# Patient Record
Sex: FEMALE | Race: WHITE | NOT HISPANIC OR LATINO | Employment: OTHER | URBAN - METROPOLITAN AREA
[De-identification: names, ages, dates, MRNs, and addresses within clinical notes are randomized per-mention and may not be internally consistent; named-entity substitution may affect disease eponyms.]

---

## 2017-01-11 ENCOUNTER — LAB REQUISITION (OUTPATIENT)
Dept: LAB | Facility: HOSPITAL | Age: 82
End: 2017-01-11
Payer: COMMERCIAL

## 2017-01-11 DIAGNOSIS — E78.5 HYPERLIPIDEMIA: ICD-10-CM

## 2017-01-11 LAB
ALBUMIN SERPL BCP-MCNC: 3.8 G/DL (ref 3.5–5)
ALP SERPL-CCNC: 59 U/L (ref 46–116)
ALT SERPL W P-5'-P-CCNC: 24 U/L (ref 12–78)
ANION GAP SERPL CALCULATED.3IONS-SCNC: 7 MMOL/L (ref 4–13)
AST SERPL W P-5'-P-CCNC: 20 U/L (ref 5–45)
BILIRUB SERPL-MCNC: 0.6 MG/DL (ref 0.2–1)
BUN SERPL-MCNC: 17 MG/DL (ref 5–25)
CALCIUM SERPL-MCNC: 8.5 MG/DL (ref 8.3–10.1)
CHLORIDE SERPL-SCNC: 102 MMOL/L (ref 100–108)
CHOLEST SERPL-MCNC: 233 MG/DL (ref 50–200)
CO2 SERPL-SCNC: 31 MMOL/L (ref 21–32)
CREAT SERPL-MCNC: 0.74 MG/DL (ref 0.6–1.3)
GFR SERPL CREATININE-BSD FRML MDRD: >60 ML/MIN/1.73SQ M
GLUCOSE SERPL-MCNC: 87 MG/DL (ref 65–140)
HDLC SERPL-MCNC: 85 MG/DL (ref 40–60)
LDLC SERPL CALC-MCNC: 122 MG/DL (ref 0–100)
POTASSIUM SERPL-SCNC: 4.8 MMOL/L (ref 3.5–5.3)
PROT SERPL-MCNC: 6.7 G/DL (ref 6.4–8.2)
SODIUM SERPL-SCNC: 140 MMOL/L (ref 136–145)
TRIGL SERPL-MCNC: 132 MG/DL

## 2017-01-11 PROCEDURE — 80053 COMPREHEN METABOLIC PANEL: CPT | Performed by: INTERNAL MEDICINE

## 2017-01-11 PROCEDURE — 80061 LIPID PANEL: CPT | Performed by: INTERNAL MEDICINE

## 2017-01-13 ENCOUNTER — ALLSCRIPTS OFFICE VISIT (OUTPATIENT)
Dept: OTHER | Facility: OTHER | Age: 82
End: 2017-01-13

## 2017-01-18 ENCOUNTER — GENERIC CONVERSION - ENCOUNTER (OUTPATIENT)
Dept: OTHER | Facility: OTHER | Age: 82
End: 2017-01-18

## 2017-01-26 ENCOUNTER — APPOINTMENT (OUTPATIENT)
Dept: LAB | Facility: MEDICAL CENTER | Age: 82
End: 2017-01-26
Payer: COMMERCIAL

## 2017-01-26 ENCOUNTER — TRANSCRIBE ORDERS (OUTPATIENT)
Dept: ADMINISTRATIVE | Facility: HOSPITAL | Age: 82
End: 2017-01-26

## 2017-01-26 DIAGNOSIS — E03.9 UNSPECIFIED HYPOTHYROIDISM: Primary | ICD-10-CM

## 2017-01-26 LAB — TSH SERPL DL<=0.05 MIU/L-ACNC: 0.2 UIU/ML (ref 0.36–3.74)

## 2017-01-26 PROCEDURE — 84443 ASSAY THYROID STIM HORMONE: CPT | Performed by: INTERNAL MEDICINE

## 2017-01-26 PROCEDURE — 36415 COLL VENOUS BLD VENIPUNCTURE: CPT | Performed by: INTERNAL MEDICINE

## 2017-01-29 ENCOUNTER — GENERIC CONVERSION - ENCOUNTER (OUTPATIENT)
Dept: OTHER | Facility: OTHER | Age: 82
End: 2017-01-29

## 2017-02-21 ENCOUNTER — TRANSCRIBE ORDERS (OUTPATIENT)
Dept: LAB | Facility: HOSPITAL | Age: 82
End: 2017-02-21

## 2017-02-21 ENCOUNTER — OFFICE VISIT (OUTPATIENT)
Dept: LAB | Facility: HOSPITAL | Age: 82
End: 2017-02-21
Attending: INTERNAL MEDICINE
Payer: COMMERCIAL

## 2017-02-21 DIAGNOSIS — E03.9 HYPOTHYROIDISM: ICD-10-CM

## 2017-02-21 DIAGNOSIS — E03.9 UNSPECIFIED HYPOTHYROIDISM: ICD-10-CM

## 2017-02-21 DIAGNOSIS — C05.0 MALIGNANT NEOPLASM OF HARD PALATE (HCC): ICD-10-CM

## 2017-02-21 DIAGNOSIS — E03.9 UNSPECIFIED HYPOTHYROIDISM: Primary | ICD-10-CM

## 2017-02-21 LAB
ALBUMIN SERPL BCP-MCNC: 3.6 G/DL (ref 3.5–5)
ALP SERPL-CCNC: 72 U/L (ref 46–116)
ALT SERPL W P-5'-P-CCNC: 22 U/L (ref 12–78)
ANION GAP SERPL CALCULATED.3IONS-SCNC: 6 MMOL/L (ref 4–13)
AST SERPL W P-5'-P-CCNC: 18 U/L (ref 5–45)
BASOPHILS # BLD AUTO: 0.05 THOUSANDS/ΜL (ref 0–0.1)
BASOPHILS NFR BLD AUTO: 1 % (ref 0–1)
BILIRUB SERPL-MCNC: 0.48 MG/DL (ref 0.2–1)
BUN SERPL-MCNC: 18 MG/DL (ref 5–25)
CALCIUM SERPL-MCNC: 8.6 MG/DL (ref 8.3–10.1)
CHLORIDE SERPL-SCNC: 105 MMOL/L (ref 100–108)
CO2 SERPL-SCNC: 29 MMOL/L (ref 21–32)
CREAT SERPL-MCNC: 0.73 MG/DL (ref 0.6–1.3)
EOSINOPHIL # BLD AUTO: 0.11 THOUSAND/ΜL (ref 0–0.61)
EOSINOPHIL NFR BLD AUTO: 1 % (ref 0–6)
ERYTHROCYTE [DISTWIDTH] IN BLOOD BY AUTOMATED COUNT: 14.3 % (ref 11.6–15.1)
GFR SERPL CREATININE-BSD FRML MDRD: >60 ML/MIN/1.73SQ M
GLUCOSE SERPL-MCNC: 97 MG/DL (ref 65–140)
HCT VFR BLD AUTO: 40 % (ref 34.8–46.1)
HGB BLD-MCNC: 13 G/DL (ref 11.5–15.4)
LYMPHOCYTES # BLD AUTO: 2.12 THOUSANDS/ΜL (ref 0.6–4.47)
LYMPHOCYTES NFR BLD AUTO: 24 % (ref 14–44)
MCH RBC QN AUTO: 29 PG (ref 26.8–34.3)
MCHC RBC AUTO-ENTMCNC: 32.5 G/DL (ref 31.4–37.4)
MCV RBC AUTO: 89 FL (ref 82–98)
MONOCYTES # BLD AUTO: 0.56 THOUSAND/ΜL (ref 0.17–1.22)
MONOCYTES NFR BLD AUTO: 6 % (ref 4–12)
NEUTROPHILS # BLD AUTO: 5.98 THOUSANDS/ΜL (ref 1.85–7.62)
NEUTS SEG NFR BLD AUTO: 68 % (ref 43–75)
NRBC BLD AUTO-RTO: 0 /100 WBCS
PLATELET # BLD AUTO: 223 THOUSANDS/UL (ref 149–390)
PMV BLD AUTO: 11.3 FL (ref 8.9–12.7)
POTASSIUM SERPL-SCNC: 4.3 MMOL/L (ref 3.5–5.3)
PROT SERPL-MCNC: 7.4 G/DL (ref 6.4–8.2)
RBC # BLD AUTO: 4.49 MILLION/UL (ref 3.81–5.12)
SODIUM SERPL-SCNC: 140 MMOL/L (ref 136–145)
TSH SERPL DL<=0.05 MIU/L-ACNC: 0.55 UIU/ML (ref 0.36–3.74)
VENIPUNCTURE: NORMAL
WBC # BLD AUTO: 8.83 THOUSAND/UL (ref 4.31–10.16)

## 2017-02-21 PROCEDURE — 36415 COLL VENOUS BLD VENIPUNCTURE: CPT

## 2017-02-21 PROCEDURE — 80053 COMPREHEN METABOLIC PANEL: CPT

## 2017-02-21 PROCEDURE — 85025 COMPLETE CBC W/AUTO DIFF WBC: CPT

## 2017-02-21 PROCEDURE — 84443 ASSAY THYROID STIM HORMONE: CPT

## 2017-02-27 ENCOUNTER — TRANSCRIBE ORDERS (OUTPATIENT)
Dept: ADMINISTRATIVE | Facility: HOSPITAL | Age: 82
End: 2017-02-27

## 2017-02-27 ENCOUNTER — ALLSCRIPTS OFFICE VISIT (OUTPATIENT)
Dept: OTHER | Facility: OTHER | Age: 82
End: 2017-02-27

## 2017-02-27 DIAGNOSIS — C05.0 MALIGNANT NEOPLASM OF HARD PALATE (HCC): Primary | ICD-10-CM

## 2017-03-13 ENCOUNTER — ALLSCRIPTS OFFICE VISIT (OUTPATIENT)
Dept: OTHER | Facility: OTHER | Age: 82
End: 2017-03-13

## 2017-03-13 DIAGNOSIS — M54.12 RADICULOPATHY OF CERVICAL REGION: ICD-10-CM

## 2017-03-13 DIAGNOSIS — M54.2 CERVICALGIA: ICD-10-CM

## 2017-03-16 ENCOUNTER — HOSPITAL ENCOUNTER (OUTPATIENT)
Dept: RADIOLOGY | Facility: MEDICAL CENTER | Age: 82
Discharge: HOME | End: 2017-03-16
Payer: COMMERCIAL

## 2017-03-16 DIAGNOSIS — M54.2 CERVICALGIA: ICD-10-CM

## 2017-03-16 PROCEDURE — 72050 X-RAY EXAM NECK SPINE 4/5VWS: CPT

## 2017-03-20 ENCOUNTER — GENERIC CONVERSION - ENCOUNTER (OUTPATIENT)
Dept: OTHER | Facility: OTHER | Age: 82
End: 2017-03-20

## 2017-03-23 ENCOUNTER — TRANSCRIBE ORDERS (OUTPATIENT)
Dept: ADMINISTRATIVE | Facility: HOSPITAL | Age: 82
End: 2017-03-23

## 2017-03-23 DIAGNOSIS — M54.12 BRACHIAL NEURITIS OR RADICULITIS NOS: Primary | ICD-10-CM

## 2017-04-04 ENCOUNTER — HOSPITAL ENCOUNTER (OUTPATIENT)
Dept: MRI IMAGING | Facility: HOSPITAL | Age: 82
Discharge: HOME | End: 2017-04-04
Payer: COMMERCIAL

## 2017-04-04 ENCOUNTER — ALLSCRIPTS OFFICE VISIT (OUTPATIENT)
Dept: OTHER | Facility: OTHER | Age: 82
End: 2017-04-04

## 2017-04-04 DIAGNOSIS — M54.12 RADICULOPATHY OF CERVICAL REGION: ICD-10-CM

## 2017-04-04 PROCEDURE — 72141 MRI NECK SPINE W/O DYE: CPT

## 2017-04-05 ENCOUNTER — ALLSCRIPTS OFFICE VISIT (OUTPATIENT)
Dept: OTHER | Facility: OTHER | Age: 82
End: 2017-04-05

## 2017-04-06 ENCOUNTER — GENERIC CONVERSION - ENCOUNTER (OUTPATIENT)
Dept: OTHER | Facility: OTHER | Age: 82
End: 2017-04-06

## 2017-04-24 ENCOUNTER — TRANSCRIBE ORDERS (OUTPATIENT)
Dept: LAB | Facility: CLINIC | Age: 82
End: 2017-04-24

## 2017-04-24 ENCOUNTER — ALLSCRIPTS OFFICE VISIT (OUTPATIENT)
Dept: OTHER | Facility: OTHER | Age: 82
End: 2017-04-24

## 2017-04-24 ENCOUNTER — APPOINTMENT (OUTPATIENT)
Dept: LAB | Facility: CLINIC | Age: 82
End: 2017-04-24
Payer: COMMERCIAL

## 2017-04-24 ENCOUNTER — APPOINTMENT (OUTPATIENT)
Dept: PREADMISSION TESTING | Facility: HOSPITAL | Age: 82
End: 2017-04-24
Payer: COMMERCIAL

## 2017-04-24 DIAGNOSIS — M54.2 CERVICALGIA: ICD-10-CM

## 2017-04-24 DIAGNOSIS — M12.88 OTHER SPECIFIC ARTHROPATHIES, NOT ELSEWHERE CLASSIFIED, OTHER SPECIFIED SITE: ICD-10-CM

## 2017-04-24 DIAGNOSIS — M48.00 SPINAL STENOSIS: ICD-10-CM

## 2017-04-24 DIAGNOSIS — R32 UNSPECIFIED URINARY INCONTINENCE: Primary | ICD-10-CM

## 2017-04-24 DIAGNOSIS — M50.30 OTHER CERVICAL DISC DEGENERATION, UNSPECIFIED CERVICAL REGION: ICD-10-CM

## 2017-04-24 DIAGNOSIS — Z01.818 PREOP EXAMINATION: ICD-10-CM

## 2017-04-24 DIAGNOSIS — M99.81 OTHER BIOMECHANICAL LESIONS OF CERVICAL REGION: ICD-10-CM

## 2017-04-24 DIAGNOSIS — R32 UNSPECIFIED URINARY INCONTINENCE: ICD-10-CM

## 2017-04-24 LAB
ANION GAP SERPL CALCULATED.3IONS-SCNC: 7 MMOL/L (ref 4–13)
APTT PPP: 34 SECONDS (ref 24–36)
BASOPHILS # BLD AUTO: 0.05 THOUSANDS/ΜL (ref 0–0.1)
BASOPHILS NFR BLD AUTO: 1 % (ref 0–1)
BUN SERPL-MCNC: 20 MG/DL (ref 5–25)
CALCIUM SERPL-MCNC: 9.1 MG/DL (ref 8.3–10.1)
CHLORIDE SERPL-SCNC: 102 MMOL/L (ref 100–108)
CO2 SERPL-SCNC: 30 MMOL/L (ref 21–32)
CREAT SERPL-MCNC: 0.77 MG/DL (ref 0.6–1.3)
EOSINOPHIL # BLD AUTO: 0.13 THOUSAND/ΜL (ref 0–0.61)
EOSINOPHIL NFR BLD AUTO: 1 % (ref 0–6)
ERYTHROCYTE [DISTWIDTH] IN BLOOD BY AUTOMATED COUNT: 14.7 % (ref 11.6–15.1)
GFR SERPL CREATININE-BSD FRML MDRD: >60 ML/MIN/1.73SQ M
GLUCOSE SERPL-MCNC: 107 MG/DL (ref 65–140)
HCT VFR BLD AUTO: 40 % (ref 34.8–46.1)
HGB BLD-MCNC: 13 G/DL (ref 11.5–15.4)
INR PPP: 1 (ref 0.86–1.16)
LYMPHOCYTES # BLD AUTO: 2.3 THOUSANDS/ΜL (ref 0.6–4.47)
LYMPHOCYTES NFR BLD AUTO: 24 % (ref 14–44)
MCH RBC QN AUTO: 29.1 PG (ref 26.8–34.3)
MCHC RBC AUTO-ENTMCNC: 32.5 G/DL (ref 31.4–37.4)
MCV RBC AUTO: 90 FL (ref 82–98)
MONOCYTES # BLD AUTO: 0.94 THOUSAND/ΜL (ref 0.17–1.22)
MONOCYTES NFR BLD AUTO: 10 % (ref 4–12)
NEUTROPHILS # BLD AUTO: 6.17 THOUSANDS/ΜL (ref 1.85–7.62)
NEUTS SEG NFR BLD AUTO: 64 % (ref 43–75)
PLATELET # BLD AUTO: 259 THOUSANDS/UL (ref 149–390)
PMV BLD AUTO: 11.1 FL (ref 8.9–12.7)
POTASSIUM SERPL-SCNC: 4 MMOL/L (ref 3.5–5.3)
PROTHROMBIN TIME: 13 SECONDS (ref 12–14.3)
RBC # BLD AUTO: 4.46 MILLION/UL (ref 3.81–5.12)
SODIUM SERPL-SCNC: 139 MMOL/L (ref 136–145)
WBC # BLD AUTO: 9.59 THOUSAND/UL (ref 4.31–10.16)

## 2017-04-24 PROCEDURE — 85610 PROTHROMBIN TIME: CPT

## 2017-04-24 PROCEDURE — 36415 COLL VENOUS BLD VENIPUNCTURE: CPT

## 2017-04-24 PROCEDURE — 80048 BASIC METABOLIC PNL TOTAL CA: CPT

## 2017-04-24 PROCEDURE — 85730 THROMBOPLASTIN TIME PARTIAL: CPT

## 2017-04-24 PROCEDURE — 85025 COMPLETE CBC W/AUTO DIFF WBC: CPT

## 2017-04-26 ENCOUNTER — HOSPITAL ENCOUNTER (EMERGENCY)
Facility: HOSPITAL | Age: 82
Discharge: HOME | End: 2017-04-26
Attending: EMERGENCY MEDICINE
Payer: COMMERCIAL

## 2017-04-26 VITALS
TEMPERATURE: 97.5 F | RESPIRATION RATE: 18 BRPM | DIASTOLIC BLOOD PRESSURE: 81 MMHG | BODY MASS INDEX: 25.61 KG/M2 | SYSTOLIC BLOOD PRESSURE: 169 MMHG | OXYGEN SATURATION: 92 % | WEIGHT: 140 LBS | HEART RATE: 69 BPM

## 2017-04-26 DIAGNOSIS — M54.2 NECK PAIN: Primary | ICD-10-CM

## 2017-04-26 DIAGNOSIS — M62.838 NECK MUSCLE SPASM: ICD-10-CM

## 2017-04-26 PROCEDURE — 99283 EMERGENCY DEPT VISIT LOW MDM: CPT

## 2017-04-26 RX ORDER — LIDOCAINE 50 MG/G
1 PATCH TOPICAL ONCE
Status: DISCONTINUED | OUTPATIENT
Start: 2017-04-26 | End: 2017-04-26 | Stop reason: HOSPADM

## 2017-04-26 RX ORDER — DIAZEPAM 5 MG/1
5 TABLET ORAL ONCE
Status: COMPLETED | OUTPATIENT
Start: 2017-04-26 | End: 2017-04-26

## 2017-04-26 RX ORDER — LIDOCAINE 50 MG/G
1 PATCH TOPICAL ONCE
Qty: 30 PATCH | Refills: 0 | Status: SHIPPED | OUTPATIENT
Start: 2017-04-26 | End: 2018-03-05

## 2017-04-26 RX ADMIN — DIAZEPAM 5 MG: 5 TABLET ORAL at 18:56

## 2017-04-26 RX ADMIN — LIDOCAINE 1 PATCH: 50 PATCH CUTANEOUS at 18:57

## 2017-05-01 ENCOUNTER — ALLSCRIPTS OFFICE VISIT (OUTPATIENT)
Dept: OTHER | Facility: OTHER | Age: 82
End: 2017-05-01

## 2017-05-02 ENCOUNTER — GENERIC CONVERSION - ENCOUNTER (OUTPATIENT)
Dept: OTHER | Facility: OTHER | Age: 82
End: 2017-05-02

## 2017-05-02 ENCOUNTER — APPOINTMENT (OUTPATIENT)
Dept: PHYSICAL THERAPY | Facility: CLINIC | Age: 82
End: 2017-05-02
Payer: COMMERCIAL

## 2017-05-02 DIAGNOSIS — M54.2 CERVICALGIA: ICD-10-CM

## 2017-05-02 DIAGNOSIS — M99.81 OTHER BIOMECHANICAL LESIONS OF CERVICAL REGION: ICD-10-CM

## 2017-05-02 DIAGNOSIS — M50.30 OTHER CERVICAL DISC DEGENERATION, UNSPECIFIED CERVICAL REGION: ICD-10-CM

## 2017-05-02 DIAGNOSIS — M12.88 OTHER SPECIFIC ARTHROPATHIES, NOT ELSEWHERE CLASSIFIED, OTHER SPECIFIED SITE: ICD-10-CM

## 2017-05-02 DIAGNOSIS — M48.00 SPINAL STENOSIS: ICD-10-CM

## 2017-05-02 PROCEDURE — 97162 PT EVAL MOD COMPLEX 30 MIN: CPT

## 2017-05-02 PROCEDURE — G8979 MOBILITY GOAL STATUS: HCPCS

## 2017-05-02 PROCEDURE — 97110 THERAPEUTIC EXERCISES: CPT

## 2017-05-02 PROCEDURE — G8978 MOBILITY CURRENT STATUS: HCPCS

## 2017-05-08 ENCOUNTER — APPOINTMENT (OUTPATIENT)
Dept: PHYSICAL THERAPY | Facility: CLINIC | Age: 82
End: 2017-05-08
Payer: COMMERCIAL

## 2017-05-08 RX ORDER — ACETAMINOPHEN 500 MG
500 TABLET ORAL EVERY 6 HOURS PRN
COMMUNITY
End: 2020-07-15 | Stop reason: ALTCHOICE

## 2017-05-09 ENCOUNTER — APPOINTMENT (OUTPATIENT)
Dept: PHYSICAL THERAPY | Facility: CLINIC | Age: 82
End: 2017-05-09
Payer: COMMERCIAL

## 2017-05-11 ENCOUNTER — GENERIC CONVERSION - ENCOUNTER (OUTPATIENT)
Dept: OTHER | Facility: OTHER | Age: 82
End: 2017-05-11

## 2017-05-12 ENCOUNTER — APPOINTMENT (OUTPATIENT)
Dept: PHYSICAL THERAPY | Facility: CLINIC | Age: 82
End: 2017-05-12
Payer: COMMERCIAL

## 2017-05-12 PROCEDURE — 97140 MANUAL THERAPY 1/> REGIONS: CPT

## 2017-05-12 PROCEDURE — 97110 THERAPEUTIC EXERCISES: CPT

## 2017-05-16 ENCOUNTER — APPOINTMENT (OUTPATIENT)
Dept: PHYSICAL THERAPY | Facility: CLINIC | Age: 82
End: 2017-05-16
Payer: COMMERCIAL

## 2017-05-16 PROCEDURE — 97140 MANUAL THERAPY 1/> REGIONS: CPT

## 2017-05-16 PROCEDURE — 97110 THERAPEUTIC EXERCISES: CPT

## 2017-05-18 ENCOUNTER — ANESTHESIA EVENT (OUTPATIENT)
Dept: PERIOP | Facility: HOSPITAL | Age: 82
End: 2017-05-18
Payer: COMMERCIAL

## 2017-05-19 ENCOUNTER — HOSPITAL ENCOUNTER (OUTPATIENT)
Facility: HOSPITAL | Age: 82
Setting detail: OUTPATIENT SURGERY
Discharge: HOME | End: 2017-05-19
Attending: UROLOGY | Admitting: UROLOGY
Payer: COMMERCIAL

## 2017-05-19 ENCOUNTER — ANESTHESIA (OUTPATIENT)
Dept: PERIOP | Facility: HOSPITAL | Age: 82
End: 2017-05-19
Payer: COMMERCIAL

## 2017-05-19 VITALS
HEIGHT: 62 IN | OXYGEN SATURATION: 92 % | SYSTOLIC BLOOD PRESSURE: 151 MMHG | WEIGHT: 140 LBS | TEMPERATURE: 97.6 F | RESPIRATION RATE: 16 BRPM | HEART RATE: 68 BPM | BODY MASS INDEX: 25.76 KG/M2 | DIASTOLIC BLOOD PRESSURE: 61 MMHG

## 2017-05-19 PROCEDURE — L8606 SYNTHETIC IMPLNT URINARY 1ML: HCPCS | Performed by: UROLOGY

## 2017-05-19 DEVICE — IMPLANTABLE DEVICE: Type: IMPLANTABLE DEVICE | Site: URETHRA | Status: FUNCTIONAL

## 2017-05-19 RX ORDER — ACETAMINOPHEN 325 MG/1
650 TABLET ORAL EVERY 4 HOURS PRN
Status: DISCONTINUED | OUTPATIENT
Start: 2017-05-19 | End: 2017-05-19 | Stop reason: HOSPADM

## 2017-05-19 RX ORDER — FENTANYL CITRATE/PF 50 MCG/ML
25 SYRINGE (ML) INJECTION
Status: DISCONTINUED | OUTPATIENT
Start: 2017-05-19 | End: 2017-05-19 | Stop reason: HOSPADM

## 2017-05-19 RX ORDER — HYDROCODONE BITARTRATE AND ACETAMINOPHEN 5; 325 MG/1; MG/1
1 TABLET ORAL EVERY 4 HOURS PRN
Status: DISCONTINUED | OUTPATIENT
Start: 2017-05-19 | End: 2017-05-19 | Stop reason: HOSPADM

## 2017-05-19 RX ORDER — CIPROFLOXACIN 500 MG/1
500 TABLET, FILM COATED ORAL 2 TIMES DAILY
Qty: 6 TABLET | Refills: 0 | Status: SHIPPED | OUTPATIENT
Start: 2017-05-19 | End: 2017-05-22

## 2017-05-19 RX ORDER — SODIUM CHLORIDE, SODIUM LACTATE, POTASSIUM CHLORIDE, CALCIUM CHLORIDE 600; 310; 30; 20 MG/100ML; MG/100ML; MG/100ML; MG/100ML
50 INJECTION, SOLUTION INTRAVENOUS CONTINUOUS
Status: DISCONTINUED | OUTPATIENT
Start: 2017-05-19 | End: 2017-05-19 | Stop reason: HOSPADM

## 2017-05-19 RX ORDER — HYDROCODONE BITARTRATE AND ACETAMINOPHEN 5; 325 MG/1; MG/1
1 TABLET ORAL EVERY 6 HOURS PRN
Qty: 20 TABLET | Refills: 0 | Status: SHIPPED | OUTPATIENT
Start: 2017-05-19 | End: 2017-05-29

## 2017-05-19 RX ORDER — ONDANSETRON 2 MG/ML
4 INJECTION INTRAMUSCULAR; INTRAVENOUS ONCE
Status: DISCONTINUED | OUTPATIENT
Start: 2017-05-19 | End: 2017-05-19 | Stop reason: HOSPADM

## 2017-05-19 RX ORDER — MAGNESIUM HYDROXIDE 1200 MG/15ML
LIQUID ORAL AS NEEDED
Status: DISCONTINUED | OUTPATIENT
Start: 2017-05-19 | End: 2017-05-19 | Stop reason: HOSPADM

## 2017-05-19 RX ORDER — ONDANSETRON 2 MG/ML
4 INJECTION INTRAMUSCULAR; INTRAVENOUS EVERY 4 HOURS PRN
Status: DISCONTINUED | OUTPATIENT
Start: 2017-05-19 | End: 2017-05-19 | Stop reason: HOSPADM

## 2017-05-19 RX ORDER — MORPHINE SULFATE 4 MG/ML
4 INJECTION, SOLUTION INTRAMUSCULAR; INTRAVENOUS EVERY 4 HOURS PRN
Status: DISCONTINUED | OUTPATIENT
Start: 2017-05-19 | End: 2017-05-19 | Stop reason: HOSPADM

## 2017-05-19 RX ORDER — CIPROFLOXACIN 2 MG/ML
400 INJECTION, SOLUTION INTRAVENOUS ONCE
Status: DISCONTINUED | OUTPATIENT
Start: 2017-05-19 | End: 2017-05-19 | Stop reason: HOSPADM

## 2017-05-19 RX ADMIN — FENTANYL CITRATE 25 MCG: 50 INJECTION INTRAMUSCULAR; INTRAVENOUS at 10:01

## 2017-05-19 RX ADMIN — FENTANYL CITRATE 25 MCG: 50 INJECTION INTRAMUSCULAR; INTRAVENOUS at 10:09

## 2017-05-19 RX ADMIN — SODIUM CHLORIDE, SODIUM LACTATE, POTASSIUM CHLORIDE, AND CALCIUM CHLORIDE: .6; .31; .03; .02 INJECTION, SOLUTION INTRAVENOUS at 09:15

## 2017-05-19 RX ADMIN — CIPROFLOXACIN 400 MG: 2 INJECTION INTRAVENOUS at 09:15

## 2017-05-22 ENCOUNTER — APPOINTMENT (OUTPATIENT)
Dept: PHYSICAL THERAPY | Facility: CLINIC | Age: 82
End: 2017-05-22
Payer: COMMERCIAL

## 2017-05-22 PROCEDURE — 97110 THERAPEUTIC EXERCISES: CPT

## 2017-05-22 PROCEDURE — 97140 MANUAL THERAPY 1/> REGIONS: CPT

## 2017-05-25 ENCOUNTER — APPOINTMENT (OUTPATIENT)
Dept: PHYSICAL THERAPY | Facility: CLINIC | Age: 82
End: 2017-05-25
Payer: COMMERCIAL

## 2017-05-26 ENCOUNTER — APPOINTMENT (OUTPATIENT)
Dept: PHYSICAL THERAPY | Facility: CLINIC | Age: 82
End: 2017-05-26
Payer: COMMERCIAL

## 2017-05-26 PROCEDURE — 97140 MANUAL THERAPY 1/> REGIONS: CPT

## 2017-05-26 PROCEDURE — 97110 THERAPEUTIC EXERCISES: CPT

## 2017-05-30 ENCOUNTER — APPOINTMENT (OUTPATIENT)
Dept: PHYSICAL THERAPY | Facility: CLINIC | Age: 82
End: 2017-05-30
Payer: COMMERCIAL

## 2017-06-01 ENCOUNTER — APPOINTMENT (OUTPATIENT)
Dept: PHYSICAL THERAPY | Facility: CLINIC | Age: 82
End: 2017-06-01
Payer: COMMERCIAL

## 2017-06-01 PROCEDURE — 97140 MANUAL THERAPY 1/> REGIONS: CPT

## 2017-06-01 PROCEDURE — 97110 THERAPEUTIC EXERCISES: CPT

## 2017-06-08 ENCOUNTER — APPOINTMENT (OUTPATIENT)
Dept: LAB | Facility: CLINIC | Age: 82
End: 2017-06-08
Payer: COMMERCIAL

## 2017-06-08 ENCOUNTER — TRANSCRIBE ORDERS (OUTPATIENT)
Dept: LAB | Facility: CLINIC | Age: 82
End: 2017-06-08

## 2017-06-08 ENCOUNTER — ALLSCRIPTS OFFICE VISIT (OUTPATIENT)
Dept: OTHER | Facility: OTHER | Age: 82
End: 2017-06-08

## 2017-06-08 DIAGNOSIS — M12.88 OTHER SPECIFIC ARTHROPATHIES, NOT ELSEWHERE CLASSIFIED, OTHER SPECIFIED SITE: ICD-10-CM

## 2017-06-08 DIAGNOSIS — M79.10 MYALGIA: ICD-10-CM

## 2017-06-08 LAB
25(OH)D3 SERPL-MCNC: 29.6 NG/ML (ref 30–100)
CK MB SERPL-MCNC: 1 % (ref 0–2.5)
CK MB SERPL-MCNC: 2.2 NG/ML (ref 0–5)
CK SERPL-CCNC: 218 U/L (ref 26–192)
CRP SERPL QL: 4.3 MG/L
ERYTHROCYTE [SEDIMENTATION RATE] IN BLOOD: 24 MM/HOUR (ref 0–20)

## 2017-06-08 PROCEDURE — 36415 COLL VENOUS BLD VENIPUNCTURE: CPT

## 2017-06-08 PROCEDURE — 85652 RBC SED RATE AUTOMATED: CPT

## 2017-06-08 PROCEDURE — 82085 ASSAY OF ALDOLASE: CPT

## 2017-06-08 PROCEDURE — 86140 C-REACTIVE PROTEIN: CPT

## 2017-06-08 PROCEDURE — 82553 CREATINE MB FRACTION: CPT

## 2017-06-08 PROCEDURE — 82306 VITAMIN D 25 HYDROXY: CPT

## 2017-06-08 PROCEDURE — 82550 ASSAY OF CK (CPK): CPT

## 2017-06-09 ENCOUNTER — GENERIC CONVERSION - ENCOUNTER (OUTPATIENT)
Dept: OTHER | Facility: OTHER | Age: 82
End: 2017-06-09

## 2017-06-09 LAB — ALDOLASE SERPL-CCNC: 9.1 U/L (ref 3.3–10.3)

## 2017-06-12 ENCOUNTER — GENERIC CONVERSION - ENCOUNTER (OUTPATIENT)
Dept: OTHER | Facility: OTHER | Age: 82
End: 2017-06-12

## 2017-06-29 ENCOUNTER — ALLSCRIPTS OFFICE VISIT (OUTPATIENT)
Dept: OTHER | Facility: OTHER | Age: 82
End: 2017-06-29

## 2017-07-11 ENCOUNTER — APPOINTMENT (OUTPATIENT)
Dept: PHYSICAL THERAPY | Facility: CLINIC | Age: 82
End: 2017-07-11
Payer: COMMERCIAL

## 2017-07-11 ENCOUNTER — APPOINTMENT (OUTPATIENT)
Dept: LAB | Facility: CLINIC | Age: 82
End: 2017-07-11
Payer: COMMERCIAL

## 2017-07-11 ENCOUNTER — ALLSCRIPTS OFFICE VISIT (OUTPATIENT)
Dept: OTHER | Facility: OTHER | Age: 82
End: 2017-07-11

## 2017-07-11 DIAGNOSIS — R32 URINARY INCONTINENCE: ICD-10-CM

## 2017-07-11 DIAGNOSIS — M12.88 OTHER SPECIFIC ARTHROPATHIES, NOT ELSEWHERE CLASSIFIED, OTHER SPECIFIED SITE: ICD-10-CM

## 2017-07-11 LAB
BACTERIA UR QL AUTO: ABNORMAL /HPF
BILIRUB UR QL STRIP: NEGATIVE
CLARITY UR: CLEAR
COLOR UR: YELLOW
GLUCOSE UR STRIP-MCNC: NEGATIVE MG/DL
HGB UR QL STRIP.AUTO: NEGATIVE
HYALINE CASTS #/AREA URNS LPF: ABNORMAL /LPF
KETONES UR STRIP-MCNC: NEGATIVE MG/DL
LEUKOCYTE ESTERASE UR QL STRIP: ABNORMAL
NITRITE UR QL STRIP: NEGATIVE
NON-SQ EPI CELLS URNS QL MICRO: ABNORMAL /HPF
PH UR STRIP.AUTO: 6 [PH] (ref 4.5–8)
PROT UR STRIP-MCNC: ABNORMAL MG/DL
RBC #/AREA URNS AUTO: ABNORMAL /HPF
SP GR UR STRIP.AUTO: 1.02 (ref 1–1.03)
UROBILINOGEN UR QL STRIP.AUTO: 0.2 E.U./DL
WBC #/AREA URNS AUTO: ABNORMAL /HPF

## 2017-07-11 PROCEDURE — 97163 PT EVAL HIGH COMPLEX 45 MIN: CPT

## 2017-07-11 PROCEDURE — G8990 OTHER PT/OT CURRENT STATUS: HCPCS

## 2017-07-11 PROCEDURE — 97014 ELECTRIC STIMULATION THERAPY: CPT

## 2017-07-11 PROCEDURE — 81001 URINALYSIS AUTO W/SCOPE: CPT

## 2017-07-11 PROCEDURE — G8991 OTHER PT/OT GOAL STATUS: HCPCS

## 2017-07-12 ENCOUNTER — GENERIC CONVERSION - ENCOUNTER (OUTPATIENT)
Dept: OTHER | Facility: OTHER | Age: 82
End: 2017-07-12

## 2017-07-18 ENCOUNTER — APPOINTMENT (OUTPATIENT)
Dept: PHYSICAL THERAPY | Facility: CLINIC | Age: 82
End: 2017-07-18
Payer: COMMERCIAL

## 2017-07-18 PROCEDURE — 97014 ELECTRIC STIMULATION THERAPY: CPT

## 2017-07-31 ENCOUNTER — ALLSCRIPTS OFFICE VISIT (OUTPATIENT)
Dept: OTHER | Facility: OTHER | Age: 82
End: 2017-07-31

## 2017-07-31 ENCOUNTER — APPOINTMENT (OUTPATIENT)
Dept: PHYSICAL THERAPY | Facility: REHABILITATION | Age: 82
End: 2017-07-31
Payer: COMMERCIAL

## 2017-07-31 PROCEDURE — G8990 OTHER PT/OT CURRENT STATUS: HCPCS

## 2017-07-31 PROCEDURE — G8991 OTHER PT/OT GOAL STATUS: HCPCS

## 2017-07-31 PROCEDURE — 97161 PT EVAL LOW COMPLEX 20 MIN: CPT

## 2017-07-31 PROCEDURE — 97530 THERAPEUTIC ACTIVITIES: CPT

## 2017-08-08 ENCOUNTER — GENERIC CONVERSION - ENCOUNTER (OUTPATIENT)
Dept: OTHER | Facility: OTHER | Age: 82
End: 2017-08-08

## 2017-08-09 ENCOUNTER — GENERIC CONVERSION - ENCOUNTER (OUTPATIENT)
Dept: OTHER | Facility: OTHER | Age: 82
End: 2017-08-09

## 2017-08-10 ENCOUNTER — GENERIC CONVERSION - ENCOUNTER (OUTPATIENT)
Dept: OTHER | Facility: OTHER | Age: 82
End: 2017-08-10

## 2017-08-10 ENCOUNTER — APPOINTMENT (OUTPATIENT)
Dept: PHYSICAL THERAPY | Facility: REHABILITATION | Age: 82
End: 2017-08-10
Payer: COMMERCIAL

## 2017-08-10 PROCEDURE — 97112 NEUROMUSCULAR REEDUCATION: CPT

## 2017-08-15 ENCOUNTER — ALLSCRIPTS OFFICE VISIT (OUTPATIENT)
Dept: RADIOLOGY | Facility: CLINIC | Age: 82
End: 2017-08-15
Payer: COMMERCIAL

## 2017-08-17 ENCOUNTER — APPOINTMENT (OUTPATIENT)
Dept: LAB | Facility: CLINIC | Age: 82
End: 2017-08-17
Payer: COMMERCIAL

## 2017-08-17 ENCOUNTER — GENERIC CONVERSION - ENCOUNTER (OUTPATIENT)
Dept: OTHER | Facility: OTHER | Age: 82
End: 2017-08-17

## 2017-08-17 ENCOUNTER — ALLSCRIPTS OFFICE VISIT (OUTPATIENT)
Dept: OTHER | Facility: OTHER | Age: 82
End: 2017-08-17

## 2017-08-17 DIAGNOSIS — R74.8 ABNORMAL LEVELS OF OTHER SERUM ENZYMES: ICD-10-CM

## 2017-08-17 DIAGNOSIS — C05.0 MALIGNANT NEOPLASM OF HARD PALATE (HCC): ICD-10-CM

## 2017-08-17 LAB — CK SERPL-CCNC: 105 U/L (ref 26–192)

## 2017-08-17 PROCEDURE — 36415 COLL VENOUS BLD VENIPUNCTURE: CPT

## 2017-08-17 PROCEDURE — 82550 ASSAY OF CK (CPK): CPT

## 2017-08-21 ENCOUNTER — HOSPITAL ENCOUNTER (OUTPATIENT)
Dept: CT IMAGING | Facility: HOSPITAL | Age: 82
Discharge: HOME | End: 2017-08-21
Attending: INTERNAL MEDICINE
Payer: COMMERCIAL

## 2017-08-21 DIAGNOSIS — C05.0 MALIGNANT NEOPLASM OF HARD PALATE (HCC): ICD-10-CM

## 2017-08-21 PROCEDURE — 70490 CT SOFT TISSUE NECK W/O DYE: CPT

## 2017-08-24 ENCOUNTER — APPOINTMENT (OUTPATIENT)
Dept: PHYSICAL THERAPY | Facility: REHABILITATION | Age: 82
End: 2017-08-24
Payer: COMMERCIAL

## 2017-08-24 PROCEDURE — 97112 NEUROMUSCULAR REEDUCATION: CPT

## 2017-08-24 PROCEDURE — 97530 THERAPEUTIC ACTIVITIES: CPT

## 2017-08-28 ENCOUNTER — ALLSCRIPTS OFFICE VISIT (OUTPATIENT)
Dept: OTHER | Facility: OTHER | Age: 82
End: 2017-08-28

## 2017-08-28 ENCOUNTER — TRANSCRIBE ORDERS (OUTPATIENT)
Dept: ADMINISTRATIVE | Facility: HOSPITAL | Age: 82
End: 2017-08-28

## 2017-08-28 DIAGNOSIS — C05.0 MALIGNANT NEOPLASM OF HARD PALATE (HCC): Primary | ICD-10-CM

## 2017-09-08 ENCOUNTER — LAB (OUTPATIENT)
Dept: LAB | Facility: CLINIC | Age: 82
End: 2017-09-08
Payer: COMMERCIAL

## 2017-09-08 DIAGNOSIS — C05.0 MALIGNANT NEOPLASM OF HARD PALATE (HCC): ICD-10-CM

## 2017-09-08 LAB
ALBUMIN SERPL BCP-MCNC: 3.7 G/DL (ref 3.5–5)
ALP SERPL-CCNC: 73 U/L (ref 46–116)
ALT SERPL W P-5'-P-CCNC: 22 U/L (ref 12–78)
ANION GAP SERPL CALCULATED.3IONS-SCNC: 8 MMOL/L (ref 4–13)
AST SERPL W P-5'-P-CCNC: 18 U/L (ref 5–45)
BASOPHILS # BLD AUTO: 0.03 THOUSANDS/ΜL (ref 0–0.1)
BASOPHILS NFR BLD AUTO: 0 % (ref 0–1)
BILIRUB SERPL-MCNC: 0.5 MG/DL (ref 0.2–1)
BUN SERPL-MCNC: 25 MG/DL (ref 5–25)
CALCIUM SERPL-MCNC: 8.7 MG/DL (ref 8.3–10.1)
CHLORIDE SERPL-SCNC: 104 MMOL/L (ref 100–108)
CO2 SERPL-SCNC: 28 MMOL/L (ref 21–32)
CREAT SERPL-MCNC: 0.98 MG/DL (ref 0.6–1.3)
EOSINOPHIL # BLD AUTO: 0.11 THOUSAND/ΜL (ref 0–0.61)
EOSINOPHIL NFR BLD AUTO: 1 % (ref 0–6)
ERYTHROCYTE [DISTWIDTH] IN BLOOD BY AUTOMATED COUNT: 14.1 % (ref 11.6–15.1)
GFR SERPL CREATININE-BSD FRML MDRD: 53 ML/MIN/1.73SQ M
GLUCOSE SERPL-MCNC: 88 MG/DL (ref 65–140)
HCT VFR BLD AUTO: 38.3 % (ref 34.8–46.1)
HGB BLD-MCNC: 12.3 G/DL (ref 11.5–15.4)
LYMPHOCYTES # BLD AUTO: 2.15 THOUSANDS/ΜL (ref 0.6–4.47)
LYMPHOCYTES NFR BLD AUTO: 24 % (ref 14–44)
MCH RBC QN AUTO: 30 PG (ref 26.8–34.3)
MCHC RBC AUTO-ENTMCNC: 32.1 G/DL (ref 31.4–37.4)
MCV RBC AUTO: 93 FL (ref 82–98)
MONOCYTES # BLD AUTO: 0.93 THOUSAND/ΜL (ref 0.17–1.22)
MONOCYTES NFR BLD AUTO: 11 % (ref 4–12)
NEUTROPHILS # BLD AUTO: 5.64 THOUSANDS/ΜL (ref 1.85–7.62)
NEUTS SEG NFR BLD AUTO: 64 % (ref 43–75)
NRBC BLD AUTO-RTO: 0 /100 WBCS
PLATELET # BLD AUTO: 230 THOUSANDS/UL (ref 149–390)
PMV BLD AUTO: 11.5 FL (ref 8.9–12.7)
POTASSIUM SERPL-SCNC: 4.3 MMOL/L (ref 3.5–5.3)
PROT SERPL-MCNC: 7 G/DL (ref 6.4–8.2)
RBC # BLD AUTO: 4.1 MILLION/UL (ref 3.81–5.12)
SODIUM SERPL-SCNC: 140 MMOL/L (ref 136–145)
WBC # BLD AUTO: 8.88 THOUSAND/UL (ref 4.31–10.16)

## 2017-09-08 PROCEDURE — 80053 COMPREHEN METABOLIC PANEL: CPT

## 2017-09-08 PROCEDURE — 36415 COLL VENOUS BLD VENIPUNCTURE: CPT

## 2017-09-08 PROCEDURE — 85025 COMPLETE CBC W/AUTO DIFF WBC: CPT

## 2017-10-18 ENCOUNTER — GENERIC CONVERSION - ENCOUNTER (OUTPATIENT)
Dept: OTHER | Facility: OTHER | Age: 82
End: 2017-10-18

## 2017-10-19 ENCOUNTER — ALLSCRIPTS OFFICE VISIT (OUTPATIENT)
Dept: OTHER | Facility: OTHER | Age: 82
End: 2017-10-19

## 2017-10-19 DIAGNOSIS — I10 ESSENTIAL (PRIMARY) HYPERTENSION: ICD-10-CM

## 2017-10-19 DIAGNOSIS — M85.80 OTHER SPECIFIED DISORDERS OF BONE DENSITY AND STRUCTURE, UNSPECIFIED SITE (CODE): ICD-10-CM

## 2017-10-19 DIAGNOSIS — M99.01 SEGMENTAL AND SOMATIC DYSFUNCTION OF CERVICAL REGION: ICD-10-CM

## 2017-10-23 ENCOUNTER — GENERIC CONVERSION - ENCOUNTER (OUTPATIENT)
Dept: OTHER | Facility: OTHER | Age: 82
End: 2017-10-23

## 2017-10-23 ENCOUNTER — APPOINTMENT (OUTPATIENT)
Dept: PHYSICAL THERAPY | Facility: CLINIC | Age: 82
End: 2017-10-23
Payer: COMMERCIAL

## 2017-10-23 DIAGNOSIS — M99.01 SEGMENTAL AND SOMATIC DYSFUNCTION OF CERVICAL REGION: ICD-10-CM

## 2017-10-23 PROCEDURE — G8982 BODY POS GOAL STATUS: HCPCS

## 2017-10-23 PROCEDURE — G8981 BODY POS CURRENT STATUS: HCPCS

## 2017-10-23 PROCEDURE — 97162 PT EVAL MOD COMPLEX 30 MIN: CPT

## 2017-10-26 ENCOUNTER — APPOINTMENT (OUTPATIENT)
Dept: PHYSICAL THERAPY | Facility: CLINIC | Age: 82
End: 2017-10-26
Payer: COMMERCIAL

## 2017-10-26 PROCEDURE — 97110 THERAPEUTIC EXERCISES: CPT

## 2017-10-30 ENCOUNTER — APPOINTMENT (OUTPATIENT)
Dept: PHYSICAL THERAPY | Facility: CLINIC | Age: 82
End: 2017-10-30
Payer: COMMERCIAL

## 2017-10-30 PROCEDURE — 97110 THERAPEUTIC EXERCISES: CPT

## 2017-10-30 PROCEDURE — 97140 MANUAL THERAPY 1/> REGIONS: CPT

## 2017-11-02 ENCOUNTER — APPOINTMENT (OUTPATIENT)
Dept: PHYSICAL THERAPY | Facility: CLINIC | Age: 82
End: 2017-11-02
Payer: COMMERCIAL

## 2017-11-02 PROCEDURE — 97014 ELECTRIC STIMULATION THERAPY: CPT

## 2017-11-02 PROCEDURE — 97140 MANUAL THERAPY 1/> REGIONS: CPT

## 2017-11-06 ENCOUNTER — ALLSCRIPTS OFFICE VISIT (OUTPATIENT)
Dept: OTHER | Facility: OTHER | Age: 82
End: 2017-11-06

## 2017-11-07 NOTE — PROGRESS NOTES
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)   7  Vitamin D deficiency (268 9) (E55 9)   8  Osteopenia (733 90) (M85 80)    Assessment plan 1  Asthma might help persistence I recommended the patient increase the Advair to 500/50 but the patient declined she will monitor symptoms try to avoid the triggers and his symptoms were to worsen she will notify me  2 benign essential hypertension controlled continue with current medical regimen will check a comprehensive metabolic panel and lipid panel 3  Hallucinations secondary to a eye problem currently stable and no recurrence 4  Hypothyroidism check 3rd generation TSH 5  Malignant neoplasm of the hard palate currently stable patient is seeing Hematology Oncology  6 vitamin-D deficiency check a vitamin-D level  7 osteopenia will check a DEXA scan RTO in 6 months call vein problems  Patient received the high-dose flu vaccine today     Plan  Benign essential hypertension    · (1) COMPREHENSIVE METABOLIC PANEL; Status:Active; Requested KFL:[id number];    · (1) LIPID PANEL, FASTING; Status:Active; Requested for:06Nov2017;   Hypothyroidism    · (Q) TSH, 3RD GENERATION; Status:Active; Requested UDS:[id number];   Osteopenia    · * DXA BONE DENSITY SPINE HIP AND PELVIS; Status:Hold For - Scheduling; Requested  GWB:[id number];   Vitamin D deficiency    · *(Q) VITAMIN D, 25-HYDROXY, LC/MS/MS; Status:Active; Requested OQR:[id number];     Chief Complaint  Chief Complaint Chronic Condition [facility]: Patient is here today for follow up of chronic conditions described in HPI        History of Present Illness  HPI: 20-year-old female who is coming in for a follow-up examination benign essential hypertension, anxiety, asthma, hallucinations; the patient reports me that the after returning she developed cough and fatigue but no fever no chills treatment but symptoms did get better on their own  She does report to me she is following a healthy diet ; the pt reports sad about the political , she gets up to dance , asthma triggers with diesel truck smoke she is trying t o avoid   uses albuterol daily  the hallucination secondary to the eye problem lolly lorraine syndrome the sx resolved no si no depression      Review of Systems  Complete-Female:   Constitutional: No fever, no chills, feels well, no tiredness, no recent weight gain or weight loss  Cardiovascular: No complaints of slow heart rate, no fast heart rate, no chest pain, no palpitations, no leg claudication, no lower extremity edema  Respiratory: No complaints of shortness of breath, no wheezing, no cough, no SOB on exertion, no orthopnea, no PND  Gastrointestinal: No complaints of abdominal pain, no constipation, no nausea or vomiting, no diarrhea, no bloody stools  Genitourinary: No complaints of dysuria, no incontinence, no pelvic pain, no dysmenorrhea, no vaginal discharge or bleeding  ROS Reviewed:   ROS reviewed  Active Problems  1  Abnormal LH level (259 9) (E34 9)   2  Allergy to IVP dye (995 27,E947 4) (T50 995A)   3  Anemia (285 9) (D64 9)   4  History of Aneurysm of aortic root (441 9) (I71 9)   5  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)   6  History of Aortic root dilatation (447 71) (I77 810)   7  Arthritis (716 90) (M19 90)   8  History of Ascending aortic aneurysm (441 2) (I71 2)   9  Asthma (493 90) (J45 909)   10  Atherosclerosis of aorta (440 0) (I70 0)   11  Basal cell carcinoma of skin (173 91) (C44 91)   12  Benign essential hypertension (401 1) (I10)   13  Bloating (787 3) (R14 0)   14  Calf cramp (729 82) (R25 2)   15  Central stenosis of spinal canal (724 00) (M48 00)   16  Cervical disc disorder of mid-cervical region (722 91) (M50 920)   17   Cervical radiculopathy (723 4) (M54 12)   18  Cervical spinal stenosis (723 0) (M48 02)   19  Cervical spondylosis (721 0) (M47 812)   20  Cervicalgia (723 1) (M54 2)   21  Chronic migraine without aura (346 70) (G43 709)   22  Chronic neck pain (723 1,338 29) (M54 2,G89 29)   23  Classic migraine with aura (346 00) (G43 109)   24  Clostridium difficile diarrhea (008 45) (A04 72)   25  Contrast media allergy (V15 08) (Z91 041)   26  Cough (786 2) (R05)   27  DDD (degenerative disc disease), cervical (722 4) (M50 30)   28  Decreased GFR (794 4) (R94 4)   29  Depression (311) (F32 9)   30  Diarrhea (787 91) (R19 7)   31  Difficulty breathing (786 09) (R06 89)   32  Dysphagia (787 20) (R13 10)   33  Elevated CK (790 5) (R74 8)   34  Esophagitis, reflux (530 11) (K21 0)   35  Facet arthropathy, cervical (721 0) (M12 88)   36  Foraminal stenosis of cervical region (723 0) (M99 81)   37  Hallucination (780 1) (R44 3)   38  Head ache (784 0) (R51)   39  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)   40  Hyperlipidemia (272 4) (E78 5)   41  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)   42  Hypophonia (784 49) (R49 8)   43  Hypothyroidism (244 9) (E03 9)   44  History of Junctional rhythm (427 89) (I49 8)   45  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)   46  Migraine aura without headache (migraine equivalents) (346 00) (G43 109)   47  Myalgia (729 1) (M79 1)   48  Myositis of multiple sites, unspecified myositis type (729 1) (M60 9)   49  Myositis of other site, unspecified myositis type (729 1) (M60 9)   50  Neck muscle spasm (728 85) (M62 838)   51  Need for influenza vaccination (V04 81) (Z23)   52  Need for pneumococcal vaccination (V03 82) (Z23)   53  Night sweats (780 8) (R61)   54  Obstructive sleep apnea (327 23) (G47 33)   55  Osteopenia (733 90) (M85 80)   56  Other specified symptom or sign involving circulatory or respiratory system (785 9,786 9) (R09 89)   57  Paroxysmal atrial fibrillation (427 31) (I48 0)   58   History of Pleural effusion, bilateral (511 9) (J90)   59  Polymyalgia rheumatica (725) (M35 3)   60  PPD screening test (V74 1) (Z11 1)   61  Preoperative clearance (V72 84) (Z01 818)   62  Preventive Medicine Estab Patient Checkup Adult Over 59 (V70 0)   63  Restless legs syndrome (333 94) (G25 81)   64  S/P trigger finger release (V45 89) (Z98 890)   65  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)   66  Screening for genitourinary condition (V81 6) (Z13 89)   67  Screening for neurological condition (V80 09) (Z13 89)   68  Shortness of breath (786 05) (R06 02)   69  Somatic dysfunction of cervical region (739 1) (M99 01)   70  Stress incontinence in female (625 6) (N39 3)   71  Stress incontinence, male (788 32) (N39 3)   72  Suicidal overdose (977 9,E950 5) (T50 902A)   73  Tricuspid regurgitation (397 0) (I07 1)   74  Urinary incontinence (788 30) (R32)   75  Urinary tract infection (599 0) (N39 0)   76  Vitamin D deficiency (268 9) (E55 9)   77  Xerostomia (527 7) (R68 2)    Past Medical History  1  Allergy to IVP dye (995 27,E947 4) (T50 995A)   2  History of Aneurysm of aortic root (441 9) (I71 9)   3  History of Aortic root dilatation (447 71) (I77 810)   4  History of Ascending aortic aneurysm (441 2) (I71 2)   5  History of Facet arthropathy, cervical (721 0) (M12 88)   6  History of Fracture Of The Vertebral Column (805 8)   7  History of basal cell carcinoma (V10 83) (Z85 828)   8  History of urinary incontinence (V13 09) (Z87 898)   9  History of urinary incontinence (V13 09) (Z87 898)   10  History of Hypoxia (799 02) (R09 02)   11  History of Junctional rhythm (427 89) (I49 8)   12  History of Lightheadedness (780 4) (R42)   13  History of Palpitations (785 1) (R00 2)   14  History of Pleural effusion, bilateral (511 9) (J90)   15  History of Thyroid trouble (246 9) (E07 9)   16  History of Trigger finger (727 03) (M65 30)   17   History of Trigger middle finger of right hand (727 03) (M65 331)  Active Problems And Past Medical History Reviewed: The active problems and past medical history were reviewed and updated today  Surgical History  1  History of Appendectomy   2  History of Ascending Aortic Aneurysm Repair With Graft   3  Recurrent history of Bladder Surgery   4  History of Cardiac Cath Procedure Summary   5  History of Hysterectomy   6  Preventive Medicine Estab Patient Checkup Adult Over 64 (V70 0)   7  History of Thyroid Surgery Total Thyroidectomy   8  History of Tonsillectomy With Adenoidectomy  Surgical History Reviewed: The surgical history was reviewed and updated today  Family History  Sister    1  Family history of CABG  Family History    2  Family history of Family Health Status Of Father -    3  Family history of Family Health Status Of Mother -    3  Family history of cardiac disorder (V17 49) (Z82 49)   5  Family history of hypertension (V17 49) (Z82 49)   6  Family history of malignant neoplasm (V16 9) (Z80 9)   7  Family history of thyroid disease (V18 19) (Z83 49)  Family History Reviewed: The family history was reviewed and updated today  Social History   · Being A Social Drinker   · Denied: History of Drug Use   · Former smoker (V15 82) (E38 739)   · Marital History -    · Never Drank Alcohol  Social History Reviewed: The social history was reviewed and updated today  The social history was reviewed and is unchanged  Current Meds   1  Advair Diskus 250-50 MCG/DOSE Inhalation Aerosol Powder Breath Activated; INHALE 1 PUFF   TWICE DAILY; Therapy: 67YNR6047 to [phone])  Requested for: 25FNC7662; Last Rx:2017   Ordered   2  Albuterol Sulfate (2 5 MG/3ML) 0 083% Inhalation Nebulization Solution; USE 1 UNIT DOSE EVERY   4-6 HOURS AS NEEDED FOR WHEEZING ; Therapy: 76KGX7459 to (Last Rx:2017)  Requested for: 30JCZ7052 Ordered   3  AmLODIPine Besylate 5 MG Oral Tablet; TAKE 1 TABLET BY MOUTH EVERY DAY;    Therapy: 34JJN3612 to (Evaluate:65Iwk5479)  Requested for: 25Oct2017; Last Rx:71Dnw6340   Ordered   4  Aspirin 81 MG TABS; TAKE 1 TABLET DAILY; Therapy: (Recorded:06Igk2971) to  Requested for: 91PFY4627 Recorded   5  Calcium CAPS; Therapy: (Recorded:08Jun2017) to Recorded   6  Levothyroxine Sodium 100 MCG Oral Tablet; TAKE 1 TABLET DAILY AS DIRECTED; Therapy: 70DLD4829 to (Rula Walden)  Requested for: 62NEI2344; Last Rx:06Mar2017   Ordered   7  Lovastatin 20 MG Oral Tablet; TAKE 1 TABLET DAILY AS DIRECTED; Therapy: 49HGT4361 to (Cherrie Jones)  Requested for: 63POG6745; Last Rx:05Oct2016   Ordered   8  Metoprolol Tartrate 25 MG Oral Tablet; Take 1 tablet twice daily; Therapy: 60ZER2842 to (Evaluate:30Mar2018)  Requested for: 95HJB1563; Last Rx:04Apr2017   Ordered   9  Multi-Vitamin Oral Tablet; TAKE 1 TABLET DAILY; Therapy: 82GQM7323 to Recorded   10  Peak Flow Meter Device; USE AS DIRECTED; Therapy: 37ASC0905 to (Last Rx:13Jan2017)  Requested for: 28LBG1003 Ordered   11  PreserVision AREDS Oral Capsule; Take 1 capsule twice daily; Therapy: 63Lok7876 to Recorded   12  Tylenol Extra Strength 500 MG Oral Tablet; Therapy: (Recorded:08Jun2017) to Recorded   13  Ventolin  (90 Base) MCG/ACT Inhalation Aerosol Solution; INHALE 2 PUFFS EVERY 4-6    HOURS, SPACED 60 SECONDS APART; Therapy: 01KMX0999 to (Last Rx:10Apr2017)  Requested for: 76Njo3564 Ordered   14  Vitamin D CAPS; Therapy: (Recorded:08Jun2017) to Recorded  Medication List Reviewed: The medication list was reviewed and updated today  Allergies  1  Amiodarone HCl TABS   2  Iodinated Contrast Media   3  Naprosyn TABS   4  Sulfa Drugs   5  Clindamycin   6   Keflex TABS  7  IV Dye    Vitals  Vital Signs    Recorded: 39MGW4473 01:43PM   Heart Rate 71   Respiration 16   Systolic 353, LUE, Sitting   Diastolic 82, LUE, Sitting   Height 5 ft 2 in   Weight 140 lb    BMI Calculated 25 61   BSA Calculated 1 64   O2 Saturation 95     Physical Exam    Constitutional   General appearance: No acute distress, well appearing and well nourished  Eyes   Conjunctiva and lids: No swelling, erythema or discharge  Pupils and irises: Equal, round and reactive to light  Ears, Nose, Mouth, and Throat   External inspection of ears and nose: Normal     Otoscopic examination: Tympanic membranes translucent with normal light reflex  Canals patent without erythema  Nasal mucosa, septum, and turbinates: Normal without edema or erythema  Oropharynx: Normal with no erythema, edema, exudate or lesions  Pulmonary   Respiratory effort: No increased work of breathing or signs of respiratory distress  Auscultation of lungs: Clear to auscultation  Cardiovascular   Auscultation of heart: Normal rate and rhythm, normal S1 and S2, without murmurs  Examination of extremities for edema and/or varicosities: Normal     Abdomen   Abdomen: Non-tender, no masses  Liver and spleen: No hepatomegaly or splenomegaly  Lymphatic   Palpation of lymph nodes in neck: No lymphadenopathy  Psychiatric   Mood and affect: Normal          Results/Data  PHQ-9 Adult Depression Screening 05OXY4963 01:45PM User, [facility]     Test Name Result Flag Reference   PHQ-9 Adult Depression Score 0     Over the last two weeks, how often have you been bothered by any of the following problems? Little interest or pleasure in doing things: Not at all - 0  Feeling down, depressed, or hopeless: Not at all - 0  Trouble falling or staying asleep, or sleeping too much: Not at all - 0  Feeling tired or having little energy: Not at all - 0  Poor appetite or over eating: Not at all - 0  Feeling bad about yourself - or that you are a failure or have let yourself or your family down: Not at all - 0  Trouble concentrating on things, such as reading the newspaper or watching television: Not at all - 0  Moving or speaking so slowly that other people could have noticed   Or the opposite -  being so fidgety or restless that you have been moving around a lot more than usual: Not at all - 0  Thoughts that you would be better off dead, or of hurting yourself in some way: Not at all - 0   PHQ-9 Adult Depression Screening Negative     PHQ-9 Difficulty Level Not difficult at all     PHQ-9 Severity No Depression       (1) CBC/PLT/DIFF 26Zwa6018 11:13AM [facility] Number: [id number]     Test Name Result Flag Reference   WBC COUNT 8 88 Thousand/uL  4 31-10 16   RBC COUNT 4 10 Million/uL  3 81-5 12   HEMOGLOBIN 12 3 g/dL  11 5-15 4   HEMATOCRIT 38 3 %  34 8-46  1   MCV 93 fL  82-98   MCH 30 0 pg  26 8-34 3   MCHC 32 1 g/dL  31 4-37 4   RDW 14 1 %  11 6-15 1   MPV 11 5 fL  8 9-12 7   PLATELET COUNT 370 Thousands/uL  149-390   nRBC AUTOMATED 0 /100 WBCs     NEUTROPHILS RELATIVE PERCENT 64 %  43-75   LYMPHOCYTES RELATIVE PERCENT 24 %  14-44   MONOCYTES RELATIVE PERCENT 11 %  4-12   EOSINOPHILS RELATIVE PERCENT 1 %  0-6   BASOPHILS RELATIVE PERCENT 0 %  0-1   NEUTROPHILS ABSOLUTE COUNT 5 64 Thousands/? ??L  1 85-7 62   LYMPHOCYTES ABSOLUTE COUNT 2 15 Thousands/? ??L  0 60-4 47   MONOCYTES ABSOLUTE COUNT 0 93 Thousand/? ??L  0 17-1 22   EOSINOPHILS ABSOLUTE COUNT 0 11 Thousand/? ??L  0 00-0 61   BASOPHILS ABSOLUTE COUNT 0 03 Thousands/? ??L  0 00-0 10     (1) COMPREHENSIVE METABOLIC PANEL 16XMF8301 28:70SR [facility] Order Number: [id number]     Test Name Result Flag Reference   GLUCOSE,RANDM 88 mg/dL     If the patient is fasting, the ADA then defines impaired fasting glucose as > 100 mg/dL and diabetes as > or equal to 123 mg/dL  Specimen collection should occur prior to Sulfasalazine administration due to the potential for falsely depressed results  Specimen collection should occur prior to Sulfapyridine administration due to the potential for falsely elevated results     SODIUM 140 mmol/L  136-145   POTASSIUM 4 3 mmol/L  3 5-5 3   CHLORIDE 104 mmol/L  100-108   CARBON DIOXIDE 28 mmol/L  21-32   ANION GAP (CALC) 8 mmol/L  4-13   BLOOD UREA NITROGEN 25 mg/dL  5-25   CREATININE 0 98 mg/dL  0 60-1 30   Standardized to IDMS reference method   CALCIUM 8 7 mg/dL  8 3-10 1   BILI, TOTAL 0 50 mg/dL  0 20-1 00   ALK PHOSPHATAS 73 U/L     ALT (SGPT) 22 U/L  12-78   Specimen collection should occur prior to Sulfasalazine and/or Sulfapyridine administration due to the potential for falsely depressed results  AST(SGOT) 18 U/L  5-45   Specimen collection should occur prior to Sulfasalazine administration due to the potential for falsely depressed results  ALBUMIN 3 7 g/dL  3 5-5 0   TOTAL PROTEIN 7 0 g/dL  6 4-8 2   eGFR 53 ml/min/1 73sq m     [facility] Disease Education Program recommendations are as follows:  GFR calculation is accurate only with a steady state creatinine  Chronic Kidney disease less than 60 ml/min/1 73 sq  meters  Kidney failure less than 15 ml/min/1 73 sq  meters  CT SOFT TISSUE NECK WO CONTRAST 47Jie8275 12:49PM David Polk [facility] Reusing)     Test Name Result Flag Reference   CT SOFT TISSUE NECK WO CONTRAST (Report)     CT SOFT TISSUE NECK WITHOUT CONTRAST     INDICATION: Follow-up carcinoma  COMPARISON: MRI of the soft tissues of the neck dated 10/11/2016  TECHNIQUE: Contiguous 2 5 mm images were obtained through the neck  In addition, sagittal and coronal reformatted images were submitted for interpretation  Radiation dose length product (DLP) for this visit: 469 mGy-cm   This examination, like all CT scans performed in the [facility], was performed utilizing techniques to minimize radiation dose exposure, including the use of iterative    reconstruction and automated exposure control  IMAGE QUALITY: Diagnostic  FINDINGS:     VISUALIZED BRAIN PARENCHYMA: Normal visualized brain parenchyma  VISUALIZED ORBITS AND PARANASAL SINUSES: Unremarkable orbits   Small retention cyst within the anterior inferior aspect of the right maxillary sinus  NASAL CAVITY AND NASOPHARYNX: Normal      SUPRAHYOID NECK: Unremarkable tongue musculature  Once again identified is a focal soft tissue mass within the right posterior lateral aspect of the hard palate and maxilla eroding the cortex  Mass measures approximately 2 cm in AP and transverse    diameter  There is thinning of the bone of the hard palate  Despite bending, the bone is intact with no extension into the adjacent inferior right nasal cavity or maxillary sinus  Normal lingual and palatine tonsils  Normal vallecula and epiglottis  Normal infratemporal fossa  INFRAHYOID NECK: Aryepiglottic folds and piriform sinuses  Normal larynx and subglottic airway  THYROID GLAND:    Normal      PAROTID AND SUBMANDIBULAR GLANDS: Normal      LYMPH NODES: No pathologic or enlarged adenopathy  VASCULAR STRUCTURES: Aneurysmal dilatation of the aortic arch which measures 4 3 cm superiorly  There is mild atherosclerotic change  THORACIC INLET: Mild chronic pleural and parenchymal change at the lung apices  BONY STRUCTURES: Stable bony structures  Exaggerated upper thoracic kyphosis  Degenerative endplate changes at W2-3  Mild chronic compression fractures of T2 and T3  IMPRESSION:     Stable soft tissue mass within the posterior lateral aspect of the right hard palate extending into the maxilla with erosion of the adjacent bone  This mass measures approximately 2 cm x 2 cm and is grossly unchanged in size and configuration  No    pathologic adenopathy identified on this limited noncontrast exam      Aneurysmal dilatation of the aortic arch  Workstation performed: [id number]     Signed by:   Marj Dockery DO   8/22/17     Health Management  Health Maintenance   Medicare Annual Wellness Visit; every 1 year; Next Due: 72Tyf2041; Overdue    Future Appointments    Date/Time Provider Specialty Site   03/05/2018 03:30 PM MYRIAM Lam [facility] [place]   11/30/2017 01:20 PM Steph Lopez, [address] [facility]   12/06/2017 03:20 PM Luly Moreno MD Cardiology  [facility]   12/11/2017 09:45 AM Oc Porter [facility] [address] [facility]     Signatures   Electronically signed by : Zoraida Metzger DO; Nov 6 2017  2:28PM EST                       (Author)

## 2017-11-13 ENCOUNTER — LAB (OUTPATIENT)
Dept: LAB | Facility: CLINIC | Age: 82
End: 2017-11-13
Payer: COMMERCIAL

## 2017-11-13 ENCOUNTER — GENERIC CONVERSION - ENCOUNTER (OUTPATIENT)
Dept: OTHER | Facility: OTHER | Age: 82
End: 2017-11-13

## 2017-11-13 DIAGNOSIS — E78.5 HYPERLIPIDEMIA: ICD-10-CM

## 2017-11-13 DIAGNOSIS — E03.9 MYXEDEMA HEART DISEASE: Primary | ICD-10-CM

## 2017-11-13 DIAGNOSIS — I51.9 MYXEDEMA HEART DISEASE: Primary | ICD-10-CM

## 2017-11-13 LAB
25(OH)D3 SERPL-MCNC: 35.2 NG/ML (ref 30–100)
ALBUMIN SERPL BCP-MCNC: 3.8 G/DL (ref 3.5–5)
ALP SERPL-CCNC: 70 U/L (ref 46–116)
ALT SERPL W P-5'-P-CCNC: 17 U/L (ref 12–78)
ANION GAP SERPL CALCULATED.3IONS-SCNC: 9 MMOL/L (ref 4–13)
AST SERPL W P-5'-P-CCNC: 18 U/L (ref 5–45)
BILIRUB SERPL-MCNC: 0.48 MG/DL (ref 0.2–1)
BUN SERPL-MCNC: 22 MG/DL (ref 5–25)
CALCIUM SERPL-MCNC: 8.7 MG/DL (ref 8.3–10.1)
CHLORIDE SERPL-SCNC: 106 MMOL/L (ref 100–108)
CHOLEST SERPL-MCNC: 190 MG/DL (ref 50–200)
CO2 SERPL-SCNC: 27 MMOL/L (ref 21–32)
CREAT SERPL-MCNC: 0.78 MG/DL (ref 0.6–1.3)
GFR SERPL CREATININE-BSD FRML MDRD: 70 ML/MIN/1.73SQ M
GLUCOSE P FAST SERPL-MCNC: 96 MG/DL (ref 65–99)
HDLC SERPL-MCNC: 64 MG/DL (ref 40–60)
LDLC SERPL CALC-MCNC: 105 MG/DL (ref 0–100)
POTASSIUM SERPL-SCNC: 4.3 MMOL/L (ref 3.5–5.3)
PROT SERPL-MCNC: 7.5 G/DL (ref 6.4–8.2)
SODIUM SERPL-SCNC: 142 MMOL/L (ref 136–145)
TRIGL SERPL-MCNC: 105 MG/DL
TSH SERPL DL<=0.05 MIU/L-ACNC: 2.62 UIU/ML (ref 0.36–3.74)

## 2017-11-13 PROCEDURE — 80053 COMPREHEN METABOLIC PANEL: CPT

## 2017-11-13 PROCEDURE — 36415 COLL VENOUS BLD VENIPUNCTURE: CPT

## 2017-11-13 PROCEDURE — 84443 ASSAY THYROID STIM HORMONE: CPT

## 2017-11-13 PROCEDURE — 82306 VITAMIN D 25 HYDROXY: CPT

## 2017-11-13 PROCEDURE — 80061 LIPID PANEL: CPT

## 2017-11-30 ENCOUNTER — ALLSCRIPTS OFFICE VISIT (OUTPATIENT)
Dept: OTHER | Facility: OTHER | Age: 82
End: 2017-11-30

## 2017-12-04 ENCOUNTER — GENERIC CONVERSION - ENCOUNTER (OUTPATIENT)
Dept: INTERNAL MEDICINE CLINIC | Facility: CLINIC | Age: 82
End: 2017-12-04

## 2017-12-06 ENCOUNTER — ALLSCRIPTS OFFICE VISIT (OUTPATIENT)
Dept: OTHER | Facility: OTHER | Age: 82
End: 2017-12-06

## 2017-12-07 ENCOUNTER — HOSPITAL ENCOUNTER (OUTPATIENT)
Dept: RADIOLOGY | Age: 82
Discharge: HOME | End: 2017-12-07
Payer: COMMERCIAL

## 2017-12-07 ENCOUNTER — TRANSCRIBE ORDERS (OUTPATIENT)
Dept: ADMINISTRATIVE | Facility: HOSPITAL | Age: 82
End: 2017-12-07

## 2017-12-07 DIAGNOSIS — M85.80 OTHER SPECIFIED DISORDERS OF BONE DENSITY AND STRUCTURE, UNSPECIFIED SITE (CODE): ICD-10-CM

## 2017-12-07 DIAGNOSIS — R06.89 HYPOVENTILATION, IDIOPATHIC: Primary | ICD-10-CM

## 2017-12-07 DIAGNOSIS — I10 ESSENTIAL HYPERTENSION, MALIGNANT: ICD-10-CM

## 2017-12-07 PROCEDURE — 77080 DXA BONE DENSITY AXIAL: CPT

## 2017-12-08 NOTE — PROGRESS NOTES
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 9)   2  History of Aortic root dilatation (447 71) (I77 810)   3  History of Ascending aortic aneurysm (441 2) (I71 2)   4  Benign essential hypertension (401 1) (I10)   5  Hyperlipidemia (272 4) (E78 5)   6  Paroxysmal atrial fibrillation (427 31) (I48 0)   7  CASTELLANO (dyspnea on exertion) (786 09) (R06 09)    Plan  Benign essential hypertension, Health Maintenance, Paroxysmal atrial fibrillation    · EKG/ECG- POC; Status:Complete;   Done: 40RYD2776   Perform: In Office; [phone]; Last Updated Oly Hernandez; 12/6/2017 3:24:44 PM;Ordered;essential hypertension, Health Maintenance, Paroxysmal atrial fibrillation; Ordered By:Steve Sandoval;  PMH: Aneurysm of aortic root, Benign essential hypertension, CASTELLANO (dyspnea onexertion), Paroxysmal atrial fibrillation    · Follow-up visit in 6 months Evaluation and Treatment  Follow-up  Status: Complete Done: 31SFL3277   Ordered;PMH: Aneurysm of aortic root, Benign essential hypertension, CASTELLANO (dyspnea on exertion), Paroxysmal atrial fibrillation; Ordered By: George Alvarado Performed:  Due: 05IOY5977; Last Updated By: Yoni Cantor; 12/6/2017 4:03:21 PM   · ECHO COMPLETE WITH CONTRAST IF INDICATED; Status:Need Information - FinancialAuthorization; Requested for:[id number];    Perform:[facility]; YJU:52NJH1989; Last Updated By:Manuel Luke; 12/6/2017 4:03:21 PM;Ordered; Aneurysm of aortic root, Benign essential hypertension, CASTELLANO (dyspnea on exertion), Paroxysmal atrial fibrillation; Ordered By:Steve Sandoval;    Discussion/Summary  Cardiology Discussion Summary Free Text Note Form [facility]:   Dear Dr Archie Howell ,  returns to see me at the [place] office  Aortic aneurysm - status post aortic aneurysm repair 2/15 - controlled  - occasional palpitations persist  I suggested to baby aspirin daily, she has refused Coumadin because of prior nasopharyngeal bleeding - still a problem  Has not improved   Walking up jomar is difficult  - continues on lovastatin with good lipid control  cancer - stable    ongoing dyspnea on exertion with an updated echocardiogram, last done in 2015 has been a slight change on her EKG with some evidence of right axis deviation  Otherwise no ischemic changes  She did have very mild MR/AI in 2015  She had no evidence of CAD on a cardiac catheterization prior to her ascending aorta at repair  Chief Complaint  Chief Complaint Free Text Note Form: Patient is here today for 6 mo f/u  Patient c/o SOB, occ palpations, Headaches  EKG today  Chief Complaint Chronic Condition [facility]: Patient is here today for follow up of chronic conditions described in HPI  History of Present Illness  Cardiology HPI Free Text Note Form [facility]: Buffy has a history of aortic aneurysm, status post repair with a history of paroxysmal atrial fibrillation but 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  She has hypertension, blood pressures appear nicely controlled  She remains in sinus rhythm  Her main complaint today is that of fairly stable class 2 dyspnea on exertion she denies orthopnea, PND, palpitations, lower extremity edema  She denies any syncope  Active Problems  Problems    1  Abnormal LH level (259 9) (E34 9)   2  Allergy to IVP dye (995 27,E947 4) (T50 995A)   3  Anemia (285 9) (D64 9)   4  History of Aneurysm of aortic root (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 9) (I71 9)   5  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)   6  History of Aortic root dilatation (447 71) (I77 810)   7  Arthritis (716 90) (M19 90)   8  History of Ascending aortic aneurysm (441 2) (I71 2)   9  Asthma (493 90) (J45 909)   10  Atherosclerosis of aorta (440 0) (I70 0)   11  Basal cell carcinoma of skin (173 91) (C44 91)   12  Benign essential hypertension (401 1) (I10)   13  Bloating (787 3) (R14 0)   14  Calf cramp (729 82) (R25 2)   15  Central stenosis of spinal canal (724 00) (M48 00)   16   Cervical disc disorder of mid-cervical region (722 91) (M50 920)   17  Cervical radiculopathy (723 4) (M54 12)   18  Cervical spinal stenosis (723 0) (M48 02)   19  Cervical spondylosis (721 0) (M47 812)   20  Cervicalgia (723 1) (M54 2)   21  Chronic migraine without aura (346 70) (G43 709)   22  Chronic neck pain (723 1,338 29) (M54 2,G89 29)   23  Classic migraine with aura (346 00) (G43 109)   24  Clostridium difficile diarrhea (008 45) (A04 72)   25  Contrast media allergy (V15 08) (Z91 041)   26  Cough (786 2) (R05)   27  DDD (degenerative disc disease), cervical (722 4) (M50 30)   28  Decreased GFR (794 4) (R94 4)   29  Depression (311) (F32 9)   30  Diarrhea (787 91) (R19 7)   31  Difficulty breathing (786 09) (R06 89)   32  Dysphagia (787 20) (R13 10)   33  Elevated CK (790 5) (R74 8)   34  Esophagitis, reflux (530 11) (K21 0)   35  Facet arthropathy, cervical (721 0) (M46 92)   36  Foraminal stenosis of cervical region (723 0) (M99 81)   37  Hallucination (780 1) (R44 3)   38  Head ache (784 0) (R51)   39  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)   40  Hyperlipidemia (272 4) (E78 5)   41  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)   42  Hypophonia (784 49) (R49 8)   43  Hypothyroidism (244 9) (E03 9)   44  History of Junctional rhythm (427 89) (I49 8)   45  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)   46  Migraine aura without headache (migraine equivalents) (346 00) (G43 109)   47  Myalgia (729 1) (M79 1)   48  Myositis of multiple sites, unspecified myositis type (729 1) (M60 9)   49  Myositis of other site, unspecified myositis type (729 1) (M60 9)   50  Neck muscle spasm (728 85) (M62 838)   51  Need for influenza vaccination (V04 81) (Z23)   52  Need for pneumococcal vaccination (V03 82) (Z23)   53  Night sweats (780 8) (R61)   54  Obstructive sleep apnea (327 23) (G47 33)   55  Osteopenia (733 90) (M85 80)   56  Other specified symptom or sign involving circulatory or respiratory system (785 9,786  9) (R09 89)   57  Paroxysmal atrial fibrillation (427 31) (I48 0)   58  History of Pleural effusion, bilateral (511 9) (J90)   59  Polymyalgia rheumatica (725) (M35 3)   60  PPD screening test (V74 1) (Z11 1)   61  Preoperative clearance (V72 84) (Z01 818)   62  Preventive Medicine Estab Patient Checkup Adult Over 59 (V70 0)   63  Restless legs syndrome (333 94) (G25 81)   64  S/P trigger finger release (V45 89) (Z98 890)   65  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)   66  Screening for genitourinary condition (V81 6) (Z13 89)   67  Screening for neurological condition (V80 09) (Z13 89)   68  Somatic dysfunction of cervical region (739 1) (M99 01)   69  Stress incontinence in female (625 6) (N39 3)   70  Stress incontinence, male (788 32) (N39 3)   70  Suicidal overdose (977 9,E950 5) (T50 902A)   72  Tricuspid regurgitation (397 0) (I07 1)   73  Urinary incontinence (788 30) (R32)   74  Urinary tract infection (599 0) (N39 0)   75  Vitamin D deficiency (268 9) (E55 9)   76  Xerostomia (527 7) (R68 2)    Past Medical History  Problems    1  Allergy to IVP dye (995 27,E947 4) (T50 995A)   2  History of Aneurysm of aortic root (441 9) (I71 9)   3  History of Aortic root dilatation (447 71) (I77 810)   4  History of Ascending aortic aneurysm (441 2) (I71 2)   5  History of Facet arthropathy, cervical (721 0) (M46 92)   6  History of Fracture Of The Vertebral Column (805 8)   7  History of basal cell carcinoma (V10 83) (Z85 828)   8  History of shortness of breath (V13 89) (Z87 898)   9  History of urinary incontinence (V13 09) (Z87 898)   10  History of urinary incontinence (V13 09) (Z87 898)   11  History of Hypoxia (799 02) (R09 02)   12  History of Junctional rhythm (427 89) (I49 8)   13  History of Lightheadedness (780 4) (R42)   14  History of Palpitations (785 1) (R00 2)   15  History of Pleural effusion, bilateral (511 9) (J90)   16  History of Thyroid trouble (246 9) (E07 9)   17   History of Trigger finger (727 03) (M65 30)   18  History of Trigger middle finger of right hand (727 03) (M65 331)  Active Problems And Past Medical History Reviewed: The active problems and past medical history were reviewed and updated today  Surgical History  Problems    1  History of Appendectomy   2  History of Ascending Aortic Aneurysm Repair With Graft   3  Recurrent history of Bladder Surgery   4  History of Cardiac Cath Procedure Summary   5  History of Hysterectomy   6  Preventive Medicine Estab Patient Checkup Adult Over 64 (V70 0)   7  History of Thyroid Surgery Total Thyroidectomy   8  History of Tonsillectomy With Adenoidectomy    Family History  Sister    1  Family history of CABG  Family History    2  Family history of Family Health Status Of Father -    3  Family history of Family Health Status Of Mother -    3  Family history of cardiac disorder (V17 49) (Z82 49)   5  Family history of hypertension (V17 49) (Z82 49)   6  Family history of malignant neoplasm (V16 9) (Z80 9)   7  Family history of thyroid disease (V18 19) (Z83 49)    Social History  Problems    · Being A Social Drinker   · Denied: History of Drug Use   · Former smoker (V15 82) (H68 772)   · Marital History -    · Never Drank Alcohol  Social History Reviewed: The social history was reviewed and updated today  The social history was reviewed and is unchanged  Current Meds   1  Advair Diskus 250-50 MCG/DOSE Inhalation Aerosol Powder Breath Activated; INHALE 1 PUFF TWICE DAILY; Therapy: 77HBY4430 to 22 162189)  Requested for: 07HBJ5540; Last Rx:2017 Ordered   2  Albuterol Sulfate (2 5 MG/3ML) 0 083% Inhalation Nebulization Solution; USE 1 UNIT DOSE EVERY 4-6 HOURS AS NEEDED FOR WHEEZING ; Therapy: 56JNI5229 to (Last Rx:2017)  Requested for: 14DOX5764 Ordered   3  AmLODIPine Besylate 5 MG Oral Tablet; TAKE 1 TABLET BY MOUTH EVERY DAY;  Therapy: 52UYS9183 to (Evaluate:05Naa5124)  Requested for: 2017; Last Rx: 30XIW0084 Ordered   4  Aspirin 81 MG TABS; take 2 tablet daily; Therapy: (Recorded:47Hox7933) to  Requested for: 22AMA6377 Recorded   5  Calcium CAPS; Therapy: (Recorded:08Jun2017) to Recorded   6  Levothyroxine Sodium 100 MCG Oral Tablet; TAKE 1 TABLET DAILY AS DIRECTED; Therapy: 79LJR9388 to (Evaluate:10Nov2018)  Requested for: 95UYE0626; Last Rx:68Fpw9736 Ordered   7  Lovastatin 20 MG Oral Tablet; TAKE 1 TABLET DAILY AS DIRECTED; Therapy: 57ANJ6506 to ([place] [place])  Requested for: 12NZO7284; Last Rx:09Nov2017 Ordered   8  Metoprolol Tartrate 25 MG Oral Tablet; Take 1 tablet twice daily; Therapy: 11DQQ1006 to (Evaluate:30Mar2018)  Requested for: 07USV5835; Last Rx:36Pxw2607 Ordered   9  Multi-Vitamin Oral Tablet; TAKE 1 TABLET DAILY; Therapy: 46MCM4332 to Recorded   10  Peak Flow Meter Device; USE AS DIRECTED; Therapy: 69GQU0529 to (Last Rx:13Jan2017)  Requested for: 29CTB9823 Ordered   11  PreserVision AREDS Oral Capsule; Take 1 capsule twice daily; Therapy: 53Odx3810 to Recorded   12  Tylenol Extra Strength 500 MG Oral Tablet; Therapy: (Recorded:08Jun2017) to Recorded   13  Ventolin  (90 Base) MCG/ACT Inhalation Aerosol Solution; INHALE 2 PUFFS  EVERY 4-6 HOURS, SPACED 60 SECONDS APART; Therapy: 46RPK0999 to (Last Rx:98Pxs7166)  Requested for: 01Mcd9905 Ordered   14  Vitamin D CAPS; Therapy: (Recorded:08Jun2017) to Recorded  Medication List Reviewed: The medication list was reviewed and updated today  Allergies  Medication    1  Amiodarone HCl TABS   2  Iodinated Contrast Media   3  Naprosyn TABS   4  Sulfa Drugs   5  Clindamycin   6  Keflex TABS  Non-Medication    7   IV Dye    Vitals  Vital Signs    Recorded: 07KJG4222 03:19PM   Heart Rate 70, Apical   Systolic 068, LUE, Sitting   Diastolic 86, LUE, Sitting   Height 5 ft 2 in   Weight 140 lb 3 oz   BMI Calculated 25 64   BSA Calculated 1 64       Physical Exam   Constitutional  General appearance: No acute distress, well appearing and well nourished  Eyes  Conjunctiva and Sclera examination: Conjunctiva pink, sclera anicteric  Ears, Nose, Mouth, and Throat - Oropharynx: Clear, nares are clear, mucous membranes are moist   Neck  Neck and thyroid: Normal, supple, trachea midline, no thyromegaly  Pulmonary  Respiratory effort: No increased work of breathing or signs of respiratory distress  Auscultation of lungs: Clear to auscultation, no rales, no rhonchi, no wheezing, good air movement  Cardiovascular  Auscultation of heart: Normal rate and rhythm, normal S1 and S2, no murmurs  Carotid pulses: Normal, 2+ bilaterally  Pedal pulses: Normal, 2+ bilaterally  Examination of extremities for edema and/or varicosities: Normal    Musculoskeletal Gait and station: Normal gait  Skin - Skin and subcutaneous tissue: Normal without rashes or lesions  Skin is warm and well perfused, normal turgor  Neurologic - Cranial nerves: II - XII intact  Psychiatric - Orientation to person, place, and time: Normal -- Mood and affect: Normal       Results/Data  Diagnostic Studies Reviewed Cardio:  Echocardiogram/BETTINA: 5/14 - Normal LVEF, dilated proximal ascending aorta 5 cm, mild to moderate TR, normal PA pressures, with mild central aortic regurgitation- EF 55%, possible anteroseptal and inferoseptal hypokinesis, RV dilated, inferior vena cava dilated  CT Scan Review: 47 mm proximal ascending aorta, 43 mm distal ascending aorta  ECG Report: Sinus rhythm, otherwise normal- sinus rhythm, normal EKG- sinus rhythm, occasional PVC- NSR, normal ECG  sinus rhythm, normal EKG12/17 - NSR, RAD      Health Management  Health Maintenance   Medicare Annual Wellness Visit; every 1 year; Next Due: 09Miw9043; Overdue    Future Appointments    Date/Time Provider Specialty Site   03/05/2018 03:30 PM MYRIAM Power   Hematology On[facility] [place]   05/07/2018 02:00 PM Poornima Espinoza DO Internal Medicine [facility]   12/11/2017 09:45 AM Zain Engel Urology [facility]       Signatures   Electronically signed by : Osorio Mckenzie MD; Dec  7 2017  1:55PM EST                       (Author)

## 2017-12-10 ENCOUNTER — GENERIC CONVERSION - ENCOUNTER (OUTPATIENT)
Dept: OTHER | Facility: OTHER | Age: 82
End: 2017-12-10

## 2017-12-12 ENCOUNTER — GENERIC CONVERSION - ENCOUNTER (OUTPATIENT)
Dept: CARDIOLOGY CLINIC | Facility: CLINIC | Age: 82
End: 2017-12-12

## 2017-12-12 ENCOUNTER — HOSPITAL ENCOUNTER (OUTPATIENT)
Dept: NON INVASIVE DIAGNOSTICS | Facility: HOSPITAL | Age: 82
Discharge: HOME | End: 2017-12-12
Attending: INTERNAL MEDICINE
Payer: COMMERCIAL

## 2017-12-12 DIAGNOSIS — R06.89 HYPOVENTILATION, IDIOPATHIC: ICD-10-CM

## 2017-12-12 DIAGNOSIS — I10 ESSENTIAL HYPERTENSION, MALIGNANT: ICD-10-CM

## 2017-12-12 PROCEDURE — 93306 TTE W/DOPPLER COMPLETE: CPT

## 2018-01-09 NOTE — MISCELLANEOUS
Assessment    1  Asthma (493 90) (J45 909)   2  Hallucination (780 1) (R44 3)   3  Urinary incontinence (788 30) (R32)    Assessment and plan #1 status post severe asthma attack requiring hospitalization for aggressive pulmonary treatments, IV steroids at this point time she is significantly better because she improved she stopped her long acting steroid I have counseled her to take this medication routinely Advair 2 50-50 one inhalation twice daily she does have a rescue inhaler  #2 hallucinations she will follow-up with her ophthalmologist is not felt to be psychiatric  #3 urinary incontinence that has been chronic I will have her see Dr Tanner Buenrostro for consideration of collagen injections RTO 4 months call if any problems  Plan  Asthma    · Advair Diskus 250-50 MCG/DOSE Inhalation Aerosol Powder Breath Activated;  INHALE 1 PUFF TWICE DAILY   Rx By: Muna Cherry; Dispense: 30 Days ; #:1 Aerosol Powder Breath Activated; Refill: 3; For: Asthma; KOBI = N; Sent To: [facility]   · Albuterol Sulfate (2 5 MG/3ML) 0 083% Inhalation Nebulization Solution; USE 1  UNIT DOSE EVERY 4-6 HOURS AS NEEDED FOR WHEEZING    Rx By: Muna Cherry; Dispense: 0 Days ; #:1 ML; Refill: 0; For: Asthma; KOBI = N; Sent To: [facility]   · Peak Flow Meter Device; USE AS DIRECTED   Rx By: Muna Cherry; Dispense: 0 Days ; #:1 Device; Refill: 0; For: Asthma; KOBI = N; Sent To: [facility]   · [address] 99; Status:Complete;   Done: 27LKZ0797 11:39AM   Perform:Not Applicable; BVA:55IQR7129;JNWJNCN; For:Asthma; Ordered By:Melina Rees;   · Respiratory Equipment Nebulizer; Status:Complete;   Done: 03TVJ4665 11:39AM   Perform:Not Applicable; JQL:48GKN1260;LEUECVW;   For:Asthma; Ordered By:Jet Rees;  Stress incontinence, male    · 1 - Tanner Buenrostro MD, Jahaira Collins (Urology) Physician Referral  Consult Only: the  expectation is that the referring provider will communicate back to the patient on  treatment options  Evaluation and Treatment: the expectation is that the referred to  provider will communicate back to the patient on treatment options  Status: Active   Requested for: 20VGS7860   Ordered; For: Stress incontinence, male; Ordered By: Elizabeth Rinaldi Performed:  Due: 68VZH4398  Care Summary provided  : Yes    Chief Complaint  Chief Complaint Free Text Note Form: HUYEN      History of Present Illness  TCM Communication [facility]: The patient is being contacted for follow-up after hospitalization  Hospital records were reviewed  She was hospitalized at [facility]  The date of admission: 12/30/2016, date of discharge: 01/01/2017, 29-year-old female is coming in for a follow-up examination regarding shortness of breath she initially presented to the hospital on 12/30/2016 due to shortness of breath cough and tightness/wheezing  She has a history of asthma she was diagnosed with severe asthma and treated with prednisone and respiratory treatment improvement of her symptoms  Her flu PCR was negative  ct of the chest negative the hallucination more of a retinal problem  At this point in time the patient is doing much better she's completed the taper of prednisone because she felt so much better shape  The Advair but she does understand now she must take these medications routinely she is unsure what may have triggered her asthma than this not needed to use her rescue inhaler is not short of breath at this point in time  Diagnosis: Shortness of breath  She was discharged to home  Medications were not reviewed today  She scheduled a follow up appointment  Symptoms: no fever, no weakness, no dizziness, no headache, no fatigue, no cough, no shortness of breath, no chest pain, no upper abdominal pain, no middle abdominal pain, no rash:, no anorexia, no nausea, no vomiting, no loose stools, no constipation and no pain with urinating  The patient is currently asymptomatic   Counseling was provided to the patient  Communication performed and completed by Barbara Mcgill      Active Problems    1  Abnormal LH level (259 9) (E34 9)   2  Allergy to IVP dye (995 27,E947 4) (T50 995A)   3  Anemia (285 9) (D64 9)   4  History of Aneurysm of aortic root (441 9) (I71 9)   5  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)   6  History of Aortic root dilatation (447 71) (I77 810)   7  Arthritis (716 90) (M19 90)   8  History of Ascending aortic aneurysm (441 2) (I71 2)   9  Asthma (493 90) (J45 909)   10  Atherosclerosis of aorta (440 0) (I70 0)   11  Basal cell carcinoma of skin (173 91) (C44 91)   12  Benign essential hypertension (401 1) (I10)   13  Bloating (787 3) (R14 0)   14  Calf cramp (729 82) (R25 2)   15  Chronic migraine without aura (346 70) (G43 709)   16  Classic migraine with aura (346 00) (G43 109)   17  Clostridium difficile diarrhea (008 45) (A04 7)   18  Contrast media allergy (V15 08) (Z91 041)   19  Cough (786 2) (R05)   20  Decreased GFR (794 4) (R94 4)   21  Depression (311) (F32 9)   22  Diarrhea (787 91) (R19 7)   23  Difficulty breathing (786 09) (R06 89)   24  Dysphagia (787 20) (R13 10)   25  Esophagitis, reflux (530 11) (K21 0)   26  Hallucination (780 1) (R44 3)   27  Head ache (784 0) (R51)   28  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)   29  Hyperlipidemia (272 4) (E78 5)   30  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)   31  Hypophonia (784 49) (R49 8)   32  Hypothyroidism (244 9) (E03 9)   33  History of Junctional rhythm (427 89) (I49 8)   34  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)   35  Migraine aura without headache (migraine equivalents) (346 00) (G43 109)   36  Need for influenza vaccination (V04 81) (Z23)   37  Night sweats (780 8) (R61)   38  Obstructive sleep apnea (327 23) (G47 33)   39  Osteopenia (733 90) (M85 80)   40  Other specified symptom or sign involving circulatory or respiratory system (785 9,786 9)    (R09 89)   41   Paroxysmal atrial fibrillation (427 31) (I48 0)   42  History of Pleural effusion, bilateral (511 9) (J90)   43  Polymyalgia rheumatica (725) (M35 3)   44  PPD screening test (V74 1) (Z11 1)   45  Preventive Medicine [facility] Patient Checkup Adult Over 59 (V70 0)   46  Restless legs syndrome (333 94) (G25 81)   47  S/P trigger finger release (V45 89) (Z98 890)   48  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)   49  Screening for genitourinary condition (V81 6) (Z13 89)   50  Screening for neurological condition (V80 09) (Z13 89)   51  Shortness of breath (786 05) (R06 02)   52  Suicidal overdose (977 9,E950 5) (T50 902A)   53  Tricuspid regurgitation (397 0) (I07 1)   54  Urinary incontinence (788 30) (R32)   55  Urinary tract infection (599 0) (N39 0)   56  Xerostomia (527 7) (K11 7)    Past Medical History    1  Allergy to IVP dye (995 27,E947 4) (T50 995A)   2  History of Aneurysm of aortic root (441 9) (I71 9)   3  History of Aortic root dilatation (447 71) (I77 810)   4  History of Ascending aortic aneurysm (441 2) (I71 2)   5  History of Fracture Of The Vertebral Column (805 8)   6  History of basal cell carcinoma (V10 83) (Z85 828)   7  History of Hypoxia (799 02) (R09 02)   8  History of Junctional rhythm (427 89) (I49 8)   9  History of Lightheadedness (780 4) (R42)   10  History of Palpitations (785 1) (R00 2)   11  History of Pleural effusion, bilateral (511 9) (J90)   12  History of Trigger finger (727 03) (M65 30)   13  History of Trigger middle finger of right hand (727 03) (M65 331)    Surgical History    1  History of Appendectomy   2  History of Ascending Aortic Aneurysm Repair With Graft   3  Recurrent history of Bladder Surgery   4  History of Cardiac Cath Procedure Summary   5  History of Hysterectomy   6  Preventive Medicine [facility] Patient Checkup Adult Over 64 (V70 0)   7  History of Thyroid Surgery Total Thyroidectomy   8  History of Tonsillectomy With Adenoidectomy  Surgical History Reviewed:    The surgical history was reviewed and updated today  Family History  Mother    1  No pertinent family history  Sister    2  Family history of CABG  Family History    3  Family history of Family Health Status Of Father -    4  Family history of Family Health Status Of Mother -   Family History Reviewed: The family history was reviewed and updated today  Social History    · Being A Social Drinker   · Denied: History of Drug Use   · Former smoker (V15 82) (G01 084)   · Marital History -    · Never Drank Alcohol  Social History Reviewed: The social history was reviewed and updated today  The social history was reviewed and is unchanged  Current Meds   1  AmLODIPine Besylate 5 MG Oral Tablet; TAKE 1 TABLET BY MOUTH EVERY DAY; Therapy: 52YFI5175 to (Evaluate:35Apq2128)  Requested for: 51BAV3923; Last   Rx:56Shi4923 Ordered   2  Aspirin 81 MG TABS; TAKE 1 TABLET DAILY; Therapy: (Recorded:93Okn6741) to  Requested for: 19QPM8134 Recorded   3  Calcium 600+D Plus Minerals 600-400 MG-UNIT Oral Tablet Chewable; Therapy: (Recorded:26Cos9324) to Recorded   4  Levothyroxine Sodium 125 MCG Oral Tablet; TAKE 1 TABLET DAILY 1 HOUR BEFORE   BREAKFAST  Requested for: 53FSF4329; Last Rx:2016 Ordered   5  Lovastatin 20 MG Oral Tablet; TAKE 1 TABLET DAILY AS DIRECTED; Therapy: 55CHL4665 to (Idalia Mabry)  Requested for: 45RDQ7431; Last   Rx:45Cwp3645 Ordered   6  Metoprolol Tartrate 25 MG Oral Tablet; Take 1 tablet twice daily; Therapy: 77BFI6147 to (Reddy Olea)  Requested for: 63HII8735; Last   Rx:51Hed8951 Ordered   7  Multi-Vitamin Oral Tablet; TAKE 1 TABLET DAILY; Therapy: 67KLG1615 to Recorded   8  PreserVision AREDS Oral Capsule; Take 1 capsule twice daily; Therapy: 23Jlb9539 to Recorded   9  Ventolin  (90 Base) MCG/ACT Inhalation Aerosol Solution; INHALE 2 PUFFS   EVERY 4-6 HOURS, SPACED 60 SECONDS APART;    Therapy: 70VMO5381 to (Last Rx:26Wtk5996)  Requested for: 03KSK6532 Ordered   10  Vitamin B-12 100 MCG Oral Tablet; TAKE 1 TABLET DAILY AS DIRECTED; Therapy: 59UZA7605 to Recorded   11  Vitamin C 500 MG Oral Capsule; TAKE 1 CAPSULE DAILY; Therapy: 30SLF0654 to Recorded  Medication List Reviewed: The medication list was reviewed and updated today  Allergies    1  Amiodarone HCl TABS   2  Iodinated Contrast Media   3  Naprosyn TABS   4  Sulfa Drugs    5  IV Dye    Physical Exam    Constitutional   General appearance: No acute distress, well appearing and well nourished  Eyes   Conjunctiva and lids: No swelling, erythema or discharge  Pupils and irises: Equal, round and reactive to light  Ears, Nose, Mouth, and Throat   External inspection of ears and nose: Normal     Otoscopic examination: Tympanic membranes translucent with normal light reflex  Canals patent without erythema  Nasal mucosa, septum, and turbinates: Normal without edema or erythema  Oropharynx: Normal with no erythema, edema, exudate or lesions  Pulmonary   Respiratory effort: No increased work of breathing or signs of respiratory distress  Auscultation of lungs: Clear to auscultation  Cardiovascular   Auscultation of heart: Normal rate and rhythm, normal S1 and S2, without murmurs  Examination of extremities for edema and/or varicosities: Normal     Abdomen   Abdomen: Non-tender, no masses  Liver and spleen: No hepatomegaly or splenomegaly  Lymphatic   Palpation of lymph nodes in neck: No lymphadenopathy  Psychiatric   Mood and affect: Normal          Health Management  Health Maintenance   Medicare Annual Wellness Visit; every 1 year; Next Due: 01Hmr2040; Overdue    Future Appointments    Date/Time Provider Specialty Site   02/27/2017 11:15 AM MYRIAM Calderón   Hematology Oncology [facility] [place]   03/10/2017 01:00 PM Edita Maurer DO Internal Medicine [facility]     Signatures   Electronically signed by James Rodriguez, ; Maxime  3 2017  8:49AM EST                       (Author)    Electronically signed by : Rocky Fall DO; Maxime 15 2017  2:03PM EST                       (Author)

## 2018-01-09 NOTE — RESULT NOTES
Message   notify the patient normal CBC follow up as scheduled        Verified Results  (1) BASIC METABOLIC PROFILE 61HUV5092 01:03PM Emmanuelle Tyler    Order Number: [id number]      National Kidney Disease Education Program recommendations are as follows:  GFR calculation is accurate only with a steady state creatinine  Chronic Kidney disease less than 60 ml/min/1 73 sq  meters  Kidney failure less than 15 ml/min/1 73 sq  meters       Test Name Result Flag Reference   GLUCOSE,RANDM 107 mg/dL     SODIUM 139 mmol/L  136-145   POTASSIUM 4 5 mmol/L  3 5-5 3   CHLORIDE 103 mmol/L  100-108   CARBON DIOXIDE 29 mmol/L  21-32   ANION GAP (CALC) 7 mmol/L  4-13   BLOOD UREA NITROGEN 19 mg/dL  5-25   CREATININE 0 90 mg/dL  0 60-1 30   Standardized to IDMS reference method   CALCIUM 8 6 mg/dL  8 3-10 1   eGFR Non-African American 59 8 ml/min/1 73sq m       (1) LD (LDH) 83GDT8362 01:03PM Rosalia Matos Order Number: [id number]     Test Name Result Flag Reference   LD (LDH) 200 U/L       (1) CBC/ PLT (NO DIFF) 39ROK3794 01:03PM Rosalia Matos Order Number: [id number]     Order Number: [id number]     Test Name Result Flag Reference   HEMATOCRIT 38 9 %  34 8-46 1   HEMOGLOBIN 12 7 g/dL  11 5-15 4   MCHC 32 6 g/dL  31 4-37 4   MCH 28 0 pg  26 8-34 3   MCV 86 fL  82-98   PLATELET COUNT 766 Thousands/uL  149-390   RBC COUNT 4 54 Million/uL  3 81-5 12   RDW 14 5 %  11 6-15 1   WBC COUNT 8 45 Thousand/uL  4 31-10 16   MPV 12 0 fL  8 9-12 7       Signatures   Electronically signed by : Henry Cannon DO; Mar 18 2016  4:32PM EST                       (Author)

## 2018-01-10 NOTE — PROGRESS NOTES
History of Present Illness  Care Coordination Encounter Information:   Type of Encounter: Telephonic    Spoke to Patient  Care Coordination SL Nurse Alejandro Spaulding:   The reason for call is to discuss outreach for follow up/needed services  Follow up call made to patient for [facility]NA discharge 1/11 and PCP visit 1/13  She states she is doing well, not currently experiencing shortness of breath  She is taking her inhalers as prescribed  She did state she had previously stopped taking her advair due to biopsies she had performed and was concerned about the steroid residue left inside her mouth  Also is concerned about the cost of the advair inhaler, I will mail out a [facility] prescription card to see if this will help with the cost  She has not experienced anymore visual hallucinations  I did provide her with urology office phone number and locations and she will schedule her appointment  No further needs or concerns  Will continue to monitor  Active Problems    1  Abnormal LH level (259 9) (E34 9)   2  Allergy to IVP dye (995 27,E947 4) (T50 995A)   3  Anemia (285 9) (D64 9)   4  History of Aneurysm of aortic root (441 9) (I71 9)   5  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)   6  History of Aortic root dilatation (447 71) (I77 810)   7  Arthritis (716 90) (M19 90)   8  History of Ascending aortic aneurysm (441 2) (I71 2)   9  Asthma (493 90) (J45 909)   10  Atherosclerosis of aorta (440 0) (I70 0)   11  Basal cell carcinoma of skin (173 91) (C44 91)   12  Benign essential hypertension (401 1) (I10)   13  Bloating (787 3) (R14 0)   14  Calf cramp (729 82) (R25 2)   15  Chronic migraine without aura (346 70) (G43 709)   16  Classic migraine with aura (346 00) (G43 109)   17  Clostridium difficile diarrhea (008 45) (A04 7)   18  Contrast media allergy (V15 08) (Z91 041)   19  Cough (786 2) (R05)   20  Decreased GFR (794 4) (R94 4)   21  Depression (311) (F32 9)   22   Diarrhea (787 91) (R19 7)   23  Difficulty breathing (786 09) (R06 89)   24  Dysphagia (787 20) (R13 10)   25  Esophagitis, reflux (530 11) (K21 0)   26  Hallucination (780 1) (R44 3)   27  Head ache (784 0) (R51)   28  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)   29  Hyperlipidemia (272 4) (E78 5)   30  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)   31  Hypophonia (784 49) (R49 8)   32  Hypothyroidism (244 9) (E03 9)   33  History of Junctional rhythm (427 89) (I49 8)   34  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)   35  Migraine aura without headache (migraine equivalents) (346 00) (G43 109)   36  Need for influenza vaccination (V04 81) (Z23)   37  Night sweats (780 8) (R61)   38  Obstructive sleep apnea (327 23) (G47 33)   39  Osteopenia (733 90) (M85 80)   40  Other specified symptom or sign involving circulatory or respiratory system (785 9,786 9)    (R09 89)   41  Paroxysmal atrial fibrillation (427 31) (I48 0)   42  History of Pleural effusion, bilateral (511 9) (J90)   43  Polymyalgia rheumatica (725) (M35 3)   44  PPD screening test (V74 1) (Z11 1)   45  Preventive Medicine [facility] Patient Checkup Adult Over 59 (V70 0)   46  Restless legs syndrome (333 94) (G25 81)   47  S/P trigger finger release (V45 89) (Z98 890)   48  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)   49  Screening for genitourinary condition (V81 6) (Z13 89)   50  Screening for neurological condition (V80 09) (Z13 89)   51  Shortness of breath (786 05) (R06 02)   52  Stress incontinence, male (788 32) (N39 3)   48  Suicidal overdose (977 9,E950 5) (T50 902A)   54  Tricuspid regurgitation (397 0) (I07 1)   55  Urinary incontinence (788 30) (R32)   56  Urinary tract infection (599 0) (N39 0)   57  Xerostomia (527 7) (K11 7)    Past Medical History    1  Allergy to IVP dye (995 27,E947 4) (T50 995A)   2  History of Aneurysm of aortic root (441 9) (I71 9)   3  History of Aortic root dilatation (447 71) (I77 810)   4   History of Ascending aortic aneurysm (441 2) (I71 2)   5  History of Fracture Of The Vertebral Column (805 8)   6  History of basal cell carcinoma (V10 83) (Z85 828)   7  History of urinary incontinence (V13 09) (Z87 898)   8  History of urinary incontinence (V13 09) (Z87 898)   9  History of Hypoxia (799 02) (R09 02)   10  History of Junctional rhythm (427 89) (I49 8)   11  History of Lightheadedness (780 4) (R42)   12  History of Palpitations (785 1) (R00 2)   13  History of Pleural effusion, bilateral (511 9) (J90)   14  History of Trigger finger (727 03) (M65 30)   15  History of Trigger middle finger of right hand (727 03) (M65 331)    Surgical History    1  History of Appendectomy   2  History of Ascending Aortic Aneurysm Repair With Graft   3  Recurrent history of Bladder Surgery   4  History of Cardiac Cath Procedure Summary   5  History of Hysterectomy   6  Preventive Medicine [facility] Patient Checkup Adult Over 64 (V70 0)   7  History of Thyroid Surgery Total Thyroidectomy   8  History of Tonsillectomy With Adenoidectomy    Family History  Mother    1  No pertinent family history  Sister    2  Family history of CABG  Family History    3  Family history of Family Health Status Of Father -    4  Family history of Family Health Status Of Mother -     Social History    · Being A Social Drinker   · Denied: History of Drug Use   · Former smoker (W87 22) (H95 149)   · Marital History -    · Never Drank Alcohol    Current Meds    1  Advair Diskus 250-50 MCG/DOSE Inhalation Aerosol Powder Breath Activated; INHALE 1   PUFF TWICE DAILY; Therapy: 69RJJ8427 to (Evaluate:44Mml1990)  Requested for: 14PHT3478; Last   Rx:2017 Ordered   2  Albuterol Sulfate (2 5 MG/3ML) 0 083% Inhalation Nebulization Solution; USE 1 UNIT   DOSE EVERY 4-6 HOURS AS NEEDED FOR WHEEZING ; Therapy: 64DGG5501 to (Last Rx:2017)  Requested for: 07NZN0558 Ordered   3  Peak Flow Meter Device; USE AS DIRECTED;    Therapy: 40TSL3488 to (Last Rx:2017) Requested for: 21YRZ0467 Ordered   4  Ventolin  (90 Base) MCG/ACT Inhalation Aerosol Solution; INHALE 2 PUFFS   EVERY 4-6 HOURS, SPACED 60 SECONDS APART; Therapy: 25SWP2526 to (Last Rx:55Vjb9755)  Requested for: 41Efm4784 Ordered    5  AmLODIPine Besylate 5 MG Oral Tablet; TAKE 1 TABLET BY MOUTH EVERY DAY; Therapy: 06UQA0203 to (Evaluate:99Nit3686)  Requested for: 70CFE0893; Last   Rx:50Qbg9669 Ordered    6  Multi-Vitamin Oral Tablet; TAKE 1 TABLET DAILY; Therapy: 91AYM2573 to Recorded   7  PreserVision AREDS Oral Capsule; Take 1 capsule twice daily; Therapy: 68Rve8542 to Recorded   8  Vitamin B-12 100 MCG Oral Tablet; TAKE 1 TABLET DAILY AS DIRECTED; Therapy: 86PTV1571 to Recorded   9  Vitamin C 500 MG Oral Capsule; TAKE 1 CAPSULE DAILY; Therapy: 19IWK4787 to Recorded    10  Lovastatin 20 MG Oral Tablet; TAKE 1 TABLET DAILY AS DIRECTED; Therapy: 47PPQ5871 to (Byron Lax)  Requested for: 71KVM1812; Last    Rx:66Eus7306 Ordered    11  Levothyroxine Sodium 125 MCG Oral Tablet (Synthroid); TAKE 1 TABLET DAILY 1 HOUR    BEFORE BREAKFAST  Requested for: 87QKW0033; Last Rx:01Mar2016 Ordered    12  Calcium 600+D Plus Minerals 600-400 MG-UNIT Oral Tablet Chewable; Therapy: (Recorded:71Wok8876) to Recorded    13  Metoprolol Tartrate 25 MG Oral Tablet; Take 1 tablet twice daily; Therapy: 92LIH7613 to (Augustina Brito)  Requested for: 04ZJT4474; Last    Rx:30Enr1829 Ordered    14  Aspirin 81 MG TABS; TAKE 1 TABLET DAILY; Therapy: (Recorded:08Bmy6254) to  Requested for: 29ECB9267 Recorded    Allergies    1  Amiodarone HCl TABS   2  Iodinated Contrast Media   3  Naprosyn TABS   4  Sulfa Drugs    5  IV Dye    Health Management   Medicare Annual Wellness Visit; every 1 year; Next Due: 47Ofb8907; Overdue    End of Encounter Meds    1  Advair Diskus 250-50 MCG/DOSE Inhalation Aerosol Powder Breath Activated; INHALE 1   PUFF TWICE DAILY;    Therapy: 49XXO7191 to (Evaluate:10Rib5688)  Requested for: 17CUQ5035; Last   Rx:13Jan2017 Ordered   2  Albuterol Sulfate (2 5 MG/3ML) 0 083% Inhalation Nebulization Solution; USE 1 UNIT   DOSE EVERY 4-6 HOURS AS NEEDED FOR WHEEZING ; Therapy: 96IFY3298 to (Last Rx:13Jan2017)  Requested for: 13YCF1020 Ordered   3  Peak Flow Meter Device; USE AS DIRECTED; Therapy: 09ALB4343 to (Last Rx:13Jan2017)  Requested for: 50FYF8233 Ordered   4  Ventolin  (90 Base) MCG/ACT Inhalation Aerosol Solution; INHALE 2 PUFFS   EVERY 4-6 HOURS, SPACED 60 SECONDS APART; Therapy: 81AXD8091 to (Last Rx:40Wjt3987)  Requested for: 43Kkw7274 Ordered    5  AmLODIPine Besylate 5 MG Oral Tablet; TAKE 1 TABLET BY MOUTH EVERY DAY; Therapy: 34CZF3083 to (Evaluate:23Pow6513)  Requested for: 89TSZ8291; Last   Rx:96Pqm9861 Ordered    6  Multi-Vitamin Oral Tablet; TAKE 1 TABLET DAILY; Therapy: 34VAQ8951 to Recorded   7  PreserVision AREDS Oral Capsule; Take 1 capsule twice daily; Therapy: 12Lkq7293 to Recorded   8  Vitamin B-12 100 MCG Oral Tablet; TAKE 1 TABLET DAILY AS DIRECTED; Therapy: 24SVN5388 to Recorded   9  Vitamin C 500 MG Oral Capsule; TAKE 1 CAPSULE DAILY; Therapy: 44ZFU8257 to Recorded    10  Lovastatin 20 MG Oral Tablet; TAKE 1 TABLET DAILY AS DIRECTED; Therapy: 91JOJ8050 to (Elisabeth Schumacher)  Requested for: 50FUX8219; Last    Rx:05Oct2016 Ordered    11  Levothyroxine Sodium 125 MCG Oral Tablet (Synthroid); TAKE 1 TABLET DAILY 1 HOUR    BEFORE BREAKFAST  Requested for: 50BIT5846; Last Rx:01Mar2016 Ordered    12  Calcium 600+D Plus Minerals 600-400 MG-UNIT Oral Tablet Chewable; Therapy: (Recorded:77Sde3095) to Recorded    13  Metoprolol Tartrate 25 MG Oral Tablet; Take 1 tablet twice daily; Therapy: 85LZH9244 to (Priscilla Lemons)  Requested for: 07PFD6057; Last    Rx:96Vaa5205 Ordered    14  Aspirin 81 MG TABS; TAKE 1 TABLET DAILY;     Therapy: (Recorded:78Sze0267) to  Requested for: 38RGI1412 Recorded    Future Appointments    Date/Time Provider Specialty Site   02/27/2017 11:15 AM MYRIAM Holguin  Hematology Oncology [facility] [place]   03/10/2017 01:00 PM Brenna Huddleston DO Internal [address]     Patient Care Team    Care Team Member Role Specialty Office Number   Attila MANSFIELD  Specialist Pulmonary Medicine [phone]   Antolin Miguel MD Specialist Cardiology [phone]   Miladys Calderon MD Specialist Pulmonary Medicine [phone]   Ryan Garner MD  Cardiac Surgery [phone], Serafin Lees DO  Internal Medicine [phone]   Andrew MANSFIELD  Pulmonary Medicine [phone]   Mable Riedel Specialist Hematology Oncology [phone]) Andrea MANSFIELD    Gastroenterology Adult [phone]     Signatures   Electronically signed by : Barbara Jimenez, ; Jan 18 2017 11:34AM EST                       (Author)

## 2018-01-11 NOTE — RESULT NOTES
Message   Please notify the patient normal labs follow up as scheduled to discuss the report in detail   Please notify the patient normal labs follow up as scheduled to discuss the report in detail   Please notify the patient normal labs follow up as scheduled to discuss the report in detail   Please notify the patient normal labs follow up as scheduled to discuss the report in detail        Verified Results  (1) COMPREHENSIVE METABOLIC PANEL [id number] 42:88MQ Rhiannon Delgado Order Number: [id number]     Test Name Result Flag Reference   SODIUM 142 mmol/L  136-145   POTASSIUM 4 3 mmol/L  3 5-5 3   CHLORIDE 106 mmol/L  100-108   CARBON DIOXIDE 27 mmol/L  21-32   ANION GAP (CALC) 9 mmol/L  4-13   BLOOD UREA NITROGEN 22 mg/dL  5-25   CREATININE 0 78 mg/dL  0 60-1 30   Standardized to IDMS reference method   CALCIUM 8 7 mg/dL  8 3-10 1   BILI, TOTAL 0 48 mg/dL  0 20-1 00   ALK PHOSPHATAS 70 U/L     ALT (SGPT) 17 U/L  12-78   Specimen collection should occur prior to Sulfasalazine and/or Sulfapyridine administration due to the potential for falsely depressed results  AST(SGOT) 18 U/L  5-45   Specimen collection should occur prior to Sulfasalazine administration due to the potential for falsely depressed results  ALBUMIN 3 8 g/dL  3 5-5 0   TOTAL PROTEIN 7 5 g/dL  6 4-8 2   eGFR 70 ml/min/1 73sq m     [facility] Disease Education Program recommendations are as follows:  GFR calculation is accurate only with a steady state creatinine  Chronic Kidney disease less than 60 ml/min/1 73 sq  meters  Kidney failure less than 15 ml/min/1 73 sq  meters  GLUCOSE FASTING 96 mg/dL  65-99   Specimen collection should occur prior to Sulfasalazine administration due to the potential for falsely depressed results  Specimen collection should occur prior to Sulfapyridine administration due to the potential for falsely elevated results       (1) LIPID PANEL, FASTING [id number] 07:48AM Mana Abrams TW Order Number: [id number]     Test Name Result Flag Reference   CHOLESTEROL 190 mg/dL     HDL,DIRECT 64 mg/dL H 40-60   Specimen collection should occur prior to Metamizole administration due to the potential for falsley depressed results  LDL CHOLESTEROL CALCULATED 105 mg/dL H 0-100   - Patient Instructions: This is a fasting blood test  Water,black tea or black  coffee only after 9:00pm the night before test   Drink 2 glasses of water the morning of test       Triglyceride:        Normal <150 mg/dl   Borderline High 150-199 mg/dl   High 200-499 mg/dl   Very High >499 mg/dl      Cholesterol:       Desirable <200 mg/dl    Borderline High 200-239 mg/dl    High >239 mg/dl      HDL Cholesterol:       High>59 mg/dL    Low <41 mg/dL      This screening LDL is a calculated result  It does not have the accuracy of the Direct Measured LDL in the monitoring of patients with hyperlipidemia and/or statin therapy  Direct Measure LDL (DWX245) must be ordered separately in these patients  TRIGLYCERIDES 105 mg/dL  <=150   Specimen collection should occur prior to N-Acetylcysteine or Metamizole administration due to the potential for falsely depressed results  (1) VITAMIN D 25-HYDROXY [id number] 07:48AM Aleyda Hill     Test Name Result Flag Reference   VIT D 25-HYDROX 35 2 ng/mL  30 0-100 0   This assay is a certified procedure of the CDC Vitamin D Standardization Certification Program (VDSCP)     Deficiency <20ng/ml   Insufficiency 20-30ng/ml   Sufficient  ng/ml     *Patients undergoing fluorescein dye angiography may retain small amounts of fluorescein in the body for 48-72 hours post procedure  Samples containing fluorescein can produce falsely elevated Vitamin D values  If the patient had this procedure, a specimen should be resubmitted post fluorescein clearance       (1) TSH [id number] 07:48AM Aleyda Hill     Test Name Result Flag Reference   TSH 2 620 uIU/mL  0 358-3 740 This is a patient instruction: This test is non-fasting  Please drink two glasses of water morning of bloodwork  Patients undergoing fluorescein dye angiography may retain small amounts of fluorescein in the body for 48-72 hours post procedure  Samples containing fluorescein can produce falsely depressed TSH values  If the patient had this procedure,a specimen should be resubmitted post fluorescein clearance            The recommended reference ranges for TSH during pregnancy are as follows:  First trimester 0 1 to 2 5 uIU/mL  Second trimester  0 2 to 3 0 uIU/mL  Third trimester 0 3 to 3 0 uIU/m

## 2018-01-11 NOTE — MISCELLANEOUS
Message   Recorded as Task   Date: 12/15/2016 10:24 AM, Created By: Dale Gamez   Task Name: Call Back   Assigned To: Maria L Chakraborty   Regarding Patient: Anayeli Felix, Status: Active   Comment:    Dale Gamez - 15 Dec 2016 10:24 AM     TASK CREATED  PT called requesting a call back she did not wish to discuss with me the reason of the call  [place] [address] - 15 Dec 2016 10:32 AM     TASK EDITED  spoke with patient   Maria L Chakraborty - 15 Dec 2016 10:32 AM     TASK COMPLETED   Maria L Chakraborty - 29 Dec 2016 12:46 PM     TASK REACTIVATED  patient was having "vague "symptoms in mid-december  stated I would call patient after Dr Olivia Snyder return to discuss  message left for patient to call me back to discuss        Active Problems    1  Abnormal LH level (259 9) (E34 9)   2  Allergy to IVP dye (995 27,E947 4) (T50 995A)   3  Anemia (285 9) (D64 9)   4  History of Aneurysm of aortic root (441 9) (I71 9)   5  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)   6  History of Aortic root dilatation (447 71) (I77 810)   7  Arthritis (716 90) (M19 90)   8  History of Ascending aortic aneurysm (441 2) (I71 2)   9  Asthma (493 90) (J45 909)   10  Atherosclerosis of aorta (440 0) (I70 0)   11  Basal cell carcinoma of skin (173 91) (C44 91)   12  Benign essential hypertension (401 1) (I10)   13  Bloating (787 3) (R14 0)   14  Calf cramp (729 82) (R25 2)   15  Chronic migraine without aura (346 70) (G43 709)   16  Classic migraine with aura (346 00) (G43 109)   17  Clostridium difficile diarrhea (008 45) (A04 7)   18  Contrast media allergy (V15 08) (Z91 041)   19  Cough (786 2) (R05)   20  Decreased GFR (794 4) (R94 4)   21  Depression (311) (F32 9)   22  Diarrhea (787 91) (R19 7)   23  Difficulty breathing (786 09) (R06 89)   24  Dysphagia (787 20) (R13 10)   25  Esophagitis, reflux (530 11) (K21 0)   26  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)   27  Hyperlipidemia (272 4) (E78 5)   28  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)   29  Hypophonia (784 49) (R49 8)   30  Hypothyroidism (244 9) (E03 9)   31  History of Junctional rhythm (427 89) (I49 8)   32  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)   33  Migraine aura without headache (migraine equivalents) (346 00) (G43 109)   34  Need for influenza vaccination (V04 81) (Z23)   35  Night sweats (780 8) (R61)   36  Obstructive sleep apnea (327 23) (G47 33)   37  Osteopenia (733 90) (M85 80)   38  Other specified symptom or sign involving circulatory or respiratory system (785 9,786 9)    (R09 89)   39  Paroxysmal atrial fibrillation (427 31) (I48 0)   40  History of Pleural effusion, bilateral (511 9) (J90)   41  Polymyalgia rheumatica (725) (M35 3)   42  PPD screening test (V74 1) (Z11 1)   43  Preventive Medicine Estab Patient Checkup Adult Over 59 (V70 0)   44  Restless legs syndrome (333 94) (G25 81)   45  S/P trigger finger release (V45 89) (Z98 890)   46  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)   47  Screening for genitourinary condition (V81 6) (Z13 89)   48  Screening for neurological condition (V80 09) (Z13 89)   49  Shortness of breath (786 05) (R06 02)   50  Suicidal overdose (977 9,E950 5) (T50 902A)   51  Tricuspid regurgitation (397 0) (I07 1)   52  Urinary incontinence (788 30) (R32)   53  Urinary tract infection (599 0) (N39 0)   54  Xerostomia (527 7) (K11 7)    Current Meds   1  AmLODIPine Besylate 5 MG Oral Tablet; TAKE 1 TABLET BY MOUTH EVERY DAY; Therapy: 60YDX7105 to (Evaluate:91Xje2296)  Requested for: 07BMU9510; Last   Rx:25Dam3635 Ordered   2  Aspirin 81 MG TABS; TAKE 1 TABLET DAILY; Therapy: (Recorded:08Ifo8703) to  Requested for: 96AGN6930 Recorded   3  Calcium 600+D Plus Minerals 600-400 MG-UNIT Oral Tablet Chewable; Therapy: (Recorded:54Kvk3175) to Recorded   4  Levothyroxine Sodium 125 MCG Oral Tablet (Synthroid); TAKE 1 TABLET DAILY 1 HOUR   BEFORE BREAKFAST  Requested for: 34GSJ5582; Last Rx:01Mar2016 Ordered   5  Lovastatin 20 MG Oral Tablet; TAKE 1 TABLET DAILY AS DIRECTED; Therapy: 94PUW9029 to (Leeannrobert Ryan)  Requested for: 89ZIH8460; Last   Rx:74Tym8003 Ordered   6  Metoprolol Tartrate 25 MG Oral Tablet; Take 1 tablet twice daily; Therapy: 02GOI8113 to (Bill Walker)  Requested for: 64UZY7779; Last   Rx:78Kan9802 Ordered   7  Multi-Vitamin Oral Tablet; TAKE 1 TABLET DAILY; Therapy: 09RMR2390 to Recorded   8  PreserVision AREDS Oral Capsule; Take 1 capsule twice daily; Therapy: 44Pom4833 to Recorded   9  Ventolin  (90 Base) MCG/ACT Inhalation Aerosol Solution; INHALE ML  PRN; Therapy: 71CIC3484 to Recorded   10  Vitamin B-12 100 MCG Oral Tablet; TAKE 1 TABLET DAILY AS DIRECTED; Therapy: 36ZGK0558 to Recorded   11  Vitamin C 500 MG Oral Capsule; TAKE 1 CAPSULE DAILY; Therapy: 28NVV3063 to Recorded    Allergies    1  Amiodarone HCl TABS   2  Iodinated Contrast Media   3  Naprosyn TABS   4  Sulfa Drugs    5   IV Dye    Signatures   Electronically signed by : Chaz Valdivia, ; Dec 29 2016 12:46PM EST                       (Author)

## 2018-01-11 NOTE — PROGRESS NOTES
Assessment    1  Cervical spondylosis (721 0) (M47 812)   2  Cervicalgia (723 1) (M54 2)   3  Somatic dysfunction of cervical region (739 1) (M99 01)    Plan  Somatic dysfunction of cervical region    · *1 - [facility] Co-Management  Please eval and treat  STM MFR  modalioties prn  muscle stim trial  Criss Singh REVIEW HEP as pt  unable to demonstrate  to  me today  Status: Active  Requested for: 95VUH1696  Care Summary provided  : Yes   · Follow-up Visit in 4 Weeks Evaluation and Treatment  Follow-up  Status: Hold For -  Scheduling  Requested for: 08CIF8858    Discussion/Summary  Impression: neck pain and cervical spondylosis  Currently, the condition is worsening  Medication changes are as documented in orders  Patient discussion: discussed with the patient  Treatment plan includes physical therapy  Chief Complaint  PCP referral for "painful stiff neck"  6/29 follow up for neck pain  8/17 fu for neck pain  10/19 had      History of Present Illness  6/8/17 IE: 79 yo female with non-traumatic chronic stiff painful neck   had to stop driving   had PTx per Dr Oneil Shoulder without pain relief   takes APAP    NSAIDS increase BP   had taken left over oxycodone without benefit  cannot ID triggers, pattern in posterior neck and radiates to periauricular area   denies numbness or tingling in fingers  Has hard palate tumor  Has painful bed transfers Has also had HAs for about nine months   attributes to tumor   had CT with "bone erosion" Was in ED April and given diazepam with definite benefit   had marked hangover effect with CBP    6/29 fu for painful stiff neck   today somewhat improved  very stiiff in am better with as day goes on  PM consultfor facet injections pending  8/17 had facet block two days ago with so far marked benefit   has stim trial with some benefit   so far unable to get home unit   no response from vendor  Did NOT change Vit D ose  10/19 much better after EMILY   Today c/o earaches on the left shooting to eye  DENIES any radicular Sx into LUE, Admits not doing HEP daily  Review of Systems    Respiratory: has URI and cough  Musculoskeletal: as noted in HPI  Neurological: no numbness and no tingling  Active Problems    1  Abnormal LH level (259 9) (E34 9)   2  Allergy to IVP dye (995 27,E947 4) (T50 995A)   3  Anemia (285 9) (D64 9)   4  History of Aneurysm of aortic root (441 9) (I71 9)   5  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)   6  History of Aortic root dilatation (447 71) (I77 810)   7  Arthritis (716 90) (M19 90)   8  History of Ascending aortic aneurysm (441 2) (I71 2)   9  Asthma (493 90) (J45 909)   10  Atherosclerosis of aorta (440 0) (I70 0)   11  Basal cell carcinoma of skin (173 91) (C44 91)   12  Benign essential hypertension (401 1) (I10)   13  Bloating (787 3) (R14 0)   14  Calf cramp (729 82) (R25 2)   15  Central stenosis of spinal canal (724 00) (M48 00)   16  Cervical disc disorder of mid-cervical region (722 91) (M50 920)   17  Cervical radiculopathy (723 4) (M54 12)   18  Cervical spinal stenosis (723 0) (M48 02)   19  Cervical spondylosis (721 0) (M47 812)   20  Cervicalgia (723 1) (M54 2)   21  Chronic migraine without aura (346 70) (G43 709)   22  Chronic neck pain (723 1,338 29) (M54 2,G89 29)   23  Classic migraine with aura (346 00) (G43 109)   24  Clostridium difficile diarrhea (008 45) (A04 72)   25  Contrast media allergy (V15 08) (Z91 041)   26  Cough (786 2) (R05)   27  DDD (degenerative disc disease), cervical (722 4) (M50 30)   28  Decreased GFR (794 4) (R94 4)   29  Depression (311) (F32 9)   30  Diarrhea (787 91) (R19 7)   31  Difficulty breathing (786 09) (R06 89)   32  Dysphagia (787 20) (R13 10)   33  Elevated CK (790 5) (R74 8)   34  Esophagitis, reflux (530 11) (K21 0)   35  Facet arthropathy, cervical (721 0) (M12 88)   36  Foraminal stenosis of cervical region (723 0) (M99 81)   37  Hallucination (780 1) (R44 3)   38   Head ache (784 0) (R51)   39  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)   40  Hyperlipidemia (272 4) (E78 5)   41  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)   42  Hypophonia (784 49) (R49 8)   43  Hypothyroidism (244 9) (E03 9)   44  History of Junctional rhythm (427 89) (I49 8)   45  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)   46  Migraine aura without headache (migraine equivalents) (346 00) (G43 109)   47  Myalgia (729 1) (M79 1)   48  Myositis of multiple sites, unspecified myositis type (729 1) (M60 9)   49  Myositis of other site, unspecified myositis type (729 1) (M60 9)   50  Neck muscle spasm (728 85) (M62 838)   51  Need for influenza vaccination (V04 81) (Z23)   52  Need for pneumococcal vaccination (V03 82) (Z23)   53  Night sweats (780 8) (R61)   54  Obstructive sleep apnea (327 23) (G47 33)   55  Osteopenia (733 90) (M85 80)   56  Other specified symptom or sign involving circulatory or respiratory system (785 9,786 9)    (R09 89)   57  Paroxysmal atrial fibrillation (427 31) (I48 0)   58  History of Pleural effusion, bilateral (511 9) (J90)   59  Polymyalgia rheumatica (725) (M35 3)   60  PPD screening test (V74 1) (Z11 1)   61  Preoperative clearance (V72 84) (Z01 818)   62  Preventive Medicine Estab Patient Checkup Adult Over 59 (V70 0)   63  Restless legs syndrome (333 94) (G25 81)   64  S/P trigger finger release (V45 89) (Z98 890)   65  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)   66  Screening for genitourinary condition (V81 6) (Z13 89)   67  Screening for neurological condition (V80 09) (Z13 89)   68  Shortness of breath (786 05) (R06 02)   69  Stress incontinence in female (625 6) (N39 3)   70  Stress incontinence, male (788 32) (N39 3)   70  Suicidal overdose (977 9,E950 5) (T50 902A)   72  Tricuspid regurgitation (397 0) (I07 1)   73  Urinary incontinence (788 30) (R32)   74  Urinary tract infection (599 0) (N39 0)   75  Vitamin D deficiency (268 9) (E55 9)   76   Xerostomia (527 7) (R68 2)    Past Medical History    1  Allergy to IVP dye (995 27,E947 4) (T50 995A)   2  History of Aneurysm of aortic root (441 9) (I71 9)   3  History of Aortic root dilatation (447 71) (I77 810)   4  History of Ascending aortic aneurysm (441 2) (I71 2)   5  History of Facet arthropathy, cervical (721 0) (M12 88)   6  History of Fracture Of The Vertebral Column (805 8)   7  History of basal cell carcinoma (V10 83) (Z85 828)   8  History of urinary incontinence (V13 09) (Z87 898)   9  History of urinary incontinence (V13 09) (Z87 898)   10  History of Hypoxia (799 02) (R09 02)   11  History of Junctional rhythm (427 89) (I49 8)   12  History of Lightheadedness (780 4) (R42)   13  History of Palpitations (785 1) (R00 2)   14  History of Pleural effusion, bilateral (511 9) (J90)   15  History of Thyroid trouble (246 9) (E07 9)   16  History of Trigger finger (727 03) (M65 30)   17  History of Trigger middle finger of right hand (727 03) (M65 331)    The active problems and past medical history were reviewed and updated today  Surgical History    1  History of Appendectomy   2  History of Ascending Aortic Aneurysm Repair With Graft   3  Recurrent history of Bladder Surgery   4  History of Cardiac Cath Procedure Summary   5  History of Hysterectomy   6  Preventive Medicine Estab Patient Checkup Adult Over 64 (V70 0)   7  History of Thyroid Surgery Total Thyroidectomy   8  History of Tonsillectomy With Adenoidectomy    The surgical history was reviewed and updated today  Family History  Sister    1  Family history of CABG  Family History    2  Family history of Family Health Status Of Father -    3  Family history of Family Health Status Of Mother -    3  Family history of cardiac disorder (V17 49) (Z82 49)   5  Family history of hypertension (V17 49) (Z82 49)   6  Family history of malignant neoplasm (V16 9) (Z80 9)   7   Family history of thyroid disease (V18 19) (Z83 49)    Social History · Being A Social Drinker   · Denied: History of Drug Use   · Former smoker (V15 82) (D25 246)   · Marital History -    · Never Drank Alcohol  The social history was reviewed and is unchanged  Current Meds   1  Advair Diskus 250-50 MCG/DOSE Inhalation Aerosol Powder Breath Activated; INHALE 1   PUFF TWICE DAILY; Therapy: 62SQN0247 to 52-28-62-17)  Requested for: 97EEU0924; Last   Rx:01Mar2017 Ordered   2  Albuterol Sulfate (2 5 MG/3ML) 0 083% Inhalation Nebulization Solution; USE 1 UNIT   DOSE EVERY 4-6 HOURS AS NEEDED FOR WHEEZING ; Therapy: 17OUU4889 to (Last Rx:13Jan2017)  Requested for: 33MTL8577 Ordered   3  AmLODIPine Besylate 5 MG Oral Tablet; TAKE 1 TABLET BY MOUTH EVERY DAY; Therapy: 27QOH9965 to (Evaluate:06Bmm0750)  Requested for: 03Xox3906; Last   Rx:01Hjz3002; Status: ACTIVE - Retrospective Authorization Ordered   4  Aspirin 81 MG TABS; TAKE 1 TABLET DAILY; Therapy: (Recorded:18Jxr0930) to  Requested for: 08HBL5527 Recorded   5  Calcium CAPS; Therapy: (Recorded:08Jun2017) to Recorded   6  Levothyroxine Sodium 100 MCG Oral Tablet; TAKE 1 TABLET DAILY AS DIRECTED; Therapy: 41WPJ3894 to (Dougie Lewis)  Requested for: 36UQF3602; Last   Rx:06Mar2017 Ordered   7  Lovastatin 20 MG Oral Tablet; TAKE 1 TABLET DAILY AS DIRECTED; Therapy: 03YQU2349 to (Vernell Reis)  Requested for: 24ZMY6361; Last   Rx:05Oct2016 Ordered   8  Metoprolol Tartrate 25 MG Oral Tablet; Take 1 tablet twice daily; Therapy: 90STY0312 to (Evaluate:30Mar2018)  Requested for: 63QGM4908; Last   Rx:04Apr2017 Ordered   9  Multi-Vitamin Oral Tablet; TAKE 1 TABLET DAILY; Therapy: 43VXZ5841 to Recorded   10  Peak Flow Meter Device; USE AS DIRECTED; Therapy: 99JHR2838 to (Last Rx:13Jan2017)  Requested for: 00FBE3852 Ordered   11  PreserVision AREDS Oral Capsule; Take 1 capsule twice daily; Therapy: 14Apr2014 to Recorded   12  Tylenol Extra Strength 500 MG Oral Tablet;     Therapy: (Recorded:08Jun2017) to Recorded   13  Ventolin  (90 Base) MCG/ACT Inhalation Aerosol Solution; INHALE 2 PUFFS    EVERY 4-6 HOURS, SPACED 60 SECONDS APART; Therapy: 98JDE5018 to (Last Rx:10Apr2017)  Requested for: 10Apr2017 Ordered   14  Vitamin D CAPS; Therapy: (Recorded:08Jun2017) to Recorded    The medication list was reviewed and updated today  Allergies    1  Amiodarone HCl TABS   2  Iodinated Contrast Media   3  Naprosyn TABS   4  Sulfa Drugs   5  Clindamycin   6  Keflex TABS    7  IV Dye    Vitals  Signs   Recorded: 65TFO4778 01:18PM   Heart Rate: 68  Systolic: 876  Diastolic: 70  Height: 5 ft 2 in  Weight: 141 lb   BMI Calculated: 25 79  BSA Calculated: 1 65    Physical Exam    Constitutional   General appearance: Abnormal   chronically ill  Cervical Spine examination demonstrates Cervical Spine: Marked restrictions of CROM  Special Tests: Marked TTP left > right SCM and traps  Special Tests Shoulder: negative Apprehension Sign on left, negative Neer Test on right, Lopez Test negative on left and negative Supraspinatus Test on the left  Results/Data    Diagnostic Review no recent notes from PTx , unable to show me her HEP  Future Appointments    Date/Time Provider Specialty Site   03/05/2018 03:30 PM MYRIAM Pugh   Hematology Oncology [facility] [place]   12/06/2017 03:20 PM Madyson Rodríguez MD Cardiology [address]   11/06/2017 01:30 PM Brooks Valladares DO Internal Medicine [facility]   12/11/2017 09:45 AM Stanley Engel Urology [facility]     Signatures   Electronically signed by : Destiny Garcia DO; Oct 19 2017  1:44PM EST                       (Author)

## 2018-01-12 VITALS
WEIGHT: 139 LBS | BODY MASS INDEX: 25.58 KG/M2 | SYSTOLIC BLOOD PRESSURE: 106 MMHG | HEIGHT: 62 IN | DIASTOLIC BLOOD PRESSURE: 66 MMHG | HEART RATE: 68 BPM

## 2018-01-12 VITALS
TEMPERATURE: 98.6 F | WEIGHT: 138 LBS | DIASTOLIC BLOOD PRESSURE: 82 MMHG | HEART RATE: 78 BPM | HEIGHT: 62 IN | RESPIRATION RATE: 16 BRPM | SYSTOLIC BLOOD PRESSURE: 150 MMHG | BODY MASS INDEX: 25.4 KG/M2 | OXYGEN SATURATION: 95 %

## 2018-01-12 VITALS
HEART RATE: 71 BPM | HEIGHT: 62 IN | BODY MASS INDEX: 25.76 KG/M2 | DIASTOLIC BLOOD PRESSURE: 82 MMHG | WEIGHT: 140 LBS | RESPIRATION RATE: 16 BRPM | SYSTOLIC BLOOD PRESSURE: 144 MMHG | OXYGEN SATURATION: 95 %

## 2018-01-12 VITALS
BODY MASS INDEX: 25.83 KG/M2 | OXYGEN SATURATION: 95 % | HEIGHT: 62 IN | SYSTOLIC BLOOD PRESSURE: 140 MMHG | WEIGHT: 140.38 LBS | DIASTOLIC BLOOD PRESSURE: 78 MMHG | TEMPERATURE: 97.1 F | RESPIRATION RATE: 14 BRPM | HEART RATE: 68 BPM

## 2018-01-12 VITALS
BODY MASS INDEX: 25.76 KG/M2 | SYSTOLIC BLOOD PRESSURE: 150 MMHG | HEIGHT: 62 IN | WEIGHT: 140 LBS | DIASTOLIC BLOOD PRESSURE: 68 MMHG | HEART RATE: 65 BPM

## 2018-01-12 VITALS
WEIGHT: 142 LBS | OXYGEN SATURATION: 96 % | HEART RATE: 67 BPM | BODY MASS INDEX: 26.13 KG/M2 | RESPIRATION RATE: 16 BRPM | DIASTOLIC BLOOD PRESSURE: 68 MMHG | HEIGHT: 62 IN | SYSTOLIC BLOOD PRESSURE: 102 MMHG | TEMPERATURE: 97.5 F

## 2018-01-12 NOTE — RESULT NOTES
Message   i already spoke to pt  please mail referral of dr solorzano to patient     Verified Results  *  [facility] [id number] 02:41PM Shelia Arredondo   TW Order Number: [id number]    - Patient Instructions: To schedule this appointment, please contact Central Scheduling at [phone]  Test Name Result Flag Reference   MRI CERVICAL SPINE [address] (Report)     This is a summary report  The complete report is available in the patient's medical record  If you cannot access the medical record, please contact the sending organization for a detailed fax or copy  MRI CERVICAL SPINE WITHOUT CONTRAST     INDICATION: Neck cracking  Right ear pain  COMPARISON: None  TECHNIQUE: Sagittal T1, sagittal T2, sagittal inversion recovery, axial T2, axial 2D merge        IMAGE QUALITY: Diagnostic     FINDINGS:     ALIGNMENT: Superior endplate compression deformities are seen from T2 through T4  MARROW SIGNAL: Normal marrow signal is identified within the visualized bony structures  No discrete marrow lesion  CERVICAL AND VISUALIZED THORACIC CORD: Normal signal within the visualized cord  PREVERTEBRAL AND PARASPINAL SOFT TISSUES: Prevertebral and paraspinal soft tissues are unremarkable  VISUALIZED POSTERIOR FOSSA: The visualized posterior fossa demonstrates no abnormal signal      CERVICAL DISC SPACES:        C2-C3: Normal      C3-C4: Moderate facet hypertrophy on the left  Minimal left foraminal narrowing  Minimal central stenosis  C4-C5: Moderate facet hypertrophy on the right results in minimal right foraminal narrowing  Minimal central stenosis  C5-C6: Degenerative disc osteophyte complex with marginal osteophytes right larger than left results in mild to moderate central stenosis, moderate right, and mild left foraminal narrowing  There is a small central protrusion type disc herniation noted   at this level       C6-C7: Degenerative disc osteophyte complex with marginal osteophytes results in mild bilateral foraminal narrowing  Central canal is patent  C7-T1: Normal      UPPER THORACIC DISC SPACES: Normal        IMPRESSION:     Spondylotic degenerative changes result in central stenosis most significantly at C5-6 where is mild to moderate in degree with moderate right and mild left foraminal narrowing noted  Degenerative changes elsewhere results in mild central and foraminal    narrowing as detailed above         Workstation performed: [id number]     Signed by:   Argentina Ann DO   4/5/17       Plan  Cervical radiculopathy    · 1 - Natty Rabago MD  (Orthopedic Surgery) Co-Management  *  Status: Active   Requested for: 25FEE7608  Care Summary provided  : Yes

## 2018-01-12 NOTE — RESULT NOTES
Message   Notify the patient low TSH please have the patient refused to see levothyroxine to 100 mcg once a day and recheck TSH in 4 weeks follow up as scheduled linda        Verified Results  (1) TSH 77MIY8756 01:35PM Kaleigh Krueger     Test Name Result Flag Reference   TSH 0 204 uIU/mL L 0 358-3 740   Patients undergoing fluorescein dye angiography may retain small amounts of fluorescein in the body for 48-72 hours post procedure  Samples containing fluorescein can produce falsely depressed TSH values  If the patient had this procedure,a specimen should be resubmitted post fluorescein clearance            The recommended reference ranges for TSH during pregnancy are as follows:  First trimester 0 1 to 2 5 uIU/mL  Second trimester  0 2 to 3 0 uIU/mL  Third trimester 0 3 to 3 0 uIU/m

## 2018-01-13 VITALS
HEIGHT: 62 IN | BODY MASS INDEX: 24.48 KG/M2 | SYSTOLIC BLOOD PRESSURE: 142 MMHG | WEIGHT: 133.04 LBS | DIASTOLIC BLOOD PRESSURE: 82 MMHG | RESPIRATION RATE: 16 BRPM | HEART RATE: 78 BPM

## 2018-01-13 VITALS
WEIGHT: 139.38 LBS | HEART RATE: 71 BPM | SYSTOLIC BLOOD PRESSURE: 169 MMHG | HEIGHT: 62 IN | BODY MASS INDEX: 25.65 KG/M2 | DIASTOLIC BLOOD PRESSURE: 73 MMHG

## 2018-01-13 VITALS
BODY MASS INDEX: 25.21 KG/M2 | SYSTOLIC BLOOD PRESSURE: 130 MMHG | WEIGHT: 137 LBS | HEART RATE: 69 BPM | RESPIRATION RATE: 16 BRPM | HEIGHT: 62 IN | DIASTOLIC BLOOD PRESSURE: 80 MMHG | OXYGEN SATURATION: 97 % | TEMPERATURE: 97.6 F

## 2018-01-13 VITALS
WEIGHT: 141 LBS | DIASTOLIC BLOOD PRESSURE: 70 MMHG | HEIGHT: 62 IN | SYSTOLIC BLOOD PRESSURE: 120 MMHG | BODY MASS INDEX: 25.95 KG/M2 | HEART RATE: 68 BPM

## 2018-01-13 NOTE — MISCELLANEOUS
Message   Recorded as Task   Date: 07/31/2017 02:54 PM, Created By: Mariah Jones   Task Name: Care Coordination   Assigned To: [employer],TEAM   Regarding Patient: Leisa Cruz, Status: In Progress   Comment:    Soledad Villasenor - 31 Jul 2017 2:54 PM     TASK CREATED  Request to hold ASA for EMILY faxed to Dr John Paul Fritzoter  Pt is hoping to get scheduled ASAP  Thank you  Pat Sewell - 95 Aug 2017 11:29 AM     TASK EDITED   Juwan Muñoz - 09 Aug 2017 4:52 PM     TASK EDITED  Already addressed in another task  Active Problems    1  Abnormal LH level (259 9) (E34 9)   2  Allergy to IVP dye (995 27,E947 4) (T50 995A)   3  Anemia (285 9) (D64 9)   4  History of Aneurysm of aortic root (441 9) (I71 9)   5  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)   6  History of Aortic root dilatation (447 71) (I77 810)   7  Arthritis (716 90) (M19 90)   8  History of Ascending aortic aneurysm (441 2) (I71 2)   9  Asthma (493 90) (J45 909)   10  Atherosclerosis of aorta (440 0) (I70 0)   11  Basal cell carcinoma of skin (173 91) (C44 91)   12  Benign essential hypertension (401 1) (I10)   13  Bloating (787 3) (R14 0)   14  Calf cramp (729 82) (R25 2)   15  Central stenosis of spinal canal (724 00) (M48 00)   16  Cervical disc disorder of mid-cervical region (722 91) (M50 920)   17  Cervical radiculopathy (723 4) (M54 12)   18  Cervical spinal stenosis (723 0) (M48 02)   19  Cervical spondylosis (721 0) (M47 812)   20  Cervicalgia (723 1) (M54 2)   21  Chronic migraine without aura (346 70) (G43 709)   22  Chronic neck pain (723 1,338 29) (M54 2,G89 29)   23  Classic migraine with aura (346 00) (G43 109)   24  Clostridium difficile diarrhea (008 45) (A04 7)   25  Contrast media allergy (V15 08) (Z91 041)   26  Cough (786 2) (R05)   27  DDD (degenerative disc disease), cervical (722 4) (M50 30)   28  Decreased GFR (794 4) (R94 4)   29  Depression (311) (F32 9)   30  Diarrhea (787 91) (R19 7)   31   Difficulty breathing (786 09) (R06 89)   32  Dysphagia (787 20) (R13 10)   33  Esophagitis, reflux (530 11) (K21 0)   34  Facet arthropathy, cervical (721 0) (M12 88)   35  Foraminal stenosis of cervical region (723 0) (M99 81)   36  Hallucination (780 1) (R44 3)   37  Head ache (784 0) (R51)   38  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)   39  Hyperlipidemia (272 4) (E78 5)   40  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)   41  Hypophonia (784 49) (R49 8)   42  Hypothyroidism (244 9) (E03 9)   43  History of Junctional rhythm (427 89) (I49 8)   44  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)   45  Migraine aura without headache (migraine equivalents) (346 00) (G43 109)   46  Myalgia (729 1) (M79 1)   47  Myositis of multiple sites, unspecified myositis type (729 1) (M60 9)   48  Myositis of other site, unspecified myositis type (729 1) (M60 9)   49  Neck muscle spasm (728 85) (M62 838)   50  Need for influenza vaccination (V04 81) (Z23)   51  Need for pneumococcal vaccination (V03 82) (Z23)   52  Night sweats (780 8) (R61)   53  Obstructive sleep apnea (327 23) (G47 33)   54  Osteopenia (733 90) (M85 80)   55  Other specified symptom or sign involving circulatory or respiratory system (785 9,786 9)    (R09 89)   56  Paroxysmal atrial fibrillation (427 31) (I48 0)   57  History of Pleural effusion, bilateral (511 9) (J90)   58  Polymyalgia rheumatica (725) (M35 3)   59  PPD screening test (V74 1) (Z11 1)   60  Preoperative clearance (V72 84) (Z01 818)   61  Preventive Medicine Estab Patient Checkup Adult Over 59 (V70 0)   62  Restless legs syndrome (333 94) (G25 81)   63  S/P trigger finger release (V45 89) (Z98 890)   64  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)   65  Screening for genitourinary condition (V81 6) (Z13 89)   66  Screening for neurological condition (V80 09) (Z13 89)   67  Shortness of breath (786 05) (R06 02)   68  Stress incontinence in female (625 6) (N39 3)   69   Stress incontinence, male (788 32) (N39 3)   70  Suicidal overdose (977 9,E950 5) (T50 902A)   71  Tricuspid regurgitation (397 0) (I07 1)   72  Urinary incontinence (788 30) (R32)   73  Urinary tract infection (599 0) (N39 0)   74  Vitamin D deficiency (268 9) (E55 9)   75  Xerostomia (527 7) (R68 2)    Current Meds   1  Advair Diskus 250-50 MCG/DOSE Inhalation Aerosol Powder Breath Activated; INHALE   1 PUFF TWICE DAILY; Therapy: 03ZFD9619 to )  Requested for: 95CBY3007; Last   Rx:01Mar2017 Ordered   2  Albuterol Sulfate (2 5 MG/3ML) 0 083% Inhalation Nebulization Solution; USE 1 UNIT   DOSE EVERY 4-6 HOURS AS NEEDED FOR WHEEZING ; Therapy: 42KSF4754 to (Last Rx:13Jan2017)  Requested for: 27UPS4187 Ordered   3  AmLODIPine Besylate 5 MG Oral Tablet; TAKE 1 TABLET BY MOUTH EVERY DAY; Therapy: 49QJY0740 to (Evaluate:04Sep2017)  Requested for: 94JCL1994; Last   Rx:09Sep2016 Ordered   4  Aspirin 81 MG TABS; TAKE 1 TABLET DAILY; Therapy: (Recorded:01Xvx7148) to  Requested for: 76IFW9349 Recorded   5  Calcium CAPS; Therapy: (Recorded:08Jun2017) to Recorded   6  Levothyroxine Sodium 100 MCG Oral Tablet; TAKE 1 TABLET DAILY AS DIRECTED; Therapy: 76WYB5755 to (Tiera Fines)  Requested for: 41EYI7900; Last   Rx:06Mar2017 Ordered   7  Lovastatin 20 MG Oral Tablet; TAKE 1 TABLET DAILY AS DIRECTED; Therapy: 84XPM1772 to (Beltran Paige)  Requested for: 07KLU2696; Last   Rx:05Oct2016 Ordered   8  Metoprolol Tartrate 25 MG Oral Tablet; Take 1 tablet twice daily; Therapy: 88UXS9973 to (Evaluate:30Mar2018)  Requested for: 48BGO3215; Last   Rx:04Apr2017 Ordered   9  Multi-Vitamin Oral Tablet; TAKE 1 TABLET DAILY; Therapy: 30ZFB3236 to Recorded   10  Peak Flow Meter Device; USE AS DIRECTED; Therapy: 68DRO3741 to (Last Rx:13Jan2017)  Requested for: 95FLW7280 Ordered   11  PreserVision AREDS Oral Capsule; Take 1 capsule twice daily; Therapy: 14Apr2014 to Recorded   12   Tylenol Extra Strength 500 MG Oral Tablet; Therapy: (Recorded:08Jun2017) to Recorded   13  Ventolin  (90 Base) MCG/ACT Inhalation Aerosol Solution; INHALE 2 PUFFS    EVERY 4-6 HOURS, SPACED 60 SECONDS APART; Therapy: 21NFD5611 to (Last Rx:10Apr2017)  Requested for: 10Apr2017 Ordered   14  Vitamin D CAPS; Therapy: (Recorded:08Jun2017) to Recorded    Allergies    1  Amiodarone HCl TABS   2  Iodinated Contrast Media   3  Naprosyn TABS   4  Sulfa Drugs   5  Clindamycin   6   Keflex TABS    7  IV Dye    Signatures   Electronically signed by : Isidro Ennis, ; Aug  9 2017  4:52PM EST                       (Author)

## 2018-01-13 NOTE — PSYCH
History of Present Illness  Psychotherapy Provided  Luke: Individual Psychotherapy 30 minutes minutes provided today  Goals addressed in session:   Patient denies any acute issues  Denies any depressive symptoms  Has chosen not to follow through with any oncology treatments moving forward  Has some anxiety moving forward as she will have to deal with pain/physical struggles while living alone  She will eventually be approved for subsidized housing in [place] and this would be helpful as she had lived there for years and has more supports/contacts in this area  Assured her I could initiate referrals that would help with these needs as well  Patient pleasant verbal and cooperative  HPI - Psych: Patient denies ay depressive symptoms  Denies SI  Has made conscious effort to view her situation in positive perceptive  Has natural apprehension regarding the future and her physical status   Note   Note:   Provided supportive therapy  Reviewed quality of life issues with her and family's response to hr choice  Reviewed stress mgmt strategies to utilize when needed  She agrees to contact me if she needs anything or her status changes in future  Assessment    1   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

## 2018-01-14 VITALS
SYSTOLIC BLOOD PRESSURE: 150 MMHG | WEIGHT: 141 LBS | BODY MASS INDEX: 25.95 KG/M2 | HEART RATE: 74 BPM | DIASTOLIC BLOOD PRESSURE: 78 MMHG | HEIGHT: 62 IN

## 2018-01-14 NOTE — RESULT NOTES
Verified Results  (1) CK (CPK) 03BMJ5488 12:12PM [facility] Number: [id number]     Test Name Result Flag Reference   CK (CPK) 218 U/L H    Slightly Hemolyzed; Results May be Affected   CK MB FRACTION 2 2 ng/mL  0 0-5 0   CK-MB INDEX 1 0 %  0 0-2 5     (1) C-REACTIVE PROTEIN 08Jun2017 12:12PM [facility] Order Number: [id number]     Test Name Result Flag Reference   C-REACT PROTEIN 4 3 mg/L H <3 0     (1) SED RATE 29AGE8431 12:12PM [facility] Number: [id number]     Test Name Result Flag Reference   SED RATE 24 mm/hour H 0-20     (1) VITAMIN D 25-[facility]OX[facility]ED9804 [facility]: [id number]     Test Name Result Flag Reference   VIT D 25-HYDROX 29 6 ng/mL L 30 0-100 0   This assay is a certified procedure of the CDC Vitamin D Standardization Certification Program (VDSCP)     Deficiency <20ng/ml   Insufficiency 20-30ng/ml   Sufficient  ng/ml     *Patients undergoing fluorescein dye angiography may retain small amounts of fluorescein in the body for 48-72 hours post procedure  Samples containing fluorescein can produce falsely elevated Vitamin D values  If the patient had this procedure, a specimen should be resubmitted post fluorescein clearance

## 2018-01-15 VITALS
HEART RATE: 72 BPM | WEIGHT: 140.38 LBS | HEIGHT: 62 IN | BODY MASS INDEX: 25.83 KG/M2 | SYSTOLIC BLOOD PRESSURE: 120 MMHG | DIASTOLIC BLOOD PRESSURE: 58 MMHG

## 2018-01-15 VITALS
DIASTOLIC BLOOD PRESSURE: 70 MMHG | HEIGHT: 62 IN | BODY MASS INDEX: 25.95 KG/M2 | WEIGHT: 141 LBS | SYSTOLIC BLOOD PRESSURE: 140 MMHG | HEART RATE: 68 BPM

## 2018-01-15 VITALS
HEART RATE: 76 BPM | WEIGHT: 142 LBS | BODY MASS INDEX: 26.13 KG/M2 | HEIGHT: 62 IN | RESPIRATION RATE: 16 BRPM | SYSTOLIC BLOOD PRESSURE: 144 MMHG | DIASTOLIC BLOOD PRESSURE: 84 MMHG

## 2018-01-15 NOTE — RESULT NOTES
Message   notified the patient negative SS-A and SS-B antibodies f/u as scheduled        Verified Results  (1) SJOGREN'S ANTIBODIES 32KCR5224 03:55PM Carry Prichard   Performed at:  [facility] [employer] [employer] [address]  [id number]  : Leann Plaza MD, Phone:  [phone]     Test Name Result Flag Reference   SS-A <0 2 AI  0 0 - 0 9   SS-B <0 2 AI  0 0 - 0 9       Signatures   Electronically signed by : Dellia Gosselin, DO; Feb 25 2016  8:11PM EST                       (Author)

## 2018-01-15 NOTE — PROGRESS NOTES
Assessment    1  Cervicalgia (723 1) (M54 2)   2  Myositis of multiple sites, unspecified myositis type (729 1) (M60 9)   3  Vitamin D deficiency (268 9) (E55 9)   4  Facet arthropathy, cervical (721 0) (M12 88)    cervical facet OA   somewhat classic AM stiffness improved with movement  Plan  Facet arthropathy, cervical    · Follow-up Visit in 4 Weeks Evaluation and Treatment  Follow-up  Status: Hold For -  Scheduling  Requested for: 96IMB3321   · *1 - [facility]  trial of muscle stim   H wave if  you have it   home unit if helpful  Status: Active  Requested for: 22KCM8262  Care Summary provided  : Yes    Discussion/Summary  Impression: osteoarthritis of the cervical spine and mild deficiency in Vit D  There are no changes in medication management  Take an extra 1000 units of Vit D3 daily  Chief Complaint  PCP referral for "painful stiff neck"  6/29 follow up for neck pain  History of Present Illness  6/8/17 IE: 81 yo female with non-traumatic chronic stiff painful neck   had to stop driving   had PTx per Dr Leopold Offer without pain relief   takes APAP    NSAIDS increase BP   had taken left over oxycodone without benefit  cannot ID triggers, pattern in posterior neck and radiates to periauricular area   denies numbness or tingling in fingers  Has hard palate tumor  Has painful bed transfers Has also had HAs for about nine months   attributes to tumor   had CT with "bone erosion" Was in ED April and given diazepam with definite benefit   had marked hangover effect with CBP    6/29 fu for painful stiff neck   today somewhat improved  very stiiff in am better with as day goes on  PM consult for facet injections pending  Review of Systems    Musculoskeletal: as noted in HPI  Neurological: no numbness  ROS reviewed  Active Problems    1  Abnormal LH level (259 9) (E34 9)   2  Allergy to IVP dye (995 27,E947 4) (T50 995A)   3  Anemia (285 9) (D64 9)   4   History of Aneurysm of aortic root (441 9) (I71 9)   5  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)   6  History of Aortic root dilatation (447 71) (I77 810)   7  Arthritis (716 90) (M19 90)   8  History of Ascending aortic aneurysm (441 2) (I71 2)   9  Asthma (493 90) (J45 909)   10  Atherosclerosis of aorta (440 0) (I70 0)   11  Basal cell carcinoma of skin (173 91) (C44 91)   12  Benign essential hypertension (401 1) (I10)   13  Bloating (787 3) (R14 0)   14  Calf cramp (729 82) (R25 2)   15  Central stenosis of spinal canal (724 00) (M48 00)   16  Cervical radiculopathy (723 4) (M54 12)   17  Cervicalgia (723 1) (M54 2)   18  Chronic migraine without aura (346 70) (G43 709)   19  Chronic neck pain (723 1,338 29) (M54 2,G89 29)   20  Classic migraine with aura (346 00) (G43 109)   21  Clostridium difficile diarrhea (008 45) (A04 7)   22  Contrast media allergy (V15 08) (Z91 041)   23  Cough (786 2) (R05)   24  DDD (degenerative disc disease), cervical (722 4) (M50 30)   25  Decreased GFR (794 4) (R94 4)   26  Depression (311) (F32 9)   27  Diarrhea (787 91) (R19 7)   28  Difficulty breathing (786 09) (R06 89)   29  Dysphagia (787 20) (R13 10)   30  Esophagitis, reflux (530 11) (K21 0)   31  Facet arthropathy, cervical (721 0) (M12 88)   32  Foraminal stenosis of cervical region (723 0) (M99 81)   33  Hallucination (780 1) (R44 3)   34  Head ache (784 0) (R51)   35  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)   36  Hyperlipidemia (272 4) (E78 5)   37  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)   38  Hypophonia (784 49) (R49 8)   39  Hypothyroidism (244 9) (E03 9)   40  History of Junctional rhythm (427 89) (I49 8)   41  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)   42  Migraine aura without headache (migraine equivalents) (346 00) (G43 109)   43  Myalgia (729 1) (M79 1)   44  Myositis of multiple sites, unspecified myositis type (729 1) (M60 9)   45   Myositis of other site, unspecified myositis type (729 1) (M60 9)   46  Neck muscle spasm (728 85) (M62 838)   47  Need for influenza vaccination (V04 81) (Z23)   48  Need for pneumococcal vaccination (V03 82) (Z23)   49  Night sweats (780 8) (R61)   50  Obstructive sleep apnea (327 23) (G47 33)   51  Osteopenia (733 90) (M85 80)   52  Other specified symptom or sign involving circulatory or respiratory system (785 9,786 9)    (R09 89)   53  Paroxysmal atrial fibrillation (427 31) (I48 0)   54  History of Pleural effusion, bilateral (511 9) (J90)   55  Polymyalgia rheumatica (725) (M35 3)   56  PPD screening test (V74 1) (Z11 1)   57  Preoperative clearance (V72 84) (Z01 818)   58  Preventive Medicine Estab Patient Checkup Adult Over 59 (V70 0)   59  Restless legs syndrome (333 94) (G25 81)   60  S/P trigger finger release (V45 89) (Z98 890)   61  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)   62  Screening for genitourinary condition (V81 6) (Z13 89)   63  Screening for neurological condition (V80 09) (Z13 89)   64  Shortness of breath (786 05) (R06 02)   65  Stress incontinence in female (625 6) (N39 3)   66  Stress incontinence, male (788 32) (N39 3)   79  Suicidal overdose (977 9,E950 5) (T50 902A)   68  Tricuspid regurgitation (397 0) (I07 1)   69  Urinary incontinence (788 30) (R32)   70  Urinary tract infection (599 0) (N39 0)   71  Xerostomia (527 7) (R68 2)    Past Medical History    1  Allergy to IVP dye (995 27,E947 4) (T50 995A)   2  History of Aneurysm of aortic root (441 9) (I71 9)   3  History of Aortic root dilatation (447 71) (I77 810)   4  History of Ascending aortic aneurysm (441 2) (I71 2)   5  History of Facet arthropathy, cervical (721 0) (M12 88)   6  History of Fracture Of The Vertebral Column (805 8)   7  History of basal cell carcinoma (V10 83) (Z85 828)   8  History of urinary incontinence (V13 09) (Z87 898)   9  History of urinary incontinence (V13 09) (Z87 898)   10  History of Hypoxia (799 02) (R09 02)   11   History of Junctional rhythm (427 89) (I49 8)   12  History of Lightheadedness (780 4) (R42)   13  History of Palpitations (785 1) (R00 2)   14  History of Pleural effusion, bilateral (511 9) (J90)   15  History of Thyroid trouble (246 9) (E07 9)   16  History of Trigger finger (727 03) (M65 30)   17  History of Trigger middle finger of right hand (727 03) (M65 331)    The active problems and past medical history were reviewed and updated today  Surgical History    1  History of Appendectomy   2  History of Ascending Aortic Aneurysm Repair With Graft   3  Recurrent history of Bladder Surgery   4  History of Cardiac Cath Procedure Summary   5  History of Hysterectomy   6  Preventive Medicine Estab Patient Checkup Adult Over 64 (V70 0)   7  History of Thyroid Surgery Total Thyroidectomy   8  History of Tonsillectomy With Adenoidectomy    The surgical history was reviewed and updated today  Family History  Sister    1  Family history of CABG  Family History    2  Family history of Family Health Status Of Father -    3  Family history of Family Health Status Of Mother -    3  Family history of cardiac disorder (V17 49) (Z82 49)   5  Family history of hypertension (V17 49) (Z82 49)   6  Family history of malignant neoplasm (V16 9) (Z80 9)   7  Family history of thyroid disease (V18 19) (Z83 49)    The family history was reviewed and updated today  Social History    · Being A Social Drinker   · Denied: History of Drug Use   · Former smoker (V15 82) (W12 689)   · Marital History -    · Never Drank Alcohol  The social history was reviewed and is unchanged  Current Meds   1  Advair Diskus 250-50 MCG/DOSE Inhalation Aerosol Powder Breath Activated; INHALE 1   PUFF TWICE DAILY; Therapy: 05FLZ5974 to 9397 8822)  Requested for: 55NMN8451; Last   Rx:2017 Ordered   2   Albuterol Sulfate (2 5 MG/3ML) 0 083% Inhalation Nebulization Solution; USE 1 UNIT   DOSE EVERY 4-6 HOURS AS NEEDED FOR WHEEZING ; Therapy: 08TQB6770 to (Last Rx:13Jan2017)  Requested for: 94SGD8177 Ordered   3  AmLODIPine Besylate 5 MG Oral Tablet; TAKE 1 TABLET BY MOUTH EVERY DAY; Therapy: 92LTE6667 to (Evaluate:04Sep2017)  Requested for: 93UXC6096; Last   Rx:99Nfj0963 Ordered   4  Aspirin 81 MG TABS; TAKE 1 TABLET DAILY; Therapy: (Recorded:35Evm3035) to  Requested for: 78DSB9182 Recorded   5  Calcium CAPS; Therapy: (Recorded:08Jun2017) to Recorded   6  Cyclobenzaprine HCl - 5 MG Oral Tablet; TAKE 1 TABLET AT BEDTIME; Therapy: 55GXX0966 to (Evaluate:21May2017)  Requested for: 73GTR3128; Last   LY:12AQU9553 Ordered   7  DiazePAM 5 MG Oral Tablet (Valium); TAKE 1 TABLET  AS NEEDED; Therapy: 23Xxv7118 to (Evaluate:03May2017); Last Rx:28Apr2017 Ordered   8  Levothyroxine Sodium 100 MCG Oral Tablet; TAKE 1 TABLET DAILY AS DIRECTED; Therapy: 44USE7827 to (Alan Quintero)  Requested for: 33UMF4040; Last   Rx:06Mar2017 Ordered   9  Lovastatin 20 MG Oral Tablet; TAKE 1 TABLET DAILY AS DIRECTED; Therapy: 75IJY0685 to (Leonel Kwan)  Requested for: 28UVO6515; Last   Rx:05Oct2016 Ordered   10  Methocarbamol 500 MG Oral Tablet; take 1-2 tabs q6h prn; Therapy: 50UGO4485 to (Last Rx:08Jun2017)  Requested for: 44SKK7124 Ordered   11  Metoprolol Tartrate 25 MG Oral Tablet; Take 1 tablet twice daily; Therapy: 99KNS3377 to (Evaluate:30Mar2018)  Requested for: 63KFE1070; Last    Rx:04Apr2017 Ordered   12  Multi-Vitamin Oral Tablet; TAKE 1 TABLET DAILY; Therapy: 62KCM4533 to Recorded   13  Peak Flow Meter Device; USE AS DIRECTED; Therapy: 10RNX0289 to (Last Rx:13Jan2017)  Requested for: 12AMJ0929 Ordered   14  PredniSONE 10 MG Oral Tablet; TAKE 1 TABLET DAILY; Therapy: 54Sou9539 to (Evaluate:83Qik3147)  Requested for: 24Apr2017; Last    Rx:24Apr2017 Ordered   15  PreserVision AREDS Oral Capsule; Take 1 capsule twice daily; Therapy: 14Apr2014 to Recorded   16  Tylenol Extra Strength 500 MG Oral Tablet;     Therapy: (Recorded:08Jun2017) to Recorded   17  Ventolin  (90 Base) MCG/ACT Inhalation Aerosol Solution; INHALE 2 PUFFS    EVERY 4-6 HOURS, SPACED 60 SECONDS APART; Therapy: 28MFJ7401 to (Last Rx:10Apr2017)  Requested for: 10Apr2017 Ordered   18  Vitamin D CAPS; Therapy: (Recorded:08Jun2017) to Recorded    The medication list was reviewed and updated today  Allergies    1  Amiodarone HCl TABS   2  Iodinated Contrast Media   3  Naprosyn TABS   4  Sulfa Drugs   5  Clindamycin   6  Keflex TABS    7  IV Dye    Vitals  Signs   Recorded: 71JDP8939 01:47PM   Heart Rate: 72  Systolic: 165  Diastolic: 58  Height: 5 ft 2 in  Weight: 140 lb 6 08 oz  BMI Calculated: 25 68  BSA Calculated: 1 64    Physical Exam    Constitutional increased thoracic kyphosis  Results/Data    Diagnostic Review mildly low Vit D, borderline CK, Normal aldolase, ESR 24 (down from 54 in 2015)  Future Appointments    Date/Time Provider Specialty Site   08/28/2017 10:30 AM MYRIAM Santiago  Hematology [facility]cology [facility] [place]   11/06/2017 01:30 PM Theresa Capone DO Internal Medicine [facility]   07/05/2017 01:15 PM MYRIAM Lynn   Urology [address]   07/31/2017 02:00 PM Shiloh Wagn MD Pain Management [place] [address]   Electronically signed by : Teofilo Schultz DO; Jun 29 2017  2:13PM EST                       (Author)

## 2018-01-15 NOTE — RESULT NOTES
Message   patient has degenerative disc disease of the cervical spine which is thinning of the disc and possible herniated disc  I would like to look closer at with with mri or ct scan but patient needs to try physical therapy first per insurance  if the patient likes, i can put in an order for PT     Verified Results  * XR SPINE CERVICAL COMPLETE 4 OR 5 VW NON INJURY 70NJO4996 01:42PM Yuliana Wild   TW Order Number: [id number]     Test Name Result Flag Reference   XR SPINE CERVICAL COMPLETE 4 OR 5 VW (Report)     CERVICAL SPINE     INDICATION: Neck pain  COMPARISON: 11/12/2007     VIEWS: 5     IMAGES: 5     FINDINGS:     No evidence of fracture or subluxation  There is interval development of degenerative disc disease of the cervical spine at C6-C7 with disc space narrowing and endplate spurring  Minor degree of narrowing and endplate spurring present at C5-C6  There is mild bilateral facet joint    osteoarthritis  On the left, the C3-C4 and C7-T1 foramina are partially narrowed secondary to uncovertebral joint spurring  On the right, there is mild to moderate narrowing at C6-C7     There are multiple surgical clips in the neck  Patient is status post median sternotomy       IMPRESSION:     Degenerative disc disease and arthritis of the cervical spine         Workstation performed: [id number]     Signed by:   Thierno Mayfield MD   3/17/17

## 2018-01-15 NOTE — RESULT NOTES
Message   notify the patient mild elevation of the blood sugar in the prediabetic range and also a slightly low GFR; please have the patient reduce sweets in the diet and follow up as scheduled        Verified Results  (1) LD (LDH) [id number] 01:03PM Leanna Ring Order Number: [id number]     Test Name Result Flag Reference   LD (LDH) 200 U/L       (1) Tristan [id number] 01:03PM Desire Lee   TW Order Number: [id number]      National Kidney Disease Education Program recommendations are as follows:  GFR calculation is accurate only with a steady state creatinine  Chronic Kidney disease less than 60 ml/min/1 73 sq  meters  Kidney failure less than 15 ml/min/1 73 sq  meters       Test Name Result Flag Reference   GLUCOSE,RANDM 107 mg/dL     SODIUM 139 mmol/L  136-145   POTASSIUM 4 5 mmol/L  3 5-5 3   CHLORIDE 103 mmol/L  100-108   CARBON DIOXIDE 29 mmol/L  21-32   ANION GAP (CALC) 7 mmol/L  4-13   BLOOD UREA NITROGEN 19 mg/dL  5-25   CREATININE 0 90 mg/dL  0 60-1 30   Standardized to IDMS reference method   CALCIUM 8 6 mg/dL  8 3-10 1   eGFR Non-African American 59 8 ml/min/1 73sq m         Signatures   Electronically signed by : Alan Feliciano DO; Mar 18 2016  4:31PM EST                       (Author)

## 2018-01-15 NOTE — PROCEDURES
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]       3  Reflux esophagitis [K21 0]                Procedures:       1  FL BARIUM [facility]oco [facility] SPEECH [FYS909 (Custom)]                                                      Video Swallow Study      Patient Name: Buffy Vazquez  Today's Date: 7/12/2016  par  par  Past Medical History  Asthma, skin ca, chronic migraines, CAD, cough, reflux esophagitis, malignant neoplasm of hard palate, salivary gland cancer, polymyalgia, xerostomia       General Information:    81 yo female referred to [facility]  for a VBS by Dr Jen Luu for dysphagia w/  c/o food getting stuck in upper chest area, specifically white bread, meats, and sometimes pills  Pt stated she does not choke  She feels the food can be stuck for hours  Pt attributes problem to eating too fast   Pt denied any problem w/ swallowing liquids  Cognition:  WNL    Speech/Swallow [facility]: Oral motor movements appeared  WNL; Dentition was  natural;  Respiratory Status: WNL on room air; Current diet: regular w/ thin liquids  Prior VBS none    Pt was seen in radiology for a Video Barium Swallow Study, seated in the upright position and viewed laterally with the following consistencies: puree, soft, solid, HTL, NTL, thin liquids, and whole pill w/ thin liquids  Results are as follows:           Oral Stage:   Bolus retrieval, mastication, manipulation, and transfer of all materials were WNL  Pharyngeal Stage:   Swallow initiation was timely w/ good airway closure and epiglottic inversion   No pharyngeal retention, laryngeal penetration or aspiration was observed  Esophageal Stage:   briefly assessed, noted delayed clearance of food boluses from cervical-thoracic esophagus, improved clearance w/ liquids  Also noted brief pill stasis in same area  All materials emptied into stomach w/o difficulty  Assessment Summary:   Oral and pharyngeal stages of swallowing appear WNL  Esophageal dysphagia noted w/ delayed clearance from proximal esophagus  Diagnosis/Prognosis:            Recommendations:   cont regular diet w/ thin liquids  Alternate food and liquids as needed    meds as tolerated  F/u w/ GI                             Received for:Provider  EPIC   Jul 12 2016  4:00PM [facility] Standard Time

## 2018-01-15 NOTE — RESULT NOTES
Message   notify the patient normal LDH follow up as scheduled        Verified Results  (1) LD (LDH) [id number] 01:03PM Rosalia Matos Order Number: [id number]     Test Name Result Flag Reference   LD (LDH) 200 U/L         Signatures   Electronically signed by : Henry Cannon DO; Mar 18 2016  4:30PM EST                       (Author)

## 2018-01-16 NOTE — RESULT NOTES
Message   please get the written report        Verified Results  Anay Palumbo [id number] 09:40AM Yue Calvillo   TW Order Number: [id number]   TW Order Number: [id number]     Test Name Result Flag Reference   FL BARIUM SWALLOW Joe Beltran (Report)     3 minutes of fluoroscopy time were provided for the Department of Speech Pathology in performing a video barium swallow  Please refer to their report for the official interpretation  A copy of the video tape is on file        Signed by: Delores Spain DO   [7/13/2016 10:01:10 AM - Alban DAI]   0 8   25       Signatures   Electronically signed by : Slick Honeycutt DO; Jul 13 2016  6:48PM EST                       (Author)

## 2018-01-16 NOTE — RESULT NOTES
Verified Results  (1) ALDOLASE [id number] 12:12PM Rahul Naranjo    Order Number: [id number]     Test Name Result Flag Reference   ALDOLASE 9 1 U/L  3 3 - 10 3   Performed at:  [address] [place] [address]  [id number]  : Bhavik Dyson MD, Phone:  [phone]

## 2018-01-16 NOTE — MISCELLANEOUS
Message     Recorded as Task   Date: 08/09/2017 12:25 PM, Created By: Stanton Mccoy   Task Name: Follow Up   Assigned To: Samia Tello   Regarding Patient: Jama Coleman, Status: Active   CommentHouston Piper - 09 Aug 2017 12:25 PM     TASK CREATED  received ok to hold aspirin from Dr Ruben Kc - 09 Aug 2017 1:13 PM     TASK IN [address] - 09 Aug 2017 1:27 PM     TASK EDITED  Called pt to schedule EMILY  Pt last took her (2) Baby ASA yest   Pt instructed she will need to hold ASA for 6 days, pt questioning that Dr Nuno Lax a 6 day hold b/c 2 months ago he would only allow her to hold the Baby ASA for 4 days instead of the required 5 days for a sm bladder sx she was having  Pt said she really is suppose to be on coumadin but can't b/c she has a tumor in her mouth and when she was on coumadin it caused bleeding in her mouth so she was switched to (2) Baby ASA  Told pt I will have to d/w FQ further how many days Dr Carlton Zapata told him pt could hold the ASA for  Pt aware Dr Colin Perry out of office this afternoon we will c/b either today or tomorrow after d/w FQ  Dr Colin Perry only in office today until 1pm, left  on his cell to pls c/b to discuss  Jamaica Reyes - 09 Aug 2017 2:40 PM     TASK EDITED  *FYI*  Discussed w/ FQ at 1:35p and he said he received a pre-op task note from Dr Carlton Zapata giving permission to hold ASA  *Per FQ if pt not comfortable holding ASA for the full 6 days he will allow her to only hold for 4 days  *    I obtained pre-op risk assessment task dated 8/9/17 from Dr Carlton Zapata which states pt may hold ASA for 7 days prior to opro  I s/w pt and advised Dr Carlton Zapata ok ASA hold for 7days prior to opro, but FQ said if pt not comfortable holding 6 days then he will allow her to only hold 4 days  Pt prefers to only hold 4 days  EMILY scheduled for 8/15 at 3:30 at [facility]   Instr reviewed: light lunch then NPO after 2:15,  needed, c/b needed if sick/abx started prior to opro, ASA to be stopped on 8/11, pt aware no NSAIDS 4 days prior to opro, no earrings or necklaces  Pt verbalized understanding of all instr  Lucille Andrews - 10 Aug 2017 8:36 AM     TASK REPLIED TO: Previously Assigned To Lucille Andrews md aware        Active Problems    1  Abnormal LH level (259 9) (E34 9)   2  Allergy to IVP dye (995 27,E947 4) (T50 995A)   3  Anemia (285 9) (D64 9)   4  History of Aneurysm of aortic root (441 9) (I71 9)   5  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)   6  History of Aortic root dilatation (447 71) (I77 810)   7  Arthritis (716 90) (M19 90)   8  History of Ascending aortic aneurysm (441 2) (I71 2)   9  Asthma (493 90) (J45 909)   10  Atherosclerosis of aorta (440 0) (I70 0)   11  Basal cell carcinoma of skin (173 91) (C44 91)   12  Benign essential hypertension (401 1) (I10)   13  Bloating (787 3) (R14 0)   14  Calf cramp (729 82) (R25 2)   15  Central stenosis of spinal canal (724 00) (M48 00)   16  Cervical disc disorder of mid-cervical region (722 91) (M50 920)   17  Cervical radiculopathy (723 4) (M54 12)   18  Cervical spinal stenosis (723 0) (M48 02)   19  Cervical spondylosis (721 0) (M47 812)   20  Cervicalgia (723 1) (M54 2)   21  Chronic migraine without aura (346 70) (G43 709)   22  Chronic neck pain (723 1,338 29) (M54 2,G89 29)   23  Classic migraine with aura (346 00) (G43 109)   24  Clostridium difficile diarrhea (008 45) (A04 7)   25  Contrast media allergy (V15 08) (Z91 041)   26  Cough (786 2) (R05)   27  DDD (degenerative disc disease), cervical (722 4) (M50 30)   28  Decreased GFR (794 4) (R94 4)   29  Depression (311) (F32 9)   30  Diarrhea (787 91) (R19 7)   31  Difficulty breathing (786 09) (R06 89)   32  Dysphagia (787 20) (R13 10)   33  Esophagitis, reflux (530 11) (K21 0)   34  Facet arthropathy, cervical (721 0) (M12 88)   35  Foraminal stenosis of cervical region (723 0) (M99 81)   36  Hallucination (780 1) (R44 3)   37  Head ache (784 0) (R51)   38  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)   39  Hyperlipidemia (272 4) (E78 5)   40  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)   41  Hypophonia (784 49) (R49 8)   42  Hypothyroidism (244 9) (E03 9)   43  History of Junctional rhythm (427 89) (I49 8)   44  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)   45  Migraine aura without headache (migraine equivalents) (346 00) (G43 109)   46  Myalgia (729 1) (M79 1)   47  Myositis of multiple sites, unspecified myositis type (729 1) (M60 9)   48  Myositis of other site, unspecified myositis type (729 1) (M60 9)   49  Neck muscle spasm (728 85) (M62 838)   50  Need for influenza vaccination (V04 81) (Z23)   51  Need for pneumococcal vaccination (V03 82) (Z23)   52  Night sweats (780 8) (R61)   53  Obstructive sleep apnea (327 23) (G47 33)   54  Osteopenia (733 90) (M85 80)   55  Other specified symptom or sign involving circulatory or respiratory system (785 9,786 9)    (R09 89)   56  Paroxysmal atrial fibrillation (427 31) (I48 0)   57  History of Pleural effusion, bilateral (511 9) (J90)   58  Polymyalgia rheumatica (725) (M35 3)   59  PPD screening test (V74 1) (Z11 1)   60  Preoperative clearance (V72 84) (Z01 818)   61  Preventive Medicine Estab Patient Checkup Adult Over 59 (V70 0)   62  Restless legs syndrome (333 94) (G25 81)   63  S/P trigger finger release (V45 89) (Z98 890)   64  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)   65  Screening for genitourinary condition (V81 6) (Z13 89)   66  Screening for neurological condition (V80 09) (Z13 89)   67  Shortness of breath (786 05) (R06 02)   68  Stress incontinence in female (625 6) (N39 3)   69  Stress incontinence, male (788 32) (N39 3)   70  Suicidal overdose (977 9,E950 5) (T50 902A)   71  Tricuspid regurgitation (397 0) (I07 1)   72  Urinary incontinence (788 30) (R32)   73  Urinary tract infection (599 0) (N39 0)   74   Vitamin D deficiency (268 9) (E55 9)   75  Xerostomia (527 7) (R68 2)    Current Meds   1  Advair Diskus 250-50 MCG/DOSE Inhalation Aerosol Powder Breath Activated; INHALE   1 PUFF TWICE DAILY; Therapy: 47IME5555 to [phone])  Requested for: 15UXV1660; Last   Rx:01Mar2017 Ordered   2  Albuterol Sulfate (2 5 MG/3ML) 0 083% Inhalation Nebulization Solution; USE 1 UNIT   DOSE EVERY 4-6 HOURS AS NEEDED FOR WHEEZING ; Therapy: 14JYD0432 to (Last Rx:13Jan2017)  Requested for: 46JJL2728 Ordered   3  AmLODIPine Besylate 5 MG Oral Tablet; TAKE 1 TABLET BY MOUTH EVERY DAY; Therapy: 15QAB2407 to (Evaluate:04Sep2017)  Requested for: 41NSO3421; Last   Rx:09Sep2016 Ordered   4  Aspirin 81 MG TABS; TAKE 1 TABLET DAILY; Therapy: (Recorded:87Bes0879) to  Requested for: 85OMZ3053 Recorded   5  Calcium CAPS; Therapy: (Recorded:08Jun2017) to Recorded   6  Levothyroxine Sodium 100 MCG Oral Tablet; TAKE 1 TABLET DAILY AS DIRECTED; Therapy: 27MGP7917 to (Manny Thorpe)  Requested for: 74RCX6193; Last   Rx:06Mar2017 Ordered   7  Lovastatin 20 MG Oral Tablet; TAKE 1 TABLET DAILY AS DIRECTED; Therapy: 95DGI6135 to (Judi Jessica)  Requested for: 87QXE4058; Last   Rx:05Oct2016 Ordered   8  Metoprolol Tartrate 25 MG Oral Tablet; Take 1 tablet twice daily; Therapy: 18SPO3918 to (Evaluate:30Mar2018)  Requested for: 29FIB5594; Last   Rx:04Apr2017 Ordered   9  Multi-Vitamin Oral Tablet; TAKE 1 TABLET DAILY; Therapy: 12RBC8159 to Recorded   10  Peak Flow Meter Device; USE AS DIRECTED; Therapy: 26RXT4397 to (Last Rx:13Jan2017)  Requested for: 80JBK6318 Ordered   11  PreserVision AREDS Oral Capsule; Take 1 capsule twice daily; Therapy: 92Tld3804 to Recorded   12  Tylenol Extra Strength 500 MG Oral Tablet; Therapy: (Recorded:08Jun2017) to Recorded   13  Ventolin  (90 Base) MCG/ACT Inhalation Aerosol Solution; INHALE 2 PUFFS    EVERY 4-6 HOURS, SPACED 60 SECONDS APART;     Therapy: 86VKF6876 to (Last Rx:77Dhp3522) Requested for: 14Woq3707 Ordered   14  Vitamin D CAPS; Therapy: (Recorded:81Pij1529) to Recorded    Allergies    1  Amiodarone HCl TABS   2  Iodinated Contrast Media   3  Naprosyn TABS   4  Sulfa Drugs   5  Clindamycin   6   Keflex TABS    7  IV Dye    Signatures   Electronically signed by : Rashaad Cedillo, ; Aug 10 2017  8:43AM EST                       (Author)

## 2018-01-17 NOTE — MISCELLANEOUS
Message  patient returned call, is acutely SOB and wheezing , has increased phlegm production with increased cough  she will contact daughter to be taken to ED      Active Problems    1  Abnormal LH level (259 9) (E34 9)   2  Allergy to IVP dye (995 27,E947 4) (T50 995A)   3  Anemia (285 9) (D64 9)   4  History of Aneurysm of aortic root (441 9) (I71 9)   5  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)   6  History of Aortic root dilatation (447 71) (I77 810)   7  Arthritis (716 90) (M19 90)   8  History of Ascending aortic aneurysm (441 2) (I71 2)   9  Asthma (493 90) (J45 909)   10  Atherosclerosis of aorta (440 0) (I70 0)   11  Basal cell carcinoma of skin (173 91) (C44 91)   12  Benign essential hypertension (401 1) (I10)   13  Bloating (787 3) (R14 0)   14  Calf cramp (729 82) (R25 2)   15  Chronic migraine without aura (346 70) (G43 709)   16  Classic migraine with aura (346 00) (G43 109)   17  Clostridium difficile diarrhea (008 45) (A04 7)   18  Contrast media allergy (V15 08) (Z91 041)   19  Cough (786 2) (R05)   20  Decreased GFR (794 4) (R94 4)   21  Depression (311) (F32 9)   22  Diarrhea (787 91) (R19 7)   23  Difficulty breathing (786 09) (R06 89)   24  Dysphagia (787 20) (R13 10)   25  Esophagitis, reflux (530 11) (K21 0)   26  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)   27  Hyperlipidemia (272 4) (E78 5)   28  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)   29  Hypophonia (784 49) (R49 8)   30  Hypothyroidism (244 9) (E03 9)   31  History of Junctional rhythm (427 89) (I49 8)   32  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)   33  Migraine aura without headache (migraine equivalents) (346 00) (G43 109)   34  Need for influenza vaccination (V04 81) (Z23)   35  Night sweats (780 8) (R61)   36  Obstructive sleep apnea (327 23) (G47 33)   37  Osteopenia (733 90) (M85 80)   38   Other specified symptom or sign involving circulatory or respiratory system (785 9,786 9)    (R09 89) 39  Paroxysmal atrial fibrillation (427 31) (I48 0)   40  History of Pleural effusion, bilateral (511 9) (J90)   41  Polymyalgia rheumatica (725) (M35 3)   42  PPD screening test (V74 1) (Z11 1)   43  Preventive Medicine Estab Patient Checkup Adult Over 59 (V70 0)   44  Restless legs syndrome (333 94) (G25 81)   45  S/P trigger finger release (V45 89) (Z98 890)   46  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)   47  Screening for genitourinary condition (V81 6) (Z13 89)   48  Screening for neurological condition (V80 09) (Z13 89)   49  Shortness of breath (786 05) (R06 02)   50  Suicidal overdose (977 9,E950 5) (T50 902A)   51  Tricuspid regurgitation (397 0) (I07 1)   52  Urinary incontinence (788 30) (R32)   53  Urinary tract infection (599 0) (N39 0)   54  Xerostomia (527 7) (K11 7)    Current Meds   1  AmLODIPine Besylate 5 MG Oral Tablet; TAKE 1 TABLET BY MOUTH EVERY DAY; Therapy: 22ZJV2006 to (Evaluate:65Vlo8714)  Requested for: 64UGS2822; Last   Rx:75Dvq6468 Ordered   2  Aspirin 81 MG TABS; TAKE 1 TABLET DAILY; Therapy: (Recorded:71Mrc8099) to  Requested for: 21WAU6919 Recorded   3  Calcium 600+D Plus Minerals 600-400 MG-UNIT Oral Tablet Chewable; Therapy: (Recorded:90Fpw6769) to Recorded   4  Levothyroxine Sodium 125 MCG Oral Tablet (Synthroid); TAKE 1 TABLET DAILY 1 HOUR   BEFORE BREAKFAST  Requested for: 40UVS9998; Last Rx:01Mar2016 Ordered   5  Lovastatin 20 MG Oral Tablet; TAKE 1 TABLET DAILY AS DIRECTED; Therapy: 70JSP0323 to (Cirilo Conn)  Requested for: 77ASH8237; Last   Rx:66Dbf4984 Ordered   6  Metoprolol Tartrate 25 MG Oral Tablet; Take 1 tablet twice daily; Therapy: 92EAU5086 to (Aj Terry)  Requested for: 95FAZ3561; Last   Rx:73Nai3279 Ordered   7  Multi-Vitamin Oral Tablet; TAKE 1 TABLET DAILY; Therapy: 72YLN2967 to Recorded   8  PreserVision AREDS Oral Capsule; Take 1 capsule twice daily; Therapy: 14Apr2014 to Recorded   9   Ventolin  (90 Base) MCG/ACT Inhalation Aerosol Solution; INHALE ML  PRN; Therapy: 59SQS5036 to Recorded   10  Vitamin B-12 100 MCG Oral Tablet; TAKE 1 TABLET DAILY AS DIRECTED; Therapy: 91AHV5663 to Recorded   11  Vitamin C 500 MG Oral Capsule; TAKE 1 CAPSULE DAILY; Therapy: 91PGS7966 to Recorded    Allergies    1  Amiodarone HCl TABS   2  Iodinated Contrast Media   3  Naprosyn TABS   4  Sulfa Drugs    5   IV Dye    Signatures   Electronically signed by : Irene Rangel, ; Dec 29 2016 12:58PM EST                       (Author)

## 2018-01-17 NOTE — PROGRESS NOTES
Assessment    1  Elevated CK (790 5) (R74 8)   2  Cervical spondylosis (721 0) (M47 812)    Plan  Elevated CK    · (1) CK (CPK); Status:Active; Requested for:17Aug2017;    · Follow-up visit in 2 months Evaluation and Treatment  Follow-up  Status: Hold For -  Scheduling  Requested for: 17Aug2017    Discussion/Summary    Increase Vit d to 2000 units total daily  Blood test nonfasting   will call with results  Chief Complaint  PCP referral for "painful stiff neck"  6/29 follow up for neck pain  8/17 fu for neck pain      History of Present Illness  6/8/17 IE: 79 yo female with non-traumatic chronic stiff painful neck   had to stop driving   had PTx per Dr Antoni Alvarez without pain relief   takes APAP    NSAIDS increase BP   had taken left over oxycodone without benefit  cannot ID triggers, pattern in posterior neck and radiates to periauricular area   denies numbness or tingling in fingers  Has hard palate tumor  Has painful bed transfers Has also had HAs for about nine months   attributes to tumor   had CT with "bone erosion" Was in ED April and given diazepam with definite benefit   had marked hangover effect with CBP    6/29 fu for painful stiff neck   today somewhat improved  very stiiff in am better with as day goes on  PM consult for facet injections pending  8/17 had facet block two days ago with so far marked benefit   has stim trial with some benefit   so far unable to get home unit   no response from vendor  Did NOT change Vit D ose  Review of Systems    Musculoskeletal: as noted in HPI  Neurological: no numbness and no tingling  ROS reviewed  Active Problems    1  Abnormal LH level (259 9) (E34 9)   2  Allergy to IVP dye (995 27,E947 4) (T50 995A)   3  Anemia (285 9) (D64 9)   4  History of Aneurysm of aortic root (441 9) (I71 9)   5  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)   6  History of Aortic root dilatation (447 71) (I77 810)   7  Arthritis (716 90) (M19 90)   8  History of Ascending aortic aneurysm (441 2) (I71 2)   9  Asthma (493 90) (J45 909)   10  Atherosclerosis of aorta (440 0) (I70 0)   11  Basal cell carcinoma of skin (173 91) (C44 91)   12  Benign essential hypertension (401 1) (I10)   13  Bloating (787 3) (R14 0)   14  Calf cramp (729 82) (R25 2)   15  Central stenosis of spinal canal (724 00) (M48 00)   16  Cervical disc disorder of mid-cervical region (722 91) (M50 920)   17  Cervical radiculopathy (723 4) (M54 12)   18  Cervical spinal stenosis (723 0) (M48 02)   19  Cervical spondylosis (721 0) (M47 812)   20  Cervicalgia (723 1) (M54 2)   21  Chronic migraine without aura (346 70) (G43 709)   22  Chronic neck pain (723 1,338 29) (M54 2,G89 29)   23  Classic migraine with aura (346 00) (G43 109)   24  Clostridium difficile diarrhea (008 45) (A04 7)   25  Contrast media allergy (V15 08) (Z91 041)   26  Cough (786 2) (R05)   27  DDD (degenerative disc disease), cervical (722 4) (M50 30)   28  Decreased GFR (794 4) (R94 4)   29  Depression (311) (F32 9)   30  Diarrhea (787 91) (R19 7)   31  Difficulty breathing (786 09) (R06 89)   32  Dysphagia (787 20) (R13 10)   33  Esophagitis, reflux (530 11) (K21 0)   34  Facet arthropathy, cervical (721 0) (M12 88)   35  Foraminal stenosis of cervical region (723 0) (M99 81)   36  Hallucination (780 1) (R44 3)   37  Head ache (784 0) (R51)   38  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)   39  Hyperlipidemia (272 4) (E78 5)   40  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)   41  Hypophonia (784 49) (R49 8)   42  Hypothyroidism (244 9) (E03 9)   43  History of Junctional rhythm (427 89) (I49 8)   44  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)   45  Migraine aura without headache (migraine equivalents) (346 00) (G43 109)   46  Myalgia (729 1) (M79 1)   47  Myositis of multiple sites, unspecified myositis type (729 1) (M60 9)   48  Myositis of other site, unspecified myositis type (729 1) (M60 9)   49   Neck muscle spasm (728 85) (M62 838)   50  Need for influenza vaccination (V04 81) (Z23)   51  Need for pneumococcal vaccination (V03 82) (Z23)   52  Night sweats (780 8) (R61)   53  Obstructive sleep apnea (327 23) (G47 33)   54  Osteopenia (733 90) (M85 80)   55  Other specified symptom or sign involving circulatory or respiratory system (785 9,786 9)    (R09 89)   56  Paroxysmal atrial fibrillation (427 31) (I48 0)   57  History of Pleural effusion, bilateral (511 9) (J90)   58  Polymyalgia rheumatica (725) (M35 3)   59  PPD screening test (V74 1) (Z11 1)   60  Preoperative clearance (V72 84) (Z01 818)   61  Preventive Medicine Estab Patient Checkup Adult Over 59 (V70 0)   62  Restless legs syndrome (333 94) (G25 81)   63  S/P trigger finger release (V45 89) (Z98 890)   64  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)   65  Screening for genitourinary condition (V81 6) (Z13 89)   66  Screening for neurological condition (V80 09) (Z13 89)   67  Shortness of breath (786 05) (R06 02)   68  Stress incontinence in female (625 6) (N39 3)   69  Stress incontinence, male (788 32) (N39 3)   70  Suicidal overdose (977 9,E950 5) (T50 902A)   71  Tricuspid regurgitation (397 0) (I07 1)   72  Urinary incontinence (788 30) (R32)   73  Urinary tract infection (599 0) (N39 0)   74  Vitamin D deficiency (268 9) (E55 9)   75  Xerostomia (527 7) (R68 2)    Past Medical History    1  Allergy to IVP dye (995 27,E947 4) (T50 995A)   2  History of Aneurysm of aortic root (441 9) (I71 9)   3  History of Aortic root dilatation (447 71) (I77 810)   4  History of Ascending aortic aneurysm (441 2) (I71 2)   5  History of Facet arthropathy, cervical (721 0) (M12 88)   6  History of Fracture Of The Vertebral Column (805 8)   7  History of basal cell carcinoma (V10 83) (Z85 828)   8  History of urinary incontinence (V13 09) (Z87 898)   9  History of urinary incontinence (V13 09) (Z87 898)   10  History of Hypoxia (799 02) (R09 02)   11   History of Junctional rhythm (427 89) (I49 8)   12  History of Lightheadedness (780 4) (R42)   13  History of Palpitations (785 1) (R00 2)   14  History of Pleural effusion, bilateral (511 9) (J90)   15  History of Thyroid trouble (246 9) (E07 9)   16  History of Trigger finger (727 03) (M65 30)   17  History of Trigger middle finger of right hand (727 03) (M65 331)    The active problems and past medical history were reviewed and updated today  Surgical History    1  History of Appendectomy   2  History of Ascending Aortic Aneurysm Repair With Graft   3  Recurrent history of Bladder Surgery   4  History of Cardiac Cath Procedure Summary   5  History of Hysterectomy   6  Preventive Medicine Estab Patient Checkup Adult Over 64 (V70 0)   7  History of Thyroid Surgery Total Thyroidectomy   8  History of Tonsillectomy With Adenoidectomy    The surgical history was reviewed and updated today  Family History  Sister    1  Family history of CABG  Family History    2  Family history of Family Health Status Of Father -    3  Family history of Family Health Status Of Mother -    3  Family history of cardiac disorder (V17 49) (Z82 49)   5  Family history of hypertension (V17 49) (Z82 49)   6  Family history of malignant neoplasm (V16 9) (Z80 9)   7  Family history of thyroid disease (V18 19) (Z83 49)    The family history was reviewed and updated today  Social History    · Being A Social Drinker   · Denied: History of Drug Use   · Former smoker (V15 82) (M86 980)   · Marital History -    · Never Drank Alcohol  The social history was reviewed and is unchanged  Current Meds   1  Advair Diskus 250-50 MCG/DOSE Inhalation Aerosol Powder Breath Activated; INHALE 1   PUFF TWICE DAILY; Therapy: 11SUL2310 to Benjamín Yee)  Requested for: 44AGO6534; Last   Rx:2017 Ordered   2   Albuterol Sulfate (2 5 MG/3ML) 0 083% Inhalation Nebulization Solution; USE 1 UNIT   DOSE EVERY 4-6 HOURS AS NEEDED FOR WHEEZING ; Therapy: 74HMR4133 to (Last Rx:13Jan2017)  Requested for: 68QWO9693 Ordered   3  AmLODIPine Besylate 5 MG Oral Tablet; TAKE 1 TABLET BY MOUTH EVERY DAY; Therapy: 16EQH1544 to (Evaluate:04Sep2017)  Requested for: 07KPI0753; Last   Rx:09Sep2016 Ordered   4  Aspirin 81 MG TABS; TAKE 1 TABLET DAILY; Therapy: (Recorded:91Ddd1431) to  Requested for: 44JPX0197 Recorded   5  Calcium CAPS; Therapy: (Recorded:08Jun2017) to Recorded   6  Levothyroxine Sodium 100 MCG Oral Tablet; TAKE 1 TABLET DAILY AS DIRECTED; Therapy: 69BXQ2046 to (Leonel Mar)  Requested for: 06NAQ1196; Last   Rx:06Mar2017 Ordered   7  Lovastatin 20 MG Oral Tablet; TAKE 1 TABLET DAILY AS DIRECTED; Therapy: 93TMY5324 to (Audra Alejandre)  Requested for: 00IVX9681; Last   Rx:05Oct2016 Ordered   8  Metoprolol Tartrate 25 MG Oral Tablet; Take 1 tablet twice daily; Therapy: 12OTG0509 to (Evaluate:30Mar2018)  Requested for: 16IST5037; Last   Rx:04Apr2017 Ordered   9  Multi-Vitamin Oral Tablet; TAKE 1 TABLET DAILY; Therapy: 04KWK3100 to Recorded   10  Peak Flow Meter Device; USE AS DIRECTED; Therapy: 42KLP3598 to (Last Rx:13Jan2017)  Requested for: 69XAB9401 Ordered   11  PreserVision AREDS Oral Capsule; Take 1 capsule twice daily; Therapy: 41Ekq1440 to Recorded   12  Tylenol Extra Strength 500 MG Oral Tablet; Therapy: (Recorded:08Jun2017) to Recorded   13  Ventolin  (90 Base) MCG/ACT Inhalation Aerosol Solution; INHALE 2 PUFFS    EVERY 4-6 HOURS, SPACED 60 SECONDS APART; Therapy: 40EQC8290 to (Last Rx:10Apr2017)  Requested for: 69Fqp4418 Ordered   14  Vitamin D CAPS; Therapy: (Recorded:08Jun2017) to Recorded    The medication list was reviewed and updated today  Allergies    1  Amiodarone HCl TABS   2  Iodinated Contrast Media   3  Naprosyn TABS   4  Sulfa Drugs   5  Clindamycin   6   Keflex TABS    7  IV Dye    Vitals  Signs   Recorded: 17Aug2017 02:22PM   Heart Rate: 76  Systolic: 145  Diastolic: 62  Height: 5 ft 2 in  Weight: 138 lb   BMI Calculated: 25 24  BSA Calculated: 1 63    Physical Exam    Constitutional   General appearance: Well developed, well nourished, alert, in no distress, non-toxic and no overt pain behavior  Cervical Spine examination demonstrates Cervical Spine:   Special Tests Shoulder: no change in neurologic exam       Results/Data    Diagnostic Review mildly elevated CK  has not had any IM shots or unusual activity  Future Appointments    Date/Time Provider Specialty Site   08/28/2017 10:30 AM MYRIAM Garcia   Hematology Oncology [facility] [place]   11/06/2017 01:30 PM Keila Roa DO Internal Medicine [facility]   12/11/2017 09:45 AM Oc Engel Urology [facility]   Electronically signed by : Isidro Zafar DO; Aug 17 2017  2:45PM EST                       (Author)

## 2018-01-17 NOTE — RESULT NOTES
Verified Results  (1) CK (CPK) [id number] 03:04PM Emma Dooley Order Number: [id number]     Test Name Result Flag Reference   CK (CPK) 105 U/L

## 2018-01-17 NOTE — RESULT NOTES
Message   notify the patient normal ferritin level follow up as scheduled        Verified Results  (1) Radha [id number] 01:03PM Nikole Upton   TW Order Number: [id number]      National Kidney Disease Education Program recommendations are as follows:  GFR calculation is accurate only with a steady state creatinine  Chronic Kidney disease less than 60 ml/min/1 73 sq  meters  Kidney failure less than 15 ml/min/1 73 sq  meters       Test Name Result Flag Reference   GLUCOSE,RANDM 107 mg/dL     SODIUM 139 mmol/L  136-145   POTASSIUM 4 5 mmol/L  3 5-5 3   CHLORIDE 103 mmol/L  100-108   CARBON DIOXIDE 29 mmol/L  21-32   ANION GAP (CALC) 7 mmol/L  4-13   BLOOD UREA NITROGEN 19 mg/dL  5-25   CREATININE 0 90 mg/dL  0 60-1 30   Standardized to IDMS reference method   CALCIUM 8 6 mg/dL  8 3-10 1   eGFR Non-African American 59 8 ml/min/1 73sq m       (1) CBC/ PLT (NO DIFF) 05LOF9805 01:03PM Elle Alfonso Order Number: [id number]    TW Order Number: [id number]     Test Name Result Flag Reference   HEMATOCRIT 38 9 %  34 8-46 1   HEMOGLOBIN 12 7 g/dL  11 5-15 4   MCHC 32 6 g/dL  31 4-37 4   MCH 28 0 pg  26 8-34 3   MCV 86 fL  82-98   PLATELET COUNT 426 Thousands/uL  149-390   RBC COUNT 4 54 Million/uL  3 81-5 12   RDW 14 5 %  11 6-15 1   WBC COUNT 8 45 Thousand/uL  4 31-10 16   MPV 12 0 fL  8 9-12 7     (1) LD (LDH) 33LXT6556 01:03PM Elle Alfonso Order Number: [id number]     Test Name Result Flag Reference   LD (LDH) 200 U/L       (1) FERRITIN 04JXF8088 01:03PM Elle Alfonso Order Number: [id number]     Test Name Result Flag Reference   FERRITIN 21 ng/mL  9-865       Signatures   Electronically signed by : Seun Cantu DO; Mar 18 2016  4:33PM EST                       (Author)

## 2018-01-18 NOTE — CONSULTS
Assessment    1  Neck muscle spasm (728 85) (M62 838)   2  Myositis of other site, unspecified myositis type (729 1) (M60 9)   3  History of Facet arthropathy, cervical (721 0) (M12 88)   4  Facet arthropathy, cervical (721 0) (M12 88)    Facet arthropathy  Myalgic pain either secondary to reflex spasm or primary pathology  Plan  Myalgia    · Methocarbamol 500 MG Oral Tablet; take 1-2 tabs q6h prn   · Follow-up Visit in 4 Weeks Evaluation and Treatment  Follow-up  Status: Hold For -  Scheduling  Requested for: 86OBE9042  PMH: Facet arthropathy, cervical    · 1 - Melvin Collazo MD, Issac ALEXANDRA (Pain Management) Co-Management  *  Status: Active   Requested for: 91BXK2354  Care Summary provided  : Yes   · (1) ALDOLASE; Status:Active; Requested [id number];    · (1) CK (CPK); Status:Active; Requested VDH:96NQM3311;    · (1) C-REACTIVE PROTEIN; Status:Active; Requested QVX:00BZF5338;    · (1) SED RATE; Status:Active; Requested XCE:65CUB0958;   PMH: Facet arthropathy, cervical, Myalgia    · (1) VITAMIN D 25-HYDROXY; Status:Active; Requested OZU:76WIV9227;     Discussion/Summary  Impression: neck pain, cervical spondylosis, osteoarthritis of the cervical spine and muscle pain  Currently, the condition is unchanged  The differential diagnosis includes osteoarthritis  The diagnostic plan includes blood tests  Medication changes are as documented in orders  Treatment plan includes pain medicine consultation  Patient discussion: discussed with the patient  Chief Complaint  PCP referral for "painful stiff neck"      History of Present Illness  17 IE: 81 yo female with non-traumatic chronic stiff painful neck   had to stop driving   had PTx per Dr Dinorah Esaprza without pain relief   takes APAP    NSAIDS increase BP   had taken left over oxycodone without benefit  cannot ID triggers, pattern in posterior neck and radiates to periauricular area   denies numbness or tingling in fingers  Has hard palate tumor   Has painful bed transfers Has also had HAs for about nine months   attributes to tumor   had CT with "bone erosion" Was in ED April and given diazepam with definite benefit   had marked hangover effect with CBP  Review of Systems    Constitutional: recent 20 lb weight gain  Cardiovascular: HTN OHS for TAA  cannot take anti coag due to tumor bleeding  and palpitations  Respiratory: wheezing  Gastrointestinal: no complaints of abdominal pain, no constipation, no nausea or diarrhea, no vomiting, no bloody stools  Genitourinary: no complaints of dysuria, no incontinence  Musculoskeletal: as noted in HPI  Integumentary: no complaints of skin rash or lesion, no itching or dry skin, no skin wounds  Neurological: no numbness and no tingling  ROS reviewed  Active Problems    1  Abnormal LH level (259 9) (E34 9)   2  Allergy to IVP dye (995 27,E947 4) (T50 995A)   3  Anemia (285 9) (D64 9)   4  History of Aneurysm of aortic root (441 9) (I71 9)   5  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)   6  History of Aortic root dilatation (447 71) (I77 810)   7  Arthritis (716 90) (M19 90)   8  History of Ascending aortic aneurysm (441 2) (I71 2)   9  Asthma (493 90) (J45 909)   10  Atherosclerosis of aorta (440 0) (I70 0)   11  Basal cell carcinoma of skin (173 91) (C44 91)   12  Benign essential hypertension (401 1) (I10)   13  Bloating (787 3) (R14 0)   14  Calf cramp (729 82) (R25 2)   15  Central stenosis of spinal canal (724 00) (M48 00)   16  Cervical radiculopathy (723 4) (M54 12)   17  Cervicalgia (723 1) (M54 2)   18  Chronic migraine without aura (346 70) (G43 709)   19  Chronic neck pain (723 1,338 29) (M54 2,G89 29)   20  Classic migraine with aura (346 00) (G43 109)   21  Clostridium difficile diarrhea (008 45) (A04 7)   22  Contrast media allergy (V15 08) (Z91 041)   23  Cough (786 2) (R05)   24  DDD (degenerative disc disease), cervical (722 4) (M50 30)   25   Decreased GFR (794 4) (R94 4) 26  Depression (311) (F32 9)   27  Diarrhea (787 91) (R19 7)   28  Difficulty breathing (786 09) (R06 89)   29  Dysphagia (787 20) (R13 10)   30  Esophagitis, reflux (530 11) (K21 0)   31  Foraminal stenosis of cervical region (723 0) (M99 81)   32  Hallucination (780 1) (R44 3)   33  Head ache (784 0) (R51)   34  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)   35  Hyperlipidemia (272 4) (E78 5)   36  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)   37  Hypophonia (784 49) (R49 8)   38  Hypothyroidism (244 9) (E03 9)   39  History of Junctional rhythm (427 89) (I49 8)   40  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)   41  Migraine aura without headache (migraine equivalents) (346 00) (G43 109)   42  Need for influenza vaccination (V04 81) (Z23)   43  Need for pneumococcal vaccination (V03 82) (Z23)   44  Night sweats (780 8) (R61)   45  Obstructive sleep apnea (327 23) (G47 33)   46  Osteopenia (733 90) (M85 80)   47  Other specified symptom or sign involving circulatory or respiratory system (785 9,786 9)    (R09 89)   48  Paroxysmal atrial fibrillation (427 31) (I48 0)   49  History of Pleural effusion, bilateral (511 9) (J90)   50  Polymyalgia rheumatica (725) (M35 3)   51  PPD screening test (V74 1) (Z11 1)   52  Preoperative clearance (V72 84) (Z01 818)   53  Preventive Medicine Estab Patient Checkup Adult Over 59 (V70 0)   54  Restless legs syndrome (333 94) (G25 81)   55  S/P trigger finger release (V45 89) (Z98 890)   56  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)   57  Screening for genitourinary condition (V81 6) (Z13 89)   58  Screening for neurological condition (V80 09) (Z13 89)   59  Shortness of breath (786 05) (R06 02)   60  Stress incontinence in female (625 6) (N39 3)   61  Stress incontinence, male (788 32) (N39 3)   58  Suicidal overdose (977 9,E950 5) (T50 902A)   63  Tricuspid regurgitation (397 0) (I07 1)   64  Urinary incontinence (788 30) (R32)   65   Urinary tract infection (599 0) (N39 0)   66  Xerostomia (527 7) (R68 2)    Past Medical History    1  Allergy to IVP dye (995 27,E947 4) (T50 995A)   2  History of Aneurysm of aortic root (441 9) (I71 9)   3  History of Aortic root dilatation (447 71) (I77 810)   4  History of Ascending aortic aneurysm (441 2) (I71 2)   5  History of Facet arthropathy, cervical (721 0) (M12 88)   6  History of Fracture Of The Vertebral Column (805 8)   7  History of basal cell carcinoma (V10 83) (Z85 828)   8  History of urinary incontinence (V13 09) (Z87 898)   9  History of urinary incontinence (V13 09) (Z87 898)   10  History of Hypoxia (799 02) (R09 02)   11  History of Junctional rhythm (427 89) (I49 8)   12  History of Lightheadedness (780 4) (R42)   13  History of Palpitations (785 1) (R00 2)   14  History of Pleural effusion, bilateral (511 9) (J90)   15  History of Thyroid trouble (246 9) (E07 9)   16  History of Trigger finger (727 03) (M65 30)   17  History of Trigger middle finger of right hand (727 03) (M65 331)    The active problems and past medical history were reviewed and updated today  Surgical History    1  History of Appendectomy   2  History of Ascending Aortic Aneurysm Repair With Graft   3  Recurrent history of Bladder Surgery   4  History of Cardiac Cath Procedure Summary   5  History of Hysterectomy   6  Preventive Medicine Estab Patient Checkup Adult Over 64 (V70 0)   7  History of Thyroid Surgery Total Thyroidectomy   8  History of Tonsillectomy With Adenoidectomy    The surgical history was reviewed and updated today  Family History  Sister    1  Family history of CABG  Family History    2  Family history of Family Health Status Of Father -    3  Family history of Family Health Status Of Mother -    3  Family history of cardiac disorder (V17 49) (Z82 49)   5  Family history of hypertension (V17 49) (Z82 49)   6  Family history of malignant neoplasm (V16 9) (Z80 9)   7   Family history of thyroid disease (V18 19) (Z83 49)    The family history was reviewed and updated today  Social History    · Being A Social Drinker   · Denied: History of Drug Use   · Former smoker (V15 82) (O19 524)   · Marital History -    · Never Drank Alcohol  The social history was reviewed and is unchanged  Current Meds   1  Advair Diskus 250-50 MCG/DOSE Inhalation Aerosol Powder Breath Activated; INHALE 1   PUFF TWICE DAILY; Therapy: 81JVB8740 to [phone])  Requested for: 06JKX3147; Last   Rx:01Mar2017 Ordered   2  Albuterol Sulfate (2 5 MG/3ML) 0 083% Inhalation Nebulization Solution; USE 1 UNIT   DOSE EVERY 4-6 HOURS AS NEEDED FOR WHEEZING ; Therapy: 44IPF3762 to (Last Rx:13Jan2017)  Requested for: 01PSB0730 Ordered   3  AmLODIPine Besylate 5 MG Oral Tablet; TAKE 1 TABLET BY MOUTH EVERY DAY; Therapy: 73QZM4348 to (Evaluate:50Gmg4120)  Requested for: 56CPY5795; Last   Rx:09Sep2016 Ordered   4  Aspirin 81 MG TABS; TAKE 1 TABLET DAILY; Therapy: (Recorded:29Tls0229) to  Requested for: 91DTG7755 Recorded   5  Calcium CAPS; Therapy: (Recorded:08Jun2017) to Recorded   6  Cyclobenzaprine HCl - 5 MG Oral Tablet; TAKE 1 TABLET AT BEDTIME; Therapy: 92XUB0564 to (Evaluate:36Wgd2143)  Requested for: 44BMJ9356; Last   KX:96DVY9371 Ordered   7  DiazePAM 5 MG Oral Tablet (Valium); TAKE 1 TABLET  AS NEEDED; Therapy: 82Bpo2292 to (Evaluate:74Bjo2429); Last Rx:28Apr2017 Ordered   8  Levothyroxine Sodium 100 MCG Oral Tablet; TAKE 1 TABLET DAILY AS DIRECTED; Therapy: 74SRR9712 to (Bobbi Rodriguez)  Requested for: 55YQR4396; Last   Rx:06Mar2017 Ordered   9  Lovastatin 20 MG Oral Tablet; TAKE 1 TABLET DAILY AS DIRECTED; Therapy: 16ZHP2325 to (Candi Maciel)  Requested for: 58MJO9694; Last   Rx:44Fol9286 Ordered   10  Metoprolol Tartrate 25 MG Oral Tablet; Take 1 tablet twice daily; Therapy: 01XAN9328 to (Evaluate:30Mar2018)  Requested for: 12BKB6631; Last    Rx:04Apr2017 Ordered   11   Multi-Vitamin Oral Tablet; TAKE 1 TABLET DAILY; Therapy: 14QRB6620 to Recorded   12  Peak Flow Meter Device; USE AS DIRECTED; Therapy: 25WSC3799 to (Last Rx:13Jan2017)  Requested for: 79SJQ5973 Ordered   13  PredniSONE 10 MG Oral Tablet; TAKE 1 TABLET DAILY; Therapy: 90Xis2175 to (Evaluate:30Bnd0157)  Requested for: 04Vlx9346; Last    Rx:24Apr2017 Ordered   14  PreserVision AREDS Oral Capsule; Take 1 capsule twice daily; Therapy: 92Luf8817 to Recorded   15  Tylenol Extra Strength 500 MG Oral Tablet; Therapy: (Recorded:08Jun2017) to Recorded   16  Ventolin  (90 Base) MCG/ACT Inhalation Aerosol Solution; INHALE 2 PUFFS    EVERY 4-6 HOURS, SPACED 60 SECONDS APART; Therapy: 17IRZ7795 to (Last Rx:10Apr2017)  Requested for: 10Apr2017 Ordered   17  Vitamin D CAPS; Therapy: (Recorded:08Jun2017) to Recorded    The medication list was reviewed and updated today  Allergies    1  Amiodarone HCl TABS   2  Iodinated Contrast Media   3  Naprosyn TABS   4  Sulfa Drugs   5  Clindamycin   6  Keflex TABS    7  IV Dye    Vitals  Signs   Recorded: 54REY1933 11:02AM   Heart Rate: 68  Systolic: 591  Diastolic: 70  Height: 5 ft 2 in  Weight: 141 lb 6 08 oz  BMI Calculated: 25 86  BSA Calculated: 1 65    Physical Exam  Crouch's negative  Cervical Spine: Appearance: a loss of normal lordosis  Flexion was not restricted  Extension was restricted and was painful  Left lateral flexion was restricted  Right lateral flexion was restricted  Rotation to the left was restricted and was painful  Rotation to the right was restricted and was painful  RR 45 deg , LR 33 deg  Special Tests: tight SCMs, and platysma  Constitutional - General appearance: Abnormal  chronically ill  Musculoskeletal - Gait and station: Normal  Muscle strength/tone: Normal    Neurologic - Cranial nerves: Normal  Reflexes: Normal  Upper extremity peripheral neuro exam: Normal  Peripheral sensation findings: MMT 5/5 t/o BUEs     Psychiatric - Orientation to person, place, and time: Normal  Mood and affect: Normal    Free Text - Marked TTP left SCM > right as well as trap and levator left > right  Results/Data  I personally reviewed the films/images/results in the office today  My interpretation follows  MRI Review marked facet changes and foraminal stenosis  Diagnostic Review ESR 54 May 2015 (?), TSH normal   CBC / CMP normal       Future Appointments    Date/Time Provider Specialty Site   08/28/2017 10:30 AM MYRIAM Tobin  Hematology Oncology [facility] [place]   11/06/2017 01:30 PM Sommer Joshua DO Internal Medicine [facility]   07/05/2017 01:15 PM MYRIAM Li   Urology [facility]   06/26/2017 01:00 PM Treva Hassan MD Sports Medicine  [address]     Signatures   Electronically signed by : Antonio Elliott DO; Jun 8 2017 11:43AM EST                       (Author)

## 2018-01-18 NOTE — RESULT NOTES
Message   notify the patient normal magnesium level follow-up as scheduled        Verified Results  (1) MAGNESIUM 09Gsq5889 03:55PM Jose De Jesus Gutierrez Order Number: [id number]     Test Name Result Flag Reference   MAGNESIUM 2 1 mg/dL  1 6-2 6       Signatures   Electronically signed by : Jyoti Moraes DO; Feb 23 2016  6:50PM EST                       (Author)

## 2018-01-22 VITALS
WEIGHT: 141.38 LBS | HEART RATE: 68 BPM | BODY MASS INDEX: 26.02 KG/M2 | HEIGHT: 62 IN | SYSTOLIC BLOOD PRESSURE: 140 MMHG | DIASTOLIC BLOOD PRESSURE: 70 MMHG

## 2018-01-22 VITALS
DIASTOLIC BLOOD PRESSURE: 62 MMHG | BODY MASS INDEX: 25.4 KG/M2 | WEIGHT: 138 LBS | HEIGHT: 62 IN | HEART RATE: 76 BPM | SYSTOLIC BLOOD PRESSURE: 120 MMHG

## 2018-01-23 VITALS
SYSTOLIC BLOOD PRESSURE: 154 MMHG | HEART RATE: 70 BPM | BODY MASS INDEX: 25.8 KG/M2 | DIASTOLIC BLOOD PRESSURE: 86 MMHG | HEIGHT: 62 IN | WEIGHT: 140.19 LBS

## 2018-01-23 NOTE — RESULT NOTES
Message   Notify the patient DEXA scan does show osteopenia please have the pt follow up with me to discuss as scheduled        Verified Results  * DXA BONE DENSITY SPINE HIP AND PELVIS 25UWL6763 03:20PM Yuval Arnold Order Number: [id number]    - Patient Instructions: To schedule this appointment, please contact Central Scheduling at [phone]  Test Name Result Flag Reference   DXA BONE DENSITY SPINE HIP AND PELVIS (Report)     DXA SCAN     CLINICAL HISTORY: 59-year-old woman  Menopause at age 36  OTHER RISK FACTORS: None  TECHNIQUE: Bone densitometry was performed using a Hologic Horizon A bone densitometer  Regions of interest appear properly placed  COMPARISON: There are no prior DXA studies performed on this unit for comparison  RESULTS:      LUMBAR SPINE L2-L3: BMD 0 853 gm/cm2 / T-score -1 9 / Z score 1 0         LEFT TOTAL HIP: BMD 0 793 gm/cm2 / T-score -1 2 / Z score 1 1   LEFT FEMORAL NECK: BMD 0 671 gm/cm2 / T score -1 6 / Z score 0 9       IMPRESSION:     1  Low bone mass (osteopenia)  2  The 10 year risk of hip fracture is 3 9% with the 10 year risk of major osteoporotic fracture being 14% as calculated by the [facility]/WHO fracture risk assessment tool (FRAX)  3  The current NOF guidelines recommend treating patients with a T-score of -2 5 or less in the lumbar spine or hips, or in post-menopausal women and men over the age of 48 with low bone mass (osteopenia) and a FRAX 10 year risk score of >3% for hip    fracture and/or >20% for major osteoporotic fracture  4  A daily intake of at least 1200 mg calcium and vitamin D 800- 1000 IU, as well as weight bearing and muscle strengthening exercise, fall prevention and avoidance of tobacco and excessive alcohol as preventive measurements are suggested  5  Follow-up DXA in two years is recommended for most patients   A one year follow-up DXA is recommended after initiation or change in therapy for osteoporosis  More frequent evaluation is also appropriate for patients with conditions associated with    rapid bone loss, such as glucocorticoid therapy  The FRAX tool has not been validated in patients currently or previously treated with pharmacotherapy for osteoporosis  In such patients, clinical judgment must be exercised in interpreting FRAX scores  It is not appropriate to use FRAX to monitor    treatment response         WHO CLASSIFICATION:   Normal (a T-score of -1 0 or higher)   Low bone mineral density (a T-score of less than -1 0 but higher than -2 5)   Osteoporosis (a T-score of -2 5 or less)   Severe osteoporosis (a T-score of -2 5 or less with a fragility fracture)       Workstation performed: [id number]     Signed by:   Smita Hernandez MD   12/8/17

## 2018-02-20 ENCOUNTER — HOSPITAL ENCOUNTER (OUTPATIENT)
Dept: CT IMAGING | Facility: HOSPITAL | Age: 83
Discharge: HOME | End: 2018-02-20
Attending: INTERNAL MEDICINE
Payer: COMMERCIAL

## 2018-02-20 DIAGNOSIS — C05.0 MALIGNANT NEOPLASM OF HARD PALATE (HCC): ICD-10-CM

## 2018-02-20 PROCEDURE — 70490 CT SOFT TISSUE NECK W/O DYE: CPT

## 2018-02-27 ENCOUNTER — TRANSCRIBE ORDERS (OUTPATIENT)
Dept: ADMINISTRATIVE | Facility: HOSPITAL | Age: 83
End: 2018-02-27

## 2018-02-27 DIAGNOSIS — I71.4 AAA (ABDOMINAL AORTIC ANEURYSM) WITHOUT RUPTURE (HCC): Primary | ICD-10-CM

## 2018-03-05 ENCOUNTER — OFFICE VISIT (OUTPATIENT)
Dept: HEMATOLOGY ONCOLOGY | Facility: CLINIC | Age: 83
End: 2018-03-05
Payer: COMMERCIAL

## 2018-03-05 VITALS
WEIGHT: 140 LBS | RESPIRATION RATE: 16 BRPM | SYSTOLIC BLOOD PRESSURE: 120 MMHG | HEART RATE: 77 BPM | TEMPERATURE: 97.5 F | HEIGHT: 62 IN | BODY MASS INDEX: 25.76 KG/M2 | DIASTOLIC BLOOD PRESSURE: 70 MMHG | OXYGEN SATURATION: 90 %

## 2018-03-05 DIAGNOSIS — C05.0 MALIGNANT NEOPLASM OF HARD PALATE (HCC): Primary | ICD-10-CM

## 2018-03-05 PROCEDURE — 99214 OFFICE O/P EST MOD 30 MIN: CPT | Performed by: INTERNAL MEDICINE

## 2018-03-05 NOTE — PROGRESS NOTES
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, had been on watchful observation, the most recent CT scan showed stable disease, no evidence of invasion or cervical lymphadenopathy, I will continue watchful observation, the patient is minimally symptomatic, she declined radiation or surgery in the past  Follow up in 9 months with CBC, CMP and CT scan of the neck            HPI: 42-year-old  female who was evaluated in [place] for a lesion on the hard palate that is growing slowly over the past 3 years status post biopsy on 11 2015 showed minor salivary gland neoplasm not otherwise specified with a mixed solid, tubular, spindle cell configuration, with diffuse p63 staining, S100, Ki-67 is low, no evidence of perineural invasion, the Invasion could not be determined on this specimen   Differential diagnoses include a benign salivary gland neoplasm (pleomorphic adenoma) versus malignant neoplasm such as low-grade polymorphous adenocarcinomapatient was offered surgical resection however she declineddecided to move [place] to [place], she had significant past history for atrial fibrillation, depression, macular degeneration, aortic aneurysm status post repair, hypothyroidism status post thyroidectomy, pneumonia, degenerative arthritis, hysterectomy with oophorectomy, appendectomy, thyroidectomyis a former smoker with intermittent smoking quit on September 2015, denied any alcohol or drug abuse   Previous Therapy:   Partial biopsy declined radiation   Repeat biopsy showed 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 with extension into maxillary sinus     Interval History:        Previous Treatment:         Test Results:    Imaging: Ct Soft Tissue Neck Wo Contrast    Result Date: 2/21/2018  Narrative: CT SOFT TISSUE NECK WITHOUT CONTRAST INDICATION: C05 0: Malignant neoplasm of hard palate  History taken directly from the electronic ordering system  COMPARISON:  8/21/2017; 10/11/2016 TECHNIQUE:  Contiguous 2 5 mm images were obtained through the neck  In addition, sagittal and coronal reformatted images were submitted for interpretation  Radiation dose length product (DLP) for this visit:  424 22 mGy-cm   This examination, like all CT scans performed in the [facility], was performed utilizing techniques to minimize radiation dose exposure, including the use of iterative  reconstruction and automated exposure control  IMAGE QUALITY:  Diagnostic  FINDINGS: VISUALIZED BRAIN PARENCHYMA:  Normal visualized brain parenchyma  VISUALIZED ORBITS AND PARANASAL SINUSES:  Bilateral lens implants noted  Minimal mucosal thickening right maxillary sinus  NASAL CAVITY AND NASOPHARYNX:  Normal  SUPRAHYOID NECK:  Again demonstrated is a 2 x 2 2 cm soft tissue mass laterally in the roof of the mouth on the right/invading the hard palate and partially destroying the posterior aspect of the right maxilla, image 27, series 2  This tumor is stable in appearance  No pathologic fracture  No invasion of the tongue  Beam hardening artifact from dental hardware limits the floor the mouth and anterior half of the tongue  INFRAHYOID NECK:  Aryepiglottic folds and piriform sinuses  Normal larynx and subglottic airway  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 a right-sided vallecular cyst image 48, series 2 unchanged from the prior MRI from 2016, measuring approximately 8 mm  THYROID GLAND:  Clips in the thyroid fossa likely reflect prior thyroidectomy  No definite thyroid tissue detected  PAROTID AND SUBMANDIBULAR GLANDS: Normal  LYMPH NODES:  No pathologic or enlarged adenopathy  VASCULAR STRUCTURES:  Sternotomy wires again noted  Marked ectasia/aneurysmal dilatation of the visualized aorta    The distal ascending aorta just proximal to the arch measures approximately 4 5 cm in maximal transverse dimension  THORACIC INLET:  Lung apices and upper mediastinum are unremarkable  BONY STRUCTURES:  Normal      Impression: 1  Stable 2 cm destructive mass in the right hard palate correlating to the history of palatine carcinoma  No definite metastases on this noncontrast neck CT 2  Stable aneurysmal dilatation of the visualized aorta  Workstation performed: [id number]       Labs:   Lab Results   Component Value Date    WBC 8 88 09/08/2017    HGB 12 3 09/08/2017    HCT 38 3 09/08/2017    MCV 93 09/08/2017     09/08/2017     Lab Results   Component Value Date     11/13/2017    K 4 3 11/13/2017     11/13/2017    CO2 27 11/13/2017    ANIONGAP 9 11/13/2017    BUN 22 11/13/2017    CREATININE 0 78 11/13/2017    GLUCOSE 88 09/08/2017    GLUF 96 11/13/2017    CALCIUM 8 7 11/13/2017    AST 18 11/13/2017    ALT 17 11/13/2017    ALKPHOS 70 11/13/2017    PROT 7 5 11/13/2017    BILITOT 0 48 11/13/2017    EGFR 70 11/13/2017       Lab Results   Component Value Date    IRON 22 (L) 02/23/2015    TIBC 248 (L) 02/23/2015    FERRITIN 21 03/17/2016       No results found for: [id number]      ROS:   Review of Systems   Constitutional: Negative  HENT: Positive for sinus pain  Negative for congestion, dental problem, drooling, postnasal drip, rhinorrhea, sinus pressure, sneezing, sore throat, tinnitus and trouble swallowing  Eyes: Negative  Respiratory: Negative  Negative for chest tightness, shortness of breath and stridor  Cardiovascular: Negative for palpitations and leg swelling  Genitourinary: Negative  Negative for dysuria, enuresis and flank pain  Musculoskeletal: Negative  Neurological: Negative  Hematological: Negative  Psychiatric/Behavioral: Negative  All other systems reviewed and are negative          Current Medications: Reviewed  Allergies: Reviewed  PMH/FH/SH:  Reviewed      Physical Exam:    Body surface area is 1 64 meters squared  Wt Readings from Last 3 Encounters:   03/05/18 63 5 kg (140 lb)   12/06/17 63 6 kg (140 lb 3 oz)   11/30/17 64 kg (141 lb)        Temp Readings from Last 3 Encounters:   03/05/18 97 5 °F (36 4 °C) (Tympanic)   08/28/17 97 5 °F (36 4 °C)   05/19/17 97 6 °F (36 4 °C) (Tympanic Core)        BP Readings from Last 3 Encounters:   03/05/18 120/70   12/06/17 154/86   11/30/17 140/70         Pulse Readings from Last 3 Encounters:   03/05/18 77   12/06/17 70   11/30/17 68        Physical Exam   Constitutional: She is oriented to person, place, and time  She appears well-developed and well-nourished  No distress  HENT:   Head: Normocephalic and atraumatic  Mouth/Throat: Oropharynx is clear and moist  No oropharyngeal exudate  A flat mass in the hard palate most likely in the right side measuring 3 x 2 cm, no ulceration was noted, no cervical lymphadenopathy   Eyes: Conjunctivae and EOM are normal  Pupils are equal, round, and reactive to light  Neck: Normal range of motion  Neck supple  No tracheal deviation present  No thyromegaly present  Cardiovascular: Normal rate and regular rhythm  Exam reveals no gallop and no friction rub  No murmur heard  Pulmonary/Chest: Effort normal and breath sounds normal  No respiratory distress  She has no wheezes  She has no rales  She exhibits no tenderness  Abdominal: Soft  Bowel sounds are normal  She exhibits no distension and no mass  There is no tenderness  There is no rebound and no guarding  Musculoskeletal: Normal range of motion  Lymphadenopathy:     She has no cervical adenopathy  Neurological: She is alert and oriented to person, place, and time  Skin: Skin is warm and dry  No rash noted  She is not diaphoretic  No erythema  No pallor  Psychiatric: She has a normal mood and affect  Her behavior is normal  Judgment and thought content normal    Vitals reviewed  Goals and Barriers:  Current Goal: Minimize effects of disease  Barriers: None  Patient's Capacity to Self Care:  Patient is able to self care      Code Status: @[facility]DESTATUS@

## 2018-03-07 NOTE — PROGRESS NOTES
January 18, 2017      Dear Nicole Samayoa,       I have enclosed a prescription discount card for NAANTALI  I hope this can save you some money on your medications  It was a pleasure speaking to you  If you have any questions regarding medications  or physician instructions feel free to contact me  Sincerely,   Louis Mota, RN  Care Coordinator  [address] [place] [address] [facility]  [phone]  [email or web address]        Electronically signed by:[facility]   Jan 18 2017 11:44AM EST

## 2018-03-20 ENCOUNTER — APPOINTMENT (OUTPATIENT)
Dept: LAB | Facility: CLINIC | Age: 83
End: 2018-03-20
Payer: COMMERCIAL

## 2018-03-20 DIAGNOSIS — C05.0 MALIGNANT NEOPLASM OF HARD PALATE (HCC): ICD-10-CM

## 2018-03-20 LAB
ALBUMIN SERPL BCP-MCNC: 3.9 G/DL (ref 3.5–5)
ALP SERPL-CCNC: 71 U/L (ref 46–116)
ALT SERPL W P-5'-P-CCNC: 17 U/L (ref 12–78)
ANION GAP SERPL CALCULATED.3IONS-SCNC: 6 MMOL/L (ref 4–13)
AST SERPL W P-5'-P-CCNC: 17 U/L (ref 5–45)
BASOPHILS # BLD AUTO: 0.04 THOUSANDS/ΜL (ref 0–0.1)
BASOPHILS NFR BLD AUTO: 1 % (ref 0–1)
BILIRUB SERPL-MCNC: 0.58 MG/DL (ref 0.2–1)
BUN SERPL-MCNC: 22 MG/DL (ref 5–25)
CALCIUM SERPL-MCNC: 8.7 MG/DL (ref 8.3–10.1)
CHLORIDE SERPL-SCNC: 101 MMOL/L (ref 100–108)
CO2 SERPL-SCNC: 28 MMOL/L (ref 21–32)
CREAT SERPL-MCNC: 0.89 MG/DL (ref 0.6–1.3)
EOSINOPHIL # BLD AUTO: 0.14 THOUSAND/ΜL (ref 0–0.61)
EOSINOPHIL NFR BLD AUTO: 2 % (ref 0–6)
ERYTHROCYTE [DISTWIDTH] IN BLOOD BY AUTOMATED COUNT: 14.3 % (ref 11.6–15.1)
GFR SERPL CREATININE-BSD FRML MDRD: 59 ML/MIN/1.73SQ M
GLUCOSE P FAST SERPL-MCNC: 87 MG/DL (ref 65–99)
HCT VFR BLD AUTO: 37.4 % (ref 34.8–46.1)
HGB BLD-MCNC: 12.1 G/DL (ref 11.5–15.4)
LYMPHOCYTES # BLD AUTO: 1.99 THOUSANDS/ΜL (ref 0.6–4.47)
LYMPHOCYTES NFR BLD AUTO: 25 % (ref 14–44)
MCH RBC QN AUTO: 29.5 PG (ref 26.8–34.3)
MCHC RBC AUTO-ENTMCNC: 32.4 G/DL (ref 31.4–37.4)
MCV RBC AUTO: 91 FL (ref 82–98)
MONOCYTES # BLD AUTO: 0.91 THOUSAND/ΜL (ref 0.17–1.22)
MONOCYTES NFR BLD AUTO: 11 % (ref 4–12)
NEUTROPHILS # BLD AUTO: 5.02 THOUSANDS/ΜL (ref 1.85–7.62)
NEUTS SEG NFR BLD AUTO: 61 % (ref 43–75)
NRBC BLD AUTO-RTO: 0 /100 WBCS
PLATELET # BLD AUTO: 232 THOUSANDS/UL (ref 149–390)
PMV BLD AUTO: 11.7 FL (ref 8.9–12.7)
POTASSIUM SERPL-SCNC: 4.1 MMOL/L (ref 3.5–5.3)
PROT SERPL-MCNC: 7.4 G/DL (ref 6.4–8.2)
RBC # BLD AUTO: 4.1 MILLION/UL (ref 3.81–5.12)
SODIUM SERPL-SCNC: 135 MMOL/L (ref 136–145)
WBC # BLD AUTO: 8.12 THOUSAND/UL (ref 4.31–10.16)

## 2018-03-20 PROCEDURE — 80053 COMPREHEN METABOLIC PANEL: CPT

## 2018-03-20 PROCEDURE — 36415 COLL VENOUS BLD VENIPUNCTURE: CPT

## 2018-03-20 PROCEDURE — 85025 COMPLETE CBC W/AUTO DIFF WBC: CPT

## 2018-04-16 DIAGNOSIS — I48.0 PAF (PAROXYSMAL ATRIAL FIBRILLATION) (HCC): Primary | ICD-10-CM

## 2018-05-02 RX ORDER — ALBUTEROL SULFATE 2.5 MG/3ML
1 SOLUTION RESPIRATORY (INHALATION)
COMMUNITY
Start: 2017-01-13 | End: 2018-05-09 | Stop reason: SDUPTHER

## 2018-05-02 RX ORDER — LEVOTHYROXINE SODIUM 0.1 MG/1
1 TABLET ORAL DAILY
COMMUNITY
Start: 2017-01-30 | End: 2018-06-13 | Stop reason: ALTCHOICE

## 2018-05-02 RX ORDER — VIT A/VIT C/VIT E/ZINC/COPPER 4296-226
1 CAPSULE ORAL 2 TIMES DAILY
COMMUNITY
Start: 2014-04-14 | End: 2018-06-13 | Stop reason: ALTCHOICE

## 2018-05-07 ENCOUNTER — OFFICE VISIT (OUTPATIENT)
Dept: INTERNAL MEDICINE CLINIC | Facility: CLINIC | Age: 83
End: 2018-05-07
Payer: COMMERCIAL

## 2018-05-07 VITALS
BODY MASS INDEX: 26.06 KG/M2 | DIASTOLIC BLOOD PRESSURE: 60 MMHG | WEIGHT: 141.6 LBS | OXYGEN SATURATION: 95 % | HEART RATE: 54 BPM | SYSTOLIC BLOOD PRESSURE: 122 MMHG | RESPIRATION RATE: 16 BRPM | HEIGHT: 62 IN

## 2018-05-07 DIAGNOSIS — J45.20 MILD INTERMITTENT ASTHMA WITHOUT COMPLICATION: ICD-10-CM

## 2018-05-07 DIAGNOSIS — R06.2 WHEEZING: ICD-10-CM

## 2018-05-07 DIAGNOSIS — I10 ESSENTIAL HYPERTENSION: ICD-10-CM

## 2018-05-07 DIAGNOSIS — C08.9 SALIVARY GLAND CANCER (HCC): ICD-10-CM

## 2018-05-07 DIAGNOSIS — E78.5 HYPERLIPIDEMIA, UNSPECIFIED HYPERLIPIDEMIA TYPE: Primary | ICD-10-CM

## 2018-05-07 PROBLEM — E03.8 HYPOTHYROIDISM DUE TO HASHIMOTO'S THYROIDITIS: Status: ACTIVE | Noted: 2018-05-07

## 2018-05-07 PROBLEM — E06.3 HYPOTHYROIDISM DUE TO HASHIMOTO'S THYROIDITIS: Status: ACTIVE | Noted: 2018-05-07

## 2018-05-07 PROCEDURE — 99214 OFFICE O/P EST MOD 30 MIN: CPT | Performed by: INTERNAL MEDICINE

## 2018-05-07 PROCEDURE — 3725F SCREEN DEPRESSION PERFORMED: CPT | Performed by: INTERNAL MEDICINE

## 2018-05-07 PROCEDURE — 1101F PT FALLS ASSESS-DOCD LE1/YR: CPT | Performed by: INTERNAL MEDICINE

## 2018-05-07 RX ORDER — BUDESONIDE AND FORMOTEROL FUMARATE DIHYDRATE 80; 4.5 UG/1; UG/1
2 AEROSOL RESPIRATORY (INHALATION) 2 TIMES DAILY
Qty: 1 INHALER | Refills: 0 | Status: SHIPPED | OUTPATIENT
Start: 2018-05-07 | End: 2018-06-13 | Stop reason: ALTCHOICE

## 2018-05-07 NOTE — PROGRESS NOTES
Assessment/Plan:           Problem List Items Addressed This Visit     Essential hypertension     Hypertension - controlled, I have counseled patient following healthy balance diet, I would like the patient reduce sodium, exercise routinely, I would like the patient continued the med current medical regiment and we will continue to monitor  Hyperlipidemia - Primary    Relevant Orders    Comprehensive metabolic panel    Lipid Panel with Direct LDL reflex    TSH, 3rd generation    Asthma     Patient does report me she has been using her rescue inhaler routinely she has not been taking her Advair and feels that the Advair was not effective  She is not taking the Advair in the past month  I will start her on Symbicort she is to continue with the nebulizer/rescue inhaler and I will have her see Pulmonary Dr  if worse call immediately         Relevant Medications    budesonide-formoterol (SYMBICORT) 80-4 5 MCG/ACT inhaler    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[address] )     Clinically stable the patient is seeing Hematology Oncology she does report me right facial pain which is intermittent secondary to tumor she declines treatment modalities at this point time           Other Visit Diagnoses     Wheezing        Relevant Medications    budesonide-formoterol (SYMBICORT) 80-4 5 MCG/ACT inhaler    Other Relevant Orders    Nebulizer    Ambulatory referral to Pulmonology    XR chest pa & lateral          Return to office 6 months call if  call if any problems  Subjective:      Patient ID: Buffy Blake is a 80 y o  female  HPI 80-year old female coming in for a follow up office visit regarding hyperlipidemia, wheezing, mild intermittent asthma, essential hypertension, salivary gland carcinoma; The patient reports me compliant taking medications without untoward side effects the    The patient is here to review his medical condition, update me on the medical condition and the patient reports me no hospitalizations and no ER visits  Patient does report me the p intermittent wheezing she has been needing to use her rescue inhaler several times per day which has been somewhat helpful she reports me she had stopped the Advair about a month ago when she ran out; she reports me she did not feel it was affective therefore she did not refill it  The patient is interested in seeing her lung specialist and considering pulmonary rehab but she would like to discuss this with the pulmonologist 1st   She reports me 9 am  And 3 pm she is sneezing and wheezing  Indoor no specific location,  Better with the rescue inhaler, cough some times vomit  with the phlem, the pt reports after 3 min mild sob wants pulm rehab for the last 6 month, no cp; she ran out of advair but not muched improved with the medication  Pt will be seeing card Dr Hina Bush in 1 week regarding swelling in the feet    The following portions of the patient's history were reviewed and updated as appropriate: allergies, current medications, past family history, past social history, past surgical history and problem list     Review of Systems   Constitutional: Negative for activity change, appetite change and unexpected weight change  HENT: Negative for dental problem and postnasal drip  Eyes: Negative for visual disturbance  Respiratory: Negative for cough and shortness of breath  Cardiovascular: Negative for chest pain  Gastrointestinal: Negative for abdominal pain, diarrhea, nausea and vomiting  Neurological: Negative for dizziness, light-headedness and headaches  Hematological: Negative for adenopathy  Objective:      /60 (BP Location: Left arm, Patient Position: Sitting, Cuff Size: Standard)   Pulse (!) 54   Resp 16   Ht 5' 2" (1 575 m)   Wt 64 2 kg (141 lb 9 6 oz)   SpO2 95%   BMI 25 90 kg/m²                       Return in about 6 months (around 11/7/2018)        Allergies   Allergen Reactions    Amiodarone Hives    Keflex [Cephalexin] Hives  Omnipaque [Iohexol] Hives and Shortness Of Breath    Clindamycin Other (See Comments)     When taken causes cdiff immediately    Sulfa Antibiotics Hives     itching    Iv Dye  [Iodinated Diagnostic Agents] Hypertension and Palpitations     Annotation - [id number]: Verified with patient     Naprosyn [Naproxen] Itching and Rash     Category: Allergy; Past Medical History:   Diagnosis Date    A-fib [facility])     Allergy to IVP dye 06/04/2013    pt felt heaviness, palpitations    Aneurysm of aortic root (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 [address] )     last assessed - [id number]    History of fracture of vertebral column     Hyperlipidemia     Hypertension     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[facility] [address] )     Hypoxia     last assessed - [id number]    Junctional rhythm     last assessed - 00Ezc9084    Lightheadedness     last assessed - 52Jyb0791    Migraine     Palpitations     last assessed - 81Rva9866    Pleural effusion, bilateral     last assessed - 58Ksq6300    Shortness of breath     last assessed - 54Dyp7333    Thyroid trouble     Trigger finger     last assessed - 37TPS3048    Trigger middle finger of right hand     last assessed - 81Ktu2237    Urinary incontinence     last assessed -  22Jul,2013; Resolved - [id number]     Past Surgical History:   Procedure Laterality Date    ABDOMINAL AORTIC ANEURYSM REPAIR W/ ENDOLUMINAL GRAFT  06/28/2015    Ascending aorta and hemiarch replacement with 30 mm Vascutek Gelweave graft;  Managed by Jey Szymanski; last assessed - 66WMG0964    APPENDECTOMY      BLADDER SURGERY      Reccurent histoy of bladder surgery    HOROWITZ PROCEDURE      CARDIAC CATHETERIZATION  2004    Procedure summary - Luminal irregularities; last assessed - [id number]     SECTION      CORONARY ANEURYSM REPAIR      CYSTOSCOPY      botox injection    HYSTERECTOMY      [place] ENDOSCOPIC INJECTION/IMPLANT N/A 2017    Procedure: CYSTOSCOPY; DURASPHERE-PERIURETHRAL BULKING AGENT INJECTION ;  Surgeon: Lupe Rasheed MD;  [facility];  Service: Urology    [place] INCISE FINGER TENDON SHEATH Right 10/18/2016    Procedure: LONG FINGER TRIGGER RELEASE ;  Surgeon: Gage Kumar MD;  [facility];  Service: Orthopedics    THYROIDECTOMY      Total Thyroidectomy    TONSILLECTOMY AND ADENOIDECTOMY       Current Outpatient Prescriptions on File Prior to Visit   Medication Sig Dispense Refill    acetaminophen (TYLENOL) 500 mg tablet Take 500 mg by mouth every 6 (six) hours as needed for mild pain      albuterol (2 5 mg/3 mL) 0 083 % nebulizer solution Inhale 1 each      albuterol (PROVENTIL HFA,VENTOLIN HFA) 90 mcg/act inhaler Inhale 2 puffs every 6 (six) hours as needed for wheezing      amLODIPine (NORVASC) 5 mg tablet Take 5 mg by mouth daily in the early morning        aspirin 81 MG tablet Take 162 mg by mouth daily in the early morning        calcium carbonate (OS-TAE) 600 MG tablet Take 600 mg by mouth daily in the early morning        fluticasone-salmeterol (ADVAIR DISKUS) 250-50 mcg/dose inhaler Inhale 1 puff 2 (two) times a day      levothyroxine 100 mcg tablet Take 1 tablet by mouth daily      levothyroxine 125 mcg tablet Take 100 mcg by mouth daily in the early morning        lovastatin (MEVACOR) 20 mg tablet Take 20 mg by mouth daily at bedtime      metoprolol tartrate (LOPRESSOR) 25 mg tablet Take 1 tablet (25 mg total) by mouth 2 (two) times a day 180 tablet 3    Multiple Vitamin (MULTIVITAMIN) tablet Take 1 tablet by mouth daily in the early morning        Multiple Vitamins-Minerals (PRESERVISION AREDS) CAPS Take 1 capsule by mouth 2 (two) times a day      [DISCONTINUED] Multiple Vitamins-Minerals (PRESERVISION AREDS PO) Take 1 tablet by mouth 2 (two) times a day       No current facility-administered medications on file prior to visit  Family History   Problem Relation Age of Onset    Coronary aneurysm Sister     Coronary artery disease Sister      CABG    Heart disease Family      cardiac disorder    Hypertension Family     Cancer Family     Thyroid disease Family      Social History     Social History    Marital status:      Spouse name: N/A    Number of children: N/A    Years of education: N/A     Occupational History    Not on file       Social History Main Topics    Smoking status: Former Smoker     Packs/day: 0 50     Quit date: 12/25/2014    Smokeless tobacco: Never Used      Comment: smoked 17 yrs 1/2 ppd quit and restarted then quit  10 days ago during Christmas 2014     Alcohol use No      Comment: Social drinker & never drang alcohol both documented in [employer]    Drug use: No    Sexual activity: Not on file     Other Topics Concern    Not on file     Social History Narrative    No narrative on file     Vitals:    05/07/18 1406   BP: 122/60   BP Location: Left arm   Patient Position: Sitting   Cuff Size: Standard   Pulse: (!) 54   Resp: 16   SpO2: 95%   Weight: 64 2 kg (141 lb 9 6 oz)   Height: 5' 2" (1 575 m)     Results for orders placed or performed in visit on 03/20/18   CBC and differential   Result Value Ref Range    WBC 8 12 4 31 - 10 16 Thousand/uL    RBC 4 10 3 81 - 5 12 Million/uL    Hemoglobin 12 1 11 5 - 15 4 g/dL    Hematocrit 37 4 34 8 - 46 1 %    MCV 91 82 - 98 fL    MCH 29 5 26 8 - 34 3 pg    MCHC 32 4 31 4 - 37 4 g/dL    RDW 14 3 11 6 - 15 1 %    MPV 11 7 8 9 - 12 7 fL    Platelets 496 468 - 892 Thousands/uL    nRBC 0 /100 WBCs    Neutrophils Relative 61 43 - 75 %    Lymphocytes Relative 25 14 - 44 %    Monocytes Relative 11 4 - 12 %    Eosinophils Relative 2 0 - 6 %    Basophils Relative 1 0 - 1 %    Neutrophils Absolute 5 02 1 85 - 7 62 Thousands/µL    Lymphocytes Absolute 1 99 0 60 - 4 47 Thousands/µL    Monocytes Absolute 0 91 0 17 - 1 22 Thousand/µL    Eosinophils Absolute 0 14 0 00 - 0 61 Thousand/µL    Basophils Absolute 0 04 0 00 - 0 10 Thousands/µL   Comprehensive metabolic panel   Result Value Ref Range    Sodium 135 (L) 136 - 145 mmol/L    Potassium 4 1 3 5 - 5 3 mmol/L    Chloride 101 100 - 108 mmol/L    CO2 28 21 - 32 mmol/L    Anion Gap 6 4 - 13 mmol/L    BUN 22 5 - 25 mg/dL    Creatinine 0 89 0 60 - 1 30 mg/dL    Glucose, Fasting 87 65 - 99 mg/dL    Calcium 8 7 8 3 - 10 1 mg/dL    AST 17 5 - 45 U/L    ALT 17 12 - 78 U/L    Alkaline Phosphatase 71 46 - 116 U/L    Total Protein 7 4 6 4 - 8 2 g/dL    Albumin 3 9 3 5 - 5 0 g/dL    Total Bilirubin 0 58 0 20 - 1 00 mg/dL    eGFR 59 ml/min/1 73sq m     Weight (last 2 days)     Date/Time   Weight    05/07/18 1406  64 2 (141 6)            Body mass index is 25 9 kg/m²  BP      Temp      Pulse    Resp      SpO2        Vitals:    05/07/18 1406   Weight: 64 2 kg (141 lb 9 6 oz)     Vitals:    05/07/18 1406   Weight: 64 2 kg (141 lb 9 6 oz)      Physical Exam   Constitutional: She appears well-developed and well-nourished  HENT:   Head: Normocephalic  Mouth/Throat: Oropharynx is clear and moist    Eyes: Conjunctivae are normal  Pupils are equal, round, and reactive to light  Right eye exhibits no discharge  Left eye exhibits no discharge  No scleral icterus  Neck: Neck supple  Cardiovascular: Normal rate, regular rhythm, normal heart sounds and intact distal pulses  Exam reveals no gallop and no friction rub  No murmur heard  Pulmonary/Chest: Breath sounds normal  No respiratory distress  She has no wheezes  She has no rales  Abdominal: Soft  Bowel sounds are normal  She exhibits no distension and no mass  There is no tenderness  There is no rebound and no guarding  Musculoskeletal: She exhibits no edema or deformity  Lymphadenopathy:     She has no cervical adenopathy  Neurological: She is alert   Coordination normal

## 2018-05-07 NOTE — ASSESSMENT & PLAN NOTE
Clinically stable the patient is seeing Hematology Oncology she does report me right facial pain which is intermittent secondary to tumor she declines treatment modalities at this point time

## 2018-05-07 NOTE — ASSESSMENT & PLAN NOTE
Hypothyroidism controlled the patient is currently euthyroid I will be ordering a TSH prior to the next office visit and the patient will continue with current medical regiment; we will continue to monitor the patient's progress

## 2018-05-07 NOTE — ASSESSMENT & PLAN NOTE
Patient does report me she has been using her rescue inhaler routinely she has not been taking her Advair and feels that the Advair was not effective  She is not taking the Advair in the past month    I will start her on Symbicort she is to continue with the nebulizer/rescue inhaler and I will have her see Pulmonary Dr  if worse call immediately

## 2018-05-07 NOTE — ASSESSMENT & PLAN NOTE
Hypertension - controlled, I have counseled patient following healthy balance diet, I would like the patient reduce sodium, exercise routinely, I would like the patient continued the med current medical regiment and we will continue to monitor

## 2018-05-09 ENCOUNTER — APPOINTMENT (OUTPATIENT)
Dept: RADIOLOGY | Facility: CLINIC | Age: 83
End: 2018-05-09
Payer: COMMERCIAL

## 2018-05-09 ENCOUNTER — APPOINTMENT (OUTPATIENT)
Dept: LAB | Facility: CLINIC | Age: 83
End: 2018-05-09
Payer: COMMERCIAL

## 2018-05-09 DIAGNOSIS — J45.909 UNCOMPLICATED ASTHMA, UNSPECIFIED ASTHMA SEVERITY, UNSPECIFIED WHETHER PERSISTENT: Primary | ICD-10-CM

## 2018-05-09 DIAGNOSIS — E78.5 HYPERLIPIDEMIA, UNSPECIFIED HYPERLIPIDEMIA TYPE: ICD-10-CM

## 2018-05-09 DIAGNOSIS — R06.2 WHEEZING: ICD-10-CM

## 2018-05-09 LAB
ALBUMIN SERPL BCP-MCNC: 3.8 G/DL (ref 3.5–5)
ALP SERPL-CCNC: 69 U/L (ref 46–116)
ALT SERPL W P-5'-P-CCNC: 22 U/L (ref 12–78)
ANION GAP SERPL CALCULATED.3IONS-SCNC: 5 MMOL/L (ref 4–13)
AST SERPL W P-5'-P-CCNC: 18 U/L (ref 5–45)
BILIRUB SERPL-MCNC: 0.61 MG/DL (ref 0.2–1)
BUN SERPL-MCNC: 18 MG/DL (ref 5–25)
CALCIUM SERPL-MCNC: 8.7 MG/DL (ref 8.3–10.1)
CHLORIDE SERPL-SCNC: 103 MMOL/L (ref 100–108)
CHOLEST SERPL-MCNC: 181 MG/DL (ref 50–200)
CO2 SERPL-SCNC: 29 MMOL/L (ref 21–32)
CREAT SERPL-MCNC: 0.79 MG/DL (ref 0.6–1.3)
GFR SERPL CREATININE-BSD FRML MDRD: 68 ML/MIN/1.73SQ M
GLUCOSE P FAST SERPL-MCNC: 98 MG/DL (ref 65–99)
HDLC SERPL-MCNC: 70 MG/DL (ref 40–60)
LDLC SERPL CALC-MCNC: 94 MG/DL (ref 0–100)
POTASSIUM SERPL-SCNC: 4.1 MMOL/L (ref 3.5–5.3)
PROT SERPL-MCNC: 7.2 G/DL (ref 6.4–8.2)
SODIUM SERPL-SCNC: 137 MMOL/L (ref 136–145)
TRIGL SERPL-MCNC: 86 MG/DL
TSH SERPL DL<=0.05 MIU/L-ACNC: 13.8 UIU/ML (ref 0.36–3.74)

## 2018-05-09 PROCEDURE — 80053 COMPREHEN METABOLIC PANEL: CPT

## 2018-05-09 PROCEDURE — 36415 COLL VENOUS BLD VENIPUNCTURE: CPT

## 2018-05-09 PROCEDURE — 84443 ASSAY THYROID STIM HORMONE: CPT

## 2018-05-09 PROCEDURE — 71046 X-RAY EXAM CHEST 2 VIEWS: CPT

## 2018-05-09 PROCEDURE — 80061 LIPID PANEL: CPT

## 2018-05-09 RX ORDER — ALBUTEROL SULFATE 2.5 MG/3ML
2.5 SOLUTION RESPIRATORY (INHALATION)
Qty: 50 VIAL | Refills: 2 | Status: SHIPPED | OUTPATIENT
Start: 2018-05-09 | End: 2019-08-21 | Stop reason: SDUPTHER

## 2018-05-10 DIAGNOSIS — R79.89 ELEVATED TSH: Primary | ICD-10-CM

## 2018-05-15 ENCOUNTER — OFFICE VISIT (OUTPATIENT)
Dept: INTERNAL MEDICINE CLINIC | Facility: CLINIC | Age: 83
End: 2018-05-15
Payer: COMMERCIAL

## 2018-05-15 VITALS
DIASTOLIC BLOOD PRESSURE: 62 MMHG | OXYGEN SATURATION: 96 % | SYSTOLIC BLOOD PRESSURE: 124 MMHG | BODY MASS INDEX: 25.83 KG/M2 | HEART RATE: 54 BPM | RESPIRATION RATE: 16 BRPM | WEIGHT: 141.2 LBS

## 2018-05-15 DIAGNOSIS — J45.41 MODERATE PERSISTENT ASTHMA WITH ACUTE EXACERBATION: Primary | ICD-10-CM

## 2018-05-15 PROCEDURE — 99213 OFFICE O/P EST LOW 20 MIN: CPT | Performed by: INTERNAL MEDICINE

## 2018-05-15 PROCEDURE — 1160F RVW MEDS BY RX/DR IN RCRD: CPT | Performed by: INTERNAL MEDICINE

## 2018-05-15 RX ORDER — PREDNISONE 10 MG/1
TABLET ORAL
Qty: 30 TABLET | Refills: 0 | Status: SHIPPED | OUTPATIENT
Start: 2018-05-15 | End: 2018-06-13 | Stop reason: ALTCHOICE

## 2018-05-15 NOTE — PROGRESS NOTES
Assessment/Plan:           Problem List Items Addressed This Visit     Asthma - Primary     The patient did not start Advair at this point reports me a dyspnea on exertion which is intermittent and intermittent wheezing worsened with cold air, humidity and improved with albuterol her symptoms are consistent with asthma; I have advised the patient's son Advair 500-50 1 puff every 12 hr, use ProAir HFA 2 puffs every 4-6 hours as needed, prednisone 10 mg 4 tablets a day x4 days, 3 tablets a day x3 days, 2 tablets a day x2 days then 1 tablet a day x1 day  Chest x-ray no acute changes  I would like the patient to see Pulmonary and the patient would like to attend pulmonary rehab  The patient declines ER have explained her her symptoms were to worsen to go to the ER  Relevant Medications    predniSONE 10 mg tablet    fluticasone-salmeterol (ADVAIR) 500-50 mcg/dose    Other Relevant Orders    Ambulatory Referral to Pulmonary Rehabilitation          Patient declined the ER but will go to ER if her symptoms worsen pulse ox at rest 95-96 on room air with ambulation around the hallway x2 it did drop to 92% in quickly recovered to 95% Return to office  1 months  call if any problems  Subjective:      Patient ID: Buffy Martines is a 80 y o  female  HPI 55-year-old female coming in with a chief complaint of wheezing, dyspnea on exertion over the last 6 months; she reports me that sob 6 months walking after a few min, a lot of wheezing and tightness , cough , wakes to goto the bathroom and when comes back wheezing , wants to see pulm  And go for pulm rehab  Declines symbicort,  advair , no cp improves with nebulizer , patient reports me that with hematin year cold weather this will trigger the wheezing and the wheezing will improve with the nebulizer treatment with albuterol      The following portions of the patient's history were reviewed and updated as appropriate: allergies, current medications, past family history, past social history, past surgical history and problem list     Review of Systems   Constitutional: Negative for chills and fever  Respiratory: Positive for cough, shortness of breath and wheezing  Cardiovascular: Negative for chest pain  Objective:      /62 (BP Location: Right arm, Patient Position: Sitting, Cuff Size: Standard)   Pulse (!) 54   Resp 16   Wt 64 kg (141 lb 3 2 oz)   SpO2 96%   BMI 25 83 kg/m²                       No Follow-up on file  Allergies   Allergen Reactions    Amiodarone Hives    Keflex [Cephalexin] Hives    Omnipaque [Iohexol] Hives and Shortness Of Breath    Clindamycin Other (See Comments)     When taken causes cdiff immediately    Sulfa Antibiotics Hives     itching    Iv Dye  [Iodinated Diagnostic Agents] Hypertension and Palpitations     Annotation - [id number]: Verified with patient     Naprosyn [Naproxen] Itching and Rash     Category: Allergy;         Past Medical History:   Diagnosis Date    A-fib [facility])     Allergy to IVP dye 06/04/2013    pt felt heaviness, palpitations    Aneurysm of aortic root ([facility] [address] )     last assessed - [id number]    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HCC)     Aortic root dilatation (HCC)     last assessed - 64GGB2157    Arthritis     Ascending aortic aneurysm (HCC)     last assessed - 41YTE2209    Asthma     Basal cell carcinoma     last assessed - 45Bdy9867    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[address] )     Disease of thyroid gland     Facet arthropathy, cervical ([facility] [address] )     last assessed - 96MMN8738    History of fracture of vertebral column     Hyperlipidemia     Hypertension     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[facility] [address] )     Hypoxia     last assessed - 73ZUL1290    Junctional rhythm     last assessed - 86Skn5167    Lightheadedness     last assessed - 13Cgb6596    Migraine     Palpitations     last assessed - 59Jqs3014    Pleural effusion, bilateral     last assessed - 15Gzz3187    Shortness of breath     last assessed - 97Vbu1343   Karon Jay Thyroid trouble     Trigger finger     last assessed - 85WWM7672    Trigger middle finger of right hand     last assessed - 42Kpm1711    Urinary incontinence     last assessed -  ; Resolved - [id number]     Past Surgical History:   Procedure Laterality Date    ABDOMINAL AORTIC ANEURYSM REPAIR W/ ENDOLUMINAL GRAFT  2015    Ascending aorta and hemiarch replacement with 30 mm Vascutek Gelweave graft;  Managed by Kelby Russell; last assessed - 48VRR8459    APPENDECTOMY      BLADDER SURGERY      Reccurent histoy of bladder surgery    [facility] PROCEDURE      CARDIAC CATHETERIZATION  2004    Procedure summary - Luminal irregularities; last assessed - 77MUR5275     SECTION      CORONARY ANEURYSM REPAIR      CYSTOSCOPY      botox injection    HYSTERECTOMY      [place] ENDOSCOPIC INJECTION/IMPLANT N/A 2017    Procedure: CYSTOSCOPY; DURASPHERE-PERIURETHRAL BULKING AGENT INJECTION ;  Surgeon: Jeremiah Geronimo MD;  [facility];  Service: Urology    [place] INCISE FINGER TENDON SHEATH Right 10/18/2016    Procedure: LONG FINGER TRIGGER RELEASE ;  Surgeon: Angelia Rawls MD;  [facility];  Service: Orthopedics    THYROIDECTOMY      Total Thyroidectomy    TONSILLECTOMY AND ADENOIDECTOMY       Current Outpatient Prescriptions on File Prior to Visit   Medication Sig Dispense Refill    acetaminophen (TYLENOL) 500 mg tablet Take 500 mg by mouth every 6 (six) hours as needed for mild pain      albuterol (2 5 mg/3 mL) 0 083 % nebulizer solution Take 3 mL (2 5 mg total) by nebulization 4 (four) times a day 50 vial 2    albuterol (PROVENTIL HFA,VENTOLIN HFA) 90 mcg/act inhaler Inhale 2 puffs every 6 (six) hours as needed for wheezing      amLODIPine (NORVASC) 5 mg tablet Take 5 mg by mouth daily in the early morning        aspirin 81 MG tablet Take 162 mg by mouth daily in the early morning        budesonide-formoterol (SYMBICORT) 80-4 5 MCG/ACT inhaler Inhale 2 puffs 2 (two) times a day 1 Inhaler 0    calcium carbonate (OS-TAE) 600 MG tablet Take 600 mg by mouth daily in the early morning        fluticasone-salmeterol (ADVAIR DISKUS) 250-50 mcg/dose inhaler Inhale 1 puff 2 (two) times a day      levothyroxine 100 mcg tablet Take 1 tablet by mouth daily      levothyroxine 125 mcg tablet Take 100 mcg by mouth daily in the early morning        lovastatin (MEVACOR) 20 mg tablet Take 20 mg by mouth daily at bedtime      metoprolol tartrate (LOPRESSOR) 25 mg tablet Take 1 tablet (25 mg total) by mouth 2 (two) times a day 180 tablet 3    Multiple Vitamin (MULTIVITAMIN) tablet Take 1 tablet by mouth daily in the early morning        Multiple Vitamins-Minerals (PRESERVISION AREDS) CAPS Take 1 capsule by mouth 2 (two) times a day       No current facility-administered medications on file prior to visit  Family History   Problem Relation Age of Onset    Coronary aneurysm Sister     Coronary artery disease Sister      CABG    Heart disease Family      cardiac disorder    Hypertension Family     Cancer Family     Thyroid disease Family      Social History     Social History    Marital status:      Spouse name: N/A    Number of children: N/A    Years of education: N/A     Occupational History    Not on file       Social History Main Topics    Smoking status: Former Smoker     Packs/day: 0 50     Quit date: 12/25/2014    Smokeless tobacco: Never Used      Comment: smoked 17 yrs 1/2 ppd quit and restarted then quit  10 days ago during Christmas 2014     Alcohol use No      Comment: Social drinker & never drang alcohol both documented in [employer]    Drug use: No    Sexual activity: Not on file     Other Topics Concern    Not on file     Social History Narrative    No narrative on file     Vitals:    05/15/18 1057   BP: 124/62   BP Location: Right arm   Patient Position: Sitting   Cuff Size: Standard   Pulse: (!) 54   Resp: 16   SpO2: 96%   Weight: 64 kg (141 lb 3 2 oz)     Results for orders placed or performed in visit on 05/09/18   Comprehensive metabolic panel   Result Value Ref Range    Sodium 137 136 - 145 mmol/L    Potassium 4 1 3 5 - 5 3 mmol/L    Chloride 103 100 - 108 mmol/L    CO2 29 21 - 32 mmol/L    Anion Gap 5 4 - 13 mmol/L    BUN 18 5 - 25 mg/dL    Creatinine 0 79 0 60 - 1 30 mg/dL    Glucose, Fasting 98 65 - 99 mg/dL    Calcium 8 7 8 3 - 10 1 mg/dL    AST 18 5 - 45 U/L    ALT 22 12 - 78 U/L    Alkaline Phosphatase 69 46 - 116 U/L    Total Protein 7 2 6 4 - 8 2 g/dL    Albumin 3 8 3 5 - 5 0 g/dL    Total Bilirubin 0 61 0 20 - 1 00 mg/dL    eGFR 68 ml/min/1 73sq m   Lipid Panel with Direct LDL reflex   Result Value Ref Range    Cholesterol 181 50 - 200 mg/dL    Triglycerides 86 <=150 mg/dL    HDL, Direct 70 (H) 40 - 60 mg/dL    LDL Calculated 94 0 - 100 mg/dL   TSH, 3rd generation   Result Value Ref Range    TSH 3RD GENERATON 13 800 (H) 0 358 - 3 740 uIU/mL     Weight (last 2 days)     Date/Time   Weight    05/15/18 1057  64 (141 2)            Body mass index is 25 83 kg/m²  BP      Temp      Pulse    Resp      SpO2        Vitals:    05/15/18 1057   Weight: 64 kg (141 lb 3 2 oz)     Vitals:    05/15/18 1057   Weight: 64 kg (141 lb 3 2 oz)      Physical Exam   Constitutional: She appears well-developed and well-nourished  No distress  HENT:   Head: Normocephalic and atraumatic  Right Ear: External ear normal    Left Ear: External ear normal    Nose: Nose normal    Mouth/Throat: Oropharynx is clear and moist  No oropharyngeal exudate  Eyes: Conjunctivae and EOM are normal  Pupils are equal, round, and reactive to light  Right eye exhibits no discharge  Left eye exhibits no discharge  No scleral icterus  Cardiovascular: Normal heart sounds  No murmur heard  Pulmonary/Chest: No respiratory distress  She has no wheezes  She has no rales  Lymphadenopathy:     She has no cervical adenopathy  Skin: She is not diaphoretic

## 2018-05-15 NOTE — ASSESSMENT & PLAN NOTE
The patient did not start Advair at this point reports me a dyspnea on exertion which is intermittent and intermittent wheezing worsened with cold air, humidity and improved with albuterol her symptoms are consistent with asthma; I have advised the patient's son Advair 500-50 1 puff every 12 hr, use ProAir HFA 2 puffs every 4-6 hours as needed, prednisone 10 mg 4 tablets a day x4 days, 3 tablets a day x3 days, 2 tablets a day x2 days then 1 tablet a day x1 day  Chest x-ray no acute changes  I would like the patient to see Pulmonary and the patient would like to attend pulmonary rehab  The patient declines ER have explained her her symptoms were to worsen to go to the ER

## 2018-05-23 ENCOUNTER — OFFICE VISIT (OUTPATIENT)
Dept: CARDIOLOGY CLINIC | Facility: MEDICAL CENTER | Age: 83
End: 2018-05-23
Payer: COMMERCIAL

## 2018-05-23 VITALS
BODY MASS INDEX: 26.7 KG/M2 | DIASTOLIC BLOOD PRESSURE: 64 MMHG | HEIGHT: 62 IN | OXYGEN SATURATION: 97 % | WEIGHT: 145.1 LBS | HEART RATE: 82 BPM | SYSTOLIC BLOOD PRESSURE: 130 MMHG

## 2018-05-23 DIAGNOSIS — I10 ESSENTIAL HYPERTENSION: ICD-10-CM

## 2018-05-23 DIAGNOSIS — R06.00 DOE (DYSPNEA ON EXERTION): ICD-10-CM

## 2018-05-23 DIAGNOSIS — I48.0 PAF (PAROXYSMAL ATRIAL FIBRILLATION) (HCC): Primary | ICD-10-CM

## 2018-05-23 DIAGNOSIS — J45.20 MILD INTERMITTENT ASTHMA WITHOUT COMPLICATION: ICD-10-CM

## 2018-05-23 DIAGNOSIS — E78.5 HYPERLIPIDEMIA, UNSPECIFIED HYPERLIPIDEMIA TYPE: ICD-10-CM

## 2018-05-23 DIAGNOSIS — I10 HYPERTENSION, UNSPECIFIED TYPE: ICD-10-CM

## 2018-05-23 PROCEDURE — 99214 OFFICE O/P EST MOD 30 MIN: CPT | Performed by: INTERNAL MEDICINE

## 2018-05-23 PROCEDURE — 93000 ELECTROCARDIOGRAM COMPLETE: CPT | Performed by: INTERNAL MEDICINE

## 2018-05-23 RX ORDER — AMLODIPINE BESYLATE AND BENAZEPRIL HYDROCHLORIDE 2.5; 1 MG/1; MG/1
1 CAPSULE ORAL DAILY
Qty: 30 CAPSULE | Refills: 11 | Status: SHIPPED | OUTPATIENT
Start: 2018-05-23 | End: 2018-06-04 | Stop reason: SDUPTHER

## 2018-05-23 NOTE — PROGRESS NOTES
Follow-up - Cardiology   Buffy James 80 y o  female MRN: [id number]        Problems    1  PAF (paroxysmal atrial fibrillation) (HCC)  POCT ECG   2  Hypertension, unspecified type  POCT ECG    amLODIPine-benazepril (LOTREL 2 5-10) 2 5-10 MG per capsule   3  CASTELLANO (dyspnea on exertion)     4  Essential hypertension     5  Mild intermittent asthma without complication     6  Hyperlipidemia, unspecified hyperlipidemia type         Impression:      Hypertension   having some edema, may be related to amlodipine as she has no risk factors for right heart failure, no examination findings of volume overload otherwise  Paroxysmal atrial fibrillation   no recent symptoms,  Only takes aspirin prophylaxis due to a 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     history of ascending aortic aneurysm   status post repair     hyperlipidemia   very mild, taken off statin therapy to reduce medical therapy considering normal cardiac catheterization prior to ascending aortic aneurysm repair     dyspnea on exertion   this is her biggest recent complaint, it is worsening, she suspects all due to her pulmonary issues/ asthma and has an appointment with Pulmonary in June  She has no signs of volume overload, echocardiogram 12/17 was normal with no significant valve disease  And her prior cardiac catheterization revealed no evidence of coronary artery disease      Plan:    Orders Placed This Encounter   Procedures    POCT ECG     Stop amlodipine due to recent edema   start low-dose Lotrel, a combination of amlodipine 2 5 mg/ benazepril 10 mg    6 month follow-up recommended      HPI:   Buffy James is a 80y o  year old female  With a history of ascending aortic aneurysm, status post repair, with significant asthma, hypertension, mild hyperlipidemia but not on statin therapy, prior normal cardiac catheterization pre surgery, paroxysmal atrial fibrillation without any recent significant symptoms, and on aspirin due to a 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  Dyspnea is our biggest complaint at this time, often has a sensation of chest pressure and wheezing  Her cardiac catheterization was normal prior to her aortic aneurysm repair in therefore highly doubtful that she has CAD causing her dyspnea  She has no recent symptoms of palpitations  Her blood pressure is under good control but she complains of recent edema  She denies orthopnea  Her recent echo 12/17 was unremarkable  Review of Systems      Past Medical History:   Diagnosis Date    A-fib [facility])     Allergy to IVP dye 06/04/2013    pt felt heaviness, palpitations    Aneurysm of aortic root (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 )     Disease of thyroid gland     [facility]     last assessed - [id number]    History of fracture of vertebral column     Hyperlipidemia     Hypertension     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[facility] )     Hypoxia     last assessed - [id number]    Junctional rhythm     last assessed - 95Yke6865    Lightheadedness     last assessed - 55Nbq5425    Migraine     Palpitations     last assessed - 71Tdm0756    Pleural effusion, bilateral     last assessed - 01Zly5956    Shortness of breath     last assessed - 54Esf1833    Thyroid trouble     Trigger finger     last assessed - 54GLD0474    Trigger middle finger of right hand     last assessed - 51Nvc8195    Urinary incontinence     last assessed -  22Jul,2013;  Resolved - [id number]     History   Alcohol Use No     Comment: Social drinker & never drang alcohol both documented in [employer]     History   Drug Use No     History   Smoking Status    Former Smoker    Packs/day: 0 50    Quit date: 12/25/2014   Smokeless Tobacco    Never Used     Comment: smoked 17 yrs 1/2 ppd quit and restarted then quit  10 days ago during Christmas 2014        Allergies: Allergies   Allergen Reactions    Amiodarone Hives    Keflex [Cephalexin] Hives    Omnipaque [Iohexol] Hives and Shortness Of Breath    Clindamycin Other (See Comments)     When taken causes cdiff immediately    Sulfa Antibiotics Hives     itching    Iv Dye  [Iodinated Diagnostic Agents] Hypertension and Palpitations     Annotation - [id number]: Verified with patient     Naprosyn [Naproxen] Itching and Rash     Category: Allergy;         Medications:     Current Outpatient Prescriptions:     acetaminophen (TYLENOL) 500 mg tablet, Take 500 mg by mouth every 6 (six) hours as needed for mild pain, Disp: , Rfl:     albuterol (2 5 mg/3 mL) 0 083 % nebulizer solution, Take 3 mL (2 5 mg total) by nebulization 4 (four) times a day, Disp: 50 vial, Rfl: 2    albuterol (PROVENTIL HFA,VENTOLIN HFA) 90 mcg/act inhaler, Inhale 2 puffs every 6 (six) hours as needed for wheezing, Disp: , Rfl:     aspirin 81 MG tablet, Take 162 mg by mouth daily in the early morning  , Disp: , Rfl:     calcium carbonate (OS-TAE) 600 MG tablet, Take 600 mg by mouth daily in the early morning  , Disp: , Rfl:     fluticasone-salmeterol (ADVAIR) 500-50 mcg/dose, Inhale 1 puff every 12 (twelve) hours, Disp: 1 Inhaler, Rfl: 0    levothyroxine 125 mcg tablet, Take 100 mcg by mouth daily in the early morning  , Disp: , Rfl:     lovastatin (MEVACOR) 20 mg tablet, Take 20 mg by mouth daily at bedtime, Disp: , Rfl:     metoprolol tartrate (LOPRESSOR) 25 mg tablet, Take 1 tablet (25 mg total) by mouth 2 (two) times a day, Disp: 180 tablet, Rfl: 3    Multiple Vitamin (MULTIVITAMIN) tablet, Take 1 tablet by mouth daily in the early morning  , Disp: , Rfl:     Multiple Vitamins-Minerals (PRESERVISION AREDS) CAPS, Take 1 capsule by mouth 2 (two) times a day, Disp: , Rfl:     predniSONE 10 mg tablet, 4 tablets a day x4 days, 3 tablets a day x3 days, 2 tablets a day x2 days, 1 tablet a x1 day , Disp: 30 tablet, Rfl: 0    amLODIPine-benazepril (LOTREL 2 5-10) 2 5-10 MG per capsule, Take 1 capsule by mouth daily, Disp: 30 capsule, Rfl: 11    budesonide-formoterol (SYMBICORT) 80-4 5 MCG/ACT inhaler, Inhale 2 puffs 2 (two) times a day, Disp: 1 Inhaler, Rfl: 0    fluticasone-salmeterol (ADVAIR DISKUS) 250-50 mcg/dose inhaler, Inhale 1 puff 2 (two) times a day, Disp: , Rfl:     levothyroxine 100 mcg tablet, Take 1 tablet by mouth daily, Disp: , Rfl:       Vitals:    05/23/18 1456   BP: 130/64   Pulse: 82   SpO2: 97%     Weight (last 2 days)     Date/Time   Weight    05/23/18 1456  65 8 (145 1)            Physical Exam      Laboratory Studies:  Lab Results   Component Value Date    HGBA1C 6 2 (H) 02/05/2015     05/09/2018     (L) 03/20/2018     11/13/2017     (L) 06/16/2015     06/11/2015     06/07/2015    K 4 1 05/09/2018    K 4 1 03/20/2018    K 4 3 11/13/2017    K 4 1 06/16/2015    K 4 1 06/11/2015    K 3 9 06/07/2015     05/09/2018     03/20/2018     11/13/2017    CL 97 (L) 06/16/2015     06/11/2015     06/07/2015    CO2 29 05/09/2018    CO2 28 03/20/2018    CO2 27 11/13/2017    CO2 30 06/16/2015    CO2 28 06/11/2015    CO2 27 06/07/2015    GLUCOSE 88 09/08/2017    GLUCOSE 107 04/24/2017    GLUCOSE 97 02/21/2017    GLUCOSE 138 06/16/2015    GLUCOSE 180 (H) 06/11/2015    GLUCOSE 97 06/08/2015    CREATININE 0 79 05/09/2018    CREATININE 0 89 03/20/2018    CREATININE 0 78 11/13/2017    CREATININE 1 07 06/16/2015    CREATININE 0 93 06/11/2015    CREATININE 0 66 06/07/2015    BUN 18 05/09/2018    BUN 22 03/20/2018    BUN 22 11/13/2017    BUN 23 06/16/2015    BUN 18 06/11/2015    BUN 13 06/07/2015    MG 2 1 02/22/2016    MG 1 9 06/07/2015    MG 1 7 06/06/2015    MG 1 8 04/09/2015    PHOS 3 3 06/07/2015     Lab Results Component Value Date    WBC 8 12 2018    WBC 7 65 2015    RBC 4 10 2018    RBC 4 16 2015    HGB 12 1 2018    HGB 9 1 (L) 2015    HCT 37 4 2018    HCT 29 9 (L) 2015    MCV 91 2018    MCV 72 (L) 2015    MCH 29 5 2018    MCH 21 9 (L) 2015    RDW 14 3 2018    RDW 17 2 (H) 2015     2018     2015     NT-proBNP: No results for input(s): NTBNP in the last 72 hours  Coags:    Lipid Profile:   Lab Results   Component Value Date    CHOL 181 2018     Lab Results   Component Value Date    HDL 70 (H) 2018     Lab Results   Component Value Date    LDLCALC 94 2018     Lab Results   Component Value Date    TRIG 86 2018       Cardiac testing:   EKG reviewed personally:   Normal sinus rhythm, cannot rule out anterior infarct,  Results for orders placed in visit on 17   Echo complete with contrast if indicated    Narrative [facility]   [facility], [address] [place] [address]   [phone]     Transthoracic Echocardiogram   2D, M-mode, Doppler, and Color Doppler     Study date:  12-Dec-2017     Patient: Orene Duane   MR number: [id number]   Account number: [de-identified]   : 1932   Age: 80 years   Gender: Female   Status: Outpatient   Location: Echo lab   Height: 62 in   Weight: 140 lb   BP: 154/ 86 mmHg     Indications: LV function  Diagnoses: I10  - Essential (primary) hypertension     Sonographer:  ROSY Castañeda   Primary Physician:  Ngoc Mcclendon DO   Referring Physician:  Maria Del Rosario Jennings MD   Group:  [facility]   Interpreting Physician:  Osmar Valdovinos MD     SUMMARY     LEFT VENTRICLE:   Size was normal    Systolic function was normal  Ejection fraction was estimated to be 55 %     Wall thickness was normal    Doppler parameters were consistent with abnormal left ventricular relaxation (grade 1 diastolic dysfunction)  RIGHT VENTRICLE:   The size was normal    Systolic function was normal      MITRAL VALVE:   There was trace regurgitation  AORTIC VALVE:   There was trace regurgitation  TRICUSPID VALVE:   There was trace regurgitation  COMPARISONS:   Comparison was made with the previous study of 04-Mar-2015  Tricuspid regurgitation has improved  HISTORY: PRIOR HISTORY: Hypertension, s/p aortic aneurysm repair (2015), hyperlipdemia, atrial fibrillation     PROCEDURE: The procedure was performed in the echo lab  This was a routine study  The transthoracic approach was used  The study included complete 2D imaging, M-mode, complete spectral Doppler, and color Doppler  The heart rate was 75 bpm,   at the start of the study  Images were obtained from the parasternal, apical, subcostal, and suprasternal notch acoustic windows  Image quality was adequate  LEFT VENTRICLE: Size was normal  Systolic function was normal  Ejection fraction was estimated to be 55 %  There were no regional wall motion abnormalities  Wall thickness was normal  DOPPLER: Doppler parameters were consistent with   abnormal left ventricular relaxation (grade 1 diastolic dysfunction)  RIGHT VENTRICLE: The size was normal  Systolic function was normal  Wall thickness was normal      LEFT ATRIUM: Size was normal      RIGHT ATRIUM: Size was normal      MITRAL VALVE: Valve structure was normal  There was normal leaflet separation  DOPPLER: The transmitral velocity was within the normal range  There was no evidence for stenosis  There was trace regurgitation  AORTIC VALVE: The valve was trileaflet  Leaflets exhibited normal thickness and normal cuspal separation  DOPPLER: Transaortic velocity was within the normal range  There was no evidence for stenosis  There was trace regurgitation  TRICUSPID VALVE: The valve structure was normal  There was normal leaflet separation   DOPPLER: The transtricuspid velocity was within the normal range  There was no evidence for stenosis  There was trace regurgitation  The tricuspid jet   envelope definition was inadequate for estimation of RV systolic pressure  There are no indirect findings suggestive of moderate or severe pulmonary hypertension  PULMONIC VALVE: Leaflets exhibited normal thickness, no calcification, and normal cuspal separation  DOPPLER: The transpulmonic velocity was within the normal range  There was trace regurgitation  PERICARDIUM: There was no pericardial effusion  The pericardium was normal in appearance  AORTA: The root exhibited upper limit of normal size  SYSTEMIC VEINS: IVC: The inferior vena cava was normal in size and course   Respirophasic changes were normal      SYSTEM MEASUREMENT TABLES     2D   %FS: 42 45 %   Ao Diam: 3 76 cm   EDV(Teich): 94 35 ml   EF Biplane: 65 87 %   EF(Teich): 73 61 %   ESV(Teich): 24 9 ml   IVSd: 0 83 cm   LA Area: 14 89 cm2   LA Diam: 3 78 cm   LVEDV MOD A2C: 54 98 ml   LVEDV MOD A4C: 58 39 ml   LVEDV MOD BP: 57 51 ml   LVEF MOD A2C: 63 38 %   LVEF MOD A4C: 68 88 %   LVESV MOD A2C: 20 13 ml   LVESV MOD A4C: 18 17 ml   LVESV MOD BP: 19 63 ml   LVIDd: 4 54 cm   LVIDs: 2 61 cm   LVLd A2C: 6 72 cm   LVLd A4C: 6 95 cm   LVLs A2C: 5 79 cm   LVLs A4C: 5 47 cm   LVPWd: 0 81 cm   RA Area: 16 33 cm2   RVIDd: 3 35 cm   SV MOD A2C: 34 85 ml   SV MOD A4C: 40 22 ml   SV(Teich): 69 46 ml     CW   AR Dec [place]: 2 01 m/s2   AR Dec Time: 1666 08 ms   AR PHT: 483 16 ms   AR Vmax: 3 35 m/s   AR maxP 97 mmHg     MM   TAPSE: 2 54 cm     PW   MV A Bipin: 0 71 m/s   MV Dec [place]: 2 46 m/s2   MV DecT: 282 46 ms   MV E Bipin: 0 69 m/s   MV E/A Ratio: 0 98   MV PHT: 81 91 ms   MVA By PHT: 2 69 cm2     [employer] Accredited Echocardiography Laboratory     Prepared and electronically signed by     Lucero Yee MD   Signed 12-Dec-2017 16:08:33       Results for orders placed in visit on 01/19/15   Cardiac catheterization    Narrative [facility], [address] [facility]   Phone: [phone] CARDIOVASCULAR LAB COMPLETE REPORT         Patient: Aki Earl   Location: Outpatient   MR number: [id number]   Account number: [de-identified]   Study date: 2015   Gender: Female   : 1932   Height: 61 8 in   Weight:   BSA:   Allergies: NONSTEROIDAL ANTI-INFLAM  AGTS, SULFA, #Naproxen, #No Known Food   Agent, Iodinated Contrast Media - IV Dye   Diagnostic Cardiologist:  Dallas Sewell DO   Primary Physician:  Magnus Seip, DO   SUMMARY   CORONARY CIRCULATION:   Coronary angiography demonstrated minor luminal irregularities  CARDIAC STRUCTURES:   EF calculated by contrast ventriculography was 60 %  INDICATIONS:   --  cath pre asc aneurysm repair   PROCEDURES PERFORMED   --  Left heart catheterization with ventriculography  --  Left coronary angiography  --  Right coronary angiography  --  Outpatient  --  Coronary Catheterization (w/ LHC)  PROCEDURE: The risks and alternatives of the procedures and conscious sedation   were explained to the patient and informed consent was obtained  The patient   was brought to the cath lab and placed on the table  The planned puncture    sites   were prepped and draped in the usual sterile fashion  --  Right radial artery access  After performing an Jesus Manuel's test to verify   adequate ulnar artery supply to the hand, the radial site was prepped  The   puncture site was infiltrated with local anesthetic  The vessel was accessed   INVASIVE CARDIOVASCULAR LAB COMPLETE REPORT   Patient: Aki Earl   MR number: [id number]    ------ Page 2   BSA:   using the modified Seldinger technique, a wire was advanced into the vessel,   and a sheath was advanced over the wire into the vessel  --  Left heart catheterization with ventriculography  A catheter was advanced   over a guidewire into the ascending aorta   After recording ascending aortic pressure, the catheter was advanced across the aortic valve and left   ventricular pressure was recorded  Ventriculography was performed  The    catheter   was pulled back across the aortic valve and into the ascending aorta and   pullback pressures were obtained  --  Left coronary artery angiography  A catheter was advanced over a guidewire   into the aorta and positioned in the left coronary artery ostium under   fluoroscopic guidance  Angiography was performed  --  Right coronary artery angiography  A catheter was advanced over a    guidewire   into the aorta and positioned in the right coronary artery ostium under   fluoroscopic guidance  Angiography was performed  --  Outpatient  --  Coronary Catheterization (w/ LHC)  PROCEDURE COMPLETION: The patient tolerated the procedure well and was   discharged from the cath lab  TIMING: Test started at 08:32  Test concluded at   08:50  HEMOSTASIS: The sheath was removed  The site was compressed with a   Hemoband device  Hemostasis was obtained  MEDICATIONS GIVEN: Fentanyl    (1OOmcg/2   ml), 25 mcg, IV, at 08:39  Versed (2mg/2ml), 1 mg, IV, at 08:39  1% Lidocaine,   1 ml, subcutaneously, at 08:40  Verapamil (5mg/2ml), 2 5 mg, IV, at 08:42  Heparin 1000 units/ml, 4,000 units, IV, at 08:42  Nitroglycerin (200mcg/ml),   200 mcg, at 08:42  CONTRAST GIVEN: 50 ml Omnipaque (350mg I /ml)  RADIATION   EXPOSURE: Fluoroscopy time: 3 5 min  VENTRICLES:   --  There were no left ventricular global or regional wall    motion   abnormalities  EF calculated by contrast ventriculography was 60 %  CORONARY VESSELS:   --  The coronary circulation is right dominant  --  Coronary angiography demonstrated minor luminal irregularities  --  Left main: Angiography showed mild atherosclerosis  --  LAD: Angiography showed minor luminal irregularities  --  Circumflex: Angiography showed minor luminal irregularities     --  RCA: Angiography showed minor luminal irregularities  RECOMMENDATIONS   Asc Aneurysm repair surgery scheduled with CTS   Prepared and signed by   Shine Solorio DO   Signed 01/19/2015 09:07:06   INVASIVE CARDIOVASCULAR LAB COMPLETE REPORT   Patient: Nolan Hahn   MR number: [id number]    ------ Page 3   BSA:   Study diagram   Hemodynamic tables   Pressures:  Baseline   Pressures:  - HR: 104   Pressures:  - Rhythm:   Pressures:  -- Aortic Pressure (S/D/M): 113/60/88   Outputs:  Baseline   Outputs:  -- CALCULATIONS[age]   Outputs:  -- CALCULATIONS: Height in cm: 157 00   Outputs:  -- CALCULATIONS: Sex: Female          George Nj MD    Portions of the record may have been created with voice recognition software   Occasional wrong word or "sound a like" substitutions may have occurred due to the inherent limitations of voice recognition software   Read the chart carefully and recognize, using context, where substitutions have occurred

## 2018-05-23 NOTE — PATIENT INSTRUCTIONS
Stop amlodipine  Start Lotrel  If you developed a worsening cough, please call me and we can consider a change

## 2018-06-04 ENCOUNTER — APPOINTMENT (OUTPATIENT)
Dept: LAB | Facility: CLINIC | Age: 83
End: 2018-06-04
Payer: COMMERCIAL

## 2018-06-04 DIAGNOSIS — I10 HYPERTENSION, UNSPECIFIED TYPE: ICD-10-CM

## 2018-06-04 DIAGNOSIS — R79.89 ELEVATED TSH: ICD-10-CM

## 2018-06-04 LAB — TSH SERPL DL<=0.05 MIU/L-ACNC: 3.15 UIU/ML (ref 0.36–3.74)

## 2018-06-04 PROCEDURE — 36415 COLL VENOUS BLD VENIPUNCTURE: CPT

## 2018-06-04 PROCEDURE — 84443 ASSAY THYROID STIM HORMONE: CPT

## 2018-06-05 ENCOUNTER — TELEPHONE (OUTPATIENT)
Dept: INTERNAL MEDICINE CLINIC | Facility: CLINIC | Age: 83
End: 2018-06-05

## 2018-06-05 RX ORDER — AMLODIPINE BESYLATE AND BENAZEPRIL HYDROCHLORIDE 2.5; 1 MG/1; MG/1
1 CAPSULE ORAL DAILY
Qty: 90 CAPSULE | Refills: 3 | Status: SHIPPED | OUTPATIENT
Start: 2018-06-05 | End: 2018-06-13 | Stop reason: ALTCHOICE

## 2018-06-05 NOTE — TELEPHONE ENCOUNTER
Pt called in regarding her recent TSH labwork - you stated that her results were normal but she doesn't know if you want her to continue taking 125mcg or go back down to 100mcg - if you want her to continue taking 125mcg she will need a new script sent into her mail order pharmacy ([facility])

## 2018-06-06 DIAGNOSIS — E03.9 HYPOTHYROIDISM, UNSPECIFIED TYPE: Primary | ICD-10-CM

## 2018-06-06 RX ORDER — LEVOTHYROXINE SODIUM 0.12 MG/1
125 TABLET ORAL
Qty: 90 TABLET | Refills: 1 | Status: SHIPPED | OUTPATIENT
Start: 2018-06-06 | End: 2019-03-20 | Stop reason: SDUPTHER

## 2018-06-06 NOTE — TELEPHONE ENCOUNTER
The patient was taking Levothyroxine 125mcg  We will need to send a prescription to [address]  I will send to you for authorization

## 2018-06-13 ENCOUNTER — OFFICE VISIT (OUTPATIENT)
Dept: PULMONOLOGY | Facility: CLINIC | Age: 83
End: 2018-06-13
Payer: COMMERCIAL

## 2018-06-13 VITALS
TEMPERATURE: 99.1 F | SYSTOLIC BLOOD PRESSURE: 150 MMHG | HEIGHT: 62 IN | BODY MASS INDEX: 26.31 KG/M2 | OXYGEN SATURATION: 97 % | WEIGHT: 143 LBS | HEART RATE: 60 BPM | DIASTOLIC BLOOD PRESSURE: 72 MMHG

## 2018-06-13 DIAGNOSIS — R06.00 DYSPNEA ON EXERTION: Primary | ICD-10-CM

## 2018-06-13 DIAGNOSIS — R06.2 WHEEZING: ICD-10-CM

## 2018-06-13 DIAGNOSIS — J44.9 COPD WITH ASTHMA (HCC): ICD-10-CM

## 2018-06-13 PROCEDURE — 99215 OFFICE O/P EST HI 40 MIN: CPT | Performed by: INTERNAL MEDICINE

## 2018-06-13 PROCEDURE — 94010 BREATHING CAPACITY TEST: CPT | Performed by: INTERNAL MEDICINE

## 2018-06-13 PROCEDURE — 94618 PULMONARY STRESS TESTING: CPT | Performed by: INTERNAL MEDICINE

## 2018-06-13 NOTE — PROGRESS NOTES
Office Progress Note - Pulmonary    June A Weddermann 80 y o  female MRN: [id number]    Encounter: [id number]      Assessment:  · Dyspnea on exertion  · COPD/asthma overlap syndrome  Plan:   · Spirometry  · 6 minutes walk test   · Advair 250/50, 1 inhalation twice a day  · Albuterol rescue inhaler as needed  · Pulmonary rehab referral   · Follow-up in 3 months  Discussion:   The patient's dyspnea on exertion is most likely due to deconditioning  She did fairly well on 6 minutes walk test and fortunately she did not desaturate  His spirometry today showed no significant airflow limitation  I have referred her to Pulmonary Rehab  I have maintained her on the Advair 250/50, 1 inhalation twice a day  She will continue to use the rescue inhaler as needed  I will see her in the office in a follow-up visit in 3 months  Subjective: The patient is a 72-year-old woman who was last seen in April of 2015  She stated that she has been doing very well until about 6 months ago  She started having shortness of breath on exertion which has been worsening  The patient stated even minimal exertion inside her house can precipitate wheezing in frothy sputum  Usually she rests and starts her nebulizer for relief  She does get the symptoms at rest   She denied chest pain or palpitations  No dizziness  She has occasional cough  No sputum production  She denies fever or chills  She is using Advair 250/51 inhalation twice a day  She is using albuterol nebulizer treatments 3 to 4 times a day  She has no nocturnal symptoms  Review of systems:  A 12 point system review is done and aside from what is stated above the rest of the review of systems is negative  Family history and social history are reviewed  Medications list is reviewed  Vitals: Blood pressure 150/72, pulse 60, temperature 99 1 °F (37 3 °C), temperature source Tympanic, height 5' 2" (1 575 m), weight 64 9 kg (143 lb), SpO2 97 %  , Physical Exam  Gen: Awake, alert, oriented x 3, no acute distress  HEENT: Mucous membranes moist, no oral lesions, no thrush  NECK: No accessory muscle use, JVP not elevated  Cardiac: Regular, single S1, single S2, no murmurs, no rubs, no gallops  Lungs:  Bilateral basal crackles  Abdomen: normoactive bowel sounds, soft nontender, nondistended, no rebound or rigidity, no guarding  Extremities: no cyanosis, no clubbing, no edema  Neuro:  Grossly nonfocal   Skin:  No rash  Spirometry done today in the office and it showed normal airflow on vital capacity  A 6 minutes walk test was done today in the office and the patient started with a heart rate of 65 beats per minute and O2 saturation 96%  At the end of the test the heart rate increased to 102 beats per minute and the O2 saturation dropped to 93%

## 2018-06-15 DIAGNOSIS — I10 HYPERTENSION, UNSPECIFIED TYPE: Primary | ICD-10-CM

## 2018-06-16 RX ORDER — AMLODIPINE BESYLATE AND BENAZEPRIL HYDROCHLORIDE 2.5; 1 MG/1; MG/1
1 CAPSULE ORAL DAILY
Qty: 90 CAPSULE | Refills: 3 | Status: SHIPPED | OUTPATIENT
Start: 2018-06-16 | End: 2019-06-07 | Stop reason: SDUPTHER

## 2018-06-26 ENCOUNTER — CLINICAL SUPPORT (OUTPATIENT)
Dept: PULMONOLOGY | Age: 83
End: 2018-06-26

## 2018-06-26 VITALS — HEIGHT: 62 IN | WEIGHT: 140 LBS | BODY MASS INDEX: 25.76 KG/M2

## 2018-06-26 DIAGNOSIS — J44.9 COPD WITH ASTHMA (HCC): ICD-10-CM

## 2018-06-26 NOTE — PROGRESS NOTES
[facility]   6/4/1932     Risk: low     Pre Post % Change Goal   Date:    6/26/18      Physical       Sub Max ETT (mets)    10% increase   6MWT (feet) Total Distance (feet): 1240 feet   10% increase   UCSD Dyspnea Score UCSD SOB Total Score: 50   5 pt decrease   Supplemental O2 use (L)        [facility] [place] (est peak O2)        Peak exercise CR/SD (mets)    40% increase   Emotional       PHQ9 (> 10 refer to MD) PHQ-9 Total Score: 6     4 pt decrease   [place] (lower score = improvement)       Total Total: 25   < 27   Feelings Feelings: Slightly   < 3   Physical Fitness Physical Fitness: Light   < 3   Social Support Social Support: No, not at all   < 3   Daily Activities Daily Activity: A little bit of difficulty   < 3   Social Activities Social Activities: Slightly   < 3   Pain Pain: Very mild pain   < 3   Overall Health Overall Health: Good   < 3   Quality of Life Quality of Life: Pretty good   < 3   Change in Health Change in Health: about the same   < 3   Dietary       Rate your plate  61   > 58   Measurements       Weight Weight - Scale: 63 5 kg (140 lb)     2 5 - 5%   BMI 25 6      19 - 25   Waist Circ       < 36 M / < 35 F   % Body fat     < 25 M / < 33 F   BP left arm               (systolic) 365   < 502   (diastolic) 82   < 90   Smoking #/day  (if applicable)    0

## 2018-06-26 NOTE — PROGRESS NOTES
134  Pulmonary Rehabilitation Plan of Care   Care Plan       Today's date: 2018   Visits: initial visit for evaluation  Patient name: Andrade Grant      : 1932  Age: 80 y o  MRN: [id number]  Referring Physician: Nara Aguirre MD  Provider: [facility]  Clinician: Ilda Cook    Dx:   Encounter Diagnosis   Name Primary?  COPD with asthma [facility])      Date of onset: 18    COMMENTS:  Patient was seen by Dr Fabian Stovall on , and was recommended to begin [facility]  She is eager to feel more energy, as she was normally very active and has recently felt very sluggish, lacking energy and motivation  She is a non-smoker who once attended Cardiac rehab and is familiar with the exercises  She will begin on  and her appt with Dr Fabian Stovall will be appropriate for her initial face to face      Medication compliance: Yes   Comments: none  Fall Risk: Low   Comments: none    EXERCISE/ACTIVITY    Cardiopulmonary Goals:   Min: 30-50   HR:    RPE: 5-7 moderate to somewhat hard exercise   O2 sat: >90%    Modalities: Treadmill, UBE, NuStep and Recumbent bike  Strength trainin-3 days / week, 12-15 repitations  and 1-2 sets per modality    Modalities: Leg press, Chest press, Lateral raise, Arm curl and Seated Row    Exercise Progression: to begin on     RPE N/A  Home activity: little  Goals: home exercise days opposite [place] and improved DASI score  Education: Benefit of exercise and O2 saturation monitoring   Plan:home exercise target 30 mins, 2 days opposite [place] and Improved 6MW results  Readiness to change: Preparation    NUTRITION    Weight control:    Starting weight: 140 lb     Diabetes: N/A  Goals:Improved Rate Your Plate score  Education:More frequent meals, smaller portions  Plan: Education Video- Diet and Nutrition  Readiness to change: Action    PSYCHOSOCIAL    Emotional: depression and chronic disease  Social support: yes  Goals: improved [facility] QOL < 27  Education: Mental Health Education  Plan: Anxiety and Lung disease  Readiness to change: Preparation    OTHER CORE COMPONENTS     Tobacco:   History   Smoking Status    Former Smoker    Packs/day: 0 25    Years: 57 00    Types: Cigarettes    Start date: 36    Quit date: 12/25/2014   Smokeless Tobacco    Never Used     Comment: smoked 17 yrs 1/2 ppd quit and restarted then quit  10 days ago during Christmas 2014      Oxygen: room air  Blood pressure:    Resting:     Exercise:    Goals: consistent exercise >150 mins/wk  Education: Pulmonary Disease, physiology, Mental Health and Medication  Plan: Exercise benefits and Proper nutrition  Readiness to change: Action

## 2018-07-05 ENCOUNTER — APPOINTMENT (OUTPATIENT)
Dept: PULMONOLOGY | Age: 83
End: 2018-07-05
Payer: COMMERCIAL

## 2018-07-10 ENCOUNTER — CLINICAL SUPPORT (OUTPATIENT)
Dept: PULMONOLOGY | Age: 83
End: 2018-07-10
Payer: COMMERCIAL

## 2018-07-10 DIAGNOSIS — J44.9 CHRONIC OBSTRUCTIVE PULMONARY DISEASE, UNSPECIFIED COPD TYPE (HCC): ICD-10-CM

## 2018-07-10 PROCEDURE — G0424 PULMONARY REHAB W EXER: HCPCS

## 2018-07-12 ENCOUNTER — CLINICAL SUPPORT (OUTPATIENT)
Dept: PULMONOLOGY | Age: 83
End: 2018-07-12
Payer: COMMERCIAL

## 2018-07-12 DIAGNOSIS — J44.9 CHRONIC OBSTRUCTIVE PULMONARY DISEASE, UNSPECIFIED COPD TYPE (HCC): ICD-10-CM

## 2018-07-12 PROCEDURE — G0424 PULMONARY REHAB W EXER: HCPCS

## 2018-07-17 ENCOUNTER — CLINICAL SUPPORT (OUTPATIENT)
Dept: PULMONOLOGY | Age: 83
End: 2018-07-17
Payer: COMMERCIAL

## 2018-07-17 DIAGNOSIS — J44.9 CHRONIC OBSTRUCTIVE PULMONARY DISEASE, UNSPECIFIED COPD TYPE (HCC): ICD-10-CM

## 2018-07-17 PROCEDURE — G0424 PULMONARY REHAB W EXER: HCPCS

## 2018-07-19 ENCOUNTER — CLINICAL SUPPORT (OUTPATIENT)
Dept: PULMONOLOGY | Age: 83
End: 2018-07-19
Payer: COMMERCIAL

## 2018-07-19 DIAGNOSIS — J44.9 CHRONIC OBSTRUCTIVE PULMONARY DISEASE, UNSPECIFIED COPD TYPE (HCC): ICD-10-CM

## 2018-07-19 PROCEDURE — G0424 PULMONARY REHAB W EXER: HCPCS

## 2018-07-24 ENCOUNTER — CLINICAL SUPPORT (OUTPATIENT)
Dept: PULMONOLOGY | Age: 83
End: 2018-07-24
Payer: COMMERCIAL

## 2018-07-24 DIAGNOSIS — J44.9 CHRONIC OBSTRUCTIVE PULMONARY DISEASE, UNSPECIFIED COPD TYPE (HCC): ICD-10-CM

## 2018-07-24 PROCEDURE — G0424 PULMONARY REHAB W EXER: HCPCS

## 2018-07-26 ENCOUNTER — CLINICAL SUPPORT (OUTPATIENT)
Dept: PULMONOLOGY | Age: 83
End: 2018-07-26
Payer: COMMERCIAL

## 2018-07-26 DIAGNOSIS — J44.9 CHRONIC OBSTRUCTIVE PULMONARY DISEASE, UNSPECIFIED COPD TYPE (HCC): ICD-10-CM

## 2018-07-26 PROCEDURE — G0424 PULMONARY REHAB W EXER: HCPCS

## 2018-07-31 ENCOUNTER — CLINICAL SUPPORT (OUTPATIENT)
Dept: PULMONOLOGY | Age: 83
End: 2018-07-31
Payer: COMMERCIAL

## 2018-07-31 DIAGNOSIS — J44.9 CHRONIC OBSTRUCTIVE PULMONARY DISEASE, UNSPECIFIED COPD TYPE (HCC): ICD-10-CM

## 2018-07-31 PROCEDURE — G0424 PULMONARY REHAB W EXER: HCPCS

## 2018-08-02 ENCOUNTER — CLINICAL SUPPORT (OUTPATIENT)
Dept: PULMONOLOGY | Age: 83
End: 2018-08-02
Payer: COMMERCIAL

## 2018-08-02 DIAGNOSIS — J44.9 CHRONIC OBSTRUCTIVE PULMONARY DISEASE, UNSPECIFIED COPD TYPE (HCC): ICD-10-CM

## 2018-08-02 PROCEDURE — G0424 PULMONARY REHAB W EXER: HCPCS

## 2018-08-06 NOTE — PROGRESS NOTES
Pulmonary Rehabilitation Plan of Care   30 day review      Today's date: 2018   Visits: 8  Patient name: Leigh Riojas      : 1932  Age: 80 y o  MRN: [id number]  Referring Physician: Randell Rodriguez MD  Provider: Cintia Alfred  Clinician: Lucero Guerrero    Dx:   Encounter Diagnosis   Name Primary?  Chronic obstructive pulmonary disease, unspecified COPD type ([facility] )      Date of onset: 18    COMMENTS: Patient is doing very well in rehab and making progress  She was unsure how steady she would feel on the treadmill but has done very well  She is making improvements in strength and endurance and gives a good effort  Medication compliance: Yes   Comments: none  Fall Risk: Low   Comments: none    EXERCISE/ACTIVITY    Cardiopulmonary Goals:   Min: 30-50   HR:    RPE: 5-7 moderate to somewhat hard exercise   O2 sat: >90%    Modalities: Treadmill, UBE, NuStep and Recumbent bike  Strength trainin-3 days / week, 12-15 repitations  and 1-2 sets per modality    Modalities: Leg press, Chest press, Lateral raise, Arm curl and Seated Row    Exercise Progression: Treadmill for 15 min at speed of 1 5 and incline of 1;  Arm ergometer for 10 min at level 4; Nustep for 15 min at level 4, Cybex equipment for strength    RPE N/A  Home activity: little  Goals: home exercise days opposite [place] and improved DASI score  Education: Benefit of exercise and O2 saturation monitoring   Plan:home exercise target 30 mins, 2 days opposite [place] and Improved 6MW results  Readiness to change: Preparation    NUTRITION    Weight control:    Starting weight: 140 lb     Diabetes: N/A  Goals:Improved Rate Your Plate score  Education:More frequent meals, smaller portions  Plan: Education Video- Diet and Nutrition  Readiness to change: Action    PSYCHOSOCIAL    Emotional: depression and chronic disease  Social support: yes  Goals: improved [facility] QOL < 27  Education: Mental Health Education  Plan: Anxiety and Lung disease  Readiness to change: Preparation    OTHER CORE COMPONENTS     Tobacco:   History   Smoking Status    Former Smoker    Packs/day: 0 25    Years: 57 00    Types: Cigarettes    Start date: 36    Quit date: 12/25/2014   Smokeless Tobacco    Never Used     Comment: smoked 17 yrs 1/2 ppd quit and restarted then quit  10 days ago during Christmas 2014      Oxygen: room air  Blood pressure:    Resting: Resting BP: 129/54   Exercise: Recovery BP: 122/59  Goals: consistent exercise >150 mins/wk  Education: Pulmonary Disease, physiology, Mental Health and Medication  Plan: Exercise benefits and Proper nutrition  Readiness to change: Action

## 2018-08-07 ENCOUNTER — APPOINTMENT (OUTPATIENT)
Dept: PULMONOLOGY | Age: 83
End: 2018-08-07
Payer: COMMERCIAL

## 2018-08-08 ENCOUNTER — OFFICE VISIT (OUTPATIENT)
Dept: PULMONOLOGY | Facility: CLINIC | Age: 83
End: 2018-08-08

## 2018-08-08 DIAGNOSIS — J45.20 MILD INTERMITTENT ASTHMA WITHOUT COMPLICATION: Primary | ICD-10-CM

## 2018-08-08 PROCEDURE — RECHECK: Performed by: INTERNAL MEDICINE

## 2018-08-08 RX ORDER — ALBUTEROL SULFATE 90 UG/1
2 AEROSOL, METERED RESPIRATORY (INHALATION) EVERY 6 HOURS PRN
Qty: 1 INHALER | Refills: 0 | Status: SHIPPED | OUTPATIENT
Start: 2018-08-08

## 2018-08-08 NOTE — PROGRESS NOTES
Medical Director 30 day Pulmonary Rehabilitation Review    I certify that I have met with Buffy Mckenzie face-to face to provide a 30 day progress review of his/her pulmonary rehabilitation program at [address] [place] [address]  I have reviewed the most recent individualized treatment plan (ITP), outcomes assessment, and provided opportunity for discussion with the patient    Comments: Feeling well with rehab, has not started to exercise at home but was encouraged to do so  She did report she is slow to change exercises to limit balance issues    Continue with current treatment plan yes    Please provide the following modifications to the current treatment plan: N/A    Albuterol rescue inhaler refill was request by the patient and was filled      Reji Lopez,

## 2018-08-09 ENCOUNTER — APPOINTMENT (OUTPATIENT)
Dept: PULMONOLOGY | Age: 83
End: 2018-08-09
Payer: COMMERCIAL

## 2018-08-16 ENCOUNTER — CLINICAL SUPPORT (OUTPATIENT)
Dept: PULMONOLOGY | Age: 83
End: 2018-08-16
Payer: COMMERCIAL

## 2018-08-16 DIAGNOSIS — J44.9 CHRONIC OBSTRUCTIVE PULMONARY DISEASE, UNSPECIFIED COPD TYPE (HCC): ICD-10-CM

## 2018-08-16 PROCEDURE — G0424 PULMONARY REHAB W EXER: HCPCS

## 2018-08-21 ENCOUNTER — CLINICAL SUPPORT (OUTPATIENT)
Dept: PULMONOLOGY | Age: 83
End: 2018-08-21
Payer: COMMERCIAL

## 2018-08-21 DIAGNOSIS — J44.9 CHRONIC OBSTRUCTIVE PULMONARY DISEASE, UNSPECIFIED COPD TYPE (HCC): ICD-10-CM

## 2018-08-21 PROCEDURE — G0424 PULMONARY REHAB W EXER: HCPCS

## 2018-08-23 ENCOUNTER — APPOINTMENT (OUTPATIENT)
Dept: PULMONOLOGY | Age: 83
End: 2018-08-23
Payer: COMMERCIAL

## 2018-08-28 ENCOUNTER — CLINICAL SUPPORT (OUTPATIENT)
Dept: PULMONOLOGY | Age: 83
End: 2018-08-28
Payer: COMMERCIAL

## 2018-08-28 DIAGNOSIS — J44.9 CHRONIC OBSTRUCTIVE PULMONARY DISEASE, UNSPECIFIED COPD TYPE (HCC): ICD-10-CM

## 2018-08-28 PROCEDURE — G0424 PULMONARY REHAB W EXER: HCPCS

## 2018-08-30 ENCOUNTER — CLINICAL SUPPORT (OUTPATIENT)
Dept: PULMONOLOGY | Age: 83
End: 2018-08-30
Payer: COMMERCIAL

## 2018-08-30 DIAGNOSIS — J44.9 CHRONIC OBSTRUCTIVE PULMONARY DISEASE, UNSPECIFIED COPD TYPE (HCC): ICD-10-CM

## 2018-08-30 PROCEDURE — G0424 PULMONARY REHAB W EXER: HCPCS

## 2018-09-04 ENCOUNTER — CLINICAL SUPPORT (OUTPATIENT)
Dept: PULMONOLOGY | Age: 83
End: 2018-09-04
Payer: COMMERCIAL

## 2018-09-04 DIAGNOSIS — J44.9 CHRONIC OBSTRUCTIVE PULMONARY DISEASE, UNSPECIFIED COPD TYPE (HCC): ICD-10-CM

## 2018-09-04 PROCEDURE — G0424 PULMONARY REHAB W EXER: HCPCS

## 2018-09-06 ENCOUNTER — CLINICAL SUPPORT (OUTPATIENT)
Dept: PULMONOLOGY | Age: 83
End: 2018-09-06
Payer: COMMERCIAL

## 2018-09-06 DIAGNOSIS — J44.9 CHRONIC OBSTRUCTIVE PULMONARY DISEASE, UNSPECIFIED COPD TYPE (HCC): ICD-10-CM

## 2018-09-06 PROCEDURE — G0424 PULMONARY REHAB W EXER: HCPCS

## 2018-09-06 NOTE — PROGRESS NOTES
Pulmonary Rehabilitation Plan of Care   60 day review      Today's date: 2018   Visits: 14  Patient name: Marquis Schuster      : 1932  Age: 80 y o  MRN: [id number]  Referring Physician: Vinny Og MD  Provider: [facility]  Clinician: Brandon Box    Dx:   Encounter Diagnosis   Name Primary?  Chronic obstructive pulmonary disease, unspecified COPD type ([facility] )      Date of onset: 18    COMMENTS: Patient is doing very well in rehab and making progress  She is making improvements in strength and endurance and continues to give a good effort  We tried the Life Cycle today and she felt her knees ache  We will try the recumbent bike next week  Medication compliance: Yes   Comments: none  Fall Risk: Low   Comments: none    EXERCISE/ACTIVITY    Cardiopulmonary Goals:   Min: 30-50   HR:    RPE: 5-7 moderate to somewhat hard exercise   O2 sat: >90%    Modalities: Treadmill, UBE, NuStep and Recumbent bike  Strength trainin-3 days / week, 12-15 repitations  and 1-2 sets per modality    Modalities: Leg press, Chest press, Lateral raise, Arm curl and Seated Row    Exercise Progression: Treadmill for 15 min at speed of 1 7 and incline of 1;  Arm ergometer for 10 min at level 4; Nustep/bike for 15 min at level 4/2, Cybex equipment for strength    RPE N/A  Home activity: little  Goals: home exercise days opposite [place] and improved DASI score  Education: Benefit of exercise and O2 saturation monitoring   Plan:home exercise target 30 mins, 2 days opposite [place] and Improved 6MW results  Readiness to change: Action    NUTRITION    Weight control:    Starting weight: 140 lb     Diabetes: N/A  Goals:Improved Rate Your Plate score  Education:More frequent meals, smaller portions  Plan: Education Video- Diet and Nutrition  Readiness to change: Action    PSYCHOSOCIAL    Emotional: depression and chronic disease  Social support: yes  Goals: improved [facility] QOL < 27  Education: Mental Health Education  Plan: Anxiety and Lung disease  Readiness to change: Action    OTHER CORE COMPONENTS     Tobacco:   History   Smoking Status    Former Smoker    Packs/day: 0 25    Years: 57 00    Types: Cigarettes    Start date: 36    Quit date: 12/25/2014   Smokeless Tobacco    Never Used     Comment: smoked 17 yrs 1/2 ppd quit and restarted then quit  10 days ago during Christmas 2014      Oxygen: room air  Blood pressure:    Resting: Resting BP: 139/72   Exercise: Recovery BP: 98/51  Goals: consistent exercise >150 mins/wk  Education: Pulmonary Disease, physiology, Mental Health and Medication  Plan: Exercise benefits and Proper nutrition  Readiness to change: Action

## 2018-09-11 ENCOUNTER — CLINICAL SUPPORT (OUTPATIENT)
Dept: PULMONOLOGY | Age: 83
End: 2018-09-11
Payer: COMMERCIAL

## 2018-09-11 DIAGNOSIS — J44.9 CHRONIC OBSTRUCTIVE PULMONARY DISEASE, UNSPECIFIED COPD TYPE (HCC): ICD-10-CM

## 2018-09-11 PROCEDURE — G0424 PULMONARY REHAB W EXER: HCPCS

## 2018-09-13 ENCOUNTER — OFFICE VISIT (OUTPATIENT)
Dept: PULMONOLOGY | Facility: CLINIC | Age: 83
End: 2018-09-13
Payer: COMMERCIAL

## 2018-09-13 ENCOUNTER — APPOINTMENT (OUTPATIENT)
Dept: PULMONOLOGY | Age: 83
End: 2018-09-13
Payer: COMMERCIAL

## 2018-09-13 VITALS
OXYGEN SATURATION: 94 % | WEIGHT: 134 LBS | TEMPERATURE: 97.9 F | BODY MASS INDEX: 24.66 KG/M2 | SYSTOLIC BLOOD PRESSURE: 150 MMHG | DIASTOLIC BLOOD PRESSURE: 70 MMHG | HEIGHT: 62 IN | HEART RATE: 63 BPM

## 2018-09-13 DIAGNOSIS — R06.00 DOE (DYSPNEA ON EXERTION): ICD-10-CM

## 2018-09-13 DIAGNOSIS — J44.9 ASTHMA-COPD OVERLAP SYNDROME (HCC): Primary | ICD-10-CM

## 2018-09-13 PROCEDURE — 99213 OFFICE O/P EST LOW 20 MIN: CPT | Performed by: INTERNAL MEDICINE

## 2018-09-13 NOTE — PROGRESS NOTES
Office Progress Note - Pulmonary    June A Weddermann 80 y o  female MRN: [id number]    Encounter: [id number]      Assessment:  · Dyspnea on exertion  · COPD/asthma overlap syndrome  Plan:   · Albuterol nebulizer treatments as needed  · Albuterol rescue inhaler as needed  · Discontinue Advair  · Continue pulmonary rehab  · Follow-up in 6 months  Discussion:   The patient's dyspnea on exertion is mainly due to deconditioning  It is improving with the pulmonary rehab  Have recommended to the patient to join a gym and she is going to do that  Hopefully she will be able to maintain her stamina after she is done with the pulmonary rehab sessions  She has been off the Advair and she would would like to remain off it  She stated that albuterol rescue inhaler usually gives her headache but she will be willing to use it if she has need for it outside the house  Otherwise she prefers to use the albuterol nebulizer as needed  The I have approved that plan for her  I reminded her to take the influenza vaccine later this month  I will see her in 6 months in a follow-up visit  Subjective: The patient is here for a follow-up visit  She has been doing the pulmonary rehab  She is able to do it up to 15 minutes on the treadmill with slight incline  She denies any significant cough, wheezing or sputum production  She has stopped using the Advair  She is using albuterol rescue inhaler and other times albuterol nebulizer as needed  She has no nocturnal symptoms  Review of systems:  A 12 point system review is done and aside from what is stated above the rest of the review of systems is negative  Family history and social history are reviewed  Medications list is reviewed  Vitals: Blood pressure 150/70, pulse 63, temperature 97 9 °F (36 6 °C), temperature source Tympanic, height 5' 2" (1 575 m), weight 60 8 kg (134 lb), SpO2 94 %  ,     Physical Exam  Gen: Awake, alert, oriented x 3, no acute distress  HEENT: Mucous membranes moist, no oral lesions, no thrush  NECK: No accessory muscle use, JVP not elevated  Cardiac: Regular, single S1, single S2, no murmurs, no rubs, no gallops  Lungs:     Normal breath sounds  No wheezing or rhonchi  Abdomen: normoactive bowel sounds, soft nontender, nondistended, no rebound or rigidity, no guarding  Extremities: no cyanosis, no clubbing, no edema  Neuro:  Grossly nonfocal   Skin:  No rash

## 2018-09-18 ENCOUNTER — CLINICAL SUPPORT (OUTPATIENT)
Dept: PULMONOLOGY | Age: 83
End: 2018-09-18
Payer: COMMERCIAL

## 2018-09-18 DIAGNOSIS — J44.9 OBSTRUCTIVE CHRONIC BRONCHITIS WITHOUT EXACERBATION (HCC): ICD-10-CM

## 2018-09-18 PROCEDURE — G0424 PULMONARY REHAB W EXER: HCPCS

## 2018-09-20 ENCOUNTER — CLINICAL SUPPORT (OUTPATIENT)
Dept: PULMONOLOGY | Age: 83
End: 2018-09-20
Payer: COMMERCIAL

## 2018-09-20 DIAGNOSIS — J44.9 OBSTRUCTIVE CHRONIC BRONCHITIS WITHOUT EXACERBATION (HCC): ICD-10-CM

## 2018-09-20 PROCEDURE — G0424 PULMONARY REHAB W EXER: HCPCS

## 2018-09-25 ENCOUNTER — APPOINTMENT (OUTPATIENT)
Dept: PULMONOLOGY | Age: 83
End: 2018-09-25
Payer: COMMERCIAL

## 2018-09-27 ENCOUNTER — CLINICAL SUPPORT (OUTPATIENT)
Dept: PULMONOLOGY | Age: 83
End: 2018-09-27
Payer: COMMERCIAL

## 2018-09-27 DIAGNOSIS — J44.9 CHRONIC OBSTRUCTIVE PULMONARY DISEASE, UNSPECIFIED COPD TYPE (HCC): ICD-10-CM

## 2018-09-27 PROCEDURE — G0424 PULMONARY REHAB W EXER: HCPCS

## 2018-10-02 ENCOUNTER — CLINICAL SUPPORT (OUTPATIENT)
Dept: PULMONOLOGY | Age: 83
End: 2018-10-02
Payer: COMMERCIAL

## 2018-10-02 DIAGNOSIS — J44.9 CHRONIC OBSTRUCTIVE PULMONARY DISEASE, UNSPECIFIED COPD TYPE (HCC): ICD-10-CM

## 2018-10-02 PROCEDURE — G0424 PULMONARY REHAB W EXER: HCPCS

## 2018-10-04 ENCOUNTER — CLINICAL SUPPORT (OUTPATIENT)
Dept: PULMONOLOGY | Age: 83
End: 2018-10-04
Payer: COMMERCIAL

## 2018-10-04 DIAGNOSIS — J44.9 CHRONIC OBSTRUCTIVE PULMONARY DISEASE, UNSPECIFIED COPD TYPE (HCC): ICD-10-CM

## 2018-10-04 PROCEDURE — G0424 PULMONARY REHAB W EXER: HCPCS

## 2018-10-09 ENCOUNTER — APPOINTMENT (OUTPATIENT)
Dept: PULMONOLOGY | Age: 83
End: 2018-10-09
Payer: COMMERCIAL

## 2018-10-10 ENCOUNTER — OFFICE VISIT (OUTPATIENT)
Dept: PULMONOLOGY | Facility: CLINIC | Age: 83
End: 2018-10-10

## 2018-10-10 DIAGNOSIS — R06.02 SHORTNESS OF BREATH: Primary | ICD-10-CM

## 2018-10-10 PROCEDURE — RECHECK: Performed by: INTERNAL MEDICINE

## 2018-10-10 NOTE — PROGRESS NOTES
Medical Director 30 day Pulmonary Rehabilitation Review    I certify that I have met with Buffy Li face-to face to provide a 30 day progress review of his/her pulmonary rehabilitation program at [address]  I have reviewed the most recent individualized treatment plan (ITP), outcomes assessment, and provided opportunity for discussion with the patient    Comments:Doing well  Plans to join gym when training completed      Continue with current treatment plan yes    Please provide the following modifications to the current treatment plan: Marychuy Villarreal MD

## 2018-10-11 ENCOUNTER — CLINICAL SUPPORT (OUTPATIENT)
Dept: PULMONOLOGY | Age: 83
End: 2018-10-11
Payer: COMMERCIAL

## 2018-10-11 DIAGNOSIS — J44.9 CHRONIC OBSTRUCTIVE PULMONARY DISEASE, UNSPECIFIED COPD TYPE (HCC): ICD-10-CM

## 2018-10-11 PROCEDURE — G0424 PULMONARY REHAB W EXER: HCPCS

## 2018-10-16 ENCOUNTER — CLINICAL SUPPORT (OUTPATIENT)
Dept: PULMONOLOGY | Age: 83
End: 2018-10-16
Payer: COMMERCIAL

## 2018-10-16 DIAGNOSIS — J44.9 OBSTRUCTIVE CHRONIC BRONCHITIS WITHOUT EXACERBATION (HCC): ICD-10-CM

## 2018-10-16 PROCEDURE — G0424 PULMONARY REHAB W EXER: HCPCS

## 2018-10-16 NOTE — PROGRESS NOTES
[facility]   6/4/1932     Risk: low     Pre Post % Change Goal   Date: 6/26/18    10/16/18     Physical       Sub Max ETT (mets)    10% increase   6MWT (feet) 1240 feet Total Distance (feet): 1300 feet  10% increase   UCSD Dyspnea Score 50 UCSD SOB Total Score: 47  5 pt decrease   Supplemental O2 use (L)        [facility] (est peak O2) CAT 23       Peak exercise CR/NV (mets)    40% increase   Emotional       PHQ9 (> 10 refer to MD) 6 PHQ-9 Total Score: 6    4 pt decrease   [place] (lower score = improvement)       Total 2 Total: 22  < 27   Feelings 2 Feelings: Slightly  < 3   Physical Fitness 4 Physical Fitness: Heavy  < 3   Social Support 2 Social Support: Yes, some  < 3   Daily Activities 2 Daily Activity: Some difficulty  < 3   Social Activities 5 Social Activities: Moderately  < 3   Pain 2 Pain: Very mild pain  < 3   Overall Health 3 Overall Health: Good  < 3   Quality of Life 2 Quality of Life: Pretty good  < 3   Change in Health 3 Change in Health: A little better  < 3   Dietary       Rate your plate 61  65  > 58   Measurements       Weight 140 lb    131 lb  2 5 - 5%   BMI 25 6  25 6  19 - 25   Waist Circ       < 36 M / < 35 F   % Body fat     < 25 M / < 33 F   BP left arm               (systolic) 264 246  < 598   (diastolic) 65 69  < 90   Smoking #/day  (if applicable)    0

## 2018-10-16 NOTE — PROGRESS NOTES
Pulmonary Rehabilitation Plan of Care   completion  review      Today's date: 10/16/2018   Visits: 22  Patient name: Wil Zafar      : 1932  Age: 80 y o  MRN: [id number]  Referring Physician: Harini Toscano MD  Provider: [facility]  Clinician: Treasure Cranker    Dx:   Encounter Diagnosis   Name Primary?  Obstructive chronic bronchitis without exacerbation [facility])      Date of onset: 18    COMMENTS: Patient has done well in Pulmonary Rehab and will continue to exercise  She just joined a gym  Her 6 min walk test showed improvement in distance walked   60 additional feet with a quicker step  She was winded after the test but recovered quickly  ADDENDUM: Weight change: 131 lbs from 140 lbs    Medication compliance: Yes   Comments: none  Fall Risk: Low   Comments: none    EXERCISE/ACTIVITY    Cardiopulmonary Goals:   Min: 30-50   HR:    RPE: 5-7 moderate to somewhat hard exercise   O2 sat: >90%    Modalities: Treadmill, UBE, NuStep and Recumbent bike  Strength trainin-3 days / week, 12-15 repitations  and 1-2 sets per modality    Modalities: Leg press, Chest press, Lateral raise, Arm curl and Seated Row    Exercise Progression: Treadmill for 15 min at speed of 2 5 and incline of 1;  Arm ergometer for 10 min at level 4; Nustep/bike for 15 min at level 5, Cybex equipment for strength    RPE 5  Home activity: yes  Goals: home exercise days opposite [place] and improved DASI score  Education: Benefit of exercise and O2 saturation monitoring   Plan:home exercise target 30 mins, 2 days opposite [place] and Improved 6MW results  Readiness to change: Action    NUTRITION    Weight control:    Starting weight: 140 lb     Diabetes: N/A  Goals:Improved Rate Your Plate score  Education:More frequent meals, smaller portions  Plan: Education Video- Diet and Nutrition  Readiness to change: Action    PSYCHOSOCIAL    Emotional: depression and chronic disease  Social support: yes  Goals: improved [facility] QOL < 27  Education: Mental Health Education  Plan: Anxiety and Lung disease  Readiness to change: Action    OTHER CORE COMPONENTS     Tobacco:   History   Smoking Status    Former Smoker    Packs/day: 0 25    Years: 57 00    Types: Cigarettes    Start date: 36    Quit date: 12/25/2014   Smokeless Tobacco    Never Used     Comment: smoked 17 yrs 1/2 ppd quit and restarted then quit  10 days ago during Christmas 2014      Oxygen: room air  Blood pressure:    Resting: Resting BP: 151/59   Exercise: Recovery BP: 149/59  Goals: consistent exercise >150 mins/wk  Education: Pulmonary Disease, physiology, Mental Health and Medication, Nutrition, Caregivers, exercise  Plan: Exercise benefits and Proper nutrition  Readiness to change: Action

## 2018-10-18 ENCOUNTER — CLINICAL SUPPORT (OUTPATIENT)
Dept: PULMONOLOGY | Age: 83
End: 2018-10-18
Payer: COMMERCIAL

## 2018-10-18 DIAGNOSIS — J44.9 CHRONIC OBSTRUCTIVE PULMONARY DISEASE, UNSPECIFIED COPD TYPE (HCC): ICD-10-CM

## 2018-10-18 PROCEDURE — G0424 PULMONARY REHAB W EXER: HCPCS

## 2018-10-23 ENCOUNTER — APPOINTMENT (OUTPATIENT)
Dept: PULMONOLOGY | Age: 83
End: 2018-10-23
Payer: COMMERCIAL

## 2018-11-13 ENCOUNTER — OFFICE VISIT (OUTPATIENT)
Dept: INTERNAL MEDICINE CLINIC | Facility: CLINIC | Age: 83
End: 2018-11-13
Payer: COMMERCIAL

## 2018-11-13 VITALS
HEIGHT: 62 IN | SYSTOLIC BLOOD PRESSURE: 124 MMHG | OXYGEN SATURATION: 97 % | RESPIRATION RATE: 16 BRPM | BODY MASS INDEX: 24.29 KG/M2 | WEIGHT: 132 LBS | DIASTOLIC BLOOD PRESSURE: 62 MMHG | HEART RATE: 66 BPM

## 2018-11-13 DIAGNOSIS — E03.8 HYPOTHYROIDISM DUE TO HASHIMOTO'S THYROIDITIS: ICD-10-CM

## 2018-11-13 DIAGNOSIS — H35.3290 EXUDATIVE AGE-RELATED MACULAR DEGENERATION, UNSPECIFIED LATERALITY, UNSPECIFIED STAGE (HCC): ICD-10-CM

## 2018-11-13 DIAGNOSIS — Z23 NEED FOR INFLUENZA VACCINATION: ICD-10-CM

## 2018-11-13 DIAGNOSIS — E78.5 HYPERLIPIDEMIA, UNSPECIFIED HYPERLIPIDEMIA TYPE: ICD-10-CM

## 2018-11-13 DIAGNOSIS — C05.0 MALIGNANT NEOPLASM OF HARD PALATE (HCC): Primary | ICD-10-CM

## 2018-11-13 DIAGNOSIS — J45.20 MILD INTERMITTENT ASTHMA WITHOUT COMPLICATION: ICD-10-CM

## 2018-11-13 DIAGNOSIS — I10 ESSENTIAL HYPERTENSION: ICD-10-CM

## 2018-11-13 DIAGNOSIS — E06.3 HYPOTHYROIDISM DUE TO HASHIMOTO'S THYROIDITIS: ICD-10-CM

## 2018-11-13 PROCEDURE — 99214 OFFICE O/P EST MOD 30 MIN: CPT | Performed by: INTERNAL MEDICINE

## 2018-11-13 PROCEDURE — 90662 IIV NO PRSV INCREASED AG IM: CPT

## 2018-11-13 PROCEDURE — 1036F TOBACCO NON-USER: CPT | Performed by: INTERNAL MEDICINE

## 2018-11-13 PROCEDURE — G0008 ADMIN INFLUENZA VIRUS VAC: HCPCS

## 2018-11-13 RX ORDER — GUAIFENESIN 600 MG
600 TABLET, EXTENDED RELEASE 12 HR ORAL EVERY 12 HOURS SCHEDULED
Qty: 60 TABLET | Refills: 0 | Status: SHIPPED | OUTPATIENT
Start: 2018-11-13 | End: 2020-02-10

## 2018-11-13 NOTE — PROGRESS NOTES
Assessment/Plan:           Problem List Items Addressed This Visit        Digestive    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[facility] [address] ) - Primary     Clinically stable and doing well she has now had her condition for 3 years she had opted for no chemo and no radiation and has been stable she will continue follow-up with Hematology            Endocrine    Hypothyroidism due to Hashimoto's thyroiditis     Hypothyroidism controlled the patient is currently euthyroid I will be ordering a TSH prior to the next office visit and the patient will continue with current medical regiment; we will continue to monitor the patient's progress  Relevant Orders    TSH, 3rd generation       Respiratory    Asthma     Asthma is currently stable continue with current medical regiment will continue monitor  Relevant Medications    guaiFENesin (MUCINEX) 600 mg 12 hr tablet    Other Relevant Orders    influenza vaccine, [id number], high-dose, PF 0 5 mL, for patients 65 yr+ (FLUZONE HIGH-DOSE) (Completed)    TSH, 3rd generation       Cardiovascular and Mediastinum    Essential hypertension     Hypertension - controlled, I have counseled patient following healthy balance diet, I would like the patient reduce sodium, exercise routinely, I would like the patient continued the med current medical regiment and we will continue to monitor  Other    Hyperlipidemia     Hyperlipidemia controlled continue with current medical regiment recommend a low-cholesterol diet and recommend routine exercise we will continue to monitor the progress           Relevant Orders    Comprehensive metabolic panel    Lipid Panel with Direct LDL reflex    Macular degeneration, wet ([facility] [address] )     She will continue follow-up with Ophthalmology clinically she is stable           Other Visit Diagnoses     Need for influenza vaccination        Relevant Orders    influenza vaccine, [id number], high-dose, PF 0 5 mL, for patients 65 yr+ (FLUZONE HIGH-DOSE) (Completed) Return to office 6  months  call if any problems  Subjective:      Patient ID: Buffy Miranda is a 80 y o  female  HPI [id number] old female coming in for a follow up office visit regarding neoplasm of the hard palate, hypothyroidism, mild intermittent asthma, hyperlipidemia, essential hypertension; The patient reports me compliant taking medications without untoward side effects the  The patient is here to review his medical condition, update me on the medical condition and the patient reports me no hospitalizations and no ER visits  she reports me she has completed the pulmonary rehab program and has now joined the gym; she does report me her lungs feel good for about 10 minutes he has seen significant improvements in her breathing able to walk for about 15 minutes now without having to stop down and be sure breath and wheezing  The pt reports she gets a lot of mucous and until she brings up the mucous the asthma wont improve     The following portions of the patient's history were reviewed and updated as appropriate: allergies, current medications, past family history, past medical history, past social history, past surgical history and problem list     Review of Systems   Constitutional: Negative for activity change, appetite change and unexpected weight change  HENT: Negative for congestion and postnasal drip  Eyes: Negative for visual disturbance  Respiratory: Negative for cough and shortness of breath  Cardiovascular: Negative for chest pain  Gastrointestinal: Negative for abdominal pain, diarrhea, nausea and vomiting  Neurological: Negative for dizziness, light-headedness and headaches  Hematological: Negative for adenopathy           Objective:      /62 (BP Location: Left arm, Patient Position: Sitting, Cuff Size: Standard)   Pulse 66   Resp 16   Ht 5' 2" (1 575 m)   Wt 59 9 kg (132 lb)   SpO2 97%   BMI 24 14 kg/m²                     No Follow-up on file       Allergies   Allergen Reactions    Amiodarone Hives    Keflex [Cephalexin] Hives    Omnipaque [Iohexol] Hives and Shortness Of Breath    Clindamycin Other (See Comments)     When taken causes cdiff immediately    Sulfa Antibiotics Hives     itching    Iv Dye  [Iodinated Diagnostic Agents] Hypertension and Palpitations     Annotation - [id number]: Verified with patient     Naprosyn [Naproxen] Itching and Rash     Category: Allergy; Past Medical History:   Diagnosis Date    A-fib [facility])     Allergy to IVP dye 06/04/2013    pt felt heaviness, palpitations    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), wet ([address] )     bilateral    Aneurysm of aortic root ([facility] [address] )     last assessed - [id number]    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HCC)     Aortic root dilatation (HCC)     last assessed - [id number]    Arthritis     Ascending aortic aneurysm (HCC)     last assessed - [id number]    Asthma     Basal cell carcinoma     last assessed - 53Szi8111    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[facility] [address] )     Disease of thyroid gland     Facet arthropathy, cervical     last assessed - [id number]    History of fracture of vertebral column     Hyperlipidemia     Hypertension     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[facility] [address] )     Hypoxia     last assessed - [id number]    Junctional rhythm     last assessed - 88Lsv3349    Lightheadedness     last assessed - 22Fgg2294    Migraine     Palpitations     last assessed - 37Jtn1203    Pleural effusion, bilateral     last assessed - 41Bqa6387    Shortness of breath     last assessed - 64Khm6112    Thyroid trouble     Trigger finger     last assessed - 44Frz3120    Trigger middle finger of right hand     last assessed - 00Ehg8739    Urinary incontinence     last assessed -  22Jul,2013; Resolved - [id number]     Past Surgical History:   Procedure Laterality Date    ABDOMINAL AORTIC ANEURYSM REPAIR W/ ENDOLUMINAL GRAFT  06/28/2015    Ascending aorta and hemiarch replacement with 30 mm Vascutek Gelweave graft;  Managed by Antonio Baez; last assessed - 49UGV1629    APPENDECTOMY      BLADDER SURGERY      Reccurent histoy of bladder surgery    [facility] YAZOO PROCEDURE      CARDIAC CATHETERIZATION  2004    Procedure summary - Luminal irregularities; last assessed - 52RQV4945     SECTION      CORONARY ANEURYSM REPAIR      CYSTOSCOPY      botox injection    HYSTERECTOMY      KS ENDOSCOPIC INJECTION/IMPLANT N/A 2017    Procedure: CYSTOSCOPY; DURASPHERE-PERIURETHRAL BULKING AGENT INJECTION ;  Surgeon: Anu Edwards MD;  [facility]: [facility];  Service: Urology    KS INCISE FINGER TENDON SHEATH Right 10/18/2016    Procedure: LONG FINGER TRIGGER RELEASE ;  Surgeon: Alejandrina Mccain MD;  Location: [facility];  Service: Orthopedics    THYROIDECTOMY      Total Thyroidectomy    TONSILLECTOMY AND ADENOIDECTOMY       Current Outpatient Prescriptions on File Prior to Visit   Medication Sig Dispense Refill    acetaminophen (TYLENOL) 500 mg tablet Take 500 mg by mouth every 6 (six) hours as needed for mild pain      albuterol (2 5 mg/3 mL) 0 083 % nebulizer solution Take 3 mL (2 5 mg total) by nebulization 4 (four) times a day 50 vial 2    albuterol (PROVENTIL HFA,VENTOLIN HFA) 90 mcg/act inhaler Inhale 2 puffs every 6 (six) hours as needed for wheezing 1 Inhaler 0    amLODIPine-benazepril (LOTREL 2 5-10) 2 5-10 MG per capsule Take 1 capsule by mouth daily 90 capsule 3    aspirin 81 MG tablet Take 162 mg by mouth daily in the early morning        calcium carbonate (OS-TAE) 600 MG tablet Take 600 mg by mouth daily in the early morning        levothyroxine 125 mcg tablet Take 1 tablet (125 mcg total) by mouth daily in the early morning 90 tablet 1    lovastatin (MEVACOR) 20 mg tablet Take 20 mg by mouth daily at bedtime      metoprolol tartrate (LOPRESSOR) 25 mg tablet Take 1 tablet (25 mg total) by mouth 2 (two) times a day 180 tablet 3    Multiple Vitamin (MULTIVITAMIN) tablet Take 1 tablet by mouth daily in the early morning         No current facility-administered medications on file prior to visit  Family History   Problem Relation Age of Onset    Coronary aneurysm Sister     Coronary artery disease Sister         CABG    Heart disease Family         cardiac disorder    Hypertension Family     Cancer Family     Thyroid disease Family      Social History     Social History    Marital status:      Spouse name: N/A    Number of children: N/A    Years of education: N/A     Occupational History    Not on file  Social History Main Topics    Smoking status: Former Smoker     Packs/day: 0 25     Years: 57 00     Types: Cigarettes     Start date: 1957     Quit date: 12/25/2014    Smokeless tobacco: Never Used      Comment: smoked 17 yrs 1/2 ppd quit and restarted then quit  10 days ago during Christmas 2014     Alcohol use No      Comment: Social drinker & never drang alcohol both documented in [employer]    Drug use: No    Sexual activity: Not Currently     Other Topics Concern    Not on file     Social History Narrative    No narrative on file     Vitals:    11/13/18 1333   BP: 124/62   BP Location: Left arm   Patient Position: Sitting   Cuff Size: Standard   Pulse: 66   Resp: 16   SpO2: 97%   Weight: 59 9 kg (132 lb)   Height: 5' 2" (1 575 m)     Results for orders placed or performed in visit on 06/04/18   TSH, 3rd generation   Result Value Ref Range    TSH 3RD GENERATON 3 150 0 358 - 3 740 uIU/mL     Weight (last 2 days)     Date/Time   Weight    11/13/18 1333  59 9 (132)            Body mass index is 24 14 kg/m²  BP      Temp      Pulse     Resp      SpO2        Vitals:    11/13/18 1333   Weight: 59 9 kg (132 lb)     Vitals:    11/13/18 1333   Weight: 59 9 kg (132 lb)        Physical Exam   Constitutional: She appears well-developed and well-nourished  HENT:   Head: Normocephalic  Mouth/Throat: Oropharynx is clear and moist    Eyes: Pupils are equal, round, and reactive to light  Conjunctivae are normal  Right eye exhibits no discharge  Left eye exhibits no discharge  No scleral icterus  Neck: Neck supple  Cardiovascular: Normal rate, regular rhythm, normal heart sounds and intact distal pulses  Exam reveals no gallop and no friction rub  No murmur heard  Pulmonary/Chest: Breath sounds normal  No respiratory distress  She has no wheezes  She has no rales  Abdominal: Soft  Bowel sounds are normal  She exhibits no distension and no mass  There is no tenderness  There is no rebound and no guarding  Musculoskeletal: She exhibits no edema or deformity  Lymphadenopathy:     She has no cervical adenopathy  Neurological: She is alert   Coordination normal

## 2018-11-14 ENCOUNTER — APPOINTMENT (OUTPATIENT)
Dept: LAB | Facility: CLINIC | Age: 83
End: 2018-11-14
Payer: COMMERCIAL

## 2018-11-14 ENCOUNTER — TELEPHONE (OUTPATIENT)
Dept: HEMATOLOGY ONCOLOGY | Facility: CLINIC | Age: 83
End: 2018-11-14

## 2018-11-14 DIAGNOSIS — E78.5 HYPERLIPIDEMIA, UNSPECIFIED HYPERLIPIDEMIA TYPE: ICD-10-CM

## 2018-11-14 DIAGNOSIS — C05.0 MALIGNANT NEOPLASM OF HARD PALATE (HCC): Primary | ICD-10-CM

## 2018-11-14 DIAGNOSIS — E06.3 HYPOTHYROIDISM DUE TO HASHIMOTO'S THYROIDITIS: ICD-10-CM

## 2018-11-14 DIAGNOSIS — E03.8 HYPOTHYROIDISM DUE TO HASHIMOTO'S THYROIDITIS: ICD-10-CM

## 2018-11-14 DIAGNOSIS — J45.20 MILD INTERMITTENT ASTHMA WITHOUT COMPLICATION: ICD-10-CM

## 2018-11-14 LAB
ALBUMIN SERPL BCP-MCNC: 3.7 G/DL (ref 3.5–5)
ALP SERPL-CCNC: 67 U/L (ref 46–116)
ALT SERPL W P-5'-P-CCNC: 21 U/L (ref 12–78)
ANION GAP SERPL CALCULATED.3IONS-SCNC: 4 MMOL/L (ref 4–13)
AST SERPL W P-5'-P-CCNC: 18 U/L (ref 5–45)
BILIRUB SERPL-MCNC: 0.7 MG/DL (ref 0.2–1)
BUN SERPL-MCNC: 15 MG/DL (ref 5–25)
CALCIUM SERPL-MCNC: 8.2 MG/DL (ref 8.3–10.1)
CHLORIDE SERPL-SCNC: 103 MMOL/L (ref 100–108)
CHOLEST SERPL-MCNC: 178 MG/DL (ref 50–200)
CO2 SERPL-SCNC: 29 MMOL/L (ref 21–32)
CREAT SERPL-MCNC: 0.71 MG/DL (ref 0.6–1.3)
GFR SERPL CREATININE-BSD FRML MDRD: 77 ML/MIN/1.73SQ M
GLUCOSE P FAST SERPL-MCNC: 95 MG/DL (ref 65–99)
HDLC SERPL-MCNC: 62 MG/DL (ref 40–60)
LDLC SERPL CALC-MCNC: 96 MG/DL (ref 0–100)
POTASSIUM SERPL-SCNC: 4.4 MMOL/L (ref 3.5–5.3)
PROT SERPL-MCNC: 6.9 G/DL (ref 6.4–8.2)
SODIUM SERPL-SCNC: 136 MMOL/L (ref 136–145)
TRIGL SERPL-MCNC: 99 MG/DL
TSH SERPL DL<=0.05 MIU/L-ACNC: 0.28 UIU/ML (ref 0.36–3.74)

## 2018-11-14 PROCEDURE — 80061 LIPID PANEL: CPT

## 2018-11-14 PROCEDURE — 80053 COMPREHEN METABOLIC PANEL: CPT

## 2018-11-14 PROCEDURE — 36415 COLL VENOUS BLD VENIPUNCTURE: CPT

## 2018-11-14 PROCEDURE — 84443 ASSAY THYROID STIM HORMONE: CPT

## 2018-11-14 NOTE — TELEPHONE ENCOUNTER
Scheduled pt for 11 28 18 at 2pm at [facility]  She is aware to have her labs done the Monday or Tuesday before her CT scan

## 2018-11-14 NOTE — TELEPHONE ENCOUNTER
Patient called to see if blood work and imaging is needed before December follow up appointment  Please call back and advise  Thank you

## 2018-11-14 NOTE — TELEPHONE ENCOUNTER
Patients appt with Dr Ying Sanchez is 12/3/18  Per Dr Agustina Sandhu last note-Patient will need CBC and CMP  and CT of the neck  Please schedule these tests  Thank you

## 2018-11-14 NOTE — ASSESSMENT & PLAN NOTE
Clinically stable and doing well she has now had her condition for 3 years she had opted for no chemo and no radiation and has been stable she will continue follow-up with Hematology

## 2018-11-15 DIAGNOSIS — E03.9 HYPOTHYROIDISM, UNSPECIFIED TYPE: Primary | ICD-10-CM

## 2018-11-15 RX ORDER — LEVOTHYROXINE SODIUM 112 UG/1
112 TABLET ORAL DAILY
Qty: 90 TABLET | Refills: 0 | Status: SHIPPED | OUTPATIENT
Start: 2018-11-15 | End: 2019-07-10 | Stop reason: ALTCHOICE

## 2018-11-21 ENCOUNTER — TRANSCRIBE ORDERS (OUTPATIENT)
Dept: LAB | Facility: CLINIC | Age: 83
End: 2018-11-21

## 2018-11-26 ENCOUNTER — TELEPHONE (OUTPATIENT)
Dept: HEMATOLOGY ONCOLOGY | Facility: CLINIC | Age: 83
End: 2018-11-26

## 2018-11-26 ENCOUNTER — APPOINTMENT (OUTPATIENT)
Dept: LAB | Facility: CLINIC | Age: 83
End: 2018-11-26
Payer: COMMERCIAL

## 2018-11-26 DIAGNOSIS — C08.9 ADENOCARCINOMA OF SALIVARY GLAND (HCC): Primary | ICD-10-CM

## 2018-11-26 LAB
ALBUMIN SERPL BCP-MCNC: 3.9 G/DL (ref 3.5–5)
ALP SERPL-CCNC: 66 U/L (ref 46–116)
ALT SERPL W P-5'-P-CCNC: 19 U/L (ref 12–78)
ANION GAP SERPL CALCULATED.3IONS-SCNC: 8 MMOL/L (ref 4–13)
AST SERPL W P-5'-P-CCNC: 18 U/L (ref 5–45)
BASOPHILS # BLD AUTO: 0.05 THOUSANDS/ΜL (ref 0–0.1)
BASOPHILS NFR BLD AUTO: 1 % (ref 0–1)
BILIRUB SERPL-MCNC: 0.56 MG/DL (ref 0.2–1)
BUN SERPL-MCNC: 17 MG/DL (ref 5–25)
CALCIUM SERPL-MCNC: 8.7 MG/DL (ref 8.3–10.1)
CHLORIDE SERPL-SCNC: 102 MMOL/L (ref 100–108)
CO2 SERPL-SCNC: 27 MMOL/L (ref 21–32)
CREAT SERPL-MCNC: 0.88 MG/DL (ref 0.6–1.3)
EOSINOPHIL # BLD AUTO: 0.08 THOUSAND/ΜL (ref 0–0.61)
EOSINOPHIL NFR BLD AUTO: 1 % (ref 0–6)
ERYTHROCYTE [DISTWIDTH] IN BLOOD BY AUTOMATED COUNT: 13.6 % (ref 11.6–15.1)
GFR SERPL CREATININE-BSD FRML MDRD: 60 ML/MIN/1.73SQ M
GLUCOSE SERPL-MCNC: 110 MG/DL (ref 65–140)
HCT VFR BLD AUTO: 38.8 % (ref 34.8–46.1)
HGB BLD-MCNC: 12.2 G/DL (ref 11.5–15.4)
IMM GRANULOCYTES # BLD AUTO: 0.01 THOUSAND/UL (ref 0–0.2)
IMM GRANULOCYTES NFR BLD AUTO: 0 % (ref 0–2)
LYMPHOCYTES # BLD AUTO: 1.87 THOUSANDS/ΜL (ref 0.6–4.47)
LYMPHOCYTES NFR BLD AUTO: 28 % (ref 14–44)
MCH RBC QN AUTO: 29.8 PG (ref 26.8–34.3)
MCHC RBC AUTO-ENTMCNC: 31.4 G/DL (ref 31.4–37.4)
MCV RBC AUTO: 95 FL (ref 82–98)
MONOCYTES # BLD AUTO: 0.67 THOUSAND/ΜL (ref 0.17–1.22)
MONOCYTES NFR BLD AUTO: 10 % (ref 4–12)
NEUTROPHILS # BLD AUTO: 4.1 THOUSANDS/ΜL (ref 1.85–7.62)
NEUTS SEG NFR BLD AUTO: 60 % (ref 43–75)
NRBC BLD AUTO-RTO: 0 /100 WBCS
PLATELET # BLD AUTO: 216 THOUSANDS/UL (ref 149–390)
PMV BLD AUTO: 12.4 FL (ref 8.9–12.7)
POTASSIUM SERPL-SCNC: 4.4 MMOL/L (ref 3.5–5.3)
PROT SERPL-MCNC: 7.2 G/DL (ref 6.4–8.2)
RBC # BLD AUTO: 4.09 MILLION/UL (ref 3.81–5.12)
SODIUM SERPL-SCNC: 137 MMOL/L (ref 136–145)
WBC # BLD AUTO: 6.78 THOUSAND/UL (ref 4.31–10.16)

## 2018-11-26 PROCEDURE — 36415 COLL VENOUS BLD VENIPUNCTURE: CPT

## 2018-11-26 PROCEDURE — 80053 COMPREHEN METABOLIC PANEL: CPT

## 2018-11-26 PROCEDURE — 85025 COMPLETE CBC W/AUTO DIFF WBC: CPT

## 2018-11-26 NOTE — TELEPHONE ENCOUNTER
Patient would like to go for these labs today  Please give her a call when they are available in the system for her      Call [phone]

## 2018-11-28 ENCOUNTER — HOSPITAL ENCOUNTER (OUTPATIENT)
Dept: RADIOLOGY | Facility: MEDICAL CENTER | Age: 83
Discharge: HOME | End: 2018-11-28
Payer: COMMERCIAL

## 2018-11-28 DIAGNOSIS — C05.0 MALIGNANT NEOPLASM OF HARD PALATE (HCC): ICD-10-CM

## 2018-11-28 PROCEDURE — 70490 CT SOFT TISSUE NECK W/O DYE: CPT

## 2018-12-03 ENCOUNTER — OFFICE VISIT (OUTPATIENT)
Dept: HEMATOLOGY ONCOLOGY | Facility: CLINIC | Age: 83
End: 2018-12-03
Payer: COMMERCIAL

## 2018-12-03 VITALS
WEIGHT: 132 LBS | DIASTOLIC BLOOD PRESSURE: 70 MMHG | OXYGEN SATURATION: 93 % | HEART RATE: 62 BPM | RESPIRATION RATE: 18 BRPM | SYSTOLIC BLOOD PRESSURE: 120 MMHG | HEIGHT: 62 IN | TEMPERATURE: 98.1 F | BODY MASS INDEX: 24.29 KG/M2

## 2018-12-03 DIAGNOSIS — C05.0 MALIGNANT NEOPLASM OF HARD PALATE (HCC): Primary | ICD-10-CM

## 2018-12-03 PROCEDURE — 99214 OFFICE O/P EST MOD 30 MIN: CPT | Performed by: INTERNAL MEDICINE

## 2018-12-03 PROCEDURE — 4040F PNEUMOC VAC/ADMIN/RCVD: CPT | Performed by: INTERNAL MEDICINE

## 2018-12-03 NOTE — LETTER
December 3, 2018     Kong Reynoso  [address] [address] [place]     Patient: Buffy Hutchison   YOB: 1932   Date of Visit: 12/3/2018       Dear Dr Kenny Ket: Thank you for referring Jaziel Covarrubias to me for evaluation  Below are my notes for this consultation  If you have questions, please do not hesitate to call me  I look forward to following your patient along with you  Sincerely,        Chas Caba MD        CC: No Recipients  Chas Caba MD  12/3/2018  1:32 PM  Sign at close encounter  Hematology Outpatient Follow - Up Note  Buffy James 80 y o  female MRN: @ Encounter: [id number]        Date:  12/3/2018        Assessment/ Plan:    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, diagnosed in November 2015, status post a 2nd biopsy to that confirm the diagnosis    The patient opted to watchful observation    The most recent CT scan of the neck in November 2018 showed stable 2 cm mass in the hard palate, no evidence of new masses   1  I will continue watchful observation    2   The patient decided not to follow up in our office unless it is needed, she would like not to do CT scan of the neck on yearly basis, she is aware of the consequences of her decision          HPI:   17-year-old  female who noticed a lesion in the hard palate of the right side in [place], that is growing slowly over the past 3 years status post biopsy in November 2015 showed minor salivary gland neoplasm not otherwise specified with a mixed solid / tubular / spindle-cell configuration and diffuse P 63 staining, Ki-67 was low   No evidence of perineural invasion, differential diagnosis was a benign salivary gland neoplasm versus malignant neoplasm such as low-grade polymorphous adenoma carcinoma  She declined surgery, she moved up to [place], she had been on yearly CT scan with stable disease the mass measuring 2 cm     She had a history of atrial fibrillation macular degeneration aortic aneurysm status post repair, hypothyroidism, status post thyroidectomy, pneumonia and degenerative arthritis   She is a former smoker with intermittent smoking quit in September 2015     repeat biopsy showed 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 with extension into the maxillary sinus    Interval History:        Previous Treatment:         Test Results:    Imaging: Ct Soft Tissue Neck Wo Contrast    Result Date: 11/29/2018  Narrative: CT SOFT TISSUE NECK WITHOUT CONTRAST INDICATION:   C05 0: Malignant neoplasm of hard palate  Known diagnosis of 3 years without history of chemotherapy or radiation  COMPARISON:  CT soft tissue neck study from February 20, 2018  TECHNIQUE:  Axial, sagittal, and coronal 2D reformatted images were created from the axial source data and submitted for interpretation  Radiation dose length product (DLP) for this visit:  469 mGy-cm   This examination, like all CT scans performed in the [facility], was performed utilizing techniques to minimize radiation dose exposure, including the use of iterative reconstruction and automated exposure control  IMAGE QUALITY:  Diagnostic  FINDINGS: VISUALIZED BRAIN PARENCHYMA:  Normal visualized brain parenchyma  VISUALIZED ORBITS AND PARANASAL SINUSES:  Focal polypoid soft tissue density in the right maxillary sinus, unchanged from the prior study  NASAL CAVITY AND NASOPHARYNX:  Normal  SUPRAHYOID NECK:  Redemonstrated is the rounded soft tissue mass within the posterior right lateral hard palate with erosion of the right posterior maxilla and stable defect in the floor of the right nasal cavity  The lesion is in close proximity to the  foramina within the right hard palate and perineural soft tissue infiltrative disease is difficult to exclude on this noncontrast CT evaluation  The lesion abuts the superior margin of the tongue  Direct inspection is recommended to exclude mucosal infiltrative disease  The lesion is stable in appearance, measuring 2 0 cm in greatest transverse dimension, 1 9 cm in craniocaudad dimension and approximately 1 8 cm in AP dimension  The lesion does not cross the midline  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 a  torus palatini  INFRAHYOID NECK:  Aryepiglottic folds and piriform sinuses  Stable right vallecular cyst   Normal larynx and subglottic airway  THYROID GLAND:  Surgically removed  PAROTID AND SUBMANDIBULAR GLANDS: Normal  LYMPH NODES:  Limited evaluation without contrast   No gross pathologic cervical adenopathy  VASCULAR STRUCTURES:  Limited without contrast  THORACIC INLET:  Redemonstrated atherosclerotic disease of the aorta with stable enlargement of the transverse aortic arch  The proximal transverse aorta measures 4 0 cm while the distal transverse aortic arch measures 4 2 cm  BONY STRUCTURES:  Normal   Periodontal disease within the mandible  Impression: 1  Stable 2 0cm  lesion of the right posterior hard palate and right posterior maxilla  Findings are in keeping with the reported history of palantine carcinoma  2   Limited evaluation for cervical adenopathy or perineural neoplastic disease  3  Redemonstrated atherosclerotic disease and aneurysmal dilatation of the transverse aortic arch   Workstation performed: [id number]       Labs:   Lab Results   Component Value Date    WBC 6 78 11/26/2018    HGB 12 2 11/26/2018    HCT 38 8 11/26/2018    MCV 95 11/26/2018     11/26/2018     Lab Results   Component Value Date     (L) 06/16/2015    K 4 4 11/26/2018     11/26/2018    CO2 27 11/26/2018    ANIONGAP 5 06/16/2015    BUN 17 11/26/2018    CREATININE 0 88 11/26/2018    GLUCOSE 138 06/16/2015    GLUF 95 11/14/2018    CALCIUM 8 7 11/26/2018    AST 18 11/26/2018    ALT 19 11/26/2018    ALKPHOS 66 11/26/2018    PROT 6 7 06/07/2015    BILITOT 0 28 06/07/2015    EGFR 60 11/26/2018       Lab Results   Component Value Date    IRON 22 (L) 02/23/2015    TIBC 248 (L) 02/23/2015    FERRITIN 21 03/17/2016       No results found for: [id number]      ROS:   Review of Systems   Constitutional: Positive for fatigue  Negative for activity change, appetite change, diaphoresis, fever and unexpected weight change  HENT: Positive for hearing loss  Negative for facial swelling, rhinorrhea, sinus pain, sinus pressure, sneezing, sore throat and tinnitus  Eyes: Negative for photophobia, pain, discharge, redness, itching and visual disturbance  Respiratory: Negative for apnea and chest tightness  Cardiovascular: Negative for chest pain, palpitations and leg swelling  Gastrointestinal: Negative for abdominal distention, abdominal pain, blood in stool, constipation, diarrhea, nausea, rectal pain and vomiting  Endocrine: Negative for cold intolerance, heat intolerance, polydipsia and polyphagia  Genitourinary: Negative for difficulty urinating, dyspareunia, frequency, hematuria, pelvic pain and urgency  Musculoskeletal: Negative for arthralgias, back pain, gait problem, joint swelling and myalgias  Skin: Negative for color change, pallor and rash  Allergic/Immunologic: Negative for environmental allergies and food allergies  Neurological: Negative for dizziness, tremors, seizures, syncope, speech difficulty, numbness and headaches  Hematological: Negative for adenopathy  Does not bruise/bleed easily  Psychiatric/Behavioral: Negative for agitation, confusion, dysphoric mood, hallucinations and suicidal ideas  Current Medications: Reviewed  Allergies: Reviewed  PMH/FH/SH:  Reviewed      Physical Exam:    Body surface area is 1 6 meters squared      Wt Readings from Last 3 Encounters:   12/03/18 59 9 kg (132 lb)   11/13/18 59 9 kg (132 lb)   09/13/18 60 8 kg (134 lb)        Temp Readings from Last 3 Encounters:   12/03/18 98 1 °F (36 7 °C) (Tympanic)   09/13/18 97 9 °F (36 6 °C) (Tympanic) 06/13/18 99 1 °F (37 3 °C) (Tympanic)        BP Readings from Last 3 Encounters:   12/03/18 120/70   11/13/18 124/62   09/13/18 150/70         Pulse Readings from Last 3 Encounters:   12/03/18 62   11/13/18 66   09/13/18 63        Physical Exam   Constitutional: She is oriented to person, place, and time  She appears well-developed and well-nourished  No distress  HENT:   Head: Normocephalic and atraumatic  Mouth/Throat: Oropharynx is clear and moist  No oropharyngeal exudate  Eyes: Pupils are equal, round, and reactive to light  Conjunctivae and EOM are normal    Neck: Normal range of motion  Neck supple  JVD present  No tracheal deviation present  No thyromegaly present  Cardiovascular: Normal rate and regular rhythm  Exam reveals no gallop and no friction rub  No murmur heard  Pulmonary/Chest: Effort normal and breath sounds normal  No respiratory distress  She has no wheezes  She has no rales  She exhibits no tenderness  Abdominal: Soft  Bowel sounds are normal  She exhibits no distension and no mass  There is no tenderness  There is no rebound and no guarding  Musculoskeletal: Normal range of motion  She exhibits no edema  Lymphadenopathy:     She has no cervical adenopathy  Neurological: She is alert and oriented to person, place, and time  Skin: Skin is warm and dry  No rash noted  She is not diaphoretic  No erythema  No pallor  Psychiatric: She has a normal mood and affect  Her behavior is normal  Judgment and thought content normal    Vitals reviewed  Goals and Barriers:  Current Goal: Minimize effects of disease  Barriers: None  Patient's Capacity to Self Care:  Patient is able to self care      Code Status: @[facility]@

## 2018-12-03 NOTE — PROGRESS NOTES
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, diagnosed in November 2015, status post a 2nd biopsy to that confirm the diagnosis    The patient opted to watchful observation    The most recent CT scan of the neck in November 2018 showed stable 2 cm mass in the hard palate, no evidence of new masses   1  I will continue watchful observation    2   The patient decided not to follow up in our office unless it is needed, she would like not to do CT scan of the neck on yearly basis, she is aware of the consequences of her decision          HPI:   55-year-old  female who noticed a lesion in the hard palate of the right side in [place], that is growing slowly over the past 3 years status post biopsy in November 2015 showed minor salivary gland neoplasm not otherwise specified with a mixed solid / tubular / spindle-cell configuration and diffuse P 63 staining, Ki-67 was low   No evidence of perineural invasion, differential diagnosis was a benign salivary gland neoplasm versus malignant neoplasm such as low-grade polymorphous adenoma carcinoma  She declined surgery, she moved up to [place], she had been on yearly CT scan with stable disease the mass measuring 2 cm     She had a history of atrial fibrillation macular degeneration aortic aneurysm status post repair, hypothyroidism, status post thyroidectomy, pneumonia and degenerative arthritis   She is a former smoker with intermittent smoking quit in September 2015     repeat biopsy showed 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 with extension into the maxillary sinus    Interval History:        Previous Treatment:         Test Results:    Imaging: Ct Soft Tissue Neck Wo Contrast    Result Date: 11/29/2018  Narrative: CT SOFT TISSUE NECK WITHOUT CONTRAST INDICATION:   C05 0: Malignant neoplasm of hard palate  Known diagnosis of 3 years without history of chemotherapy or radiation  COMPARISON:  CT soft tissue neck study from February 20, 2018  TECHNIQUE:  Axial, sagittal, and coronal 2D reformatted images were created from the axial source data and submitted for interpretation  Radiation dose length product (DLP) for this visit:  469 mGy-cm   This examination, like all CT scans performed in the [facility], was performed utilizing techniques to minimize radiation dose exposure, including the use of iterative reconstruction and automated exposure control  IMAGE QUALITY:  Diagnostic  FINDINGS: VISUALIZED BRAIN PARENCHYMA:  Normal visualized brain parenchyma  VISUALIZED ORBITS AND PARANASAL SINUSES:  Focal polypoid soft tissue density in the right maxillary sinus, unchanged from the prior study  NASAL CAVITY AND NASOPHARYNX:  Normal  SUPRAHYOID NECK:  Redemonstrated is the rounded soft tissue mass within the posterior right lateral hard palate with erosion of the right posterior maxilla and stable defect in the floor of the right nasal cavity  The lesion is in close proximity to the  foramina within the right hard palate and perineural soft tissue infiltrative disease is difficult to exclude on this noncontrast CT evaluation  The lesion abuts the superior margin of the tongue  Direct inspection is recommended to exclude mucosal infiltrative disease  The lesion is stable in appearance, measuring 2 0 cm in greatest transverse dimension, 1 9 cm in craniocaudad dimension and approximately 1 8 cm in AP dimension  The lesion does not cross the midline  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 a  torus palatini  INFRAHYOID NECK:  Aryepiglottic folds and piriform sinuses  Stable right vallecular cyst   Normal larynx and subglottic airway  THYROID GLAND:  Surgically removed   PAROTID AND SUBMANDIBULAR GLANDS: Normal  LYMPH NODES: Limited evaluation without contrast   No gross pathologic cervical adenopathy  VASCULAR STRUCTURES:  Limited without contrast  THORACIC INLET:  Redemonstrated atherosclerotic disease of the aorta with stable enlargement of the transverse aortic arch  The proximal transverse aorta measures 4 0 cm while the distal transverse aortic arch measures 4 2 cm  BONY STRUCTURES:  Normal   Periodontal disease within the mandible  Impression: 1  Stable 2 0cm  lesion of the right posterior hard palate and right posterior maxilla  Findings are in keeping with the reported history of palantine carcinoma  2   Limited evaluation for cervical adenopathy or perineural neoplastic disease  3  Redemonstrated atherosclerotic disease and aneurysmal dilatation of the transverse aortic arch  Workstation performed: [id number]       Labs:   Lab Results   Component Value Date    WBC 6 78 11/26/2018    HGB 12 2 11/26/2018    HCT 38 8 11/26/2018    MCV 95 11/26/2018     11/26/2018     Lab Results   Component Value Date     (L) 06/16/2015    K 4 4 11/26/2018     11/26/2018    CO2 27 11/26/2018    ANIONGAP 5 06/16/2015    BUN 17 11/26/2018    CREATININE 0 88 11/26/2018    GLUCOSE 138 06/16/2015    GLUF 95 11/14/2018    CALCIUM 8 7 11/26/2018    AST 18 11/26/2018    ALT 19 11/26/2018    ALKPHOS 66 11/26/2018    PROT 6 7 06/07/2015    BILITOT 0 28 06/07/2015    EGFR 60 11/26/2018       Lab Results   Component Value Date    IRON 22 (L) 02/23/2015    TIBC 248 (L) 02/23/2015    FERRITIN 21 03/17/2016       No results found for: [id number]      ROS:   Review of Systems   Constitutional: Positive for fatigue  Negative for activity change, appetite change, diaphoresis, fever and unexpected weight change  HENT: Positive for hearing loss  Negative for facial swelling, rhinorrhea, sinus pain, sinus pressure, sneezing, sore throat and tinnitus  Eyes: Negative for photophobia, pain, discharge, redness, itching and visual disturbance  Respiratory: Negative for apnea and chest tightness  Cardiovascular: Negative for chest pain, palpitations and leg swelling  Gastrointestinal: Negative for abdominal distention, abdominal pain, blood in stool, constipation, diarrhea, nausea, rectal pain and vomiting  Endocrine: Negative for cold intolerance, heat intolerance, polydipsia and polyphagia  Genitourinary: Negative for difficulty urinating, dyspareunia, frequency, hematuria, pelvic pain and urgency  Musculoskeletal: Negative for arthralgias, back pain, gait problem, joint swelling and myalgias  Skin: Negative for color change, pallor and rash  Allergic/Immunologic: Negative for environmental allergies and food allergies  Neurological: Negative for dizziness, tremors, seizures, syncope, speech difficulty, numbness and headaches  Hematological: Negative for adenopathy  Does not bruise/bleed easily  Psychiatric/Behavioral: Negative for agitation, confusion, dysphoric mood, hallucinations and suicidal ideas  Current Medications: Reviewed  Allergies: Reviewed  PMH/FH/SH:  Reviewed      Physical Exam:    Body surface area is 1 6 meters squared  Wt Readings from Last 3 Encounters:   12/03/18 59 9 kg (132 lb)   11/13/18 59 9 kg (132 lb)   09/13/18 60 8 kg (134 lb)        Temp Readings from Last 3 Encounters:   12/03/18 98 1 °F (36 7 °C) (Tympanic)   09/13/18 97 9 °F (36 6 °C) (Tympanic)   06/13/18 99 1 °F (37 3 °C) (Tympanic)        BP Readings from Last 3 Encounters:   12/03/18 120/70   11/13/18 124/62   09/13/18 150/70         Pulse Readings from Last 3 Encounters:   12/03/18 62   11/13/18 66   09/13/18 63        Physical Exam   Constitutional: She is oriented to person, place, and time  She appears well-developed and well-nourished  No distress  HENT:   Head: Normocephalic and atraumatic  Mouth/Throat: Oropharynx is clear and moist  No oropharyngeal exudate  Eyes: Pupils are equal, round, and reactive to light  Conjunctivae and EOM are normal    Neck: Normal range of motion  Neck supple  JVD present  No tracheal deviation present  No thyromegaly present  Cardiovascular: Normal rate and regular rhythm  Exam reveals no gallop and no friction rub  No murmur heard  Pulmonary/Chest: Effort normal and breath sounds normal  No respiratory distress  She has no wheezes  She has no rales  She exhibits no tenderness  Abdominal: Soft  Bowel sounds are normal  She exhibits no distension and no mass  There is no tenderness  There is no rebound and no guarding  Musculoskeletal: Normal range of motion  She exhibits no edema  Lymphadenopathy:     She has no cervical adenopathy  Neurological: She is alert and oriented to person, place, and time  Skin: Skin is warm and dry  No rash noted  She is not diaphoretic  No erythema  No pallor  Psychiatric: She has a normal mood and affect  Her behavior is normal  Judgment and thought content normal    Vitals reviewed  Goals and Barriers:  Current Goal: Minimize effects of disease  Barriers: None  Patient's Capacity to Self Care:  Patient is able to self care      Code Status: @[facility]@

## 2018-12-06 DIAGNOSIS — E78.5 HYPERLIPIDEMIA, UNSPECIFIED HYPERLIPIDEMIA TYPE: Primary | ICD-10-CM

## 2018-12-06 RX ORDER — LOVASTATIN 20 MG/1
TABLET ORAL
Qty: 90 TABLET | Refills: 3 | Status: SHIPPED | OUTPATIENT
Start: 2018-12-06 | End: 2020-01-06

## 2018-12-24 ENCOUNTER — APPOINTMENT (OUTPATIENT)
Dept: LAB | Facility: CLINIC | Age: 83
End: 2018-12-24
Payer: COMMERCIAL

## 2018-12-24 DIAGNOSIS — E03.9 HYPOTHYROIDISM, UNSPECIFIED TYPE: ICD-10-CM

## 2018-12-24 LAB — TSH SERPL DL<=0.05 MIU/L-ACNC: 0.2 UIU/ML (ref 0.36–3.74)

## 2018-12-24 PROCEDURE — 84443 ASSAY THYROID STIM HORMONE: CPT

## 2018-12-24 PROCEDURE — 36415 COLL VENOUS BLD VENIPUNCTURE: CPT

## 2018-12-26 DIAGNOSIS — R79.89 ABNORMAL TSH: Primary | ICD-10-CM

## 2018-12-26 RX ORDER — LEVOTHYROXINE SODIUM 0.1 MG/1
100 TABLET ORAL DAILY
Qty: 90 TABLET | Refills: 0 | Status: CANCELLED | OUTPATIENT
Start: 2018-12-26

## 2018-12-26 NOTE — TELEPHONE ENCOUNTER
You can disregard this request  It was for Levothyroxine 100mcg but the patient states that she has enough and does not need anymore sent to the pharmacy

## 2019-02-04 ENCOUNTER — OFFICE VISIT (OUTPATIENT)
Dept: CARDIOLOGY CLINIC | Facility: MEDICAL CENTER | Age: 84
End: 2019-02-04
Payer: COMMERCIAL

## 2019-02-04 VITALS
WEIGHT: 134.6 LBS | DIASTOLIC BLOOD PRESSURE: 58 MMHG | HEART RATE: 62 BPM | HEIGHT: 62 IN | SYSTOLIC BLOOD PRESSURE: 122 MMHG | OXYGEN SATURATION: 96 % | BODY MASS INDEX: 24.77 KG/M2

## 2019-02-04 DIAGNOSIS — E78.5 HYPERLIPIDEMIA, UNSPECIFIED HYPERLIPIDEMIA TYPE: ICD-10-CM

## 2019-02-04 DIAGNOSIS — I48.0 PAROXYSMAL ATRIAL FIBRILLATION (HCC): Primary | ICD-10-CM

## 2019-02-04 DIAGNOSIS — R06.02 SHORTNESS OF BREATH: ICD-10-CM

## 2019-02-04 DIAGNOSIS — I10 ESSENTIAL HYPERTENSION: ICD-10-CM

## 2019-02-04 PROCEDURE — 93000 ELECTROCARDIOGRAM COMPLETE: CPT | Performed by: INTERNAL MEDICINE

## 2019-02-04 PROCEDURE — 99214 OFFICE O/P EST MOD 30 MIN: CPT | Performed by: INTERNAL MEDICINE

## 2019-02-04 NOTE — PROGRESS NOTES
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, only takes aspirin, rare symptoms of palpitations occur  history of ascending aortic aneurysm   status post repair     hyperlipidemia  No atherosclerotic cardiovascular disease, and considering normal catheterization prior to aneurysm repair, taken off statin therapy considering her age     dyspnea on exertion  No cardiac cause found  Normal LV function  No evidence of volume overload/CHF previously  [place] by Pulmonary to be deconditioning, was sent to pulmonary rehab    Plan: At this point she would prefer to follow up as needed considering no significant recurrent atrial fibrillation, very well-controlled hypertension, improved lower extremity edema since adding benazepril, and overall feeling better since going through pulmonary rehab been doing some gym attendance on her own  HPI:   Buffy Guerrier is a 80y o  year old female  With a history of ascending aortic aneurysm, status post repair, with significant asthma, hypertension, mild hyperlipidemia but not on statin therapy, prior normal cardiac catheterization pre surgery, paroxysmal atrial fibrillation without any recent significant symptoms, and on aspirin due to a 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  Dyspnea is somewhat improved, she went through pulmonary rehab with her improved request of Pulmonary    No other specific pulmonary cause was found, but she does have a chronic issue of asthma, she has chronic mucus production that does cause coughing regularly but no fevers, chills, overwhelming illness  She has rare palpitations, does not last very long, she has had no obvious recurrence of atrial fibrillation  She can use to take aspirin at her own request, with a history of 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  Her blood pressure is very well controlled, improved with the addition of benazepril, and with the addition of benazepril in the setting of chronic amlodipine, with slight amlodipine dose reduction her lightheadedness has improved, and her lower extremity edema resolved  Review of Systems   Constitutional: Negative for activity change, diaphoresis and fatigue  Respiratory: Positive for shortness of breath  Cardiovascular: Positive for palpitations  Negative for chest pain and leg swelling  Neurological: Negative for syncope and light-headedness           Past Medical History:   Diagnosis Date    A-fib [facility])     Allergy to IVP dye 06/04/2013    pt felt heaviness, palpitations    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), wet ([facility] )     bilateral    [facility]     last assessed - [id number]    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HCC)     Aortic root dilatation (HCC)     last assessed - [id number]    Arthritis     Ascending aortic aneurysm (HCC)     last assessed - [id number]    Asthma     Basal cell carcinoma     last assessed - [id number]    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[facility] )     Disease of thyroid gland     Facet arthropathy, cervical     last assessed - [id number]    History of fracture of vertebral column     Hyperlipidemia     Hypertension     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[facility] )     Hypoxia     last assessed - [id number]    Junctional rhythm     last assessed - [id number]    Lightheadedness     last assessed - [id number]    Migraine     Palpitations     last assessed - [id number]    Pleural effusion, bilateral     last assessed - [id number]    Shortness of breath     last assessed - 10Yzd7474    Thyroid trouble     Trigger finger     last assessed - [id number]    Trigger middle finger of right hand     last assessed - 65Kah7001    Urinary incontinence     last assessed -  22Jul,2013; Resolved - [id number]     History   Alcohol Use No     Comment: Social drinker & never drang alcohol both documented in [employer]     History   Drug Use No     History   Smoking Status    Former Smoker    Packs/day: 0 25    Years: 57 00    Types: Cigarettes    Start date: 36    Quit date: 12/25/2014   Smokeless Tobacco    Never Used     Comment: smoked 17 yrs 1/2 ppd quit and restarted then quit  10 days ago during Christmas 2014        Allergies: Allergies   Allergen Reactions    Amiodarone Hives    Keflex [Cephalexin] Hives    Omnipaque [Iohexol] Hives and Shortness Of Breath    Clindamycin Other (See Comments)     When taken causes cdiff immediately    Sulfa Antibiotics Hives     itching    Iv Dye  [Iodinated Diagnostic Agents] Hypertension and Palpitations     Annotation - [id number]: Verified with patient     Naprosyn [Naproxen] Itching and Rash     Category: Allergy;         Medications:     Current Outpatient Prescriptions:     acetaminophen (TYLENOL) 500 mg tablet, Take 500 mg by mouth every 6 (six) hours as needed for mild pain, Disp: , Rfl:     albuterol (2 5 mg/3 mL) 0 083 % nebulizer solution, Take 3 mL (2 5 mg total) by nebulization 4 (four) times a day, Disp: 50 vial, Rfl: 2    albuterol (PROVENTIL HFA,VENTOLIN HFA) 90 mcg/act inhaler, Inhale 2 puffs every 6 (six) hours as needed for wheezing, Disp: 1 Inhaler, Rfl: 0    amLODIPine-benazepril (LOTREL 2 5-10) 2 5-10 MG per capsule, Take 1 capsule by mouth daily, Disp: 90 capsule, Rfl: 3    aspirin 81 MG tablet, Take 162 mg by mouth daily in the early morning  , Disp: , Rfl:     calcium carbonate (OS-TAE) 600 MG tablet, Take 600 mg by mouth daily in the early morning  , Disp: , Rfl:     levothyroxine 112 mcg tablet, Take 1 tablet (112 mcg total) by mouth daily (Patient taking differently: Take 100 mcg by mouth daily  ), Disp: 90 tablet, Rfl: 0    lovastatin (MEVACOR) 20 mg tablet, TAKE ONE TABLET BY MOUTH EVERY DAY, Disp: 90 tablet, Rfl: 3    metoprolol tartrate (LOPRESSOR) 25 mg tablet, Take 1 tablet (25 mg total) by mouth 2 (two) times a day, Disp: 180 tablet, Rfl: 3    Multiple Vitamin (MULTIVITAMIN) tablet, Take 1 tablet by mouth daily in the early morning  , Disp: , Rfl:     guaiFENesin (MUCINEX) 600 mg 12 hr tablet, Take 1 tablet (600 mg total) by mouth every 12 (twelve) hours (Patient not taking: Reported on 2/4/2019 ), Disp: 60 tablet, Rfl: 0    levothyroxine 125 mcg tablet, Take 1 tablet (125 mcg total) by mouth daily in the early morning (Patient not taking: Reported on 2/4/2019 ), Disp: 90 tablet, Rfl: 1      Vitals:    02/04/19 1407   BP: 122/58   Pulse: 62   SpO2: 96%     Weight (last 2 days)     Date/Time   Weight    02/04/19 1407  61 1 (134 6)            Physical Exam   Constitutional: No distress  HENT:   Head: Normocephalic and atraumatic  Eyes: Conjunctivae are normal  No scleral icterus  Neck: Normal range of motion  No JVD present  Cardiovascular: Normal rate, regular rhythm, normal heart sounds and intact distal pulses  No murmur heard  Pulmonary/Chest: Effort normal and breath sounds normal  No respiratory distress  She has no wheezes  She has no rales  Musculoskeletal: She exhibits no edema or tenderness  Skin: Skin is warm and dry  She is not diaphoretic           Laboratory Studies:  Lab Results   Component Value Date    HGBA1C 6 2 (H) 02/05/2015     (L) 06/16/2015     06/11/2015     06/07/2015    K 4 4 11/26/2018    K 4 4 11/14/2018    K 4 1 05/09/2018    K 4 1 06/16/2015    K 4 1 06/11/2015    K 3 9 06/07/2015     11/26/2018     11/14/2018     05/09/2018    CL 97 (L) 06/16/2015     06/11/2015     06/07/2015    CO2 27 11/26/2018    CO2 29 11/14/2018    CO2 29 05/09/2018    CO2 30 06/16/2015    CO2 28 06/11/2015    CO2 27 06/07/2015    GLUCOSE 138 06/16/2015    GLUCOSE 180 (H) 06/11/2015    GLUCOSE 97 06/08/2015    CREATININE 0 88 11/26/2018    CREATININE 0 71 11/14/2018    CREATININE 0 79 05/09/2018    CREATININE 1 07 06/16/2015    CREATININE 0 93 06/11/2015    CREATININE 0 66 06/07/2015    BUN 17 11/26/2018    BUN 15 11/14/2018    BUN 18 05/09/2018    BUN 23 06/16/2015    BUN 18 06/11/2015    BUN 13 06/07/2015    MG 2 1 02/22/2016    MG 1 9 06/07/2015    MG 1 7 06/06/2015    MG 1 8 04/09/2015    PHOS 3 3 06/07/2015     Lab Results   Component Value Date    WBC 6 78 11/26/2018    WBC 7 65 06/16/2015    RBC 4 09 11/26/2018    RBC 4 16 06/16/2015    HGB 12 2 11/26/2018    HGB 9 1 (L) 06/16/2015    HCT 38 8 11/26/2018    HCT 29 9 (L) 06/16/2015    MCV 95 11/26/2018    MCV 72 (L) 06/16/2015    MCH 29 8 11/26/2018    MCH 21 9 (L) 06/16/2015    RDW 13 6 11/26/2018    RDW 17 2 (H) 06/16/2015     11/26/2018     06/16/2015     NT-proBNP: No results for input(s): NTBNP in the last 72 hours  Coags:    Lipid Profile:   Lab Results   Component Value Date    CHOL 154 06/08/2015     Lab Results   Component Value Date    HDL 62 (H) 11/14/2018     Lab Results   Component Value Date    LDLCALC 96 11/14/2018     Lab Results   Component Value Date    TRIG 99 11/14/2018       Cardiac testing:   EKG reviewed personally:   Sinus rhythm, normal EKG    Echocardiogram 12/12/2017-LVEF 55%, normal wall thickness, grade 1 DD, trace MR, trace AI    Denice Claire MD    Portions of the record may have been created with voice recognition software   Occasional wrong word or "sound a like" substitutions may have occurred due to the inherent limitations of voice recognition software   Read the chart carefully and recognize, using context, where substitutions have occurred

## 2019-02-15 ENCOUNTER — APPOINTMENT (OUTPATIENT)
Dept: LAB | Facility: CLINIC | Age: 84
End: 2019-02-15
Payer: COMMERCIAL

## 2019-02-15 DIAGNOSIS — R79.89 ABNORMAL TSH: ICD-10-CM

## 2019-02-15 LAB — TSH SERPL DL<=0.05 MIU/L-ACNC: 5.65 UIU/ML (ref 0.36–3.74)

## 2019-02-15 PROCEDURE — 36415 COLL VENOUS BLD VENIPUNCTURE: CPT

## 2019-02-15 PROCEDURE — 84443 ASSAY THYROID STIM HORMONE: CPT

## 2019-02-19 DIAGNOSIS — E06.3 HYPOTHYROIDISM DUE TO HASHIMOTO'S THYROIDITIS: Primary | ICD-10-CM

## 2019-02-19 DIAGNOSIS — E03.8 HYPOTHYROIDISM DUE TO HASHIMOTO'S THYROIDITIS: Primary | ICD-10-CM

## 2019-03-19 ENCOUNTER — APPOINTMENT (OUTPATIENT)
Dept: LAB | Facility: CLINIC | Age: 84
End: 2019-03-19
Payer: COMMERCIAL

## 2019-03-19 DIAGNOSIS — E06.3 HYPOTHYROIDISM DUE TO HASHIMOTO'S THYROIDITIS: ICD-10-CM

## 2019-03-19 DIAGNOSIS — E03.8 HYPOTHYROIDISM DUE TO HASHIMOTO'S THYROIDITIS: ICD-10-CM

## 2019-03-19 LAB — TSH SERPL DL<=0.05 MIU/L-ACNC: 4.19 UIU/ML (ref 0.36–3.74)

## 2019-03-19 PROCEDURE — 36415 COLL VENOUS BLD VENIPUNCTURE: CPT

## 2019-03-19 PROCEDURE — 84443 ASSAY THYROID STIM HORMONE: CPT

## 2019-03-20 DIAGNOSIS — E06.3 HYPOTHYROIDISM DUE TO HASHIMOTO'S THYROIDITIS: Primary | ICD-10-CM

## 2019-03-20 DIAGNOSIS — E03.8 HYPOTHYROIDISM DUE TO HASHIMOTO'S THYROIDITIS: Primary | ICD-10-CM

## 2019-03-20 DIAGNOSIS — E03.9 HYPOTHYROIDISM, UNSPECIFIED TYPE: ICD-10-CM

## 2019-03-20 RX ORDER — LEVOTHYROXINE SODIUM 0.12 MG/1
125 TABLET ORAL
Qty: 90 TABLET | Refills: 1 | Status: SHIPPED | OUTPATIENT
Start: 2019-03-20 | End: 2019-07-10 | Stop reason: ALTCHOICE

## 2019-04-05 RX ORDER — LEVOTHYROXINE SODIUM 0.15 MG/1
150 TABLET ORAL
COMMUNITY
Start: 2015-06-22 | End: 2019-07-10 | Stop reason: ALTCHOICE

## 2019-04-09 ENCOUNTER — OFFICE VISIT (OUTPATIENT)
Dept: INTERNAL MEDICINE CLINIC | Facility: CLINIC | Age: 84
End: 2019-04-09
Payer: COMMERCIAL

## 2019-04-09 VITALS
SYSTOLIC BLOOD PRESSURE: 118 MMHG | WEIGHT: 134.6 LBS | HEIGHT: 62 IN | DIASTOLIC BLOOD PRESSURE: 60 MMHG | OXYGEN SATURATION: 96 % | RESPIRATION RATE: 16 BRPM | BODY MASS INDEX: 24.77 KG/M2 | HEART RATE: 73 BPM

## 2019-04-09 DIAGNOSIS — J45.20 MILD INTERMITTENT ASTHMA WITHOUT COMPLICATION: ICD-10-CM

## 2019-04-09 DIAGNOSIS — C08.9 SALIVARY GLAND CANCER (HCC): ICD-10-CM

## 2019-04-09 DIAGNOSIS — E78.5 HYPERLIPIDEMIA, UNSPECIFIED HYPERLIPIDEMIA TYPE: Primary | ICD-10-CM

## 2019-04-09 DIAGNOSIS — Z13.1 SCREENING FOR DIABETES MELLITUS: ICD-10-CM

## 2019-04-09 DIAGNOSIS — E03.9 HYPOTHYROIDISM, UNSPECIFIED TYPE: ICD-10-CM

## 2019-04-09 DIAGNOSIS — H92.03 OTALGIA OF BOTH EARS: ICD-10-CM

## 2019-04-09 DIAGNOSIS — I10 ESSENTIAL HYPERTENSION: ICD-10-CM

## 2019-04-09 PROCEDURE — 99214 OFFICE O/P EST MOD 30 MIN: CPT | Performed by: INTERNAL MEDICINE

## 2019-04-09 PROCEDURE — 1160F RVW MEDS BY RX/DR IN RCRD: CPT | Performed by: INTERNAL MEDICINE

## 2019-04-09 PROCEDURE — 1036F TOBACCO NON-USER: CPT | Performed by: INTERNAL MEDICINE

## 2019-04-29 DIAGNOSIS — I48.0 PAF (PAROXYSMAL ATRIAL FIBRILLATION) (HCC): ICD-10-CM

## 2019-06-05 ENCOUNTER — APPOINTMENT (OUTPATIENT)
Dept: LAB | Facility: CLINIC | Age: 84
End: 2019-06-05
Payer: COMMERCIAL

## 2019-06-05 DIAGNOSIS — E78.5 HYPERLIPIDEMIA, UNSPECIFIED HYPERLIPIDEMIA TYPE: ICD-10-CM

## 2019-06-05 DIAGNOSIS — E03.8 HYPOTHYROIDISM DUE TO HASHIMOTO'S THYROIDITIS: ICD-10-CM

## 2019-06-05 DIAGNOSIS — E03.9 HYPOTHYROIDISM, UNSPECIFIED TYPE: ICD-10-CM

## 2019-06-05 DIAGNOSIS — Z13.1 SCREENING FOR DIABETES MELLITUS: ICD-10-CM

## 2019-06-05 DIAGNOSIS — I10 ESSENTIAL HYPERTENSION: ICD-10-CM

## 2019-06-05 DIAGNOSIS — E06.3 HYPOTHYROIDISM DUE TO HASHIMOTO'S THYROIDITIS: ICD-10-CM

## 2019-06-05 LAB
ALBUMIN SERPL BCP-MCNC: 4 G/DL (ref 3.5–5)
ALP SERPL-CCNC: 64 U/L (ref 46–116)
ALT SERPL W P-5'-P-CCNC: 18 U/L (ref 12–78)
ANION GAP SERPL CALCULATED.3IONS-SCNC: 4 MMOL/L (ref 4–13)
AST SERPL W P-5'-P-CCNC: 16 U/L (ref 5–45)
BILIRUB SERPL-MCNC: 0.6 MG/DL (ref 0.2–1)
BUN SERPL-MCNC: 18 MG/DL (ref 5–25)
CALCIUM SERPL-MCNC: 8.4 MG/DL (ref 8.3–10.1)
CHLORIDE SERPL-SCNC: 106 MMOL/L (ref 100–108)
CHOLEST SERPL-MCNC: 177 MG/DL (ref 50–200)
CO2 SERPL-SCNC: 31 MMOL/L (ref 21–32)
CREAT SERPL-MCNC: 0.7 MG/DL (ref 0.6–1.3)
EST. AVERAGE GLUCOSE BLD GHB EST-MCNC: 123 MG/DL
GFR SERPL CREATININE-BSD FRML MDRD: 78 ML/MIN/1.73SQ M
GLUCOSE P FAST SERPL-MCNC: 103 MG/DL (ref 65–99)
HBA1C MFR BLD: 5.9 % (ref 4.2–6.3)
HDLC SERPL-MCNC: 71 MG/DL (ref 40–60)
LDLC SERPL CALC-MCNC: 91 MG/DL (ref 0–100)
POTASSIUM SERPL-SCNC: 4.3 MMOL/L (ref 3.5–5.3)
PROT SERPL-MCNC: 6.7 G/DL (ref 6.4–8.2)
SODIUM SERPL-SCNC: 141 MMOL/L (ref 136–145)
TRIGL SERPL-MCNC: 75 MG/DL
TSH SERPL DL<=0.05 MIU/L-ACNC: 0.29 UIU/ML (ref 0.36–3.74)

## 2019-06-05 PROCEDURE — 80061 LIPID PANEL: CPT

## 2019-06-05 PROCEDURE — 80053 COMPREHEN METABOLIC PANEL: CPT

## 2019-06-05 PROCEDURE — 84443 ASSAY THYROID STIM HORMONE: CPT

## 2019-06-05 PROCEDURE — 83036 HEMOGLOBIN GLYCOSYLATED A1C: CPT

## 2019-06-05 PROCEDURE — 36415 COLL VENOUS BLD VENIPUNCTURE: CPT

## 2019-06-07 DIAGNOSIS — E03.9 HYPOTHYROIDISM, UNSPECIFIED TYPE: Primary | ICD-10-CM

## 2019-06-07 DIAGNOSIS — I10 HYPERTENSION, UNSPECIFIED TYPE: ICD-10-CM

## 2019-06-07 RX ORDER — AMLODIPINE BESYLATE AND BENAZEPRIL HYDROCHLORIDE 2.5; 1 MG/1; MG/1
1 CAPSULE ORAL DAILY
Qty: 90 CAPSULE | Refills: 3 | Status: SHIPPED | OUTPATIENT
Start: 2019-06-07 | End: 2020-02-10

## 2019-07-10 ENCOUNTER — APPOINTMENT (OUTPATIENT)
Dept: LAB | Facility: CLINIC | Age: 84
End: 2019-07-10
Payer: COMMERCIAL

## 2019-07-10 ENCOUNTER — OFFICE VISIT (OUTPATIENT)
Dept: INTERNAL MEDICINE CLINIC | Facility: CLINIC | Age: 84
End: 2019-07-10
Payer: COMMERCIAL

## 2019-07-10 VITALS
DIASTOLIC BLOOD PRESSURE: 80 MMHG | BODY MASS INDEX: 25.1 KG/M2 | HEART RATE: 64 BPM | WEIGHT: 136.4 LBS | HEIGHT: 62 IN | RESPIRATION RATE: 16 BRPM | SYSTOLIC BLOOD PRESSURE: 138 MMHG | OXYGEN SATURATION: 94 %

## 2019-07-10 DIAGNOSIS — Z00.00 MEDICARE ANNUAL WELLNESS VISIT, SUBSEQUENT: ICD-10-CM

## 2019-07-10 DIAGNOSIS — E03.9 HYPOTHYROIDISM, UNSPECIFIED TYPE: ICD-10-CM

## 2019-07-10 DIAGNOSIS — R53.83 OTHER FATIGUE: Primary | ICD-10-CM

## 2019-07-10 DIAGNOSIS — M25.371 ANKLE INSTABILITY, RIGHT: ICD-10-CM

## 2019-07-10 DIAGNOSIS — I10 ESSENTIAL HYPERTENSION: ICD-10-CM

## 2019-07-10 DIAGNOSIS — J45.20 MILD INTERMITTENT ASTHMA WITHOUT COMPLICATION: ICD-10-CM

## 2019-07-10 LAB — TSH SERPL DL<=0.05 MIU/L-ACNC: 0.15 UIU/ML (ref 0.36–3.74)

## 2019-07-10 PROCEDURE — 84443 ASSAY THYROID STIM HORMONE: CPT

## 2019-07-10 PROCEDURE — 1101F PT FALLS ASSESS-DOCD LE1/YR: CPT | Performed by: INTERNAL MEDICINE

## 2019-07-10 PROCEDURE — 1170F FXNL STATUS ASSESSED: CPT | Performed by: INTERNAL MEDICINE

## 2019-07-10 PROCEDURE — 36415 COLL VENOUS BLD VENIPUNCTURE: CPT

## 2019-07-10 PROCEDURE — G0439 PPPS, SUBSEQ VISIT: HCPCS | Performed by: INTERNAL MEDICINE

## 2019-07-10 PROCEDURE — 99214 OFFICE O/P EST MOD 30 MIN: CPT | Performed by: INTERNAL MEDICINE

## 2019-07-10 PROCEDURE — 1125F AMNT PAIN NOTED PAIN PRSNT: CPT | Performed by: INTERNAL MEDICINE

## 2019-07-10 RX ORDER — LEVOTHYROXINE SODIUM 0.1 MG/1
100 TABLET ORAL DAILY
Qty: 90 TABLET | Refills: 3 | Status: SHIPPED | OUTPATIENT
Start: 2019-07-10 | End: 2020-07-07 | Stop reason: SDUPTHER

## 2019-07-10 NOTE — PROGRESS NOTES
Assessment and Plan:     Problem List Items Addressed This Visit        Endocrine    Hypothyroidism    Relevant Medications    levothyroxine 100 mcg tablet    Other Relevant Orders    TSH, 3rd generation       Other    Medicare annual wellness visit, subsequent     Assessment and plan 1  Medicare subsequent annual wellness examination overall the patient is clinically stable and doing well, we encouraged the patient to follow a healthy and balanced diet  We recommend that the patient exercise routinely approximately 30 minutes 5 times per week   We have reviewed the patient's vaccines and have made recommendations for updates if necessary consider the new shingles vaccine, annual flu vaccine       We will be ordering screening laboratories which are age appropriate  Return to the office in  6 months    call if any problems             Other Visit Diagnoses     Other fatigue    -  Primary    Relevant Orders    Ferritin    CBC (Includes Diff/Plt) (Refl)    Vitamin B12    Vitamin D 25 hydroxy    Ankle instability, right        Relevant Medications    Elastic Bandages & Supports (NEOPRENE ANKLE SUPPORT) MISC    Other Relevant Orders    Ambulatory referral to Orthopedic Surgery    XR ankle 3+ vw right         History of Present Illness:     Patient presents for Medicare Annual Wellness visit    Patient Care Team:  Mandie Mae DO as PCP - MD Ulises Alston MD Markel Montana, MD Leretha Bias, MD Nanine Celestine, DO Lavenia Chamorro, MD Lorretta Celestine, MD Jori Pfeiffer, MD Fleurette Pool, MD Erla Bimler, MD Samantha Millard, MD     Problem List:     Patient Active Problem List   Diagnosis    Trigger middle finger of right hand    Shortness of breath    Essential hypertension    Hyperlipidemia    Asthma    Paroxysmal atrial fibrillation (HCC)    Iron deficiency anemia    Diarrhea    Headache    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[address] )    Visual hallucination    Macular degeneration, wet (HCC)    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[address] )    Hypothyroidism due to Hashimoto's thyroiditis    Mild intermittent asthma without complication    Hypothyroidism    Otalgia of both ears    Screening for diabetes mellitus    Medicare annual wellness visit, subsequent      Past Medical and Surgical History:     Past Medical History:   Diagnosis Date    A-fib ([place] [address] )     Allergy to IVP dye 06/04/2013    pt felt heaviness, palpitations    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), wet ([place] [address] )     bilateral    Aneurysm of aortic root ([place] [address] )     last assessed - [id number]    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HCC)     Aortic root dilatation (HCC)     last assessed - [id number]    Arthritis     Ascending aortic aneurysm (HCC)     last assessed - [id number]    Asthma     Basal cell carcinoma     last assessed - 23Beg7711    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[place] [address] )     Disease of thyroid gland     Facet arthropathy, cervical     last assessed - [id number]    History of fracture of vertebral column     Hyperlipidemia     Hypertension     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[place] [address] )     Hypoxia     last assessed - [id number]    Junctional rhythm     last assessed - 68Xpj4949    Lightheadedness     last assessed - 50Mum9830    Migraine     Palpitations     last assessed - 11Azz9389    Pleural effusion, bilateral     last assessed - 86Nbj4838    Shortness of breath     last assessed - 59Toe2336    Thyroid trouble     Trigger finger     last assessed - 09Tlf5184    Trigger middle finger of right hand     last assessed - 30Eir0678    Urinary incontinence     last assessed -  22Jul,2013; Resolved - [id number]     Past Surgical History:   Procedure Laterality Date    ABDOMINAL AORTIC ANEURYSM REPAIR W/ ENDOLUMINAL GRAFT  06/28/2015    Ascending aorta and hemiarch replacement with 30 mm Vascutek Gelweave graft;  Managed by Joelle Card; last assessed - [id number]    APPENDECTOMY      BLADDER SURGERY Nano holley of bladder surgery    HOROWITZ PROCEDURE      CARDIAC CATHETERIZATION  2004    Procedure summary - Luminal irregularities; last assessed - [id number]     SECTION      CORONARY ANEURYSM REPAIR      CYSTOSCOPY      botox injection    HYSTERECTOMY      MI ENDOSCOPIC INJECTION/IMPLANT N/A 2017    Procedure: CYSTOSCOPY; DURASPHERE-PERIURETHRAL BULKING AGENT INJECTION ;  Surgeon: Stanton Dickerson MD;  Location: [facility];  Service: Urology    MI INCISE FINGER TENDON SHEATH Right 10/18/2016    Procedure: LONG FINGER TRIGGER RELEASE ;  Surgeon: Yash Ring MD;  Location: [facility];  Service: Orthopedics    THYROIDECTOMY      Total Thyroidectomy    TONSILLECTOMY AND ADENOIDECTOMY        Family History:     Family History   Problem Relation Age of Onset    Coronary aneurysm Sister     Coronary artery disease Sister         CABG    Heart disease Family         cardiac disorder    Hypertension Family     Cancer Family     Thyroid disease Family       Social History:     Social History     Tobacco Use   Smoking Status Former Smoker    Packs/day: 0 25    Years: 57 00    Pack years: 14 25    Types: Cigarettes    Start date: 36    Last attempt to quit: 2014    Years since quittin 5   Smokeless Tobacco Never Used   Tobacco Comment    smoked 17 yrs 1/2 ppd quit and restarted then quit  10 days ago during 2014      Social History     Substance and Sexual Activity   Alcohol Use No    Comment: Social drinker & never drang alcohol both documented in [employer]     Social History     Substance and Sexual Activity   Drug Use No      Medications and Allergies:     Current Outpatient Medications   Medication Sig Dispense Refill    acetaminophen (TYLENOL) 500 mg tablet Take 500 mg by mouth every 6 (six) hours as needed for mild pain      albuterol (2 5 mg/3 mL) 0 083 % nebulizer solution Take 3 mL (2 5 mg total) by nebulization 4 (four) times a day 50 vial 2  albuterol (PROVENTIL HFA,VENTOLIN HFA) 90 mcg/act inhaler Inhale 2 puffs every 6 (six) hours as needed for wheezing 1 Inhaler 0    amLODIPine-benazepril (LOTREL 2 5-10) 2 5-10 MG per capsule Take 1 capsule by mouth daily 90 capsule 3    aspirin 81 MG tablet Take 162 mg by mouth daily in the early morning        calcium carbonate (OS-TAE) 600 MG tablet Take 600 mg by mouth daily in the early morning        guaiFENesin (MUCINEX) 600 mg 12 hr tablet Take 1 tablet (600 mg total) by mouth every 12 (twelve) hours 60 tablet 0    lovastatin (MEVACOR) 20 mg tablet TAKE ONE TABLET BY MOUTH EVERY DAY 90 tablet 3    metoprolol tartrate (LOPRESSOR) 25 mg tablet Take 1 tablet (25 mg total) by mouth 2 (two) times a day 180 tablet 3    Multiple Vitamin (MULTIVITAMIN) tablet Take 1 tablet by mouth daily in the early morning        Elastic Bandages & Supports (NEOPRENE ANKLE SUPPORT) MISC by Does not apply route daily 1 each 0    levothyroxine 100 mcg tablet Take 1 tablet (100 mcg total) by mouth daily 90 tablet 3     No current facility-administered medications for this visit  Allergies   Allergen Reactions    Amiodarone Hives    Keflex [Cephalexin] Hives    Omnipaque [Iohexol] Hives and Shortness Of Breath    Clindamycin Other (See Comments)     When taken causes cdiff immediately    Sulfa Antibiotics Hives     itching    Iv Dye  [Iodinated Diagnostic Agents] Hypertension and Palpitations     Annotation - [id number]: Verified with patient     Naprosyn [Naproxen] Itching and Rash     Category:  Allergy;       Immunizations:     Immunization History   Administered Date(s) Administered    INFLUENZA 11/17/2005, 12/12/2013, 12/23/2014, 11/13/2018    Influenza Split High Dose Preservative Free IM 09/09/2016, 11/06/2017    Influenza TIV (IM) 01/01/2012, 10/03/2014    Influenza, high dose seasonal 0 5 mL 11/13/2018    Pneumococcal Conjugate 13-Valent 03/13/2017    Pneumococcal Polysaccharide PPV23 01/01/2008  Tuberculin Skin Test-PPD Intradermal 05/07/2015      Medicare Screening Tests and Risk Assessments:     Buffy is here for her Subsequent Wellness visit  Health Risk Assessment:  Patient rates overall health as good  Patient feels that their physical health rating is Same  Eyesight was rated as Same  Hearing was rated as Same  Patient feels that their emotional and mental health rating is Same  Pain experienced by patient in the last 7 days has been None  Patient states that she has experienced no weight loss or gain in last 6 months  Emotional/Mental Health:  Patient has not been feeling nervous/anxious  PHQ-9 Depression Screening:    Frequency of the following problems over the past two weeks:      1  Little interest or pleasure in doing things: 0 - not at all      2  Feeling down, depressed, or hopeless: 0 - not at all  PHQ-2 Score: 0          Broken Bones/Falls: Fall Risk Assessment:    In the past year, patient has experienced: History of falling in past year    Number of falls: 1    Injured during fall: No      Patient feels steady when standing or walking  Patient is worried about falling     Bladder/Bowel:  Patient has not leaked urine accidently in the last six months  Patient reports no loss of bowel control  Immunizations:  Patient has had a flu vaccination within the last year  Patient has received a pneumonia shot  Patient has not received a shingles shot  Patient has received tetanus/diphtheria shot  Home Safety:  Patient does not have trouble with stairs inside or outside of their home  Patient currently reports that there are no safety hazards present in home, working smoke alarms, working carbon monoxide detectors        Preventative Screenings:   Breast cancer screening performed, colon cancer screen completed, cholesterol screen completed, glaucoma eye exam completed,     Nutrition:  Current diet: Regular with servings of the following:    Medications:  Patient is currently taking over-the-counter supplements  Patient is able to manage medications  Lifestyle Choices:  Patient reports no tobacco use  Patient has not smoked or used tobacco in the past   Patient reports no alcohol use  Patient drives a vehicle  Patient wears seat belt  Activities of Daily Living:  Can get out of bed by his or her self, able to dress self, able to make own meals, able to do own shopping, able to bathe self, can do own laundry/housekeeping, can manage own money, pay bills and track expenses    Previous Hospitalizations:  No hospitalization or ED visit in past 12 months        Advanced Directives:  Patient has decided on a power of   Patient has spoken to designated power of   Patient has completed advanced directive

## 2019-07-10 NOTE — PATIENT INSTRUCTIONS
Obesity   AMBULATORY CARE:   Obesity  is when your body mass index (BMI) is greater than 30  Your healthcare provider will use your height and weight to measure your BMI  The risks of obesity include  many health problems, such as injuries or physical disability  You may need tests to check for the following:  · Diabetes     · High blood pressure or high cholesterol     · Heart disease     · Gallbladder or liver disease     · Cancer of the colon, breast, prostate, liver, or kidney     · Sleep apnea     · Arthritis or gout  Seek care immediately if:   · You have a severe headache, confusion, or difficulty speaking  · You have weakness on one side of your body  · You have chest pain, sweating, or shortness of breath  Contact your healthcare provider if:   · You have symptoms of gallbladder or liver disease, such as pain in your upper abdomen  · You have knee or hip pain and discomfort while walking  · You have symptoms of diabetes, such as intense hunger and thirst, and frequent urination  · You have symptoms of sleep apnea, such as snoring or daytime sleepiness  · You have questions or concerns about your condition or care  Treatment for obesity  focuses on helping you lose weight to improve your health  Even a small decrease in BMI can reduce the risk for many health problems  Your healthcare provider will help you set a weight-loss goal   · Lifestyle changes  are the first step in treating obesity  These include making healthy food choices and getting regular physical activity  Your healthcare provider may suggest a weight-loss program that involves coaching, education, and therapy  · Medicine  may help you lose weight when it is used with a healthy diet and physical activity  · Surgery  can help you lose weight if you are very obese and have other health problems  There are several types of weight-loss surgery  Ask your healthcare provider for more information    Be successful losing weight:   · Set small, realistic goals  An example of a small goal is to walk for 20 minutes 5 days a week  Anther goal is to lose 5% of your body weight  · Tell friends, family members, and coworkers about your goals  and ask for their support  Ask a friend to lose weight with you, or join a weight-loss support group  · Identify foods or triggers that may cause you to overeat , and find ways to avoid them  Remove tempting high-calorie foods from your home and workplace  Place a bowl of fresh fruit on your kitchen counter  If stress causes you to eat, then find other ways to cope with stress  · Keep a diary to track what you eat and drink  Also write down how many minutes of physical activity you do each day  Weigh yourself once a week and record it in your diary  Eating changes: You will need to eat 500 to 1,000 fewer calories each day than you currently eat to lose 1 to 2 pounds a week  The following changes will help you cut calories:  · Eat smaller portions  Use small plates, no larger than 9 inches in diameter  Fill your plate half full of fruits and vegetables  Measure your food using measuring cups until you know what a serving size looks like  · Eat 3 meals and 1 or 2 snacks each day  Plan your meals in advance  [employer] and eat at home most of the time  Eat slowly  · Eat fruits and vegetables at every meal   They are low in calories and high in fiber, which makes you feel full  Do not add butter, margarine, or cream sauce to vegetables  Use herbs to season steamed vegetables  · Eat less fat and fewer fried foods  Eat more baked or grilled chicken and fish  These protein sources are lower in calories and fat than red meat  Limit fast food  Dress your salads with olive oil and vinegar instead of bottled dressing  · Limit the amount of sugar you eat  Do not drink sugary beverages  Limit alcohol  Activity changes:  Physical activity is good for your body in many ways   It helps you burn calories and build strong muscles  It decreases stress and depression, and improves your mood  It can also help you sleep better  Talk to your healthcare provider before you begin an exercise program   · Exercise for at least 30 minutes 5 days a week  Start slowly  Set aside time each day for physical activity that you enjoy and that is convenient for you  It is best to do both weight training and an activity that increases your heart rate, such as walking, bicycling, or swimming  · Find ways to be more active  Do yard work and housecleaning  Walk up the stairs instead of using elevators  Spend your leisure time going to events that require walking, such as outdoor festivals or fairs  This extra physical activity can help you lose weight and keep it off  Follow up with your healthcare provider as directed: You may need to meet with a dietitian  Write down your questions so you remember to ask them during your visits  © 2017 [employer] Information is for End User's use only and may not be sold, redistributed or otherwise used for commercial purposes  All illustrations and images included in CareNotes® are the copyrighted property of [employer]  or Reid Noriega  The above information is an  only  It is not intended as medical advice for individual conditions or treatments  Talk to your doctor, nurse or pharmacist before following any medical regimen to see if it is safe and effective for you  Urinary Incontinence   WHAT YOU NEED TO KNOW:   What is urinary incontinence? Urinary incontinence (UI) is when you lose control of your bladder  What causes UI? UI occurs because your bladder cannot store or empty urine properly  The following are the most common types of UI:  · Stress incontinence  is when you leak urine due to increased bladder pressure  This may happen when you cough, sneeze, or exercise       · Urge incontinence  is when you feel the need to urinate right away and leak urine accidentally  · Mixed incontinence  is when you have both stress and urge UI  What are the signs and symptoms of UI?   · You feel like your bladder does not empty completely when you urinate  · You urinate often and need to urinate immediately  · You leak urine when you sleep, or you wake up with the urge to urinate  · You leak urine when you cough, sneeze, exercise, or laugh  How is UI diagnosed? Your healthcare provider will ask how often you leak urine and whether you have stress or urge symptoms  Tell him which medicines you take, how often you urinate, and how much liquid you drink each day  You may need any of the following tests:  · Urine tests  may show infection or kidney function  · A pelvic exam  may be done to check for blockages  A pelvic exam will also show if your bladder, uterus, or other organs have moved out of place  · An x-ray, ultrasound, or CT  may show problems with parts of your urinary system  You may be given contrast liquid to help your organs show up better in the pictures  Tell the healthcare provider if you have ever had an allergic reaction to contrast liquid  Do not enter the MRI room with anything metal  Metal can cause serious injury  Tell the healthcare provider if you have any metal in or on your body  · A bladder scan  will show how much urine is left in your bladder after you urinate  You will be asked to urinate and then healthcare providers will use a small ultrasound machine to check the urine left in your bladder  · Cystometry  is used to check the function of your urinary system  Your healthcare provider checks the pressure in your bladder while filling it with fluid  Your bladder pressure may also be tested when your bladder is full and while you urinate  How is UI treated? · Medicines  can help strengthen your bladder control      · Electrical stimulation  is used to send a small amount of electrical energy to your pelvic floor muscles  This helps control your bladder function  Electrodes may be placed outside your body or in your rectum  For women, the electrodes may be placed in the vagina  · A bulking agent  may be injected into the wall of your urethra to make it thicker  This helps keep your urethra closed and decreases urine leakage  · Devices  such as a clamp, pessary, or tampon may help stop urine leaks  Ask your healthcare provider for more information about these and other devices  · Surgery  may be needed if other treatments do not work  Several types of surgery can help improve your bladder control  Ask your healthcare provider for more information about the surgery you may need  How can I manage my symptoms? · Do pelvic muscle exercises often  Your pelvic muscles help you stop urinating  Squeeze these muscles tight for 5 seconds, then relax for 5 seconds  Gradually work up to squeezing for 10 seconds  Do 3 sets of 15 repetitions a day, or as directed  This will help strengthen your pelvic muscles and improve bladder control  · A catheter  may be used to help empty your bladder  A catheter is a tiny, plastic tube that is put into your bladder to drain your urine  Your healthcare provider may tell you to use a catheter to prevent your bladder from getting too full and leaking urine  · Keep a UI record  Write down how often you leak urine and how much you leak  Make a note of what you were doing when you leaked urine  · Train your bladder  Go to the bathroom at set times, such as every 2 hours, even if you do not feel the urge to go  You can also try to hold your urine when you feel the urge to go  For example, hold your urine for 5 minutes when you feel the urge to go  As that becomes easier, hold your urine for 10 minutes  · Drink liquids as directed  Ask your healthcare provider how much liquid to drink each day and which liquids are best for you   You may need to limit the amount of liquid you drink to help control your urine leakage  Limit or do not have drinks that contain caffeine or alcohol  Do not drink any liquid right before you go to bed  · Prevent constipation  Eat a variety of high-fiber foods  Good examples are high-fiber cereals, beans, vegetables, and whole-grain breads  Prune juice may help make your bowel movement softer  Walking is the best way to trigger your intestines to have a bowel movement  · Exercise regularly and maintain a healthy weight  Ask your healthcare provider how much you should weigh and about the best exercise plan for you  Weight loss and exercise will decrease pressure on your bladder and help you control your leakage  Ask him to help you create a weight loss plan if you are overweight  When should I seek immediate care? · You have severe pain  · You are confused or cannot think clearly  When should I contact my healthcare provider? · You have a fever  · You see blood in your urine  · You have pain when you urinate  · You have new or worse pain, even after treatment  · Your mouth feels dry or you have vision changes  · Your urine is cloudy or smells bad  · You have questions or concerns about your condition or care  CARE AGREEMENT:   You have the right to help plan your care  Learn about your health condition and how it may be treated  Discuss treatment options with your caregivers to decide what care you want to receive  You always have the right to refuse treatment  The above information is an  only  It is not intended as medical advice for individual conditions or treatments  Talk to your doctor, nurse or pharmacist before following any medical regimen to see if it is safe and effective for you  © 2017 [employer] Information is for End User's use only and may not be sold, redistributed or otherwise used for commercial purposes   All illustrations and images included in CareNotes® are the copyrighted property of [employer]  or Reid Noriega  Cigarette Smoking and Your Health   AMBULATORY CARE:   Risks to your health if you smoke:  Nicotine and other chemicals found in tobacco damage every cell in your body  Even if you are a light smoker, you have an increased risk for cancer, heart disease, and lung disease  If you are pregnant or have diabetes, smoking increases your risk for complications  Benefits to your health if you stop smoking:   · You decrease respiratory symptoms such as coughing, wheezing, and shortness of breath  · You reduce your risk for cancers of the lung, mouth, throat, kidney, bladder, pancreas, stomach, and cervix  If you already have cancer, you increase the benefits of chemotherapy  You also reduce your risk for cancer returning or a second cancer from developing  · You reduce your risk for heart disease, blood clots, heart attack, and stroke  · You reduce your risk for lung infections, and diseases such as pneumonia, asthma, chronic bronchitis, and emphysema  · Your circulation improves  More oxygen can be delivered to your body  If you have diabetes, you lower your risk for complications, such as kidney, artery, and eye diseases  You also lower your risk for nerve damage  Nerve damage can lead to amputations, poor vision, and blindness  · You improve your body's ability to heal and to fight infections  Benefits to the health of others if you stop smoking:  Tobacco is harmful to nonsmokers who breathe in your secondhand smoke  The following are ways the health of others around you may improve when you stop smoking:  · You lower the risks for lung cancer and heart disease in nonsmoking adults  · If you are pregnant, you lower the risk for miscarriage, early delivery, low birth weight, and stillbirth  You also lower your baby's risk for SIDS, obesity, developmental delay, and neurobehavioral problems, such as ADHD  · If you have children, you lower their risk for ear infections, colds, pneumonia, bronchitis, and asthma  For more information and support to stop smoking:   · Smokefree  gov  Phone: 7- 469 - 256-6934  Web Address: www smokefree  gov  Follow up with your healthcare provider as directed:  Write down your questions so you remember to ask them during your visits  © 2017 [address] [employer] Information is for End User's use only and may not be sold, redistributed or otherwise used for commercial purposes  All illustrations and images included in CareNotes® are the copyrighted property of [employer]  or Reid Noriega  The above information is an  only  It is not intended as medical advice for individual conditions or treatments  Talk to your doctor, nurse or pharmacist before following any medical regimen to see if it is safe and effective for you  Fall Prevention   AMBULATORY CARE:   Fall prevention  includes ways to make your home and other areas safer  It also includes ways you can move more carefully to prevent a fall  Health conditions that cause changes in your blood pressure, vision, or muscle strength and coordination may increase your risk for falls  Medicines may also increase your risk for falls if they make you dizzy, weak, or sleepy  Call 911 or have someone else call if:   · You have fallen and are unconscious  · You have fallen and cannot move part of your body  Contact your healthcare provider if:   · You have fallen and have pain or a headache  · You have questions or concerns about your condition or care  Fall prevention tips:   · Stand or sit up slowly  This may help you keep your balance and prevent falls  · Use assistive devices as directed  Your healthcare provider may suggest that you use a cane or walker to help you keep your balance  You may need to have grab bars put in your bathroom near the toilet or in the shower      · Wear shoes that fit well and have soles that   Wear shoes both inside and outside  Use slippers with good   Do not wear shoes with high heels  · Wear a personal alarm  This is a device that allows you to call 911 if you fall and need help  Ask your healthcare provider for more information  · Stay active  Exercise can help strengthen your muscles and improve your balance  Your healthcare provider may recommend water aerobics or walking  He or she may also recommend physical therapy to improve your coordination  Never start an exercise program without talking to your healthcare provider first      · Manage your medical conditions  Keep all appointments with your healthcare providers  Visit your eye doctor as directed  Home safety tips:   · Add items to prevent falls in the bathroom  Put nonslip strips on your bath or shower floor to prevent you from slipping  Use a bath mat if you do not have carpet in the bathroom  This will prevent you from falling when you step out of the bath or shower  Use a shower seat so you do not need to stand while you shower  Sit on the toilet or a chair in your bathroom to dry yourself and put on clothing  This will prevent you from losing your balance from drying or dressing yourself while you are standing  · Keep paths clear  Remove books, shoes, and other objects from walkways and stairs  Place cords for telephones and lamps out of the way so that you do not need to walk over them  Tape them down if you cannot move them  Remove small rugs  If you cannot remove a rug, secure it with double-sided tape  This will prevent you from tripping  · Install bright lights in your home  Use night lights to help light paths to the bathroom or kitchen  Always turn on the light before you start walking  · Keep items you use often on shelves within reach  Do not use a step stool to help you reach an item  · Paint or place reflective tape on the edges of your stairs    This will help you see the stairs better  Follow up with your healthcare provider as directed:  Write down your questions so you remember to ask them during your visits  © 2017 [employer] Information is for End User's use only and may not be sold, redistributed or otherwise used for commercial purposes  All illustrations and images included in CareNotes® are the copyrighted property of [employer]  or Reid Noriega  The above information is an  only  It is not intended as medical advice for individual conditions or treatments  Talk to your doctor, nurse or pharmacist before following any medical regimen to see if it is safe and effective for you  Advance Directives   WHAT YOU NEED TO KNOW:   What are advance directives? Advance directives are legal documents that state your wishes and plans for medical care  These plans are made ahead of time in case you lose your ability to make decisions for yourself  Advance directives can apply to any medical decision, such as the treatments you want, and if you want to donate organs  What are the types of advance directives? There are many types of advance directives, and each state has rules about how to use them  You may choose a combination of any of the following:  · Living will: This is a written record of the treatment you want  You can also choose which treatments you do not want, which to limit, and which to stop at a certain time  This includes surgery, medicine, IV fluid, and tube feedings  · Durable power of  for healthcare [facility]): This is a written record that states who you want to make healthcare choices for you when you are unable to make them for yourself  This person, called a proxy, is usually a family member or a friend  You may choose more than 1 proxy  · Do not resuscitate (DNR) order:  A DNR order is used in case your heart stops beating or you stop breathing   It is a request not to have certain forms of treatment, such as CPR  A DNR order may be included in other types of advance directives  · Medical directive: This covers the care that you want if you are in a coma, near death, or unable to make decisions for yourself  You can list the treatments you want for each condition  Treatment may include pain medicine, surgery, blood transfusions, dialysis, IV or tube feedings, and a ventilator (breathing machine)  · Values history: This document has questions about your views, beliefs, and how you feel and think about life  This information can help others choose the care that you would choose  Why are advance directives important? An advance directive helps you control your care  Although spoken wishes may be used, it is better to have your wishes written down  Spoken wishes can be misunderstood, or not followed  Treatments may be given even if you do not want them  An advance directive may make it easier for your family to make difficult choices about your care  How do I decide what to put in my advance directives? · Make informed decisions:  Make sure you fully understand treatments or care you may receive  Think about the benefits and problems your decisions could cause for you or your family  Talk to healthcare providers if you have concerns or questions before you write down your wishes  You may also want to talk with your Mormon or , or a   Check your state laws to make sure that what you put in your advance directive is legal      · Sign all forms:  Sign and date your advance directive when you have finished  You may also need 2 witnesses to sign the forms  Witnesses cannot be your doctor or his staff, your spouse, heirs or beneficiaries, people you owe money to, or your chosen proxy  Talk to your family, proxy, and healthcare providers about your advance directive  Give each person a copy, and keep one for yourself in a place you can get to easily   Do not keep it hidden or locked away  · Review and revise your plans: You can revise your advance directive at any time, as long as you are able to make decisions  Review your plan every year, and when there are changes in your life, or your health  When you make changes, let your family, proxy, and healthcare providers know  Give each a new copy  Where can I find more information? · [employer]  [address] [place] , [place]  Phone: [phone]  Phone: [phone]  Web Address: http://www  aafp org  · [address] [facility])  [address] [address] [facility], [address]  Phone: [phone]  Phone: [phone]  Web Address: Richie peters  CARE AGREEMENT:   You have the right to help plan your care  To help with this plan, you must learn about your health condition and treatment options  You must also learn about advance directives and how they are used  Work with your healthcare providers to decide what care will be used to treat you  You always have the right to refuse treatment  The above information is an  only  It is not intended as medical advice for individual conditions or treatments  Talk to your doctor, nurse or pharmacist before following any medical regimen to see if it is safe and effective for you  © 2017 [employer] Information is for End User's use only and may not be sold, redistributed or otherwise used for commercial purposes  All illustrations and images included in CareNotes® are the copyrighted property of [employer]  or Reid Noriega

## 2019-07-10 NOTE — PROGRESS NOTES
BMI Counseling: Body mass index is 24 95 kg/m²  Discussed the patient's BMI with her  The BMI is in the normal range    Assessment/Plan:    Medicare annual wellness visit, subsequent  Assessment and plan 1  Medicare subsequent annual wellness examination overall the patient is clinically stable and doing well, we encouraged the patient to follow a healthy and balanced diet  We recommend that the patient exercise routinely approximately 30 minutes 5 times per week   We have reviewed the patient's vaccines and have made recommendations for updates if necessary consider the new shingles vaccine, annual flu vaccine       We will be ordering screening laboratories which are age appropriate  Return to the office in  6 months    call if any problems  Hypothyroidism  Hypothyroidism low TSH I will reduce the levothyroxine to 100 mcg once daily and recheck a 3rd generation TSH in 4 weeks    Asthma  Clinically stable doing well she reports me intermittent wheezing I have asked her follow up with her pulmonologist currently she is stable    Essential hypertension  Hypertension - controlled, I have counseled patient following healthy balance diet, I would like the patient reduce sodium, exercise routinely, I would like the patient continued the med current medical regiment and we will continue to monitor      Ankle instability, right  Patient does have tenderness of the anterior medial aspect of the ankle I am concerned she has developed some arthritis/instability of the ankle I will check x-ray of the right ankle and have the patient see orthopedic foot specialist Dr Michael Yost         Problem List Items Addressed This Visit        Endocrine    Hypothyroidism     Hypothyroidism low TSH I will reduce the levothyroxine to 100 mcg once daily and recheck a 3rd generation TSH in 4 weeks         Relevant Medications    levothyroxine 100 mcg tablet    Other Relevant Orders    TSH, 3rd generation       Respiratory    Asthma Clinically stable doing well she reports me intermittent wheezing I have asked her follow up with her pulmonologist currently she is stable            Cardiovascular and Mediastinum    Essential hypertension     Hypertension - controlled, I have counseled patient following healthy balance diet, I would like the patient reduce sodium, exercise routinely, I would like the patient continued the med current medical regiment and we will continue to monitor  Other    Medicare annual wellness visit, subsequent     Assessment and plan 1  Medicare subsequent annual wellness examination overall the patient is clinically stable and doing well, we encouraged the patient to follow a healthy and balanced diet  We recommend that the patient exercise routinely approximately 30 minutes 5 times per week   We have reviewed the patient's vaccines and have made recommendations for updates if necessary consider the new shingles vaccine, annual flu vaccine       We will be ordering screening laboratories which are age appropriate  Return to the office in  6 months    call if any problems  Ankle instability, right     Patient does have tenderness of the anterior medial aspect of the ankle I am concerned she has developed some arthritis/instability of the ankle I will check x-ray of the right ankle and have the patient see orthopedic foot specialist Dr Maddison Juarez (NEOPRENE ANKLE SUPPORT) MISC    Other Relevant Orders    Ambulatory referral to Orthopedic Surgery    XR ankle 3+ vw right    Other fatigue - Primary    Relevant Orders    Ferritin    CBC (Includes Diff/Plt) (Refl)    Vitamin B12    Vitamin D 25 hydroxy          Return to office 6  months  call if any problems  Subjective:      Patient ID: Buffy Kerns is a 80 y o  female      HPI 80-year old female coming in for a follow up office visit regarding hypothyroidism, fatigue, right ankle instability/pain/mild intermittent asthma and hypertension; The patient reports me compliant taking medications without untoward side effects the  The patient is here to review his medical condition, update me on the medical condition and the patient reports me no hospitalizations and no ER visits  she reports me that with her right foot it can suddenly give out and then the pain develops and then she almost falls down; she describes as a sharp pain in she has had it for 3 weeks no injury she will have the pain for about 1 hour and then resolves on its own  He reports me that the pain will shoot up the the front of the like  No previous injuries that she can recall  The following portions of the patient's history were reviewed and updated as appropriate: allergies, current medications, past family history, past medical history, past social history, past surgical history and problem list     Review of Systems   Constitutional: Positive for fatigue  Negative for activity change, appetite change and unexpected weight change  HENT: Negative for congestion and postnasal drip  Eyes: Negative for visual disturbance  Respiratory: Positive for wheezing (Intermittent wheezing)  Negative for cough and shortness of breath  Cardiovascular: Negative for chest pain  Gastrointestinal: Negative for abdominal pain, diarrhea, nausea and vomiting  Neurological: Negative for dizziness, light-headedness and headaches  Hematological: Negative for adenopathy  Objective:    No follow-ups on file  No results found        Allergies   Allergen Reactions    Amiodarone Hives    Keflex [Cephalexin] Hives    Omnipaque [Iohexol] Hives and Shortness Of Breath    Clindamycin Other (See Comments)     When taken causes cdiff immediately    Sulfa Antibiotics Hives     itching    Iv Dye  [Iodinated Diagnostic Agents] Hypertension and Palpitations     [facility] [id number]: Verified with patient     Naprosyn [Naproxen] Itching and Rash     Category: Allergy; Past Medical History:   Diagnosis Date    A-fib [facility])     Allergy to IVP dye 2013    pt felt heaviness, palpitations    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), [facility] ([facility] )     bilateral    [facility] )     last assessed - [id number]    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HCC)     Aortic root dilatation (HCC)     last assessed - [id number]    Arthritis     Ascending aortic aneurysm (HCC)     last assessed - [id number]    Asthma     Basal cell carcinoma     last assessed - [id number]    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[facility] )     Disease of thyroid gland     Facet arthropathy, cervical     last assessed - [id number]    History of fracture of vertebral column     Hyperlipidemia     Hypertension     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[facility] )     Hypoxia     last assessed - [id number]    Junctional rhythm     last assessed - [id number]    Lightheadedness     last assessed - 40Ecp4506    Migraine     Palpitations     last assessed - [id number]    Pleural effusion, bilateral     last assessed - [id number]    Shortness of breath     last assessed - [id number]    Thyroid trouble     Trigger finger     last assessed - [id number]    Trigger middle finger of right hand     last assessed - 63Vej7217    Urinary incontinence     last assessed -  ; Resolved - [id number]     Past Surgical History:   Procedure Laterality Date    ABDOMINAL AORTIC ANEURYSM REPAIR W/ ENDOLUMINAL GRAFT  2015    Ascending aorta and hemiarch replacement with 30 mm Vascutek Gelweave graft;  Managed by Kimberly Barnhart; last assessed - [id number]    APPENDECTOMY      BLADDER SURGERY      Reccurent histoy of bladder surgery    [facility] JEREMY PROCEDURE      CARDIAC CATHETERIZATION  2004    Procedure summary - Luminal irregularities; last assessed - [id number]     SECTION      CORONARY ANEURYSM REPAIR      CYSTOSCOPY      botox injection    HYSTERECTOMY      CA ENDOSCOPIC INJECTION/IMPLANT N/A 5/19/2017    Procedure: CYSTOSCOPY; DURASPHERE-PERIURETHRAL BULKING AGENT INJECTION ;  Surgeon: Nimco Quintanilla MD;  [facility];  Service: Urology    FL INCISE FINGER TENDON SHEATH Right 10/18/2016    Procedure: LONG FINGER TRIGGER RELEASE ;  Surgeon: Barbara Blanco MD;  [facility];  Service: Orthopedics    THYROIDECTOMY      Total Thyroidectomy    TONSILLECTOMY AND ADENOIDECTOMY       Current Outpatient Medications on File Prior to Visit   Medication Sig Dispense Refill    acetaminophen (TYLENOL) 500 mg tablet Take 500 mg by mouth every 6 (six) hours as needed for mild pain      albuterol (2 5 mg/3 mL) 0 083 % nebulizer solution Take 3 mL (2 5 mg total) by nebulization 4 (four) times a day 50 vial 2    albuterol (PROVENTIL HFA,VENTOLIN HFA) 90 mcg/act inhaler Inhale 2 puffs every 6 (six) hours as needed for wheezing 1 Inhaler 0    amLODIPine-benazepril (LOTREL 2 5-10) 2 5-10 MG per capsule Take 1 capsule by mouth daily 90 capsule 3    aspirin 81 MG tablet Take 162 mg by mouth daily in the early morning        calcium carbonate (OS-TAE) 600 MG tablet Take 600 mg by mouth daily in the early morning        guaiFENesin (MUCINEX) 600 mg 12 hr tablet Take 1 tablet (600 mg total) by mouth every 12 (twelve) hours 60 tablet 0    lovastatin (MEVACOR) 20 mg tablet TAKE ONE TABLET BY MOUTH EVERY DAY 90 tablet 3    metoprolol tartrate (LOPRESSOR) 25 mg tablet Take 1 tablet (25 mg total) by mouth 2 (two) times a day 180 tablet 3    Multiple Vitamin (MULTIVITAMIN) tablet Take 1 tablet by mouth daily in the early morning        [DISCONTINUED] levothyroxine 112 mcg tablet Take 1 tablet (112 mcg total) by mouth daily (Patient taking differently: Take 100 mcg by mouth daily  ) 90 tablet 0    [DISCONTINUED] levothyroxine 125 mcg tablet Take 1 tablet (125 mcg total) by mouth daily in the early morning (Patient not taking: Reported on 7/10/2019) 90 tablet 1    [DISCONTINUED] levothyroxine 150 mcg tablet Take 150 mcg by mouth       No current facility-administered medications on file prior to visit  Family History   Problem Relation Age of Onset    Coronary aneurysm Sister     Coronary artery disease Sister         CABG    Heart disease Family         cardiac disorder    Hypertension Family     Cancer Family     Thyroid disease Family      Social History     Socioeconomic History    Marital status:       Spouse name: Not on file    Number of children: Not on file    Years of education: Not on file    Highest education level: Not on file   Occupational History    Not on file   Social Needs    Financial resource strain: Not on file    Food insecurity:     Worry: Not on file     Inability: Not on file    Transportation needs:     Medical: Not on file     Non-medical: Not on file   Tobacco Use    Smoking status: Former Smoker     Packs/day: 0 25     Years: 57 00     Pack years: 14 25     Types: Cigarettes     Start date:      Last attempt to quit: 2014     Years since quittin 5    Smokeless tobacco: Never Used    Tobacco comment: smoked 17 yrs /2 ppd quit and restarted then quit  10 days ago during 2014    Substance and Sexual Activity    Alcohol use: No     Comment: Social drinker & never drang alcohol both documented in [employer]    Drug use: No    Sexual activity: Not Currently   Lifestyle    Physical activity:     Days per week: Not on file     Minutes per session: Not on file    Stress: Not on file   Relationships    Social connections:     Talks on phone: Not on file     Gets together: Not on file     Attends Zoroastrian service: Not on file     Active member of club or organization: Not on file     Attends meetings of clubs or organizations: Not on file     Relationship status: Not on file    Intimate partner violence:     Fear of current or ex partner: Not on file     Emotionally abused: Not on file     Physically abused: Not on file     Forced sexual activity: Not on file   Other Topics Concern    Not on file   Social History Narrative    Not on file     Vitals:    07/10/19 1701   BP: 138/80   Pulse: 64   Resp: 16   SpO2: 94%   Weight: 61 9 kg (136 lb 6 4 oz)   Height: 5' 2" (1 575 m)     Results for orders placed or performed in visit on 07/10/19   TSH, 3rd generation   Result Value Ref Range    TSH 3RD GENERATON 0 146 (L) 0 358 - 3 740 uIU/mL     Weight (last 2 days)     Date/Time   Weight    07/10/19 1701   61 9 (136 4)            Body mass index is 24 95 kg/m²  BP      Temp      Pulse     Resp      SpO2        Vitals:    07/10/19 1701   Weight: 61 9 kg (136 lb 6 4 oz)     Vitals:    07/10/19 1701   Weight: 61 9 kg (136 lb 6 4 oz)       /80   Pulse 64   Resp 16   Ht 5' 2" (1 575 m)   Wt 61 9 kg (136 lb 6 4 oz)   SpO2 94%   BMI 24 95 kg/m²          Physical Exam   Constitutional: She appears well-developed and well-nourished  HENT:   Head: Normocephalic  Mouth/Throat: Oropharynx is clear and moist    Eyes: Pupils are equal, round, and reactive to light  Conjunctivae are normal  Right eye exhibits no discharge  Left eye exhibits no discharge  No scleral icterus  Neck: Neck supple  Cardiovascular: Normal rate, regular rhythm, normal heart sounds and intact distal pulses  Exam reveals no gallop and no friction rub  No murmur heard  Pulmonary/Chest: Breath sounds normal  No respiratory distress  She has no wheezes  She has no rales  Abdominal: Soft  Bowel sounds are normal  She exhibits no distension and no mass  There is no tenderness  There is no rebound and no guarding  Musculoskeletal: She exhibits no edema or deformity  Lymphadenopathy:     She has no cervical adenopathy  Neurological: She is alert   Coordination normal

## 2019-07-11 NOTE — ASSESSMENT & PLAN NOTE
Patient does have tenderness of the anterior medial aspect of the ankle I am concerned she has developed some arthritis/instability of the ankle I will check x-ray of the right ankle and have the patient see orthopedic foot specialist Dr Panda Cabrales

## 2019-07-11 NOTE — ASSESSMENT & PLAN NOTE
Assessment and plan 1  Medicare subsequent annual wellness examination overall the patient is clinically stable and doing well, we encouraged the patient to follow a healthy and balanced diet  We recommend that the patient exercise routinely approximately 30 minutes 5 times per week   We have reviewed the patient's vaccines and have made recommendations for updates if necessary consider the new shingles vaccine, annual flu vaccine       We will be ordering screening laboratories which are age appropriate  Return to the office in  6 months    call if any problems

## 2019-07-11 NOTE — ASSESSMENT & PLAN NOTE
Clinically stable doing well she reports me intermittent wheezing I have asked her follow up with her pulmonologist currently she is stable

## 2019-07-11 NOTE — ASSESSMENT & PLAN NOTE
Hypothyroidism low TSH I will reduce the levothyroxine to 100 mcg once daily and recheck a 3rd generation TSH in 4 weeks

## 2019-07-12 ENCOUNTER — APPOINTMENT (OUTPATIENT)
Dept: RADIOLOGY | Facility: MEDICAL CENTER | Age: 84
End: 2019-07-12
Payer: COMMERCIAL

## 2019-07-12 ENCOUNTER — TRANSCRIBE ORDERS (OUTPATIENT)
Dept: LAB | Facility: CLINIC | Age: 84
End: 2019-07-12

## 2019-07-12 ENCOUNTER — APPOINTMENT (OUTPATIENT)
Dept: LAB | Facility: CLINIC | Age: 84
End: 2019-07-12
Payer: COMMERCIAL

## 2019-07-12 DIAGNOSIS — R53.83 FATIGUE, UNSPECIFIED TYPE: ICD-10-CM

## 2019-07-12 DIAGNOSIS — E03.9 HYPOTHYROIDISM, UNSPECIFIED TYPE: ICD-10-CM

## 2019-07-12 DIAGNOSIS — M25.371 ANKLE INSTABILITY, RIGHT: ICD-10-CM

## 2019-07-12 DIAGNOSIS — R53.83 OTHER FATIGUE: ICD-10-CM

## 2019-07-12 DIAGNOSIS — R53.83 FATIGUE, UNSPECIFIED TYPE: Primary | ICD-10-CM

## 2019-07-12 LAB
25(OH)D3 SERPL-MCNC: 26.1 NG/ML (ref 30–100)
BASOPHILS # BLD AUTO: 0.07 THOUSANDS/ΜL (ref 0–0.1)
BASOPHILS NFR BLD AUTO: 1 % (ref 0–1)
EOSINOPHIL # BLD AUTO: 0.17 THOUSAND/ΜL (ref 0–0.61)
EOSINOPHIL NFR BLD AUTO: 2 % (ref 0–6)
ERYTHROCYTE [DISTWIDTH] IN BLOOD BY AUTOMATED COUNT: 13.4 % (ref 11.6–15.1)
FERRITIN SERPL-MCNC: 25 NG/ML (ref 8–388)
HCT VFR BLD AUTO: 39.5 % (ref 34.8–46.1)
HGB BLD-MCNC: 12.4 G/DL (ref 11.5–15.4)
IMM GRANULOCYTES # BLD AUTO: 0.03 THOUSAND/UL (ref 0–0.2)
IMM GRANULOCYTES NFR BLD AUTO: 0 % (ref 0–2)
LYMPHOCYTES # BLD AUTO: 2.31 THOUSANDS/ΜL (ref 0.6–4.47)
LYMPHOCYTES NFR BLD AUTO: 26 % (ref 14–44)
MCH RBC QN AUTO: 29.8 PG (ref 26.8–34.3)
MCHC RBC AUTO-ENTMCNC: 31.4 G/DL (ref 31.4–37.4)
MCV RBC AUTO: 95 FL (ref 82–98)
MONOCYTES # BLD AUTO: 0.86 THOUSAND/ΜL (ref 0.17–1.22)
MONOCYTES NFR BLD AUTO: 10 % (ref 4–12)
NEUTROPHILS # BLD AUTO: 5.39 THOUSANDS/ΜL (ref 1.85–7.62)
NEUTS SEG NFR BLD AUTO: 61 % (ref 43–75)
NRBC BLD AUTO-RTO: 0 /100 WBCS
PLATELET # BLD AUTO: 234 THOUSANDS/UL (ref 149–390)
PMV BLD AUTO: 11.9 FL (ref 8.9–12.7)
RBC # BLD AUTO: 4.16 MILLION/UL (ref 3.81–5.12)
VIT B12 SERPL-MCNC: 903 PG/ML (ref 100–900)
WBC # BLD AUTO: 8.83 THOUSAND/UL (ref 4.31–10.16)

## 2019-07-12 PROCEDURE — 73610 X-RAY EXAM OF ANKLE: CPT

## 2019-07-12 PROCEDURE — 85025 COMPLETE CBC W/AUTO DIFF WBC: CPT

## 2019-07-12 PROCEDURE — 36415 COLL VENOUS BLD VENIPUNCTURE: CPT

## 2019-07-12 PROCEDURE — 82728 ASSAY OF FERRITIN: CPT

## 2019-07-12 PROCEDURE — 82607 VITAMIN B-12: CPT

## 2019-07-12 PROCEDURE — 82306 VITAMIN D 25 HYDROXY: CPT

## 2019-07-16 DIAGNOSIS — E55.9 VITAMIN D DEFICIENCY: Primary | ICD-10-CM

## 2019-07-26 ENCOUNTER — CONSULT (OUTPATIENT)
Dept: OBGYN CLINIC | Facility: CLINIC | Age: 84
End: 2019-07-26
Payer: COMMERCIAL

## 2019-07-26 VITALS
HEART RATE: 67 BPM | WEIGHT: 136.4 LBS | DIASTOLIC BLOOD PRESSURE: 76 MMHG | BODY MASS INDEX: 25.1 KG/M2 | HEIGHT: 62 IN | SYSTOLIC BLOOD PRESSURE: 146 MMHG

## 2019-07-26 DIAGNOSIS — M94.9 OSTEOCHONDRAL LESION OF TALAR DOME: ICD-10-CM

## 2019-07-26 DIAGNOSIS — M25.371 ANKLE INSTABILITY, RIGHT: ICD-10-CM

## 2019-07-26 DIAGNOSIS — M67.01 ACQUIRED TIGHT ACHILLES TENDON, RIGHT: Primary | ICD-10-CM

## 2019-07-26 DIAGNOSIS — M89.9 OSTEOCHONDRAL LESION OF TALAR DOME: ICD-10-CM

## 2019-07-26 PROCEDURE — 99213 OFFICE O/P EST LOW 20 MIN: CPT | Performed by: ORTHOPAEDIC SURGERY

## 2019-07-26 NOTE — PROGRESS NOTES
MYRIAM Robertson  Attending, Orthopaedic Surgery  [facility] [address] [address] [facility]        ORTHOPAEDIC FOOT AND ANKLE CLINIC VISIT     Assessment:     Encounter Diagnoses   Name Primary?  Ankle instability, right     Acquired tight Achilles tendon, right Yes    Osteochondral lesion of talar dome               Plan:   · The patient verbalized understanding of exam findings and treatment plan  We engaged in the shared decision-making process and treatment options were discussed at length with the patient  Surgical and conservative management discussed today along with risks and benefits  · Buffy was given a referral to physical therapy for achilles equinus  · She is to wear an ASO ankle brace for stability with activity  · She can take NSAIDS for pain control as needed  · Activity to tolerance  Return if symptoms worsen or fail to improve  History of Present Illness:   Chief Complaint:   Chief Complaint   Patient presents with    Right Ankle - Pain     Buffy Rangel is a 80 y o  female who is being seen for right foot pain x 1 month  She denies any injury mechanism and states pain increases with walking  Pain is localized at anteromedial ankle with minimal radiating and described as sharp and severe  Patient denies numbness, tingling or radicular pain  Denies history of neuropathy  Patient does not smoke, does not have diabetes and does not take blood thinners  Patient denies family history of anesthesia complications and has not had any complications with anesthesia       Pain/symptom timing:  Worse during the day when active  Pain/symptom context:  Worse with activites and work  Pain/symptom modifying factors:  Rest makes better, activities make worse  Pain/symptom associated signs/symptoms: none    Prior treatment   · NSAIDsNo    · Injections No   · Bracing/Orthotics No   · Physical Therapy No     Orthopedic Surgical History:   See below    Past Medical, Surgical and Social History:  Past Medical History:  has a past medical history of A-fib ([facility] ), Allergy to IVP dye (2013), 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[facility] ), Aneurysm of aortic root ([facility] ), 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[facility] ), Aortic root dilatation ([facility] ), Arthritis, Ascending aortic aneurysm ([facility] ), Asthma, Basal cell carcinoma, 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[facility] ), Disease of thyroid gland, Facet arthropathy, cervical, History of fracture of vertebral column, Hyperlipidemia, Hypertension, 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[facility] ), Hypoxia, Junctional rhythm, Lightheadedness, Migraine, Palpitations, Pleural effusion, bilateral, Shortness of breath, Thyroid trouble, Trigger finger, Trigger middle finger of right hand, and Urinary incontinence  Problem List: does not have any pertinent problems on file  Past Surgical History:  has a past surgical history that includes pr incise finger tendon sheath (Right, 10/18/2016); Cystoscopy; Piper procedure;  section; Coronary aneurysm repair; Appendectomy; Hysterectomy; Thyroidectomy; pr endoscopic injection/implant (N/A, 2017); Abdominal aortic aneurysm repair w/ endoluminal graft (2015); Bladder surgery; Cardiac catheterization (2004); and Tonsillectomy and adenoidectomy  Family History: family history includes Cancer in her family; Coronary aneurysm in her sister; Coronary artery disease in her sister; Heart disease in her family; Hypertension in her family; Thyroid disease in her family  Social History:  reports that she quit smoking about 4 years ago  Her smoking use included cigarettes  She started smoking about 62 years ago  She has a 14 25 pack-year smoking history  She has never used smokeless tobacco  She reports that she does not drink alcohol or use drugs    Current Medications: has a current medication list which includes the following prescription(s): acetaminophen, albuterol, albuterol, amlodipine-benazepril, aspirin, calcium carbonate, neoprene ankle support, levothyroxine, lovastatin, metoprolol tartrate, multivitamin, and guaifenesin  Allergies: is allergic to amiodarone; keflex [cephalexin]; omnipaque [iohexol]; clindamycin; sulfa antibiotics; iv dye  [iodinated diagnostic agents]; and naprosyn [naproxen]  Review of Systems:  General- denies fever/chills  HEENT- denies hearing loss or sore throat  Eyes- denies eye pain or visual disturbances, denies red eyes  Respiratory- denies cough or SOB  Cardio- denies chest pain or palpitations  GI- denies abdominal pain  Endocrine- denies urinary frequency  Urinary- denies pain with urination  Musculoskeletal- Negative except noted above  Skin- denies rashes or wounds  Neurological- denies dizziness or headache  Psychiatric- denies anxiety or difficulty concentrating    Physical Exam:   /76 (BP Location: Left arm, Patient Position: Sitting, Cuff Size: Adult)   Pulse 67   Ht 5' 2" (1 575 m)   Wt 61 9 kg (136 lb 6 4 oz)   BMI 24 95 kg/m²   General/Constitutional: No apparent distress: well-nourished and well developed  Eyes: normal ocular motion  Lymphatic: No appreciable lymphadenopathy  Respiratory: Non-labored breathing  Vascular: No edema, swelling or tenderness, except as noted in detailed exam   Integumentary: No impressive skin lesions present, except as noted in detailed exam   Neuro: No ataxia or tremors noted  Psych: Normal mood and affect, oriented to person, place and time  Appropriate affect  Musculoskeletal: Normal, except as noted in detailed exam and in HPI  Examination    Right    Gait Normal   Musculoskeletal Tender to palpation at anteromedial ankle  Skin  Varicosities present of the right foot and ankle      Nails Normal    Range of Motion  Neutral degrees with achilles equinus  dorsiflexion, 40 degrees plantarflexion  Subtalar motion: wnl    Stability Stable    Muscle Strength 5/5 tibialis anterior  5/5 gastrocnemius-soleus  5/5 posterior tibialis  5/5 peroneal/eversion strength  5/5 EHL  5/5 FHL    Neurologic Normal    Sensation Intact to light touch throughout sural, saphenous, superficial peroneal, deep peroneal and medial/lateral plantar nerve distributions  [place] 5 07 filament (10g) testing deferred  Cardiovascular Brisk capillary refill < 2 seconds,intact DP and PT pulses    Special Tests Negative anterior drawer test      Imaging Studies:   3 views of the right ankle  were taken, reviewed and interpreted independently that demonstrate 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

## 2019-08-02 ENCOUNTER — EVALUATION (OUTPATIENT)
Dept: PHYSICAL THERAPY | Facility: CLINIC | Age: 84
End: 2019-08-02
Payer: COMMERCIAL

## 2019-08-02 DIAGNOSIS — M67.01 ACQUIRED TIGHT ACHILLES TENDON, RIGHT: Primary | ICD-10-CM

## 2019-08-02 PROCEDURE — 97161 PT EVAL LOW COMPLEX 20 MIN: CPT | Performed by: PHYSICAL THERAPIST

## 2019-08-02 PROCEDURE — 97140 MANUAL THERAPY 1/> REGIONS: CPT | Performed by: PHYSICAL THERAPIST

## 2019-08-02 PROCEDURE — 97110 THERAPEUTIC EXERCISES: CPT | Performed by: PHYSICAL THERAPIST

## 2019-08-02 NOTE — PROGRESS NOTES
PT Evaluation     Today's date: 2019  Patient name: Buffy Dobson  : 1932  MRN: [id number]  Referring provider: Junaid Urbina MD  Dx:   Encounter Diagnosis     ICD-10-CM    1  Acquired tight Achilles tendon, right M67 01 Ambulatory referral to Physical Therapy              Assessment  Assessment details: Buffy Dobson is a 80 y o  female who presents with signs and symptoms consistent with their referring diagnosis of Acquired tight Achilles tendon, right  (primary encounter diagnosis)  This patient would benefit from skilled physical therapy to address their listed impairments and functional limitations to maximize functional outcome  Impairments: abnormal or restricted ROM, lacks appropriate home exercise program, safety issue and weight-bearing intolerance     Prognosis: good    Goals  STG Patient is independent with HEP   STG Range of motion is improved by 25% in 2 weeks  STG Tolerance to weight bearing activities is improved by 25% in 2 weeks  LTG Increase ROM 10 degrees in 4 weeks  LTG Weight bearing tolerance improved 50% in 4 weeks    Plan  Patient would benefit from: skilled physical therapy  Planned therapy interventions: joint mobilization, manual therapy, stretching, graded activity, home exercise program, functional ROM exercises and flexibility  Frequency: 2x week  Duration in visits: 8  Duration in weeks: 4  Treatment plan discussed with: patient        Subjective Evaluation    History of Present Illness  Mechanism of injury: Patient reports 1 month ago she had sudden onset shooting and grabbing pain from the anterior ankle towards the knee  The sharp pain is transient but she is left with a dull ache that last for about 30 minutes  Symptoms only occur when walking  She had two episodes of pain today but typically she will get the symptoms 2-3 times per week      Pain  At best pain ratin  At worst pain rating: 10  Quality: dull ache and sharp    Patient Goals  Patient goals for therapy: decreased pain, increased motion and independence with ADLs/IADLs          Objective     Active Range of Motion     Right Ankle/Foot   Dorsiflexion (ke): -2 degrees   Plantar flexion: WFL  Inversion: WFL  Eversion: WFL    Joint Play     Right Ankle/Foot  Hypomobile in the talocrural joint, subtalar joint and midfoot            Precautions: cardiac:  Aneurysm repair, cath       Manual  8/2            TCJ mobilizations with movement SY            LMTJ mobilizations SY            Manual gastroc stretch SY                                          Exercise Diary  8/2            Ankle circles 20ea            gastroc towel stretch :20x4            Standing gastroc stretch :20x4            Wobble boards 4-way             SB stretch             prostretch             Leg press heel raises             SLS                                                                                                                                                                             Modalities

## 2019-08-21 ENCOUNTER — OFFICE VISIT (OUTPATIENT)
Dept: PULMONOLOGY | Facility: CLINIC | Age: 84
End: 2019-08-21
Payer: COMMERCIAL

## 2019-08-21 VITALS
DIASTOLIC BLOOD PRESSURE: 80 MMHG | HEIGHT: 62 IN | BODY MASS INDEX: 25.03 KG/M2 | RESPIRATION RATE: 18 BRPM | OXYGEN SATURATION: 95 % | TEMPERATURE: 98.8 F | HEART RATE: 75 BPM | SYSTOLIC BLOOD PRESSURE: 120 MMHG | WEIGHT: 136 LBS

## 2019-08-21 DIAGNOSIS — R06.00 DOE (DYSPNEA ON EXERTION): ICD-10-CM

## 2019-08-21 DIAGNOSIS — J45.909 UNCOMPLICATED ASTHMA, UNSPECIFIED ASTHMA SEVERITY, UNSPECIFIED WHETHER PERSISTENT: ICD-10-CM

## 2019-08-21 DIAGNOSIS — J44.9 ASTHMA-COPD OVERLAP SYNDROME (HCC): Primary | ICD-10-CM

## 2019-08-21 PROCEDURE — 99213 OFFICE O/P EST LOW 20 MIN: CPT | Performed by: INTERNAL MEDICINE

## 2019-08-21 RX ORDER — ALBUTEROL SULFATE 2.5 MG/3ML
2.5 SOLUTION RESPIRATORY (INHALATION)
Qty: 120 VIAL | Refills: 5 | Status: SHIPPED | OUTPATIENT
Start: 2019-08-21 | End: 2020-07-15 | Stop reason: ALTCHOICE

## 2019-08-21 NOTE — PROGRESS NOTES
Office Progress Note - Pulmonary    June A Weddermann 80 y o  female MRN: [id number]    Encounter: [id number]      Assessment:   COPD/asthma overlap syndrome   Dyspnea on exertion  Plan:     Albuterol nebulizer treatments 4 times a day as needed   Albuterol rescue inhaler 4 times a day as needed   Regular exercise to maintain stamina   Follow-up as needed  Discussion:   The patient's asthma/COPD overlap syndrome is in remission  I have maintained her on the albuterol nebulizer treatments 4 times a day as needed as well as the rescue inhaler as needed  She prefers not to be on a maintenance inhalers  For now she is very well controlled and I feel she is appropriate to be only on as needed basis albuterol  She requested if she can come only as needed since she is fairly asymptomatic and I have agreed  I have not scheduled her for another appointment however I will be happy to see her in the future if the need arises  Subjective: The patient is here for a follow-up visit  Today she feels much better compared to the last time  She finished her pulmonary rehab  She has remained active and she will can walk for 10 minutes with a fast pace  She told me that she dances frequently at home to remain active  She coughs only when she gets exposed to fumes  She has no nocturnal symptoms  On average she uses her albuterol nebulizer 2-3 times a day  She feels the nebulizer works better for her than the inhaler  Review of systems:  A 12 point system review is done and aside from what is stated above the rest of the review of systems is negative  Family history and social history are reviewed  Medications list is reviewed  Vitals: Blood pressure 120/80, pulse 75, temperature 98 8 °F (37 1 °C), temperature source Tympanic, resp  rate 18, height 5' 2" (1 575 m), weight 61 7 kg (136 lb), SpO2 95 %  ,     Physical Exam  Gen: Awake, alert, oriented x 3, no acute distress  HEENT: Mucous membranes moist, no oral lesions, no thrush  NECK: No accessory muscle use, JVP not elevated  Cardiac: Regular, single S1, single S2, no murmurs, no rubs, no gallops  Lungs:  Decreased breath sounds  Few basal crackles  No wheezing  Abdomen: normoactive bowel sounds, soft nontender, nondistended, no rebound or rigidity, no guarding  Extremities: no cyanosis, no clubbing, no edema  Neuro:  Grossly nonfocal   Skin:  No rash

## 2019-09-18 ENCOUNTER — OFFICE VISIT (OUTPATIENT)
Dept: URGENT CARE | Facility: MEDICAL CENTER | Age: 84
End: 2019-09-18
Payer: COMMERCIAL

## 2019-09-18 ENCOUNTER — APPOINTMENT (EMERGENCY)
Dept: CT IMAGING | Facility: HOSPITAL | Age: 84
End: 2019-09-18
Payer: COMMERCIAL

## 2019-09-18 ENCOUNTER — APPOINTMENT (EMERGENCY)
Dept: RADIOLOGY | Facility: HOSPITAL | Age: 84
End: 2019-09-18
Payer: COMMERCIAL

## 2019-09-18 ENCOUNTER — HOSPITAL ENCOUNTER (EMERGENCY)
Facility: HOSPITAL | Age: 84
Discharge: LEFT BEFORE DISCHARGE | End: 2019-09-18
Attending: EMERGENCY MEDICINE | Admitting: INTERNAL MEDICINE
Payer: COMMERCIAL

## 2019-09-18 VITALS
WEIGHT: 131.61 LBS | OXYGEN SATURATION: 95 % | TEMPERATURE: 98 F | DIASTOLIC BLOOD PRESSURE: 76 MMHG | BODY MASS INDEX: 24.07 KG/M2 | HEART RATE: 78 BPM | RESPIRATION RATE: 16 BRPM | SYSTOLIC BLOOD PRESSURE: 166 MMHG

## 2019-09-18 VITALS
RESPIRATION RATE: 18 BRPM | SYSTOLIC BLOOD PRESSURE: 140 MMHG | OXYGEN SATURATION: 95 % | HEART RATE: 77 BPM | TEMPERATURE: 98.4 F | DIASTOLIC BLOOD PRESSURE: 70 MMHG

## 2019-09-18 DIAGNOSIS — R55 NEAR SYNCOPE: Primary | ICD-10-CM

## 2019-09-18 DIAGNOSIS — R42 DIZZINESS AND GIDDINESS: Primary | ICD-10-CM

## 2019-09-18 LAB
ABO GROUP BLD: NORMAL
ALBUMIN SERPL BCP-MCNC: 4.4 G/DL (ref 3.5–5)
ALP SERPL-CCNC: 70 U/L (ref 46–116)
ALT SERPL W P-5'-P-CCNC: 34 U/L (ref 12–78)
ANION GAP SERPL CALCULATED.3IONS-SCNC: 8 MMOL/L (ref 4–13)
AST SERPL W P-5'-P-CCNC: 36 U/L (ref 5–45)
ATRIAL RATE: 73 BPM
BASOPHILS # BLD AUTO: 0.05 THOUSANDS/ΜL (ref 0–0.1)
BASOPHILS NFR BLD AUTO: 1 % (ref 0–1)
BILIRUB SERPL-MCNC: 0.5 MG/DL (ref 0.2–1)
BLD GP AB SCN SERPL QL: POSITIVE
BLOOD GROUP ANTIBODIES SERPL: NORMAL
BUN SERPL-MCNC: 15 MG/DL (ref 5–25)
CALCIUM SERPL-MCNC: 8.8 MG/DL (ref 8.3–10.1)
CHLORIDE SERPL-SCNC: 103 MMOL/L (ref 100–108)
CO2 SERPL-SCNC: 29 MMOL/L (ref 21–32)
CREAT SERPL-MCNC: 0.84 MG/DL (ref 0.6–1.3)
EOSINOPHIL # BLD AUTO: 0.11 THOUSAND/ΜL (ref 0–0.61)
EOSINOPHIL NFR BLD AUTO: 1 % (ref 0–6)
ERYTHROCYTE [DISTWIDTH] IN BLOOD BY AUTOMATED COUNT: 13.9 % (ref 11.6–15.1)
GFR SERPL CREATININE-BSD FRML MDRD: 63 ML/MIN/1.73SQ M
GLUCOSE SERPL-MCNC: 127 MG/DL (ref 65–140)
HCT VFR BLD AUTO: 41.2 % (ref 34.8–46.1)
HGB BLD-MCNC: 13.3 G/DL (ref 11.5–15.4)
IMM GRANULOCYTES # BLD AUTO: 0.01 THOUSAND/UL (ref 0–0.2)
IMM GRANULOCYTES NFR BLD AUTO: 0 % (ref 0–2)
LYMPHOCYTES # BLD AUTO: 2.31 THOUSANDS/ΜL (ref 0.6–4.47)
LYMPHOCYTES NFR BLD AUTO: 30 % (ref 14–44)
MCH RBC QN AUTO: 30.3 PG (ref 26.8–34.3)
MCHC RBC AUTO-ENTMCNC: 32.3 G/DL (ref 31.4–37.4)
MCV RBC AUTO: 94 FL (ref 82–98)
MONOCYTES # BLD AUTO: 0.7 THOUSAND/ΜL (ref 0.17–1.22)
MONOCYTES NFR BLD AUTO: 9 % (ref 4–12)
NEUTROPHILS # BLD AUTO: 4.54 THOUSANDS/ΜL (ref 1.85–7.62)
NEUTS SEG NFR BLD AUTO: 59 % (ref 43–75)
NRBC BLD AUTO-RTO: 0 /100 WBCS
NT-PROBNP SERPL-MCNC: 1229 PG/ML
P AXIS: 56 DEGREES
PLATELET # BLD AUTO: 223 THOUSANDS/UL (ref 149–390)
PMV BLD AUTO: 11.5 FL (ref 8.9–12.7)
POTASSIUM SERPL-SCNC: 4.3 MMOL/L (ref 3.5–5.3)
PR INTERVAL: 168 MS
PROT SERPL-MCNC: 7.9 G/DL (ref 6.4–8.2)
QRS AXIS: -5 DEGREES
QRSD INTERVAL: 78 MS
QT INTERVAL: 386 MS
QTC INTERVAL: 425 MS
RBC # BLD AUTO: 4.39 MILLION/UL (ref 3.81–5.12)
RH BLD: POSITIVE
SODIUM SERPL-SCNC: 140 MMOL/L (ref 136–145)
SPECIMEN EXPIRATION DATE: NORMAL
T WAVE AXIS: 61 DEGREES
TROPONIN I SERPL-MCNC: <0.02 NG/ML
TROPONIN I SERPL-MCNC: <0.02 NG/ML
VENTRICULAR RATE: 73 BPM
WBC # BLD AUTO: 7.72 THOUSAND/UL (ref 4.31–10.16)

## 2019-09-18 PROCEDURE — 36415 COLL VENOUS BLD VENIPUNCTURE: CPT | Performed by: EMERGENCY MEDICINE

## 2019-09-18 PROCEDURE — 80053 COMPREHEN METABOLIC PANEL: CPT | Performed by: EMERGENCY MEDICINE

## 2019-09-18 PROCEDURE — 71046 X-RAY EXAM CHEST 2 VIEWS: CPT

## 2019-09-18 PROCEDURE — 93005 ELECTROCARDIOGRAM TRACING: CPT

## 2019-09-18 PROCEDURE — S9088 SERVICES PROVIDED IN URGENT: HCPCS | Performed by: PHYSICIAN ASSISTANT

## 2019-09-18 PROCEDURE — 85025 COMPLETE CBC W/AUTO DIFF WBC: CPT | Performed by: EMERGENCY MEDICINE

## 2019-09-18 PROCEDURE — 86870 RBC ANTIBODY IDENTIFICATION: CPT | Performed by: EMERGENCY MEDICINE

## 2019-09-18 PROCEDURE — 93010 ELECTROCARDIOGRAM REPORT: CPT | Performed by: INTERNAL MEDICINE

## 2019-09-18 PROCEDURE — 99283 EMERGENCY DEPT VISIT LOW MDM: CPT | Performed by: EMERGENCY MEDICINE

## 2019-09-18 PROCEDURE — 86900 BLOOD TYPING SEROLOGIC ABO: CPT | Performed by: EMERGENCY MEDICINE

## 2019-09-18 PROCEDURE — 83880 ASSAY OF NATRIURETIC PEPTIDE: CPT | Performed by: EMERGENCY MEDICINE

## 2019-09-18 PROCEDURE — 86850 RBC ANTIBODY SCREEN: CPT | Performed by: EMERGENCY MEDICINE

## 2019-09-18 PROCEDURE — 99285 EMERGENCY DEPT VISIT HI MDM: CPT

## 2019-09-18 PROCEDURE — 70450 CT HEAD/BRAIN W/O DYE: CPT

## 2019-09-18 PROCEDURE — 70490 CT SOFT TISSUE NECK W/O DYE: CPT

## 2019-09-18 PROCEDURE — 84484 ASSAY OF TROPONIN QUANT: CPT | Performed by: EMERGENCY MEDICINE

## 2019-09-18 PROCEDURE — 99213 OFFICE O/P EST LOW 20 MIN: CPT | Performed by: PHYSICIAN ASSISTANT

## 2019-09-18 PROCEDURE — 86901 BLOOD TYPING SEROLOGIC RH(D): CPT | Performed by: EMERGENCY MEDICINE

## 2019-09-18 RX ORDER — HYDRALAZINE HYDROCHLORIDE 20 MG/ML
5 INJECTION INTRAMUSCULAR; INTRAVENOUS ONCE
Status: DISCONTINUED | OUTPATIENT
Start: 2019-09-18 | End: 2019-09-18

## 2019-09-18 NOTE — ED PROVIDER NOTES
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, not on chemotherapy, AFib not on anticoagulation, diastolic heart failure, hypertension, history of ascending aortic aneurysm repair in 2015 at [facility], who presents for evaluation of dizziness  Patient describes the dizziness as lightheadedness  States that it is worse when she is standing but also present when she is sitting down  She had symptoms like this about a year ago, improved when she was started on lisinopril by her doctor, has not had it in the interim  States she felt multiple times like she was going to pass out today, including while seated, prompting her daughter to bring her in for evaluation  Patient endorses a mild frontal headache that is chronic and has been present for years, is unchanged  No sensation of the room spinning  Denies chest pain or shortness of breath  She does endorse some bright red blood when having bowel movements, this started today  Patient states she has been constipated for the last few days, finally had multiple bowel movements today after taking [place], states she has seen some blood in the toilet after the bowel movement  Denies black stools  No abdominal pain, nausea, or vomiting  She does have a history of hemorrhoids  Prior to Admission Medications   Prescriptions Last Dose Informant Patient Reported? Taking?    Elastic Bandages & Supports (NEOPRENE ANKLE SUPPORT) MISC   No No   Sig: by Does not apply route daily   Patient not taking: Reported on 9/18/2019   Multiple Vitamin (MULTIVITAMIN) tablet  Self Yes No   Sig: Take 1 tablet by mouth daily in the early morning     Multiple Vitamins-Minerals (ICAPS AREDS 2 PO)   Yes No   Sig: Take by mouth   acetaminophen (TYLENOL) 500 mg tablet  Self Yes No   Sig: Take 500 mg by mouth every 6 (six) hours as needed for mild pain   albuterol (2 5 mg/3 mL) 0 083 % nebulizer solution   No No   Sig: Take 1 vial (2 5 mg total) by nebulization 4 (four) times a day   Patient not taking: Reported on 9/18/2019   albuterol (PROVENTIL HFA,VENTOLIN HFA) 90 mcg/act inhaler  Self No No   Sig: Inhale 2 puffs every 6 (six) hours as needed for wheezing   amLODIPine-benazepril (LOTREL 2 5-10) 2 5-10 MG per capsule   No No   Sig: Take 1 capsule by mouth daily   aspirin 81 MG tablet  Self Yes No   Sig: Take 162 mg by mouth daily in the early morning     calcium carbonate (OS-TAE) 600 MG tablet  Self Yes No   Sig: Take 600 mg by mouth daily in the early morning     guaiFENesin (MUCINEX) 600 mg 12 hr tablet  Self No No   Sig: Take 1 tablet (600 mg total) by mouth every 12 (twelve) hours   Patient not taking: Reported on 9/18/2019   levothyroxine 100 mcg tablet   No No   Sig: Take 1 tablet (100 mcg total) by mouth daily   lovastatin (MEVACOR) 20 mg tablet  Self No No   Sig: TAKE ONE TABLET BY MOUTH EVERY DAY   metoprolol tartrate (LOPRESSOR) 25 mg tablet   No No   Sig: Take 1 tablet (25 mg total) by mouth 2 (two) times a day      Facility-Administered Medications: None       Past Medical History:   Diagnosis Date    A-fib ([facility] )     Allergy to IVP dye 06/04/2013    pt felt heaviness, palpitations    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), wet (HCC)     bilateral    Aneurysm of aortic root ([facility] )     last assessed - [id number]    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HCC)     Aortic root dilatation (HCC)     last assessed - [id number]    Arthritis     Ascending aortic aneurysm (HCC)     last assessed - [id number]    Asthma     Basal cell carcinoma     last assessed - 81Psp3889    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[facility] )     Disease of thyroid gland     Facet arthropathy, cervical     last assessed - 44ALB8218    History of fracture of vertebral column     Hyperlipidemia     Hypertension     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[address] )     Hypoxia     last assessed - 39PXF9133    Junctional rhythm     last assessed - 76Vnb8966    Lightheadedness     last assessed - 04Ynz3930    Migraine     Palpitations     last assessed - 37Xgw8083    Pleural effusion, bilateral     last assessed - 22Gls4751    Shortness of breath     last assessed - 81Fnn2555    Thyroid trouble     Trigger finger     last assessed - 96ZNZ6016    Trigger middle finger of right hand     last assessed - 64Uya2103    Urinary incontinence     last assessed -  Jul,2013; Resolved - [id number]       Past Surgical History:   Procedure Laterality Date    ABDOMINAL AORTIC ANEURYSM REPAIR W/ ENDOLUMINAL GRAFT  2015    Ascending aorta and hemiarch replacement with 30 mm Vascutek Gelweave graft;  Managed by Radha Haji; last assessed - 29AGW4486    APPENDECTOMY      BLADDER SURGERY      Reccurent histoy of bladder surgery    [facility] YAZOO PROCEDURE      CARDIAC CATHETERIZATION  2004    Procedure summary - Luminal irregularities; last assessed - [id number]     SECTION      CORONARY ANEURYSM REPAIR      CYSTOSCOPY      botox injection    HYSTERECTOMY      SC ENDOSCOPIC INJECTION/IMPLANT N/A 2017    Procedure: CYSTOSCOPY; DURASPHERE-PERIURETHRAL BULKING AGENT INJECTION ;  Surgeon: Abby Adams MD;  [facility];  Service: Urology    SC INCISE FINGER TENDON SHEATH Right 10/18/2016    Procedure: LONG FINGER TRIGGER RELEASE ;  Surgeon: Gadiel Calzada MD;  Location: [facility];  Service: Orthopedics    THYROIDECTOMY      Total Thyroidectomy    TONSILLECTOMY AND ADENOIDECTOMY         Family History   Problem Relation Age of Onset    Coronary aneurysm Sister     Coronary artery disease Sister         CABG    Heart disease Family         cardiac disorder    Hypertension Family     Cancer Family     Thyroid disease Family      I have reviewed and agree with the history as documented  Social History     Tobacco Use    Smoking status: Former Smoker     Packs/day: 0 25     Years: 57 00     Pack years: 14 25     Types: Cigarettes     Start date:      Last attempt to quit: 2014     Years since quittin 7    Smokeless tobacco: Never Used    Tobacco comment: smoked 17 yrs 1/2 ppd quit and restarted then quit  10 days ago during 2014    Substance Use Topics    Alcohol use: No     Comment: Social drinker & never drang alcohol both documented in [employer]    Drug use: No        Review of Systems   Constitutional: Negative for chills and fever  HENT: Negative for congestion  Eyes: Negative for visual disturbance  Respiratory: Negative for cough and shortness of breath  Cardiovascular: Negative for chest pain and leg swelling  Gastrointestinal: Positive for blood in stool  Negative for abdominal pain, constipation, diarrhea, nausea and vomiting  Genitourinary: Negative for dysuria and frequency  Musculoskeletal: Negative for arthralgias, back pain, neck pain and neck stiffness  Skin: Negative for rash  Neurological: Positive for light-headedness and headaches  Negative for syncope, facial asymmetry, speech difficulty, weakness and numbness  Psychiatric/Behavioral: Negative for agitation, behavioral problems and confusion  Physical Exam  Physical Exam   Constitutional: She is oriented to person, place, and time  She appears well-developed and well-nourished  No distress  HENT:   Head: Normocephalic and atraumatic  Right Ear: External ear normal    Left Ear: External ear normal    Nose: Nose normal    Mouth/Throat: Oropharynx is clear and moist    Eyes: Pupils are equal, round, and reactive to light  Conjunctivae and EOM are normal    Neck: Normal range of motion  Neck supple  Cardiovascular: Normal rate, regular rhythm, normal heart sounds and intact distal pulses  Exam reveals no gallop and no friction rub  No murmur heard  Occasional PACs   Pulmonary/Chest: Effort normal and breath sounds normal  No respiratory distress  She has no wheezes  She has no rales  Abdominal: Soft  Bowel sounds are normal  She exhibits no distension  There is no tenderness  There is no guarding  Genitourinary:   Genitourinary Comments: Non-thrombosed external hemorrhoid with overlying dried blood  Light brown stool in the rectal vault  No gross blood  Musculoskeletal: Normal range of motion  She exhibits no edema (no calf swelling or tenderness) or deformity  Neurological: She is alert and oriented to person, place, and time  She exhibits normal muscle tone  Face symmetric, tongue midline, 5/5 strength in the proximal and distal upper and lower extremities bilaterally with intact sensation to light touch throughout  CN II-XII intact  Normal finger-to-nose, rapid alternating movements, and heel-to-shin bilaterally  Normal speech  No pronator drift  Skin: Skin is warm and dry  She is not diaphoretic         Vital Signs  ED Triage Vitals [09/18/19 1323]   Temperature Pulse Respirations Blood Pressure SpO2   98 °F (36 7 °C) 76 18 (!) 200/81 95 %      Temp Source Heart Rate Source Patient Position - Orthostatic VS BP Location FiO2 (%)   Oral Monitor Lying Right arm --      Pain Score       No Pain           Vitals:    09/18/19 1503 09/18/19 1658 09/18/19 1730 09/18/19 1800   BP: 140/62 (!) 174/77  166/76   Pulse: 72 77 80 78   Patient Position - Orthostatic VS:             Visual Acuity      ED Medications  Medications - No data to display    Diagnostic Studies  Results Reviewed     Procedure Component Value Units Date/Time    Troponin I [[id number]]  (Normal) Collected:  09/18/19 1657    Lab Status:  Final result Specimen:  Blood from Arm, Left Updated:  09/18/19 1735     Troponin I <0 02 ng/mL     NT-BNP PRO (BNP for [facility], [facility], [facility], [facility], [facility], [facility], [facility], [place] campuses) [[id number]]  (Abnormal) Collected:  09/18/19 1355    Lab Status:  Final result Specimen:  Blood from Arm, Right Updated:  09/18/19 1439     NT-proBNP 1,229 pg/mL     Troponin I [[id number]]  (Normal) Collected:  09/18/19 1355    Lab Status:  Final result Specimen:  Blood from Arm, Right Updated:  09/18/19 1433     Troponin I <0 02 ng/mL     Comprehensive metabolic panel [[id number]] Collected:  09/18/19 1355    Lab Status:  Final result Specimen:  Blood from Arm, Right Updated:  09/18/19 1430     Sodium 140 mmol/L      Potassium 4 3 mmol/L      Chloride 103 mmol/L      CO2 29 mmol/L      ANION GAP 8 mmol/L      BUN 15 mg/dL      Creatinine 0 84 mg/dL      Glucose 127 mg/dL      Calcium 8 8 mg/dL      AST 36 U/L      ALT 34 U/L      Alkaline Phosphatase 70 U/L      Total Protein 7 9 g/dL      Albumin 4 4 g/dL      Total Bilirubin 0 50 mg/dL      eGFR 63 ml/min/1 73sq m     Narrative:       [place] guidelines for Chronic Kidney Disease (CKD):     Stage 1 with normal or high GFR (GFR > 90 mL/min/1 73 square meters)    Stage 2 Mild CKD (GFR = 60-89 mL/min/1 73 square meters)    Stage 3A Moderate CKD (GFR = 45-59 mL/min/1 73 square meters)    Stage 3B Moderate CKD (GFR = 30-44 mL/min/1 73 square meters)    Stage 4 Severe CKD (GFR = 15-29 mL/min/1 73 square meters)    Stage 5 End Stage CKD (GFR <15 mL/min/1 73 square meters)  Note: GFR calculation is accurate only with a steady state creatinine    CBC and differential [[id number]] Collected:  09/18/19 1355    Lab Status:  Final result Specimen:  Blood from Arm, Right Updated:  09/18/19 1415     WBC 7 72 Thousand/uL      RBC 4 39 Million/uL      Hemoglobin 13 3 g/dL      Hematocrit 41 2 %      MCV 94 fL      MCH 30 3 pg      MCHC 32 3 g/dL      RDW 13 9 %      MPV 11 5 fL      Platelets 023 Thousands/uL      nRBC 0 /100 WBCs      Neutrophils Relative 59 %      Immat GRANS % 0 %      Lymphocytes Relative 30 %      Monocytes Relative 9 %      Eosinophils Relative 1 % Basophils Relative 1 %      Neutrophils Absolute 4 54 Thousands/µL      Immature Grans Absolute 0 01 Thousand/uL      Lymphocytes Absolute 2 31 Thousands/µL      Monocytes Absolute 0 70 Thousand/µL      Eosinophils Absolute 0 11 Thousand/µL      Basophils Absolute 0 05 Thousands/µL                  XR chest 2 views   Final Result by Artis Silva DO (09/18 1543)      No acute cardiopulmonary disease  Workstation performed: [id number]         CT head without contrast   Final Result by Stephanie Pizarro MD (09/18 1516)      No acute intracranial hemorrhage seen   No CT signs of acute territorial infarction   Moderate periventricular and white matter hypodensity related to chronic small vessel ischemic changes   If concern for mild metastatic disease in the brain parenchyma consider MRI or CT with contrast               Workstation performed: [id number]         CT soft tissue neck wo contrast   Final Result by Stephanie Pizarro MD (09/18 1559)   Mass involving the hard palate and lower right maxilla measuring 1 9 x 2 3 cm, stable  No new lymphadenopathy   Limited study due to lack of contrast                  Workstation performed: [id number]                    Procedures  Procedures       ED Course                               MDM  Number of Diagnoses or Management Options  Near syncope: new and requires workup  Diagnosis management comments: Generally well appearing  Afebrile and hemodynamically stable  Patient endorses multiple episodes of presyncope where she almost passed out during the day today  Denies chest pain, palpitations, or shortness of breath  I personally interpreted her EKG, which reveals normal rate, sinus rhythm with PACs, mild left axis deviation, Q-waves in leads V1 and V2, no acute ischemic changes, with the exception of PACs is unchanged from prior  Troponin obtained in the setting of risk stratification with presyncope, is undetectable    HEART score not calculated as patient denies chest pain  BNP is elevated to 1229  Chest x-ray with no cardiopulmonary disease  Patient is not short of breath, is satting well on room air, I have a low suspicion for acute decompensated heart failure, however given presyncope with known diastolic heart failure, recommend admission for cardiac evaluation including echocardiogram     Patient endorses a chronic headache, has a known malignant mass to hard palate  CT head with no acute intracranial abnormalities, CT of the soft tissues of the neck without progression of her known mass  Patient is hypertensive to over 291 systolic on arrival, improved without intervention  Discussed admission to the hospital with the patient for concern for possible underlying cardiac abnormality as the cause of her presyncope  Unfortunately, patient refuses observation admission and does not meet criteria for inpatient admission to the hospital   She was evaluated by the internal medicine team, who agrees that the patient does not meet inpatient criteria  The patient understands that at this time I am unable to rule out underlying cardiogenic cause of her presyncope, especially in light of her known history of CHF  Patient counseled about the risk of decompensated heart failure, cardiac arrest, or death without further workup and evaluation  She is able to verbalize these risks back to me, but despite my best attempts I am unable to convince her to stay  She has capacity to make her own medical decisions, and prefers to follow up with her cardiologist, Dr Lexi Gonzalez,  as an outpatient  She understands that this is not my medical recommendation  I have reached out to the patient's cardiologist via tiger text to let him know that the patient will be contacting his office to get follow-up as soon as possible  Patient was encouraged to return to the emergency department at any time for admission to complete her workup         Amount and/or Complexity of Data Reviewed  Clinical lab tests: ordered and reviewed  Tests in the radiology section of CPT®: ordered and reviewed  Review and summarize past medical records: yes  Discuss the patient with other providers: yes  Independent visualization of images, tracings, or specimens: yes    Patient Progress  Patient progress: stable           Disposition  Final diagnoses:   Near syncope     Time reflects when diagnosis was documented in both MDM as applicable and the Disposition within this note     Time User Action Codes Description Comment    9/18/2019  4:22 PM Ephriam Milks Add [R55] Near syncope       ED Disposition     ED Disposition Condition Date/Time Comment    NONA  Wed Sep 18, 2019  5:57 PM Patient is leaving against the medical advice of the emergency department staff  She has capacity to make her own medical decisions  Understands the risks of leaving, particularly the risk of heart attack, heart failure, or death, and is able to ve rbalize these back to me  Follow-up Information     Follow up With Specialties Details Why Contact Info Additional Information    [facility] Emergency Department Emergency Medicine  Return to the Emergency Department at any time for admission to the hospital to complete your medical workup  Please return immediately for recurrence of your ligthheadedness, or development of chest pain or shortness of breath  [facility] [place] [address]  [phone] [facility] ED, [address], [place], [place], [address], DO Internal Medicine  Please follow-up with your doctor as soon as possible for further evaluation of your ligthheadedness  Your doctor will likely want to perform an echocardiogram of your heart   [facility] [address]             Discharge Medication List as of 9/18/2019  5:59 PM      CONTINUE these medications which have NOT CHANGED    Details   acetaminophen (TYLENOL) 500 mg tablet Take 500 mg by mouth every 6 (six) hours as needed for mild pain, Historical Med      albuterol (2 5 mg/3 mL) 0 083 % nebulizer solution Take 1 vial (2 5 mg total) by nebulization 4 (four) times a day, Starting Wed 8/21/2019, Normal      albuterol (PROVENTIL HFA,VENTOLIN HFA) 90 mcg/act inhaler Inhale 2 puffs every 6 (six) hours as needed for wheezing, Starting Wed 8/8/2018, Normal      amLODIPine-benazepril (LOTREL 2 5-10) 2 5-10 MG per capsule Take 1 capsule by mouth daily, Starting Fri 6/7/2019, Normal      aspirin 81 MG tablet Take 162 mg by mouth daily in the early morning  , Historical Med      calcium carbonate (OS-TAE) 600 MG tablet Take 600 mg by mouth daily in the early morning  , Historical Med      Elastic Bandages & Supports (NEOPRENE ANKLE SUPPORT) MISC by Does not apply route daily, Starting Wed 7/10/2019, Print      guaiFENesin (MUCINEX) 600 mg 12 hr tablet Take 1 tablet (600 mg total) by mouth every 12 (twelve) hours, Starting Tue 11/13/2018, Print      levothyroxine 100 mcg tablet Take 1 tablet (100 mcg total) by mouth daily, Starting Wed 7/10/2019, Normal      lovastatin (MEVACOR) 20 mg tablet TAKE ONE TABLET BY MOUTH EVERY DAY, Normal      metoprolol tartrate (LOPRESSOR) 25 mg tablet Take 1 tablet (25 mg total) by mouth 2 (two) times a day, Starting Mon 4/29/2019, Normal      Multiple Vitamin (MULTIVITAMIN) tablet Take 1 tablet by mouth daily in the early morning  , Historical Med      Multiple Vitamins-Minerals (ICAPS AREDS 2 PO) Take by mouth, Historical Med           No discharge procedures on file      ED Provider  Electronically Signed by           Vishnu Walls MD  09/18/19 6045

## 2019-09-18 NOTE — ED NOTES
Pt ambulated to restroom with minimal assist x1 (pt requested to hold onto RN's elbow, steady gait noted)  Pt requested "a new pad, I don't think I made it in time"  New pad provided, at that time RN noticed old pad in pt's underwear to be saturated with bright red blood  Pt stated "this happened once before when I went pee earlier"  Pt admitted to not telling anyone because "I'm okay"  2 large painful, bleeding masses noted protruding from rectum  MD updated       Karyna Tanner, ERMELINDA  09/18/19 8789

## 2019-09-18 NOTE — QUICK NOTE
Went to evaluate patient for admission  Perform full history and physical exam   Discussed observation status with patient given lack of inpatient criteria (no hypertensive urgency, acute kidney injury, neurologic deficits, troponin elevation)  Patient at this time is very concerned that she will receive a high bill for hospital stay  Discussed that if I would make her inpatient criteria without any criteria hospital would be coming from [place], she may have a large bill, or her insurance would not cover at all  Due to concern for financial ramifications the patient decided to sign out against medical advice  She is alert and oriented x3 and of sound mind and body to make her own medical decisions  I highly urged the patient to continue her current blood pressure medication as well as to take her blood pressure at home more frequently so that her primary care physician and/or cardiologist can make adequate adjustments to her medications as needed  I have stressed close follow-up with her primary care physician and cardiologist in the outpatient setting  Patient verbalized understanding as well as risks  Emergency Department physician made aware the patient will be signing out AMA and she will complete the form as well as discuss further risk and benefit with the patient  Please refer to her note for further details  Discussed above circumstances with emergency department physician Laura Méndez MD and my attending, SHYLA Kurtz PA-C

## 2019-09-18 NOTE — PROGRESS NOTES
[facility]        NAME: Buffy Garcia is a 80 y o  female  : 1932    MRN: [id number]  DATE: 2019  TIME: 8:13 AM    Assessment and Plan   Dizziness and giddiness [R42]  1  Dizziness and giddiness  Transfer to other facility         Patient Instructions     Dizziness  Patient referred to ER for further evaluation  Follow up with PCP in 3-5 days  Proceed to  ER if symptoms worsen  Chief Complaint     Chief Complaint   Patient presents with    Dizziness     woke up at 3 am and felt "weird that her head felt like it was full of air"  once in room pt turned to get on stetcher and seemed to loss balance and stumbled  pt stated that she want to cards a few years ago for familiiar epsodies and was placed on meds  stated that the eposide last a few seconds  History of Present Illness       81 y/o female with PMH of cardiac disease presents with daughter c/o episodes of feeling like she will pass out  Patient states she had similar symptoms in the past but not x 1 year and never this severe  Patient denies chest pain, palpitations, SOB, diaphoresis  Patient referred to ER for further evaluation      Review of Systems   Review of Systems   Constitutional: Negative  HENT: Negative  Eyes: Negative  Respiratory: Negative  Negative for cough, chest tightness, shortness of breath, wheezing and stridor  Cardiovascular: Negative  Negative for chest pain, palpitations and leg swelling  Neurological: Positive for dizziness and headaches  Negative for tremors, seizures, syncope, facial asymmetry, speech difficulty, weakness, light-headedness and numbness           Current Medications       Current Outpatient Medications:     albuterol (PROVENTIL HFA,VENTOLIN HFA) 90 mcg/act inhaler, Inhale 2 puffs every 6 (six) hours as needed for wheezing, Disp: 1 Inhaler, Rfl: 0    amLODIPine-benazepril (LOTREL 2 5-10) 2 5-10 MG per capsule, Take 1 capsule by mouth daily, Disp: 90 capsule, Rfl: 3    aspirin 81 MG tablet, Take 162 mg by mouth daily in the early morning  , Disp: , Rfl:     calcium carbonate (OS-TAE) 600 MG tablet, Take 600 mg by mouth daily in the early morning  , Disp: , Rfl:     levothyroxine 100 mcg tablet, Take 1 tablet (100 mcg total) by mouth daily, Disp: 90 tablet, Rfl: 3    lovastatin (MEVACOR) 20 mg tablet, TAKE ONE TABLET BY MOUTH EVERY DAY, Disp: 90 tablet, Rfl: 3    metoprolol tartrate (LOPRESSOR) 25 mg tablet, Take 1 tablet (25 mg total) by mouth 2 (two) times a day, Disp: 180 tablet, Rfl: 3    Multiple Vitamin (MULTIVITAMIN) tablet, Take 1 tablet by mouth daily in the early morning  , Disp: , Rfl:     Multiple Vitamins-Minerals (ICAPS AREDS 2 PO), Take by mouth, Disp: , Rfl:     acetaminophen (TYLENOL) 500 mg tablet, Take 500 mg by mouth every 6 (six) hours as needed for mild pain, Disp: , Rfl:     albuterol (2 5 mg/3 mL) 0 083 % nebulizer solution, Take 1 vial (2 5 mg total) by nebulization 4 (four) times a day (Patient not taking: Reported on 9/18/2019), Disp: 120 vial, Rfl: 5    Elastic Bandages & Supports (NEOPRENE ANKLE SUPPORT) MISC, by Does not apply route daily (Patient not taking: Reported on 9/18/2019), Disp: 1 each, Rfl: 0    guaiFENesin (MUCINEX) 600 mg 12 hr tablet, Take 1 tablet (600 mg total) by mouth every 12 (twelve) hours (Patient not taking: Reported on 9/18/2019), Disp: 60 tablet, Rfl: 0    Current Allergies     Allergies as of 09/18/2019 - Reviewed 09/18/2019   Allergen Reaction Noted    Amiodarone Hives 03/19/2015    Keflex [cephalexin] Hives 12/31/2016    Omnipaque [iohexol] Hives and Shortness Of Breath 04/06/2016    Clindamycin Other (See Comments) 05/08/2017    Sulfa antibiotics Hives 08/26/2015    Iv dye  [iodinated diagnostic agents] Hypertension and Palpitations 06/10/2014    Naprosyn [naproxen] Itching and Rash 07/11/2012            The following portions of the patient's history were reviewed and updated as appropriate: allergies, current medications, past family history, past medical history, past social history, past surgical history and problem list      Past Medical History:   Diagnosis Date    [facility]-fib [facility])     Allergy to IVP dye 2013    pt felt heaviness, palpitations    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), wet ([facility] [address] )     bilateral    [facility] )     last assessed - [id number]    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HCC)     Aortic root dilatation (HCC)     last assessed - [id number]    Arthritis     Ascending aortic aneurysm ([facility] )     last assessed - [id number]    Asthma     Basal cell carcinoma     last assessed - 83Iqu4145    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[place] [address] )     Disease of thyroid gland     Facet arthropathy, cervical     last assessed - 38HKV3483    History of fracture of vertebral column     Hyperlipidemia     Hypertension     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[facility] )     Hypoxia     last assessed - 92YGW3323    Junctional rhythm     last assessed - 81Zcl5260    Lightheadedness     last assessed - 36Rlj4126    Migraine     Palpitations     last assessed - 65Xzt8263    Pleural effusion, bilateral     last assessed - 30Ixn5490    Shortness of breath     last assessed - 02Cis1139    Thyroid trouble     Trigger finger     last assessed - 64Etq6613    Trigger middle finger of right hand     last assessed - 97Ecc8252    Urinary incontinence     last assessed -  ; Resolved - [id number]       Past Surgical History:   Procedure Laterality Date    ABDOMINAL AORTIC ANEURYSM REPAIR W/ ENDOLUMINAL GRAFT  2015    Ascending aorta and hemiarch replacement with 30 mm Vascutek Gelweave graft;  Managed by Samuel Tai; last assessed - [id number]    APPENDECTOMY      BLADDER SURGERY      Reccurent histoy of bladder surgery    [facility] JEREMY PROCEDURE      CARDIAC CATHETERIZATION  2004    Procedure summary - Luminal irregularities; last assessed - [id number]     SECTION      CORONARY ANEURYSM REPAIR      CYSTOSCOPY      botox injection    HYSTERECTOMY      NV ENDOSCOPIC INJECTION/IMPLANT N/A 5/19/2017    Procedure: CYSTOSCOPY; DURASPHERE-PERIURETHRAL BULKING AGENT INJECTION ;  Surgeon: Ya Barnard MD;  Location: [facility];  Service: Urology    NV INCISE FINGER TENDON SHEATH Right 10/18/2016    Procedure: LONG FINGER TRIGGER RELEASE ;  Surgeon: Neal Machado MD;  Location: [facility];  Service: Orthopedics    THYROIDECTOMY      Total Thyroidectomy    TONSILLECTOMY AND ADENOIDECTOMY         Family History   Problem Relation Age of Onset    Coronary aneurysm Sister     Coronary artery disease Sister         CABG    Heart disease Family         cardiac disorder    Hypertension Family     Cancer Family     Thyroid disease Family          Medications have been verified  Objective   /70   Pulse 77   Temp 98 4 °F (36 9 °C) (Temporal)   Resp 18   SpO2 95%        Physical Exam     Physical Exam   Constitutional: She is oriented to person, place, and time  She appears well-developed and well-nourished  HENT:   Head: Normocephalic and atraumatic  Right Ear: External ear normal    Left Ear: External ear normal    Nose: Nose normal    Mouth/Throat: Oropharynx is clear and moist  No oropharyngeal exudate  Neck: Normal range of motion  Neck supple  Cardiovascular: Normal rate, regular rhythm, normal heart sounds and intact distal pulses  Pulmonary/Chest: Effort normal and breath sounds normal  No respiratory distress  She has no wheezes  She has no rales  She exhibits no tenderness  Lymphadenopathy:     She has no cervical adenopathy  Neurological: She is alert and oriented to person, place, and time  She has normal strength  She is not disoriented  No cranial nerve deficit or sensory deficit  GCS eye subscore is 4  GCS verbal subscore is 5  GCS motor subscore is 6           Patient refused EKG at this time and daughter states she will drive her to ER

## 2019-09-19 ENCOUNTER — TELEPHONE (OUTPATIENT)
Dept: CARDIOLOGY CLINIC | Facility: CLINIC | Age: 84
End: 2019-09-19

## 2019-09-19 NOTE — TELEPHONE ENCOUNTER
Buffy asking if you reviewed the notes from her ED visit yesterday  (She was the told the Dr Jimenez Giron you and discussed the event)  She opted for outpatient testing and is asking for an order for an echo prior to scheduling an OV   Please advise

## 2019-09-20 DIAGNOSIS — R55 NEAR SYNCOPE: Primary | ICD-10-CM

## 2019-09-20 NOTE — TELEPHONE ENCOUNTER
Echo ordered  I can see her at [facility] 3:20pm if she wants  Otherwise you can add her to [facility] office 10/4

## 2019-10-04 ENCOUNTER — OFFICE VISIT (OUTPATIENT)
Dept: CARDIOLOGY CLINIC | Facility: CLINIC | Age: 84
End: 2019-10-04
Payer: COMMERCIAL

## 2019-10-04 VITALS
SYSTOLIC BLOOD PRESSURE: 130 MMHG | HEIGHT: 62 IN | OXYGEN SATURATION: 96 % | WEIGHT: 133.4 LBS | DIASTOLIC BLOOD PRESSURE: 58 MMHG | BODY MASS INDEX: 24.55 KG/M2 | HEART RATE: 68 BPM

## 2019-10-04 DIAGNOSIS — I48.0 PAROXYSMAL ATRIAL FIBRILLATION (HCC): Primary | ICD-10-CM

## 2019-10-04 DIAGNOSIS — I10 ESSENTIAL HYPERTENSION: ICD-10-CM

## 2019-10-04 DIAGNOSIS — R06.02 SHORTNESS OF BREATH: ICD-10-CM

## 2019-10-04 DIAGNOSIS — R55 VASOVAGAL SYNCOPE: ICD-10-CM

## 2019-10-04 PROCEDURE — 99214 OFFICE O/P EST MOD 30 MIN: CPT | Performed by: INTERNAL MEDICINE

## 2019-10-04 PROCEDURE — 93000 ELECTROCARDIOGRAM COMPLETE: CPT | Performed by: INTERNAL MEDICINE

## 2019-10-04 NOTE — PROGRESS NOTES
Follow-up - Cardiology   [facility] MRN: [id number]        Problems    1  Paroxysmal atrial fibrillation (HCC)  POCT ECG   2  Essential hypertension  POCT ECG   3  Shortness of breath  POCT ECG   4  Vasovagal syncope         Impression:    Hypertension  Currently well controlled  Unexplained episode of severe hypertension on 09/18 in the setting of feeling quite lightheaded and having a syncopal episode  No clear explanation  Was not on any nasal decongestant  Had been in [place] the night before,  1 slice of pizza and had salad, does not seem like she had a tremendous amount of sodium load to explain the blood pressure spike    Paroxysmal atrial fibrillation  No recurrence  No anticoagulation due to nasopharyngeal bleeding    History of ascending aortic aneurysm  Status post remote repair    Hyperlipidemia  No statin therapy considering age and no evidence of CAD by cardiac catheterization prior to ascending aortic root in repair    Dyspnea on exertion  No cardiac cause found  Normal LV function  No evidence of volume overload/CHF previously  [place] by Pulmonary to be deconditioning, was sent to pulmonary rehab    Plan:    I do not have a clear explanation for her near syncopal symptoms, unclear why her blood pressure was suddenly so elevated, but without any significant change to her baseline regimen, her blood pressures are now quite well controlled and she has had no symptoms since her episode more than 2 weeks ago  I do not think any additional cardiac testing will help uncover any additional causes  Good dietary control, sodium restriction, adequate hydration, and medication compliance were discussed    I will have her come back and see me in 2 months    HPI:   Buffy Moctezuma is a 80y o  year old female  With a history of ascending aortic aneurysm, status post repair, with significant asthma, hypertension, mild hyperlipidemia but not on statin therapy, prior normal cardiac catheterization pre surgery, paroxysmal atrial fibrillation without any recent significant symptoms, and on aspirin due to a 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  She was just in the ER, severely hypertensive, 3 days of lightheadedness, and apparently an episode of syncope per her daughter and sister  Workup in the ER was normal except for high blood pressure  Symptoms have resolved since then, no medication changes were made  Troponins were normal, proBNP was minimally elevated, there is no signs of volume overload  She denies edema  She is currently taking Lotrel 2 5/10 mg, metoprolol 25 mg, and lovastatin  She is on aspirin but no Coumadin, there has been no sign of recurrence of atrial fibrillation, and avoiding anticoagulation due to prior nasopharyngeal bleeding in the setting of her cancer    Review of Systems   Constitutional: Negative for appetite change, diaphoresis, fatigue and fever  Respiratory: Negative for chest tightness, shortness of breath and wheezing  Cardiovascular: Negative for chest pain, palpitations and leg swelling  Gastrointestinal: Negative for abdominal pain and blood in stool  Musculoskeletal: Negative for arthralgias and joint swelling  Skin: Negative for rash  Neurological: Negative for dizziness, syncope and light-headedness           Past Medical History:   Diagnosis Date    A-fib [facility])     Allergy to IVP dye 06/04/2013    pt felt heaviness, palpitations    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), wet ([address] )     bilateral    Aneurysm of aortic root ([facility] [facility] )     last assessed - [id number]    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HCC)     Aortic root dilatation (HCC)     last assessed - 98QKC9957    Arthritis     Ascending aortic aneurysm (HCC)     last assessed - 64APT7475    Asthma     Basal cell carcinoma     last assessed - 76Pzd2966    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[facility] )     Disease of thyroid gland     Facet arthropathy, cervical     last assessed - 44PZX3764  History of fracture of vertebral column     Hyperlipidemia     Hypertension     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[facility] [address] )     Hypoxia     last assessed - 50HMG8725    Junctional rhythm     last assessed - 59Fgx6650    Lightheadedness     last assessed - 75Iez8219    Migraine     Palpitations     last assessed - 63Qde2305    Pleural effusion, bilateral     last assessed - 98Ygp7469    Shortness of breath     last assessed - 92Jva4089    Thyroid trouble     Trigger finger     last assessed - 60DRI2139    Trigger middle finger of right hand     last assessed - 56Wfo9142    Urinary incontinence     last assessed -  22Jul; Resolved - 35ZBZ3971     Social History     Substance and Sexual Activity   Alcohol Use No    Comment: Social drinker & never drang alcohol both documented in [employer]     Social History     Substance and Sexual Activity   Drug Use No     Social History     Tobacco Use   Smoking Status Former Smoker    Packs/day: 0 25    Years: 57 00    Pack years: 14 25    Types: Cigarettes    Start date: 36    Last attempt to quit: 2014    Years since quittin 7   Smokeless Tobacco Never Used   Tobacco Comment    smoked 17 yrs 1/2 ppd quit and restarted then quit  10 days ago during 2014        Allergies: Allergies   Allergen Reactions    Amiodarone Hives    Keflex [Cephalexin] Hives    Omnipaque [Iohexol] Hives and Shortness Of Breath    Clindamycin Other (See Comments)     When taken causes cdiff immediately    Other     Sulfa Antibiotics Hives     itching    Iv Dye  [Iodinated Diagnostic Agents] Hypertension and Palpitations     Annotation - [id number]: Verified with patient     Naprosyn [Naproxen] Itching and Rash     Category: Allergy;         Medications:     Current Outpatient Medications:     acetaminophen (TYLENOL) 500 mg tablet, Take 500 mg by mouth every 6 (six) hours as needed for mild pain, Disp: , Rfl:     albuterol (2 5 mg/3 mL) 0 083 % nebulizer solution, Take 1 vial (2 5 mg total) by nebulization 4 (four) times a day, Disp: 120 vial, Rfl: 5    albuterol (PROVENTIL HFA,VENTOLIN HFA) 90 mcg/act inhaler, Inhale 2 puffs every 6 (six) hours as needed for wheezing, Disp: 1 Inhaler, Rfl: 0    amLODIPine-benazepril (LOTREL 2 5-10) 2 5-10 MG per capsule, Take 1 capsule by mouth daily, Disp: 90 capsule, Rfl: 3    calcium carbonate (OS-TAE) 600 MG tablet, Take 600 mg by mouth daily in the early morning  , Disp: , Rfl:     guaiFENesin (MUCINEX) 600 mg 12 hr tablet, Take 1 tablet (600 mg total) by mouth every 12 (twelve) hours, Disp: 60 tablet, Rfl: 0    levothyroxine 100 mcg tablet, Take 1 tablet (100 mcg total) by mouth daily, Disp: 90 tablet, Rfl: 3    lovastatin (MEVACOR) 20 mg tablet, TAKE ONE TABLET BY MOUTH EVERY DAY, Disp: 90 tablet, Rfl: 3    metoprolol tartrate (LOPRESSOR) 25 mg tablet, Take 1 tablet (25 mg total) by mouth 2 (two) times a day, Disp: 180 tablet, Rfl: 3    Multiple Vitamin (MULTIVITAMIN) tablet, Take 1 tablet by mouth daily in the early morning  , Disp: , Rfl:     Multiple Vitamins-Minerals (ICAPS AREDS 2 PO), Take by mouth, Disp: , Rfl:     aspirin 81 MG tablet, Take 162 mg by mouth daily in the early morning  , Disp: , Rfl:     Elastic Bandages & Supports (NEOPRENE ANKLE SUPPORT) MISC, by Does not apply route daily (Patient not taking: Reported on 9/18/2019), Disp: 1 each, Rfl: 0      Vitals:    10/04/19 1342   BP: 130/58   Pulse: 68   SpO2: 96%     Weight (last 2 days)     Date/Time   Weight    10/04/19 1342   60 5 (133 4)            Physical Exam   Constitutional: No distress  HENT:   Head: Normocephalic and atraumatic  Eyes: Conjunctivae are normal  No scleral icterus  Neck: Normal range of motion  No JVD present  Cardiovascular: Normal rate, regular rhythm, normal heart sounds and intact distal pulses  No murmur heard  Pulmonary/Chest: Effort normal and breath sounds normal  No respiratory distress  She has no wheezes   She has no rhonchi  She has no rales  Musculoskeletal: She exhibits no edema or tenderness  Right lower leg: Normal  She exhibits no edema  Left lower leg: Normal  She exhibits no edema  Skin: Skin is warm and dry  She is not diaphoretic  Laboratory Studies:  Lab Results   Component Value Date    HGBA1C 5 9 06/05/2019    HGBA1C 6 2 (H) 02/05/2015     (L) 06/16/2015     06/11/2015     06/07/2015    K 4 3 09/18/2019    K 4 3 06/05/2019    K 4 4 11/26/2018    K 4 1 06/16/2015    K 4 1 06/11/2015    K 3 9 06/07/2015     09/18/2019     06/05/2019     11/26/2018    CL 97 (L) 06/16/2015     06/11/2015     06/07/2015    CO2 29 09/18/2019    CO2 31 06/05/2019    CO2 27 11/26/2018    CO2 30 06/16/2015    CO2 28 06/11/2015    CO2 27 06/07/2015    GLUCOSE 138 06/16/2015    GLUCOSE 180 (H) 06/11/2015    GLUCOSE 97 06/08/2015    CREATININE 0 84 09/18/2019    CREATININE 0 70 06/05/2019    CREATININE 0 88 11/26/2018    CREATININE 1 07 06/16/2015    CREATININE 0 93 06/11/2015    CREATININE 0 66 06/07/2015    BUN 15 09/18/2019    BUN 18 06/05/2019    BUN 17 11/26/2018    BUN 23 06/16/2015    BUN 18 06/11/2015    BUN 13 06/07/2015    MG 2 1 02/22/2016    MG 1 9 06/07/2015    MG 1 7 06/06/2015    MG 1 8 04/09/2015    PHOS 3 3 06/07/2015     Lab Results   Component Value Date    WBC 7 72 09/18/2019    WBC 7 65 06/16/2015    RBC 4 39 09/18/2019    RBC 4 16 06/16/2015    HGB 13 3 09/18/2019    HGB 9 1 (L) 06/16/2015    HCT 41 2 09/18/2019    HCT 29 9 (L) 06/16/2015    MCV 94 09/18/2019    MCV 72 (L) 06/16/2015    MCH 30 3 09/18/2019    MCH 21 9 (L) 06/16/2015    RDW 13 9 09/18/2019    RDW 17 2 (H) 06/16/2015     09/18/2019     06/16/2015     NT-proBNP: No results for input(s): NTBNP in the last 72 hours     Coags:    Lipid Profile:   Lab Results   Component Value Date    CHOL 154 06/08/2015     Lab Results   Component Value Date    HDL 71 (H) 06/05/2019     Lab Results   Component Value Date    LDLCALC 91 06/05/2019     Lab Results   Component Value Date    TRIG 75 06/05/2019       Cardiac testing:   EKG reviewed personally:  Sinus rhythm, normal EKG    Echocardiogram 12/12/2017-LVEF 55%, normal wall thickness, grade 1 DD, trace MR, trace AI    Brenda Graham MD    Portions of the record may have been created with voice recognition software   Occasional wrong word or "sound a like" substitutions may have occurred due to the inherent limitations of voice recognition software   Read the chart carefully and recognize, using context, where substitutions have occurred

## 2019-10-07 ENCOUNTER — APPOINTMENT (OUTPATIENT)
Dept: LAB | Facility: CLINIC | Age: 84
End: 2019-10-07
Payer: COMMERCIAL

## 2019-10-07 DIAGNOSIS — E55.9 VITAMIN D DEFICIENCY: ICD-10-CM

## 2019-10-07 LAB
25(OH)D3 SERPL-MCNC: 36.7 NG/ML (ref 30–100)
TSH SERPL DL<=0.05 MIU/L-ACNC: 0.84 UIU/ML (ref 0.36–3.74)

## 2019-10-07 PROCEDURE — 36415 COLL VENOUS BLD VENIPUNCTURE: CPT

## 2019-10-07 PROCEDURE — 82306 VITAMIN D 25 HYDROXY: CPT

## 2019-10-07 PROCEDURE — 84443 ASSAY THYROID STIM HORMONE: CPT

## 2019-10-10 ENCOUNTER — TELEPHONE (OUTPATIENT)
Dept: HEMATOLOGY ONCOLOGY | Facility: CLINIC | Age: 84
End: 2019-10-10

## 2019-10-10 NOTE — TELEPHONE ENCOUNTER
Pt called and wants to make a appt to see Dr Santana Mckay but before she does she thinks the Dr would like for her to go for a Scan of the Head and neck  Pt last  visit with Dr Santana Mckay was 12/03/2019    Can you please call pt back on if they need a scan before making appt

## 2019-10-14 ENCOUNTER — OFFICE VISIT (OUTPATIENT)
Dept: INTERNAL MEDICINE CLINIC | Facility: CLINIC | Age: 84
End: 2019-10-14
Payer: COMMERCIAL

## 2019-10-14 VITALS
WEIGHT: 132.2 LBS | BODY MASS INDEX: 24.33 KG/M2 | HEART RATE: 63 BPM | SYSTOLIC BLOOD PRESSURE: 112 MMHG | HEIGHT: 62 IN | OXYGEN SATURATION: 95 % | RESPIRATION RATE: 16 BRPM | DIASTOLIC BLOOD PRESSURE: 68 MMHG

## 2019-10-14 DIAGNOSIS — Z23 NEED FOR INFLUENZA VACCINATION: ICD-10-CM

## 2019-10-14 DIAGNOSIS — Z13.1 SCREENING FOR DIABETES MELLITUS: ICD-10-CM

## 2019-10-14 DIAGNOSIS — E78.5 HYPERLIPIDEMIA, UNSPECIFIED HYPERLIPIDEMIA TYPE: ICD-10-CM

## 2019-10-14 DIAGNOSIS — I10 ESSENTIAL HYPERTENSION: ICD-10-CM

## 2019-10-14 DIAGNOSIS — J45.20 MILD INTERMITTENT ASTHMA WITHOUT COMPLICATION: ICD-10-CM

## 2019-10-14 DIAGNOSIS — E06.3 HYPOTHYROIDISM DUE TO HASHIMOTO'S THYROIDITIS: ICD-10-CM

## 2019-10-14 DIAGNOSIS — E03.8 HYPOTHYROIDISM DUE TO HASHIMOTO'S THYROIDITIS: ICD-10-CM

## 2019-10-14 DIAGNOSIS — E03.9 HYPOTHYROIDISM, UNSPECIFIED TYPE: Primary | ICD-10-CM

## 2019-10-14 DIAGNOSIS — G47.09 OTHER INSOMNIA: ICD-10-CM

## 2019-10-14 PROBLEM — E55.9 VITAMIN D DEFICIENCY: Status: ACTIVE | Noted: 2019-10-14

## 2019-10-14 PROCEDURE — 90662 IIV NO PRSV INCREASED AG IM: CPT

## 2019-10-14 PROCEDURE — G0008 ADMIN INFLUENZA VIRUS VAC: HCPCS

## 2019-10-14 PROCEDURE — 99214 OFFICE O/P EST MOD 30 MIN: CPT | Performed by: INTERNAL MEDICINE

## 2019-10-14 RX ORDER — MONTELUKAST SODIUM 10 MG/1
10 TABLET ORAL
Qty: 30 TABLET | Refills: 2 | Status: SHIPPED | OUTPATIENT
Start: 2019-10-14 | End: 2020-02-24 | Stop reason: SDUPTHER

## 2019-10-14 NOTE — PROGRESS NOTES
BMI Counseling: Body mass index is 24 18 kg/m²  Discussed the patient's BMI with her  The BMI in the normal range, I have counselled the pt to follow a healthy and balanced diet ,and recommend routine exercise  Assessment/Plan:    Hypothyroidism  Hypothyroidism controlled the patient is currently euthyroid I will be ordering a TSH prior to the next office visit and the patient will continue with current medical regiment; we will continue to monitor the patient's progress  Asthma  Clinically stable and doing well continue the current medical regiment will continue monitor  Pharmacist will see there is a are cost effective medication to the Advair    Essential hypertension  Hypertension - controlled, I have counseled patient following healthy balance diet, I would like the patient reduce sodium, exercise routinely, I would like the patient continued the med current medical regiment and we will continue to monitor  Hyperlipidemia  Hyperlipidemia controlled continue with current medical regiment recommend a low-cholesterol diet and recommend routine exercise we will continue to monitor the progress  Need for influenza vaccination  Counseled, patient will receive the influenza vaccine high-dose    Screening for diabetes mellitus  I have counselled the pt to follow a healthy and balanced diet ,and recommend routine exercise      Other insomnia  Likely secondary to depression no suicidal ideation the patient declines SSRI but may consider patient will try melatonin 1 mg 1 p o  Q h s  P r n  I will have pharmacist [id number] work with the patient regarding this         Problem List Items Addressed This Visit        Endocrine    Hypothyroidism due to Hashimoto's thyroiditis    Hypothyroidism - Primary     Hypothyroidism controlled the patient is currently euthyroid I will be ordering a TSH prior to the next office visit and the patient will continue with current medical regiment; we will continue to monitor the patient's progress  Relevant Orders    TSH, 3rd generation       Respiratory    Asthma     Clinically stable and doing well continue the current medical regiment will continue monitor  Pharmacist will see there is a are cost effective medication to the Advair            Cardiovascular and Mediastinum    Essential hypertension     Hypertension - controlled, I have counseled patient following healthy balance diet, I would like the patient reduce sodium, exercise routinely, I would like the patient continued the med current medical regiment and we will continue to monitor  Relevant Orders    Comprehensive metabolic panel       Other    Hyperlipidemia     Hyperlipidemia controlled continue with current medical regiment recommend a low-cholesterol diet and recommend routine exercise we will continue to monitor the progress  Relevant Orders    Lipid Panel with Direct LDL reflex    Screening for diabetes mellitus     I have counselled the pt to follow a healthy and balanced diet ,and recommend routine exercise  Relevant Orders    Hemoglobin A1C    Need for influenza vaccination     Counseled, patient will receive the influenza vaccine high-dose         Relevant Orders    influenza vaccine, [id number], high-dose, PF 0 5 mL (FLUZONE HIGH-DOSE) (Completed)    Other insomnia     Likely secondary to depression no suicidal ideation the patient declines SSRI but may consider patient will try melatonin 1 mg 1 p o  Q h s  P r n  [facility] [id number] [facility]               Return to office 6  months  call if any problems  Subjective:      Patient ID: June A Dario Montoya is a 80 y o  female  HPI 80-year old female coming in for a follow up office visit regarding hypothyroidism, hypertension, hyperlipidemia, asthma; The patient reports me compliant taking medications without untoward side effects the    The patient is here to review his medical condition, update me on the medical condition and the patient reports me no hospitalizations and no ER visits  The pt reports er syncope, stressed out , needed to rent for sister, bp in the er 220 she is doiing  Much better with being careful with eating , difficulty with sleeping  She is having trouble with staying asleep sad , feels cheated ,  Frustrated ,  No si   The following portions of the patient's history were reviewed and updated as appropriate: allergies, current medications, past family history, past medical history, past social history, past surgical history and problem list     Review of Systems   Constitutional: Negative for activity change, appetite change and unexpected weight change  HENT: Negative for congestion and postnasal drip  Eyes: Negative for visual disturbance  Respiratory: Negative for cough and shortness of breath  Cardiovascular: Negative for chest pain  Gastrointestinal: Negative for abdominal pain, diarrhea, nausea and vomiting  Neurological: Negative for dizziness, light-headedness and headaches  Hematological: Negative for adenopathy  Objective:    No follow-ups on file  Procedure: Xr Chest 2 Views    Result Date: 9/18/2019  Narrative: CHEST INDICATION:   lightheadedness, history of aortic arch repair  COMPARISON:  5/9/2018 EXAM PERFORMED/VIEWS:  XR CHEST PA & LATERAL  The frontal view was performed utilizing dual energy radiographic technique  Images: 4 FINDINGS:  Sternotomy wires are intact  Mild tortuosity of the thoracic aorta  The heart is not enlarged  The lungs are clear  No pneumothorax or pleural effusion  Stable moderate compression fracture at the thoracolumbar junction  No acute osseous abnormality  Impression: No acute cardiopulmonary disease  Workstation performed: [id number]     Procedure: Ct Head Without Contrast    Result Date: 9/18/2019  Narrative: CT BRAIN - WITHOUT CONTRAST INDICATION:   headache, 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   COMPARISON:  Previous MRI from October 11, 2016 TECHNIQUE:  CT examination of the brain was performed  In addition to axial images, coronal 2D reformatted images were created and submitted for interpretation  Radiation dose length product (DLP) for this visit:  999 mGy-cm   This examination, like all CT scans performed in the [facility], was performed utilizing techniques to minimize radiation dose exposure, including the use of iterative reconstruction and automated exposure control  IMAGE QUALITY:  Diagnostic  FINDINGS: PARENCHYMA: Decreased attenuation is noted in periventricular and subcortical white matter demonstrating an appearance that is statistically most likely to represent moderate microangiopathic change  No CT signs of acute infarction  No intracranial mass, mass effect or midline shift  No acute parenchymal hemorrhage  No mass effect seen No acute intracranial hemorrhage seen A pineal cyst seen Tortuous basilar artery VENTRICLES AND EXTRA-AXIAL SPACES:  Normal for the patient's age  VISUALIZED ORBITS AND PARANASAL SINUSES:  Chronic mucosal thickening seen in the right maxillary sinus  A lytic lesion seen in the right side of the hard palate with associated soft tissue mass, measuring 1 9 x 1 9 cm CALVARIUM AND EXTRACRANIAL SOFT TISSUES:  Normal      Impression: No acute intracranial hemorrhage seen No CT signs of acute territorial infarction Moderate periventricular and white matter hypodensity related to chronic small vessel ischemic changes If concern for mild metastatic disease in the brain parenchyma consider MRI or CT with contrast Workstation performed: [id number]     Procedure: Ct Soft Tissue Neck Wo Contrast    Result Date: 9/18/2019  Narrative: CT SOFT TISSUE NECK WITHOUT CONTRAST INDICATION:   headache, 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   COMPARISON:  Previous study from November 28, 2018 TECHNIQUE:  Axial, sagittal, and coronal 2D reformatted images were created from the axial source data and submitted for interpretation  Radiation dose length product (DLP) for this visit:  425 mGy-cm   This examination, like all CT scans performed in the [facility], was performed utilizing techniques to minimize radiation dose exposure, including the use of iterative reconstruction and automated exposure control  IMAGE QUALITY:  Diagnostic  FINDINGS: VISUALIZED BRAIN PARENCHYMA:  Moderate periventricular and white matter hypodensity seen related to chronic small vessel ischemic changes VISUALIZED ORBITS AND PARANASAL SINUSES:  Normal  NASAL CAVITY AND NASOPHARYNX:  Normal  SUPRAHYOID NECK:  A rounded the mass involving the posterior aspect of the right maxilla with the erosive destructive changes with involvement of palate noted  This mass measures 1 9 x 2 3 cm  This was also seen on the previous study of November 2018  and is stable  Erosion of the floor of the right nasal cavity seen  Again this lesion is close to the palatine foramina with limited evaluation for perineural extension 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  INFRAHYOID NECK:  Aryepiglottic folds and piriform sinuses  Normal larynx and subglottic airway  Well-circumscribed rounded density seen deep to the hyoid body and projecting in the vallecula, measuring about 5 mm, stable THYROID GLAND:  The right thyroid lobe and the left thyroid lobe is absent  Surgical clips are noted in the thyroid gland PAROTID AND SUBMANDIBULAR GLANDS: Normal  LYMPH NODES:  No new lymphadenopathy seen Limited evaluation VASCULAR STRUCTURES:  Limited without contrast  THORACIC INLET:  Lung apices and upper mediastinum are unremarkable  Dilated aorta as seen on the previous study with dilation of the arch BONY STRUCTURES:  Degenerative changes seen within the cervical spine  No acute compression collapse of the vertebra seen     Impression: Mass involving the hard palate and lower right maxilla measuring 1 9 x 2 3 cm, stable    No new lymphadenopathy Limited study due to lack of contrast Workstation performed: [id number]         Allergies   Allergen Reactions    Amiodarone Hives    Keflex [Cephalexin] Hives    Omnipaque [Iohexol] Hives and Shortness Of Breath    Clindamycin Other (See Comments)     When taken causes cdiff immediately    Other     Sulfa Antibiotics Hives     itching    Iv Dye  [Iodinated Diagnostic Agents] Hypertension and Palpitations     Annotation - [id number]: Verified with patient     Naprosyn [Naproxen] Itching and Rash     Category: Allergy; Past Medical History:   Diagnosis Date    A-fib [facility])     Allergy to IVP dye 06/04/2013    pt felt heaviness, palpitations    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), wet ([facility] [address] )     bilateral    Aneurysm of aortic root ([facility] [address] )     last assessed - [id number]    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HCC)     Aortic root dilatation (HCC)     last assessed - [id number]    Arthritis     Ascending aortic aneurysm (HCC)     last assessed - [id number]    Asthma     Basal cell carcinoma     last assessed - [id number]    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[address] )     Disease of thyroid gland     Facet arthropathy, cervical     last assessed - [id number]    History of fracture of vertebral column     Hyperlipidemia     Hypertension     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[facility] [address] )     Hypoxia     last assessed - [id number]    Junctional rhythm     last assessed - [id number]    Lightheadedness     last assessed - 27Bzt2252    Migraine     Palpitations     last assessed - [id number]    Pleural effusion, bilateral     last assessed - 82Jlc2239    Shortness of breath     last assessed - 83Oti5397    Thyroid trouble     Trigger finger     last assessed - 39Qvj1712    Trigger middle finger of right hand     last assessed - [id number]    Urinary incontinence     last assessed -  22Jul,2013;  Resolved - [id number]     Past Surgical History:   Procedure Laterality Date    ABDOMINAL AORTIC ANEURYSM REPAIR W/ ENDOLUMINAL GRAFT  06/28/2015    Ascending aorta and hemiarch replacement with 30 mm Vascutek Gelweave graft;  Managed by Sarah Wright; last assessed - [id number]    APPENDECTOMY      BLADDER SURGERY      Reccurent leydi of bladder surgery    [facility] YAZOO PROCEDURE      CARDIAC CATHETERIZATION  2004    Procedure summary - Luminal irregularities; last assessed - [id number]     SECTION      CORONARY ANEURYSM REPAIR      CYSTOSCOPY      botox injection    HYSTERECTOMY      KS ENDOSCOPIC INJECTION/IMPLANT N/A 2017    Procedure: CYSTOSCOPY; DURASPHERE-PERIURETHRAL BULKING AGENT INJECTION ;  Surgeon: Justin Groves MD;  Location: [facility];  Service: Urology    KS INCISE FINGER TENDON SHEATH Right 10/18/2016    Procedure: LONG FINGER TRIGGER RELEASE ;  Surgeon: Jaylen Camarena MD;  Location: [facility];  Service: Orthopedics    THYROIDECTOMY      Total Thyroidectomy    TONSILLECTOMY AND ADENOIDECTOMY       Current Outpatient Medications on File Prior to Visit   Medication Sig Dispense Refill    acetaminophen (TYLENOL) 500 mg tablet Take 500 mg by mouth every 6 (six) hours as needed for mild pain      albuterol (2 5 mg/3 mL) 0 083 % nebulizer solution Take 1 vial (2 5 mg total) by nebulization 4 (four) times a day 120 vial 5    albuterol (PROVENTIL HFA,VENTOLIN HFA) 90 mcg/act inhaler Inhale 2 puffs every 6 (six) hours as needed for wheezing 1 Inhaler 0    amLODIPine-benazepril (LOTREL 2 5-10) 2 5-10 MG per capsule Take 1 capsule by mouth daily 90 capsule 3    aspirin 81 MG tablet Take 162 mg by mouth daily in the early morning        calcium carbonate (OS-TAE) 600 MG tablet Take 600 mg by mouth daily in the early morning        Elastic Bandages & Supports (NEOPRENE ANKLE SUPPORT) MISC by Does not apply route daily 1 each 0    guaiFENesin (MUCINEX) 600 mg 12 hr tablet Take 1 tablet (600 mg total) by mouth every 12 (twelve) hours 60 tablet 0    levothyroxine 100 mcg tablet Take 1 tablet (100 mcg total) by mouth daily 90 tablet 3    lovastatin (MEVACOR) 20 mg tablet TAKE ONE TABLET BY MOUTH EVERY DAY 90 tablet 3    metoprolol tartrate (LOPRESSOR) 25 mg tablet Take 1 tablet (25 mg total) by mouth 2 (two) times a day 180 tablet 3    Multiple Vitamin (MULTIVITAMIN) tablet Take 1 tablet by mouth daily in the early morning        Multiple Vitamins-Minerals (ICAPS AREDS 2 PO) Take by mouth       No current facility-administered medications on file prior to visit  Family History   Problem Relation Age of Onset    Coronary aneurysm Sister     Coronary artery disease Sister         CABG    Heart disease Family         cardiac disorder    Hypertension Family     Cancer Family     Thyroid disease Family      Social History     Socioeconomic History    Marital status:       Spouse name: Not on file    Number of children: Not on file    Years of education: Not on file    Highest education level: Not on file   Occupational History    Not on file   Social Needs    Financial resource strain: Not on file    Food insecurity:     Worry: Not on file     Inability: Not on file    Transportation needs:     Medical: Not on file     Non-medical: Not on file   Tobacco Use    Smoking status: Former Smoker     Packs/day: 0 25     Years: 57 00     Pack years: 14 25     Types: Cigarettes     Start date:      Last attempt to quit: 2014     Years since quittin 8    Smokeless tobacco: Never Used    Tobacco comment: smoked 17 yrs 1/2 ppd quit and restarted then quit  10 days ago during 2014    Substance and Sexual Activity    Alcohol use: No     Comment: Social drinker & never drang alcohol both documented in [employer]    Drug use: No    Sexual activity: Not Currently   Lifestyle    Physical activity:     Days per week: Not on file     Minutes per session: Not on file    Stress: Not on file   Relationships    Social connections:     Talks on phone: Not on file     Gets together: Not on file     Attends [employer] service: Not on file     Active member of club or organization: Not on file     Attends meetings of clubs or organizations: Not on file     Relationship status: Not on file    Intimate partner violence:     Fear of current or ex partner: Not on file     Emotionally abused: Not on file     Physically abused: Not on file     Forced sexual activity: Not on file   Other Topics Concern    Not on file   Social History Narrative    Not on file     Vitals:    10/14/19 1543   BP: 112/68   Pulse: 63   Resp: 16   SpO2: 95%   Weight: 60 kg (132 lb 3 2 oz)   Height: 5' 2" (1 575 m)     Results for orders placed or performed in visit on 10/07/19   Vitamin D 25 hydroxy   Result Value Ref Range    Vit D, 25-Hydroxy 36 7 30 0 - 100 0 ng/mL     Weight (last 2 days)     Date/Time   Weight    10/14/19 1543   60 (132 2)            Body mass index is 24 18 kg/m²  BP      Temp      Pulse     Resp      SpO2        Vitals:    10/14/19 1543   Weight: 60 kg (132 lb 3 2 oz)     Vitals:    10/14/19 1543   Weight: 60 kg (132 lb 3 2 oz)       /68   Pulse 63   Resp 16   Ht 5' 2" (1 575 m)   Wt 60 kg (132 lb 3 2 oz)   SpO2 95%   BMI 24 18 kg/m²          Physical Exam   Constitutional: She appears well-developed and well-nourished  HENT:   Head: Normocephalic  Mouth/Throat: Oropharynx is clear and moist    Eyes: Pupils are equal, round, and reactive to light  Conjunctivae are normal  Right eye exhibits no discharge  Left eye exhibits no discharge  No scleral icterus  Neck: Neck supple  Cardiovascular: Normal rate, regular rhythm, normal heart sounds and intact distal pulses  Exam reveals no gallop and no friction rub  No murmur heard  Pulmonary/Chest: Breath sounds normal  No respiratory distress  She has no wheezes  She has no rales  Abdominal: Soft  Bowel sounds are normal  She exhibits no distension and no mass  There is no tenderness  There is no rebound and no guarding     Musculoskeletal: She exhibits no edema or deformity  Lymphadenopathy:     She has no cervical adenopathy  Neurological: She is alert   Coordination normal

## 2019-10-14 NOTE — ASSESSMENT & PLAN NOTE
Clinically stable and doing well continue the current medical regiment will continue monitor    Pharmacist will see there is a are cost effective medication to the Advair

## 2019-10-14 NOTE — ASSESSMENT & PLAN NOTE
Likely secondary to depression no suicidal ideation the patient declines SSRI but may consider patient will try melatonin 1 mg 1 p o  Q h s  P r n  I will have pharmacist [phone] work with the patient regarding this

## 2019-10-14 NOTE — PROGRESS NOTES
Has not been using fluticasone/salmeterol inhaler in > 1 year d/t cost; per chart review pulmonary agreed with trial off rx  Reports SOB periodically throughout the week, denies daily s/sx but may sometimes need albuterol nebs/inh 2x/day  Patient would prefer oral agent vs inhaled steroid d/t concerns for absorption  Noted patient is considered to have persistent mild-mod asthma based on albuterol use  Noted patient previously saw pulmonary and was noted to have asthma/COPD overlap; last appt 8/2019 and rec to f/u PRN if s/sx worsen  Montelukast is considered tier 1 for patient's insurance, however this would not adequately relieve COPD s/sx  PCP: may consider trial with montelukast to see if rx improves asthma component; will pend rx to obtain agreeance for montelukast to trial to see if rx improves SOB s/sx per pt preference to avoid inh steroids  If SOB s/sx not adequately improved, may consider encouraging pt to f/u with pulmonary  Pharmacist will contact pt if PCP agrees with plan

## 2019-10-24 NOTE — TELEPHONE ENCOUNTER
Patient called stating that she is scheduled 12/9 with Dr Lakia Badillo but believes a Ct soft tissue neck w contrast should be ordered prior to her appt  Please call back and advise  Best call back number [phone]

## 2019-11-06 ENCOUNTER — TELEPHONE (OUTPATIENT)
Dept: HEMATOLOGY ONCOLOGY | Facility: CLINIC | Age: 84
End: 2019-11-06

## 2019-11-06 NOTE — TELEPHONE ENCOUNTER
Patient called to confirm appt time   I confirmed appt at [address] for  12-96-19 @ 2:30pm

## 2019-11-22 ENCOUNTER — HOSPITAL ENCOUNTER (OUTPATIENT)
Dept: NON INVASIVE DIAGNOSTICS | Facility: CLINIC | Age: 84
Discharge: HOME | End: 2019-11-22
Payer: COMMERCIAL

## 2019-11-22 DIAGNOSIS — R55 NEAR SYNCOPE: ICD-10-CM

## 2019-11-22 PROCEDURE — 93306 TTE W/DOPPLER COMPLETE: CPT | Performed by: INTERNAL MEDICINE

## 2019-11-22 PROCEDURE — 93306 TTE W/DOPPLER COMPLETE: CPT

## 2019-11-29 ENCOUNTER — TELEPHONE (OUTPATIENT)
Dept: CARDIOLOGY CLINIC | Facility: CLINIC | Age: 84
End: 2019-11-29

## 2019-11-29 NOTE — TELEPHONE ENCOUNTER
Called and left Buffy know that the results of her recent echo were normal     ----- Message from Lucia Yap MD sent at 11/27/2019 12:24 PM EST -----  Please make patient aware that this test was normal

## 2019-12-09 ENCOUNTER — OFFICE VISIT (OUTPATIENT)
Dept: HEMATOLOGY ONCOLOGY | Facility: CLINIC | Age: 84
End: 2019-12-09
Payer: COMMERCIAL

## 2019-12-09 VITALS
RESPIRATION RATE: 16 BRPM | HEIGHT: 62 IN | DIASTOLIC BLOOD PRESSURE: 72 MMHG | OXYGEN SATURATION: 97 % | BODY MASS INDEX: 24.48 KG/M2 | SYSTOLIC BLOOD PRESSURE: 120 MMHG | TEMPERATURE: 97.5 F | HEART RATE: 74 BPM | WEIGHT: 133 LBS

## 2019-12-09 DIAGNOSIS — C76.0 CANCER OF HEAD AND NECK (HCC): Primary | ICD-10-CM

## 2019-12-09 PROCEDURE — 99213 OFFICE O/P EST LOW 20 MIN: CPT | Performed by: PHYSICIAN ASSISTANT

## 2019-12-09 NOTE — PROGRESS NOTES
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, diagnosed in November 2015 in [place]  2nd biopsy 3/2016 by Dr Yris Hightower confirmed the diagnosis      The patient opted to observe  No surgery, radiation, systemic treatment        Non-contrast CT scan of the neck in September 2019 showed stable 2 cm mass in the hard palate, no evidence of new masses        2  Intermittent bleeding  States she will wake up on occasion with blood in her mouth  She requests f/u imaging  If there is progression, she states she may consider palliative measures  She requests results over phone  Declines f/u appointment  Confirmed her phone #  She reported ok to leave message  If within 1 week post CT scan, she doesn't hear from our office, she is to call  3  PAF  She is not anticoagulated due to history of nasopharyngeal bleeding              HPI:   Buffy Ly is an 29-year-old  female seen initially 7/2015  re: iron deficiency anemia after open heart surgery in February 2015 for aortic aneurysm repair, with subsequent atrial fibrillation  She also had a history of hemorrhoid with daily bleeding  Patient received Venofer 300 mg IV x4 doses  She was seen again 3/2016 regarding salivary gland neoplasm  She underwent excisional biopsy 11/3/2015 when she was in [place]  The pathology from [place], [place] was reviewed at [facility] 3/16/2016 who conferred with the diagnosis of Salivary gland epithelial neoplasm, differential diagnosis includes myoepithelial neoplasia such as monomorphic adenoma versus polymorphous low-grade adenocarcinoma  There was a mixed solid / tubular / spindle-cell configuration and diffuse P 63 staining, Ki-67 was low   No evidence of perineural invasion  Patient reported the lesion was growing slowly over 3 years  She declined surgery, radiation, chemo/rt and moved back  to [place]  She met with Dr Spike Landry  Shave biopsy performed by Dr Spike Landry 3/16/2016 of hard palate, alveolar lesion was also reviewed at [facility] which identified upper portion of salivary gland tumor, consistent with polymorphous low-grade adenocarcinoma  PET-CT did not identify evidence of distant metastases  The tumor measured 1 9 x 1 point sent been centimeters eroding through the maxillary bone/hard palate and possibly the maxillary sinus  Patient again declined any intervention  Serial CT imaging biannually continued to show stability  CT scan of the neck in November 2018 showed stable 2 cm mass in the hard palate, no evidence of new masses     She has a history of atrial fibrillation macular degeneration aortic aneurysm status post repair, hypothyroidism, status post thyroidectomy, pneumonia and degenerative arthritis   She is a former smoker with intermittent smoking quit in September 2015        Interval History: At her 12/3/2018 f/u, she declined additional f/u appointments or imaging in our office  Patient called 10/10/2019 requesting f/u and CT scan  Patient had CT scan 9/2019  States she wakes up occasionally with blood in her mouth  Intermittent ear pain  Intermittent wheezing  Known COPD and asthma  Met with Dr Ivanna De La Fuente, ENT 5/8/2019  Reported B ear pain  6 month f/u requested  States she is able to eat with no changes          Test Results:        Labs:   Lab Results   Component Value Date    HGB 13 3 09/18/2019    HCT 41 2 09/18/2019    MCV 94 09/18/2019     09/18/2019    WBC 7 72 09/18/2019    NRBC 0 09/18/2019     Lab Results   Component Value Date     (L) 06/16/2015    K 4 3 09/18/2019     09/18/2019    CO2 29 09/18/2019    ANIONGAP 5 06/16/2015    BUN 15 09/18/2019    CREATININE 0 84 09/18/2019    GLUCOSE 138 06/16/2015    GLUF 103 (H) 06/05/2019    CALCIUM 8 8 09/18/2019    AST 36 09/18/2019    ALT 34 09/18/2019    ALKPHOS 70 09/18/2019    PROT 6 7 06/07/2015    BILITOT 0 28 06/07/2015    EGFR 63 09/18/2019       Imaging: No results found  ROS:  As mentioned in HPI & Interval History otherwise 14 point ROS negative  Allergies: Allergies   Allergen Reactions    Amiodarone Hives    Keflex [Cephalexin] Hives    Omnipaque [Iohexol] Hives and Shortness Of Breath    Clindamycin Other (See Comments)     When taken causes cdiff immediately    Other     Sulfa Antibiotics Hives     itching    Iv Dye  [Iodinated Diagnostic Agents] Hypertension and Palpitations     Annotation - [id number]: Verified with patient     Naprosyn [Naproxen] Itching and Rash     Category: Allergy;      Current Medications: Reviewed  PMH/FH/SH:  Reviewed      Physical Exam:    There is no height or weight on file to calculate BSA  Ht Readings from Last 3 Encounters:   10/14/19 5' 2" (1 575 m)   10/04/19 5' 2" (1 575 m)   08/21/19 5' 2" (1 575 m)        Wt Readings from Last 3 Encounters:   10/14/19 60 kg (132 lb 3 2 oz)   10/04/19 60 5 kg (133 lb 6 4 oz)   09/18/19 59 7 kg (131 lb 9 8 oz)        Temp Readings from Last 3 Encounters:   09/18/19 98 °F (36 7 °C) (Oral)   09/18/19 98 4 °F (36 9 °C) (Temporal)   08/21/19 98 8 °F (37 1 °C) (Tympanic)        BP Readings from Last 3 Encounters:   10/14/19 112/68   10/04/19 130/58   09/18/19 166/76           Physical Exam   Constitutional: She is oriented to person, place, and time  She appears well-developed and well-nourished  No distress  HENT:   Head: Normocephalic and atraumatic  Mass right hard palate   Eyes: Conjunctivae are normal    Neck: Normal range of motion  Neck supple  No tracheal deviation present  Cardiovascular: Normal rate and regular rhythm  Exam reveals no gallop and no friction rub     No murmur heard   Pulmonary/Chest: Effort normal and breath sounds normal  No respiratory distress  She has no wheezes  She has no rales  She exhibits no tenderness  Abdominal: Soft  She exhibits no distension  There is no tenderness  Lymphadenopathy:     She has no cervical adenopathy  Neurological: She is alert and oriented to person, place, and time  Skin: Skin is warm and dry  She is not diaphoretic  No erythema  No pallor  Psychiatric: She has a normal mood and affect  Her behavior is normal  Judgment and thought content normal    Vitals reviewed        ECO      Emergency Contacts:    Blade Garcia, ,

## 2019-12-13 ENCOUNTER — HOSPITAL ENCOUNTER (OUTPATIENT)
Dept: RADIOLOGY | Facility: MEDICAL CENTER | Age: 84
Discharge: HOME | End: 2019-12-13
Payer: COMMERCIAL

## 2019-12-13 DIAGNOSIS — C76.0 CANCER OF HEAD AND NECK (HCC): ICD-10-CM

## 2019-12-13 PROCEDURE — 70490 CT SOFT TISSUE NECK W/O DYE: CPT

## 2020-01-05 DIAGNOSIS — E78.5 HYPERLIPIDEMIA, UNSPECIFIED HYPERLIPIDEMIA TYPE: ICD-10-CM

## 2020-01-06 RX ORDER — LOVASTATIN 20 MG/1
TABLET ORAL
Qty: 90 TABLET | Refills: 0 | Status: SHIPPED | OUTPATIENT
Start: 2020-01-06 | End: 2020-04-15 | Stop reason: SDUPTHER

## 2020-02-10 ENCOUNTER — OFFICE VISIT (OUTPATIENT)
Dept: CARDIOLOGY CLINIC | Facility: MEDICAL CENTER | Age: 85
End: 2020-02-10
Payer: COMMERCIAL

## 2020-02-10 VITALS
OXYGEN SATURATION: 97 % | HEIGHT: 62 IN | HEART RATE: 64 BPM | DIASTOLIC BLOOD PRESSURE: 68 MMHG | WEIGHT: 131.3 LBS | SYSTOLIC BLOOD PRESSURE: 180 MMHG | BODY MASS INDEX: 24.16 KG/M2

## 2020-02-10 DIAGNOSIS — R06.02 SHORTNESS OF BREATH: ICD-10-CM

## 2020-02-10 DIAGNOSIS — I10 ESSENTIAL HYPERTENSION: ICD-10-CM

## 2020-02-10 DIAGNOSIS — I48.0 PAROXYSMAL ATRIAL FIBRILLATION (HCC): Primary | ICD-10-CM

## 2020-02-10 DIAGNOSIS — I10 HYPERTENSION, UNSPECIFIED TYPE: ICD-10-CM

## 2020-02-10 DIAGNOSIS — E78.5 HYPERLIPIDEMIA, UNSPECIFIED HYPERLIPIDEMIA TYPE: ICD-10-CM

## 2020-02-10 PROCEDURE — 3077F SYST BP >= 140 MM HG: CPT | Performed by: INTERNAL MEDICINE

## 2020-02-10 PROCEDURE — 3078F DIAST BP <80 MM HG: CPT | Performed by: INTERNAL MEDICINE

## 2020-02-10 PROCEDURE — 3008F BODY MASS INDEX DOCD: CPT | Performed by: INTERNAL MEDICINE

## 2020-02-10 PROCEDURE — 1160F RVW MEDS BY RX/DR IN RCRD: CPT | Performed by: INTERNAL MEDICINE

## 2020-02-10 PROCEDURE — 4040F PNEUMOC VAC/ADMIN/RCVD: CPT | Performed by: INTERNAL MEDICINE

## 2020-02-10 PROCEDURE — 99214 OFFICE O/P EST MOD 30 MIN: CPT | Performed by: INTERNAL MEDICINE

## 2020-02-10 PROCEDURE — 1036F TOBACCO NON-USER: CPT | Performed by: INTERNAL MEDICINE

## 2020-02-10 RX ORDER — AMLODIPINE BESYLATE AND BENAZEPRIL HYDROCHLORIDE 5; 20 MG/1; MG/1
1 CAPSULE ORAL DAILY
Qty: 90 CAPSULE | Refills: 3 | Status: SHIPPED | OUTPATIENT
Start: 2020-02-10 | End: 2021-02-03 | Stop reason: SDUPTHER

## 2020-02-10 NOTE — PROGRESS NOTES
Vaccine Information Statement(s) was given . This has been reviewed, questions answered, and verbal consent given by  for injection(s) and administration of .        Patient tolerated without incident. See immunization grid for documentation.         Assessment    1  Cervicalgia (723 1) (M54 2)   2  Cervical spondylosis (721 0) (M47 812)    Plan  Cervical spondylosis    · Follow-up PRN Evaluation and Treatment  Follow-up  Status: Complete  Done:  40TVU0662 01:43PM    Discussion/Summary  The patient has the current Goals: Decrease pain  The patent has the current Barriers: None  Patient is able to Self-Care  Patient agrees and allows to involve family/caregiver in development of care plan:   Impression: neck pain and cervical spondylosis  Currently, the condition is improving  There are no changes in medication management  Treatment plan includes exercise regimen and muscle stim unity  Patient discussion: discussed with the patient  Chief Complaint  PCP referral for "painful stiff neck"  6/29 follow up for neck pain  8/17 fu for neck pain  10/19 had EMILY   11/30 fu for neck pain  History of Present Illness  6/8/17 IE: 81 yo female with non-traumatic chronic stiff painful neck   had to stop driving   had PTx per Dr Beverley Patel without pain relief   takes APAP    NSAIDS increase BP   had taken left over oxycodone without benefit  cannot ID triggers, pattern in posterior neck and radiates to periauricular area   denies numbness or tingling in fingers  Has hard palate tumor  Has painful bed transfers Has also had HAs for about nine months   attributes to tumor   had CT with "bone erosion" Was in ED April and given diazepam with definite benefit   had marked hangover effect with CBP    6/29 fu for painful stiff neck   today somewhat improved  very stiiff in am better with as day goes on  PM consultfor facet injections pending  8/17 had facet block two days ago with so far marked benefit   has stim trial with some benefit   so far unable to get home unit   no response from vendor  Did NOT change Vit D ose  10/19 much better after EMILY  Today c/o earaches on the left  shooting to eye  DENIES any radicular Sx into LUE, Admits not doing HEP daily     11/30 PT ordered 10/19   has a home stim unit   does use it with some relief   overall pain is less severe   does not awaken her  Has joined fitness center at her [facility] locale  Review of Systems    Cardiovascular: No complaints of chest pain, no palpitations, no leg claudication or lower extremity edema  Respiratory: cough  Gastrointestinal: no complaints of abdominal pain, no constipation, no nausea or diarrhea, no vomiting, no bloody stools  Genitourinary: no complaints of dysuria, no incontinence  Musculoskeletal: as noted in HPI  Active Problems    1  Abnormal LH level (259 9) (E34 9)   2  Allergy to IVP dye (995 27,E947 4) (T50 995A)   3  Anemia (285 9) (D64 9)   4  History of Aneurysm of aortic root (441 9) (I71 9)   5  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)   6  History of Aortic root dilatation (447 71) (I77 810)   7  Arthritis (716 90) (M19 90)   8  History of Ascending aortic aneurysm (441 2) (I71 2)   9  Asthma (493 90) (J45 909)   10  Atherosclerosis of aorta (440 0) (I70 0)   11  Basal cell carcinoma of skin (173 91) (C44 91)   12  Benign essential hypertension (401 1) (I10)   13  Bloating (787 3) (R14 0)   14  Calf cramp (729 82) (R25 2)   15  Central stenosis of spinal canal (724 00) (M48 00)   16  Cervical disc disorder of mid-cervical region (722 91) (M50 920)   17  Cervical radiculopathy (723 4) (M54 12)   18  Cervical spinal stenosis (723 0) (M48 02)   19  Cervical spondylosis (721 0) (M47 812)   20  Cervicalgia (723 1) (M54 2)   21  Chronic migraine without aura (346 70) (G43 709)   22  Chronic neck pain (723 1,338 29) (M54 2,G89 29)   23  Classic migraine with aura (346 00) (G43 109)   24  Clostridium difficile diarrhea (008 45) (A04 72)   25  Contrast media allergy (V15 08) (Z91 041)   26  Cough (786 2) (R05)   27  DDD (degenerative disc disease), cervical (722 4) (M50 30)   28  Decreased GFR (794 4) (R94 4)   29  Depression (311) (F32 9)   30   Diarrhea (787 91) (R19 7)   31  Difficulty breathing (786 09) (R06 89)   32  Dysphagia (787 20) (R13 10)   33  Elevated CK (790 5) (R74 8)   34  Esophagitis, reflux (530 11) (K21 0)   35  Facet arthropathy, cervical (721 0) (M12 88)   36  Foraminal stenosis of cervical region (723 0) (M99 81)   37  Hallucination (780 1) (R44 3)   38  Head ache (784 0) (R51)   39  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)   40  Hyperlipidemia (272 4) (E78 5)   41  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)   42  Hypophonia (784 49) (R49 8)   43  Hypothyroidism (244 9) (E03 9)   44  History of Junctional rhythm (427 89) (I49 8)   45  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)   46  Migraine aura without headache (migraine equivalents) (346 00) (G43 109)   47  Myalgia (729 1) (M79 1)   48  Myositis of multiple sites, unspecified myositis type (729 1) (M60 9)   49  Myositis of other site, unspecified myositis type (729 1) (M60 9)   50  Neck muscle spasm (728 85) (M62 838)   51  Need for influenza vaccination (V04 81) (Z23)   52  Need for pneumococcal vaccination (V03 82) (Z23)   53  Night sweats (780 8) (R61)   54  Obstructive sleep apnea (327 23) (G47 33)   55  Osteopenia (733 90) (M85 80)   56  Other specified symptom or sign involving circulatory or respiratory system (785 9,786 9)    (R09 89)   57  Paroxysmal atrial fibrillation (427 31) (I48 0)   58  History of Pleural effusion, bilateral (511 9) (J90)   59  Polymyalgia rheumatica (725) (M35 3)   60  PPD screening test (V74 1) (Z11 1)   61  Preoperative clearance (V72 84) (Z01 818)   62  Preventive Medicine Estab Patient Checkup Adult Over 59 (V70 0)   63  Restless legs syndrome (333 94) (G25 81)   64  S/P trigger finger release (V45 89) (Z98 890)   65  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)   66  Screening for genitourinary condition (V81 6) (Z13 89)   67  Screening for neurological condition (V80 09) (Z13 89)   68  Shortness of breath (786 05) (R06 02)   69   Somatic dysfunction of cervical region (739 1) (M99 01)   70  Stress incontinence in female (625 6) (N39 3)   71  Stress incontinence, male (788 32) (N39 3)   72  Suicidal overdose (977 9,E950 5) (T50 902A)   73  Tricuspid regurgitation (397 0) (I07 1)   74  Urinary incontinence (788 30) (R32)   75  Urinary tract infection (599 0) (N39 0)   76  Vitamin D deficiency (268 9) (E55 9)   77  Xerostomia (527 7) (R68 2)    Past Medical History    1  Allergy to IVP dye (995 27,E947 4) (T50 995A)   2  History of Aneurysm of aortic root (441 9) (I71 9)   3  History of Aortic root dilatation (447 71) (I77 810)   4  History of Ascending aortic aneurysm (441 2) (I71 2)   5  History of Facet arthropathy, cervical (721 0) (M12 88)   6  History of Fracture Of The Vertebral Column (805 8)   7  History of basal cell carcinoma (V10 83) (Z85 828)   8  History of urinary incontinence (V13 09) (Z87 898)   9  History of urinary incontinence (V13 09) (Z87 898)   10  History of Hypoxia (799 02) (R09 02)   11  History of Junctional rhythm (427 89) (I49 8)   12  History of Lightheadedness (780 4) (R42)   13  History of Palpitations (785 1) (R00 2)   14  History of Pleural effusion, bilateral (511 9) (J90)   15  History of Thyroid trouble (246 9) (E07 9)   16  History of Trigger finger (727 03) (M65 30)   17  History of Trigger middle finger of right hand (727 03) (M65 331)    The active problems and past medical history were reviewed and updated today  Surgical History    1  History of Appendectomy   2  History of Ascending Aortic Aneurysm Repair With Graft   3  Recurrent history of Bladder Surgery   4  History of Cardiac Cath Procedure Summary   5  History of Hysterectomy   6  Preventive Medicine Estab Patient Checkup Adult Over 64 (V70 0)   7  History of Thyroid Surgery Total Thyroidectomy   8  History of Tonsillectomy With Adenoidectomy    The surgical history was reviewed and updated today  Family History  Sister    1   Family history of CABG  Family History    2  Family history of Family Health Status Of Father -    3  Family history of Family Health Status Of Mother -    3  Family history of cardiac disorder (V17 49) (Z82 49)   5  Family history of hypertension (V17 49) (Z82 49)   6  Family history of malignant neoplasm (V16 9) (Z80 9)   7  Family history of thyroid disease (V18 19) (Z80 46)    Social History    · Being A Social Drinker   · Denied: History of Drug Use   · Former smoker (V15 82) (P85 029)   · Marital History -    · Never Drank Alcohol  The social history was reviewed and is unchanged  Current Meds   1  Advair Diskus 250-50 MCG/DOSE Inhalation Aerosol Powder Breath Activated; INHALE 1   PUFF TWICE DAILY; Therapy: 88TNB5852 to 9397 8822)  Requested for: 48FDS5982; Last   Rx:2017 Ordered   2  Albuterol Sulfate (2 5 MG/3ML) 0 083% Inhalation Nebulization Solution; USE 1 UNIT   DOSE EVERY 4-6 HOURS AS NEEDED FOR WHEEZING ; Therapy: 44WMI5447 to (Last Rx:2017)  Requested for: 57MAK4515 Ordered   3  AmLODIPine Besylate 5 MG Oral Tablet; TAKE 1 TABLET BY MOUTH EVERY DAY; Therapy: 59FAN7543 to (Evaluate:81Kxq1735)  Requested for: 2017; Last   Rx:31Fvz1835 Ordered   4  Aspirin 81 MG TABS; TAKE 1 TABLET DAILY; Therapy: (Recorded:41Nwa4445) to  Requested for: 93BFK3406 Recorded   5  Calcium CAPS; Therapy: (Recorded:2017) to Recorded   6  Levothyroxine Sodium 100 MCG Oral Tablet; TAKE 1 TABLET DAILY AS DIRECTED; Therapy: 35HBG2170 to (Evaluate:2018)  Requested for: 57KXT4939; Last   Rx:2017 Ordered   7  Lovastatin 20 MG Oral Tablet; TAKE 1 TABLET DAILY AS DIRECTED; Therapy: 00DQY3970 to (Corky Justice)  Requested for: 13ARZ8716; Last   Rx:2017 Ordered   8  Metoprolol Tartrate 25 MG Oral Tablet; Take 1 tablet twice daily; Therapy: 63XBA6566 to (Evaluate:2018)  Requested for: 81YAD4817; Last   Rx:2017 Ordered   9   Multi-Vitamin Oral Tablet; TAKE 1 TABLET DAILY; Therapy: 37MIH5186 to Recorded   10  Peak Flow Meter Device; USE AS DIRECTED; Therapy: 29OQQ1982 to (Last Rx:13Jan2017)  Requested for: 12HMI0649 Ordered   11  PreserVision AREDS Oral Capsule; Take 1 capsule twice daily; Therapy: 15Fgm9609 to Recorded   12  Tylenol Extra Strength 500 MG Oral Tablet; Therapy: (Recorded:08Jun2017) to Recorded   13  Ventolin  (90 Base) MCG/ACT Inhalation Aerosol Solution; INHALE 2 PUFFS    EVERY 4-6 HOURS, SPACED 60 SECONDS APART; Therapy: 02EAI1747 to (Last Rx:91Vfz4025)  Requested for: 23Qsu9130 Ordered   14  Vitamin D CAPS; Therapy: (Recorded:08Jun2017) to Recorded    The medication list was reviewed and updated today  Allergies    1  Amiodarone HCl TABS   2  Iodinated Contrast Media   3  Naprosyn TABS   4  Sulfa Drugs   5  Clindamycin   6  Keflex TABS    7  IV Dye    Vitals  Signs   Recorded: 60ZZM8487 01:22PM   Heart Rate: 68  Systolic: 228  Diastolic: 70  Height: 5 ft 2 in  Weight: 141 lb   BMI Calculated: 25 79  BSA Calculated: 1 65    Physical Exam    Constitutional   General appearance: Well developed, well nourished, alert, in no distress, non-toxic and no overt pain behavior  for age  Cervical Spine examination demonstrates Cervical Spine:   Appearance: abnormal lordosis  Tenderness: right paraspinal tenderness, left paraspinal tenderness, right trapezius muscle and left trapezius muscle  postural deformity mostly fixed  Flexion was restricted and was painless  Extension was restricted and was painless  Left lateral flexion was painless  Right lateral flexion was restricted and was painless  Rotation to the left was restricted and was painless  Rotation to the right was restricted and was painless  Evaluation of Muscle Stretch Reflexes on the right side demonstrates 1/4 Triceps Reflex, 1/4 Biceps Reflex, 1/4 Brachioradialis Reflex, 1/4 Knee Jerk Reflex and 1/4 Ankle Jerk Reflex, but negative Crouch's Test right upper extremity  Evaluation of Muscle Stretch Reflexes on the left side demonstrates 1/4 Triceps Reflex, 1/4 Biceps Reflex, 1/4 Brachioradialis Reflex, 1/4 Knee Jerk Reflex and 1/4 Ankle Jerk Reflex, but negative Crouch's Test left upper extremity  Results/Data    Diagnostic Review Last PTx note 10/23 for trial of E stim  Provider Comments    Discussed with her she can repeat EMILY but feels she is "dealing" with S at this time  Future Appointments    Date/Time Provider Specialty Site   03/05/2018 03:30 PM MYRIAM Hall   Hematology Oncology [facility] [place]   12/06/2017 03:20 PM Viola Siddiqui MD Cardiology [address]   05/07/2018 02:00 PM Therese Odom DO Internal [facility]   12/11/2017 09:45 AM Zain Engel Urology [facility]     Signatures   Electronically signed by : Erika Nguyen DO; Nov 30 2017  1:44PM EST                       (Author)

## 2020-02-10 NOTE — PROGRESS NOTES
Follow-up - Cardiology   Buffy ALEXANDRA Weddermann 80 y o  female MRN: [id number]        Problems    1  Paroxysmal atrial fibrillation (HCC)  CANCELED: POCT ECG   2  Essential hypertension  CANCELED: POCT ECG   3  Hyperlipidemia, unspecified hyperlipidemia type     4  Shortness of breath         Impression:    Hypertension  Did 3 weeks of blood pressures on her own at home, always high, always over 842 systolic  Office based blood pressures have been normal, but she has 2 phases to her blood pressure sounds, today initial phase was positive at 823 mmHg systolic, then it 064 disappears, then appears again at 125 mmHg  Do not have a good explanation for this, but I do believe her blood pressures are in fact elevated  Currently taking very low-dose amlodipine/benazepril    Paroxysmal atrial fibrillation  No recurrence by symptoms, normal on examination, and does not take anticoagulation due to significant nasopharyngeal bleeding in the past due to tumor    History of ascending aortic aneurysm  With a history of repair remotely    Hyperlipidemia  No statin due to age    Dyspnea on exertion  Chronic, mild-to-moderate symptoms, normal LVEF by echo 11/19, trivial valve disease except for mild AI which is unrelated to her symptoms  Plan:    Blood pressure is significantly elevated in my office today  Will increase benazepril/amlodipine to 20/5 mg daily  Requested she double up on her tablets she currently has at home, and start the new prescription when it comes in from her mail-in pharmacy  HPI:   Buffy Gallardo is a 80y o  year old female  With ascending aortic aneurysm, status post repair, with significant asthma, hypertension, mild hyperlipidemia but not on statin therapy, prior normal cardiac catheterization pre surgery, paroxysmal atrial fibrillation without any recent significant symptoms, and on aspirin due to a 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      Blood pressures been severely elevated, blood pressure in the office today is significantly elevated, she is on amlodipine/benazepril but very low-dose  She denies chest pain, she does not take statin therapy due to age, she denies syncope, but near lightheadedness and near-syncope have been common complaints in the past, happen on occasion and she thinks is related to high blood pressure  She denies palpitations, only takes aspirin and no anticoagulation for paroxysmal atrial fibrillation, primarily due to history of nasopharyngeal bleeding in the setting of her nasopharyngeal cancer  Review of Systems   All other systems reviewed and are negative  Past Medical History:   Diagnosis Date    A-fib [facility])     Allergy to IVP dye 06/04/2013    pt felt heaviness, palpitations    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), wet ([address] )     bilateral    Aneurysm of aortic root ([facility] [address] )     last assessed - [id number]    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HCC)     Aortic root dilatation (HCC)     last assessed - [id number]    Arthritis     Ascending aortic aneurysm (HCC)     last assessed - [id number]    Asthma     Basal cell carcinoma     last assessed - 09Qco0950    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[address] )     Disease of thyroid gland     Facet arthropathy, cervical     last assessed - [id number]    History of fracture of vertebral column     Hyperlipidemia     Hypertension     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[facility] [address] )     Hypoxia     last assessed - [id number]    Junctional rhythm     last assessed - 51Xon2466    Lightheadedness     last assessed - 70Chp0372    Migraine     Palpitations     last assessed - 24Sch7655    Pleural effusion, bilateral     last assessed - 02Tnt8106    Shortness of breath     last assessed - 99Qth4661    Thyroid trouble     Trigger finger     last assessed - 43Jnp0765    Trigger middle finger of right hand     last assessed - 61Fdj7457    Urinary incontinence     last assessed -  22Jul,2013;  Resolved - [id number]     Social History     Substance and Sexual Activity   Alcohol Use No    Comment: Social drinker & never drang alcohol both documented in [employer]     Social History     Substance and Sexual Activity   Drug Use No     Social History     Tobacco Use   Smoking Status Former Smoker    Packs/day: 0 25    Years: 57 00    Pack years: 14 25    Types: Cigarettes    Start date: 36    Last attempt to quit: 2014    Years since quittin 1   Smokeless Tobacco Never Used   Tobacco Comment    smoked 17 yrs 1/2 ppd quit and restarted then quit  10 days ago during 2014        Allergies: Allergies   Allergen Reactions    Amiodarone Hives    Keflex [Cephalexin] Hives    Omnipaque [Iohexol] Hives and Shortness Of Breath    Clindamycin Other (See Comments)     When taken causes cdiff immediately    Other     Sulfa Antibiotics Hives     itching    Acetazolamide Hives and Rash     Reaction Date:Unknown    Iv Dye  [Iodinated Diagnostic Agents] Hypertension and Palpitations     Annotation - [id number]: Verified with patient     Naprosyn [Naproxen] Itching and Rash     Category: Allergy;         Medications:     Current Outpatient Medications:     acetaminophen (TYLENOL) 500 mg tablet, Take 500 mg by mouth every 6 (six) hours as needed for mild pain, Disp: , Rfl:     albuterol (2 5 mg/3 mL) 0 083 % nebulizer solution, Take 1 vial (2 5 mg total) by nebulization 4 (four) times a day, Disp: 120 vial, Rfl: 5    albuterol (PROVENTIL HFA,VENTOLIN HFA) 90 mcg/act inhaler, Inhale 2 puffs every 6 (six) hours as needed for wheezing, Disp: 1 Inhaler, Rfl: 0    amLODIPine-benazepril (LOTREL 2 5-10) 2 5-10 MG per capsule, Take 1 capsule by mouth daily, Disp: 90 capsule, Rfl: 3    aspirin 81 MG tablet, Take 162 mg by mouth daily in the early morning  , Disp: , Rfl:     calcium carbonate (OS-TAE) 600 MG tablet, Take 600 mg by mouth daily in the early morning  , Disp: , Rfl:     levothyroxine 100 mcg tablet, Take 1 tablet (100 mcg total) by mouth daily, Disp: 90 tablet, Rfl: 3    lovastatin (MEVACOR) 20 mg tablet, TAKE ONE TABLET BY MOUTH EVERY DAY, Disp: 90 tablet, Rfl: 0    metoprolol tartrate (LOPRESSOR) 25 mg tablet, Take 1 tablet (25 mg total) by mouth 2 (two) times a day, Disp: 180 tablet, Rfl: 3    montelukast (SINGULAIR) 10 mg tablet, Take 1 tablet (10 mg total) by mouth daily at bedtime, Disp: 30 tablet, Rfl: 2    Multiple Vitamin (MULTIVITAMIN) tablet, Take 1 tablet by mouth daily in the early morning  , Disp: , Rfl:     Multiple Vitamins-Minerals (ICAPS AREDS 2 PO), Take by mouth, Disp: , Rfl:       Vitals:    02/10/20 1443   Pulse: 64   SpO2: 97%     Weight (last 2 days)     Date/Time   Weight    02/10/20 1443   59 6 (131 3)            Physical Exam   Constitutional: No distress  HENT:   Head: Normocephalic and atraumatic  Eyes: Conjunctivae are normal  No scleral icterus  Neck: Normal range of motion  No JVD present  Cardiovascular: Normal rate, regular rhythm, normal heart sounds and intact distal pulses  No murmur heard  Pulmonary/Chest: Effort normal and breath sounds normal  No respiratory distress  She has no wheezes  She has no rhonchi  She has no rales  Musculoskeletal: She exhibits no edema or tenderness  Right lower leg: Normal  She exhibits no edema  Left lower leg: Normal  She exhibits no edema  Skin: Skin is warm and dry  She is not diaphoretic           Laboratory Studies:  Lab Results   Component Value Date    HGBA1C 5 9 06/05/2019    HGBA1C 6 2 (H) 02/05/2015     (L) 06/16/2015     06/11/2015     06/07/2015    K 4 3 09/18/2019    K 4 3 06/05/2019    K 4 4 11/26/2018    K 4 1 06/16/2015    K 4 1 06/11/2015    K 3 9 06/07/2015     09/18/2019     06/05/2019     11/26/2018    CL 97 (L) 06/16/2015     06/11/2015     06/07/2015    CO2 29 09/18/2019    CO2 31 06/05/2019    CO2 27 11/26/2018    CO2 30 06/16/2015    CO2 28 06/11/2015    CO2 27 06/07/2015    GLUCOSE 138 06/16/2015    GLUCOSE 180 (H) 06/11/2015    GLUCOSE 97 06/08/2015    CREATININE 0 84 09/18/2019    CREATININE 0 70 06/05/2019    CREATININE 0 88 11/26/2018    CREATININE 1 07 06/16/2015    CREATININE 0 93 06/11/2015    CREATININE 0 66 06/07/2015    BUN 15 09/18/2019    BUN 18 06/05/2019    BUN 17 11/26/2018    BUN 23 06/16/2015    BUN 18 06/11/2015    BUN 13 06/07/2015    MG 2 1 02/22/2016    MG 1 9 06/07/2015    MG 1 7 06/06/2015    MG 1 8 04/09/2015    PHOS 3 3 06/07/2015     Lab Results   Component Value Date    WBC 7 72 09/18/2019    WBC 7 65 06/16/2015    RBC 4 39 09/18/2019    RBC 4 16 06/16/2015    HGB 13 3 09/18/2019    HGB 9 1 (L) 06/16/2015    HCT 41 2 09/18/2019    HCT 29 9 (L) 06/16/2015    MCV 94 09/18/2019    MCV 72 (L) 06/16/2015    MCH 30 3 09/18/2019    MCH 21 9 (L) 06/16/2015    RDW 13 9 09/18/2019    RDW 17 2 (H) 06/16/2015     09/18/2019     06/16/2015     NT-proBNP: No results for input(s): NTBNP in the last 72 hours  Coags:    Lipid Profile:   Lab Results   Component Value Date    CHOL 154 06/08/2015     Lab Results   Component Value Date    HDL 71 (H) 06/05/2019     Lab Results   Component Value Date    LDLCALC 91 06/05/2019     Lab Results   Component Value Date    TRIG 75 06/05/2019       Cardiac testing:   EKG reviewed personally:      10/19-Sinus rhythm, normal EKG    Echocardiogram 12/12/2017-LVEF 55%, normal wall thickness, grade 1 DD, trace MR, trace AI    Echocardiogram 12/19-EF normal, grade 1 diastolic dysfunction, mild AI    Yasmani Villanueva MD    Portions of the record may have been created with voice recognition software   Occasional wrong word or "sound a like" substitutions may have occurred due to the inherent limitations of voice recognition software   Read the chart carefully and recognize, using context, where substitutions have occurred

## 2020-02-24 DIAGNOSIS — J45.20 MILD INTERMITTENT ASTHMA WITHOUT COMPLICATION: ICD-10-CM

## 2020-02-24 RX ORDER — MONTELUKAST SODIUM 10 MG/1
10 TABLET ORAL
Qty: 90 TABLET | Refills: 1 | Status: SHIPPED | OUTPATIENT
Start: 2020-02-24 | End: 2020-06-13 | Stop reason: SDUPTHER

## 2020-02-25 DIAGNOSIS — J45.20 MILD INTERMITTENT ASTHMA WITHOUT COMPLICATION: ICD-10-CM

## 2020-02-25 RX ORDER — MONTELUKAST SODIUM 10 MG/1
10 TABLET ORAL
Qty: 90 TABLET | Refills: 0 | Status: CANCELLED | OUTPATIENT
Start: 2020-02-25

## 2020-04-03 ENCOUNTER — TELEPHONE (OUTPATIENT)
Dept: INTERNAL MEDICINE CLINIC | Facility: CLINIC | Age: 85
End: 2020-04-03

## 2020-04-07 ENCOUNTER — TRANSCRIBE ORDERS (OUTPATIENT)
Dept: LAB | Facility: HOSPITAL | Age: 85
End: 2020-04-07

## 2020-04-07 ENCOUNTER — TELEPHONE (OUTPATIENT)
Dept: LAB | Facility: HOSPITAL | Age: 85
End: 2020-04-07

## 2020-04-07 DIAGNOSIS — E78.5 HYPERLIPIDEMIA, UNSPECIFIED HYPERLIPIDEMIA TYPE: Primary | ICD-10-CM

## 2020-04-09 ENCOUNTER — APPOINTMENT (OUTPATIENT)
Dept: LAB | Facility: HOSPITAL | Age: 85
End: 2020-04-09
Payer: COMMERCIAL

## 2020-04-09 DIAGNOSIS — I10 ESSENTIAL HYPERTENSION: ICD-10-CM

## 2020-04-09 DIAGNOSIS — Z13.1 SCREENING FOR DIABETES MELLITUS: ICD-10-CM

## 2020-04-09 DIAGNOSIS — E03.9 HYPOTHYROIDISM, UNSPECIFIED TYPE: ICD-10-CM

## 2020-04-09 DIAGNOSIS — E78.5 HYPERLIPIDEMIA, UNSPECIFIED HYPERLIPIDEMIA TYPE: ICD-10-CM

## 2020-04-09 LAB
ALBUMIN SERPL BCP-MCNC: 4 G/DL (ref 3.5–5)
ALP SERPL-CCNC: 56 U/L (ref 46–116)
ALT SERPL W P-5'-P-CCNC: 22 U/L (ref 12–78)
ANION GAP SERPL CALCULATED.3IONS-SCNC: 5 MMOL/L (ref 4–13)
AST SERPL W P-5'-P-CCNC: 19 U/L (ref 5–45)
BILIRUB SERPL-MCNC: 0.61 MG/DL (ref 0.2–1)
BUN SERPL-MCNC: 24 MG/DL (ref 5–25)
CALCIUM SERPL-MCNC: 9.1 MG/DL (ref 8.3–10.1)
CHLORIDE SERPL-SCNC: 103 MMOL/L (ref 100–108)
CHOLEST SERPL-MCNC: 191 MG/DL (ref 50–200)
CO2 SERPL-SCNC: 29 MMOL/L (ref 21–32)
CREAT SERPL-MCNC: 0.84 MG/DL (ref 0.6–1.3)
EST. AVERAGE GLUCOSE BLD GHB EST-MCNC: 120 MG/DL
GFR SERPL CREATININE-BSD FRML MDRD: 63 ML/MIN/1.73SQ M
GLUCOSE SERPL-MCNC: 101 MG/DL (ref 65–140)
HBA1C MFR BLD: 5.8 %
HDLC SERPL-MCNC: 79 MG/DL
LDLC SERPL CALC-MCNC: 99 MG/DL (ref 0–100)
POTASSIUM SERPL-SCNC: 4.2 MMOL/L (ref 3.5–5.3)
PROT SERPL-MCNC: 7.4 G/DL (ref 6.4–8.2)
SODIUM SERPL-SCNC: 137 MMOL/L (ref 136–145)
TRIGL SERPL-MCNC: 67 MG/DL
TSH SERPL DL<=0.05 MIU/L-ACNC: 3.14 UIU/ML (ref 0.36–3.74)

## 2020-04-09 PROCEDURE — 84443 ASSAY THYROID STIM HORMONE: CPT

## 2020-04-09 PROCEDURE — 36415 COLL VENOUS BLD VENIPUNCTURE: CPT

## 2020-04-09 PROCEDURE — 83036 HEMOGLOBIN GLYCOSYLATED A1C: CPT

## 2020-04-09 PROCEDURE — 80061 LIPID PANEL: CPT

## 2020-04-09 PROCEDURE — 80053 COMPREHEN METABOLIC PANEL: CPT

## 2020-04-10 LAB — VENIPUNCTURE: NORMAL

## 2020-04-15 ENCOUNTER — TELEMEDICINE (OUTPATIENT)
Dept: INTERNAL MEDICINE CLINIC | Facility: CLINIC | Age: 85
End: 2020-04-15
Payer: COMMERCIAL

## 2020-04-15 VITALS
SYSTOLIC BLOOD PRESSURE: 130 MMHG | OXYGEN SATURATION: 95 % | BODY MASS INDEX: 23.37 KG/M2 | WEIGHT: 127 LBS | HEIGHT: 62 IN | HEART RATE: 61 BPM | DIASTOLIC BLOOD PRESSURE: 72 MMHG

## 2020-04-15 DIAGNOSIS — J45.909 UNCOMPLICATED ASTHMA, UNSPECIFIED ASTHMA SEVERITY, UNSPECIFIED WHETHER PERSISTENT: ICD-10-CM

## 2020-04-15 DIAGNOSIS — C08.9 SALIVARY GLAND CANCER (HCC): ICD-10-CM

## 2020-04-15 DIAGNOSIS — E78.5 HYPERLIPIDEMIA, UNSPECIFIED HYPERLIPIDEMIA TYPE: Primary | ICD-10-CM

## 2020-04-15 DIAGNOSIS — E03.9 HYPOTHYROIDISM, UNSPECIFIED TYPE: ICD-10-CM

## 2020-04-15 DIAGNOSIS — E78.5 HYPERLIPIDEMIA, UNSPECIFIED HYPERLIPIDEMIA TYPE: ICD-10-CM

## 2020-04-15 DIAGNOSIS — Z01.00 ENCOUNTER FOR VISION SCREENING: ICD-10-CM

## 2020-04-15 DIAGNOSIS — Z13.1 SCREENING FOR DIABETES MELLITUS: ICD-10-CM

## 2020-04-15 PROCEDURE — 99443 PR PHYS/QHP TELEPHONE EVALUATION 21-30 MIN: CPT | Performed by: INTERNAL MEDICINE

## 2020-04-15 RX ORDER — LOVASTATIN 20 MG/1
20 TABLET ORAL DAILY
Qty: 90 TABLET | Refills: 3 | Status: SHIPPED | OUTPATIENT
Start: 2020-04-15 | End: 2020-04-15 | Stop reason: SDUPTHER

## 2020-04-15 RX ORDER — LOVASTATIN 20 MG/1
20 TABLET ORAL DAILY
Qty: 90 TABLET | Refills: 3 | Status: SHIPPED | OUTPATIENT
Start: 2020-04-15 | End: 2021-05-05 | Stop reason: SDUPTHER

## 2020-05-04 DIAGNOSIS — I48.0 PAF (PAROXYSMAL ATRIAL FIBRILLATION) (HCC): ICD-10-CM

## 2020-06-13 DIAGNOSIS — J45.20 MILD INTERMITTENT ASTHMA WITHOUT COMPLICATION: ICD-10-CM

## 2020-06-15 RX ORDER — MONTELUKAST SODIUM 10 MG/1
10 TABLET ORAL
Qty: 90 TABLET | Refills: 1 | Status: SHIPPED | OUTPATIENT
Start: 2020-06-15 | End: 2020-10-05

## 2020-07-01 ENCOUNTER — APPOINTMENT (OUTPATIENT)
Dept: LAB | Facility: MEDICAL CENTER | Age: 85
End: 2020-07-01
Payer: COMMERCIAL

## 2020-07-01 ENCOUNTER — HOSPITAL ENCOUNTER (OUTPATIENT)
Dept: RADIOLOGY | Facility: MEDICAL CENTER | Age: 85
Discharge: HOME | End: 2020-07-01
Payer: COMMERCIAL

## 2020-07-01 DIAGNOSIS — E03.9 HYPOTHYROIDISM, UNSPECIFIED TYPE: ICD-10-CM

## 2020-07-01 DIAGNOSIS — C08.9 SALIVARY GLAND CANCER (HCC): ICD-10-CM

## 2020-07-01 LAB — TSH SERPL DL<=0.05 MIU/L-ACNC: 2.82 UIU/ML (ref 0.36–3.74)

## 2020-07-01 PROCEDURE — 70490 CT SOFT TISSUE NECK W/O DYE: CPT

## 2020-07-01 PROCEDURE — 36415 COLL VENOUS BLD VENIPUNCTURE: CPT

## 2020-07-01 PROCEDURE — 84443 ASSAY THYROID STIM HORMONE: CPT

## 2020-07-07 DIAGNOSIS — E03.9 HYPOTHYROIDISM, UNSPECIFIED TYPE: ICD-10-CM

## 2020-07-07 RX ORDER — LEVOTHYROXINE SODIUM 0.1 MG/1
100 TABLET ORAL DAILY
Qty: 90 TABLET | Refills: 3 | Status: SHIPPED | OUTPATIENT
Start: 2020-07-07 | End: 2020-10-22

## 2020-07-09 ENCOUNTER — OFFICE VISIT (OUTPATIENT)
Dept: HEMATOLOGY ONCOLOGY | Facility: CLINIC | Age: 85
End: 2020-07-09
Payer: COMMERCIAL

## 2020-07-09 VITALS
TEMPERATURE: 98.3 F | SYSTOLIC BLOOD PRESSURE: 126 MMHG | WEIGHT: 131 LBS | HEART RATE: 61 BPM | HEIGHT: 62 IN | OXYGEN SATURATION: 98 % | BODY MASS INDEX: 24.11 KG/M2 | RESPIRATION RATE: 16 BRPM | DIASTOLIC BLOOD PRESSURE: 72 MMHG

## 2020-07-09 DIAGNOSIS — C08.9 SALIVARY GLAND CANCER (HCC): ICD-10-CM

## 2020-07-09 PROCEDURE — 3074F SYST BP LT 130 MM HG: CPT | Performed by: INTERNAL MEDICINE

## 2020-07-09 PROCEDURE — 1160F RVW MEDS BY RX/DR IN RCRD: CPT | Performed by: INTERNAL MEDICINE

## 2020-07-09 PROCEDURE — 4040F PNEUMOC VAC/ADMIN/RCVD: CPT | Performed by: INTERNAL MEDICINE

## 2020-07-09 PROCEDURE — 1036F TOBACCO NON-USER: CPT | Performed by: INTERNAL MEDICINE

## 2020-07-09 PROCEDURE — 3008F BODY MASS INDEX DOCD: CPT | Performed by: INTERNAL MEDICINE

## 2020-07-09 PROCEDURE — 3078F DIAST BP <80 MM HG: CPT | Performed by: INTERNAL MEDICINE

## 2020-07-09 PROCEDURE — 99214 OFFICE O/P EST MOD 30 MIN: CPT | Performed by: INTERNAL MEDICINE

## 2020-07-09 NOTE — PROGRESS NOTES
HPI:  Follow-up visit for biopsy-proven low-grade salivary gland adenocarcinoma   on the right side of the hard palate with extension into the maxillary sinus  Patient has started to have some pain in that area  This was diagnosed in November 2015 in [place]  2nd biopsy was on  3/2016 by Dr Christine Lemus  Patient preferred this surveillance  There has not been change on imaging studies   Patient has agreed to try radiation treatments   Patient has vision problem secondary to macular degeneration and she gets injections into the eyes   She has chronic lung disease and has mild cough and exertional dyspnea  She has history of migraine headaches     Tiredness and arthritic symptoms           Current Outpatient Medications:     acetaminophen (TYLENOL) 500 mg tablet, Take 500 mg by mouth every 6 (six) hours as needed for mild pain, Disp: , Rfl:     albuterol (2 5 mg/3 mL) 0 083 % nebulizer solution, Take 1 vial (2 5 mg total) by nebulization 4 (four) times a day, Disp: 120 vial, Rfl: 5    albuterol (PROVENTIL HFA,VENTOLIN HFA) 90 mcg/act inhaler, Inhale 2 puffs every 6 (six) hours as needed for wheezing, Disp: 1 Inhaler, Rfl: 0    amLODIPine-benazepril (LOTREL 5-20) 5-20 MG per capsule, Take 1 capsule by mouth daily, Disp: 90 capsule, Rfl: 3    aspirin 81 MG tablet, Take 162 mg by mouth daily in the early morning  , Disp: , Rfl:     calcium carbonate (OS-TAE) 600 MG tablet, Take 600 mg by mouth daily in the early morning  , Disp: , Rfl:     levothyroxine 100 mcg tablet, Take 1 tablet (100 mcg total) by mouth daily, Disp: 90 tablet, Rfl: 3    lovastatin (MEVACOR) 20 mg tablet, Take 1 tablet (20 mg total) by mouth daily, Disp: 90 tablet, Rfl: 3    metoprolol tartrate (LOPRESSOR) 25 mg tablet, Take 1 tablet (25 mg total) by mouth 2 (two) times a day, Disp: 180 tablet, Rfl: 3    montelukast (SINGULAIR) 10 mg tablet, Take 1 tablet (10 mg total) by mouth daily at bedtime, Disp: 90 tablet, Rfl: 1   Multiple Vitamin (MULTIVITAMIN) tablet, Take 1 tablet by mouth daily in the early morning  , Disp: , Rfl:     Multiple Vitamins-Minerals (ICAPS AREDS 2 PO), Take by mouth, Disp: , Rfl:     Allergies   Allergen Reactions    Amiodarone Hives    Keflex [Cephalexin] Hives    Omnipaque [Iohexol] Hives and Shortness Of Breath    Clindamycin Other (See Comments)     When taken causes cdiff immediately    Other     Sulfa Antibiotics Hives     itching    Acetazolamide Hives and Rash     Reaction Date:Unknown    Iv Dye  [Iodinated Diagnostic Agents] Hypertension and Palpitations     [facility] - [id number]: Verified with patient     Naprosyn [Naproxen] Itching and Rash     Category: Allergy; No history exists  ROS:  07/09/20 Reviewed 13 systems: See symptoms in HPI:  Tiredness  Exertional dyspnea  Nonproductive cough  Vision problem  Migraine headaches  Palate tumor  Arthritic symptoms  Presently no  seizures, dizziness, diplopia, dysphagia, hoarseness, chest pain, palpitations,  hemoptysis, abdominal pain, nausea, vomiting, change in bowel habits, melena, hematuria, fever, chills, bleeding, bone pains, skin rash, weight loss,   weakness, numbness,  claudication and gait problem  No frequent infections  Not unusually sensitive to heat or cold  No swelling of the ankles  No swollen glands  Patient is anxious         /72 (BP Location: Left arm, Patient Position: Sitting, Cuff Size: Adult)   Pulse 61   Temp 98 3 °F (36 8 °C)   Resp 16   Ht 5' 2" (1 575 m)   Wt 59 4 kg (131 lb)   SpO2 98%   BMI 23 96 kg/m²     Physical Exam:  Alert, oriented, not in distress, no icterus, no oral thrush, visible mass right side of hard palate, no palpable neck mass, clear lung fields, regular heart rate, abdomen  soft and non tender, no palpable abdominal mass, no ascites, no edema of ankles, no calf tenderness, no focal neurological deficit other than vision problem, no skin rash, no palpable lymphadenopathy in the neck and axillary areas, good arterial pulses  Patient is anxious  Performance status 2  IMAGING:  IMPRESSION:        1  Lytic lesion in the right posterior maxilla/hard palate is stable  2   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  3   No significant interval change or obvious suspicious lymphadenopathy on this noncontrast CT              Workstation performed: [id number]      Imaging     CT soft tissue neck wo contrast (Order: [id number]) - 7/1/2020  IMPRESSION:     No acute cardiopulmonary disease            Workstation performed: [id number]      Imaging     XR chest 2 views (Order: [id number]) - 9/18/2019       LABS:  Results for orders placed or performed in visit on 07/01/20   TSH, 3rd generation   Result Value Ref Range    TSH 3RD GENERATON 2 820 0 358 - 3 740 uIU/mL     Labs, Imaging, & Other studies:   All pertinent labs and imaging studies were personally reviewed    Lab Results   Component Value Date     (L) 06/16/2015    K 4 2 04/09/2020     04/09/2020    CO2 29 04/09/2020    ANIONGAP 5 06/16/2015    BUN 24 04/09/2020    CREATININE 0 84 04/09/2020    GLUCOSE 138 06/16/2015    GLUF 103 (H) 06/05/2019    CALCIUM 9 1 04/09/2020    AST 19 04/09/2020    ALT 22 04/09/2020    ALKPHOS 56 04/09/2020    PROT 6 7 06/07/2015    BILITOT 0 28 06/07/2015    EGFR 63 04/09/2020     Lab Results   Component Value Date    WBC 7 72 09/18/2019    HGB 13 3 09/18/2019    HCT 41 2 09/18/2019    MCV 94 09/18/2019     09/18/2019   Tissue Exam: [id number]   Order: [id number]   Collected:  3/16/2016  3:00 PM   Status:  Final result   Visible to patient:  Yes (MyChart)   Dx:  Neoplasm of uncertain behavior of pha     Component  Resulting Agency   Case Report   Surgical Pathology Report                         Case: [id number]                                    Authorizing Provider: Samantha Augustin DO          Collected:           03/16/2016 1500               Pathologist:           Venus Esposito MD                Received:            03/18/2016 1210               Specimen:    Hard palate, Right hard palate/ Alveolar    lesion                                         Final Diagnosis   A  Right hard palate/ Alveolar lesion, shave biopsy:  - Upper portion of salivary gland tumor, consistent with polymorphous low-grade adenocarcinoma      Note:  The tumor is at the base of the biopsy  The interface between the tumor and surrounding normal tissue is not seen  Perineural invasion is not seen on the sections         Interpretation performed at [facility], [address] [place] [address], [address]            Electronically signed by Venus Esposito MD on 3/25/2016 at  6:42 PM   Additional Information  [facility]               Reviewed and discussed with patient  Assessment and plan: Follow-up visit for biopsy-proven low-grade salivary gland adenocarcinoma   on the right side of the hard palate with extension into the maxillary sinus  Patient has started to have some pain in that area  This was diagnosed in November 2015 in [place]  2nd biopsy was on  3/2016 by Dr Ludmila Paula  Patient preferred this surveillance  There has not been change on imaging studies   Patient has agreed to try radiation treatments   Patient has vision problem secondary to macular degeneration and she gets injections into the eyes   She has chronic lung disease and has mild cough and exertional dyspnea  She has history of migraine headaches     Tiredness and arthritic symptoms            Physical examination and test results are as recorded and discussed  Patient is being referred to Radiation oncologist   Tissue sample of 2016 will be sent for special testing-molecular markers in case she would require systemic therapy and could she be a candidate for Keytruda or NTRK directed therapy  All discussed in detail  Questions answered    Discussed the importance of self-breast examination, eating healthy foods, staying active as tolerated and health screening tests  Patient will continue to follow with her primary physician and other consultants  Discussed precautions against coronavirus  Goal is to treat salivary gland tumor at hard palate causing lytic lesion  1  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[facility] [address] )    - Ambulatory referral to Hematology / Oncology  - CBC and differential; Future  - Comprehensive metabolic panel; Future  - Ambulatory referral to Radiation Oncology; Future  2  Migraine headaches  3  Chronic lung disease  4  Vision problem    Patient voiced understanding and agreement in the discussion  Counseling / Coordination of Care   Greater than 50% of total time was spent with the patient and / or family counseling and / or coordination of care

## 2020-07-15 ENCOUNTER — TELEPHONE (OUTPATIENT)
Dept: RADIATION ONCOLOGY | Facility: HOSPITAL | Age: 85
End: 2020-07-15

## 2020-07-16 ENCOUNTER — APPOINTMENT (OUTPATIENT)
Dept: RADIATION ONCOLOGY | Facility: HOSPITAL | Age: 85
End: 2020-07-16
Payer: COMMERCIAL

## 2020-07-16 ENCOUNTER — CLINICAL SUPPORT (OUTPATIENT)
Dept: RADIATION ONCOLOGY | Facility: HOSPITAL | Age: 85
End: 2020-07-16
Attending: RADIOLOGY
Payer: COMMERCIAL

## 2020-07-16 VITALS
WEIGHT: 132 LBS | BODY MASS INDEX: 24.14 KG/M2 | OXYGEN SATURATION: 94 % | SYSTOLIC BLOOD PRESSURE: 128 MMHG | RESPIRATION RATE: 16 BRPM | TEMPERATURE: 97.6 F | HEART RATE: 64 BPM | DIASTOLIC BLOOD PRESSURE: 70 MMHG

## 2020-07-16 DIAGNOSIS — C05.0 MALIGNANT NEOPLASM OF HARD PALATE (HCC): Primary | ICD-10-CM

## 2020-07-16 DIAGNOSIS — C08.9 SALIVARY GLAND CANCER (HCC): Primary | ICD-10-CM

## 2020-07-16 DIAGNOSIS — C08.9 SALIVARY GLAND CANCER (HCC): ICD-10-CM

## 2020-07-16 PROCEDURE — 99211 OFF/OP EST MAY X REQ PHY/QHP: CPT | Performed by: RADIOLOGY

## 2020-07-16 NOTE — PROGRESS NOTES
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  She was initially diagnosed in 2015 via biopsy in [place]  She had a second biopsy done by Dr Tereso Bates in March 2016  She elected for surveillance at that time  1/29/20 ENT, Dr Shannen Rod follow-up  Right sided ear pain a little worse over the last few weeks  She has not had any treatment for low-grade salivary gland cancer, follows with Dr Rene Murray for observation  Scans are stable  Ear exam normal  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 and referred pain from hard palate tumor  She defers hearing trial  Plan to f/u in 6 months  4/15/20 Dr Brooklyn Rose, PCP  Pt feels more pain in back of throat and is concerned about growth of salivary gland cancer  She would like to see a new oncologist  Plan for CT and referral to Dr Adrian Rehman  7/1/20 CT soft tissue neck wo contrast  1  Lytic lesion in the right posterior maxilla/hard palate is stable  2   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  3   No significant interval change or obvious suspicious lymphadenopathy on this noncontrast CT     7/9/20 Dr Adrian Rehman, Med Onc  Patient reports more pain on right side, she agrees to try radiation treatments  Plan: refer to Rad Onc  Tissue sample from 2016 will be sent for molecular markers to see if she is a candidate for any systemic therapies       7/29/20 ENT, Dr Shannen Rod follow-up  10/12/20 Dr Adrian Rehman follow-up          Salivary gland cancer [facility])    2015 Initial Diagnosis     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[facility] [address] )      11/3/2015 Biopsy      Palate, excisional biopsy (Eight (8) slides, Donte Gamez [facility], collected 11/3/2015, labeled "[id number]"):         - Salivary gland epithelial neoplasm, differential diagnosis includes myoepithelial neoplasia such as monomorphic adenoma versus polymorphous low grade adenocarcinoma       3/16/2016 Biopsy     Right hard palate/ Alveolar lesion, shave biopsy:  - Upper portion of salivary gland tumor, consistent with polymorphous low-grade adenocarcinoma      Note:  The tumor is at the base of the biopsy  The interface between the tumor and surrounding normal tissue is not seen    Perineural invasion is not seen on the sections         Clinical Trial: no      Health Maintenance   Topic Date Due    SLP PLAN OF CARE  06/04/1932    Influenza Vaccine  07/01/2020    Medicare Annual Wellness Visit (AWV)  07/10/2020    Fall Risk  08/02/2020    Depression Screening PHQ  08/02/2020    DTaP,Tdap,and Td Vaccines (1 - Tdap) 04/10/2021 (Originally 6/4/1943)    BMI: Adult  07/16/2021    Pneumococcal Vaccine: 65+ Years  Completed    Pneumococcal Vaccine: Pediatrics (0 to 5 Years) and At-Risk Patients (6 to 59 Years)  Aged Out    HIB Vaccine  Aged Out    Hepatitis B Vaccine  Aged Out    IPV Vaccine  Aged Out    Hepatitis A Vaccine  Aged Out    Meningococcal ACWY Vaccine  Aged Out    HPV Vaccine  Aged Out       Past Medical History:   Diagnosis Date    A-fib ([address] )     Allergy to IVP dye 06/04/2013    pt felt heaviness, palpitations    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), wet ([address] )     bilateral    Aneurysm of aortic root ([address] )     last assessed - [id number]    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[address] )     Aortic root dilatation ([facility] [address] )     last assessed - [id number]    Arthritis     Ascending aortic aneurysm ([facility] [address] )     last assessed - [id number]    Asthma     Basal cell carcinoma     last assessed - [id number]    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[address] )     Disease of thyroid gland     Facet arthropathy, cervical     last assessed - [id number]    History of fracture of vertebral column     Hyperlipidemia     Hypertension     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[address] )     Hypoxia     last assessed - [id number]    Junctional rhythm     last assessed - 2017    Lightheadedness     last assessed - [id number]    Migraine     Palpitations     last assessed - [id number]    Pleural effusion, bilateral     last assessed - 2015    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HCC)     Shortness of breath     last assessed - 2017    Thyroid trouble     Trigger finger     last assessed - [id number]    Trigger middle finger of right hand     last assessed - [id number]    Urinary incontinence     last assessed -  Jul,2013; Resolved - [id number]       Past Surgical History:   Procedure Laterality Date    ABDOMINAL AORTIC ANEURYSM REPAIR W/ ENDOLUMINAL GRAFT  2015    Ascending aorta and hemiarch replacement with 30 mm Vascutek Gelweave graft;  Managed by Brandon Botello; last assessed - [id number]    APPENDECTOMY      BLADDER SURGERY      Reccurent histoy of bladder surgery    [facility] YAKERRIEO PROCEDURE      CARDIAC CATHETERIZATION  2004    Procedure summary - Luminal irregularities; last assessed - [id number]     SECTION      CORONARY ANEURYSM REPAIR      CYSTOSCOPY      botox injection    HYSTERECTOMY      [place] ENDOSCOPIC INJECTION/IMPLANT N/A 2017    Procedure: CYSTOSCOPY; Kalyani Potter BULKING AGENT INJECTION ;  Surgeon: Rebeca Crowell MD;  Location: [facility];  Service: Urology    [place] INCISE FINGER TENDON SHEATH Right 10/18/2016    Procedure: LONG FINGER TRIGGER RELEASE ;  Surgeon: Loida Myers MD;  [facility];  Service: Orthopedics    THYROIDECTOMY      Total Thyroidectomy    TONSILLECTOMY AND ADENOIDECTOMY         Family History   Problem Relation Age of Onset    Coronary aneurysm Sister     Coronary artery disease Sister         CABG    Heart disease Family         cardiac disorder    Hypertension Family     Cancer Family     Thyroid disease Family        Social History     Tobacco Use    Smoking status: Former Smoker     Packs/day: 0 25     Years: 57 00     Pack years: 14 25     Types: Cigarettes     Start date: 36     Last attempt to quit: 2014     Years since quittin 5    Smokeless tobacco: Never Used    Tobacco comment: smoked 17 yrs 1/2 ppd quit and restarted then quit  10 days ago during 2014    Substance Use Topics    Alcohol use: No     Comment: Social drinker & never drang alcohol both documented in [employer]    Drug use: No          Current Outpatient Medications:     albuterol (PROVENTIL HFA,VENTOLIN HFA) 90 mcg/act inhaler, Inhale 2 puffs every 6 (six) hours as needed for wheezing, Disp: 1 Inhaler, Rfl: 0    amLODIPine-benazepril (LOTREL 5-20) 5-20 MG per capsule, Take 1 capsule by mouth daily, Disp: 90 capsule, Rfl: 3    aspirin 81 MG tablet, Take 162 mg by mouth daily in the early morning  , Disp: , Rfl:     calcium carbonate (OS-TAE) 600 MG tablet, Take 600 mg by mouth daily in the early morning  , Disp: , Rfl:     levothyroxine 100 mcg tablet, Take 1 tablet (100 mcg total) by mouth daily, Disp: 90 tablet, Rfl: 3    lovastatin (MEVACOR) 20 mg tablet, Take 1 tablet (20 mg total) by mouth daily, Disp: 90 tablet, Rfl: 3    metoprolol tartrate (LOPRESSOR) 25 mg tablet, Take 1 tablet (25 mg total) by mouth 2 (two) times a day, Disp: 180 tablet, Rfl: 3    montelukast (SINGULAIR) 10 mg tablet, Take 1 tablet (10 mg total) by mouth daily at bedtime, Disp: 90 tablet, Rfl: 1    Multiple Vitamin (MULTIVITAMIN) tablet, Take 1 tablet by mouth daily in the early morning  , Disp: , Rfl:     Multiple Vitamins-Minerals (ICAPS AREDS 2 PO), Take by mouth, Disp: , Rfl:     Allergies   Allergen Reactions    Amiodarone Hives    Keflex [Cephalexin] Hives    Omnipaque [Iohexol] Hives and Shortness Of Breath    Clindamycin Other (See Comments)     When taken causes cdiff immediately    Other     Sulfa Antibiotics Hives     itching    Acetazolamide Hives and Rash     Reaction Date:Unknown    Iv Dye  [Iodinated Diagnostic Agents] Hypertension and Palpitations     [facility] - [id number]: Verified with patient     Naprosyn [Naproxen] Itching and Rash     Category: Allergy; Review of Systems:  Review of Systems   Constitutional: Positive for fatigue  HENT: Positive for ear pain ("shooting" pain on right side)  Eyes: Positive for visual disturbance  Hx of macular degeneration   Respiratory: Positive for shortness of breath (on exertion)  Cardiovascular: Negative  Gastrointestinal: Negative  Endocrine: Negative  Genitourinary: Negative  Musculoskeletal: Positive for arthralgias  Skin: Negative  Allergic/Immunologic: Positive for environmental allergies (seasonal )  Neurological: Positive for headaches (chronic, taking tylenol prn)  Hematological: Negative  Psychiatric/Behavioral: Negative  Vitals:    07/16/20 0844   BP: 128/70   BP Location: Right arm   Pulse: 64   Resp: 16   Temp: 97 6 °F (36 4 °C)   TempSrc: Temporal   SpO2: 94%   Weight: 59 9 kg (132 lb)            Pain assessment: 0    Imaging:No images are attached to the encounter       Teaching: NCI packet provided    MST completed

## 2020-07-16 NOTE — PROGRESS NOTES
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 with extension into the maxillary sinus  Briefly, she was initially diagnosed in  and again with confirmatory biopsy with Dr Bubba Quezada in 2016  She elected for surveillance at that time       20 ENT, Dr Deedee Del Toro follow-up and noted right sided ear pain a little worse over the last few weeks  She has not had any treatment for low-grade salivary gland cancer, follows with Dr Vasu Urias for observation  Scans are stable  Ear exam normal  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 and referred pain from hard palate tumor       20 CT soft tissue neck wo contrast  1   Lytic lesion in the right posterior maxilla/hard palate is stable  2   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  3   No significant interval change or obvious suspicious lymphadenopathy on this noncontrast CT     20 Dr Beatrice Harkins referred for palliative radiation to the right side  The patient comes with her daughter today for consultation  She complains of right ear pain that has been worse recently  She states pain is not constant and generally resolves spontaneously or with otc analgesics  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 and has pain in both ears periodically, but admits it is more frequent and severe on the right  She denies dysphagia, odynophagia, or difficulty with speech  She has occasional headaches centered on there right  She notes tenderness when eating, but is able to eat a normal diet  She denies nasal discharge of sinus pressure  She denies epistaxis    She is concerned about infections due to tumor and presents today for consideration for palliative radiation  Upcoming appointments:   7/29/20 ENT, Dr Eyad Hdz follow-up  10/12/20 Dr Jillian Cuevas follow-up       Historical Information      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[place] [address] )    12/30/2016 Initial Diagnosis     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HCC)        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[place] [address] )    2015 Initial Diagnosis     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[place] [address] )      11/3/2015 Biopsy      Palate, excisional biopsy (Eight (8) slides, [place], [place], collected 11/3/2015, labeled "[id number]"):         - Salivary gland epithelial neoplasm, differential diagnosis includes myoepithelial neoplasia such as monomorphic adenoma versus polymorphous low grade adenocarcinoma       3/16/2016 Biopsy     Right hard palate/ Alveolar lesion, shave biopsy:  - Upper portion of salivary gland tumor, consistent with polymorphous low-grade adenocarcinoma      Note:  The tumor is at the base of the biopsy  The interface between the tumor and surrounding normal tissue is not seen    Perineural invasion is not seen on the sections         Past Medical History:   Diagnosis Date    A-fib [facility])     Allergy to IVP dye 06/04/2013    pt felt heaviness, palpitations    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), wet ([place] [address] )     bilateral    Aneurysm of aortic root ([place] [address] )     last assessed - [id number]    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HCC)     Aortic root dilatation (HCC)     last assessed - 52KGI4639    Arthritis     Ascending aortic aneurysm (HCC)     last assessed - 86GSA7953    Asthma     Basal cell carcinoma     last assessed - 61Hlc2062    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[place] [address] )     Disease of thyroid gland     Facet arthropathy, cervical     last assessed - 92XMZ1857    History of fracture of vertebral column     Hyperlipidemia     Hypertension     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[place] [address] )     Hypoxia     last assessed - 41KDQ4544    Junctional rhythm     last assessed - 60Vlg6630   Carla Rosario Lightheadedness     last assessed - 2016    Migraine     Palpitations     last assessed - 2016    Pleural effusion, bilateral     last assessed - 68Bbp4132    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HCC)     Shortness of breath     last assessed - 2017    Thyroid trouble     Trigger finger     last assessed - 53OPB1307    Trigger middle finger of right hand     last assessed - 29Jgp5762    Urinary incontinence     last assessed -  Jul,2013; Resolved - [id number]     Past Surgical History:   Procedure Laterality Date    ABDOMINAL AORTIC ANEURYSM REPAIR W/ ENDOLUMINAL GRAFT  2015    Ascending aorta and hemiarch replacement with 30 mm Vascutek Gelweave graft;  Managed by [employer]; last assessed - 20HII2904    APPENDECTOMY      BLADDER SURGERY      Reccurent histoy of bladder surgery    [facility] YAZOO PROCEDURE      CARDIAC CATHETERIZATION  2004    Procedure summary - Luminal irregularities; last assessed - [id number]     SECTION      CORONARY ANEURYSM REPAIR      CYSTOSCOPY      botox injection    HYSTERECTOMY      GA ENDOSCOPIC INJECTION/IMPLANT N/A 2017    Procedure: CYSTOSCOPY; [facility] BULKING AGENT INJECTION ;  Surgeon: Gordon Luu MD;  [facility];  Service: Urology    GA INCISE FINGER TENDON SHEATH Right 10/18/2016    Procedure: LONG FINGER TRIGGER RELEASE ;  Surgeon: Clint Martinez MD;  [facility];  Service: Orthopedics    THYROIDECTOMY      Total Thyroidectomy    TONSILLECTOMY AND ADENOIDECTOMY         Family History   Problem Relation Age of Onset    Coronary aneurysm Sister     Coronary artery disease Sister         CABG    Heart disease Family         cardiac disorder    Hypertension Family     Cancer Family     Thyroid disease Family        Social History   Social History     Substance and Sexual Activity   Alcohol Use No    Comment: Social drinker & never drang alcohol both documented in [employer]     Social History Substance and Sexual Activity   Drug Use No     Social History     Tobacco Use   Smoking Status Former Smoker    Packs/day: 0 25    Years: 57 00    Pack years: 14 25    Types: Cigarettes    Start date: 36    Last attempt to quit: 2014    Years since quittin 5   Smokeless Tobacco Never Used   Tobacco Comment    smoked 17 yrs 1/2 ppd quit and restarted then quit  10 days ago during 2014        Meds/Allergies     Current Outpatient Medications:     albuterol (PROVENTIL HFA,VENTOLIN HFA) 90 mcg/act inhaler, Inhale 2 puffs every 6 (six) hours as needed for wheezing, Disp: 1 Inhaler, Rfl: 0    amLODIPine-benazepril (LOTREL 5-20) 5-20 MG per capsule, Take 1 capsule by mouth daily, Disp: 90 capsule, Rfl: 3    aspirin 81 MG tablet, Take 162 mg by mouth daily in the early morning  , Disp: , Rfl:     calcium carbonate (OS-TAE) 600 MG tablet, Take 600 mg by mouth daily in the early morning  , Disp: , Rfl:     levothyroxine 100 mcg tablet, Take 1 tablet (100 mcg total) by mouth daily, Disp: 90 tablet, Rfl: 3    lovastatin (MEVACOR) 20 mg tablet, Take 1 tablet (20 mg total) by mouth daily, Disp: 90 tablet, Rfl: 3    metoprolol tartrate (LOPRESSOR) 25 mg tablet, Take 1 tablet (25 mg total) by mouth 2 (two) times a day, Disp: 180 tablet, Rfl: 3    montelukast (SINGULAIR) 10 mg tablet, Take 1 tablet (10 mg total) by mouth daily at bedtime, Disp: 90 tablet, Rfl: 1    Multiple Vitamin (MULTIVITAMIN) tablet, Take 1 tablet by mouth daily in the early morning  , Disp: , Rfl:     Multiple Vitamins-Minerals (ICAPS AREDS 2 PO), Take by mouth, Disp: , Rfl:   Allergies   Allergen Reactions    Amiodarone Hives    Keflex [Cephalexin] Hives    Omnipaque [Iohexol] Hives and Shortness Of Breath    Clindamycin Other (See Comments)     When taken causes cdiff immediately    Other     Sulfa Antibiotics Hives     itching    Acetazolamide Hives and Rash     Reaction Date:Unknown    Iv Dye  [Iodinated Diagnostic Agents] Hypertension and Palpitations     [facility] [id number]: Verified with patient     Naprosyn [Naproxen] Itching and Rash     Category: Allergy; Review of Systems  Constitutional: Positive for fatigue  HENT: Positive for ear pain ("shooting" pain on right side)  Eyes: Positive for visual disturbance  Hx of macular degeneration   Respiratory: Positive for shortness of breath (on exertion)  Cardiovascular: Negative  Gastrointestinal: Negative  Endocrine: Negative  Genitourinary: Negative  Musculoskeletal: Positive for arthralgias  Skin: Negative  Allergic/Immunologic: Positive for environmental allergies (seasonal )  Neurological: Positive for headaches (chronic, taking tylenol prn)  Hematological: Negative  Psychiatric/Behavioral: Negative  OBJECTIVE:   /70 (BP Location: Right arm)   Pulse 64   Temp 97 6 °F (36 4 °C) (Temporal)   Resp 16   Wt 59 9 kg (132 lb)   SpO2 94%   BMI 24 14 kg/m²   Performance Status: Karnofsky: 80 - Normal activity with effort; some signs or symptoms of disease    Physical Exam   Constitutional: She is oriented to person, place, and time  She appears well-developed and well-nourished  No distress  HENT:   Mouth/Throat: Oropharynx is clear and moist    Oral cavity examination demonstrates visible protruding tumor over the right hard palate without ulceration  There is firm palpable nodule in the right parotid  Eyes: No scleral icterus  Cardiovascular: Normal rate  No murmur heard  Pulmonary/Chest: Effort normal  No respiratory distress  She has no wheezes  She has no rhonchi  She has no rales  Abdominal: Soft  She exhibits no distension  There is no tenderness  Musculoskeletal: She exhibits no edema  Lymphadenopathy:     She has no cervical adenopathy  Right: No supraclavicular adenopathy present  Left: No supraclavicular adenopathy present     Neurological: She is alert and oriented to person, place, and time  Nursing note and vitals reviewed  RESULTS  Imaging Studies  Ct Soft Tissue Neck Wo Contrast    Result Date: 7/1/2020  Narrative: CT SOFT TISSUE NECK WITHOUT CONTRAST INDICATION:   C08 9: Malignant neoplasm of major salivary gland, unspecified  Follow-up malignant salivary gland tumor  COMPARISON:  12/13/2019; 8/21/2017 TECHNIQUE:  Axial, sagittal, and coronal 2D reformatted images were created from the axial source data and submitted for interpretation  Radiation dose length product (DLP) for this visit:  484 mGy-cm   This examination, like all CT scans performed in the [facility], was performed utilizing techniques to minimize radiation dose exposure, including the use of iterative reconstruction and automated exposure control  The study was performed without intravenous contrast as per the clinical request   The patient indicates a contrast allergy  The lack of contrast limits evaluation for characterizing mass lesions in detecting mucosal abnormalities as well as characterizing the vessels and lymph nodes  IMAGE QUALITY:  Diagnostic  FINDINGS: VISUALIZED BRAIN PARENCHYMA:  Normal visualized brain parenchyma  VISUALIZED ORBITS AND PARANASAL SINUSES:  Small mucous retention cyst in the right maxillary sinus noted  Bilateral lens implants noted  NASAL CAVITY AND NASOPHARYNX:  Normal  SUPRAHYOID NECK:  Destructive lesion in the posterior aspect of the right maxilla/hard palate is grossly stable when allowing for differences in measurement technique, seen on image 34, series 2 measuring approximately 2 2 cm in maximal axial dimension  Small right-sided vallecular cyst, image 52, series 2, measuring 7 mm is stable from 2017, seen on image 45, series 2 retrospectively on the 8/21/2017 CT  INFRAHYOID NECK:  Aryepiglottic folds and piriform sinuses  Normal larynx and subglottic airway   THYROID GLAND:  Clips in the thyroid fossa bilaterally again noted likely reflective of prior thyroidectomy  PAROTID AND SUBMANDIBULAR GLANDS: Normal  LYMPH NODES:  No pathologic or enlarged adenopathy  VASCULAR STRUCTURES:  Aneurysm of the aortic arch redemonstrated, measuring at least 4 4 cm in the proximal descending aorta and 4 6 cm in the superior aspect of the ascending aorta, grossly stable  Scattered vascular calcifications noted  Evaluation of luminal patency limited without IV contrast  THORACIC INLET:  Lung apices and upper mediastinum are unremarkable  BONY STRUCTURES:  Mild scattered spondylotic changes noted  Impression: 1  Lytic lesion in the right posterior maxilla/hard palate is stable  2   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  3   No significant interval change or obvious suspicious lymphadenopathy on this noncontrast CT Workstation performed: [id number]       Pathology:  Collected:  3/16/2016 15:00 Status:  Edited Result - FINAL   Visible to patient:  Yes (MyChart) Dx:  Neoplasm of uncertain behavior of pha  Component  Resulting Agency   Case Report   Surgical Pathology Report                         Case: [id number]                                    Authorizing Provider: Samantha Augustin DO          Collected:           03/16/2016 1500               Ordering Location:     [facility]      Received:            03/18/2016 [address] [place] [address] Laboratory                                                           Pathologist:           Malik Justice MD                                                                   Specimen:    Hard palate, Right hard palate/ Alveolar    lesion                                         Addendum   At the request of Dr David Juarez, unstained slides from paraffin BLOCK A1 containing the patient's cancer cells was sent to [facility] for MI PROFILE testing   Upon completion of testing, [facility] Laboratory report will be directly sent to the requesting physician as well as posted in the Media Tab of the patient's Epic EMR by the [facility]      Please note: The [facility]'s analysis and report is performed independently of [facility] [email or web address] [facility], and neither the Hospital nor the Pathology Department screen, review or comment upon [facility] report  Because the role of testing in cancer diagnosis and management is subject to evolving development, usage and interpretation, we strongly urge that the test limitations described in the [facility] report be carefully read, appropriately shared with the patient, and critically considered when reaching treatment decisions      This pathology material was removed from archive for purpose of molecular testing  It was reviewed and approved by Dr Coco Knapp  Addendum electronically signed by Violeta Alejandra MD on 7/21/2020 at [phone]   Final Diagnosis   A  Right hard palate/ Alveolar lesion, shave biopsy:  - Upper portion of salivary gland tumor, consistent with polymorphous low-grade adenocarcinoma      Note:  The tumor is at the base of the biopsy  The interface between the tumor and surrounding normal tissue is not seen  Perineural invasion is not seen on the sections         Interpretation performed at [facility] [address] [place] [address], [address] [place] [address], [address] [place] [address]            Electronically signed by Violeta Alejandra MD on 3/25/2016 at [address]  1  Salivary gland cancer [facility])  Ambulatory referral to Radiation Oncology        PLAN/DISCUSSION  Buffy James is a 80y o  year old female with a history of low grade salivary gland adenocarcinoma diagnosed in 2016 who deferred therapy and continues under observation  She has tumor involving right side of hard palate with extension into the maxillary sinus  This lesion is slow growing based on imaging review, but symptomatic      We discussed treatment options including radical radiation in an effort to slow growth and palliate symptoms versus palliative radiation with short course of QUAD shot regimen  The patient is not interested in radical radiation approach  Given her desire for minimal intervention I offered her QUAD shot radiation  I felt that this would offer her benefit in terms of pain improvement and likely at least temporary tumor response  I explained that this regimen is delivered over 2 days with repeating cycle up to 4 times  It has been associated with significant improvement in tolerance in terms of toxicity and improved palliation versus traditional hypofractionated courses  I reviewed CT imaging results with patient and her daughter  I discussed rational and potential benefits of radiation as well as possible side effects, acute and late toxicities  Side effects discussed included, but were not limited to: fatigue, mucositis, esophagitis, xerostomia, dysguesia, and nasal congestion  The patient was concerned about infection associate with tumor  There is no ulceration and no signs or symptoms of infection  The tumor is eroding into the maxillary sinus and may could become obstructive resulting in increased risk of infection, although it is not causing such obstruction at this time  I recommended discussing risk of this with ENT  Regarding her symptoms, the patient continues to desire no intervention  She is aware that she has some symptoms from her tumor, but she feels they are mild and controlled with minimal medication  She has decided to defer radiation at this time  She may decide to pursue palliative radiation in the future if her symptoms worsen  We would be happy to see her again if the need arises  Kimberly Schuster MD  7/16/2020,1:24 PM      Portions of the record may have been created with voice recognition software    Occasional wrong word or "sound a like" substitutions may have occurred due to the inherent limitations of voice recognition software  Read the chart carefully and recognize, using context, where substitutions have occurred

## 2020-07-21 ENCOUNTER — DOCUMENTATION (OUTPATIENT)
Dept: NUTRITION | Facility: CLINIC | Age: 85
End: 2020-07-21

## 2020-07-21 NOTE — PROGRESS NOTES
Received notification by Surinder Monterroso RN on 7/16/20 that pt has triggered for oncology nutrition care (reason for referral: HNC dx)  Per HemOnc OV note and discussion with Dio RN, Buffy has yet to decide if she will pursue cancer treatment at [facility]  Will check back in ~3-4 weeks to see if Buffy  has decided to undergo cancer tx at [facility], at which time oncology nutrition care will be established

## 2020-08-03 ENCOUNTER — TELEPHONE (OUTPATIENT)
Dept: LAB | Facility: HOSPITAL | Age: 85
End: 2020-08-03

## 2020-08-04 ENCOUNTER — DOCUMENTATION (OUTPATIENT)
Dept: NUTRITION | Facility: HOSPITAL | Age: 85
End: 2020-08-04

## 2020-08-04 NOTE — PROGRESS NOTES
Received notification by Shahab Soliz RN on 7/16/20 that pt has triggered for oncology nutrition care (reason for referral: HNC dx)  Per chart review on 7/21/20 pt has not decided if she would pursue tx at [facility]  Per ENT OV note 7/29/20, pt does not wish to pursue further tx  In the event pt changes her mind and chooses to pursue tx at [facility], please refer back to oncology nutrition services

## 2020-08-06 ENCOUNTER — APPOINTMENT (OUTPATIENT)
Dept: LAB | Facility: HOSPITAL | Age: 85
End: 2020-08-06
Payer: COMMERCIAL

## 2020-08-06 DIAGNOSIS — C08.9 SALIVARY GLAND CANCER (HCC): ICD-10-CM

## 2020-08-06 LAB
ALBUMIN SERPL BCP-MCNC: 3.9 G/DL (ref 3.5–5)
ALP SERPL-CCNC: 56 U/L (ref 46–116)
ALT SERPL W P-5'-P-CCNC: 20 U/L (ref 12–78)
ANION GAP SERPL CALCULATED.3IONS-SCNC: 6 MMOL/L (ref 4–13)
AST SERPL W P-5'-P-CCNC: 18 U/L (ref 5–45)
BASOPHILS # BLD AUTO: 0.05 THOUSANDS/ΜL (ref 0–0.1)
BASOPHILS NFR BLD AUTO: 1 % (ref 0–1)
BILIRUB SERPL-MCNC: 0.58 MG/DL (ref 0.2–1)
BUN SERPL-MCNC: 21 MG/DL (ref 5–25)
CALCIUM SERPL-MCNC: 9.1 MG/DL (ref 8.3–10.1)
CHLORIDE SERPL-SCNC: 101 MMOL/L (ref 100–108)
CO2 SERPL-SCNC: 32 MMOL/L (ref 21–32)
CREAT SERPL-MCNC: 0.74 MG/DL (ref 0.6–1.3)
EOSINOPHIL # BLD AUTO: 0.17 THOUSAND/ΜL (ref 0–0.61)
EOSINOPHIL NFR BLD AUTO: 2 % (ref 0–6)
ERYTHROCYTE [DISTWIDTH] IN BLOOD BY AUTOMATED COUNT: 13.5 % (ref 11.6–15.1)
GFR SERPL CREATININE-BSD FRML MDRD: 73 ML/MIN/1.73SQ M
GLUCOSE SERPL-MCNC: 89 MG/DL (ref 65–140)
HCT VFR BLD AUTO: 39.1 % (ref 34.8–46.1)
HGB BLD-MCNC: 12.4 G/DL (ref 11.5–15.4)
IMM GRANULOCYTES # BLD AUTO: 0.02 THOUSAND/UL (ref 0–0.2)
IMM GRANULOCYTES NFR BLD AUTO: 0 % (ref 0–2)
LYMPHOCYTES # BLD AUTO: 2.65 THOUSANDS/ΜL (ref 0.6–4.47)
LYMPHOCYTES NFR BLD AUTO: 33 % (ref 14–44)
MCH RBC QN AUTO: 30.2 PG (ref 26.8–34.3)
MCHC RBC AUTO-ENTMCNC: 31.7 G/DL (ref 31.4–37.4)
MCV RBC AUTO: 95 FL (ref 82–98)
MONOCYTES # BLD AUTO: 0.75 THOUSAND/ΜL (ref 0.17–1.22)
MONOCYTES NFR BLD AUTO: 9 % (ref 4–12)
NEUTROPHILS # BLD AUTO: 4.33 THOUSANDS/ΜL (ref 1.85–7.62)
NEUTS SEG NFR BLD AUTO: 55 % (ref 43–75)
NRBC BLD AUTO-RTO: 0 /100 WBCS
PLATELET # BLD AUTO: 216 THOUSANDS/UL (ref 149–390)
PMV BLD AUTO: 11.9 FL (ref 8.9–12.7)
POTASSIUM SERPL-SCNC: 4.3 MMOL/L (ref 3.5–5.3)
PROT SERPL-MCNC: 7.1 G/DL (ref 6.4–8.2)
RBC # BLD AUTO: 4.1 MILLION/UL (ref 3.81–5.12)
SODIUM SERPL-SCNC: 139 MMOL/L (ref 136–145)
WBC # BLD AUTO: 7.97 THOUSAND/UL (ref 4.31–10.16)

## 2020-08-06 PROCEDURE — 85025 COMPLETE CBC W/AUTO DIFF WBC: CPT

## 2020-08-06 PROCEDURE — 80053 COMPREHEN METABOLIC PANEL: CPT

## 2020-08-06 PROCEDURE — 36415 COLL VENOUS BLD VENIPUNCTURE: CPT

## 2020-08-21 ENCOUNTER — TELEPHONE (OUTPATIENT)
Dept: HEMATOLOGY ONCOLOGY | Facility: CLINIC | Age: 85
End: 2020-08-21

## 2020-08-21 NOTE — TELEPHONE ENCOUNTER
Spoke to patient, she would like a call back from Dr Jeff Perez at his convenience stating she has a few questions for him

## 2020-08-24 ENCOUNTER — OFFICE VISIT (OUTPATIENT)
Dept: PULMONOLOGY | Facility: CLINIC | Age: 85
End: 2020-08-24
Payer: COMMERCIAL

## 2020-08-24 VITALS
WEIGHT: 129 LBS | HEART RATE: 72 BPM | HEIGHT: 62 IN | TEMPERATURE: 98 F | SYSTOLIC BLOOD PRESSURE: 110 MMHG | BODY MASS INDEX: 23.74 KG/M2 | DIASTOLIC BLOOD PRESSURE: 60 MMHG | OXYGEN SATURATION: 94 %

## 2020-08-24 DIAGNOSIS — J44.9 ASTHMA-COPD OVERLAP SYNDROME (HCC): Primary | ICD-10-CM

## 2020-08-24 DIAGNOSIS — R06.00 DOE (DYSPNEA ON EXERTION): ICD-10-CM

## 2020-08-24 PROCEDURE — 3074F SYST BP LT 130 MM HG: CPT | Performed by: INTERNAL MEDICINE

## 2020-08-24 PROCEDURE — 4040F PNEUMOC VAC/ADMIN/RCVD: CPT | Performed by: INTERNAL MEDICINE

## 2020-08-24 PROCEDURE — 94618 PULMONARY STRESS TESTING: CPT | Performed by: INTERNAL MEDICINE

## 2020-08-24 PROCEDURE — 99215 OFFICE O/P EST HI 40 MIN: CPT | Performed by: INTERNAL MEDICINE

## 2020-08-24 PROCEDURE — 3008F BODY MASS INDEX DOCD: CPT | Performed by: INTERNAL MEDICINE

## 2020-08-24 PROCEDURE — 3078F DIAST BP <80 MM HG: CPT | Performed by: INTERNAL MEDICINE

## 2020-08-24 PROCEDURE — 1036F TOBACCO NON-USER: CPT | Performed by: INTERNAL MEDICINE

## 2020-08-24 PROCEDURE — 1160F RVW MEDS BY RX/DR IN RCRD: CPT | Performed by: INTERNAL MEDICINE

## 2020-08-24 RX ORDER — ALBUTEROL SULFATE 2.5 MG/3ML
2.5 SOLUTION RESPIRATORY (INHALATION) EVERY 6 HOURS PRN
COMMUNITY

## 2020-08-24 NOTE — PROGRESS NOTES
Office Progress Note - Pulmonary    June A Weddermann 80 y o  female MRN: [id number]    Encounter: [id number]      Assessment:   COPD/asthma overlap syndrome   Dyspnea on exertion  Plan:     6 minutes walk test    Breo Ellipta 100/25, 1 inhalation once a day   Albuterol rescue inhaler 2 inhalations 4 times a day as needed   Regular walks to improve stamina   Follow-up in 4 weeks  Discussion:   The patient's dyspnea on exertion is mainly due to deconditioning  I have encouraged the patient to take regular walks to improve stamina  Fortunately she did not desaturate on the 6 minutes walk test   Her cough is due to bronchial asthma/COPD overlap syndrome  I have started her on Breo Ellipta 100/25 to be used once a day  I have provided her with samples and showed her how to use the Ellipta device appropriately  She will also use albuterol rescue inhaler 2 inhalations 4 times a day as needed  I will see her in 4 weeks in a follow-up visit  Subjective: The patient is here for evaluation of shortness of breath and cough  The patient stated that since her last visit a year ago she was having increasing shortness of breath on exertion  She can walk up to about a block and then she has to stop  She also has significant cough associated with whitish sputum  She does have good days and other days when she coughs more  She has no nocturnal symptoms  She is using the albuterol nebulizer on average once or twice a day  However she may have to 3 days when she does not need to use it  She denies chest pain, palpitation or dizziness when she develops the shortness of breath on exertion  Denies lower extremity edema  Review of systems:  A 12 point system review is done and aside from what is stated above the rest of the review of systems is negative  Family history and social history are reviewed  Medications list is reviewed        Vitals: Blood pressure 110/60, pulse 72, temperature 98 °F (36 7 °C), height 5' 2" (1 575 m), weight 58 5 kg (129 lb), SpO2 94 %  ,     Physical Exam  Gen: Awake, alert, oriented x 3, no acute distress  HEENT: Mucous membranes moist, no oral lesions, no thrush  NECK: No accessory muscle use, JVP not elevated  Cardiac: Regular, single S1, single S2, no murmurs, no rubs, no gallops  Lungs:  Decreased breath sounds  No wheezing or rhonchi  Abdomen: normoactive bowel sounds, soft nontender, nondistended, no rebound or rigidity, no guarding  Extremities: no cyanosis, no clubbing, no edema  Neuro:  Grossly nonfocal   Skin:  No rash  Lab Results   Component Value Date    SODIUM 139 08/06/2020    K 4 3 08/06/2020     08/06/2020    CO2 32 08/06/2020    BUN 21 08/06/2020    CREATININE 0 74 08/06/2020    GLUC 89 08/06/2020    CALCIUM 9 1 08/06/2020     Lab Results   Component Value Date    WBC 7 97 08/06/2020    HGB 12 4 08/06/2020    HCT 39 1 08/06/2020    MCV 95 08/06/2020     08/06/2020     A 6 minutes walk test was done in the office today and the patient started with a heart rate 68 beats per minute and O2 saturation 95 percent  At the end of the test the patient's heart rate was 102 beats per minute and the O2 saturation was 92 percent  She walked 378 meters

## 2020-08-30 ENCOUNTER — DOCUMENTATION (OUTPATIENT)
Dept: HEMATOLOGY ONCOLOGY | Facility: CLINIC | Age: 85
End: 2020-08-30

## 2020-08-31 NOTE — PROGRESS NOTES
I spoke with patient on 08/21/2020 and she was still debating whether to have radiation or not  I suggested to her to have radiation because of destructive lesion of right  maxilla/hard palate  She was not sure    She had already seen Dr Caprice Berkowitz

## 2020-09-23 ENCOUNTER — OFFICE VISIT (OUTPATIENT)
Dept: CARDIOLOGY CLINIC | Facility: MEDICAL CENTER | Age: 85
End: 2020-09-23
Payer: COMMERCIAL

## 2020-09-23 VITALS
DIASTOLIC BLOOD PRESSURE: 64 MMHG | TEMPERATURE: 97.1 F | OXYGEN SATURATION: 93 % | HEIGHT: 62 IN | WEIGHT: 131.1 LBS | HEART RATE: 60 BPM | BODY MASS INDEX: 24.13 KG/M2 | SYSTOLIC BLOOD PRESSURE: 136 MMHG

## 2020-09-23 DIAGNOSIS — E78.5 HYPERLIPIDEMIA, UNSPECIFIED HYPERLIPIDEMIA TYPE: ICD-10-CM

## 2020-09-23 DIAGNOSIS — I48.0 PAROXYSMAL ATRIAL FIBRILLATION (HCC): Primary | ICD-10-CM

## 2020-09-23 DIAGNOSIS — I10 ESSENTIAL HYPERTENSION: ICD-10-CM

## 2020-09-23 DIAGNOSIS — R06.00 DOE (DYSPNEA ON EXERTION): ICD-10-CM

## 2020-09-23 DIAGNOSIS — I34.0 NONRHEUMATIC MITRAL VALVE REGURGITATION: ICD-10-CM

## 2020-09-23 PROCEDURE — 99214 OFFICE O/P EST MOD 30 MIN: CPT | Performed by: INTERNAL MEDICINE

## 2020-10-02 ENCOUNTER — DOCUMENTATION (OUTPATIENT)
Dept: PULMONOLOGY | Facility: CLINIC | Age: 85
End: 2020-10-02

## 2020-10-05 ENCOUNTER — OFFICE VISIT (OUTPATIENT)
Dept: PULMONOLOGY | Facility: CLINIC | Age: 85
End: 2020-10-05
Payer: COMMERCIAL

## 2020-10-05 VITALS
TEMPERATURE: 97.6 F | SYSTOLIC BLOOD PRESSURE: 116 MMHG | WEIGHT: 132 LBS | HEART RATE: 60 BPM | BODY MASS INDEX: 24.29 KG/M2 | DIASTOLIC BLOOD PRESSURE: 66 MMHG | HEIGHT: 62 IN | OXYGEN SATURATION: 97 %

## 2020-10-05 DIAGNOSIS — R06.00 DYSPNEA ON EXERTION: ICD-10-CM

## 2020-10-05 DIAGNOSIS — E03.8 HYPOTHYROIDISM DUE TO HASHIMOTO'S THYROIDITIS: ICD-10-CM

## 2020-10-05 DIAGNOSIS — J45.40 MODERATE PERSISTENT ASTHMA WITHOUT COMPLICATION: Primary | ICD-10-CM

## 2020-10-05 DIAGNOSIS — I10 ESSENTIAL HYPERTENSION: ICD-10-CM

## 2020-10-05 DIAGNOSIS — C05.0 MALIGNANT NEOPLASM OF HARD PALATE (HCC): ICD-10-CM

## 2020-10-05 DIAGNOSIS — R05.3 CHRONIC COUGH: ICD-10-CM

## 2020-10-05 DIAGNOSIS — Z23 NEED FOR INFLUENZA VACCINATION: ICD-10-CM

## 2020-10-05 DIAGNOSIS — E06.3 HYPOTHYROIDISM DUE TO HASHIMOTO'S THYROIDITIS: ICD-10-CM

## 2020-10-05 PROCEDURE — G0008 ADMIN INFLUENZA VIRUS VAC: HCPCS | Performed by: INTERNAL MEDICINE

## 2020-10-05 PROCEDURE — 90662 IIV NO PRSV INCREASED AG IM: CPT | Performed by: INTERNAL MEDICINE

## 2020-10-05 PROCEDURE — 94010 BREATHING CAPACITY TEST: CPT | Performed by: INTERNAL MEDICINE

## 2020-10-05 PROCEDURE — 99215 OFFICE O/P EST HI 40 MIN: CPT | Performed by: INTERNAL MEDICINE

## 2020-10-06 PROBLEM — I34.0 NONRHEUMATIC MITRAL VALVE REGURGITATION: Status: ACTIVE | Noted: 2020-10-06

## 2020-10-06 PROCEDURE — 93000 ELECTROCARDIOGRAM COMPLETE: CPT | Performed by: INTERNAL MEDICINE

## 2020-10-07 ENCOUNTER — TELEPHONE (OUTPATIENT)
Dept: CARDIOLOGY CLINIC | Facility: CLINIC | Age: 85
End: 2020-10-07

## 2020-10-12 ENCOUNTER — APPOINTMENT (OUTPATIENT)
Dept: LAB | Facility: HOSPITAL | Age: 85
End: 2020-10-12
Payer: COMMERCIAL

## 2020-10-12 DIAGNOSIS — Z13.1 SCREENING FOR DIABETES MELLITUS: ICD-10-CM

## 2020-10-12 DIAGNOSIS — E78.5 HYPERLIPIDEMIA, UNSPECIFIED HYPERLIPIDEMIA TYPE: ICD-10-CM

## 2020-10-12 LAB
ALBUMIN SERPL BCP-MCNC: 3.9 G/DL (ref 3.5–5)
ALP SERPL-CCNC: 59 U/L (ref 46–116)
ALT SERPL W P-5'-P-CCNC: 17 U/L (ref 12–78)
ANION GAP SERPL CALCULATED.3IONS-SCNC: 3 MMOL/L (ref 4–13)
AST SERPL W P-5'-P-CCNC: 17 U/L (ref 5–45)
BILIRUB SERPL-MCNC: 0.55 MG/DL (ref 0.2–1)
BUN SERPL-MCNC: 20 MG/DL (ref 5–25)
CALCIUM SERPL-MCNC: 9.2 MG/DL (ref 8.3–10.1)
CHLORIDE SERPL-SCNC: 105 MMOL/L (ref 100–108)
CHOLEST SERPL-MCNC: 175 MG/DL (ref 50–200)
CO2 SERPL-SCNC: 29 MMOL/L (ref 21–32)
CREAT SERPL-MCNC: 0.84 MG/DL (ref 0.6–1.3)
EST. AVERAGE GLUCOSE BLD GHB EST-MCNC: 117 MG/DL
GFR SERPL CREATININE-BSD FRML MDRD: 62 ML/MIN/1.73SQ M
GLUCOSE SERPL-MCNC: 86 MG/DL (ref 65–140)
HBA1C MFR BLD: 5.7 %
HDLC SERPL-MCNC: 66 MG/DL
LDLC SERPL CALC-MCNC: 89 MG/DL (ref 0–100)
POTASSIUM SERPL-SCNC: 4.5 MMOL/L (ref 3.5–5.3)
PROT SERPL-MCNC: 7.1 G/DL (ref 6.4–8.2)
SODIUM SERPL-SCNC: 137 MMOL/L (ref 136–145)
TRIGL SERPL-MCNC: 102 MG/DL

## 2020-10-12 PROCEDURE — 80061 LIPID PANEL: CPT

## 2020-10-12 PROCEDURE — 80053 COMPREHEN METABOLIC PANEL: CPT

## 2020-10-12 PROCEDURE — 83036 HEMOGLOBIN GLYCOSYLATED A1C: CPT

## 2020-10-12 PROCEDURE — 36415 COLL VENOUS BLD VENIPUNCTURE: CPT

## 2020-10-15 ENCOUNTER — OFFICE VISIT (OUTPATIENT)
Dept: HEMATOLOGY ONCOLOGY | Facility: CLINIC | Age: 85
End: 2020-10-15
Payer: COMMERCIAL

## 2020-10-15 VITALS
RESPIRATION RATE: 16 BRPM | WEIGHT: 131 LBS | DIASTOLIC BLOOD PRESSURE: 60 MMHG | HEIGHT: 62 IN | SYSTOLIC BLOOD PRESSURE: 118 MMHG | BODY MASS INDEX: 24.11 KG/M2 | TEMPERATURE: 98 F | OXYGEN SATURATION: 97 % | HEART RATE: 75 BPM

## 2020-10-15 DIAGNOSIS — C08.9 SALIVARY GLAND CANCER (HCC): Primary | ICD-10-CM

## 2020-10-15 PROCEDURE — 1160F RVW MEDS BY RX/DR IN RCRD: CPT | Performed by: INTERNAL MEDICINE

## 2020-10-15 PROCEDURE — 1036F TOBACCO NON-USER: CPT | Performed by: INTERNAL MEDICINE

## 2020-10-15 PROCEDURE — 99214 OFFICE O/P EST MOD 30 MIN: CPT | Performed by: INTERNAL MEDICINE

## 2020-10-16 ENCOUNTER — TELEPHONE (OUTPATIENT)
Dept: LAB | Facility: HOSPITAL | Age: 85
End: 2020-10-16

## 2020-10-19 ENCOUNTER — APPOINTMENT (OUTPATIENT)
Dept: LAB | Facility: HOSPITAL | Age: 85
End: 2020-10-19
Payer: COMMERCIAL

## 2020-10-19 ENCOUNTER — TRANSCRIBE ORDERS (OUTPATIENT)
Dept: LAB | Facility: HOSPITAL | Age: 85
End: 2020-10-19

## 2020-10-19 ENCOUNTER — TELEPHONE (OUTPATIENT)
Dept: INTERNAL MEDICINE CLINIC | Facility: CLINIC | Age: 85
End: 2020-10-19

## 2020-10-19 DIAGNOSIS — E06.3 HYPOTHYROIDISM DUE TO HASHIMOTO'S THYROIDITIS: ICD-10-CM

## 2020-10-19 DIAGNOSIS — E03.8 HYPOTHYROIDISM DUE TO HASHIMOTO'S THYROIDITIS: ICD-10-CM

## 2020-10-19 DIAGNOSIS — E78.5 HYPERLIPIDEMIA, UNSPECIFIED HYPERLIPIDEMIA TYPE: ICD-10-CM

## 2020-10-19 DIAGNOSIS — Z13.1 SCREENING FOR DIABETES MELLITUS: ICD-10-CM

## 2020-10-19 DIAGNOSIS — E03.9 HYPOTHYROIDISM, UNSPECIFIED TYPE: Primary | ICD-10-CM

## 2020-10-19 LAB — TSH SERPL DL<=0.05 MIU/L-ACNC: 6.31 UIU/ML (ref 0.36–3.74)

## 2020-10-19 PROCEDURE — 84443 ASSAY THYROID STIM HORMONE: CPT

## 2020-10-19 PROCEDURE — 36415 COLL VENOUS BLD VENIPUNCTURE: CPT

## 2020-10-20 ENCOUNTER — APPOINTMENT (EMERGENCY)
Dept: RADIOLOGY | Facility: HOSPITAL | Age: 85
DRG: 310 | End: 2020-10-20
Payer: COMMERCIAL

## 2020-10-20 ENCOUNTER — OFFICE VISIT (OUTPATIENT)
Dept: INTERNAL MEDICINE CLINIC | Facility: CLINIC | Age: 85
End: 2020-10-20
Payer: COMMERCIAL

## 2020-10-20 ENCOUNTER — HOSPITAL ENCOUNTER (INPATIENT)
Facility: HOSPITAL | Age: 85
LOS: 1 days | Discharge: HOME | DRG: 310 | End: 2020-10-21
Attending: EMERGENCY MEDICINE | Admitting: INTERNAL MEDICINE
Payer: COMMERCIAL

## 2020-10-20 DIAGNOSIS — R00.0 SINUS TACHYCARDIA: ICD-10-CM

## 2020-10-20 DIAGNOSIS — R06.02 SOB (SHORTNESS OF BREATH): ICD-10-CM

## 2020-10-20 DIAGNOSIS — I48.0 PAROXYSMAL ATRIAL FIBRILLATION (HCC): ICD-10-CM

## 2020-10-20 DIAGNOSIS — Z00.00 MEDICARE ANNUAL WELLNESS VISIT, SUBSEQUENT: Primary | ICD-10-CM

## 2020-10-20 DIAGNOSIS — R06.00 DYSPNEA: Primary | ICD-10-CM

## 2020-10-20 DIAGNOSIS — E78.5 HYPERLIPIDEMIA, UNSPECIFIED HYPERLIPIDEMIA TYPE: ICD-10-CM

## 2020-10-20 DIAGNOSIS — I10 HYPERTENSION: ICD-10-CM

## 2020-10-20 PROBLEM — J45.51 SEVERE PERSISTENT ASTHMA WITH ACUTE EXACERBATION: Status: ACTIVE | Noted: 2020-10-20

## 2020-10-20 LAB
ANION GAP SERPL CALCULATED.3IONS-SCNC: 10 MMOL/L (ref 4–13)
APTT PPP: 33 SECONDS (ref 23–37)
BASOPHILS # BLD AUTO: 0.05 THOUSANDS/ΜL (ref 0–0.1)
BASOPHILS NFR BLD AUTO: 1 % (ref 0–1)
BUN SERPL-MCNC: 17 MG/DL (ref 5–25)
CALCIUM SERPL-MCNC: 9.1 MG/DL (ref 8.3–10.1)
CHLORIDE SERPL-SCNC: 103 MMOL/L (ref 100–108)
CO2 SERPL-SCNC: 26 MMOL/L (ref 21–32)
CREAT SERPL-MCNC: 0.69 MG/DL (ref 0.6–1.3)
EOSINOPHIL # BLD AUTO: 0.13 THOUSAND/ΜL (ref 0–0.61)
EOSINOPHIL NFR BLD AUTO: 2 % (ref 0–6)
ERYTHROCYTE [DISTWIDTH] IN BLOOD BY AUTOMATED COUNT: 13.5 % (ref 11.6–15.1)
GFR SERPL CREATININE-BSD FRML MDRD: 78 ML/MIN/1.73SQ M
GLUCOSE SERPL-MCNC: 104 MG/DL (ref 65–140)
HCT VFR BLD AUTO: 38.5 % (ref 34.8–46.1)
HGB BLD-MCNC: 12.7 G/DL (ref 11.5–15.4)
IMM GRANULOCYTES # BLD AUTO: 0.01 THOUSAND/UL (ref 0–0.2)
IMM GRANULOCYTES NFR BLD AUTO: 0 % (ref 0–2)
INR PPP: 0.93 (ref 0.84–1.19)
LYMPHOCYTES # BLD AUTO: 1.97 THOUSANDS/ΜL (ref 0.6–4.47)
LYMPHOCYTES NFR BLD AUTO: 27 % (ref 14–44)
MCH RBC QN AUTO: 30.5 PG (ref 26.8–34.3)
MCHC RBC AUTO-ENTMCNC: 33 G/DL (ref 31.4–37.4)
MCV RBC AUTO: 93 FL (ref 82–98)
MONOCYTES # BLD AUTO: 0.78 THOUSAND/ΜL (ref 0.17–1.22)
MONOCYTES NFR BLD AUTO: 11 % (ref 4–12)
NEUTROPHILS # BLD AUTO: 4.49 THOUSANDS/ΜL (ref 1.85–7.62)
NEUTS SEG NFR BLD AUTO: 59 % (ref 43–75)
NRBC BLD AUTO-RTO: 0 /100 WBCS
PLATELET # BLD AUTO: 151 THOUSANDS/UL (ref 149–390)
PMV BLD AUTO: 11.7 FL (ref 8.9–12.7)
POTASSIUM SERPL-SCNC: 4.7 MMOL/L (ref 3.5–5.3)
PROTHROMBIN TIME: 12.6 SECONDS (ref 11.6–14.5)
RBC # BLD AUTO: 4.16 MILLION/UL (ref 3.81–5.12)
SARS-COV-2 RNA RESP QL NAA+PROBE: NEGATIVE
SODIUM SERPL-SCNC: 139 MMOL/L (ref 136–145)
TROPONIN I SERPL-MCNC: <0.02 NG/ML
WBC # BLD AUTO: 7.43 THOUSAND/UL (ref 4.31–10.16)

## 2020-10-20 PROCEDURE — 99214 OFFICE O/P EST MOD 30 MIN: CPT | Performed by: INTERNAL MEDICINE

## 2020-10-20 PROCEDURE — 87635 SARS-COV-2 COVID-19 AMP PRB: CPT | Performed by: EMERGENCY MEDICINE

## 2020-10-20 PROCEDURE — 1160F RVW MEDS BY RX/DR IN RCRD: CPT | Performed by: INTERNAL MEDICINE

## 2020-10-20 PROCEDURE — 71045 X-RAY EXAM CHEST 1 VIEW: CPT

## 2020-10-20 PROCEDURE — 84484 ASSAY OF TROPONIN QUANT: CPT | Performed by: EMERGENCY MEDICINE

## 2020-10-20 PROCEDURE — 94640 AIRWAY INHALATION TREATMENT: CPT

## 2020-10-20 PROCEDURE — 93005 ELECTROCARDIOGRAM TRACING: CPT

## 2020-10-20 PROCEDURE — 80048 BASIC METABOLIC PNL TOTAL CA: CPT | Performed by: EMERGENCY MEDICINE

## 2020-10-20 PROCEDURE — 99285 EMERGENCY DEPT VISIT HI MDM: CPT | Performed by: EMERGENCY MEDICINE

## 2020-10-20 PROCEDURE — G0439 PPPS, SUBSEQ VISIT: HCPCS | Performed by: INTERNAL MEDICINE

## 2020-10-20 PROCEDURE — 99223 1ST HOSP IP/OBS HIGH 75: CPT | Performed by: INTERNAL MEDICINE

## 2020-10-20 PROCEDURE — 85610 PROTHROMBIN TIME: CPT | Performed by: EMERGENCY MEDICINE

## 2020-10-20 PROCEDURE — 99285 EMERGENCY DEPT VISIT HI MDM: CPT

## 2020-10-20 PROCEDURE — 85025 COMPLETE CBC W/AUTO DIFF WBC: CPT | Performed by: EMERGENCY MEDICINE

## 2020-10-20 PROCEDURE — 85730 THROMBOPLASTIN TIME PARTIAL: CPT | Performed by: EMERGENCY MEDICINE

## 2020-10-20 PROCEDURE — 36415 COLL VENOUS BLD VENIPUNCTURE: CPT | Performed by: EMERGENCY MEDICINE

## 2020-10-20 RX ORDER — ACETAMINOPHEN 325 MG/1
650 TABLET ORAL EVERY 6 HOURS PRN
Status: DISCONTINUED | OUTPATIENT
Start: 2020-10-20 | End: 2020-10-21 | Stop reason: HOSPADM

## 2020-10-20 RX ORDER — ALBUTEROL SULFATE 2.5 MG/3ML
5 SOLUTION RESPIRATORY (INHALATION) ONCE
Status: COMPLETED | OUTPATIENT
Start: 2020-10-20 | End: 2020-10-20

## 2020-10-20 RX ADMIN — ACETAMINOPHEN 650 MG: 325 TABLET, FILM COATED ORAL at 21:48

## 2020-10-20 RX ADMIN — ALBUTEROL SULFATE 5 MG: 2.5 SOLUTION RESPIRATORY (INHALATION) at 18:07

## 2020-10-21 VITALS
HEART RATE: 89 BPM | BODY MASS INDEX: 23.34 KG/M2 | DIASTOLIC BLOOD PRESSURE: 66 MMHG | HEIGHT: 62 IN | RESPIRATION RATE: 18 BRPM | OXYGEN SATURATION: 93 % | TEMPERATURE: 97.5 F | SYSTOLIC BLOOD PRESSURE: 146 MMHG | WEIGHT: 126.8 LBS

## 2020-10-21 PROBLEM — R00.0 SINUS TACHYCARDIA: Status: RESOLVED | Noted: 2020-10-20 | Resolved: 2020-10-21

## 2020-10-21 LAB
ANION GAP SERPL CALCULATED.3IONS-SCNC: 8 MMOL/L (ref 4–13)
BUN SERPL-MCNC: 19 MG/DL (ref 5–25)
CALCIUM SERPL-MCNC: 8.9 MG/DL (ref 8.3–10.1)
CHLORIDE SERPL-SCNC: 105 MMOL/L (ref 100–108)
CO2 SERPL-SCNC: 30 MMOL/L (ref 21–32)
CREAT SERPL-MCNC: 0.81 MG/DL (ref 0.6–1.3)
ERYTHROCYTE [DISTWIDTH] IN BLOOD BY AUTOMATED COUNT: 13.7 % (ref 11.6–15.1)
GFR SERPL CREATININE-BSD FRML MDRD: 65 ML/MIN/1.73SQ M
GLUCOSE SERPL-MCNC: 103 MG/DL (ref 65–140)
HCT VFR BLD AUTO: 37.5 % (ref 34.8–46.1)
HGB BLD-MCNC: 11.9 G/DL (ref 11.5–15.4)
MCH RBC QN AUTO: 29.9 PG (ref 26.8–34.3)
MCHC RBC AUTO-ENTMCNC: 31.7 G/DL (ref 31.4–37.4)
MCV RBC AUTO: 94 FL (ref 82–98)
PLATELET # BLD AUTO: 212 THOUSANDS/UL (ref 149–390)
PMV BLD AUTO: 11.1 FL (ref 8.9–12.7)
POTASSIUM SERPL-SCNC: 4.2 MMOL/L (ref 3.5–5.3)
RBC # BLD AUTO: 3.98 MILLION/UL (ref 3.81–5.12)
SODIUM SERPL-SCNC: 143 MMOL/L (ref 136–145)
T4 FREE SERPL-MCNC: 1.11 NG/DL (ref 0.76–1.46)
T4 FREE SERPL-MCNC: 1.12 NG/DL (ref 0.76–1.46)
TSH SERPL DL<=0.05 MIU/L-ACNC: 6.74 UIU/ML (ref 0.36–3.74)
WBC # BLD AUTO: 6.25 THOUSAND/UL (ref 4.31–10.16)

## 2020-10-21 PROCEDURE — 94664 DEMO&/EVAL PT USE INHALER: CPT

## 2020-10-21 PROCEDURE — 84443 ASSAY THYROID STIM HORMONE: CPT | Performed by: INTERNAL MEDICINE

## 2020-10-21 PROCEDURE — 84439 ASSAY OF FREE THYROXINE: CPT | Performed by: INTERNAL MEDICINE

## 2020-10-21 PROCEDURE — 84439 ASSAY OF FREE THYROXINE: CPT | Performed by: PSYCHIATRY & NEUROLOGY

## 2020-10-21 PROCEDURE — 85027 COMPLETE CBC AUTOMATED: CPT | Performed by: INTERNAL MEDICINE

## 2020-10-21 PROCEDURE — 80048 BASIC METABOLIC PNL TOTAL CA: CPT | Performed by: INTERNAL MEDICINE

## 2020-10-21 PROCEDURE — 99239 HOSP IP/OBS DSCHRG MGMT >30: CPT | Performed by: INTERNAL MEDICINE

## 2020-10-21 RX ORDER — CALCIUM CARBONATE 500(1250)
1 TABLET ORAL
Status: DISCONTINUED | OUTPATIENT
Start: 2020-10-21 | End: 2020-10-21 | Stop reason: HOSPADM

## 2020-10-21 RX ORDER — ALBUTEROL SULFATE 90 UG/1
2 AEROSOL, METERED RESPIRATORY (INHALATION) EVERY 6 HOURS PRN
Status: DISCONTINUED | OUTPATIENT
Start: 2020-10-21 | End: 2020-10-21 | Stop reason: HOSPADM

## 2020-10-21 RX ORDER — LEVOTHYROXINE SODIUM 0.1 MG/1
100 TABLET ORAL
Status: DISCONTINUED | OUTPATIENT
Start: 2020-10-21 | End: 2020-10-21 | Stop reason: HOSPADM

## 2020-10-21 RX ORDER — AMLODIPINE BESYLATE 5 MG/1
5 TABLET ORAL DAILY
Status: DISCONTINUED | OUTPATIENT
Start: 2020-10-21 | End: 2020-10-21 | Stop reason: HOSPADM

## 2020-10-21 RX ORDER — SENNOSIDES 8.6 MG
1 TABLET ORAL DAILY
Status: DISCONTINUED | OUTPATIENT
Start: 2020-10-21 | End: 2020-10-21 | Stop reason: HOSPADM

## 2020-10-21 RX ORDER — ASPIRIN 81 MG/1
162 TABLET, CHEWABLE ORAL DAILY
Status: DISCONTINUED | OUTPATIENT
Start: 2020-10-21 | End: 2020-10-21 | Stop reason: HOSPADM

## 2020-10-21 RX ORDER — ONDANSETRON 2 MG/ML
4 INJECTION INTRAMUSCULAR; INTRAVENOUS EVERY 6 HOURS PRN
Status: DISCONTINUED | OUTPATIENT
Start: 2020-10-21 | End: 2020-10-21 | Stop reason: HOSPADM

## 2020-10-21 RX ORDER — PRAVASTATIN SODIUM 20 MG
20 TABLET ORAL
Status: DISCONTINUED | OUTPATIENT
Start: 2020-10-21 | End: 2020-10-21 | Stop reason: HOSPADM

## 2020-10-21 RX ORDER — LISINOPRIL 20 MG/1
20 TABLET ORAL DAILY
Status: DISCONTINUED | OUTPATIENT
Start: 2020-10-21 | End: 2020-10-21 | Stop reason: HOSPADM

## 2020-10-21 RX ADMIN — LISINOPRIL 20 MG: 20 TABLET ORAL at 09:03

## 2020-10-21 RX ADMIN — ACETAMINOPHEN 650 MG: 325 TABLET, FILM COATED ORAL at 09:04

## 2020-10-21 RX ADMIN — CALCIUM 1 TABLET: 500 TABLET ORAL at 09:04

## 2020-10-21 RX ADMIN — Medication 12.5 MG: at 09:05

## 2020-10-21 RX ADMIN — ASPIRIN 81 MG CHEWABLE TABLET 162 MG: 81 TABLET CHEWABLE at 09:04

## 2020-10-21 RX ADMIN — LEVOTHYROXINE SODIUM 100 MCG: 100 TABLET ORAL at 05:03

## 2020-10-21 RX ADMIN — ENOXAPARIN SODIUM 40 MG: 40 INJECTION SUBCUTANEOUS at 09:04

## 2020-10-21 RX ADMIN — AMLODIPINE BESYLATE 5 MG: 5 TABLET ORAL at 09:04

## 2020-10-22 ENCOUNTER — TRANSITIONAL CARE MANAGEMENT (OUTPATIENT)
Dept: INTERNAL MEDICINE CLINIC | Facility: CLINIC | Age: 85
End: 2020-10-22

## 2020-10-22 ENCOUNTER — PATIENT OUTREACH (OUTPATIENT)
Dept: CASE MANAGEMENT | Facility: HOSPITAL | Age: 85
End: 2020-10-22

## 2020-10-22 DIAGNOSIS — E03.9 HYPOTHYROIDISM, UNSPECIFIED TYPE: Primary | ICD-10-CM

## 2020-10-22 LAB
ATRIAL RATE: 89 BPM
P AXIS: 55 DEGREES
PR INTERVAL: 148 MS
QRS AXIS: -40 DEGREES
QRSD INTERVAL: 70 MS
QT INTERVAL: 368 MS
QTC INTERVAL: 447 MS
T WAVE AXIS: 57 DEGREES
VENTRICULAR RATE: 89 BPM

## 2020-10-22 PROCEDURE — 93010 ELECTROCARDIOGRAM REPORT: CPT | Performed by: INTERNAL MEDICINE

## 2020-10-22 RX ORDER — LEVOTHYROXINE SODIUM 0.12 MG/1
125 TABLET ORAL DAILY
Qty: 30 TABLET | Refills: 2 | Status: SHIPPED | OUTPATIENT
Start: 2020-10-22 | End: 2021-01-15 | Stop reason: ALTCHOICE

## 2020-10-27 PROCEDURE — 93000 ELECTROCARDIOGRAM COMPLETE: CPT | Performed by: INTERNAL MEDICINE

## 2020-10-28 ENCOUNTER — OFFICE VISIT (OUTPATIENT)
Dept: INTERNAL MEDICINE CLINIC | Facility: CLINIC | Age: 85
End: 2020-10-28
Payer: COMMERCIAL

## 2020-10-28 VITALS
HEART RATE: 71 BPM | DIASTOLIC BLOOD PRESSURE: 72 MMHG | BODY MASS INDEX: 23.92 KG/M2 | SYSTOLIC BLOOD PRESSURE: 140 MMHG | WEIGHT: 130 LBS | TEMPERATURE: 97.1 F | OXYGEN SATURATION: 97 % | HEIGHT: 62 IN

## 2020-10-28 DIAGNOSIS — I48.0 PAROXYSMAL ATRIAL FIBRILLATION (HCC): ICD-10-CM

## 2020-10-28 DIAGNOSIS — R06.02 SOB (SHORTNESS OF BREATH): Primary | ICD-10-CM

## 2020-10-28 DIAGNOSIS — E03.9 HYPOTHYROIDISM, UNSPECIFIED TYPE: ICD-10-CM

## 2020-10-28 PROCEDURE — 1111F DSCHRG MED/CURRENT MED MERGE: CPT | Performed by: NURSE PRACTITIONER

## 2020-10-28 PROCEDURE — 99495 TRANSJ CARE MGMT MOD F2F 14D: CPT | Performed by: NURSE PRACTITIONER

## 2020-10-29 ENCOUNTER — TELEPHONE (OUTPATIENT)
Dept: CARDIOLOGY CLINIC | Facility: MEDICAL CENTER | Age: 85
End: 2020-10-29

## 2020-11-23 DIAGNOSIS — I48.0 PAROXYSMAL ATRIAL FIBRILLATION (HCC): ICD-10-CM

## 2020-11-23 DIAGNOSIS — R00.0 SINUS TACHYCARDIA: ICD-10-CM

## 2020-11-24 DIAGNOSIS — I48.0 PAROXYSMAL ATRIAL FIBRILLATION (HCC): ICD-10-CM

## 2020-11-24 DIAGNOSIS — R00.0 SINUS TACHYCARDIA: ICD-10-CM

## 2020-12-03 ENCOUNTER — TRANSCRIBE ORDERS (OUTPATIENT)
Dept: LAB | Facility: HOSPITAL | Age: 85
End: 2020-12-03

## 2020-12-03 ENCOUNTER — TELEPHONE (OUTPATIENT)
Dept: LAB | Facility: HOSPITAL | Age: 85
End: 2020-12-03

## 2020-12-11 ENCOUNTER — TELEPHONE (OUTPATIENT)
Dept: PULMONOLOGY | Facility: CLINIC | Age: 85
End: 2020-12-11

## 2020-12-14 ENCOUNTER — LAB (OUTPATIENT)
Dept: LAB | Facility: HOSPITAL | Age: 85
End: 2020-12-14
Payer: COMMERCIAL

## 2020-12-14 DIAGNOSIS — E03.9 HYPOTHYROIDISM, UNSPECIFIED TYPE: ICD-10-CM

## 2020-12-14 DIAGNOSIS — E06.3 HYPOTHYROIDISM DUE TO HASHIMOTO'S THYROIDITIS: ICD-10-CM

## 2020-12-14 DIAGNOSIS — E03.8 HYPOTHYROIDISM DUE TO HASHIMOTO'S THYROIDITIS: ICD-10-CM

## 2020-12-14 LAB — TSH SERPL DL<=0.05 MIU/L-ACNC: 0.34 UIU/ML (ref 0.36–3.74)

## 2020-12-14 PROCEDURE — 84443 ASSAY THYROID STIM HORMONE: CPT

## 2020-12-14 PROCEDURE — 36415 COLL VENOUS BLD VENIPUNCTURE: CPT

## 2021-01-04 ENCOUNTER — TELEPHONE (OUTPATIENT)
Dept: HEMATOLOGY ONCOLOGY | Facility: CLINIC | Age: 86
End: 2021-01-04

## 2021-01-04 NOTE — TELEPHONE ENCOUNTER
Patient is calling to check with Dr Sarkis Reina that it is ok for her to start Breo inhaler  Pulmonary is prescribing this for patient  She is concerned that this medication can leave a residue in her mouth and wants to confirm that this will not impact her cancer since she has cancer of the hard palate    Will send to RN to confirm with Dr Sarkis Reina patient is ok to start [facility]

## 2021-01-04 NOTE — TELEPHONE ENCOUNTER
Patient is calling in inquiring whether it would be ok for her to use an inhaler that was prescribed by her pulmonary Dr today   She can be contacted back at [phone]

## 2021-01-04 NOTE — TELEPHONE ENCOUNTER
Telephone call spoke with pt  She is concerned that the residue from the medication will sit in her mouth where the cancer is  She states she practices good mouth hygiene  Per Dr Candance Ly this is a pulmonary medication that your doctor has rx  This should not impact the cancer

## 2021-01-08 ENCOUNTER — TELEPHONE (OUTPATIENT)
Dept: LAB | Facility: HOSPITAL | Age: 86
End: 2021-01-08

## 2021-01-08 ENCOUNTER — TELEPHONE (OUTPATIENT)
Dept: INTERNAL MEDICINE CLINIC | Facility: CLINIC | Age: 86
End: 2021-01-08

## 2021-01-08 DIAGNOSIS — E03.9 HYPOTHYROIDISM, UNSPECIFIED TYPE: Primary | ICD-10-CM

## 2021-01-14 ENCOUNTER — APPOINTMENT (OUTPATIENT)
Dept: LAB | Facility: HOSPITAL | Age: 86
End: 2021-01-14
Payer: COMMERCIAL

## 2021-01-14 DIAGNOSIS — E03.9 HYPOTHYROIDISM, UNSPECIFIED TYPE: ICD-10-CM

## 2021-01-14 LAB
T4 FREE SERPL-MCNC: 1.76 NG/DL (ref 0.76–1.46)
TSH SERPL DL<=0.05 MIU/L-ACNC: 0.2 UIU/ML (ref 0.36–3.74)

## 2021-01-14 PROCEDURE — 84439 ASSAY OF FREE THYROXINE: CPT

## 2021-01-14 PROCEDURE — 36415 COLL VENOUS BLD VENIPUNCTURE: CPT

## 2021-01-14 PROCEDURE — 84443 ASSAY THYROID STIM HORMONE: CPT

## 2021-01-15 ENCOUNTER — TELEPHONE (OUTPATIENT)
Dept: INTERNAL MEDICINE CLINIC | Facility: CLINIC | Age: 86
End: 2021-01-15

## 2021-01-15 DIAGNOSIS — E03.9 HYPOTHYROIDISM, UNSPECIFIED TYPE: Primary | ICD-10-CM

## 2021-01-15 RX ORDER — LEVOTHYROXINE SODIUM 112 UG/1
112 TABLET ORAL
Qty: 30 TABLET | Refills: 0 | Status: SHIPPED | OUTPATIENT
Start: 2021-01-15 | End: 2021-08-09 | Stop reason: SDUPTHER

## 2021-01-15 RX ORDER — LEVOTHYROXINE SODIUM 112 UG/1
112 TABLET ORAL
Qty: 90 TABLET | Refills: 3 | Status: SHIPPED | OUTPATIENT
Start: 2021-01-15 | End: 2022-01-12

## 2021-01-15 NOTE — TELEPHONE ENCOUNTER
The patient saw her blood work from yesterday and her TSH and T4 are off  She would like to know what caused her levels to change and if she needs to be on medication  She is very concerned about this because she does not have a thyroid  The patient would like to speak to you directly  Please advise thank you  She does not want you to prescribe anything until you talk to her   If a new prescription is necessary she would like it sent to her mail order

## 2021-01-15 NOTE — TELEPHONE ENCOUNTER
[facility] please let patient know that this means she was getting too much of the levothyroxine therefore we reduce the levothyroxine dose to 112 mcg once daily and will recheck the TSH in 4 weeks

## 2021-01-15 NOTE — TELEPHONE ENCOUNTER
Spoke with pt; pt wants [facility] week supply and if she will continue on the new dose she wants rx to be sent to the mail order pharmacy

## 2021-01-19 ENCOUNTER — IMMUNIZATIONS (OUTPATIENT)
Dept: FAMILY MEDICINE CLINIC | Facility: HOSPITAL | Age: 86
End: 2021-01-19

## 2021-01-19 DIAGNOSIS — Z23 ENCOUNTER FOR IMMUNIZATION: Primary | ICD-10-CM

## 2021-01-19 PROCEDURE — 0001A SARS-COV-2 / COVID-19 MRNA VACCINE (PFIZER-BIONTECH) 30 MCG: CPT

## 2021-01-19 PROCEDURE — 91300 SARS-COV-2 / COVID-19 MRNA VACCINE (PFIZER-BIONTECH) 30 MCG: CPT

## 2021-02-03 DIAGNOSIS — I10 ESSENTIAL HYPERTENSION: ICD-10-CM

## 2021-02-08 ENCOUNTER — TELEPHONE (OUTPATIENT)
Dept: HEMATOLOGY ONCOLOGY | Facility: CLINIC | Age: 86
End: 2021-02-08

## 2021-02-08 RX ORDER — AMLODIPINE BESYLATE AND BENAZEPRIL HYDROCHLORIDE 5; 20 MG/1; MG/1
1 CAPSULE ORAL DAILY
Qty: 90 CAPSULE | Refills: 3 | Status: SHIPPED | OUTPATIENT
Start: 2021-02-08 | End: 2022-02-09 | Stop reason: SDUPTHER

## 2021-02-08 NOTE — TELEPHONE ENCOUNTER
Patient called to stating she would like to reschedule her COVID-19 vaccine  Patient stated she cannot get in touch with anyone to help her do that  I instructed patient she can reschedule through her my chart  Patient stated she will try to reschedule through my chart

## 2021-02-09 ENCOUNTER — IMMUNIZATIONS (OUTPATIENT)
Dept: FAMILY MEDICINE CLINIC | Facility: HOSPITAL | Age: 86
End: 2021-02-09

## 2021-02-09 DIAGNOSIS — Z23 ENCOUNTER FOR IMMUNIZATION: Primary | ICD-10-CM

## 2021-02-09 PROCEDURE — 0002A SARS-COV-2 / COVID-19 MRNA VACCINE (PFIZER-BIONTECH) 30 MCG: CPT

## 2021-02-09 PROCEDURE — 91300 SARS-COV-2 / COVID-19 MRNA VACCINE (PFIZER-BIONTECH) 30 MCG: CPT

## 2021-02-17 ENCOUNTER — TELEPHONE (OUTPATIENT)
Dept: LAB | Facility: HOSPITAL | Age: 86
End: 2021-02-17

## 2021-03-02 ENCOUNTER — APPOINTMENT (OUTPATIENT)
Dept: LAB | Facility: HOSPITAL | Age: 86
End: 2021-03-02
Attending: INTERNAL MEDICINE
Payer: COMMERCIAL

## 2021-03-02 DIAGNOSIS — C08.9 SALIVARY GLAND CANCER (HCC): ICD-10-CM

## 2021-03-02 LAB
ALBUMIN SERPL BCP-MCNC: 4.2 G/DL (ref 3.5–5)
ALP SERPL-CCNC: 62 U/L (ref 46–116)
ALT SERPL W P-5'-P-CCNC: 21 U/L (ref 12–78)
ANION GAP SERPL CALCULATED.3IONS-SCNC: 4 MMOL/L (ref 4–13)
AST SERPL W P-5'-P-CCNC: 21 U/L (ref 5–45)
BASOPHILS # BLD AUTO: 0.06 THOUSANDS/ΜL (ref 0–0.1)
BASOPHILS NFR BLD AUTO: 1 % (ref 0–1)
BILIRUB SERPL-MCNC: 0.6 MG/DL (ref 0.2–1)
BUN SERPL-MCNC: 27 MG/DL (ref 5–25)
CALCIUM SERPL-MCNC: 9.4 MG/DL (ref 8.3–10.1)
CHLORIDE SERPL-SCNC: 105 MMOL/L (ref 100–108)
CO2 SERPL-SCNC: 29 MMOL/L (ref 21–32)
CREAT SERPL-MCNC: 0.89 MG/DL (ref 0.6–1.3)
EOSINOPHIL # BLD AUTO: 0.1 THOUSAND/ΜL (ref 0–0.61)
EOSINOPHIL NFR BLD AUTO: 1 % (ref 0–6)
ERYTHROCYTE [DISTWIDTH] IN BLOOD BY AUTOMATED COUNT: 13.7 % (ref 11.6–15.1)
GFR SERPL CREATININE-BSD FRML MDRD: 58 ML/MIN/1.73SQ M
GLUCOSE SERPL-MCNC: 91 MG/DL (ref 65–140)
HCT VFR BLD AUTO: 40.5 % (ref 34.8–46.1)
HGB BLD-MCNC: 12.6 G/DL (ref 11.5–15.4)
IMM GRANULOCYTES # BLD AUTO: 0.02 THOUSAND/UL (ref 0–0.2)
IMM GRANULOCYTES NFR BLD AUTO: 0 % (ref 0–2)
LYMPHOCYTES # BLD AUTO: 2.06 THOUSANDS/ΜL (ref 0.6–4.47)
LYMPHOCYTES NFR BLD AUTO: 26 % (ref 14–44)
MCH RBC QN AUTO: 29.2 PG (ref 26.8–34.3)
MCHC RBC AUTO-ENTMCNC: 31.1 G/DL (ref 31.4–37.4)
MCV RBC AUTO: 94 FL (ref 82–98)
MONOCYTES # BLD AUTO: 1 THOUSAND/ΜL (ref 0.17–1.22)
MONOCYTES NFR BLD AUTO: 13 % (ref 4–12)
NEUTROPHILS # BLD AUTO: 4.61 THOUSANDS/ΜL (ref 1.85–7.62)
NEUTS SEG NFR BLD AUTO: 59 % (ref 43–75)
NRBC BLD AUTO-RTO: 0 /100 WBCS
PLATELET # BLD AUTO: 235 THOUSANDS/UL (ref 149–390)
PMV BLD AUTO: 12.4 FL (ref 8.9–12.7)
POTASSIUM SERPL-SCNC: 5 MMOL/L (ref 3.5–5.3)
PROT SERPL-MCNC: 7.5 G/DL (ref 6.4–8.2)
RBC # BLD AUTO: 4.32 MILLION/UL (ref 3.81–5.12)
SODIUM SERPL-SCNC: 138 MMOL/L (ref 136–145)
WBC # BLD AUTO: 7.85 THOUSAND/UL (ref 4.31–10.16)

## 2021-03-02 PROCEDURE — 36415 COLL VENOUS BLD VENIPUNCTURE: CPT

## 2021-03-02 PROCEDURE — 85025 COMPLETE CBC W/AUTO DIFF WBC: CPT

## 2021-03-02 PROCEDURE — 80053 COMPREHEN METABOLIC PANEL: CPT

## 2021-03-05 ENCOUNTER — HOSPITAL ENCOUNTER (OUTPATIENT)
Dept: CT IMAGING | Facility: HOSPITAL | Age: 86
Discharge: HOME | End: 2021-03-05
Attending: INTERNAL MEDICINE
Payer: COMMERCIAL

## 2021-03-05 DIAGNOSIS — C08.9 SALIVARY GLAND CANCER (HCC): ICD-10-CM

## 2021-03-05 PROCEDURE — G1004 CDSM NDSC: HCPCS

## 2021-03-05 PROCEDURE — 70490 CT SOFT TISSUE NECK W/O DYE: CPT

## 2021-03-19 ENCOUNTER — OFFICE VISIT (OUTPATIENT)
Dept: HEMATOLOGY ONCOLOGY | Facility: CLINIC | Age: 86
End: 2021-03-19
Payer: COMMERCIAL

## 2021-03-19 VITALS
TEMPERATURE: 97.6 F | SYSTOLIC BLOOD PRESSURE: 138 MMHG | WEIGHT: 131 LBS | OXYGEN SATURATION: 96 % | DIASTOLIC BLOOD PRESSURE: 68 MMHG | HEIGHT: 62 IN | BODY MASS INDEX: 24.11 KG/M2 | RESPIRATION RATE: 18 BRPM | HEART RATE: 64 BPM

## 2021-03-19 DIAGNOSIS — C08.9 SALIVARY GLAND CANCER (HCC): Primary | ICD-10-CM

## 2021-03-19 DIAGNOSIS — R06.02 SHORTNESS OF BREATH: ICD-10-CM

## 2021-03-19 DIAGNOSIS — R53.83 OTHER FATIGUE: ICD-10-CM

## 2021-03-19 PROCEDURE — 1160F RVW MEDS BY RX/DR IN RCRD: CPT | Performed by: INTERNAL MEDICINE

## 2021-03-19 PROCEDURE — 1036F TOBACCO NON-USER: CPT | Performed by: INTERNAL MEDICINE

## 2021-03-19 PROCEDURE — 99214 OFFICE O/P EST MOD 30 MIN: CPT | Performed by: INTERNAL MEDICINE

## 2021-03-19 NOTE — PROGRESS NOTES
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     This was diagnosed in 2015 and patient opted not to have any treatment, not even palliative radiation and she still feels the same way  She remains under surveillance  Patient has some pain  in that area  No symptoms suggestive of sinusitis  There was no actionable target on molecular study by [facility]  Patient has vision problem secondary to macular degeneration and she gets injections into the eyes   She has chronic lung disease and has mild cough and exertional dyspnea  She has history of migraine headaches     Tiredness and arthritic symptoms         No new symptoms                  Current Outpatient Medications:     albuterol (2 5 mg/3 mL) 0 083 % nebulizer solution, Take 2 5 mg by nebulization every 6 (six) hours as needed for wheezing or shortness of breath, Disp: , Rfl:     albuterol (PROVENTIL HFA,VENTOLIN HFA) 90 mcg/act inhaler, Inhale 2 puffs every 6 (six) hours as needed for wheezing, Disp: 1 Inhaler, Rfl: 0    amLODIPine-benazepril (LOTREL 5-20) 5-20 MG per capsule, Take 1 capsule by mouth daily, Disp: 90 capsule, Rfl: 3    aspirin 81 MG tablet, Take 162 mg by mouth daily in the early morning  , Disp: , Rfl:     calcium carbonate (OS-TAE) 600 MG tablet, Take 600 mg by mouth daily in the early morning  , Disp: , Rfl:     levothyroxine 112 mcg tablet, Take 1 tablet (112 mcg total) by mouth daily in the early morning (Patient taking differently: Take 100 mcg by mouth daily in the early morning ), Disp: 30 tablet, Rfl: 0    lovastatin (MEVACOR) 20 mg tablet, Take 1 tablet (20 mg total) by mouth daily, Disp: 90 tablet, Rfl: 3    levothyroxine 112 mcg tablet, Take 1 tablet (112 mcg total) by mouth daily in the early morning (Patient not taking: Reported on 3/19/2021), Disp: 90 tablet, Rfl: 3    metoprolol tartrate (LOPRESSOR) 25 mg tablet, Take 1 tablet (25 mg total) by mouth every 12 (twelve) hours, Disp: 180 tablet, Rfl: 3    Allergies   Allergen Reactions    Amiodarone Hives    Keflex [Cephalexin] Hives    Omnipaque [Iohexol] Hives and Shortness Of Breath    Clindamycin Other (See Comments)     When taken causes cdiff immediately    Other     Sulfa Antibiotics Hives     itching    Acetazolamide Hives and Rash     Reaction Date:Unknown    Iv Dye  [Iodinated Diagnostic Agents] Hypertension and Palpitations     [facility] [id number]: Verified with patient     Naprosyn [Naproxen] Itching and Rash     Category: Allergy; Oncology History   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[facility] [address] )   12/30/2016 Initial Diagnosis    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[facility])     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[address] )   2015 Initial Diagnosis    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[address] )     11/3/2015 Biopsy     Palate, excisional biopsy (Eight (8) slides, [facility], [address], collected 11/3/2015, labeled "[id number]"):         - Salivary gland epithelial neoplasm, differential diagnosis includes myoepithelial neoplasia such as monomorphic adenoma versus polymorphous low grade adenocarcinoma      3/16/2016 Biopsy    Right hard palate/ Alveolar lesion, shave biopsy:  - Upper portion of salivary gland tumor, consistent with polymorphous low-grade adenocarcinoma      Note:  The tumor is at the base of the biopsy  The interface between the tumor and surrounding normal tissue is not seen  Perineural invasion is not seen on the sections         ROS:  03/19/21 Reviewed 12 systems: See symptoms in HPI  Presently no other neurological, cardiac, pulmonary, GI and  symptoms other than mentioned above in HPI  No  fever, chills, bleeding, bone pains, skin rash, weight loss,   weakness, numbness,  claudication   No frequent infections  Not unusually sensitive to heat or cold  No swelling of the ankles  No swollen glands  Patient is anxious         /68 (BP Location: Left arm, Patient Position: Sitting, Cuff Size: Adult)   Pulse 64   Temp 97 6 °F (36 4 °C)   Resp 18   Ht 5' 2" (1 575 m)   Wt 59 4 kg (131 lb)   SpO2 96%   BMI 23 96 kg/m²     Physical Exam:   My physician assistant Suellen Najera was in the room with me during examination  Alert, oriented, not in distress, stable vital signs, no icterus, no oral thrush  There is a hard palate mass on the right, no palpable neck mass, clear lung fields, regular heart rate, systolic murmur, abdomen  soft and non tender, no palpable abdominal mass, no ascites, no edema of ankles, no calf tenderness, no focal neurological deficit except for vision problem, no skin rash, no palpable lymphadenopathy in the neck and axillary areas, no clubbing  Patient is anxious  Performance status 2  IMAGING:  IMPRESSION:     There is an expansile mass identified within the right posterior lateral maxilla now measuring approximately 2 2 x 2 2 cm  Although this is similar in size to the most recent examination, this has exhibited slow interval growth since 2016    There is   resulting bony remodeling of the adjacent bone      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               Workstation performed: [id number]      Imaging    CT soft tissue neck wo contrast (Order: [id number]) - 3/5/2021      LABS:    Results for orders placed or performed in visit on 03/02/21   CBC and differential   Result Value Ref Range    WBC 7 85 4 31 - 10 16 Thousand/uL    RBC 4 32 3 81 - 5 12 Million/uL    Hemoglobin 12 6 11 5 - 15 4 g/dL    Hematocrit 40 5 34 8 - 46 1 %    MCV 94 82 - 98 fL    MCH 29 2 26 8 - 34 3 pg    MCHC 31 1 (L) 31 4 - 37 4 g/dL    RDW 13 7 11 6 - 15 1 %    MPV 12 4 8 9 - 12 7 fL    Platelets 759 778 - 422 Thousands/uL    nRBC 0 /100 WBCs    Neutrophils Relative 59 43 - 75 %    Immat GRANS % 0 0 - 2 %    Lymphocytes Relative 26 14 - 44 %    Monocytes Relative 13 (H) 4 - 12 %    Eosinophils Relative 1 0 - 6 %    Basophils Relative 1 0 - 1 % Neutrophils Absolute 4 61 1 85 - 7 62 Thousands/µL    Immature Grans Absolute 0 02 0 00 - 0 20 Thousand/uL    Lymphocytes Absolute 2 06 0 60 - 4 47 Thousands/µL    Monocytes Absolute 1 00 0 17 - 1 22 Thousand/µL    Eosinophils Absolute 0 10 0 00 - 0 61 Thousand/µL    Basophils Absolute 0 06 0 00 - 0 10 Thousands/µL   Comprehensive metabolic panel   Result Value Ref Range    Sodium 138 136 - 145 mmol/L    Potassium 5 0 3 5 - 5 3 mmol/L    Chloride 105 100 - 108 mmol/L    CO2 29 21 - 32 mmol/L    ANION GAP 4 4 - 13 mmol/L    BUN 27 (H) 5 - 25 mg/dL    Creatinine 0 89 0 60 - 1 30 mg/dL    Glucose 91 65 - 140 mg/dL    Calcium 9 4 8 3 - 10 1 mg/dL    AST 21 5 - 45 U/L    ALT 21 12 - 78 U/L    Alkaline Phosphatase 62 46 - 116 U/L    Total Protein 7 5 6 4 - 8 2 g/dL    Albumin 4 2 3 5 - 5 0 g/dL    Total Bilirubin 0 60 0 20 - 1 00 mg/dL    eGFR 58 ml/min/1 73sq m     Labs, Imaging, & Other studies:   All pertinent labs and imaging studies were personally reviewed    Lab Results   Component Value Date     (L) 06/16/2015    K 5 0 03/02/2021     03/02/2021    CO2 29 03/02/2021    ANIONGAP 5 06/16/2015    BUN 27 (H) 03/02/2021    CREATININE 0 89 03/02/2021    GLUCOSE 138 06/16/2015    GLUF 103 (H) 06/05/2019    CALCIUM 9 4 03/02/2021    AST 21 03/02/2021    ALT 21 03/02/2021    ALKPHOS 62 03/02/2021    PROT 6 7 06/07/2015    BILITOT 0 28 06/07/2015    EGFR 58 03/02/2021     Lab Results   Component Value Date    WBC 7 85 03/02/2021    HGB 12 6 03/02/2021    HCT 40 5 03/02/2021    MCV 94 03/02/2021     03/02/2021       Reviewed CBC, CMP and CT scan of soft tissue neck and discussed with patient  Assessment and plan:      Follow-up visit for 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     This was diagnosed in 2015 and patient opted not to have any treatment, not even palliative radiation and she still feels the same way  She remains under surveillance  Patient has some pain  in that area  No symptoms suggestive of sinusitis  There was no actionable target on molecular study by [facility]  Patient has vision problem secondary to macular degeneration and she gets injections into the eyes   She has chronic lung disease and has mild cough and exertional dyspnea  She has history of migraine headaches     Tiredness and arthritic symptoms     No new symptoms      Physical examination and test results are as recorded and discussed  Reviewed CBC, CMP and CT scan of soft tissue neck and discussed with patient     Plan is surveillance as wished by the patient  All discussed in detail  Questions answered  Discussed the importance of self-breast examination, eating healthy foods, staying active as tolerated and health screening tests  Patient will continue to follow with her primary physician and other consultants  Discussed precautions against coronavirus  Goal is to treat salivary gland tumor at hard palate causing lytic lesion when agreeable to the patient    Patient appears to be capable of self-care  1  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[address] )      2  Shortness of breath      3  Other fatigue    4  Chronic lung disease  5  Vision problem    Follow-up in 4 months  Patient will need star transport in future  Patient voiced understanding and agreement in the discussion  Counseling / Coordination of Care      Provided counseling and support

## 2021-05-05 ENCOUNTER — OFFICE VISIT (OUTPATIENT)
Dept: INTERNAL MEDICINE CLINIC | Facility: CLINIC | Age: 86
End: 2021-05-05
Payer: COMMERCIAL

## 2021-05-05 VITALS
RESPIRATION RATE: 16 BRPM | BODY MASS INDEX: 23.81 KG/M2 | DIASTOLIC BLOOD PRESSURE: 68 MMHG | OXYGEN SATURATION: 97 % | HEIGHT: 62 IN | WEIGHT: 129.4 LBS | HEART RATE: 67 BPM | SYSTOLIC BLOOD PRESSURE: 132 MMHG

## 2021-05-05 DIAGNOSIS — R25.2 MUSCLE CRAMPS: ICD-10-CM

## 2021-05-05 DIAGNOSIS — E78.5 HYPERLIPIDEMIA, UNSPECIFIED HYPERLIPIDEMIA TYPE: ICD-10-CM

## 2021-05-05 DIAGNOSIS — Z13.1 SCREENING FOR DIABETES MELLITUS: ICD-10-CM

## 2021-05-05 DIAGNOSIS — R22.9 SKIN NODULE: ICD-10-CM

## 2021-05-05 DIAGNOSIS — Z13.6 SCREENING FOR CARDIOVASCULAR CONDITION: ICD-10-CM

## 2021-05-05 DIAGNOSIS — I10 ESSENTIAL HYPERTENSION: ICD-10-CM

## 2021-05-05 DIAGNOSIS — J45.30 MILD PERSISTENT ASTHMA, UNSPECIFIED WHETHER COMPLICATED: ICD-10-CM

## 2021-05-05 DIAGNOSIS — E03.9 HYPOTHYROIDISM, UNSPECIFIED TYPE: Primary | ICD-10-CM

## 2021-05-05 DIAGNOSIS — C08.9 SALIVARY GLAND CANCER (HCC): ICD-10-CM

## 2021-05-05 DIAGNOSIS — E55.9 VITAMIN D DEFICIENCY: ICD-10-CM

## 2021-05-05 PROBLEM — L72.9 SKIN CYST: Status: ACTIVE | Noted: 2021-05-05

## 2021-05-05 PROCEDURE — 99214 OFFICE O/P EST MOD 30 MIN: CPT | Performed by: INTERNAL MEDICINE

## 2021-05-05 PROCEDURE — 1036F TOBACCO NON-USER: CPT | Performed by: INTERNAL MEDICINE

## 2021-05-05 PROCEDURE — 3725F SCREEN DEPRESSION PERFORMED: CPT | Performed by: INTERNAL MEDICINE

## 2021-05-05 PROCEDURE — 1160F RVW MEDS BY RX/DR IN RCRD: CPT | Performed by: INTERNAL MEDICINE

## 2021-05-05 RX ORDER — LOVASTATIN 20 MG/1
20 TABLET ORAL DAILY
Qty: 90 TABLET | Refills: 3 | Status: SHIPPED | OUTPATIENT
Start: 2021-05-05 | End: 2022-05-12 | Stop reason: SDUPTHER

## 2021-05-05 NOTE — PROGRESS NOTES
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[address] )    Currently stable patient did see Hematology Oncology I did review that note at this point time she would like to off on radiation  She has been to the dentist also period she had this tumor for many years with minimal growth  Hypothyroidism    Hypothyroidism controlled the patient is currently euthyroid I will be ordering a TSH prior to the next office visit and the patient will continue with current medical regiment; we will continue to monitor the patient's progress  Continue levothyroxine 112 mcg once daily    Asthma   Clinically stable and doing well continue the current medical regiment will continue monitor  Continue albuterol 2 puffs every 4- 6 hours as needed patient did request Rx for Advair 250-51 puff every 12 hour she states she does much better with Advair and like to discontinue the [facility] which is less effective  Essential hypertension    Hypertension - controlled, I have counseled patient following healthy balance diet, I would like the patient reduce sodium, exercise routinely, I would like the patient continued the med current medical regiment and we will continue to monitor  Blood pressure today 132/60 continue Lotrel 5-20 1 tab once daily    Hyperlipidemia   Hyperlipidemia controlled continue with current medical regiment recommend a low-cholesterol diet and recommend routine exercise we will continue to monitor the progress   Continue Mevacor 20 mg once daily    Muscle cramps   She may try over-the-counter magnesium 250 mg once daily drink adequate amounts of fluid she may try propel water, stretching  Will check CBC and magnesium level    Skin cyst   Small skin cyst around the eyes  Possibly related to cholesterol patient does request antibiotic cream given her Rx for Bactroban cream if these symptoms not resolve please notify me         Problem List Items Addressed This Visit        Digestive    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[facility] ) Currently stable patient did see Hematology Oncology I did review that note at this point time she would like to off on radiation  She has been to the dentist also period she had this tumor for many years with minimal growth  Endocrine    Hypothyroidism - Primary       Hypothyroidism controlled the patient is currently euthyroid I will be ordering a TSH prior to the next office visit and the patient will continue with current medical regiment; we will continue to monitor the patient's progress  Continue levothyroxine 112 mcg once daily            Respiratory    Asthma      Clinically stable and doing well continue the current medical regiment will continue monitor  Continue albuterol 2 puffs every 4- 6 hours as needed patient did request Rx for Advair 250-51 puff every 12 hour she states she does much better with Advair and like to discontinue the [facility] which is less effective  Relevant Medications    fluticasone-salmeterol (Advair Diskus) 250-50 mcg/dose inhaler       Cardiovascular and Mediastinum    Essential hypertension       Hypertension - controlled, I have counseled patient following healthy balance diet, I would like the patient reduce sodium, exercise routinely, I would like the patient continued the med current medical regiment and we will continue to monitor  Blood pressure today 132/60 continue Lotrel 5-20 1 tab once daily            Other    Hyperlipidemia      Hyperlipidemia controlled continue with current medical regiment recommend a low-cholesterol diet and recommend routine exercise we will continue to monitor the progress   Continue Mevacor 20 mg once daily         Relevant Medications    lovastatin (MEVACOR) 20 mg tablet    Screening for diabetes mellitus    Muscle cramps      She may try over-the-counter magnesium 250 mg once daily drink adequate amounts of fluid she may try propel water, stretching  Will check CBC and magnesium level         Relevant Orders Comprehensive metabolic panel    CBC (Includes Diff/Plt) (Refl)    Magnesium    Ambulatory referral to Orthopedic Surgery    Screening for cardiovascular condition    Relevant Orders    Lipid Panel with Direct LDL reflex    RESOLVED: Vitamin D deficiency      Other Visit Diagnoses     Skin nodule        Relevant Medications    mupirocin (BACTROBAN) 2 % ointment          Patient will try over-the-counter magnesium 250 mg once daily low-dose  RTO in 6 months call if any problems  Subjective:      Patient ID: Buffy Johnson is a 80 y o  female  HPI  80-year old female coming in for a follow up office visit regarding hyperlipidemia, hypothyroid, skin nodules, hypertension, muscle cramps,  Salivary gland cancer, asthma; The patient reports me compliant taking medications without untoward side effects the  The patient is here to review his medical condition, update me on the medical condition and the patient reports me no hospitalizations and no ER visits  Patient reports me her asthma is currently stable she reports me she does much better with the adhere as compared to the [address] requesting a prescription for the Advair in place of the [facility]  Patient also reports me small little cyst- on the eyelids there is requesting antibiotic cream   She also reports me muscle cramping bilaterally intermittent  Reports me seeing her oncologist currently her summary gland cancer is stable she does not want radiation therapy  doing well with the inhaled steroid, stated did better with the advair, getting     Nocturnal leg cramps    The following portions of the patient's history were reviewed and updated as appropriate: allergies, current medications, past family history, past medical history, past social history, past surgical history and problem list     Review of Systems   Constitutional: Negative for activity change, appetite change and unexpected weight change     HENT: Negative for congestion and postnasal drip     Eyes: Negative for visual disturbance  Respiratory: Negative for cough and shortness of breath  Cardiovascular: Negative for chest pain  Gastrointestinal: Negative for abdominal pain, diarrhea, nausea and vomiting  Neurological: Negative for dizziness, light-headedness and headaches  Objective:    No follow-ups on file  No results found  Allergies   Allergen Reactions    Amiodarone Hives    Omnipaque [Iohexol] Hives and Shortness Of Breath    Clindamycin Other (See Comments)     When taken causes cdiff immediately    Other     Sulfa Antibiotics Hives     itching    Acetazolamide Hives and Rash     Reaction Date:Unknown    Iv Dye  [Iodinated Diagnostic Agents] Hypertension and Palpitations     Annotation - [id number]: Verified with patient     Naprosyn [Naproxen] Itching and Rash     Category: Allergy;         Past Medical History:   Diagnosis Date    A-fib [facility])     Allergy to IVP dye 06/04/2013    pt felt heaviness, palpitations    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), wet ([address] )     bilateral    Aneurysm of aortic root ([facility] [address] )     last assessed - [id number]    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HCC)     Aortic root dilatation (HCC)     last assessed - [id number]    Arthritis     Ascending aortic aneurysm (HCC)     last assessed - [id number]    Asthma     Basal cell carcinoma     last assessed - [id number]    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[address] )     Disease of thyroid gland     Facet arthropathy, cervical     last assessed - [id number]    History of fracture of vertebral column     Hyperlipidemia     Hypertension     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[facility] [address] )     Hypoxia     last assessed - [id number]    Junctional rhythm     last assessed - [id number]    Lightheadedness     last assessed - 48Phs1088    Migraine     Palpitations     last assessed - 83Soo5135    Pleural effusion, bilateral     last assessed - [id number]    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HCC)     Shortness of breath     last assessed - [id number]    Thyroid trouble     Trigger finger     last assessed - [id number]    Trigger middle finger of right hand     last assessed - 2016    Urinary incontinence     last assessed -  ; Resolved - [id number]     Past Surgical History:   Procedure Laterality Date    ABDOMINAL AORTIC ANEURYSM REPAIR W/ ENDOLUMINAL GRAFT  2015    Ascending aorta and hemiarch replacement with 30 mm Vascutek Gelweave graft;  Managed by Margarita Bennett; last assessed - [id number]    APPENDECTOMY      BLADDER SURGERY      Reccurent histoy of bladder surgery    [facility] YAZOO PROCEDURE      CARDIAC CATHETERIZATION  2004    Procedure summary - Luminal irregularities; last assessed - [id number]     SECTION      CORONARY ANEURYSM REPAIR      CYSTOSCOPY      botox injection    HYSTERECTOMY      SC ENDOSCOPIC INJECTION/IMPLANT N/A 2017    Procedure: CYSTOSCOPY; DURASPHERE-PERIURETHRAL BULKING AGENT INJECTION ;  Surgeon: Imtiaz Macias MD;  Location: [facility];  Service: Urology    SC INCISE FINGER TENDON SHEATH Right 10/18/2016    Procedure: LONG FINGER TRIGGER RELEASE ;  Surgeon: Jenny Ford MD;  [facility];  Service: Orthopedics    THYROIDECTOMY      Total Thyroidectomy    TONSILLECTOMY AND ADENOIDECTOMY       Current Outpatient Medications on File Prior to Visit   Medication Sig Dispense Refill    albuterol (2 5 mg/3 mL) 0 083 % nebulizer solution Take 2 5 mg by nebulization every 6 (six) hours as needed for wheezing or shortness of breath      albuterol (PROVENTIL HFA,VENTOLIN HFA) 90 mcg/act inhaler Inhale 2 puffs every 6 (six) hours as needed for wheezing 1 Inhaler 0    amLODIPine-benazepril (LOTREL 5-20) 5-20 MG per capsule Take 1 capsule by mouth daily 90 capsule 3    aspirin 81 MG tablet Take 162 mg by mouth daily in the early morning        calcium carbonate (OS-TAE) 600 MG tablet Take 600 mg by mouth daily in the early morning        levothyroxine 112 mcg tablet Take 1 tablet (112 mcg total) by mouth daily in the early morning (Patient taking differently: Take 100 mcg by mouth daily in the early morning ) 30 tablet 0    [DISCONTINUED] lovastatin (MEVACOR) 20 mg tablet Take 1 tablet (20 mg total) by mouth daily 90 tablet 3    levothyroxine 112 mcg tablet Take 1 tablet (112 mcg total) by mouth daily in the early morning (Patient not taking: Reported on 3/19/2021) 90 tablet 3    metoprolol tartrate (LOPRESSOR) 25 mg tablet Take 1 tablet (25 mg total) by mouth every 12 (twelve) hours 180 tablet 3     No current facility-administered medications on file prior to visit  Family History   Problem Relation Age of Onset    Coronary aneurysm Sister     Coronary artery disease Sister         CABG    Heart disease Family         cardiac disorder    Hypertension Family     Cancer Family     Thyroid disease Family      Social History     Socioeconomic History    Marital status:       Spouse name: Not on file    Number of children: Not on file    Years of education: Not on file    Highest education level: Not on file   Occupational History    Not on file   Social Needs    Financial resource strain: Not on file    Food insecurity     Worry: Not on file     Inability: Not on file    Transportation needs     Medical: Not on file     Non-medical: Not on file   Tobacco Use    Smoking status: Former Smoker     Packs/day: 0 25     Years: 57 00     Pack years: 14 25     Types: Cigarettes     Start date: 36     Quit date: 2014     Years since quittin 3    Smokeless tobacco: Former User    Tobacco comment: smoked 17 yrs 1/2 ppd quit and restarted then quit  10 days ago during 2014    Substance and Sexual Activity    Alcohol use: Not Currently     Comment: Social drinker    Drug use: No    Sexual activity: Not Currently   Lifestyle    Physical activity     Days per week: Not on file     Minutes per session: Not on file    Stress: Not on file Relationships    Social connections     Talks on phone: Not on file     Gets together: Not on file     Attends Rastafarian service: Not on file     Active member of club or organization: Not on file     Attends meetings of clubs or organizations: Not on file     Relationship status: Not on file    Intimate partner violence     Fear of current or ex partner: Not on file     Emotionally abused: Not on file     Physically abused: Not on file     Forced sexual activity: Not on file   Other Topics Concern    Not on file   Social History Narrative    Not on file     Vitals:    05/05/21 1349   BP: 132/68   Pulse: 67   Resp: 16   SpO2: 97%   Weight: 58 7 kg (129 lb 6 4 oz)   Height: 5' 2" (1 575 m)     Results for orders placed or performed in visit on 03/02/21   CBC and differential   Result Value Ref Range    WBC 7 85 4 31 - 10 16 Thousand/uL    RBC 4 32 3 81 - 5 12 Million/uL    Hemoglobin 12 6 11 5 - 15 4 g/dL    Hematocrit 40 5 34 8 - 46 1 %    MCV 94 82 - 98 fL    MCH 29 2 26 8 - 34 3 pg    MCHC 31 1 (L) 31 4 - 37 4 g/dL    RDW 13 7 11 6 - 15 1 %    MPV 12 4 8 9 - 12 7 fL    Platelets 753 998 - 870 Thousands/uL    nRBC 0 /100 WBCs    Neutrophils Relative 59 43 - 75 %    Immat GRANS % 0 0 - 2 %    Lymphocytes Relative 26 14 - 44 %    Monocytes Relative 13 (H) 4 - 12 %    Eosinophils Relative 1 0 - 6 %    Basophils Relative 1 0 - 1 %    Neutrophils Absolute 4 61 1 85 - 7 62 Thousands/µL    Immature Grans Absolute 0 02 0 00 - 0 20 Thousand/uL    Lymphocytes Absolute 2 06 0 60 - 4 47 Thousands/µL    Monocytes Absolute 1 00 0 17 - 1 22 Thousand/µL    Eosinophils Absolute 0 10 0 00 - 0 61 Thousand/µL    Basophils Absolute 0 06 0 00 - 0 10 Thousands/µL   Comprehensive metabolic panel   Result Value Ref Range    Sodium 138 136 - 145 mmol/L    Potassium 5 0 3 5 - 5 3 mmol/L    Chloride 105 100 - 108 mmol/L    CO2 29 21 - 32 mmol/L    ANION GAP 4 4 - 13 mmol/L    BUN 27 (H) 5 - 25 mg/dL    Creatinine 0 89 0 60 - 1 30 mg/dL Glucose 91 65 - 140 mg/dL    Calcium 9 4 8 3 - 10 1 mg/dL    AST 21 5 - 45 U/L    ALT 21 12 - 78 U/L    Alkaline Phosphatase 62 46 - 116 U/L    Total Protein 7 5 6 4 - 8 2 g/dL    Albumin 4 2 3 5 - 5 0 g/dL    Total Bilirubin 0 60 0 20 - 1 00 mg/dL    eGFR 58 ml/min/1 73sq m     Weight (last 2 days)     Date/Time   Weight    05/05/21 1349   58 7 (129 4)            Body mass index is 23 67 kg/m²  BP      Temp      Pulse     Resp      SpO2        Vitals:    05/05/21 1349   Weight: 58 7 kg (129 lb 6 4 oz)     Vitals:    05/05/21 1349   Weight: 58 7 kg (129 lb 6 4 oz)       /68   Pulse 67   Resp 16   Ht 5' 2" (1 575 m)   Wt 58 7 kg (129 lb 6 4 oz)   SpO2 97%   BMI 23 67 kg/m²       And has noticed little white cyst-like structures on the upper eyelid   Physical Exam  Constitutional:       Appearance: She is well-developed  HENT:      Head: Normocephalic  Eyes:      General: No scleral icterus  Right eye: No discharge  Left eye: No discharge  Conjunctiva/sclera: Conjunctivae normal       Pupils: Pupils are equal, round, and reactive to light  Neck:      Musculoskeletal: Neck supple  Cardiovascular:      Rate and Rhythm: Normal rate and regular rhythm  Heart sounds: Normal heart sounds  No murmur  No friction rub  No gallop  Pulmonary:      Effort: No respiratory distress  Breath sounds: Normal breath sounds  No wheezing or rales  Abdominal:      General: Bowel sounds are normal  There is no distension  Palpations: Abdomen is soft  There is no mass  Tenderness: There is no abdominal tenderness  There is no guarding or rebound  Musculoskeletal:         General: No deformity  Lymphadenopathy:      Cervical: No cervical adenopathy  Neurological:      Mental Status: She is alert        Coordination: Coordination normal

## 2021-05-06 NOTE — ASSESSMENT & PLAN NOTE
Hyperlipidemia controlled continue with current medical regiment recommend a low-cholesterol diet and recommend routine exercise we will continue to monitor the progress   Continue Mevacor 20 mg once daily

## 2021-05-06 NOTE — ASSESSMENT & PLAN NOTE
Small skin cyst around the eyes  Possibly related to cholesterol patient does request antibiotic cream given her Rx for Bactroban cream if these symptoms not resolve please notify me

## 2021-05-06 NOTE — ASSESSMENT & PLAN NOTE
Currently stable patient did see Hematology Oncology I did review that note at this point time she would like to off on radiation  She has been to the dentist also period she had this tumor for many years with minimal growth

## 2021-05-06 NOTE — ASSESSMENT & PLAN NOTE
Clinically stable and doing well continue the current medical regiment will continue monitor  Continue albuterol 2 puffs every 4- 6 hours as needed patient did request Rx for Advair 250-51 puff every 12 hour she states she does much better with Advair and like to discontinue the [facility] which is less effective

## 2021-05-06 NOTE — ASSESSMENT & PLAN NOTE
Hypertension - controlled, I have counseled patient following healthy balance diet, I would like the patient reduce sodium, exercise routinely, I would like the patient continued the med current medical regiment and we will continue to monitor    Blood pressure today 132/60 continue Lotrel 5-20 1 tab once daily

## 2021-05-06 NOTE — ASSESSMENT & PLAN NOTE
Hypothyroidism controlled the patient is currently euthyroid I will be ordering a TSH prior to the next office visit and the patient will continue with current medical regiment; we will continue to monitor the patient's progress    Continue levothyroxine 112 mcg once daily

## 2021-05-06 NOTE — ASSESSMENT & PLAN NOTE
She may try over-the-counter magnesium 250 mg once daily drink adequate amounts of fluid she may try propel water, stretching  Will check CBC and magnesium level

## 2021-05-17 ENCOUNTER — TELEPHONE (OUTPATIENT)
Dept: LAB | Facility: HOSPITAL | Age: 86
End: 2021-05-17

## 2021-05-18 ENCOUNTER — APPOINTMENT (OUTPATIENT)
Dept: LAB | Facility: CLINIC | Age: 86
End: 2021-05-18
Payer: COMMERCIAL

## 2021-05-18 DIAGNOSIS — R25.2 MUSCLE CRAMPS: ICD-10-CM

## 2021-05-18 LAB — MAGNESIUM SERPL-MCNC: 2.3 MG/DL (ref 1.6–2.6)

## 2021-05-18 PROCEDURE — 83735 ASSAY OF MAGNESIUM: CPT

## 2021-05-18 PROCEDURE — 36415 COLL VENOUS BLD VENIPUNCTURE: CPT

## 2021-06-15 ENCOUNTER — TELEPHONE (OUTPATIENT)
Dept: HEMATOLOGY ONCOLOGY | Facility: CLINIC | Age: 86
End: 2021-06-15

## 2021-06-15 NOTE — TELEPHONE ENCOUNTER
Appointment Cancellation Or Reschedule     Person calling in Patient     Provider Eber CORTES    Office Visit Date and Time 7/9   Office Visit Location [facility]    Did patient want to reschedule their office appointment? If so, when was it scheduled to? Yes 7/23 11:30 am    Is this patient calling to reschedule an infusion appointment? no   Is this patient a Chemo patient? no   When is their next infusion visit? N/a    Reason for Cancellation or Reschedule Moving, called [facility] and informed them of new appt date      If the patient is a treatment patient, please route this to the office nurse  If the patient is not on treatment, please route to the office MA

## 2021-07-19 ENCOUNTER — TELEPHONE (OUTPATIENT)
Dept: HEMATOLOGY ONCOLOGY | Facility: CLINIC | Age: 86
End: 2021-07-19

## 2021-07-19 NOTE — TELEPHONE ENCOUNTER
Reschedule Appointment     Who is calling in Patient    Doctor Appointment Scheduled with Ludmila Tim date and time 07/23 at 11:30am   New date and time 07/29 at 2:30pm   Monica Lincoln   Patient verbalized understanding

## 2021-07-21 NOTE — TELEPHONE ENCOUNTER
Patient needs [facility] appt has been rescheduled to 08/09/21 at 11 am and confirm  with Niranjan Wise at [facility]

## 2021-07-21 NOTE — TELEPHONE ENCOUNTER
Caty Castro @ [facility] confirmed transportation for patient's upcoiming appointment on 8/9/2021 @ 11:00 AM with REYMUNDO Horton requested that the patient be ready for pickup a minimum of 90 minutes prior to her appointment time of 11:00 AM     LVM advising patient of the above

## 2021-08-06 ENCOUNTER — TELEPHONE (OUTPATIENT)
Dept: HEMATOLOGY ONCOLOGY | Facility: CLINIC | Age: 86
End: 2021-08-06

## 2021-08-06 DIAGNOSIS — R53.83 OTHER FATIGUE: ICD-10-CM

## 2021-08-06 DIAGNOSIS — C76.0 CANCER OF HEAD AND NECK (HCC): ICD-10-CM

## 2021-08-06 DIAGNOSIS — C08.9 SALIVARY GLAND CANCER (HCC): Primary | ICD-10-CM

## 2021-08-09 ENCOUNTER — OFFICE VISIT (OUTPATIENT)
Dept: HEMATOLOGY ONCOLOGY | Facility: CLINIC | Age: 86
End: 2021-08-09
Payer: COMMERCIAL

## 2021-08-09 VITALS
WEIGHT: 124.5 LBS | BODY MASS INDEX: 22.91 KG/M2 | OXYGEN SATURATION: 99 % | HEART RATE: 66 BPM | HEIGHT: 62 IN | RESPIRATION RATE: 16 BRPM | SYSTOLIC BLOOD PRESSURE: 120 MMHG | TEMPERATURE: 97.6 F | DIASTOLIC BLOOD PRESSURE: 70 MMHG

## 2021-08-09 DIAGNOSIS — C08.9 SALIVARY GLAND CANCER (HCC): Primary | ICD-10-CM

## 2021-08-09 DIAGNOSIS — C76.0 CANCER OF HEAD AND NECK (HCC): ICD-10-CM

## 2021-08-09 PROCEDURE — 1036F TOBACCO NON-USER: CPT | Performed by: PHYSICIAN ASSISTANT

## 2021-08-09 PROCEDURE — 99214 OFFICE O/P EST MOD 30 MIN: CPT | Performed by: PHYSICIAN ASSISTANT

## 2021-08-09 PROCEDURE — 1160F RVW MEDS BY RX/DR IN RCRD: CPT | Performed by: PHYSICIAN ASSISTANT

## 2021-08-09 NOTE — PROGRESS NOTES
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  Most recent imaging was in March 2021  Patient does proceed with any treatment at this time however wants to continue to have CT scans at longer intervals  I reviewed the 6 month interval with the September  She asked if she could was completed in December     This will be scheduled  Follow-up thereafter  She will call if she has any worsening pain in the meantime     - CT soft tissue neck wo contrast; Future      HPI:  42-year-old female presents for follow-up regarding 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 extending into the maxillary sinus which has been very slow growing  This was diagnosed in 2015  Patient decided against any treatment including palliative radiation  She remains under surveillance  She had molecular testing sent on the specimen to [facility] and there is no actionable target on the molecular study  Interval history: She has "fleeting" short episodes of pain from the palate mass  She states it is not effecting her quality of life  She takes Tylenol PRN  ROS: Review of Systems   Constitutional: Positive for fatigue  Negative for appetite change, chills, fever and unexpected weight change  HENT: Negative for trouble swallowing  Respiratory: Positive for cough (chronic, unchanged) and shortness of breath (chronic, unchanged)  Cardiovascular: Negative for chest pain, palpitations and leg swelling  Gastrointestinal: Negative for abdominal pain, constipation, diarrhea, nausea and vomiting  Genitourinary: Negative for difficulty urinating, dysuria and hematuria  Musculoskeletal: Negative for arthralgias  Skin: Negative  Neurological: Negative for dizziness, light-headedness, numbness and headaches  Left leg keeps falling asleep often    Hematological: Negative  Psychiatric/Behavioral: Negative  Past Medical History:   Diagnosis Date    A-fib [facility])     Allergy to IVP dye 06/04/2013    pt felt heaviness, palpitations    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), wet ([address] )     bilateral    Aneurysm of aortic root ([facility] [address] )     last assessed - [id number]    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HCC)     Aortic root dilatation (HCC)     last assessed - [id number]    Arthritis     Ascending aortic aneurysm (HCC)     last assessed - [id number]    Asthma     Basal cell carcinoma     last assessed - 03Joy2813    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[facility] [address] )     Disease of thyroid gland     Facet arthropathy, cervical     last assessed - [id number]    History of fracture of vertebral column     Hyperlipidemia     Hypertension     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[facility] [address] )     Hypoxia     last assessed - 26UYJ2625    Junctional rhythm     last assessed - 58Lwo3620    Lightheadedness     last assessed - 16Ylu8056    Migraine     Palpitations     last assessed - 23Eby7421    Pleural effusion, bilateral     last assessed - 22Mdj9066    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HCC)     Shortness of breath     last assessed - 90Bzz8522    Thyroid trouble     Trigger finger     last assessed - 04Nje7888    Trigger middle finger of right hand     last assessed - 42Nph1634    Urinary incontinence     last assessed -  22Jul,2013; Resolved - [id number]       Past Surgical History:   Procedure Laterality Date    ABDOMINAL AORTIC ANEURYSM REPAIR W/ ENDOLUMINAL GRAFT  06/28/2015    Ascending aorta and hemiarch replacement with 30 mm Vascutek Gelweave graft;  Managed by Ebenezer Fuller; last assessed - [id number]    APPENDECTOMY      BLADDER SURGERY      Reccurent histoy of bladder surgery    [address] CATHETERIZATION  2004    Procedure summary - Luminal irregularities; last assessed - 55XOT8950     SECTION      CORONARY ANEURYSM REPAIR      CYSTOSCOPY      botox injection    HYSTERECTOMY      SD ENDOSCOPIC INJECTION/IMPLANT N/A 2017    Procedure: CYSTOSCOPY; Cara Maddox BULKING AGENT INJECTION ;  Surgeon: Edith Perkins MD;  [facility];  Service: Urology    SD INCISE FINGER TENDON SHEATH Right 10/18/2016    Procedure: LONG FINGER TRIGGER RELEASE ;  Surgeon: Tyson Vaughn MD;  [facility];  Service: Orthopedics    THYROIDECTOMY      Total Thyroidectomy    TONSILLECTOMY AND ADENOIDECTOMY         Social History     Socioeconomic History    Marital status:      Spouse name: None    Number of children: None    Years of education: None    Highest education level: None   Occupational History    None   Tobacco Use    Smoking status: Former Smoker     Packs/day: 0 25     Years: 57 00     Pack years: 14 25     Types: Cigarettes     Start date: 36     Quit date: 2014     Years since quittin 6    Smokeless tobacco: Former User    Tobacco comment: smoked 17 yrs 1/2 ppd quit and restarted then quit  10 days ago during 2014    Vaping Use    Vaping Use: Never used   Substance and Sexual Activity    Alcohol use: Not Currently     Comment: Social drinker    Drug use: No    Sexual activity: Not Currently   Other Topics Concern    None   Social History Narrative    None     Social Determinants of Health     Financial Resource Strain:     Difficulty of Paying Living Expenses:    Food Insecurity:     Worried About Running Out of Food in the Last Year:     [address] in the Last Year:    Transportation Needs:     Lack of Transportation (Medical):      Lack of Transportation (Non-Medical):    Physical Activity:     Days of Exercise per Week:     Minutes of Exercise per Session:    Stress:     Feeling of Stress :    Social Connections:     Frequency of Communication with Friends and Family:     Frequency of Social Gatherings with Friends and Family:     Attends [employer] Services:     Active Member of Clubs or Organizations:     Attends Club or Organization Meetings:     Marital Status:    Intimate Partner Violence:     Fear of Current or Ex-Partner:     Emotionally Abused:     Physically Abused:     Sexually Abused:        Family History   Problem Relation Age of Onset    Coronary aneurysm Sister     Coronary artery disease Sister         CABG    Heart disease Family         cardiac disorder    Hypertension Family     Cancer Family     Thyroid disease Family        Allergies   Allergen Reactions    Amiodarone Hives    Omnipaque [Iohexol] Hives and Shortness Of Breath    Clindamycin Other (See Comments)     When taken causes cdiff immediately    Other     Sulfa Antibiotics Hives     itching    Acetazolamide Hives and Rash     Reaction Date:Unknown    Iv Dye  [Iodinated Diagnostic Agents] Hypertension and Palpitations     Annotation - [id number]: Verified with patient     Naprosyn [Naproxen] Itching and Rash     Category:  Allergy;          Current Outpatient Medications:     albuterol (2 5 mg/3 mL) 0 083 % nebulizer solution, Take 2 5 mg by nebulization every 6 (six) hours as needed for wheezing or shortness of breath, Disp: , Rfl:     albuterol (PROVENTIL HFA,VENTOLIN HFA) 90 mcg/act inhaler, Inhale 2 puffs every 6 (six) hours as needed for wheezing, Disp: 1 Inhaler, Rfl: 0    amLODIPine-benazepril (LOTREL 5-20) 5-20 MG per capsule, Take 1 capsule by mouth daily, Disp: 90 capsule, Rfl: 3    aspirin 81 MG tablet, Take 162 mg by mouth daily in the early morning  , Disp: , Rfl:     calcium carbonate (OS-TAE) 600 MG tablet, Take 600 mg by mouth daily in the early morning  , Disp: , Rfl:     fluticasone-salmeterol (Advair Diskus) 250-50 mcg/dose inhaler, Inhale 1 puff 2 (two) times a day Rinse mouth after use , Disp: 3 Inhaler, Rfl: 3    levothyroxine 112 mcg tablet, Take 1 tablet (112 mcg total) by mouth daily in the early morning, Disp: 90 tablet, Rfl: 3    lovastatin (MEVACOR) 20 mg tablet, Take 1 tablet (20 mg total) by mouth daily, Disp: 90 tablet, Rfl: 3    metoprolol tartrate (LOPRESSOR) 25 mg tablet, Take 1 tablet (25 mg total) by mouth every 12 (twelve) hours, Disp: 180 tablet, Rfl: 3      Physical Exam:  /70 (BP Location: Left arm, Patient Position: Sitting, Cuff Size: Adult)   Pulse 66   Temp 97 6 °F (36 4 °C) (Temporal)   Resp 16   Ht 5' 2" (1 575 m)   Wt 56 5 kg (124 lb 8 oz)   SpO2 99%   BMI 22 77 kg/m²     Physical Exam      Labs:  Lab Results   Component Value Date    WBC 7 85 03/02/2021    HGB 12 6 03/02/2021    HCT 40 5 03/02/2021    MCV 94 03/02/2021     03/02/2021     Lab Results   Component Value Date     (L) 06/16/2015    K 5 0 03/02/2021     03/02/2021    CO2 29 03/02/2021    ANIONGAP 5 06/16/2015    BUN 27 (H) 03/02/2021    CREATININE 0 89 03/02/2021    GLUCOSE 138 06/16/2015    GLUF 103 (H) 06/05/2019    CALCIUM 9 4 03/02/2021    AST 21 03/02/2021    ALT 21 03/02/2021    ALKPHOS 62 03/02/2021    PROT 6 7 06/07/2015    BILITOT 0 28 06/07/2015    EGFR 58 03/02/2021       Patient voiced understanding and agreement in the above discussion  Aware to contact our office with questions/symptoms in the interim  This note has been generated by voice recognition software system  Therefore, there may be spelling, grammar, and or syntax errors  Please contact if questions arise

## 2021-09-08 ENCOUNTER — TELEPHONE (OUTPATIENT)
Dept: OTHER | Facility: OTHER | Age: 86
End: 2021-09-08

## 2021-09-08 NOTE — TELEPHONE ENCOUNTER
The patient was exposed on labor day to covid  It was a direct contact  Her great grandchild has covid  Could you please order a covid test for her? The patient does not have any symptoms  She would like to go tomorrow to the urgent care near her  Please advise  Thank you    The great grandchild was exposed to covid by another baby in a day care

## 2021-09-08 NOTE — TELEPHONE ENCOUNTER
Please have pt got to the [facility]  telephone  [phone] order in chart

## 2021-09-09 PROCEDURE — U0003 INFECTIOUS AGENT DETECTION BY NUCLEIC ACID (DNA OR RNA); SEVERE ACUTE RESPIRATORY SYNDROME CORONAVIRUS 2 (SARS-COV-2) (CORONAVIRUS DISEASE [COVID-19]), AMPLIFIED PROBE TECHNIQUE, MAKING USE OF HIGH THROUGHPUT TECHNOLOGIES AS DESCRIBED BY CMS-2020-01-R: HCPCS | Performed by: INTERNAL MEDICINE

## 2021-09-09 PROCEDURE — U0005 INFEC AGEN DETEC AMPLI PROBE: HCPCS | Performed by: INTERNAL MEDICINE

## 2021-09-18 ENCOUNTER — OFFICE VISIT (OUTPATIENT)
Dept: OBGYN CLINIC | Facility: HOSPITAL | Age: 86
End: 2021-09-18
Payer: COMMERCIAL

## 2021-09-18 ENCOUNTER — HOSPITAL ENCOUNTER (OUTPATIENT)
Dept: RADIOLOGY | Facility: HOSPITAL | Age: 86
Discharge: HOME | End: 2021-09-18
Payer: COMMERCIAL

## 2021-09-18 VITALS
WEIGHT: 121.8 LBS | DIASTOLIC BLOOD PRESSURE: 68 MMHG | SYSTOLIC BLOOD PRESSURE: 130 MMHG | HEIGHT: 62 IN | BODY MASS INDEX: 22.41 KG/M2 | HEART RATE: 57 BPM

## 2021-09-18 DIAGNOSIS — R25.2 MUSCLE CRAMPING: ICD-10-CM

## 2021-09-18 DIAGNOSIS — M79.641 RIGHT HAND PAIN: ICD-10-CM

## 2021-09-18 DIAGNOSIS — M79.641 RIGHT HAND PAIN: Primary | ICD-10-CM

## 2021-09-18 PROCEDURE — 99203 OFFICE O/P NEW LOW 30 MIN: CPT | Performed by: PHYSICIAN ASSISTANT

## 2021-09-18 PROCEDURE — 1160F RVW MEDS BY RX/DR IN RCRD: CPT | Performed by: PHYSICIAN ASSISTANT

## 2021-09-18 PROCEDURE — 1036F TOBACCO NON-USER: CPT | Performed by: PHYSICIAN ASSISTANT

## 2021-09-18 PROCEDURE — 73130 X-RAY EXAM OF HAND: CPT

## 2021-09-18 NOTE — PROGRESS NOTES
ASSESSMENT/PLAN:    Assessment:   Muscle cramps    Plan:   Have the labwork done that your PCP already ordered and f/u with your PCP    Follow Up:  PRN    To Do Next Visit:       General Discussions:       Operative Discussions:         _____________________________________________________  CHIEF COMPLAINT:  Chief Complaint   Patient presents with    Right Hand - Pain         SUBJECTIVE:  Buffy Kirk is a 80 y o  female who presents with occasional cramping sensations in there right hand where her hand stiffens and looks like a claw  She pushes her finger open gently with the other hand and then it is fine  This happens about once a week with no specific cause  No popping, locking, or triggering  This is not painful  She also gets cramps in her b/l feet and legs fairly often  She has orders in for [facility] labs, but has not had them done  Right now, she is completely asymptomatic      PAST MEDICAL HISTORY:  Past Medical History:   Diagnosis Date    A-fib 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), wet ([address] )     bilateral    Aneurysm of aortic root ([facility] [address] )     last assessed - [id number]    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HCC)     Aortic root dilatation (HCC)     last assessed - 53NTQ7613    Arthritis     Ascending aortic aneurysm (HCC)     last assessed - 64NCT8336    Asthma     Basal cell carcinoma     last assessed - 47Gce0236    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[facility] [address] )     Disease of thyroid gland     Facet arthropathy, cervical     last assessed - 65ZFZ6958    History of fracture of vertebral column     Hyperlipidemia     Hypertension     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[facility] [address] )     Hypoxia     last assessed - 46TVX1832    Junctional rhythm     last assessed - 85Sbb1770    Lightheadedness     last assessed - 43Tnb3912    Migraine     Palpitations     last assessed - 81Lxx2444    Pleural effusion, bilateral     last assessed - 47Ugy3830    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HCC)     Shortness of breath     last assessed - 04Oaa6956    Thyroid trouble     Trigger finger     last assessed - 88RBH2807    Trigger middle finger of right hand     last assessed - 58Vcd8607    Urinary incontinence     last assessed -  ; Resolved - [id number]       PAST SURGICAL HISTORY:  Past Surgical History:   Procedure Laterality Date    ABDOMINAL AORTIC ANEURYSM REPAIR W/ ENDOLUMINAL GRAFT  2015    Ascending aorta and hemiarch replacement with 30 mm Vascutek Gelweave graft;  Managed by Eve Longo; last assessed - 95WCS5374    APPENDECTOMY      BLADDER SURGERY      Reccurent histoy of bladder surgery    [facility] PROCEDURE      CARDIAC CATHETERIZATION  2004    Procedure summary - Luminal irregularities; last assessed - 74RGK2942     SECTION      CORONARY ANEURYSM REPAIR      CYSTOSCOPY      botox injection    HYSTERECTOMY      MS ENDOSCOPIC INJECTION/IMPLANT N/A 2017    Procedure: CYSTOSCOPY; Tilda Pennant BULKING AGENT INJECTION ;  Surgeon: Sarah Beth Wei MD;  [facility];  Service: Urology    MS INCISE FINGER TENDON SHEATH Right 10/18/2016    Procedure: LONG FINGER TRIGGER RELEASE ;  Surgeon: García Costa MD;  [facility];  Service: Orthopedics    THYROIDECTOMY      Total Thyroidectomy    TONSILLECTOMY AND ADENOIDECTOMY         FAMILY HISTORY:  Family History   Problem Relation Age of Onset    Coronary aneurysm Sister     Coronary artery disease Sister         CABG    Heart disease Family         cardiac disorder    Hypertension Family     Cancer Family     Thyroid disease Family        SOCIAL HISTORY:  Social History     Tobacco Use    Smoking status: Former Smoker     Packs/day: 0 25     Years: 57 00     Pack years: 14 25     Types: Cigarettes     Start date: 36     Quit date: 2014     Years since quittin 7    Smokeless tobacco: Former User    Tobacco comment: smoked 17 yrs 1/2 ppd quit and restarted then quit  10 days ago during Christmas 2014    Vaping Use    Vaping Use: Never used   Substance Use Topics    Alcohol use: Not Currently     Comment: Social drinker    Drug use: No       MEDICATIONS:    Current Outpatient Medications:     albuterol (2 5 mg/3 mL) 0 083 % nebulizer solution, Take 2 5 mg by nebulization every 6 (six) hours as needed for wheezing or shortness of breath, Disp: , Rfl:     albuterol (PROVENTIL HFA,VENTOLIN HFA) 90 mcg/act inhaler, Inhale 2 puffs every 6 (six) hours as needed for wheezing, Disp: 1 Inhaler, Rfl: 0    amLODIPine-benazepril (LOTREL 5-20) 5-20 MG per capsule, Take 1 capsule by mouth daily, Disp: 90 capsule, Rfl: 3    aspirin 81 MG tablet, Take 162 mg by mouth daily in the early morning  , Disp: , Rfl:     calcium carbonate (OS-TAE) 600 MG tablet, Take 600 mg by mouth daily in the early morning  , Disp: , Rfl:     fluticasone-salmeterol (Advair Diskus) 250-50 mcg/dose inhaler, Inhale 1 puff 2 (two) times a day Rinse mouth after use , Disp: 3 Inhaler, Rfl: 3    levothyroxine 112 mcg tablet, Take 1 tablet (112 mcg total) by mouth daily in the early morning, Disp: 90 tablet, Rfl: 3    lovastatin (MEVACOR) 20 mg tablet, Take 1 tablet (20 mg total) by mouth daily, Disp: 90 tablet, Rfl: 3    metoprolol tartrate (LOPRESSOR) 25 mg tablet, Take 1 tablet (25 mg total) by mouth every 12 (twelve) hours, Disp: 180 tablet, Rfl: 3    ALLERGIES:  Allergies   Allergen Reactions    Amiodarone Hives    Omnipaque [Iohexol] Hives and Shortness Of Breath    Clindamycin Other (See Comments)     When taken causes cdiff immediately    Other     Sulfa Antibiotics Hives     itching    Acetazolamide Hives and Rash     Reaction Date:Unknown    Iv Dye  [Iodinated Diagnostic Agents] Hypertension and Palpitations     [facility] - [id number]: Verified with patient     Naprosyn [Naproxen] Itching and Rash     Category:  Allergy;        REVIEW OF SYSTEMS:  Pertinent items are noted in HPI  A comprehensive review of systems was negative  LABS:  HgA1c:   Lab Results   Component Value Date    HGBA1C 5 7 (H) 10/12/2020     BMP:   Lab Results   Component Value Date    GLUCOSE 138 06/16/2015    CALCIUM 9 4 03/02/2021     (L) 06/16/2015    K 5 0 03/02/2021    CO2 29 03/02/2021     03/02/2021    BUN 27 (H) 03/02/2021    CREATININE 0 89 03/02/2021         _____________________________________________________  PHYSICAL EXAMINATION:  Vital signs: /68   Pulse 57   Ht 5' 2" (1 575 m)   Wt 55 2 kg (121 lb 12 8 oz)   BMI 22 28 kg/m²   General: well developed and well nourished, alert, oriented times 3 and appears comfortable  Psychiatric: Normal  HEENT: Trachea Midline, No torticollis  Cardiovascular: No discernable arrhythmia  Pulmonary: No wheezing or stridor  Abdomen: No rebound or guarding  Extremities: No peripheral edema  Skin: No masses, erythema, lacerations, fluctation, ulcerations  Neurovascular: Sensation Intact to the Median, Ulnar, Radial Nerve, Motor Intact to the Median, Ulnar, Radial Nerve and Pulses Intact    MUSCULOSKELETAL EXAMINATION:  RIGHT SIDE:  CMC: Positive Hyperextension MP and almost no CMC joint motion  No pain to palpation of the hand or fingers including the A1 pulleys  No nodules  No triggering  Full passive motion in the fingers      _____________________________________________________  STUDIES REVIEWED:  No Studies to review      PROCEDURES PERFORMED:  Procedures  No Procedures performed today

## 2021-10-22 ENCOUNTER — OFFICE VISIT (OUTPATIENT)
Dept: CARDIOLOGY CLINIC | Facility: CLINIC | Age: 86
End: 2021-10-22
Payer: COMMERCIAL

## 2021-10-22 VITALS
BODY MASS INDEX: 22.26 KG/M2 | DIASTOLIC BLOOD PRESSURE: 62 MMHG | WEIGHT: 121 LBS | HEART RATE: 62 BPM | OXYGEN SATURATION: 98 % | SYSTOLIC BLOOD PRESSURE: 140 MMHG | HEIGHT: 62 IN

## 2021-10-22 DIAGNOSIS — I10 ESSENTIAL HYPERTENSION: ICD-10-CM

## 2021-10-22 DIAGNOSIS — R06.02 SHORTNESS OF BREATH: ICD-10-CM

## 2021-10-22 DIAGNOSIS — E78.5 HYPERLIPIDEMIA, UNSPECIFIED HYPERLIPIDEMIA TYPE: ICD-10-CM

## 2021-10-22 DIAGNOSIS — I48.0 PAROXYSMAL ATRIAL FIBRILLATION (HCC): Primary | ICD-10-CM

## 2021-10-22 DIAGNOSIS — I34.0 NONRHEUMATIC MITRAL VALVE REGURGITATION: ICD-10-CM

## 2021-10-22 PROCEDURE — 99214 OFFICE O/P EST MOD 30 MIN: CPT | Performed by: INTERNAL MEDICINE

## 2021-10-22 PROCEDURE — 1160F RVW MEDS BY RX/DR IN RCRD: CPT | Performed by: INTERNAL MEDICINE

## 2021-10-22 PROCEDURE — 93000 ELECTROCARDIOGRAM COMPLETE: CPT | Performed by: INTERNAL MEDICINE

## 2021-10-22 PROCEDURE — 1036F TOBACCO NON-USER: CPT | Performed by: INTERNAL MEDICINE

## 2021-10-22 RX ORDER — METOPROLOL SUCCINATE 25 MG/1
25 TABLET, EXTENDED RELEASE ORAL DAILY
Qty: 90 TABLET | Refills: 3 | Status: SHIPPED | OUTPATIENT
Start: 2021-10-22

## 2021-10-22 RX ORDER — MULTIVITAMIN WITH IRON
TABLET ORAL
COMMUNITY
End: 2021-12-23 | Stop reason: SDUPTHER

## 2021-11-10 ENCOUNTER — RA CDI HCC (OUTPATIENT)
Dept: OTHER | Facility: HOSPITAL | Age: 86
End: 2021-11-10

## 2021-11-13 ENCOUNTER — APPOINTMENT (OUTPATIENT)
Dept: LAB | Facility: CLINIC | Age: 86
End: 2021-11-13
Payer: COMMERCIAL

## 2021-11-13 DIAGNOSIS — R25.2 MUSCLE CRAMPS: ICD-10-CM

## 2021-11-13 DIAGNOSIS — Z13.6 SCREENING FOR CARDIOVASCULAR CONDITION: ICD-10-CM

## 2021-11-13 LAB
ALBUMIN SERPL BCP-MCNC: 3.8 G/DL (ref 3.5–5)
ALP SERPL-CCNC: 65 U/L (ref 46–116)
ALT SERPL W P-5'-P-CCNC: 22 U/L (ref 12–78)
ANION GAP SERPL CALCULATED.3IONS-SCNC: 6 MMOL/L (ref 4–13)
AST SERPL W P-5'-P-CCNC: 21 U/L (ref 5–45)
BASOPHILS # BLD AUTO: 0.07 THOUSANDS/ΜL (ref 0–0.1)
BASOPHILS NFR BLD AUTO: 1 % (ref 0–1)
BILIRUB SERPL-MCNC: 0.6 MG/DL (ref 0.2–1)
BUN SERPL-MCNC: 19 MG/DL (ref 5–25)
CALCIUM SERPL-MCNC: 8.6 MG/DL (ref 8.3–10.1)
CHLORIDE SERPL-SCNC: 104 MMOL/L (ref 100–108)
CHOLEST SERPL-MCNC: 173 MG/DL (ref 50–200)
CO2 SERPL-SCNC: 29 MMOL/L (ref 21–32)
CREAT SERPL-MCNC: 0.76 MG/DL (ref 0.6–1.3)
EOSINOPHIL # BLD AUTO: 0.52 THOUSAND/ΜL (ref 0–0.61)
EOSINOPHIL NFR BLD AUTO: 8 % (ref 0–6)
ERYTHROCYTE [DISTWIDTH] IN BLOOD BY AUTOMATED COUNT: 13.7 % (ref 11.6–15.1)
GFR SERPL CREATININE-BSD FRML MDRD: 70 ML/MIN/1.73SQ M
GLUCOSE P FAST SERPL-MCNC: 97 MG/DL (ref 65–99)
HCT VFR BLD AUTO: 38.6 % (ref 34.8–46.1)
HDLC SERPL-MCNC: 76 MG/DL
HGB BLD-MCNC: 12.1 G/DL (ref 11.5–15.4)
IMM GRANULOCYTES # BLD AUTO: 0.02 THOUSAND/UL (ref 0–0.2)
IMM GRANULOCYTES NFR BLD AUTO: 0 % (ref 0–2)
LDLC SERPL CALC-MCNC: 86 MG/DL (ref 0–100)
LYMPHOCYTES # BLD AUTO: 2.02 THOUSANDS/ΜL (ref 0.6–4.47)
LYMPHOCYTES NFR BLD AUTO: 30 % (ref 14–44)
MCH RBC QN AUTO: 29.7 PG (ref 26.8–34.3)
MCHC RBC AUTO-ENTMCNC: 31.3 G/DL (ref 31.4–37.4)
MCV RBC AUTO: 95 FL (ref 82–98)
MONOCYTES # BLD AUTO: 0.68 THOUSAND/ΜL (ref 0.17–1.22)
MONOCYTES NFR BLD AUTO: 10 % (ref 4–12)
NEUTROPHILS # BLD AUTO: 3.41 THOUSANDS/ΜL (ref 1.85–7.62)
NEUTS SEG NFR BLD AUTO: 51 % (ref 43–75)
NRBC BLD AUTO-RTO: 0 /100 WBCS
PLATELET # BLD AUTO: 188 THOUSANDS/UL (ref 149–390)
PMV BLD AUTO: 11.8 FL (ref 8.9–12.7)
POTASSIUM SERPL-SCNC: 4.4 MMOL/L (ref 3.5–5.3)
PROT SERPL-MCNC: 7.4 G/DL (ref 6.4–8.2)
RBC # BLD AUTO: 4.08 MILLION/UL (ref 3.81–5.12)
SODIUM SERPL-SCNC: 139 MMOL/L (ref 136–145)
TRIGL SERPL-MCNC: 53 MG/DL
WBC # BLD AUTO: 6.72 THOUSAND/UL (ref 4.31–10.16)

## 2021-11-13 PROCEDURE — 80053 COMPREHEN METABOLIC PANEL: CPT

## 2021-11-13 PROCEDURE — 80061 LIPID PANEL: CPT

## 2021-11-13 PROCEDURE — 36415 COLL VENOUS BLD VENIPUNCTURE: CPT

## 2021-11-13 PROCEDURE — 85025 COMPLETE CBC W/AUTO DIFF WBC: CPT

## 2021-11-16 ENCOUNTER — OFFICE VISIT (OUTPATIENT)
Dept: INTERNAL MEDICINE CLINIC | Facility: CLINIC | Age: 86
End: 2021-11-16
Payer: COMMERCIAL

## 2021-11-16 VITALS
SYSTOLIC BLOOD PRESSURE: 128 MMHG | HEIGHT: 62 IN | RESPIRATION RATE: 16 BRPM | DIASTOLIC BLOOD PRESSURE: 70 MMHG | HEART RATE: 88 BPM | OXYGEN SATURATION: 97 % | WEIGHT: 119.6 LBS | BODY MASS INDEX: 22.01 KG/M2

## 2021-11-16 DIAGNOSIS — R25.2 MUSCLE CRAMPS: ICD-10-CM

## 2021-11-16 DIAGNOSIS — M20.039 SWAN-NECK DEFORMITY OF FINGER, UNSPECIFIED LATERALITY: ICD-10-CM

## 2021-11-16 DIAGNOSIS — Z23 NEED FOR VACCINATION: ICD-10-CM

## 2021-11-16 DIAGNOSIS — M79.605 PAIN OF LEFT LOWER EXTREMITY: ICD-10-CM

## 2021-11-16 DIAGNOSIS — Z00.00 HEALTHCARE MAINTENANCE: ICD-10-CM

## 2021-11-16 DIAGNOSIS — Z00.00 MEDICARE ANNUAL WELLNESS VISIT, SUBSEQUENT: Primary | ICD-10-CM

## 2021-11-16 DIAGNOSIS — M79.662 PAIN OF LEFT LOWER LEG: ICD-10-CM

## 2021-11-16 PROBLEM — M79.602 LEFT UPPER LIMB PAIN: Status: ACTIVE | Noted: 2021-11-16

## 2021-11-16 PROCEDURE — 99213 OFFICE O/P EST LOW 20 MIN: CPT | Performed by: INTERNAL MEDICINE

## 2021-11-16 PROCEDURE — G0008 ADMIN INFLUENZA VIRUS VAC: HCPCS

## 2021-11-16 PROCEDURE — G0439 PPPS, SUBSEQ VISIT: HCPCS | Performed by: INTERNAL MEDICINE

## 2021-11-16 PROCEDURE — 3288F FALL RISK ASSESSMENT DOCD: CPT | Performed by: INTERNAL MEDICINE

## 2021-11-16 PROCEDURE — 90662 IIV NO PRSV INCREASED AG IM: CPT

## 2021-11-16 PROCEDURE — 1125F AMNT PAIN NOTED PAIN PRSNT: CPT | Performed by: INTERNAL MEDICINE

## 2021-11-16 PROCEDURE — 1170F FXNL STATUS ASSESSED: CPT | Performed by: INTERNAL MEDICINE

## 2021-11-18 ENCOUNTER — TELEPHONE (OUTPATIENT)
Dept: HEMATOLOGY ONCOLOGY | Facility: CLINIC | Age: 86
End: 2021-11-18

## 2021-11-26 ENCOUNTER — APPOINTMENT (OUTPATIENT)
Dept: RADIOLOGY | Facility: MEDICAL CENTER | Age: 86
End: 2021-11-26
Payer: COMMERCIAL

## 2021-11-26 ENCOUNTER — OFFICE VISIT (OUTPATIENT)
Dept: OBGYN CLINIC | Facility: MEDICAL CENTER | Age: 86
End: 2021-11-26
Payer: COMMERCIAL

## 2021-11-26 VITALS
WEIGHT: 120 LBS | SYSTOLIC BLOOD PRESSURE: 145 MMHG | HEART RATE: 68 BPM | BODY MASS INDEX: 22.08 KG/M2 | DIASTOLIC BLOOD PRESSURE: 60 MMHG | HEIGHT: 62 IN

## 2021-11-26 DIAGNOSIS — M54.42 CHRONIC BILATERAL LOW BACK PAIN WITH LEFT-SIDED SCIATICA: Primary | ICD-10-CM

## 2021-11-26 DIAGNOSIS — M47.816 LUMBAR SPONDYLOSIS: ICD-10-CM

## 2021-11-26 DIAGNOSIS — G89.29 CHRONIC BILATERAL LOW BACK PAIN WITH LEFT-SIDED SCIATICA: Primary | ICD-10-CM

## 2021-11-26 DIAGNOSIS — M16.12 PRIMARY OSTEOARTHRITIS OF LEFT HIP: ICD-10-CM

## 2021-11-26 DIAGNOSIS — M19.041 OSTEOARTHRITIS OF RIGHT MIDDLE FINGER: ICD-10-CM

## 2021-11-26 DIAGNOSIS — M25.552 PAIN IN LEFT HIP: ICD-10-CM

## 2021-11-26 DIAGNOSIS — M54.42 CHRONIC BILATERAL LOW BACK PAIN WITH LEFT-SIDED SCIATICA: ICD-10-CM

## 2021-11-26 DIAGNOSIS — M51.36 LUMBAR DEGENERATIVE DISC DISEASE: ICD-10-CM

## 2021-11-26 DIAGNOSIS — M43.9 CURVATURE OF SPINE: ICD-10-CM

## 2021-11-26 DIAGNOSIS — G89.29 CHRONIC BILATERAL LOW BACK PAIN WITH LEFT-SIDED SCIATICA: ICD-10-CM

## 2021-11-26 PROCEDURE — 1036F TOBACCO NON-USER: CPT | Performed by: EMERGENCY MEDICINE

## 2021-11-26 PROCEDURE — 1160F RVW MEDS BY RX/DR IN RCRD: CPT | Performed by: EMERGENCY MEDICINE

## 2021-11-26 PROCEDURE — 73502 X-RAY EXAM HIP UNI 2-3 VIEWS: CPT

## 2021-11-26 PROCEDURE — 72100 X-RAY EXAM L-S SPINE 2/3 VWS: CPT

## 2021-11-26 PROCEDURE — 99214 OFFICE O/P EST MOD 30 MIN: CPT | Performed by: EMERGENCY MEDICINE

## 2021-12-03 ENCOUNTER — EVALUATION (OUTPATIENT)
Dept: PHYSICAL THERAPY | Facility: CLINIC | Age: 86
End: 2021-12-03
Payer: COMMERCIAL

## 2021-12-03 DIAGNOSIS — M51.36 LUMBAR DEGENERATIVE DISC DISEASE: ICD-10-CM

## 2021-12-03 DIAGNOSIS — M79.605 LEFT LEG PAIN: Primary | ICD-10-CM

## 2021-12-03 DIAGNOSIS — M16.12 PRIMARY OSTEOARTHRITIS OF LEFT HIP: ICD-10-CM

## 2021-12-03 PROCEDURE — 97110 THERAPEUTIC EXERCISES: CPT | Performed by: PHYSICAL THERAPIST

## 2021-12-03 PROCEDURE — 97161 PT EVAL LOW COMPLEX 20 MIN: CPT | Performed by: PHYSICAL THERAPIST

## 2021-12-06 ENCOUNTER — HOSPITAL ENCOUNTER (OUTPATIENT)
Dept: NON INVASIVE DIAGNOSTICS | Facility: CLINIC | Age: 86
Discharge: HOME | End: 2021-12-06
Payer: COMMERCIAL

## 2021-12-06 ENCOUNTER — TELEPHONE (OUTPATIENT)
Dept: HEMATOLOGY ONCOLOGY | Facility: CLINIC | Age: 86
End: 2021-12-06

## 2021-12-06 DIAGNOSIS — M79.662 PAIN OF LEFT LOWER LEG: ICD-10-CM

## 2021-12-06 PROCEDURE — 93971 EXTREMITY STUDY: CPT | Performed by: SURGERY

## 2021-12-06 PROCEDURE — 93971 EXTREMITY STUDY: CPT

## 2021-12-07 ENCOUNTER — HOSPITAL ENCOUNTER (OUTPATIENT)
Dept: CT IMAGING | Facility: HOSPITAL | Age: 86
Discharge: HOME | End: 2021-12-07
Payer: COMMERCIAL

## 2021-12-07 ENCOUNTER — OFFICE VISIT (OUTPATIENT)
Dept: PHYSICAL THERAPY | Facility: CLINIC | Age: 86
End: 2021-12-07
Payer: COMMERCIAL

## 2021-12-07 DIAGNOSIS — C76.0 CANCER OF HEAD AND NECK (HCC): ICD-10-CM

## 2021-12-07 DIAGNOSIS — M16.12 PRIMARY OSTEOARTHRITIS OF LEFT HIP: Primary | ICD-10-CM

## 2021-12-07 DIAGNOSIS — M51.36 LUMBAR DEGENERATIVE DISC DISEASE: ICD-10-CM

## 2021-12-07 DIAGNOSIS — M79.605 LEFT LEG PAIN: ICD-10-CM

## 2021-12-07 DIAGNOSIS — C08.9 SALIVARY GLAND CANCER (HCC): ICD-10-CM

## 2021-12-07 PROCEDURE — 70490 CT SOFT TISSUE NECK W/O DYE: CPT

## 2021-12-07 PROCEDURE — 97110 THERAPEUTIC EXERCISES: CPT

## 2021-12-09 ENCOUNTER — OFFICE VISIT (OUTPATIENT)
Dept: PHYSICAL THERAPY | Facility: CLINIC | Age: 86
End: 2021-12-09
Payer: COMMERCIAL

## 2021-12-09 DIAGNOSIS — M16.12 PRIMARY OSTEOARTHRITIS OF LEFT HIP: ICD-10-CM

## 2021-12-09 DIAGNOSIS — M51.36 LUMBAR DEGENERATIVE DISC DISEASE: ICD-10-CM

## 2021-12-09 DIAGNOSIS — M79.605 LEFT LEG PAIN: Primary | ICD-10-CM

## 2021-12-09 PROCEDURE — 97110 THERAPEUTIC EXERCISES: CPT | Performed by: PHYSICAL THERAPIST

## 2021-12-14 ENCOUNTER — OFFICE VISIT (OUTPATIENT)
Dept: PHYSICAL THERAPY | Facility: CLINIC | Age: 86
End: 2021-12-14
Payer: COMMERCIAL

## 2021-12-14 DIAGNOSIS — M79.605 LEFT LEG PAIN: ICD-10-CM

## 2021-12-14 DIAGNOSIS — M51.36 LUMBAR DEGENERATIVE DISC DISEASE: ICD-10-CM

## 2021-12-14 DIAGNOSIS — M16.12 PRIMARY OSTEOARTHRITIS OF LEFT HIP: Primary | ICD-10-CM

## 2021-12-14 PROCEDURE — 97110 THERAPEUTIC EXERCISES: CPT

## 2021-12-14 PROCEDURE — 97112 NEUROMUSCULAR REEDUCATION: CPT

## 2021-12-16 ENCOUNTER — OFFICE VISIT (OUTPATIENT)
Dept: PHYSICAL THERAPY | Facility: CLINIC | Age: 86
End: 2021-12-16
Payer: COMMERCIAL

## 2021-12-16 DIAGNOSIS — M16.12 PRIMARY OSTEOARTHRITIS OF LEFT HIP: ICD-10-CM

## 2021-12-16 DIAGNOSIS — M51.36 LUMBAR DEGENERATIVE DISC DISEASE: ICD-10-CM

## 2021-12-16 DIAGNOSIS — M79.605 LEFT LEG PAIN: Primary | ICD-10-CM

## 2021-12-16 PROCEDURE — 97110 THERAPEUTIC EXERCISES: CPT | Performed by: PHYSICAL THERAPIST

## 2021-12-20 ENCOUNTER — APPOINTMENT (OUTPATIENT)
Dept: PHYSICAL THERAPY | Facility: CLINIC | Age: 86
End: 2021-12-20
Payer: COMMERCIAL

## 2021-12-23 ENCOUNTER — OFFICE VISIT (OUTPATIENT)
Dept: FAMILY MEDICINE CLINIC | Facility: CLINIC | Age: 86
End: 2021-12-23
Payer: COMMERCIAL

## 2021-12-23 VITALS
HEART RATE: 77 BPM | BODY MASS INDEX: 22.49 KG/M2 | RESPIRATION RATE: 18 BRPM | WEIGHT: 122.2 LBS | TEMPERATURE: 97.6 F | SYSTOLIC BLOOD PRESSURE: 144 MMHG | HEIGHT: 62 IN | DIASTOLIC BLOOD PRESSURE: 82 MMHG | OXYGEN SATURATION: 95 %

## 2021-12-23 DIAGNOSIS — E78.5 HYPERLIPIDEMIA, UNSPECIFIED HYPERLIPIDEMIA TYPE: ICD-10-CM

## 2021-12-23 DIAGNOSIS — M35.3 POLYMYALGIA RHEUMATICA (HCC): ICD-10-CM

## 2021-12-23 DIAGNOSIS — I70.0 ATHEROSCLEROSIS OF AORTA (HCC): ICD-10-CM

## 2021-12-23 DIAGNOSIS — J45.20 MILD INTERMITTENT ASTHMA WITHOUT COMPLICATION: ICD-10-CM

## 2021-12-23 DIAGNOSIS — E20.9 HYPOPARATHYROIDISM, UNSPECIFIED HYPOPARATHYROIDISM TYPE (HCC): ICD-10-CM

## 2021-12-23 DIAGNOSIS — Z00.00 HEALTHCARE MAINTENANCE: ICD-10-CM

## 2021-12-23 DIAGNOSIS — R25.2 MUSCLE CRAMPS: ICD-10-CM

## 2021-12-23 DIAGNOSIS — H35.3230 BILATERAL EXUDATIVE AGE-RELATED MACULAR DEGENERATION, UNSPECIFIED STAGE (HCC): ICD-10-CM

## 2021-12-23 DIAGNOSIS — I10 ESSENTIAL HYPERTENSION: ICD-10-CM

## 2021-12-23 DIAGNOSIS — E06.3 HYPOTHYROIDISM DUE TO HASHIMOTO'S THYROIDITIS: Primary | ICD-10-CM

## 2021-12-23 DIAGNOSIS — C08.9 SALIVARY GLAND CANCER (HCC): ICD-10-CM

## 2021-12-23 DIAGNOSIS — J44.9 ASTHMA-COPD OVERLAP SYNDROME (HCC): ICD-10-CM

## 2021-12-23 DIAGNOSIS — E03.8 HYPOTHYROIDISM DUE TO HASHIMOTO'S THYROIDITIS: Primary | ICD-10-CM

## 2021-12-23 PROCEDURE — 99203 OFFICE O/P NEW LOW 30 MIN: CPT | Performed by: FAMILY MEDICINE

## 2021-12-23 RX ORDER — MULTIVITAMIN WITH IRON
250 TABLET ORAL DAILY
Qty: 100 TABLET | Refills: 3 | Status: SHIPPED | OUTPATIENT
Start: 2021-12-23

## 2021-12-23 NOTE — PROGRESS NOTES
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's thyroiditis  -     TSH, 3rd generation with Free T4 reflex; Future    Mild intermittent asthma without complication    Essential hypertension    Muscle cramps  -     Magnesium 250 MG TABS; Take 1 tablet (250 mg total) by mouth in the morning    Hyperlipidemia, unspecified hyperlipidemia type    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HCC)    Polymyalgia rheumatica (HCC)    Atherosclerosis of aorta (HCC)    Hypoparathyroidism, unspecified hypoparathyroidism type (HCC)    Bilateral exudative age-related macular degeneration, unspecified stage ([facility])    Asthma-COPD overlap syndrome ([facility] [address] )          Subjective:   Chief Complaint   Patient presents with    [facility]        Patient ID: Buffy Davis is a 80 y o  female  Patietnt is new to the practice  She moved from [facility]  Is living in subsidized housing  She takes medication for blood pressure, cholesterol, asthma ( she ended up with asthma after "burning her airway" from bathroom chemicals  Was diagnosed in 2015 with salivary glands  Had open heart surgery prior to that diagnosis  She has questions regarding Magnesium  The following portions of the patient's history were reviewed and updated as appropriate: allergies, current medications, past family history, past medical history, past social history, past surgical history and problem list     Review of Systems   Constitutional: Negative for chills and fever  HENT: Negative for congestion and sore throat  Respiratory: Negative for chest tightness  Cardiovascular: Negative for chest pain and palpitations     Gastrointestinal: Negative for abdominal pain, constipation, diarrhea and nausea  Genitourinary: Negative for difficulty urinating  Skin: Negative  Neurological: Negative for dizziness and headaches  Psychiatric/Behavioral: Negative  Objective:      /82 (BP Location: Left arm, Patient Position: Sitting)   Pulse 77   Temp 97 6 °F (36 4 °C) (Tympanic)   Resp 18   Ht 5' 2" (1 575 m)   Wt 55 4 kg (122 lb 3 2 oz)   SpO2 95%   BMI 22 35 kg/m²          Physical Exam  Vitals and nursing note reviewed  Constitutional:       General: She is not in acute distress  HENT:      Head: Normocephalic  Mouth/Throat:      Comments: Abnormality roof of mouth  Neck:      Thyroid: No thyromegaly  Cardiovascular:      Rate and Rhythm: Normal rate and regular rhythm  Heart sounds: Normal heart sounds  Pulmonary:      Effort: Pulmonary effort is normal       Breath sounds: Normal breath sounds  Lymphadenopathy:      Cervical: No cervical adenopathy  Skin:     General: Skin is warm and dry  Neurological:      Mental Status: She is alert and oriented to person, place, and time

## 2021-12-27 ENCOUNTER — OFFICE VISIT (OUTPATIENT)
Dept: OBGYN CLINIC | Facility: MEDICAL CENTER | Age: 86
End: 2021-12-27
Payer: COMMERCIAL

## 2021-12-27 VITALS
HEIGHT: 62 IN | HEART RATE: 71 BPM | SYSTOLIC BLOOD PRESSURE: 114 MMHG | DIASTOLIC BLOOD PRESSURE: 66 MMHG | WEIGHT: 120 LBS | TEMPERATURE: 99.4 F | BODY MASS INDEX: 22.08 KG/M2

## 2021-12-27 DIAGNOSIS — G89.29 CHRONIC BILATERAL LOW BACK PAIN WITH LEFT-SIDED SCIATICA: Primary | ICD-10-CM

## 2021-12-27 DIAGNOSIS — M16.12 PRIMARY OSTEOARTHRITIS OF LEFT HIP: ICD-10-CM

## 2021-12-27 DIAGNOSIS — M47.816 LUMBAR SPONDYLOSIS: ICD-10-CM

## 2021-12-27 DIAGNOSIS — M51.36 LUMBAR DEGENERATIVE DISC DISEASE: ICD-10-CM

## 2021-12-27 DIAGNOSIS — M54.42 CHRONIC BILATERAL LOW BACK PAIN WITH LEFT-SIDED SCIATICA: Primary | ICD-10-CM

## 2021-12-27 DIAGNOSIS — M25.552 PAIN IN LEFT HIP: ICD-10-CM

## 2021-12-27 PROCEDURE — 1160F RVW MEDS BY RX/DR IN RCRD: CPT | Performed by: EMERGENCY MEDICINE

## 2021-12-27 PROCEDURE — 99212 OFFICE O/P EST SF 10 MIN: CPT | Performed by: EMERGENCY MEDICINE

## 2021-12-27 PROCEDURE — 1036F TOBACCO NON-USER: CPT | Performed by: EMERGENCY MEDICINE

## 2021-12-28 ENCOUNTER — APPOINTMENT (OUTPATIENT)
Dept: LAB | Facility: CLINIC | Age: 86
End: 2021-12-28
Payer: COMMERCIAL

## 2021-12-28 DIAGNOSIS — E03.8 HYPOTHYROIDISM DUE TO HASHIMOTO'S THYROIDITIS: ICD-10-CM

## 2021-12-28 DIAGNOSIS — E06.3 HYPOTHYROIDISM DUE TO HASHIMOTO'S THYROIDITIS: ICD-10-CM

## 2021-12-28 LAB — TSH SERPL DL<=0.05 MIU/L-ACNC: 0.39 UIU/ML (ref 0.36–3.74)

## 2021-12-28 PROCEDURE — 84443 ASSAY THYROID STIM HORMONE: CPT

## 2021-12-28 PROCEDURE — 36415 COLL VENOUS BLD VENIPUNCTURE: CPT

## 2022-01-04 ENCOUNTER — OFFICE VISIT (OUTPATIENT)
Dept: HEMATOLOGY ONCOLOGY | Facility: CLINIC | Age: 87
End: 2022-01-04
Payer: COMMERCIAL

## 2022-01-04 VITALS
BODY MASS INDEX: 22.45 KG/M2 | OXYGEN SATURATION: 94 % | RESPIRATION RATE: 18 BRPM | HEART RATE: 82 BPM | TEMPERATURE: 98.5 F | WEIGHT: 122 LBS | SYSTOLIC BLOOD PRESSURE: 138 MMHG | DIASTOLIC BLOOD PRESSURE: 70 MMHG | HEIGHT: 62 IN

## 2022-01-04 DIAGNOSIS — R53.83 OTHER FATIGUE: ICD-10-CM

## 2022-01-04 DIAGNOSIS — J44.9 ASTHMA-COPD OVERLAP SYNDROME (HCC): ICD-10-CM

## 2022-01-04 DIAGNOSIS — C08.9 SALIVARY GLAND CANCER (HCC): Primary | ICD-10-CM

## 2022-01-04 DIAGNOSIS — R06.02 SHORTNESS OF BREATH: ICD-10-CM

## 2022-01-04 DIAGNOSIS — C05.0 MALIGNANT NEOPLASM OF HARD PALATE (HCC): ICD-10-CM

## 2022-01-04 DIAGNOSIS — H35.3290 EXUDATIVE AGE-RELATED MACULAR DEGENERATION, UNSPECIFIED LATERALITY, UNSPECIFIED STAGE (HCC): ICD-10-CM

## 2022-01-04 DIAGNOSIS — E03.9 HYPOTHYROIDISM, UNSPECIFIED TYPE: ICD-10-CM

## 2022-01-04 PROCEDURE — 1160F RVW MEDS BY RX/DR IN RCRD: CPT | Performed by: INTERNAL MEDICINE

## 2022-01-04 PROCEDURE — 99214 OFFICE O/P EST MOD 30 MIN: CPT | Performed by: INTERNAL MEDICINE

## 2022-01-04 PROCEDURE — 1036F TOBACCO NON-USER: CPT | Performed by: INTERNAL MEDICINE

## 2022-01-04 NOTE — PATIENT INSTRUCTIONS
Blood work and CT neck prior to next visit in 9 months    Patient will need star transport for CT scan and for visit

## 2022-01-04 NOTE — PROGRESS NOTES
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  extending into the maxillary sinus  This has been growing very very slowly  No surgery  So far no radiation at patient's request   She still feels the same way  She remains under surveillance  Patient has some pain  in that area off and on  No symptoms suggestive of sinusitis  There was no actionable target on molecular study by Tay  Patient has vision problem secondary to macular degeneration and she gets injections into the eyes   She has chronic lung disease and has mild cough and exertional dyspnea  She follows with her lung specialist   She has history of migraine headaches     Tiredness and arthritic symptoms                      Current Outpatient Medications:     albuterol (2 5 mg/3 mL) 0 083 % nebulizer solution, Take 2 5 mg by nebulization every 6 (six) hours as needed for wheezing or shortness of breath, Disp: , Rfl:     albuterol (PROVENTIL HFA,VENTOLIN HFA) 90 mcg/act inhaler, Inhale 2 puffs every 6 (six) hours as needed for wheezing, Disp: 1 Inhaler, Rfl: 0    amLODIPine-benazepril (LOTREL 5-20) 5-20 MG per capsule, Take 1 capsule by mouth daily, Disp: 90 capsule, Rfl: 3    aspirin 81 MG tablet, Take 162 mg by mouth daily in the early morning  , Disp: , Rfl:     calcium carbonate (OS-TAE) 600 MG tablet, Take 600 mg by mouth daily in the early morning  , Disp: , Rfl:     fluticasone-salmeterol (Advair Diskus) 250-50 mcg/dose inhaler, Inhale 1 puff 2 (two) times a day Rinse mouth after use , Disp: 3 Inhaler, Rfl: 3    levothyroxine 112 mcg tablet, Take 1 tablet (112 mcg total) by mouth daily in the early morning, Disp: 90 tablet, Rfl: 3    lovastatin (MEVACOR) 20 mg tablet, Take 1 tablet (20 mg total) by mouth daily, Disp: 90 tablet, Rfl: 3    Magnesium 250 MG TABS, Take 1 tablet (250 mg total) by mouth in the morning, Disp: 100 tablet, Rfl: 3    metoprolol succinate (TOPROL-XL) 25 mg 24 hr tablet, Take 1 tablet (25 mg total) by mouth daily, Disp: 90 tablet, Rfl: 3    Allergies   Allergen Reactions    Amiodarone Hives    Omnipaque [Iohexol] Hives and Shortness Of Breath    Clindamycin Other (See Comments)     When taken causes cdiff immediately    Other     Sulfa Antibiotics Hives     itching    Acetazolamide Hives and Rash     Reaction Date:Unknown    Iv Dye  [Iodinated Diagnostic Agents] Hypertension and Palpitations     [facility] [id number]: Verified with patient     Naprosyn [Naproxen] Itching and Rash     Category: Allergy; Oncology History   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[facility] [address] )   12/30/2016 Initial Diagnosis    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HCC)     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[facility] )   2015 Initial Diagnosis    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[facility] )     11/3/2015 Biopsy     Palate, excisional biopsy (Eight (8) slides, [facility], [place], [place], collected 11/3/2015, labeled "[id number]"):         - Salivary gland epithelial neoplasm, differential diagnosis includes myoepithelial neoplasia such as monomorphic adenoma versus polymorphous low grade adenocarcinoma      3/16/2016 Biopsy    Right hard palate/ Alveolar lesion, shave biopsy:  - Upper portion of salivary gland tumor, consistent with polymorphous low-grade adenocarcinoma      Note:  The tumor is at the base of the biopsy  The interface between the tumor and surrounding normal tissue is not seen  Perineural invasion is not seen on the sections         ROS:  01/04/22 Reviewed 12 systems: See symptoms in HPI  Presently no other neurological, cardiac, pulmonary, GI and  symptoms other than mentioned above in HPI     Other symptoms are in HPI  No  fever, chills, bleeding, bone pains, skin rash, weight loss,   weakness, numbness,  claudication   No frequent infections  Not unusually sensitive to heat or cold  No swelling of the ankles  No swollen glands  Patient is anxious     No new symptoms /70 (BP Location: Left arm, Patient Position: Sitting, Cuff Size: Adult)   Pulse 82   Temp 98 5 °F (36 9 °C)   Resp 18   Ht 5' 2" (1 575 m)   Wt 55 3 kg (122 lb)   SpO2 94%   BMI 22 31 kg/m²     Physical Exam:        Patient is alert and oriented  Patient is not in distress  Vital signs are above  There is no   icterus and no oral thrush  There is a hard palate mass on the right that does not appear to be changed much, no palpable neck mass, clear lung fields, regular heart rate, systolic murmur, abdomen  soft and non tender, no palpable abdominal mass, no ascites, no edema of ankles, no calf tenderness, no focal neurological deficit except for vision problem, no skin rash, no palpable lymphadenopathy in the neck and axillary areas, no clubbing  Patient is anxious  Performance status 2  IMAGING:  IMPRESSION:     There is an expansile mass identified within the right posterior lateral maxilla now measuring approximately 2 2 x 2 2 cm  Although this is similar in size to the most recent examination, this has exhibited slow interval growth since 2016  There is   resulting bony remodeling of the adjacent bone      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               Workstation performed: [id number]      Imaging    CT soft tissue neck wo contrast (Order: [id number]) - 3/5/2021    IMPRESSION:     1  Stable 2 5 cm destructive mass in the right hard palate/roof of the mouth acentrically to the right, slightly eroding the floor of the right maxillary sinus, unchanged from prior studies, correlating to the history of salivary gland/palatine cancer  2   No definite metastatic disease on this noncontrast neck CT    3   Stable 1 cm cystic lesion in the right vallecula likely a vallecular cyst   4   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               Workstation performed: [id number]          Imaging    CT soft tissue neck wo contrast (Order: [id number]) - 12/7/2021    LABS:    Results for orders placed or performed in visit on 12/28/21   TSH, 3rd generation with Free T4 reflex   Result Value Ref Range    TSH 3RD GENERATON 0 394 0 358 - 3 740 uIU/mL     Labs, Imaging, & Other studies:   All pertinent labs and imaging studies were personally reviewed    Lab Results   Component Value Date     (L) 06/16/2015    K 4 4 11/13/2021     11/13/2021    CO2 29 11/13/2021    ANIONGAP 5 06/16/2015    BUN 19 11/13/2021    CREATININE 0 76 11/13/2021    GLUCOSE 138 06/16/2015    GLUF 97 11/13/2021    CALCIUM 8 6 11/13/2021    AST 21 11/13/2021    ALT 22 11/13/2021    ALKPHOS 65 11/13/2021    PROT 6 7 06/07/2015    BILITOT 0 28 06/07/2015    EGFR 70 11/13/2021     Lab Results   Component Value Date    WBC 6 72 11/13/2021    HGB 12 1 11/13/2021    HCT 38 6 11/13/2021    MCV 95 11/13/2021     11/13/2021       Reviewed test results and discussed with patient  Assessment and plan:      In 2015 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  extending into the maxillary sinus  This has been growing very very slowly  No surgery  So far no radiation at patient's request   She still feels the same way  She remains under surveillance  Patient has some pain  in that area off and on  No symptoms suggestive of sinusitis  There was no actionable target on molecular study by Tay  Patient has vision problem secondary to macular degeneration and she gets injections into the eyes   She has chronic lung disease and has mild cough and exertional dyspnea  She follows with her lung specialist   She has history of migraine headaches     Tiredness and arthritic symptoms      Physical examination and test results are as recorded and discussed  Plan is surveillance as wished by the patient  All discussed in detail  Questions answered    Discussed the importance of self-breast examination, eating healthy foods, staying active as tolerated and health screening tests  Patient will continue to follow with her primary physician and other consultants  Discussed precautions against coronavirus  Goal is to treat salivary gland tumor at hard palate causing lytic lesion and treatment will start when agreeable to the patient  Treatment could be palliative radiation    Patient appears to be capable of self-care  Jerry Manifold She would prefer to come back in 9 months    Patient will continue to follow with her primary physician and other consultants  See diagnoses, orders and instructions    1  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HCC)    - CBC and differential; Future  - Comprehensive metabolic panel; Future  - CT soft tissue neck wo contrast; Future    2  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HCC)    - CBC and differential; Future  - Comprehensive metabolic panel; Future  - CT soft tissue neck wo contrast; Future    3  Shortness of breath      4  Exudative age-related macular degeneration, unspecified laterality, unspecified stage ([facility] [address] )      5  Asthma-COPD overlap syndrome ([facility] [address] )      6  Hypothyroidism, unspecified type      7  Other fatigue    Blood work and CT neck prior to next visit in 9 months  Patient will need star transport for CT scan and for visit      Patient voiced understanding and agrees      Counseling / Coordination of Care      Provided counseling and support

## 2022-01-12 DIAGNOSIS — E03.9 HYPOTHYROIDISM, UNSPECIFIED TYPE: ICD-10-CM

## 2022-01-12 RX ORDER — LEVOTHYROXINE SODIUM 112 UG/1
TABLET ORAL
Qty: 90 TABLET | Refills: 3 | Status: SHIPPED | OUTPATIENT
Start: 2022-01-12

## 2022-02-02 ENCOUNTER — OFFICE VISIT (OUTPATIENT)
Dept: OBGYN CLINIC | Facility: CLINIC | Age: 87
End: 2022-02-02
Payer: COMMERCIAL

## 2022-02-02 VITALS
HEIGHT: 62 IN | WEIGHT: 123.4 LBS | BODY MASS INDEX: 22.71 KG/M2 | HEART RATE: 69 BPM | DIASTOLIC BLOOD PRESSURE: 71 MMHG | SYSTOLIC BLOOD PRESSURE: 180 MMHG

## 2022-02-02 DIAGNOSIS — M19.041 OSTEOARTHRITIS OF RIGHT MIDDLE FINGER: Primary | ICD-10-CM

## 2022-02-02 DIAGNOSIS — M35.3 POLYMYALGIA RHEUMATICA (HCC): ICD-10-CM

## 2022-02-02 PROCEDURE — 1036F TOBACCO NON-USER: CPT | Performed by: SURGERY

## 2022-02-02 PROCEDURE — 99213 OFFICE O/P EST LOW 20 MIN: CPT | Performed by: SURGERY

## 2022-02-02 PROCEDURE — 1160F RVW MEDS BY RX/DR IN RCRD: CPT | Performed by: SURGERY

## 2022-02-02 NOTE — PROGRESS NOTES
Channing MANSFIELD  Attending, Orthopaedic Surgery  Hand, Wrist, and Elbow Surgery  [facility]      ORTHOPAEDIC HAND, WRIST, AND ELBOW OFFICE  VISIT       ASSESSMENT/PLAN:      79 y/o female with osteoarthritis of the right long finger  Patient will be prescribed Voltaren Gel to apply prn for pain relief  She was also instructed to apply heat to the hand and long finger prn to help with the "stiffness"  She was offered a cortisone injection today in the office but declined and wished to proceed with Voltaren Gel and applying heat before considering an injection  If her symptoms/pain persists after 4-6 weeks of this treatment, she may schedule follow up visit to have cortisone injection performed  The patient verbalized understanding of exam findings and treatment plan  We engaged in the shared decision-making process and treatment options were discussed at length with the patient  Surgical and conservative management discussed today along with risks and benefits  Diagnoses and all orders for this visit:    Osteoarthritis of right middle finger    Follow Up:  Return if symptoms worsen or fail to improve  General Discussions:  Osteoarthritis:  The anatomy and physiology of osteoarthritis was discussed with the patient today in the office  Deterioration of the articular cartilage eventually leads to altered mobility at the joint, resulting in joint subluxation, osteophyte formation, cystic changes, as well as subchondral sclerosis  Eventually, pain, limited mobility, and compensatory hypermobility at surrounding joints may develop  While normal activity and usage of the joint may provide a painful experience to the patient, this typically does not result in damage to the limb  Treatment options include splints to decreased joint edema, pain, and inflammation    Therapy exercises to strengthen the surrounding musculature may relieve pain, but do not alter the overall continued development of osteoarthritis  Oral medications, topical medications, corticosteroid injections may decrease pain and increase overall function  Eventually, some patients may require surgical intervention  ____________________________________________________________________________________________________________________________________________      CHIEF COMPLAINT:  Chief Complaint   Patient presents with    Right Middle Finger - Pain    Right Hand - Locking       SUBJECTIVE:  Buffy Batres is a 80y o  year old RHD female who presents today for an orthopedic surgery consultation visit at the request of Dr Jason Vicente for right long finger and hand pain  Patient reports pain about the long finger and hand for about 2 years  Denies injury or trauma  Pain is worse with repetitive activities and gripping items  Most of her pain is localized about the DIP and PIP joints of the long finger  No numbness or tingling  No fevers or chills          Pain/symptom timing:  Worse during the day when active  Pain/symptom context:  Worse with activites and work  Pain/symptom modifying factors:  Rest makes better, activities make worse  Pain/symptom associated signs/symptoms: none    Prior treatment   · NSAIDs- Yes  · Injections No   · Bracing/Orthotics No    Physical Therapy No     I have personally reviewed all the relevant PMH, PSH, SH, FH, Medications and allergies      PAST MEDICAL HISTORY:  Past Medical History:   Diagnosis Date    A-fib ([facility] )     Allergy to IVP dye 06/04/2013    pt felt heaviness, palpitations    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), wet ([facility] )     bilateral    Aneurysm of aortic root ([facility] )     last assessed - [id number]    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HCC)     Aortic root dilatation ([facility] )     last assessed - [id number]    Arthritis     Ascending aortic aneurysm ([facility] )     last assessed - [id number]    Asthma     Basal cell carcinoma     last assessed - [id number]    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[facility] )     Disease of thyroid gland     Facet arthropathy, cervical     last assessed - [id number]    History of fracture of vertebral column     Hyperlipidemia     Hypertension     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[address] )     Hypoxia     last assessed - [id number]    Junctional rhythm     last assessed - 2017    Lightheadedness     last assessed - 2016    Migraine     Palpitations     last assessed - 2016    Pleural effusion, bilateral     last assessed - 2015    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HCC)     Shortness of breath     last assessed - 2017    Thyroid trouble     Trigger finger     last assessed - [id number]    Trigger middle finger of right hand     last assessed - [id number]    Urinary incontinence     last assessed -  Jul,2013; Resolved - [id number]       PAST SURGICAL HISTORY:  Past Surgical History:   Procedure Laterality Date    ABDOMINAL AORTIC ANEURYSM REPAIR W/ ENDOLUMINAL GRAFT  2015    Ascending aorta and hemiarch replacement with 30 mm Vascutek Gelweave graft;  Managed by Dion Lynn; last assessed - [id number]    APPENDECTOMY      BLADDER SURGERY      Reccurent hist of bladder surgery    [facility] YAZOO PROCEDURE      CARDIAC CATHETERIZATION  2004    Procedure summary - Luminal irregularities; last assessed - [id number]     SECTION      CORONARY ANEURYSM REPAIR      CYSTOSCOPY      botox injection    HYSTERECTOMY      OK ENDOSCOPIC INJECTION/IMPLANT N/A 2017    Procedure: CYSTOSCOPY; DURASPHERE-PERIURETHRAL BULKING AGENT INJECTION ;  Surgeon: Alhaji Caruso MD;  Location: [facility];  Service: Urology    OK INCISE FINGER TENDON SHEATH Right 10/18/2016    Procedure: LONG FINGER TRIGGER RELEASE ;  Surgeon: Misha Mejía MD;  Location: [facility];  Service: Orthopedics    THYROIDECTOMY      Total Thyroidectomy    TONSILLECTOMY AND ADENOIDECTOMY         FAMILY HISTORY:  Family History   Problem Relation Age of Onset    Coronary aneurysm Sister     Coronary artery disease Sister         CABG    Heart disease Family         cardiac disorder    Hypertension Family     Cancer Family     Thyroid disease Family        SOCIAL HISTORY:  Social History     Tobacco Use    Smoking status: Former Smoker     Packs/day: 0 25     Years: 57 00     Pack years: 14 25     Types: Cigarettes     Start date:      Quit date: 2014     Years since quittin 1    Smokeless tobacco: Former User    Tobacco comment: smoked 17 yrs 1/2 ppd quit and restarted then quit  10 days ago during 2014    Vaping Use    Vaping Use: Never used   Substance Use Topics    Alcohol use: Not Currently     Comment: Social drinker    Drug use: No       MEDICATIONS:    Current Outpatient Medications:     albuterol (2 5 mg/3 mL) 0 083 % nebulizer solution, Take 2 5 mg by nebulization every 6 (six) hours as needed for wheezing or shortness of breath, Disp: , Rfl:     albuterol (PROVENTIL HFA,VENTOLIN HFA) 90 mcg/act inhaler, Inhale 2 puffs every 6 (six) hours as needed for wheezing, Disp: 1 Inhaler, Rfl: 0    amLODIPine-benazepril (LOTREL 5-20) 5-20 MG per capsule, Take 1 capsule by mouth daily, Disp: 90 capsule, Rfl: 3    aspirin 81 MG tablet, Take 162 mg by mouth daily in the early morning  , Disp: , Rfl:     calcium carbonate (OS-TAE) 600 MG tablet, Take 600 mg by mouth daily in the early morning  , Disp: , Rfl:     fluticasone-salmeterol (Advair Diskus) 250-50 mcg/dose inhaler, Inhale 1 puff 2 (two) times a day Rinse mouth after use , Disp: 3 Inhaler, Rfl: 3    levothyroxine 112 mcg tablet, TAKE ONE TABLET BY MOUTH DAILY IN THE EARLY MORNING, Disp: 90 tablet, Rfl: 3    lovastatin (MEVACOR) 20 mg tablet, Take 1 tablet (20 mg total) by mouth daily, Disp: 90 tablet, Rfl: 3    Magnesium 250 MG TABS, Take 1 tablet (250 mg total) by mouth in the morning, Disp: 100 tablet, Rfl: 3    metoprolol succinate (TOPROL-XL) 25 mg 24 hr tablet, Take 1 tablet (25 mg total) by mouth daily, Disp: 90 tablet, Rfl: 3    ALLERGIES:  Allergies   Allergen Reactions    Amiodarone Hives    Omnipaque [Iohexol] Hives and Shortness Of Breath    Clindamycin Other (See Comments)     When taken causes cdiff immediately    Other     Sulfa Antibiotics Hives     itching    Acetazolamide Hives and Rash     Reaction Date:Unknown    Iv Dye  [Iodinated Diagnostic Agents] Hypertension and Palpitations     Annotation - [id number]: Verified with patient     Naprosyn [Naproxen] Itching and Rash     Category: Allergy;            REVIEW OF SYSTEMS:  Review of Systems   Constitutional: Negative for chills, fever and unexpected weight change  HENT: Negative for hearing loss, nosebleeds and sore throat  Eyes: Negative for pain, redness and visual disturbance  Respiratory: Negative for cough, shortness of breath and wheezing  Cardiovascular: Negative for chest pain, palpitations and leg swelling  Gastrointestinal: Negative for abdominal distention, nausea and vomiting  Endocrine: Negative for polydipsia and polyuria  Genitourinary: Negative for dysuria and hematuria  Skin: Negative for rash and wound  Neurological: Negative for dizziness, numbness and headaches  Psychiatric/Behavioral: Negative for decreased concentration and suicidal ideas         VITALS:  Vitals:    02/02/22 1058   BP: (!) 180/71   Pulse: 69       LABS:  HgA1c:   Lab Results   Component Value Date    HGBA1C 5 7 (H) 10/12/2020     BMP:   Lab Results   Component Value Date    GLUCOSE 138 06/16/2015    CALCIUM 8 6 11/13/2021     (L) 06/16/2015    K 4 4 11/13/2021    CO2 29 11/13/2021     11/13/2021    BUN 19 11/13/2021    CREATININE 0 76 11/13/2021       _____________________________________________________  PHYSICAL EXAMINATION:  General: well developed and well nourished, alert, oriented times 3 and appears comfortable  Psychiatric: Normal  HEENT: Normocephalic, Atraumatic Trachea Midline, No torticollis  Pulmonary: No audible wheezing or respiratory distress   Abdomen/GI: Non tender, non distended   Cardiovascular: No pitting edema, 2+ radial pulse   Skin: No masses, erythema, lacerations, fluctation, ulcerations  Neurovascular: Sensation Intact to the Median, Ulnar, Radial Nerve, Motor Intact to the Median, Ulnar, Radial Nerve and Pulses Intact  Musculoskeletal: Normal, except as noted in detailed exam and in HPI  MUSCULOSKELETAL EXAMINATION:  Right Long Finger: Skin is intact  No erythema or ecchymosis  Tenderness to palpation maximally about the PIP and DIP joints  Full ROM with mild pain  5/5 Motor to the APB, FDI, FDP2, FDP5, EDC  Sensation intact to light touch in the median, radial, and ulnar nerve distribution  Brisk capillary refill    ___________________________________________________  STUDIES REVIEWED:  I have personally reviewed AP lateral and oblique radiographs of right hand 9/18/2021 which demonstrate osteoarthritis of the DIP and PIP joints most pronounced at the 3rd DIP joint             PROCEDURES PERFORMED:  Procedures  No Procedures performed today    _____________________________________________________      Wendy Galarza    I,:  Larissa Avalos am acting as a scribe while in the presence of the attending physician :       I,:  Sharla Ashley MD personally performed the services described in this documentation    as scribed in my presence :

## 2022-02-07 DIAGNOSIS — M19.041 OSTEOARTHRITIS OF RIGHT MIDDLE FINGER: Primary | ICD-10-CM

## 2022-02-09 DIAGNOSIS — I10 ESSENTIAL HYPERTENSION: ICD-10-CM

## 2022-02-09 RX ORDER — AMLODIPINE BESYLATE AND BENAZEPRIL HYDROCHLORIDE 5; 20 MG/1; MG/1
1 CAPSULE ORAL DAILY
Qty: 90 CAPSULE | Refills: 3 | Status: SHIPPED | OUTPATIENT
Start: 2022-02-09

## 2022-03-24 ENCOUNTER — OFFICE VISIT (OUTPATIENT)
Dept: PULMONOLOGY | Facility: CLINIC | Age: 87
End: 2022-03-24
Payer: COMMERCIAL

## 2022-03-24 VITALS
SYSTOLIC BLOOD PRESSURE: 130 MMHG | RESPIRATION RATE: 16 BRPM | WEIGHT: 124 LBS | DIASTOLIC BLOOD PRESSURE: 70 MMHG | BODY MASS INDEX: 22.82 KG/M2 | HEART RATE: 74 BPM | OXYGEN SATURATION: 96 % | HEIGHT: 62 IN

## 2022-03-24 DIAGNOSIS — J45.40 MODERATE PERSISTENT ASTHMA WITHOUT COMPLICATION: Primary | ICD-10-CM

## 2022-03-24 DIAGNOSIS — R06.00 DOE (DYSPNEA ON EXERTION): ICD-10-CM

## 2022-03-24 DIAGNOSIS — J44.9 CHRONIC OBSTRUCTIVE PULMONARY DISEASE, UNSPECIFIED COPD TYPE (HCC): ICD-10-CM

## 2022-03-24 PROCEDURE — 1160F RVW MEDS BY RX/DR IN RCRD: CPT | Performed by: INTERNAL MEDICINE

## 2022-03-24 PROCEDURE — 1036F TOBACCO NON-USER: CPT | Performed by: INTERNAL MEDICINE

## 2022-03-24 PROCEDURE — 99214 OFFICE O/P EST MOD 30 MIN: CPT | Performed by: INTERNAL MEDICINE

## 2022-03-24 NOTE — PROGRESS NOTES
Office Progress Note - Pulmonary    June A Wedrajindermann 80 y o  female MRN: [id number]    Encounter: [id number]      Assessment:   Bronchial asthma   Cough   Dyspnea on exertion  Plan:     Advair 500/50, 1 inhalation twice a day   Albuterol rescue inhaler 2 inhalations 4 times a day as needed   Regular walks to improve stamina   Follow-up in 6 months  Discussion:   The patient's asthma for the most part is controlled  However the cough is still bothering her  In an attempt to better control it I have increase the Advair to 500/5, 1 inhalation twice a day  Hopefully this will help her with the stress incontinence  She will use albuterol rescue inhaler 2 inhalations 4 times a day as needed  Subjective:   Patient is here for follow-up visit  She denies any significant wheezing or chest tightness  She has frequent cough which is dry  She is bothered by the cough because of the stress incontinence  She is on Advair 250/50, 1 inhalation twice a day  She rarely needs to use her rescue inhaler  She has no nocturnal symptoms  Review of systems:  A 12 point system review is done and aside from what is stated above the rest of the review of systems is negative  Family history and social history are reviewed  Medications list is reviewed  Vitals: Blood pressure 130/70, pulse 74, resp  rate 16, height 5' 2" (1 575 m), weight 56 2 kg (124 lb), SpO2 96 %  ,     Physical Exam  Gen: Awake, alert, oriented x 3, no acute distress  HEENT: Mucous membranes moist, no oral lesions, no thrush  NECK: No accessory muscle use, JVP not elevated  Cardiac: Regular, single S1, single S2, no murmurs, no rubs, no gallops  Lungs:  Clear breath sounds  No wheezing rhonchi  Abdomen: normoactive bowel sounds, soft nontender, nondistended, no rebound or rigidity, no guarding  Extremities: no cyanosis, no clubbing, no edema  Neuro:  Grossly nonfocal   Skin:  No rash      Lab Results   Component Value Date WBC 6 72 11/13/2021    HGB 12 1 11/13/2021    HCT 38 6 11/13/2021    MCV 95 11/13/2021     11/13/2021     Lab Results   Component Value Date    SODIUM 139 11/13/2021    K 4 4 11/13/2021     11/13/2021    CO2 29 11/13/2021    BUN 19 11/13/2021    CREATININE 0 76 11/13/2021    GLUC 91 03/02/2021    CALCIUM 8 6 11/13/2021

## 2022-04-08 ENCOUNTER — TELEPHONE (OUTPATIENT)
Dept: FAMILY MEDICINE CLINIC | Facility: CLINIC | Age: 87
End: 2022-04-08

## 2022-04-08 NOTE — TELEPHONE ENCOUNTER
Pt LM stating she would like to know where she can get the covid booster  I called and LMOM letting her know she can call around to local pharmacies

## 2022-05-12 DIAGNOSIS — E78.5 HYPERLIPIDEMIA, UNSPECIFIED HYPERLIPIDEMIA TYPE: ICD-10-CM

## 2022-05-12 RX ORDER — LOVASTATIN 20 MG/1
20 TABLET ORAL DAILY
Qty: 90 TABLET | Refills: 3 | Status: SHIPPED | OUTPATIENT
Start: 2022-05-12

## 2022-07-05 ENCOUNTER — TELEPHONE (OUTPATIENT)
Dept: HEMATOLOGY ONCOLOGY | Facility: CLINIC | Age: 87
End: 2022-07-05

## 2022-07-08 ENCOUNTER — PREPPED CHART (OUTPATIENT)
Dept: URBAN - METROPOLITAN AREA CLINIC 6 | Facility: CLINIC | Age: 87
End: 2022-07-08

## 2022-07-12 ENCOUNTER — ESTABLISHED COMPREHENSIVE EXAM (OUTPATIENT)
Dept: URBAN - METROPOLITAN AREA CLINIC 6 | Facility: CLINIC | Age: 87
End: 2022-07-12

## 2022-07-12 DIAGNOSIS — H04.123: ICD-10-CM

## 2022-07-12 DIAGNOSIS — Z96.1: ICD-10-CM

## 2022-07-12 DIAGNOSIS — H35.3230: ICD-10-CM

## 2022-07-12 PROCEDURE — 92134 CPTRZ OPH DX IMG PST SGM RTA: CPT

## 2022-07-12 PROCEDURE — 92014 COMPRE OPH EXAM EST PT 1/>: CPT

## 2022-07-12 ASSESSMENT — TONOMETRY
OD_IOP_MMHG: 8
OS_IOP_MMHG: 8

## 2022-07-12 ASSESSMENT — VISUAL ACUITY
OD_CC: 20/40+1
OS_PH: 20/30+1
OS_CC: 20/50

## 2022-07-20 ENCOUNTER — TELEPHONE (OUTPATIENT)
Dept: PULMONOLOGY | Facility: CLINIC | Age: 87
End: 2022-07-20

## 2022-07-20 NOTE — TELEPHONE ENCOUNTER
Lvm for pt to schedule a 6m f/u @ our [address] office with Dr Elia Dean or JENNIFER in September  Offer AP before offering Dr Elia Dean

## 2022-09-06 ENCOUNTER — TELEPHONE (OUTPATIENT)
Dept: FAMILY MEDICINE CLINIC | Facility: CLINIC | Age: 87
End: 2022-09-06

## 2022-09-06 ENCOUNTER — APPOINTMENT (OUTPATIENT)
Dept: LAB | Facility: CLINIC | Age: 87
End: 2022-09-06
Payer: COMMERCIAL

## 2022-09-06 DIAGNOSIS — C08.9 SALIVARY GLAND CANCER (HCC): ICD-10-CM

## 2022-09-06 DIAGNOSIS — E03.9 HYPOTHYROIDISM, UNSPECIFIED TYPE: ICD-10-CM

## 2022-09-06 DIAGNOSIS — E03.9 HYPOTHYROIDISM, UNSPECIFIED TYPE: Primary | ICD-10-CM

## 2022-09-06 DIAGNOSIS — C05.0 MALIGNANT NEOPLASM OF HARD PALATE (HCC): ICD-10-CM

## 2022-09-06 LAB
ALBUMIN SERPL BCP-MCNC: 3.8 G/DL (ref 3.5–5)
ALP SERPL-CCNC: 60 U/L (ref 46–116)
ALT SERPL W P-5'-P-CCNC: 25 U/L (ref 12–78)
ANION GAP SERPL CALCULATED.3IONS-SCNC: 0 MMOL/L (ref 4–13)
AST SERPL W P-5'-P-CCNC: 19 U/L (ref 5–45)
BASOPHILS # BLD AUTO: 0.04 THOUSANDS/ΜL (ref 0–0.1)
BASOPHILS NFR BLD AUTO: 1 % (ref 0–1)
BILIRUB SERPL-MCNC: 0.58 MG/DL (ref 0.2–1)
BUN SERPL-MCNC: 18 MG/DL (ref 5–25)
CALCIUM SERPL-MCNC: 9 MG/DL (ref 8.3–10.1)
CHLORIDE SERPL-SCNC: 105 MMOL/L (ref 96–108)
CO2 SERPL-SCNC: 32 MMOL/L (ref 21–32)
CREAT SERPL-MCNC: 0.8 MG/DL (ref 0.6–1.3)
EOSINOPHIL # BLD AUTO: 0.14 THOUSAND/ΜL (ref 0–0.61)
EOSINOPHIL NFR BLD AUTO: 2 % (ref 0–6)
ERYTHROCYTE [DISTWIDTH] IN BLOOD BY AUTOMATED COUNT: 13.8 % (ref 11.6–15.1)
GFR SERPL CREATININE-BSD FRML MDRD: 65 ML/MIN/1.73SQ M
GLUCOSE P FAST SERPL-MCNC: 106 MG/DL (ref 65–99)
HCT VFR BLD AUTO: 38.5 % (ref 34.8–46.1)
HGB BLD-MCNC: 12.2 G/DL (ref 11.5–15.4)
IMM GRANULOCYTES # BLD AUTO: 0.02 THOUSAND/UL (ref 0–0.2)
IMM GRANULOCYTES NFR BLD AUTO: 0 % (ref 0–2)
LYMPHOCYTES # BLD AUTO: 2.21 THOUSANDS/ΜL (ref 0.6–4.47)
LYMPHOCYTES NFR BLD AUTO: 33 % (ref 14–44)
MCH RBC QN AUTO: 30.3 PG (ref 26.8–34.3)
MCHC RBC AUTO-ENTMCNC: 31.7 G/DL (ref 31.4–37.4)
MCV RBC AUTO: 96 FL (ref 82–98)
MONOCYTES # BLD AUTO: 0.69 THOUSAND/ΜL (ref 0.17–1.22)
MONOCYTES NFR BLD AUTO: 10 % (ref 4–12)
NEUTROPHILS # BLD AUTO: 3.67 THOUSANDS/ΜL (ref 1.85–7.62)
NEUTS SEG NFR BLD AUTO: 54 % (ref 43–75)
NRBC BLD AUTO-RTO: 0 /100 WBCS
PLATELET # BLD AUTO: 221 THOUSANDS/UL (ref 149–390)
PMV BLD AUTO: 11.5 FL (ref 8.9–12.7)
POTASSIUM SERPL-SCNC: 4.5 MMOL/L (ref 3.5–5.3)
PROT SERPL-MCNC: 7.3 G/DL (ref 6.4–8.4)
RBC # BLD AUTO: 4.03 MILLION/UL (ref 3.81–5.12)
SODIUM SERPL-SCNC: 137 MMOL/L (ref 135–147)
T4 FREE SERPL-MCNC: 1.14 NG/DL (ref 0.76–1.46)
TSH SERPL DL<=0.05 MIU/L-ACNC: 5.51 UIU/ML (ref 0.45–4.5)
WBC # BLD AUTO: 6.77 THOUSAND/UL (ref 4.31–10.16)

## 2022-09-06 PROCEDURE — 84443 ASSAY THYROID STIM HORMONE: CPT

## 2022-09-06 PROCEDURE — 85025 COMPLETE CBC W/AUTO DIFF WBC: CPT

## 2022-09-06 PROCEDURE — 80053 COMPREHEN METABOLIC PANEL: CPT

## 2022-09-06 PROCEDURE — 84439 ASSAY OF FREE THYROXINE: CPT

## 2022-09-06 PROCEDURE — 36415 COLL VENOUS BLD VENIPUNCTURE: CPT

## 2022-09-13 ENCOUNTER — HOSPITAL ENCOUNTER (OUTPATIENT)
Dept: CT IMAGING | Facility: HOSPITAL | Age: 87
Discharge: HOME | End: 2022-09-13
Attending: INTERNAL MEDICINE
Payer: COMMERCIAL

## 2022-09-13 DIAGNOSIS — C08.9 SALIVARY GLAND CANCER (HCC): ICD-10-CM

## 2022-09-13 DIAGNOSIS — C05.0 MALIGNANT NEOPLASM OF HARD PALATE (HCC): ICD-10-CM

## 2022-09-13 PROCEDURE — G1004 CDSM NDSC: HCPCS

## 2022-09-13 PROCEDURE — 70490 CT SOFT TISSUE NECK W/O DYE: CPT

## 2022-09-27 ENCOUNTER — RA CDI HCC (OUTPATIENT)
Dept: OTHER | Facility: HOSPITAL | Age: 87
End: 2022-09-27

## 2022-09-27 NOTE — PROGRESS NOTES
[facility]  coding opportunities       Chart reviewed, no opportunity found:   [address]        Patients Insurance     Medicare Insurance: [employer]

## 2022-09-29 ENCOUNTER — OFFICE VISIT (OUTPATIENT)
Dept: FAMILY MEDICINE CLINIC | Facility: CLINIC | Age: 87
End: 2022-09-29
Payer: COMMERCIAL

## 2022-09-29 VITALS
BODY MASS INDEX: 23.7 KG/M2 | TEMPERATURE: 95 F | DIASTOLIC BLOOD PRESSURE: 70 MMHG | OXYGEN SATURATION: 98 % | SYSTOLIC BLOOD PRESSURE: 140 MMHG | HEIGHT: 62 IN | WEIGHT: 128.8 LBS | HEART RATE: 63 BPM

## 2022-09-29 DIAGNOSIS — N39.46 MIXED STRESS AND URGE URINARY INCONTINENCE: ICD-10-CM

## 2022-09-29 DIAGNOSIS — G25.81 RESTLESS LEG SYNDROME: Primary | ICD-10-CM

## 2022-09-29 DIAGNOSIS — E20.9 HYPOPARATHYROIDISM, UNSPECIFIED HYPOPARATHYROIDISM TYPE (HCC): ICD-10-CM

## 2022-09-29 DIAGNOSIS — I70.0 ATHEROSCLEROSIS OF AORTA (HCC): ICD-10-CM

## 2022-09-29 DIAGNOSIS — E03.9 HYPOTHYROIDISM, UNSPECIFIED TYPE: ICD-10-CM

## 2022-09-29 DIAGNOSIS — I48.0 PAROXYSMAL ATRIAL FIBRILLATION (HCC): ICD-10-CM

## 2022-09-29 PROCEDURE — 99214 OFFICE O/P EST MOD 30 MIN: CPT | Performed by: FAMILY MEDICINE

## 2022-09-29 RX ORDER — ROPINIROLE 0.25 MG/1
0.25 TABLET, FILM COATED ORAL
Qty: 90 TABLET | Refills: 1 | Status: SHIPPED | OUTPATIENT
Start: 2022-09-29

## 2022-09-29 RX ORDER — LEVOTHYROXINE SODIUM 0.12 MG/1
125 TABLET ORAL
Qty: 90 TABLET | Refills: 3 | Status: SHIPPED | OUTPATIENT
Start: 2022-09-29

## 2022-09-29 NOTE — LETTER
September 29, 2022     Patient: June Jenine Sandifer  YOB: 1932  Date of Visit: 9/29/2022      To Whom it May Concern:    Wandy Benoit is under my professional care  Buffy was seen in my office on 9/29/2022  She needs to use incontinence products  If you have any questions or concerns, please don't hesitate to call           Sincerely,          Germán Pollard DO        CC: No Recipients

## 2022-09-29 NOTE — PROGRESS NOTES
Name: El Granado      : 1932      MRN: [id number]  Encounter Provider: Germán Pollard DO  Encounter Date: 2022   Encounter department: [address] [place] [address]    patient is a 45-year-old female with concern for restless leg - it is more painful than just needing to move but as soon as she gets out of bed, her symptoms go away  We will try ropinirole at its lowest dose  She recently had thyroid blood work it was slightly abnormal   I have reduced her dose of levothyroxine and she will have another TSH in about a week's  1  Restless leg syndrome  -     rOPINIRole (REQUIP) 0 25 mg tablet; Take 1 tablet (0 25 mg total) by mouth daily at bedtime    2  Hypothyroidism, unspecified type  -     levothyroxine 125 mcg tablet; Take 1 tablet (125 mcg total) by mouth daily in the early morning  -     TSH, 3rd generation with Free T4 reflex; Future; Expected date: 2022    3  Atherosclerosis of aorta (HCC)    4  Paroxysmal atrial fibrillation ([facility] [address] )    5  Hypoparathyroidism, unspecified hypoparathyroidism type ([facility] [address] )    6  Mixed stress and urge urinary incontinence    Patient also has issues with urinary incontinence - she has been through many procedures  She is wondering if there is some type of treatment that is not so invasive  She wants to fly to [facility] to see her sister but cannot go if she has the urinary incontinence that she has at this time  I also gave her a note for her landlord  Patient is on a fixed income and lives in subsidized housing  She needs a note about any expenditure she has  She pays quite a bit of money for incontinence garments  Subjective      Chief Complaint   Patient presents with    leg cramps     Having night time cramps only at night  Other concerns wants to discuss with         Patient is here for concerns for incontinence - if she stretches, coughs, sneezes  Wonders about ?electrical stimulation      Review of Systems Constitutional: Negative for chills and fever  HENT: Negative for congestion and sore throat  Respiratory: Negative for chest tightness  Cardiovascular: Negative for chest pain and palpitations  Gastrointestinal: Negative for abdominal pain, constipation, diarrhea and nausea  Genitourinary: Positive for frequency and urgency  Negative for difficulty urinating  Skin: Negative  Neurological: Negative for dizziness and headaches  Psychiatric/Behavioral: Negative  Current Outpatient Medications on File Prior to Visit   Medication Sig    albuterol (2 5 mg/3 mL) 0 083 % nebulizer solution Take 2 5 mg by nebulization every 6 (six) hours as needed for wheezing or shortness of breath    albuterol (PROVENTIL HFA,VENTOLIN HFA) 90 mcg/act inhaler Inhale 2 puffs every 6 (six) hours as needed for wheezing    amLODIPine-benazepril (LOTREL 5-20) 5-20 MG per capsule Take 1 capsule by mouth daily    aspirin 81 MG tablet Take 162 mg by mouth daily in the early morning      calcium carbonate (OS-TAE) 600 MG tablet Take 600 mg by mouth daily in the early morning      Diclofenac Sodium (VOLTAREN) 1 % Apply 2 g topically 4 (four) times a day    lovastatin (MEVACOR) 20 mg tablet Take 1 tablet (20 mg total) by mouth in the morning   metoprolol succinate (TOPROL-XL) 25 mg 24 hr tablet Take 1 tablet (25 mg total) by mouth daily       Objective     /70   Pulse 63   Temp (!) 95 °F (35 °C) (Tympanic)   Ht 5' 2" (1 575 m)   Wt 58 4 kg (128 lb 12 8 oz)   SpO2 98%   BMI 23 56 kg/m²     Physical Exam  Vitals and nursing note reviewed  Constitutional:       General: She is not in acute distress  HENT:      Head: Normocephalic  Neck:      Thyroid: No thyromegaly  Cardiovascular:      Rate and Rhythm: Normal rate and regular rhythm  Heart sounds: Normal heart sounds  Pulmonary:      Effort: Pulmonary effort is normal       Breath sounds: Normal breath sounds     Musculoskeletal:      Right lower leg: No edema  Left lower leg: No edema  Lymphadenopathy:      Cervical: No cervical adenopathy  Skin:     General: Skin is warm and dry  Neurological:      Mental Status: She is alert and oriented to person, place, and time     Psychiatric:         Mood and Affect: Mood normal        Luan Arce,

## 2022-10-10 DIAGNOSIS — I48.0 PAROXYSMAL ATRIAL FIBRILLATION (HCC): ICD-10-CM

## 2022-10-10 DIAGNOSIS — I10 ESSENTIAL HYPERTENSION: ICD-10-CM

## 2022-10-10 RX ORDER — METOPROLOL SUCCINATE 25 MG/1
25 TABLET, EXTENDED RELEASE ORAL DAILY
Qty: 90 TABLET | Refills: 1 | Status: SHIPPED | OUTPATIENT
Start: 2022-10-10

## 2022-10-12 PROBLEM — Z13.6 SCREENING FOR CARDIOVASCULAR CONDITION: Status: RESOLVED | Noted: 2021-05-05 | Resolved: 2022-10-12

## 2022-11-17 ENCOUNTER — TELEPHONE (OUTPATIENT)
Dept: HEMATOLOGY ONCOLOGY | Facility: CLINIC | Age: 87
End: 2022-11-17

## 2022-11-17 NOTE — TELEPHONE ENCOUNTER
LVM to the patient in regards to her appointment being rescheduled to 1/11/23 at 10:30am  Informed patient if this appt does not work please call the office to reschedule at [phone]

## 2022-11-28 ENCOUNTER — APPOINTMENT (OUTPATIENT)
Dept: LAB | Facility: CLINIC | Age: 87
End: 2022-11-28

## 2022-11-28 DIAGNOSIS — E03.9 HYPOTHYROIDISM, UNSPECIFIED TYPE: ICD-10-CM

## 2022-11-28 LAB — TSH SERPL DL<=0.05 MIU/L-ACNC: 1.24 UIU/ML (ref 0.45–4.5)

## 2022-12-21 ENCOUNTER — RA CDI HCC (OUTPATIENT)
Dept: OTHER | Facility: HOSPITAL | Age: 87
End: 2022-12-21

## 2022-12-22 NOTE — PROGRESS NOTES
[facility]  coding opportunities       Chart reviewed, no opportunity found:   [address]        Patients Insurance     Medicare Insurance: [employer]

## 2022-12-23 ENCOUNTER — IMMUNIZATIONS (OUTPATIENT)
Dept: FAMILY MEDICINE CLINIC | Facility: CLINIC | Age: 87
End: 2022-12-23

## 2022-12-23 DIAGNOSIS — Z23 NEED FOR VACCINATION: Primary | ICD-10-CM

## 2023-01-04 ENCOUNTER — TELEPHONE (OUTPATIENT)
Dept: HEMATOLOGY ONCOLOGY | Facility: CLINIC | Age: 88
End: 2023-01-04

## 2023-01-04 NOTE — TELEPHONE ENCOUNTER
Patient calling to confirm [facility] [employer] is scheduled for her appointment 1/11/23 @ 10:30am with Stephanie Alejandra  I called jerri on her behalf and they it is scheduled her

## 2023-01-05 ENCOUNTER — TELEPHONE (OUTPATIENT)
Dept: HEMATOLOGY ONCOLOGY | Facility: CLINIC | Age: 88
End: 2023-01-05

## 2023-01-05 DIAGNOSIS — C08.9 SALIVARY GLAND CANCER (HCC): Primary | ICD-10-CM

## 2023-01-05 DIAGNOSIS — R53.83 OTHER FATIGUE: ICD-10-CM

## 2023-01-05 DIAGNOSIS — C05.0 MALIGNANT NEOPLASM OF HARD PALATE (HCC): ICD-10-CM

## 2023-01-06 ENCOUNTER — APPOINTMENT (OUTPATIENT)
Dept: LAB | Facility: CLINIC | Age: 88
End: 2023-01-06

## 2023-01-06 DIAGNOSIS — C08.9 SALIVARY GLAND CANCER (HCC): ICD-10-CM

## 2023-01-06 DIAGNOSIS — R53.83 OTHER FATIGUE: ICD-10-CM

## 2023-01-06 DIAGNOSIS — C05.0 MALIGNANT NEOPLASM OF HARD PALATE (HCC): ICD-10-CM

## 2023-01-06 LAB
ALBUMIN SERPL BCP-MCNC: 3.8 G/DL (ref 3.5–5)
ALP SERPL-CCNC: 54 U/L (ref 46–116)
ALT SERPL W P-5'-P-CCNC: 23 U/L (ref 12–78)
ANION GAP SERPL CALCULATED.3IONS-SCNC: 5 MMOL/L (ref 4–13)
AST SERPL W P-5'-P-CCNC: 17 U/L (ref 5–45)
BASOPHILS # BLD AUTO: 0.07 THOUSANDS/ÂΜL (ref 0–0.1)
BASOPHILS NFR BLD AUTO: 1 % (ref 0–1)
BILIRUB SERPL-MCNC: 0.6 MG/DL (ref 0.2–1)
BUN SERPL-MCNC: 34 MG/DL (ref 5–25)
CALCIUM SERPL-MCNC: 9 MG/DL (ref 8.3–10.1)
CHLORIDE SERPL-SCNC: 103 MMOL/L (ref 96–108)
CO2 SERPL-SCNC: 29 MMOL/L (ref 21–32)
CREAT SERPL-MCNC: 0.81 MG/DL (ref 0.6–1.3)
EOSINOPHIL # BLD AUTO: 0.15 THOUSAND/ÂΜL (ref 0–0.61)
EOSINOPHIL NFR BLD AUTO: 2 % (ref 0–6)
ERYTHROCYTE [DISTWIDTH] IN BLOOD BY AUTOMATED COUNT: 13.3 % (ref 11.6–15.1)
GFR SERPL CREATININE-BSD FRML MDRD: 64 ML/MIN/1.73SQ M
GLUCOSE SERPL-MCNC: 100 MG/DL (ref 65–140)
HCT VFR BLD AUTO: 38.8 % (ref 34.8–46.1)
HGB BLD-MCNC: 12.3 G/DL (ref 11.5–15.4)
IMM GRANULOCYTES # BLD AUTO: 0.02 THOUSAND/UL (ref 0–0.2)
IMM GRANULOCYTES NFR BLD AUTO: 0 % (ref 0–2)
LYMPHOCYTES # BLD AUTO: 2.19 THOUSANDS/ÂΜL (ref 0.6–4.47)
LYMPHOCYTES NFR BLD AUTO: 29 % (ref 14–44)
MCH RBC QN AUTO: 30.1 PG (ref 26.8–34.3)
MCHC RBC AUTO-ENTMCNC: 31.7 G/DL (ref 31.4–37.4)
MCV RBC AUTO: 95 FL (ref 82–98)
MONOCYTES # BLD AUTO: 0.82 THOUSAND/ÂΜL (ref 0.17–1.22)
MONOCYTES NFR BLD AUTO: 11 % (ref 4–12)
NEUTROPHILS # BLD AUTO: 4.24 THOUSANDS/ÂΜL (ref 1.85–7.62)
NEUTS SEG NFR BLD AUTO: 57 % (ref 43–75)
NRBC BLD AUTO-RTO: 0 /100 WBCS
PLATELET # BLD AUTO: 233 THOUSANDS/UL (ref 149–390)
PMV BLD AUTO: 11.9 FL (ref 8.9–12.7)
POTASSIUM SERPL-SCNC: 4.9 MMOL/L (ref 3.5–5.3)
PROT SERPL-MCNC: 6.9 G/DL (ref 6.4–8.4)
RBC # BLD AUTO: 4.09 MILLION/UL (ref 3.81–5.12)
SODIUM SERPL-SCNC: 137 MMOL/L (ref 135–147)
WBC # BLD AUTO: 7.49 THOUSAND/UL (ref 4.31–10.16)

## 2023-01-11 ENCOUNTER — OFFICE VISIT (OUTPATIENT)
Dept: HEMATOLOGY ONCOLOGY | Facility: CLINIC | Age: 88
End: 2023-01-11

## 2023-01-11 VITALS
HEIGHT: 62 IN | RESPIRATION RATE: 16 BRPM | WEIGHT: 127 LBS | SYSTOLIC BLOOD PRESSURE: 120 MMHG | TEMPERATURE: 97.9 F | BODY MASS INDEX: 23.37 KG/M2 | OXYGEN SATURATION: 98 % | HEART RATE: 72 BPM | DIASTOLIC BLOOD PRESSURE: 70 MMHG

## 2023-01-11 DIAGNOSIS — C08.9 SALIVARY GLAND CANCER (HCC): Primary | ICD-10-CM

## 2023-01-11 NOTE — PROGRESS NOTES
Hematology/Oncology Outpatient Follow-up  Buffy Colón 80 y o  female 6/4/1932 [id number]    Date:  1/11/2023      Assessment and Plan:  1  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[address] )  History of low-grade salivary gland adenocarcinoma  She had repeat imaging in September 2022  This showed an unchanged 2 5 cm destructive soft tissue lesion in the right posterior maxillary hard palate with erosion into the right maxillary sinus floor  This has been stable for some time  Patient was clear that she does not want any intervention for this  Eventually if this were to become a problem she would want palliative care intervention  Reviewed that we do have this department and reviewed with her office is located  We also reviewed the incidental finding of a 4 8 stable aortic arch aneurysm  Repeat imaging recommended in 12 months  She states that she will decide if she even wants this to be monitored either in PCP would monitor if patient wants monitoring  Patient stated she does not wish to follow here anymore  She has been discharged  HPI:  In 2015 patient was diagnosed to 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  extending into the maxillary sinus  This has been growing very very slowly  No surgery  So far no radiation at patient's request   She still feels the same way  She remains under surveillance  Alexis Mills has some pain  in that area off and on  No symptoms suggestive of sinusitis  There was no actionable target on molecular study by [facility]         Interval history:    ROS: Review of Systems   Constitutional: Negative for activity change, appetite change, chills, fatigue, fever and unexpected weight change  Respiratory: Negative for cough and shortness of breath  Cardiovascular: Negative for chest pain, palpitations and leg swelling  Gastrointestinal: Negative for abdominal pain, constipation, diarrhea, nausea and vomiting     Genitourinary: Negative for difficulty urinating, dysuria and hematuria  Musculoskeletal: Negative for arthralgias  Skin: Negative  Neurological: Negative for dizziness, weakness, light-headedness, numbness and headaches  Hematological: Negative  Psychiatric/Behavioral: Negative  Past Medical History:   Diagnosis Date   • A-fib [facility])    • Allergy to IVP dye 06/04/2013    pt felt heaviness, palpitations   • 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), wet (HCC)     bilateral   • Aneurysm of aortic root     last assessed - [id number]   • Aortic arch aneurysm    • Aortic root dilatation (HCC)     last assessed - 11SGY3454   • Arthritis    • Ascending aortic aneurysm     last assessed - 84ECE0745   • Asthma    • Basal cell carcinoma     last assessed - 27Qle0945   • 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[facility])    • Disease of thyroid gland    • Facet arthropathy, cervical     last assessed - [id number]   • History of fracture of vertebral column    • Hyperlipidemia    • Hypertension    • 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[address] )    • Hypoxia     last assessed - 90ZQS5749   • Junctional rhythm     last assessed - 14Ssi9725   • Lightheadedness     last assessed - 53Knp9742   • Migraine    • Palpitations     last assessed - 20Cur5012   • Pleural effusion, bilateral     last assessed - 72Rpw2245   • 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[facility])    • Shortness of breath     last assessed - 61Vrv4647   • Thyroid trouble    • Trigger finger     last assessed - [id number]   • Trigger middle finger of right hand     last assessed - 00Dmm0147   • Urinary incontinence     last assessed -  22Jul,2013; Resolved - [id number]       Past Surgical History:   Procedure Laterality Date   • ABDOMINAL AORTIC ANEURYSM REPAIR W/ ENDOLUMINAL GRAFT  06/28/2015    Ascending aorta and hemiarch replacement with 30 mm Vascutek Gelweave graft;  Managed by [facility]; last assessed - 04KXT8419   • APPENDECTOMY     • BLADDER SURGERY      Reccurent histoy of bladder surgery   • HOROWITZ PROCEDURE     • CARDIAC CATHETERIZATION 2004    Procedure summary - Luminal irregularities; last assessed - 30BLI5131   •  SECTION     • CORONARY ANEURYSM REPAIR     • CYSTOSCOPY      botox injection   • HYSTERECTOMY     • NE NDSC NJX IMPLT MATRL URT&/BLDR NCK N/A 2017    Procedure: [employer]; DURASPHERE-PERIURETHRAL BULKING AGENT INJECTION ;  Surgeon: Bebe Silveira MD;  Location: [facility];  Service: Urology   • NE TENDON SHEATH INCISION Right 10/18/2016    Procedure: LONG FINGER TRIGGER RELEASE ;  Surgeon: Sreedhar Oakes MD;  Location: [facility];  Service: Orthopedics   • THYROIDECTOMY      Total Thyroidectomy   • TONSILLECTOMY AND ADENOIDECTOMY         Social History     Socioeconomic History   • Marital status:      Spouse name: None   • Number of children: None   • Years of education: None   • Highest education level: None   Occupational History   • None   Tobacco Use   • Smoking status: Former     Packs/day: 0 25     Years: 57 00     Pack years: 14 25     Types: Cigarettes     Start date: 36     Quit date: 2014     Years since quittin 0   • Smokeless tobacco: Former   • Tobacco comments:     smoked 17 yrs 1/2 ppd quit and restarted then quit  10 days ago during 2014    Vaping Use   • Vaping Use: Never used   Substance and Sexual Activity   • Alcohol use: Not Currently     Comment: Social drinker   • Drug use: No   • Sexual activity: Not Currently   Other Topics Concern   • None   Social History Narrative   • None     Social Determinants of Health     Financial Resource Strain: Medium Risk   • Difficulty of Paying Living Expenses: Somewhat hard   Food Insecurity: Not on file   Transportation Needs: Unmet Transportation Needs   • Lack of Transportation (Medical):  Yes   • Lack of Transportation (Non-Medical): Yes   Physical Activity: Not on file   Stress: Not on file   Social Connections: Not on file   Intimate Partner Violence: Not on file   Housing Stability: Not on file       Family History Problem Relation Age of Onset   • Coronary aneurysm Sister    • Coronary artery disease Sister         CABG   • Heart disease Family         cardiac disorder   • Hypertension Family    • Cancer Family    • Thyroid disease Family        Allergies   Allergen Reactions   • Amiodarone Hives   • Omnipaque [Iohexol] Hives and Shortness Of Breath   • Clindamycin Other (See Comments)     When taken causes cdiff immediately   • Other    • Sulfa Antibiotics Hives     itching   • Acetazolamide Hives and Rash     Reaction Date:Unknown   • Iv Dye  [Iodinated Contrast Media] Hypertension and Palpitations     [facility] [id number]: Verified with patient    • Naprosyn [Naproxen] Itching and Rash     Category:  Allergy;          Current Outpatient Medications:   •  albuterol (2 5 mg/3 mL) 0 083 % nebulizer solution, Take 2 5 mg by nebulization every 6 (six) hours as needed for wheezing or shortness of breath, Disp: , Rfl:   •  albuterol (PROVENTIL HFA,VENTOLIN HFA) 90 mcg/act inhaler, Inhale 2 puffs every 6 (six) hours as needed for wheezing, Disp: 1 Inhaler, Rfl: 0  •  amLODIPine-benazepril (LOTREL 5-20) 5-20 MG per capsule, Take 1 capsule by mouth daily, Disp: 90 capsule, Rfl: 3  •  aspirin 81 MG tablet, Take 162 mg by mouth daily in the early morning  , Disp: , Rfl:   •  calcium carbonate (OS-TAE) 600 MG tablet, Take 600 mg by mouth daily in the early morning  , Disp: , Rfl:   •  Diclofenac Sodium (VOLTAREN) 1 %, Apply 2 g topically 4 (four) times a day, Disp: 700 g, Rfl: 2  •  levothyroxine 125 mcg tablet, Take 1 tablet (125 mcg total) by mouth daily in the early morning, Disp: 90 tablet, Rfl: 3  •  lovastatin (MEVACOR) 20 mg tablet, Take 1 tablet (20 mg total) by mouth in the morning , Disp: 90 tablet, Rfl: 3  •  metoprolol succinate (TOPROL-XL) 25 mg 24 hr tablet, Take 1 tablet (25 mg total) by mouth daily, Disp: 90 tablet, Rfl: 1  •  rOPINIRole (REQUIP) 0 25 mg tablet, Take 1 tablet (0 25 mg total) by mouth daily at bedtime (Patient not taking: Reported on 1/11/2023), Disp: 90 tablet, Rfl: 1      Physical Exam:  /70 (BP Location: Left arm, Patient Position: Sitting, Cuff Size: Standard)   Pulse 72   Temp 97 9 °F (36 6 °C) (Temporal)   Resp 16   Ht 5' 2" (1 575 m)   Wt 57 6 kg (127 lb)   SpO2 98%   BMI 23 23 kg/m²     Physical Exam  Vitals reviewed  Constitutional:       General: She is not in acute distress  Appearance: She is well-developed  She is not ill-appearing  HENT:      Head: Normocephalic and atraumatic  Eyes:      General: No scleral icterus  Conjunctiva/sclera: Conjunctivae normal    Cardiovascular:      Rate and Rhythm: Normal rate and regular rhythm  Heart sounds: Normal heart sounds  No murmur heard  Pulmonary:      Effort: Pulmonary effort is normal  No respiratory distress  Breath sounds: Normal breath sounds  Abdominal:      Palpations: Abdomen is soft  Tenderness: There is no abdominal tenderness  Musculoskeletal:         General: No tenderness  Normal range of motion  Cervical back: Normal range of motion and neck supple  Right lower leg: No edema  Left lower leg: No edema  Lymphadenopathy:      Cervical: No cervical adenopathy  Skin:     General: Skin is warm and dry  Neurological:      Mental Status: She is alert and oriented to person, place, and time  Cranial Nerves: No cranial nerve deficit     Psychiatric:         Mood and Affect: Mood normal          Behavior: Behavior normal        Labs:  Lab Results   Component Value Date    WBC 7 49 01/06/2023    HGB 12 3 01/06/2023    HCT 38 8 01/06/2023    MCV 95 01/06/2023     01/06/2023     Lab Results   Component Value Date     (L) 06/16/2015    K 4 9 01/06/2023     01/06/2023    CO2 29 01/06/2023    ANIONGAP 5 06/16/2015    BUN 34 (H) 01/06/2023    CREATININE 0 81 01/06/2023    GLUCOSE 138 06/16/2015    GLUF 106 (H) 09/06/2022    CALCIUM 9 0 01/06/2023    AST 17 01/06/2023 ALT 23 01/06/2023    ALKPHOS 54 01/06/2023    PROT 6 7 06/07/2015    BILITOT 0 28 06/07/2015    EGFR 64 01/06/2023       Patient voiced understanding and agreement in the above discussion  Aware to contact our office with questions/symptoms in the interim  This note has been generated by voice recognition software system  Therefore, there may be spelling, grammar, and or syntax errors  Please contact if questions arise

## 2023-01-14 ENCOUNTER — APPOINTMENT (EMERGENCY)
Dept: RADIOLOGY | Facility: HOSPITAL | Age: 88
End: 2023-01-14

## 2023-01-14 ENCOUNTER — HOSPITAL ENCOUNTER (INPATIENT)
Facility: HOSPITAL | Age: 88
LOS: 5 days | Discharge: HOME HEALTH | End: 2023-01-19
Attending: EMERGENCY MEDICINE | Admitting: STUDENT IN AN ORGANIZED HEALTH CARE EDUCATION/TRAINING PROGRAM

## 2023-01-14 ENCOUNTER — APPOINTMENT (EMERGENCY)
Dept: CT IMAGING | Facility: HOSPITAL | Age: 88
End: 2023-01-14

## 2023-01-14 DIAGNOSIS — M79.601 RIGHT ARM PAIN: Primary | ICD-10-CM

## 2023-01-14 DIAGNOSIS — M25.511 ACUTE PAIN OF RIGHT SHOULDER: ICD-10-CM

## 2023-01-14 DIAGNOSIS — M54.9 BACK PAIN: ICD-10-CM

## 2023-01-14 DIAGNOSIS — R52 INTRACTABLE PAIN: ICD-10-CM

## 2023-01-14 PROBLEM — I71.22 ANEURYSM OF AORTIC ARCH WITHOUT RUPTURE: Status: ACTIVE | Noted: 2023-01-14

## 2023-01-14 LAB
2HR DELTA HS TROPONIN: 0 NG/L
4HR DELTA HS TROPONIN: 0 NG/L
ALBUMIN SERPL BCP-MCNC: 3.9 G/DL (ref 3.5–5)
ALP SERPL-CCNC: 63 U/L (ref 46–116)
ALT SERPL W P-5'-P-CCNC: 27 U/L (ref 12–78)
ANION GAP SERPL CALCULATED.3IONS-SCNC: 5 MMOL/L (ref 4–13)
AST SERPL W P-5'-P-CCNC: 27 U/L (ref 5–45)
ATRIAL RATE: 102 BPM
ATRIAL RATE: 62 BPM
ATRIAL RATE: 70 BPM
BASOPHILS # BLD AUTO: 0.04 THOUSANDS/ÂΜL (ref 0–0.1)
BASOPHILS NFR BLD AUTO: 1 % (ref 0–1)
BILIRUB SERPL-MCNC: 0.52 MG/DL (ref 0.2–1)
BUN SERPL-MCNC: 19 MG/DL (ref 5–25)
CALCIUM SERPL-MCNC: 8.9 MG/DL (ref 8.3–10.1)
CARDIAC TROPONIN I PNL SERPL HS: 7 NG/L
CHLORIDE SERPL-SCNC: 102 MMOL/L (ref 96–108)
CO2 SERPL-SCNC: 32 MMOL/L (ref 21–32)
CREAT SERPL-MCNC: 0.71 MG/DL (ref 0.6–1.3)
EOSINOPHIL # BLD AUTO: 0.14 THOUSAND/ÂΜL (ref 0–0.61)
EOSINOPHIL NFR BLD AUTO: 2 % (ref 0–6)
ERYTHROCYTE [DISTWIDTH] IN BLOOD BY AUTOMATED COUNT: 13 % (ref 11.6–15.1)
GFR SERPL CREATININE-BSD FRML MDRD: 75 ML/MIN/1.73SQ M
GLUCOSE SERPL-MCNC: 124 MG/DL (ref 65–140)
HCT VFR BLD AUTO: 37.5 % (ref 34.8–46.1)
HGB BLD-MCNC: 12 G/DL (ref 11.5–15.4)
IMM GRANULOCYTES # BLD AUTO: 0.03 THOUSAND/UL (ref 0–0.2)
IMM GRANULOCYTES NFR BLD AUTO: 0 % (ref 0–2)
LYMPHOCYTES # BLD AUTO: 1.58 THOUSANDS/ÂΜL (ref 0.6–4.47)
LYMPHOCYTES NFR BLD AUTO: 23 % (ref 14–44)
MCH RBC QN AUTO: 30 PG (ref 26.8–34.3)
MCHC RBC AUTO-ENTMCNC: 32 G/DL (ref 31.4–37.4)
MCV RBC AUTO: 94 FL (ref 82–98)
MONOCYTES # BLD AUTO: 0.68 THOUSAND/ÂΜL (ref 0.17–1.22)
MONOCYTES NFR BLD AUTO: 10 % (ref 4–12)
NEUTROPHILS # BLD AUTO: 4.42 THOUSANDS/ÂΜL (ref 1.85–7.62)
NEUTS SEG NFR BLD AUTO: 64 % (ref 43–75)
NRBC BLD AUTO-RTO: 0 /100 WBCS
P AXIS: 73 DEGREES
P AXIS: 83 DEGREES
P AXIS: 97 DEGREES
PLATELET # BLD AUTO: 204 THOUSANDS/UL (ref 149–390)
PMV BLD AUTO: 11.1 FL (ref 8.9–12.7)
POTASSIUM SERPL-SCNC: 3.8 MMOL/L (ref 3.5–5.3)
PR INTERVAL: 184 MS
PR INTERVAL: 184 MS
PROT SERPL-MCNC: 7.2 G/DL (ref 6.4–8.4)
QRS AXIS: 76 DEGREES
QRS AXIS: 84 DEGREES
QRS AXIS: 90 DEGREES
QRSD INTERVAL: 70 MS
QRSD INTERVAL: 74 MS
QRSD INTERVAL: 82 MS
QT INTERVAL: 372 MS
QT INTERVAL: 388 MS
QT INTERVAL: 412 MS
QTC INTERVAL: 418 MS
QTC INTERVAL: 419 MS
QTC INTERVAL: 442 MS
RBC # BLD AUTO: 4 MILLION/UL (ref 3.81–5.12)
SODIUM SERPL-SCNC: 139 MMOL/L (ref 135–147)
T WAVE AXIS: 72 DEGREES
T WAVE AXIS: 78 DEGREES
T WAVE AXIS: 85 DEGREES
VENTRICULAR RATE: 62 BPM
VENTRICULAR RATE: 70 BPM
VENTRICULAR RATE: 85 BPM
WBC # BLD AUTO: 6.89 THOUSAND/UL (ref 4.31–10.16)

## 2023-01-14 RX ORDER — ASPIRIN 81 MG/1
81 TABLET ORAL DAILY
Status: DISCONTINUED | OUTPATIENT
Start: 2023-01-14 | End: 2023-01-19 | Stop reason: HOSPADM

## 2023-01-14 RX ORDER — ACETAMINOPHEN 325 MG/1
975 TABLET ORAL EVERY 8 HOURS SCHEDULED
Status: DISCONTINUED | OUTPATIENT
Start: 2023-01-14 | End: 2023-01-19 | Stop reason: HOSPADM

## 2023-01-14 RX ORDER — METOPROLOL SUCCINATE 25 MG/1
25 TABLET, EXTENDED RELEASE ORAL DAILY
Status: DISCONTINUED | OUTPATIENT
Start: 2023-01-14 | End: 2023-01-19 | Stop reason: HOSPADM

## 2023-01-14 RX ORDER — HYDROMORPHONE HCL IN WATER/PF 6 MG/30 ML
0.2 PATIENT CONTROLLED ANALGESIA SYRINGE INTRAVENOUS ONCE
Status: COMPLETED | OUTPATIENT
Start: 2023-01-14 | End: 2023-01-14

## 2023-01-14 RX ORDER — DIAZEPAM 5 MG/ML
5 INJECTION, SOLUTION INTRAMUSCULAR; INTRAVENOUS ONCE
Status: COMPLETED | OUTPATIENT
Start: 2023-01-14 | End: 2023-01-14

## 2023-01-14 RX ORDER — ONDANSETRON 2 MG/ML
4 INJECTION INTRAMUSCULAR; INTRAVENOUS ONCE
Status: COMPLETED | OUTPATIENT
Start: 2023-01-14 | End: 2023-01-14

## 2023-01-14 RX ORDER — FENTANYL CITRATE 50 UG/ML
25 INJECTION, SOLUTION INTRAMUSCULAR; INTRAVENOUS ONCE
Status: COMPLETED | OUTPATIENT
Start: 2023-01-14 | End: 2023-01-14

## 2023-01-14 RX ORDER — ONDANSETRON 2 MG/ML
4 INJECTION INTRAMUSCULAR; INTRAVENOUS EVERY 4 HOURS PRN
Status: DISCONTINUED | OUTPATIENT
Start: 2023-01-14 | End: 2023-01-19 | Stop reason: HOSPADM

## 2023-01-14 RX ORDER — HYDROMORPHONE HCL IN WATER/PF 6 MG/30 ML
0.2 PATIENT CONTROLLED ANALGESIA SYRINGE INTRAVENOUS EVERY 4 HOURS PRN
Status: DISCONTINUED | OUTPATIENT
Start: 2023-01-14 | End: 2023-01-16

## 2023-01-14 RX ORDER — PRAVASTATIN SODIUM 20 MG
20 TABLET ORAL
Status: DISCONTINUED | OUTPATIENT
Start: 2023-01-14 | End: 2023-01-19 | Stop reason: HOSPADM

## 2023-01-14 RX ORDER — AMLODIPINE BESYLATE 5 MG/1
5 TABLET ORAL DAILY
Status: DISCONTINUED | OUTPATIENT
Start: 2023-01-14 | End: 2023-01-19 | Stop reason: HOSPADM

## 2023-01-14 RX ORDER — LISINOPRIL 20 MG/1
20 TABLET ORAL DAILY
Status: DISCONTINUED | OUTPATIENT
Start: 2023-01-14 | End: 2023-01-19 | Stop reason: HOSPADM

## 2023-01-14 RX ORDER — AMOXICILLIN 250 MG
1 CAPSULE ORAL
Status: DISCONTINUED | OUTPATIENT
Start: 2023-01-14 | End: 2023-01-19 | Stop reason: HOSPADM

## 2023-01-14 RX ORDER — KETOROLAC TROMETHAMINE 30 MG/ML
30 INJECTION, SOLUTION INTRAMUSCULAR; INTRAVENOUS ONCE
Status: COMPLETED | OUTPATIENT
Start: 2023-01-14 | End: 2023-01-14

## 2023-01-14 RX ORDER — LIDOCAINE 50 MG/G
1 PATCH TOPICAL DAILY
Status: DISCONTINUED | OUTPATIENT
Start: 2023-01-14 | End: 2023-01-19 | Stop reason: HOSPADM

## 2023-01-14 RX ORDER — ALBUTEROL SULFATE 2.5 MG/3ML
2.5 SOLUTION RESPIRATORY (INHALATION) EVERY 6 HOURS PRN
Status: DISCONTINUED | OUTPATIENT
Start: 2023-01-14 | End: 2023-01-19 | Stop reason: HOSPADM

## 2023-01-14 RX ORDER — BACLOFEN 10 MG/1
10 TABLET ORAL 3 TIMES DAILY
Status: DISCONTINUED | OUTPATIENT
Start: 2023-01-14 | End: 2023-01-19 | Stop reason: HOSPADM

## 2023-01-14 RX ORDER — OXYCODONE HYDROCHLORIDE 5 MG/1
2.5 TABLET ORAL EVERY 4 HOURS PRN
Status: DISCONTINUED | OUTPATIENT
Start: 2023-01-14 | End: 2023-01-16

## 2023-01-14 RX ORDER — OXYCODONE HYDROCHLORIDE 5 MG/1
5 TABLET ORAL EVERY 4 HOURS PRN
Status: DISCONTINUED | OUTPATIENT
Start: 2023-01-14 | End: 2023-01-16

## 2023-01-14 RX ORDER — ENOXAPARIN SODIUM 100 MG/ML
40 INJECTION SUBCUTANEOUS DAILY
Status: DISCONTINUED | OUTPATIENT
Start: 2023-01-14 | End: 2023-01-19 | Stop reason: HOSPADM

## 2023-01-14 RX ADMIN — DICLOFENAC SODIUM 2 G: 10 GEL TOPICAL at 21:55

## 2023-01-14 RX ADMIN — ACETAMINOPHEN 975 MG: 325 TABLET ORAL at 21:55

## 2023-01-14 RX ADMIN — ONDANSETRON 4 MG: 2 INJECTION INTRAMUSCULAR; INTRAVENOUS at 05:05

## 2023-01-14 RX ADMIN — KETOROLAC TROMETHAMINE 30 MG: 30 INJECTION, SOLUTION INTRAMUSCULAR; INTRAVENOUS at 17:14

## 2023-01-14 RX ADMIN — ENOXAPARIN SODIUM 40 MG: 40 INJECTION SUBCUTANEOUS at 11:48

## 2023-01-14 RX ADMIN — LIDOCAINE 1 PATCH: 50 PATCH CUTANEOUS at 11:46

## 2023-01-14 RX ADMIN — DICLOFENAC SODIUM 2 G: 10 GEL TOPICAL at 17:18

## 2023-01-14 RX ADMIN — DIAZEPAM 5 MG: 5 INJECTION INTRAMUSCULAR; INTRAVENOUS at 08:23

## 2023-01-14 RX ADMIN — BACLOFEN 10 MG: 10 TABLET ORAL at 16:17

## 2023-01-14 RX ADMIN — DICLOFENAC SODIUM 2 G: 10 GEL TOPICAL at 14:29

## 2023-01-14 RX ADMIN — HYDROMORPHONE HYDROCHLORIDE 0.2 MG: 0.2 INJECTION, SOLUTION INTRAMUSCULAR; INTRAVENOUS; SUBCUTANEOUS at 06:28

## 2023-01-14 RX ADMIN — ACETAMINOPHEN 975 MG: 325 TABLET ORAL at 14:28

## 2023-01-14 RX ADMIN — ASPIRIN 81 MG: 81 TABLET, COATED ORAL at 11:47

## 2023-01-14 RX ADMIN — OXYCODONE HYDROCHLORIDE 5 MG: 5 TABLET ORAL at 16:17

## 2023-01-14 RX ADMIN — BACLOFEN 10 MG: 10 TABLET ORAL at 11:48

## 2023-01-14 RX ADMIN — AMLODIPINE BESYLATE 5 MG: 5 TABLET ORAL at 11:47

## 2023-01-14 RX ADMIN — FENTANYL CITRATE 25 MCG: 50 INJECTION, SOLUTION INTRAMUSCULAR; INTRAVENOUS at 04:22

## 2023-01-14 RX ADMIN — LISINOPRIL 20 MG: 20 TABLET ORAL at 11:47

## 2023-01-14 RX ADMIN — PRAVASTATIN SODIUM 20 MG: 20 TABLET ORAL at 16:17

## 2023-01-14 RX ADMIN — HYDROMORPHONE HYDROCHLORIDE 0.2 MG: 0.2 INJECTION, SOLUTION INTRAMUSCULAR; INTRAVENOUS; SUBCUTANEOUS at 05:05

## 2023-01-14 RX ADMIN — SENNOSIDES AND DOCUSATE SODIUM 1 TABLET: 8.6; 5 TABLET ORAL at 21:54

## 2023-01-14 RX ADMIN — BACLOFEN 10 MG: 10 TABLET ORAL at 21:54

## 2023-01-14 RX ADMIN — OXYCODONE HYDROCHLORIDE 5 MG: 5 TABLET ORAL at 11:58

## 2023-01-14 RX ADMIN — METOPROLOL SUCCINATE 25 MG: 25 TABLET, EXTENDED RELEASE ORAL at 11:47

## 2023-01-14 NOTE — ED ATTENDING ATTESTATION
1/14/2023  IKirt DO, saw and evaluated the patient  I have discussed the patient with the resident/non-physician practitioner and agree with the resident's/non-physician practitioner's findings, Plan of Care, and MDM as documented in the resident's/non-physician practitioner's note, except where noted  All available labs and Radiology studies were reviewed  I was present for key portions of any procedure(s) performed by the resident/non-physician practitioner and I was immediately available to provide assistance  At this point I agree with the current assessment done in the Emergency Department  I have conducted an independent evaluation of this patient a history and physical is as follows:    80-year-old female presents via EMS for acute right shoulder pain  Patient states that she had some pain in her elbow yesterday but it was mild  Woke up tonight in excruciating pain in her right shoulder and scapula  No known trauma  Patient denies any chest pain or shortness of breath  Does have a history of an aortic aneurysm that required stenting about 7 years ago  Given patient's history we will obtain a CT scan but unfortunately cannot use IV contrast due to allergy  We will try to compare to most recent CT that was done to compare measurements for possible expansion  Also obtain cardiac work-up, treat pain with IV medications and continue with frequent observations  There are no overlying skin changes or pain out of proportion to suggest necrotizing fasciitis or other underlying infectious cause at this time but will continue with frequent exams  Work-up is overall negative  Troponin is 7 so she will require a delta troponin  Patient requiring multiple doses of pain medicine so she probably will need to be admitted for pain control despite what the CT read would be  Patient might be a transfer to [facility] for possible vascular intervention if needed    Disposition pending on final CT read  Patient signed out to oncoming team to follow-up on results and disposition    ED Course         Critical Care Time  Procedures

## 2023-01-14 NOTE — ASSESSMENT & PLAN NOTE
History of low-grade salivary gland adenocarcinoma      Repeat imaging in September 2022 with unchanged 2 5 cm destructive soft tissue lesion in the right posterior maxillary hard palate with erosion into the right maxillary sinus floor  Continue follow up outpatient

## 2023-01-14 NOTE — ASSESSMENT & PLAN NOTE
As noted on CT imaging, minimally unchanged at 4 7cm   Patient recommended to follow up with PCP outpatient with yearly screenings

## 2023-01-14 NOTE — ASSESSMENT & PLAN NOTE
80year old female presented with acute worsening pain of her right shoulder blade, and her right elbow  CT chest without contrast without any acute processes  X-ray of shoulder and elbow pending, reviewed by me without acute fractures  Patient reports pain has been ongoing for the past week, reports as dull, achy  Patient denies any trauma, falls or heavy lifting  Suspect patient may have pulled a muscle, pinched nerve versus muscle spasm  Conservative management with Lidoderm patch, heat, scheduled Tylenol, baclofen and as needed opioids

## 2023-01-14 NOTE — H&P
[address] [facility]  H&P- [facility] NASRIN Perez 6/4/1932, 80 y o  female MRN: [id number]  Unit/Bed#: E2 -01 Encounter: [id number]  Primary Care Provider: Annamarie Seen, DO   Date and time admitted to hospital: 1/14/2023  3:40 AM    * Acute pain of right shoulder  Assessment & Plan  80year old female presented with acute worsening pain of her right shoulder blade, and her right elbow  CT chest without contrast without any acute processes  X-ray of shoulder and elbow pending, reviewed by me without acute fractures  Patient reports pain has been ongoing for the past week, reports as dull, achy  Patient denies any trauma, falls or heavy lifting  Suspect patient may have pulled a muscle, pinched nerve versus muscle spasm  Conservative management with Lidoderm patch, heat, scheduled Tylenol, baclofen and as needed opioids    Aneurysm of aortic arch without rupture  Assessment & Plan  As noted on CT imaging, minimally unchanged at 4 7cm   Patient recommended to follow up with PCP outpatient with yearly screenings    Hypothyroidism  Assessment & Plan  Continue levothyroxine    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[facility] [address] )  Assessment & Plan  History of low-grade salivary gland adenocarcinoma      Repeat imaging in September 2022 with unchanged 2 5 cm destructive soft tissue lesion in the right posterior maxillary hard palate with erosion into the right maxillary sinus floor  Continue follow up outpatient    Paroxysmal atrial fibrillation (HCC)  Assessment & Plan  Continue toprol xl 25  Not on anticoagulation due to risk of bleeding due to salivary tumor    Essential hypertension  Assessment & Plan  Continue home BP meds  Elevated on admission, suspect likely due to pain      VTE Prophylaxis: Enoxaparin (Lovenox)  / sequential compression device   Code Status: FC  POLST: There is no POLST form on file for this patient (pre-hospital)  Discussion with family: patient    Anticipated Length of Stay:  Patient will be admitted on an Inpatient basis with an anticipated length of stay of 2 midnights  Justification for Hospital Stay: pain control, awaiting imaging, PT evaluation    Total Time for Visit, including Counseling / Coordination of Care: 60 minutes  Greater than 50% of this total time spent on direct patient counseling and coordination of care  Chief Complaint:   Right shoulder pain    History of Present Illness:    June A Christian Bourne is a 80 y o  female who presents with acute right shoulder pain  Patient with past med history of paroxysmal A  fib, hypertension, salivary gland cancer  She states that she awoke around 3 this morning with excruciating pain in her right shoulder near her scapula  States that this been going on and off this past week  Initial CT imaging without contrast due to allergy completed shows no acute process but does show 47 mm aortic arch aneurysm  Minimally unchanged from previous imaging  Patient reports the pain as dull, achy with occasional tenderness on palpation, and unchanged with active and passive flexion  She currently denies any fevers, chills, nausea or vomiting or history of gout  X-ray imaging ordered and pending formal read  Review of Systems:    Review of Systems   Musculoskeletal: Positive for arthralgias  All other systems reviewed and are negative        Past Medical and Surgical History:     Past Medical History:   Diagnosis Date   • A-fib [facility])    • Allergy to IVP dye 06/04/2013    pt felt heaviness, palpitations   • 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), wet (HCC)     bilateral   • Aneurysm of aortic root     last assessed - [id number]   • Aortic arch aneurysm    • Aortic root dilatation (HCC)     last assessed - [id number]   • Arthritis    • Ascending aortic aneurysm     last assessed - [id number]   • Asthma    • Basal cell carcinoma     last assessed - [id number]   • 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HCC)    • Disease of thyroid gland    • Facet arthropathy, cervical     last assessed - [id number]   • History of fracture of vertebral column    • Hyperlipidemia    • Hypertension    • 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[address] )    • Hypoxia     last assessed - [id number]   • Junctional rhythm     last assessed - 2017   • Lightheadedness     last assessed - 2016   • Migraine    • Palpitations     last assessed - 2016   • Pleural effusion, bilateral     last assessed - 2015   • 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HCC)    • Shortness of breath     last assessed - 2017   • Thyroid trouble    • Trigger finger     last assessed - [id number]   • Trigger middle finger of right hand     last assessed - 2016   • Urinary incontinence     last assessed -  Jul,2013; Resolved - [id number]       Past Surgical History:   Procedure Laterality Date   • ABDOMINAL AORTIC ANEURYSM REPAIR W/ ENDOLUMINAL GRAFT  2015    Ascending aorta and hemiarch replacement with 30 mm Vascutek Gelweave graft; Managed by Reid Pulliam; last assessed - [id number]   • APPENDECTOMY     • BLADDER SURGERY      Reccurent histoy of bladder surgery   • HOROWITZ PROCEDURE     • CARDIAC CATHETERIZATION  2004    Procedure summary - Luminal irregularities; last assessed - [id number]   •  SECTION     • CORONARY ANEURYSM REPAIR     • CYSTOSCOPY      botox injection   • HYSTERECTOMY     • AZ NDSC NJX IMPLT MATRL URT&/BLDR NCK N/A 2017    Procedure: Mary Ribera; [facility] BULKING AGENT INJECTION ;  Surgeon: Belem Banuelos MD;  Location: [facility];  Service: Urology   • AZ TENDON SHEATH INCISION Right 10/18/2016    Procedure: LONG FINGER TRIGGER RELEASE ;  Surgeon: Yolie Mccullough MD;  Location: [facility];  Service: Orthopedics   • THYROIDECTOMY      Total Thyroidectomy   • TONSILLECTOMY AND ADENOIDECTOMY         Meds/Allergies:    Prior to Admission medications    Medication Sig Start Date End Date Taking?  Authorizing Provider   albuterol (2 5 mg/3 mL) 0 083 % nebulizer solution Take 2 5 mg by nebulization every 6 (six) hours as needed for wheezing or shortness of breath    Historical Provider, MD   albuterol (PROVENTIL HFA,VENTOLIN HFA) 90 mcg/act inhaler Inhale 2 puffs every 6 (six) hours as needed for wheezing 8/8/18   Stephen Willard,    amLODIPine-benazepril (LOTREL 5-20) 5-20 MG per capsule Take 1 capsule by mouth daily 2/9/22   Rekha Kee MD   aspirin 81 MG tablet Take 162 mg by mouth daily in the early morning      Historical Provider, MD   calcium carbonate (OS-TAE) 600 MG tablet Take 600 mg by mouth daily in the early morning      Historical Provider, MD   Diclofenac Sodium (VOLTAREN) 1 % Apply 2 g topically 4 (four) times a day 2/7/22   Sara Carlisle MD   levothyroxine 125 mcg tablet Take 1 tablet (125 mcg total) by mouth daily in the early morning 9/29/22   Enedina Martines DO   lovastatin (MEVACOR) 20 mg tablet Take 1 tablet (20 mg total) by mouth in the morning  5/12/22   Enedina Martines DO   metoprolol succinate (TOPROL-XL) 25 mg 24 hr tablet Take 1 tablet (25 mg total) by mouth daily 10/10/22   Rekha Kee MD   rOPINIRole (REQUIP) 0 25 mg tablet Take 1 tablet (0 25 mg total) by mouth daily at bedtime  Patient not taking: Reported on 1/11/2023 9/29/22   Enedina Martines DO     I have reviewed home medications with patient personally  Allergies: Allergies   Allergen Reactions   • Amiodarone Hives   • Omnipaque [Iohexol] Hives and Shortness Of Breath   • Clindamycin Other (See Comments)     When taken causes cdiff immediately   • Other    • Sulfa Antibiotics Hives     itching   • Acetazolamide Hives and Rash     Reaction Date:Unknown   • Iv Dye  [Iodinated Contrast Media] Hypertension and Palpitations     [facility] - [id number]: Verified with patient    • Naprosyn [Naproxen] Itching and Rash     Category: Allergy;        Social History:     Marital Status:     Occupation: retired  Patient Pre-hospital Living Situation: home  Patient Pre-hospital Level of Mobility: ambulatory  Patient Pre-hospital Diet Restrictions: none  Substance Use History:   Social History     Substance and Sexual Activity   Alcohol Use Not Currently    Comment: Social drinker     Social History     Tobacco Use   Smoking Status Former   • Packs/day: 0 25   • Years: 57 00   • Pack years: 14 25   • Types: Cigarettes   • Start date: 36   • Quit date: 2014   • Years since quittin 0   Smokeless Tobacco Former   Tobacco Comments    smoked 17 yrs 1/2 ppd quit and restarted then quit  10 days ago during 2014      Social History     Substance and Sexual Activity   Drug Use No       Family History:    Family History   Problem Relation Age of Onset   • Coronary aneurysm Sister    • Coronary artery disease Sister         CABG   • Heart disease Family         cardiac disorder   • Hypertension Family    • Cancer Family    • Thyroid disease Family        Physical Exam:     Vitals:   Blood Pressure: 156/68 (23)  Pulse: 73 (23)  Temperature: 98 9 °F (37 2 °C) (23)  Temp Source: Temporal (23)  Respirations: 18 (23)  SpO2: 92 % (23)    Physical Exam  Vitals and nursing note reviewed  Constitutional:       General: She is in acute distress  HENT:      Head: Normocephalic and atraumatic  Eyes:      General: No scleral icterus  Conjunctiva/sclera: Conjunctivae normal    Cardiovascular:      Rate and Rhythm: Normal rate and regular rhythm  Pulmonary:      Breath sounds: Normal breath sounds  No wheezing or rhonchi  Abdominal:      General: Bowel sounds are normal  There is no distension  Palpations: Abdomen is soft  Musculoskeletal:         General: Tenderness ( right shoulder) present  No swelling  Right lower leg: No edema  Left lower leg: No edema  Skin:     General: Skin is warm and dry  Neurological:      General: No focal deficit present  Mental Status: She is alert  Mental status is at baseline           Additional Data:     Lab Results: I have personally reviewed pertinent reports  Results from last 7 days   Lab Units 01/14/23  0421   WBC Thousand/uL 6 89   HEMOGLOBIN g/dL 12 0   HEMATOCRIT % 37 5   PLATELETS Thousands/uL 204   NEUTROS PCT % 64   LYMPHS PCT % 23   MONOS PCT % 10   EOS PCT % 2     Results from last 7 days   Lab Units 01/14/23  0421   SODIUM mmol/L 139   POTASSIUM mmol/L 3 8   CHLORIDE mmol/L 102   CO2 mmol/L 32   BUN mg/dL 19   CREATININE mg/dL 0 71   ANION GAP mmol/L 5   CALCIUM mg/dL 8 9   ALBUMIN g/dL 3 9   TOTAL BILIRUBIN mg/dL 0 52   ALK PHOS U/L 63   ALT U/L 27   AST U/L 27   GLUCOSE RANDOM mg/dL 124                       Imaging: I have personally reviewed pertinent reports  CT chest without contrast   Final Result by Wing Luis Angel MD (01/14 3032)      No evidence of acute thoracic process insofar as can be detected on a noncontrast CT  Distal aortic arch/proximal descending aortic aneurysm maximum diameter 4 7 cm minimally enlarged since December 2016  Nonemergent vascular surgical consultation is advised  Additional chronic findings and negatives as above  This study demonstrates a significant  finding and was documented as such in Epic for liaison and referring practitioner notification  Workstation performed: [id number]         XR shoulder 2+ views RIGHT   ED Interpretation by Louie Madden DO (01/14 5515)   No acute osseous abnormality      XR elbow 3+ views RIGHT   ED Interpretation by Louie Madden DO (01/14 2750)   No acute osseous abnormality          EKG, Pathology, and Other Studies Reviewed on Admission:   · EKG: NSR    Allscripts / Epic Records Reviewed: Yes     ** Please Note: This note has been constructed using a voice recognition system   **

## 2023-01-14 NOTE — ED PROVIDER NOTES
History  Chief Complaint   Patient presents with   • Arm Pain     Pt c/o right shoulder pain radiates down arm, rpeorts pain started 1 week ago went away and came back last night  Denies any injury     Patient is a 68-year-old female, past medical history of A  fib, aortic aneurysm status post repair, hyperlipidemia, hypertension, and salivary gland cancer, who presents the emergency department for right arm pain  Patient states she woke up just emergency room with severe 10 out of 10 right upper extremity pain  She states she feels as if the pain starts in her right scapula and radiates to her right shoulder and then to her right elbow  No modifying factors  She states the pain has basically been constant since waking up  She states she did have a similar episode about a week ago but it was very fleeting  No associated symptoms  Denies any injuries  Denies any chest pain, shortness of breath, back pain, or any other new or concerning symptoms  Chart reviewed  Patient's most recent imaging of her aorta was on 9/13/2022 when she underwent a CT soft tissue of her neck  Among other things, it showed a 4 8 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 Taking?    Diclofenac Sodium (VOLTAREN) 1 %   No No   Sig: Apply 2 g topically 4 (four) times a day   albuterol (2 5 mg/3 mL) 0 083 % nebulizer solution   Yes No   Sig: Take 2 5 mg by nebulization every 6 (six) hours as needed for wheezing or shortness of breath   albuterol (PROVENTIL HFA,VENTOLIN HFA) 90 mcg/act inhaler   No No   Sig: Inhale 2 puffs every 6 (six) hours as needed for wheezing   amLODIPine-benazepril (LOTREL 5-20) 5-20 MG per capsule   No No   Sig: Take 1 capsule by mouth daily   aspirin 81 MG tablet   Yes No   Sig: Take 162 mg by mouth daily in the early morning     calcium carbonate (OS-TAE) 600 MG tablet   Yes No   Sig: Take 600 mg by mouth daily in the early morning     levothyroxine 125 mcg tablet   No No   Sig: Take 1 tablet (125 mcg total) by mouth daily in the early morning   lovastatin (MEVACOR) 20 mg tablet   No No   Sig: Take 1 tablet (20 mg total) by mouth in the morning  metoprolol succinate (TOPROL-XL) 25 mg 24 hr tablet   No No   Sig: Take 1 tablet (25 mg total) by mouth daily   rOPINIRole (REQUIP) 0 25 mg tablet   No No   Sig: Take 1 tablet (0 25 mg total) by mouth daily at bedtime   Patient not taking: Reported on 1/11/2023      Facility-Administered Medications: None       Past Medical History:   Diagnosis Date   • A-fib [facility])    • Allergy to IVP dye 06/04/2013    pt felt heaviness, palpitations   • 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), wet (HCC)     bilateral   • Aneurysm of aortic root     last assessed - [id number]   • Aortic arch aneurysm    • Aortic root dilatation (HCC)     last assessed - [id number]   • Arthritis    • Ascending aortic aneurysm     last assessed - 01AIB8211   • Asthma    • Basal cell carcinoma     last assessed - 86Vbh9296   • 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[facility] )    • Disease of thyroid gland    • Facet arthropathy, cervical     last assessed - 98FLR6304   • History of fracture of vertebral column    • Hyperlipidemia    • Hypertension    • 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[facility] )    • Hypoxia     last assessed - 74SFC3657   • Junctional rhythm     last assessed - 09Xid1309   • Lightheadedness     last assessed - 68Iad8220   • Migraine    • Palpitations     last assessed - 68Ncd5152   • Pleural effusion, bilateral     last assessed - 63Lcm5156   • 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[facility] )    • Shortness of breath     last assessed - 75Eaf9566   • Thyroid trouble    • Trigger finger     last assessed - 32OBI4836   • Trigger middle finger of right hand     last assessed - 25Dcm8076   • Urinary incontinence     last assessed -  22Jul,2013;  Resolved - 51FUK9474       Past Surgical History:   Procedure Laterality Date   • ABDOMINAL AORTIC ANEURYSM REPAIR W/ ENDOLUMINAL GRAFT  2015    Ascending aorta and hemiarch replacement with 30 mm Vascutek Gelweave graft; Managed by Oscar Camarillo; last assessed - 05VQI9641   • APPENDECTOMY     • BLADDER SURGERY      Reccurent histoy of bladder surgery   • HOROWITZ PROCEDURE     • CARDIAC CATHETERIZATION  2004    Procedure summary - Luminal irregularities; last assessed - 56WRH1820   •  SECTION     • CORONARY ANEURYSM REPAIR     • CYSTOSCOPY      botox injection   • HYSTERECTOMY     • MS NDSC NJX IMPLT MATRL URT&/BLDR NCK N/A 2017    Procedure: Devere Chard; [place] [facility]ULKING AGENT INJECTION ;  Surgeon: Dayana Escobar MD;  Location: [facility];  Service: Urology   • MS TENDON SHEATH INCISION Right 10/18/2016    Procedure: LONG FINGER TRIGGER RELEASE ;  Surgeon: Sierra Rausch MD;  Location: [facility];  Service: Orthopedics   • THYROIDECTOMY      Total Thyroidectomy   • TONSILLECTOMY AND ADENOIDECTOMY         Family History   Problem Relation Age of Onset   • Coronary aneurysm Sister    • Coronary artery disease Sister         CABG   • Heart disease Family         cardiac disorder   • Hypertension Family    • Cancer Family    • Thyroid disease Family      I have reviewed and agree with the history as documented      E-Cigarette/Vaping   • E-Cigarette Use Never User      E-Cigarette/Vaping Substances   • Nicotine No    • THC No    • CBD No    • Flavoring No    • Other No    • Unknown No      Social History     Tobacco Use   • Smoking status: Former     Packs/day: 0 25     Years: 57 00     Pack years: 14 25     Types: Cigarettes     Start date: 36     Quit date: 2014     Years since quittin 0   • Smokeless tobacco: Former   • Tobacco comments:     smoked 17 yrs 1/2 ppd quit and restarted then quit  10 days ago during 2014    Vaping Use   • Vaping Use: Never used   Substance Use Topics   • Alcohol use: Not Currently Comment: Social drinker   • Drug use: No        Review of Systems   Constitutional: Negative for chills and fever  Respiratory: Negative for shortness of breath  Cardiovascular: Negative for chest pain  Gastrointestinal: Negative for abdominal pain, diarrhea, nausea and vomiting  Musculoskeletal:        Right arm pain   All other systems reviewed and are negative  Physical Exam  ED Triage Vitals [01/14/23 0345]   Temperature Pulse Respirations Blood Pressure SpO2   97 5 °F (36 4 °C) 76 18 (!) 204/86 96 %      Temp Source Heart Rate Source Patient Position - Orthostatic VS BP Location FiO2 (%)   Oral Monitor Lying Left arm --      Pain Score       10 - Worst Possible Pain             Orthostatic Vital Signs  Vitals:    01/14/23 0345 01/14/23 0400 01/14/23 0500 01/14/23 0600   BP: (!) 204/86 (!) 181/74 155/67 128/86   Pulse: 76 70 70 76   Patient Position - Orthostatic VS: Lying Lying Lying Lying       Physical Exam  Vitals and nursing note reviewed  Constitutional:       General: She is in acute distress  Appearance: She is well-developed  She is not ill-appearing, toxic-appearing or diaphoretic  HENT:      Head: Normocephalic and atraumatic  Right Ear: External ear normal       Left Ear: External ear normal       Nose: Nose normal    Eyes:      General: Lids are normal  No scleral icterus  Cardiovascular:      Rate and Rhythm: Normal rate and regular rhythm  Heart sounds: Normal heart sounds  No murmur heard  No friction rub  No gallop  Pulmonary:      Effort: Pulmonary effort is normal  No respiratory distress  Breath sounds: Normal breath sounds  No wheezing or rales  Abdominal:      Palpations: Abdomen is soft  Tenderness: There is no abdominal tenderness  There is no guarding or rebound  Musculoskeletal:         General: No deformity  Normal range of motion  Cervical back: Normal range of motion and neck supple        Comments: Full range of motion of the right upper extremity  Right upper extremity compartments are soft  2+ right radial pulse  No overlying skin changes  Right upper extremity is warm and well-perfused  No focal tenderness, but patient appears to be very uncomfortable  Skin:     General: Skin is warm and dry  Neurological:      General: No focal deficit present  Mental Status: She is alert     Psychiatric:         Mood and Affect: Mood normal          Behavior: Behavior normal          ED Medications  Medications   fentanyl citrate (PF) 100 MCG/2ML 25 mcg (25 mcg Intravenous Given 1/14/23 0422)   HYDROmorphone HCl (DILAUDID) injection 0 2 mg (0 2 mg Intravenous Given 1/14/23 0505)   ondansetron (ZOFRAN) injection 4 mg (4 mg Intravenous Given 1/14/23 0505)   HYDROmorphone HCl (DILAUDID) injection 0 2 mg (0 2 mg Intravenous Given 1/14/23 0628)       Diagnostic Studies  Results Reviewed     Procedure Component Value Units Date/Time    HS Troponin I 2hr [[id number]]  (Normal) Collected: 01/14/23 0622    Lab Status: Final result Specimen: Blood from Arm, Left Updated: 01/14/23 0654     hs TnI 2hr 7 ng/L      Delta 2hr hsTnI 0 ng/L     HS Troponin I 4hr [[id number]]     Lab Status: No result Specimen: Blood     HS Troponin 0hr (reflex protocol) [[id number]]  (Normal) Collected: 01/14/23 0421    Lab Status: Final result Specimen: Blood from Arm, Left Updated: 01/14/23 0455     hs TnI 0hr 7 ng/L     Comprehensive metabolic panel [[id number]] Collected: 01/14/23 0421    Lab Status: Final result Specimen: Blood from Arm, Left Updated: 01/14/23 0450     Sodium 139 mmol/L      Potassium 3 8 mmol/L      Chloride 102 mmol/L      CO2 32 mmol/L      ANION GAP 5 mmol/L      BUN 19 mg/dL      Creatinine 0 71 mg/dL      Glucose 124 mg/dL      Calcium 8 9 mg/dL      AST 27 U/L      ALT 27 U/L      Alkaline Phosphatase 63 U/L      Total Protein 7 2 g/dL      Albumin 3 9 g/dL      Total Bilirubin 0 52 mg/dL      eGFR 75 ml/min/1 73sq m     Narrative:      [facility] guidelines for Chronic Kidney Disease (CKD):   •  Stage 1 with normal or high GFR (GFR > 90 mL/min/1 73 square meters)  •  Stage 2 Mild CKD (GFR = 60-89 mL/min/1 73 square meters)  •  Stage 3A Moderate CKD (GFR = 45-59 mL/min/1 73 square meters)  •  Stage 3B Moderate CKD (GFR = 30-44 mL/min/1 73 square meters)  •  Stage 4 Severe CKD (GFR = 15-29 mL/min/1 73 square meters)  •  Stage 5 End Stage CKD (GFR <15 mL/min/1 73 square meters)  Note: GFR calculation is accurate only with a steady state creatinine    CBC and differential [[id number]] Collected: 01/14/23 0421    Lab Status: Final result Specimen: Blood from Arm, Left Updated: 01/14/23 0434     WBC 6 89 Thousand/uL      RBC 4 00 Million/uL      Hemoglobin 12 0 g/dL      Hematocrit 37 5 %      MCV 94 fL      MCH 30 0 pg      MCHC 32 0 g/dL      RDW 13 0 %      MPV 11 1 fL      Platelets 293 Thousands/uL      nRBC 0 /100 WBCs      Neutrophils Relative 64 %      Immat GRANS % 0 %      Lymphocytes Relative 23 %      Monocytes Relative 10 %      Eosinophils Relative 2 %      Basophils Relative 1 %      Neutrophils Absolute 4 42 Thousands/µL      Immature Grans Absolute 0 03 Thousand/uL      Lymphocytes Absolute 1 58 Thousands/µL      Monocytes Absolute 0 68 Thousand/µL      Eosinophils Absolute 0 14 Thousand/µL      Basophils Absolute 0 04 Thousands/µL                  XR shoulder 2+ views RIGHT   ED Interpretation by Molly Sims DO (01/14 0223)   No acute osseous abnormality      XR elbow 3+ views RIGHT   ED Interpretation by Molly Sims DO (01/14 9188)   No acute osseous abnormality      CT chest without contrast    (Results Pending)         Procedures  ECG 12 Lead Documentation Only    Date/Time: 1/14/2023 6:55 AM  Performed by: Molly Sims DO  Authorized by: Molly Sims DO     ECG reviewed by me, the ED Provider: yes    Patient location:  ED  Interpretation:     Interpretation: non-specific    Rate:     ECG rate: 85    ECG rate assessment: normal    Rhythm:     Rhythm: sinus rhythm    Ectopy:     Ectopy: PAC    QRS:     QRS axis:  Normal  Conduction:     Conduction: normal    ST segments:     ST segments:  Normal  T waves:     T waves: normal            ED Course  ED Course as of 01/14/23 0659   Sat Jan 14, 2023   0435 CBC and differential   0450 Comprehensive metabolic panel   8997 hs TnI 0hr: 7   0500 Patient still in pain  Will try dilaudid   0626 Notified by nurse patient still in significant pain  Will give more Dilaudid  8257 Patient signed out to Dr Agueda Lafleur  Pending CT read  Likely admit given persistent pain  SBIRT 22yo+    Flowsheet Row Most Recent Value   SBIRT (23 yo +)    In order to provide better care to our patients, we are screening all of our patients for alcohol and drug use  Would it be okay to ask you these screening questions? No Filed at: 01/14/2023 3544                Medical Decision Making  Patient is a 80 y o  female who presents to the ED for severe right upper extremity pain  Patient is in acute distress, but nontoxic  Patient is hypertensive, but otherwise vitals are stable  Unclear etiology of pain  Differential includes musculoskeletal pain  Given no reported trauma, doubt fracture, doubt dislocation  Presentation not consistent with compartment syndrome  Given the history of aortic aneurysm, there is some concern that pain may be related to this/referred  Likewise, differential also includes ACS  Given no abdominal pain, doubt biliary cholic/acute cholecystitis  Plan: Labs, pain control, CT chest without contrast (patient has severe contrast allergy, plan to obtain CT chest without contrast to compare to recent previous, if significant change in appearance will get emergent contrast prep for further evaluation)  Portions of the record may have been created with voice recognition software   Occasional wrong word or "sound a like" substitutions may have occurred due to the inherent limitations of voice recognition software  Read the chart carefully and recognize, using context, where substitutions have occurred  Amount and/or Complexity of Data Reviewed  External Data Reviewed: radiology  Labs: ordered  Decision-making details documented in ED Course  Radiology: ordered and independent interpretation performed  ECG/medicine tests: ordered  Risk  Prescription drug management  Disposition  Final diagnoses:   Right arm pain     Time reflects when diagnosis was documented in both MDM as applicable and the Disposition within this note     Time User Action Codes Description Comment    1/14/2023  6:57 AM Martha Funk Add [M79 [id number]] Right arm pain       ED Disposition     None      Follow-up Information    None         Patient's Medications   Discharge Prescriptions    No medications on file     No discharge procedures on file  PDMP Review     None           ED Provider  Attending physically available and evaluated Buffy A Akosua Arguello I managed the patient along with the ED Attending      Electronically Signed by         Candace Wick DO  01/14/23 5692

## 2023-01-14 NOTE — PLAN OF CARE
Problem: PAIN - ADULT  Goal: Verbalizes/displays adequate comfort level or baseline comfort level  Description: Interventions:  - Encourage patient to monitor pain and request assistance  - Assess pain using appropriate pain scale  - Administer analgesics based on type and severity of pain and evaluate response  - Implement non-pharmacological measures as appropriate and evaluate response  - Consider cultural and social influences on pain and pain management  - Notify physician/advanced practitioner if interventions unsuccessful or patient reports new pain  Outcome: Progressing     Problem: METABOLIC, FLUID AND ELECTROLYTES - ADULT  Goal: Electrolytes maintained within normal limits  Description: INTERVENTIONS:  - Monitor labs and assess patient for signs and symptoms of electrolyte imbalances  - Administer electrolyte replacement as ordered  - Monitor response to electrolyte replacements, including repeat lab results as appropriate  - Instruct patient on fluid and nutrition as appropriate  Outcome: Progressing  Goal: Fluid balance maintained  Description: INTERVENTIONS:  - Monitor labs   - Monitor I/O and WT  - Instruct patient on fluid and nutrition as appropriate  - Assess for signs & symptoms of volume excess or deficit  Outcome: Progressing     Problem: CARDIOVASCULAR - ADULT  Goal: Maintains optimal cardiac output and hemodynamic stability  Description: INTERVENTIONS:  - Monitor I/O, vital signs and rhythm  - Monitor for S/S and trends of decreased cardiac output  - Administer and titrate ordered vasoactive medications to optimize hemodynamic stability  - Assess quality of pulses, skin color and temperature  - Assess for signs of decreased coronary artery perfusion  - Instruct patient to report change in severity of symptoms  Outcome: Progressing     Problem: Knowledge Deficit  Goal: Patient/family/caregiver demonstrates understanding of disease process, treatment plan, medications, and discharge instructions  Description: Complete learning assessment and assess knowledge base    Interventions:  - Provide teaching at level of understanding  - Provide teaching via preferred learning methods  Outcome: Progressing     Problem: DISCHARGE PLANNING  Goal: Discharge to home or other facility with appropriate resources  Description: INTERVENTIONS:  - Identify barriers to discharge w/patient and caregiver  - Arrange for needed discharge resources and transportation as appropriate  - Identify discharge learning needs (meds, wound care, etc )  - Arrange for interpretive services to assist at discharge as needed  - Refer to Case Management Department for coordinating discharge planning if the patient needs post-hospital services based on physician/advanced practitioner order or complex needs related to functional status, cognitive ability, or social support system  Outcome: Progressing

## 2023-01-15 LAB
ANION GAP SERPL CALCULATED.3IONS-SCNC: 11 MMOL/L (ref 4–13)
BASOPHILS # BLD AUTO: 0.04 THOUSANDS/ÂΜL (ref 0–0.1)
BASOPHILS NFR BLD AUTO: 1 % (ref 0–1)
BUN SERPL-MCNC: 16 MG/DL (ref 5–25)
CALCIUM SERPL-MCNC: 8.6 MG/DL (ref 8.3–10.1)
CHLORIDE SERPL-SCNC: 101 MMOL/L (ref 96–108)
CO2 SERPL-SCNC: 26 MMOL/L (ref 21–32)
CREAT SERPL-MCNC: 0.73 MG/DL (ref 0.6–1.3)
CRP SERPL QL: 3.7 MG/L
EOSINOPHIL # BLD AUTO: 0.12 THOUSAND/ÂΜL (ref 0–0.61)
EOSINOPHIL NFR BLD AUTO: 2 % (ref 0–6)
ERYTHROCYTE [DISTWIDTH] IN BLOOD BY AUTOMATED COUNT: 13 % (ref 11.6–15.1)
GFR SERPL CREATININE-BSD FRML MDRD: 72 ML/MIN/1.73SQ M
GLUCOSE SERPL-MCNC: 112 MG/DL (ref 65–140)
HCT VFR BLD AUTO: 37.3 % (ref 34.8–46.1)
HGB BLD-MCNC: 12.1 G/DL (ref 11.5–15.4)
IMM GRANULOCYTES # BLD AUTO: 0.02 THOUSAND/UL (ref 0–0.2)
IMM GRANULOCYTES NFR BLD AUTO: 0 % (ref 0–2)
LYMPHOCYTES # BLD AUTO: 1.93 THOUSANDS/ÂΜL (ref 0.6–4.47)
LYMPHOCYTES NFR BLD AUTO: 24 % (ref 14–44)
MCH RBC QN AUTO: 30 PG (ref 26.8–34.3)
MCHC RBC AUTO-ENTMCNC: 32.4 G/DL (ref 31.4–37.4)
MCV RBC AUTO: 93 FL (ref 82–98)
MONOCYTES # BLD AUTO: 0.77 THOUSAND/ÂΜL (ref 0.17–1.22)
MONOCYTES NFR BLD AUTO: 10 % (ref 4–12)
NEUTROPHILS # BLD AUTO: 5.07 THOUSANDS/ÂΜL (ref 1.85–7.62)
NEUTS SEG NFR BLD AUTO: 63 % (ref 43–75)
NRBC BLD AUTO-RTO: 0 /100 WBCS
PLATELET # BLD AUTO: 212 THOUSANDS/UL (ref 149–390)
PMV BLD AUTO: 11 FL (ref 8.9–12.7)
POTASSIUM SERPL-SCNC: 3.7 MMOL/L (ref 3.5–5.3)
RBC # BLD AUTO: 4.03 MILLION/UL (ref 3.81–5.12)
SODIUM SERPL-SCNC: 138 MMOL/L (ref 135–147)
TSH SERPL DL<=0.05 MIU/L-ACNC: 1.44 UIU/ML (ref 0.45–4.5)
WBC # BLD AUTO: 7.95 THOUSAND/UL (ref 4.31–10.16)

## 2023-01-15 RX ORDER — DEXAMETHASONE SODIUM PHOSPHATE 4 MG/ML
5 INJECTION, SOLUTION INTRA-ARTICULAR; INTRALESIONAL; INTRAMUSCULAR; INTRAVENOUS; SOFT TISSUE EVERY 8 HOURS SCHEDULED
Status: COMPLETED | OUTPATIENT
Start: 2023-01-15 | End: 2023-01-15

## 2023-01-15 RX ORDER — METHYLPREDNISOLONE 4 MG/1
8 TABLET ORAL DAILY
Status: DISCONTINUED | OUTPATIENT
Start: 2023-01-20 | End: 2023-01-19 | Stop reason: HOSPADM

## 2023-01-15 RX ORDER — METHYLPREDNISOLONE 16 MG/1
16 TABLET ORAL DAILY
Status: COMPLETED | OUTPATIENT
Start: 2023-01-18 | End: 2023-01-18

## 2023-01-15 RX ORDER — METHYLPREDNISOLONE 4 MG/1
12 TABLET ORAL DAILY
Status: COMPLETED | OUTPATIENT
Start: 2023-01-19 | End: 2023-01-19

## 2023-01-15 RX ORDER — METHYLPREDNISOLONE 4 MG/1
4 TABLET ORAL DAILY
Status: DISCONTINUED | OUTPATIENT
Start: 2023-01-21 | End: 2023-01-19 | Stop reason: HOSPADM

## 2023-01-15 RX ADMIN — DEXAMETHASONE SODIUM PHOSPHATE 5 MG: 4 INJECTION INTRA-ARTICULAR; INTRALESIONAL; INTRAMUSCULAR; INTRAVENOUS; SOFT TISSUE at 23:17

## 2023-01-15 RX ADMIN — PRAVASTATIN SODIUM 20 MG: 20 TABLET ORAL at 15:40

## 2023-01-15 RX ADMIN — LIDOCAINE 1 PATCH: 50 PATCH CUTANEOUS at 09:07

## 2023-01-15 RX ADMIN — ACETAMINOPHEN 975 MG: 325 TABLET ORAL at 23:17

## 2023-01-15 RX ADMIN — DICLOFENAC SODIUM 2 G: 10 GEL TOPICAL at 17:45

## 2023-01-15 RX ADMIN — ACETAMINOPHEN 975 MG: 325 TABLET ORAL at 06:40

## 2023-01-15 RX ADMIN — ENOXAPARIN SODIUM 40 MG: 40 INJECTION SUBCUTANEOUS at 09:07

## 2023-01-15 RX ADMIN — ACETAMINOPHEN 975 MG: 325 TABLET ORAL at 15:00

## 2023-01-15 RX ADMIN — OXYCODONE HYDROCHLORIDE 5 MG: 5 TABLET ORAL at 04:54

## 2023-01-15 RX ADMIN — BACLOFEN 10 MG: 10 TABLET ORAL at 09:06

## 2023-01-15 RX ADMIN — METOPROLOL SUCCINATE 25 MG: 25 TABLET, EXTENDED RELEASE ORAL at 09:06

## 2023-01-15 RX ADMIN — BACLOFEN 10 MG: 10 TABLET ORAL at 23:17

## 2023-01-15 RX ADMIN — HYDROMORPHONE HYDROCHLORIDE 0.2 MG: 0.2 INJECTION, SOLUTION INTRAMUSCULAR; INTRAVENOUS; SUBCUTANEOUS at 15:15

## 2023-01-15 RX ADMIN — DEXAMETHASONE SODIUM PHOSPHATE 5 MG: 4 INJECTION INTRA-ARTICULAR; INTRALESIONAL; INTRAMUSCULAR; INTRAVENOUS; SOFT TISSUE at 10:24

## 2023-01-15 RX ADMIN — LISINOPRIL 20 MG: 20 TABLET ORAL at 09:07

## 2023-01-15 RX ADMIN — DICLOFENAC SODIUM 2 G: 10 GEL TOPICAL at 09:07

## 2023-01-15 RX ADMIN — AMLODIPINE BESYLATE 5 MG: 5 TABLET ORAL at 09:06

## 2023-01-15 RX ADMIN — DICLOFENAC SODIUM 2 G: 10 GEL TOPICAL at 11:29

## 2023-01-15 RX ADMIN — SENNOSIDES AND DOCUSATE SODIUM 1 TABLET: 8.6; 5 TABLET ORAL at 23:17

## 2023-01-15 RX ADMIN — BACLOFEN 10 MG: 10 TABLET ORAL at 15:40

## 2023-01-15 RX ADMIN — ASPIRIN 81 MG: 81 TABLET, COATED ORAL at 09:06

## 2023-01-15 RX ADMIN — LEVOTHYROXINE SODIUM 125 MCG: 25 TABLET ORAL at 06:40

## 2023-01-15 RX ADMIN — DICLOFENAC SODIUM 2 G: 10 GEL TOPICAL at 23:18

## 2023-01-15 NOTE — ASSESSMENT & PLAN NOTE
· 80year old female presented with acute worsening pain of her right shoulder blade, and her right elbow  · CT chest without contrast without any acute processes  · X-ray of shoulder and elbow pending, reviewed by me without acute fractures  · Patient reports pain has been ongoing for the past week, reports as dull, achy  · Patient denies any trauma, falls or heavy lifting  · Suspect patient may have pulled a muscle, pinched nerve versus muscle spasm  · Conservative management with Lidoderm patch, heat, scheduled Tylenol, baclofen and as needed opioids  · Still with pain today, will trial IV Decadron x2 doses given concern for referred pain from neck  · PT/OT pending

## 2023-01-15 NOTE — PLAN OF CARE
Problem: PAIN - ADULT  Goal: Verbalizes/displays adequate comfort level or baseline comfort level  Description: Interventions:  - Encourage patient to monitor pain and request assistance  - Assess pain using appropriate pain scale  - Administer analgesics based on type and severity of pain and evaluate response  - Implement non-pharmacological measures as appropriate and evaluate response  - Consider cultural and social influences on pain and pain management  - Notify physician/advanced practitioner if interventions unsuccessful or patient reports new pain  Outcome: Progressing     Problem: SAFETY ADULT  Goal: Patient will remain free of falls  Description: INTERVENTIONS:  - Educate patient/family on patient safety including physical limitations  - Instruct patient to call for assistance with activity   - Consult OT/PT to assist with strengthening/mobility   - Keep Call bell within reach  - Keep bed low and locked with side rails adjusted as appropriate  - Keep care items and personal belongings within reach  - Initiate and maintain comfort rounds  - Make Fall Risk Sign visible to staff  - Offer Toileting every 2 Hours, in advance of need  - Initiate/Maintain bed/chair alarm  - Obtain necessary fall risk management equipment: bed/chair alarm, gripper socks  - Apply yellow socks and bracelet for high fall risk patients  - Consider moving patient to room near nurses station  Outcome: Progressing     Problem: METABOLIC, FLUID AND ELECTROLYTES - ADULT  Goal: Electrolytes maintained within normal limits  Description: INTERVENTIONS:  - Monitor labs and assess patient for signs and symptoms of electrolyte imbalances  - Administer electrolyte replacement as ordered  - Monitor response to electrolyte replacements, including repeat lab results as appropriate  - Instruct patient on fluid and nutrition as appropriate  Outcome: Progressing  Goal: Fluid balance maintained  Description: INTERVENTIONS:  - Monitor labs   - Monitor I/O and WT  - Instruct patient on fluid and nutrition as appropriate  - Assess for signs & symptoms of volume excess or deficit  Outcome: Progressing     Problem: CARDIOVASCULAR - ADULT  Goal: Maintains optimal cardiac output and hemodynamic stability  Description: INTERVENTIONS:  - Monitor I/O, vital signs and rhythm  - Monitor for S/S and trends of decreased cardiac output  - Administer and titrate ordered vasoactive medications to optimize hemodynamic stability  - Assess quality of pulses, skin color and temperature  - Assess for signs of decreased coronary artery perfusion  - Instruct patient to report change in severity of symptoms  Outcome: Progressing

## 2023-01-15 NOTE — ASSESSMENT & PLAN NOTE
· History of low-grade salivary gland adenocarcinoma      · Repeat imaging in September 2022 with unchanged 2 5 cm destructive soft tissue lesion in the right posterior maxillary hard palate with erosion into the right maxillary sinus floor  · Continue follow up outpatient

## 2023-01-15 NOTE — ASSESSMENT & PLAN NOTE
· Continue home BP meds  · BP remains slightly elevated, continue monitoring with better pain control

## 2023-01-15 NOTE — PROGRESS NOTES
[facility]  Progress Note - June Martín Gallegos 6/4/1932, 80 y o  female MRN: [id number]  Unit/Bed#: E2 -01 Encounter: [id number]  Primary Care Provider: Lu Hutson DO   Date and time admitted to hospital: 1/14/2023  3:40 AM    * Acute pain of right shoulder  Assessment & Plan  · 80year old female presented with acute worsening pain of her right shoulder blade, and her right elbow  · CT chest without contrast without any acute processes  · X-ray of shoulder and elbow pending, reviewed by me without acute fractures  · Patient reports pain has been ongoing for the past week, reports as dull, achy  · Patient denies any trauma, falls or heavy lifting  · Suspect patient may have pulled a muscle, pinched nerve versus muscle spasm  · Conservative management with Lidoderm patch, heat, scheduled Tylenol, baclofen and as needed opioids  · Still with pain today, will trial IV Decadron x2 doses given concern for referred pain from neck  · PT/OT pending    Aneurysm of aortic arch without rupture  Assessment & Plan  · As noted on CT imaging, minimally unchanged at 4 7cm   · Patient recommended to follow up with PCP outpatient with yearly screenings    Hypothyroidism  Assessment & Plan  · Continue levothyroxine    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[facility] [address] )  Assessment & Plan  · History of low-grade salivary gland adenocarcinoma      · Repeat imaging in September 2022 with unchanged 2 5 cm destructive soft tissue lesion in the right posterior maxillary hard palate with erosion into the right maxillary sinus floor  · Continue follow up outpatient    Paroxysmal atrial fibrillation (HCC)  Assessment & Plan  · Continue toprol xl 25  · Not on anticoagulation due to risk of bleeding due to salivary tumor    Essential hypertension  Assessment & Plan  · Continue home BP meds  · BP remains slightly elevated, continue monitoring with better pain control        VTE Pharmacologic Prophylaxis:   High Risk (Score >/= 5) - Pharmacological DVT Prophylaxis Ordered: enoxaparin (Lovenox)  Sequential Compression Devices Ordered  Patient Centered Rounds: I performed bedside rounds with nursing staff today  Discussions with Specialists or Other Care Team Provider: None    Education and Discussions with Family / Patient: Attempted to update  (daughter) via phone  Left voicemail  Time Spent for Care: 30 minutes  More than 50% of total time spent on counseling and coordination of care as described above  Current Length of Stay: 1 day(s)  Current Patient Status: Inpatient   Certification Statement: The patient will continue to require additional inpatient hospital stay due to Acute arm pain requiring medication titration and therapy consultation  Discharge Plan: Anticipate discharge tomorrow to discharge location to be determined pending rehab evaluations  Code Status: Level 1 - Full Code    Subjective:   Patient reports she is doing okay today  Does report continued pain in her arm  Notes she does not feel comfortable returning home as she lives alone  She reports pain in the middle of her back radiating down her arm into her right elbow  Objective:     Vitals:   Temp (24hrs), Av 2 °F (36 8 °C), Min:97 3 °F (36 3 °C), Max:98 6 °F (37 °C)    Temp:  [97 3 °F (36 3 °C)-98 6 °F (37 °C)] 98 6 °F (37 °C)  HR:  [70-86] 70  Resp:  [18] 18  BP: (131-161)/(60-74) 161/74  SpO2:  [91 %-95 %] 95 %  There is no height or weight on file to calculate BMI  Input and Output Summary (last 24 hours): Intake/Output Summary (Last 24 hours) at 1/15/2023 1025  Last data filed at 2023 2330  Gross per 24 hour   Intake --   Output 350 ml   Net -350 ml       Physical Exam:   Physical Exam  Vitals reviewed  Constitutional:       General: She is not in acute distress  HENT:      Head: Normocephalic and atraumatic  Eyes:      General: No scleral icterus       Conjunctiva/sclera: Conjunctivae normal    Cardiovascular: Rate and Rhythm: Normal rate and regular rhythm  Heart sounds: No murmur heard  Pulmonary:      Effort: Pulmonary effort is normal  No respiratory distress  Breath sounds: Normal breath sounds  Abdominal:      General: Bowel sounds are normal  There is no distension  Palpations: Abdomen is soft  Tenderness: There is no abdominal tenderness  Musculoskeletal:      Cervical back: Neck supple  Right lower leg: No edema  Left lower leg: No edema  Skin:     General: Skin is warm and dry  Neurological:      General: No focal deficit present  Mental Status: She is alert  Mental status is at baseline  Psychiatric:         Mood and Affect: Mood normal          Behavior: Behavior normal           Additional Data:     Labs:  Results from last 7 days   Lab Units 01/15/23  0509   WBC Thousand/uL 7 95   HEMOGLOBIN g/dL 12 1   HEMATOCRIT % 37 3   PLATELETS Thousands/uL 212   NEUTROS PCT % 63   LYMPHS PCT % 24   MONOS PCT % 10   EOS PCT % 2     Results from last 7 days   Lab Units 01/15/23  0509 01/14/23  0421   SODIUM mmol/L 138 139   POTASSIUM mmol/L 3 7 3 8   CHLORIDE mmol/L 101 102   CO2 mmol/L 26 32   BUN mg/dL 16 19   CREATININE mg/dL 0 73 0 71   ANION GAP mmol/L 11 5   CALCIUM mg/dL 8 6 8 9   ALBUMIN g/dL  --  3 9   TOTAL BILIRUBIN mg/dL  --  0 52   ALK PHOS U/L  --  63   ALT U/L  --  27   AST U/L  --  27   GLUCOSE RANDOM mg/dL 112 124                       Lines/Drains:  Invasive Devices     Peripheral Intravenous Line  Duration           Peripheral IV 01/14/23 Left;Proximal;Ventral (anterior) Forearm 1 day                      Imaging: No pertinent imaging reviewed      Recent Cultures (last 7 days):         Last 24 Hours Medication List:   Current Facility-Administered Medications   Medication Dose Route Frequency Provider Last Rate   • acetaminophen  975 mg Oral Q8H Moses Salazar MD     • albuterol  2 5 mg Nebulization Q6H PRN Yovanny Patel MD     • amLODIPine  5 mg Oral Daily Charu Mckeon MD      And   • lisinopril  20 mg Oral Daily Charu Mckeon MD     • aspirin  81 mg Oral Daily Charu Mckeon MD     • baclofen  10 mg Oral TID Charu Mckeon MD     • dexamethasone  5 mg Intravenous [facility] Mee Robert PA-C     • Diclofenac Sodium  2 g Topical 4x Daily Charu Mckeon MD     • enoxaparin  40 mg Subcutaneous Daily Charu Mckeon MD     • HYDROmorphone  0 2 mg Intravenous Q4H PRN Charu Mckeon MD     • levothyroxine  125 mcg Oral Early Morning Charu Mckeon MD     • lidocaine  1 patch Topical Daily Charu Mckeon MD     • metoprolol succinate  25 mg Oral Daily Charu Mckeon MD     • ondansetron  4 mg Intravenous Q4H PRN Charu Mckeon MD     • oxyCODONE  2 5 mg Oral Q4H PRN Charu Mckeon MD     • oxyCODONE  5 mg Oral Q4H PRN Charu Mckeon MD     • pravastatin  20 mg Oral Daily With Coco Loomis MD     • senna-docusate sodium  1 tablet Oral HS Charu Mckeon MD          Today, Patient Was Seen By: Elizabeth Dejesus PA-C    **Please Note: This note may have been constructed using a voice recognition system  **

## 2023-01-15 NOTE — CASE MANAGEMENT
Case Management Assessment & Discharge Planning Note    Patient name Buffy ALEXANDRA [place] [address] /E2 -* MRN [id number]  : 1932 Date 1/15/2023       Current Admission Date: 2023  Current Admission Diagnosis:Acute pain of right shoulder   Patient Active Problem List    Diagnosis Date Noted   • Acute pain of right shoulder 2023   • Aneurysm of aortic arch without rupture 2023   • Polymyalgia rheumatica ([address] ) 2021   • Atherosclerosis of aorta ([address] ) 2021   • Hypoparathyroidism, unspecified hypoparathyroidism type ([facility] [address] ) 2021   • Pain of left lower extremity 2021   • [place]-neck deformity of finger 2021   • Muscle cramps 2021   • Skin cyst 2021   • Severe persistent asthma with acute exacerbation 10/20/2020   • Nonrheumatic mitral valve regurgitation    • Uncomplicated asthma    • Encounter for vision screening 04/15/2020   • Need for influenza vaccination 10/14/2019   • Other insomnia 10/14/2019   • Vasovagal syncope 10/04/2019   • Medicare annual wellness visit, subsequent 07/10/2019   • Ankle instability, right 07/10/2019   • Other fatigue 07/10/2019   • Moderate persistent asthma without complication    • Hypothyroidism 2019   • Otalgia of both ears 2019   • Screening for diabetes mellitus 2019   • Hypothyroidism due to Hashimoto's thyroiditis 2018   • Shortness of breath 2016   • Essential hypertension 2016   • Hyperlipidemia 2016   • Asthma-COPD overlap syndrome ([address] ) 2016   • Paroxysmal atrial fibrillation ([address] ) 2016   • Iron deficiency anemia 2016   • Diarrhea 2016   • Headache 2016   • 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 [place] [place] - [address] [place] [address] [place] [address]  Phone: [phone] Fax: [address] [place], [address] Forrester [facility] [address]  Phone: [phone] Fax: [phone]    Primary Care Provider: Sony Dean DO    Primary Insurance: Rai Hampton [facility] REP  Secondary Insurance:     ASSESSMENT:  [address], [address] - Daughter   Primary Phone: [phone] (Mobile)               Advance Directives  Does patient have a [address] [place] [address] Avenue?:  (unsure)  Does patient have Advance Directives?: Yes  Advance Directives: Living will  Primary Contact: Seema Richard [phone]    Readmission Root Cause  30 Day Readmission: No    Patient Information  Admitted from[de-identified] Home  Mental Status: Confused  During Assessment patient was accompanied by: Not accompanied during assessment  Assessment information provided by[de-identified] Daughter  Primary Caregiver: Self  Support Systems: Spouse/significant other, Daughter  [place] of Residence: [address] [place] [address] do you live in?: [address] [place] [address] entry access options   Select all that apply : No steps to enter home (daughter states she doesnt think there are stairs)  Type of Current Residence: Apartment  Upon entering residence, is there a bedroom on the main floor (no further steps)?: Yes  Upon entering residence, is there a bathroom on the main floor (no further steps)?: Yes  Living Arrangements: Lives Alone    Activities of Daily Living Prior to Admission  Functional Status: Independent  Completes ADLs independently?: Yes  Ambulates independently?: Yes  Does patient use assisted devices?: No  Does patient currently own DME?: No  Does patient have a history of Outpatient Therapy (PT/OT)?: Yes (cardiac rehab)  Does the patient have a history of Short-Term Rehab?: No  Does patient have a history of HHC?: No  Does patient currently have [address]?: No    Patient Information Continued  Does patient have prescription coverage?: Yes  Does patient have a history of substance abuse?: No  Does patient have a history of Mental Health Diagnosis?: No    Means of Transportation  [facility] Drives Self        DISCHARGE DETAILS:    Discharge planning discussed with[de-identified] Maciel Horn CM contacted family/caregiver?: Yes (nurse at bedside stated patient a little confued today)       Contacts  Patient Contacts: Adry Guerrier  Relationship to Patient[de-identified] Family  Contact Method: Phone  Phone Number: [phone]  Reason/Outcome: Emergency Contact, Discharge Planning, Continuity of Care

## 2023-01-15 NOTE — ASSESSMENT & PLAN NOTE
· As noted on CT imaging, minimally unchanged at 4 7cm   · Patient recommended to follow up with PCP outpatient with yearly screenings

## 2023-01-15 NOTE — PLAN OF CARE
Problem: PAIN - ADULT  Goal: Verbalizes/displays adequate comfort level or baseline comfort level  Description: Interventions:  - Encourage patient to monitor pain and request assistance  - Assess pain using appropriate pain scale  - Administer analgesics based on type and severity of pain and evaluate response  - Implement non-pharmacological measures as appropriate and evaluate response  - Consider cultural and social influences on pain and pain management  - Notify physician/advanced practitioner if interventions unsuccessful or patient reports new pain  Outcome: Progressing     Problem: SAFETY ADULT  Goal: Patient will remain free of falls  Description: INTERVENTIONS:  - Educate patient/family on patient safety including physical limitations  - Instruct patient to call for assistance with activity   - Consult OT/PT to assist with strengthening/mobility   - Keep Call bell within reach  - Keep bed low and locked with side rails adjusted as appropriate  - Keep care items and personal belongings within reach  - Initiate and maintain comfort rounds  - Make Fall Risk Sign visible to staff  - Offer Toileting every 2 Hours, in advance of need  - Initiate/Maintain bed/chair alarm  - Obtain necessary fall risk management equipment: bed/chair alarm, gripper socks  - Apply yellow socks and bracelet for high fall risk patients  - Consider moving patient to room near nurses station  Outcome: Progressing     Problem: DISCHARGE PLANNING  Goal: Discharge to home or other facility with appropriate resources  Description: INTERVENTIONS:  - Identify barriers to discharge w/patient and caregiver  - Arrange for needed discharge resources and transportation as appropriate  - Identify discharge learning needs  - Refer to Case Management Department for coordinating discharge planning if the patient needs post-hospital services based on physician/advanced practitioner order or complex needs related to functional status, cognitive ability, or social support system  Outcome: Progressing     Problem: Knowledge Deficit  Goal: Patient/family/caregiver demonstrates understanding of disease process, treatment plan, medications, and discharge instructions  Description: Complete learning assessment and assess knowledge base    Interventions:  - Provide teaching at level of understanding  - Provide teaching via preferred learning methods  Outcome: Progressing     Problem: CARDIOVASCULAR - ADULT  Goal: Maintains optimal cardiac output and hemodynamic stability  Description: INTERVENTIONS:  - Monitor I/O, vital signs and rhythm  - Monitor for S/S and trends of decreased cardiac output  - Administer and titrate ordered vasoactive medications to optimize hemodynamic stability  - Assess quality of pulses, skin color and temperature  - Assess for signs of decreased coronary artery perfusion  - Instruct patient to report change in severity of symptoms  Outcome: Progressing     Problem: Potential for Falls  Goal: Patient will remain free of falls  Description: INTERVENTIONS:  - Educate patient/family on patient safety including physical limitations  - Instruct patient to call for assistance with activity   - Consult OT/PT to assist with strengthening/mobility   - Keep Call bell within reach  - Keep bed low and locked with side rails adjusted as appropriate  - Keep care items and personal belongings within reach  - Initiate and maintain comfort rounds  - Make Fall Risk Sign visible to staff  - Offer Toileting every 2 Hours, in advance of need  - Initiate/Maintain bed/chair alarm  - Obtain necessary fall risk management equipment: bed/chair alarm, gripper socks  - Apply yellow socks and bracelet for high fall risk patients  - Consider moving patient to room near nurses station  Outcome: Progressing     Problem: MUSCULOSKELETAL - ADULT  Goal: Maintain or return mobility to safest level of function  Description: INTERVENTIONS:  - Assess patient's ability to carry out ADLs; assess patient's baseline for ADL function and identify physical deficits which impact ability to perform ADLs (bathing, care of mouth/teeth, toileting, grooming, dressing, etc )  - Assess/evaluate cause of self-care deficits   - Assess range of motion  - Assess patient's mobility  - Assess patient's need for assistive devices and provide as appropriate  - Encourage maximum independence but intervene and supervise when necessary  - Involve family in performance of ADLs  - Assess for home care needs following discharge   - Consider OT consult to assist with ADL evaluation and planning for discharge  - Provide patient education as appropriate  Outcome: Progressing

## 2023-01-15 NOTE — CONSULTS
Orthopaedic Surgery - Consultation  Buffy Benitez (80 y o  female)   : 1932   MRN: [id number]  Date: 1/15/2023   Encounter: [id number]   Unit/Bed#: E2 -01    Consulting Physician:  Isrrael Spivey MD  Requesting Physician: Cara Kwan MD  Reason for Consult / Principal Problem: right shoulder pain    Assessment / Plan  Right shoulder and arm pain, likely referred pain from neck    · Analgesics  · Consider oral steroid taper  · PT/OT for cervical program  · OK to d/c from ortho standpoint when pain is controlled  · F/U with [facility] center if symptoms persist    History of Present Illness  Buffy Brunson is a 80 y o  female who presents for right posterior shoulder and arm pain for the past 2-3 days  She denies injury  Pain is posterior around the shoulder blade and radiates down to the fingers  She denies neck pain or numbness in the RUE  She does experience relief with elevating her arm overhead  She denies prior similar episodes of shoulder or arm pain      Review of Systems  Negative for chest pain and shortness of breath  Review of all other systems is negative    Medical, Surgical, Family, and Social History    Past Medical History:   Diagnosis Date   • A-fib [facility])    • Allergy to IVP dye 2013    pt felt heaviness, palpitations   • 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), wet (HCC)     bilateral   • Aneurysm of aortic root     last assessed - [id number]   • Aortic arch aneurysm    • Aortic root dilatation (HCC)     last assessed - [id number]   • Arthritis    • Ascending aortic aneurysm     last assessed - [id number]   • Asthma    • Basal cell carcinoma     last assessed - [id number]   • 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[facility])    • Disease of thyroid gland    • Facet arthropathy, cervical     last assessed - [id number]   • History of fracture of vertebral column    • Hyperlipidemia    • Hypertension    • 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[address] )    • Hypoxia     last assessed - [id number]   • Junctional rhythm     last assessed - 2017   • Lightheadedness     last assessed - 2016   • Migraine    • Palpitations     last assessed - 2016   • Pleural effusion, bilateral     last assessed - 2015   • 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HCC)    • Shortness of breath     last assessed - 2017   • Thyroid trouble    • Trigger finger     last assessed - [id number]   • Trigger middle finger of right hand     last assessed - 2016   • Urinary incontinence     last assessed -  Jul,2013; Resolved - [id number]       Past Surgical History:   Procedure Laterality Date   • ABDOMINAL AORTIC ANEURYSM REPAIR W/ ENDOLUMINAL GRAFT  2015    Ascending aorta and hemiarch replacement with 30 mm Vascutek Gelweave graft;  Managed by Neida Hollingsworth; last assessed -    • APPENDECTOMY     • BLADDER SURGERY      Reccurent histoy of bladder surgery   • HOROWITZ PROCEDURE     • CARDIAC CATHETERIZATION  2004    Procedure summary - Luminal irregularities; last assessed - [id number]   •  SECTION     • CORONARY ANEURYSM REPAIR     • CYSTOSCOPY      botox injection   • HYSTERECTOMY     • MD NDSC NJX IMPLT MATRL URT&/BLDR NCK N/A 2017    Procedure: [address]; [place] BULKING AGENT INJECTION ;  Surgeon: Bubba Herrera MD;  Location: [facility];  Service: Urology   • MD TENDON SHEATH INCISION Right 10/18/2016    Procedure: LONG FINGER TRIGGER RELEASE ;  Surgeon: Vivien Chang MD;  Location: [facility];  Service: Orthopedics   • THYROIDECTOMY      Total Thyroidectomy   • TONSILLECTOMY AND ADENOIDECTOMY         Family History   Problem Relation Age of Onset   • Coronary aneurysm Sister    • Coronary artery disease Sister         CABG   • Heart disease Family         cardiac disorder   • Hypertension Family    • Cancer Family    • Thyroid disease Family        Social History     Occupational History   • Not on file   Tobacco Use   • Smoking status: Former     Packs/day: 0 25     Years: 57 00 Pack years: 14 25     Types: Cigarettes     Start date: 36     Quit date: 2014     Years since quittin 0   • Smokeless tobacco: Former   • Tobacco comments:     smoked 17 yrs 1/2 ppd quit and restarted then quit  10 days ago during 2014    Vaping Use   • Vaping Use: Never used   Substance and Sexual Activity   • Alcohol use: Not Currently     Comment: Social drinker   • Drug use: No   • Sexual activity: Not Currently       Allergies   Allergen Reactions   • Amiodarone Hives   • Omnipaque [Iohexol] Hives and Shortness Of Breath   • Clindamycin Other (See Comments)     When taken causes cdiff immediately   • Other    • Sulfa Antibiotics Hives     itching   • Acetazolamide Hives and Rash     Reaction Date:Unknown   • Iv Dye  [Iodinated Contrast Media] Hypertension and Palpitations     [facility] [id number]: Verified with patient    • Naprosyn [Naproxen] Itching and Rash     Category:  Allergy;        Current Facility-Administered Medications   Medication Dose Route Frequency   • acetaminophen (TYLENOL) tablet 975 mg  975 mg Oral Q8H [facility]   • albuterol inhalation solution 2 5 mg  2 5 mg Nebulization Q6H PRN   • amLODIPine (NORVASC) tablet 5 mg  5 mg Oral Daily    And   • lisinopril (ZESTRIL) tablet 20 mg  20 mg Oral Daily   • aspirin (ECOTRIN LOW STRENGTH) EC tablet 81 mg  81 mg Oral Daily   • baclofen tablet 10 mg  10 mg Oral TID   • Diclofenac Sodium (VOLTAREN) 1 % topical gel 2 g  2 g Topical 4x Daily   • enoxaparin (LOVENOX) subcutaneous injection 40 mg  40 mg Subcutaneous Daily   • HYDROmorphone HCl (DILAUDID) injection 0 2 mg  0 2 mg Intravenous Q4H PRN   • levothyroxine tablet 125 mcg  125 mcg Oral Early Morning   • lidocaine (LIDODERM) 5 % patch 1 patch  1 patch Topical Daily   • metoprolol succinate (TOPROL-XL) 24 hr tablet 25 mg  25 mg Oral Daily   • ondansetron (ZOFRAN) injection 4 mg  4 mg Intravenous Q4H PRN   • oxyCODONE (ROXICODONE) IR tablet 2 5 mg  2 5 mg Oral Q4H PRN   • oxyCODONE (ROXICODONE) IR tablet 5 mg  5 mg Oral Q4H PRN   • pravastatin (PRAVACHOL) tablet 20 mg  20 mg Oral Daily With Dinner   • senna-docusate sodium (SENOKOT S) 8 6-50 mg per tablet 1 tablet  1 tablet Oral HS     Medications Prior to Admission   Medication   • albuterol (2 5 mg/3 mL) 0 083 % nebulizer solution   • albuterol (PROVENTIL HFA,VENTOLIN HFA) 90 mcg/act inhaler   • amLODIPine-benazepril (LOTREL 5-20) 5-20 MG per capsule   • aspirin 81 MG tablet   • calcium carbonate (OS-TAE) 600 MG tablet   • Diclofenac Sodium (VOLTAREN) 1 %   • levothyroxine 125 mcg tablet   • lovastatin (MEVACOR) 20 mg tablet   • metoprolol succinate (TOPROL-XL) 25 mg 24 hr tablet   • rOPINIRole (REQUIP) 0 25 mg tablet       Vitals  Temp:  [97 3 °F (36 3 °C)-98 9 °F (37 2 °C)] 98 6 °F (37 °C)  HR:  [70-86] 70  Resp:  [18] 18  BP: (131-161)/(60-74) 161/74  There is no height or weight on file to calculate BMI  I/O last 24 hours: In: -   Out: 350 [Urine:350]    Physical Exam  General:  Alert & oriented x3, no distress, appears stated age  [de-identified]:  EOMI, eyes clear, hearing intact, mucous membranes moist  Neck:  Supple, non-tender, trachea midline, no lymphadenopathy  Cardiovascular:  Regular rate, no discernable arrhythmia  Pulmonary:  Regular rate, respirations non-labored   Abdominal:  Soft, non-tender    Ortho Exam - Right Upper Extremity  · Diffuse periscapular and trapezius tenderness  · Shoulder FE-170, ER-60  · Rotator cuff strength 5/5  · Sensation intact throughout RUE  · No focal neurologic deficits C5-T1  · 2+ radial pulse    Imaging  I have personally reviewed pertinent films in PACS    XR of right shoulder - no fractures or degenerative changes    Lab Results  (I have personally reviewed pertinent lab results )  Results from last 7 days   Lab Units 01/15/23  0509 01/14/23  0421   WBC Thousand/uL 7 95 6 89   HEMOGLOBIN g/dL 12 1 12 0   HEMATOCRIT % 37 3 37 5   PLATELETS Thousands/uL 212 204   RBC Million/uL 4 03 4 00   MCV fL 93 94 MCH pg 30 0 30 0   MCHC g/dL 32 4 32 0   RDW % 13 0 13 0   MPV fL 11 0 11 1   NRBC AUTO /100 WBCs 0 0         Results from last 7 days   Lab Units 01/15/23  0509 01/14/23  0421   POTASSIUM mmol/L 3 7 3 8   CHLORIDE mmol/L 101 102   CO2 mmol/L 26 32   BUN mg/dL 16 19   CREATININE mg/dL 0 73 0 71   EGFR ml/min/1 73sq m 72 75   CALCIUM mg/dL 8 6 8 9   ALT U/L  --  27   AST U/L  --  27               EKG, Pathology, and Other Studies  (I have personally reviewed pertinent reports )  na    Code Status  Level 1 - Full Code    Counseling / Coordination of Care  Total floor / unit time spent today 20 minutes  Greater than 50% of total time was spent with the patient and / or family counseling and / or coordination of care      Eunice Martinez MD

## 2023-01-16 ENCOUNTER — APPOINTMENT (INPATIENT)
Dept: RADIOLOGY | Facility: HOSPITAL | Age: 88
End: 2023-01-16

## 2023-01-16 PROBLEM — G93.41 ACUTE METABOLIC ENCEPHALOPATHY: Status: ACTIVE | Noted: 2023-01-16

## 2023-01-16 RX ORDER — TRAMADOL HYDROCHLORIDE 50 MG/1
50 TABLET ORAL EVERY 6 HOURS PRN
Status: DISCONTINUED | OUTPATIENT
Start: 2023-01-16 | End: 2023-01-19 | Stop reason: HOSPADM

## 2023-01-16 RX ORDER — SODIUM CHLORIDE 9 MG/ML
75 INJECTION, SOLUTION INTRAVENOUS CONTINUOUS
Status: DISCONTINUED | OUTPATIENT
Start: 2023-01-16 | End: 2023-01-17

## 2023-01-16 RX ORDER — HYDROMORPHONE HCL IN WATER/PF 6 MG/30 ML
0.2 PATIENT CONTROLLED ANALGESIA SYRINGE INTRAVENOUS
Status: DISCONTINUED | OUTPATIENT
Start: 2023-01-16 | End: 2023-01-19 | Stop reason: HOSPADM

## 2023-01-16 RX ORDER — HYDRALAZINE HYDROCHLORIDE 20 MG/ML
5 INJECTION INTRAMUSCULAR; INTRAVENOUS EVERY 6 HOURS PRN
Status: DISCONTINUED | OUTPATIENT
Start: 2023-01-16 | End: 2023-01-19 | Stop reason: HOSPADM

## 2023-01-16 RX ORDER — MUSCLE RUB CREAM 100; 150 MG/G; MG/G
CREAM TOPICAL 4 TIMES DAILY PRN
Status: DISCONTINUED | OUTPATIENT
Start: 2023-01-16 | End: 2023-01-19 | Stop reason: HOSPADM

## 2023-01-16 RX ORDER — TRAMADOL HYDROCHLORIDE 50 MG/1
25 TABLET ORAL EVERY 6 HOURS PRN
Status: DISCONTINUED | OUTPATIENT
Start: 2023-01-16 | End: 2023-01-19 | Stop reason: HOSPADM

## 2023-01-16 RX ADMIN — TRAMADOL HYDROCHLORIDE 50 MG: 50 TABLET, COATED ORAL at 15:30

## 2023-01-16 RX ADMIN — AMLODIPINE BESYLATE 5 MG: 5 TABLET ORAL at 08:54

## 2023-01-16 RX ADMIN — ENOXAPARIN SODIUM 40 MG: 40 INJECTION SUBCUTANEOUS at 08:53

## 2023-01-16 RX ADMIN — METHYLPREDNISOLONE 24 MG: 16 TABLET ORAL at 08:54

## 2023-01-16 RX ADMIN — LEVOTHYROXINE SODIUM 125 MCG: 25 TABLET ORAL at 06:00

## 2023-01-16 RX ADMIN — BACLOFEN 10 MG: 10 TABLET ORAL at 22:52

## 2023-01-16 RX ADMIN — ACETAMINOPHEN 975 MG: 325 TABLET ORAL at 06:00

## 2023-01-16 RX ADMIN — DICLOFENAC SODIUM 2 G: 10 GEL TOPICAL at 12:23

## 2023-01-16 RX ADMIN — METOPROLOL SUCCINATE 25 MG: 25 TABLET, EXTENDED RELEASE ORAL at 08:53

## 2023-01-16 RX ADMIN — DICLOFENAC SODIUM 2 G: 10 GEL TOPICAL at 08:54

## 2023-01-16 RX ADMIN — DICLOFENAC SODIUM 2 G: 10 GEL TOPICAL at 22:52

## 2023-01-16 RX ADMIN — BACLOFEN 10 MG: 10 TABLET ORAL at 15:29

## 2023-01-16 RX ADMIN — BACLOFEN 10 MG: 10 TABLET ORAL at 08:54

## 2023-01-16 RX ADMIN — PRAVASTATIN SODIUM 20 MG: 20 TABLET ORAL at 15:30

## 2023-01-16 RX ADMIN — SENNOSIDES AND DOCUSATE SODIUM 1 TABLET: 8.6; 5 TABLET ORAL at 22:52

## 2023-01-16 RX ADMIN — LIDOCAINE 1 PATCH: 50 PATCH CUTANEOUS at 08:54

## 2023-01-16 RX ADMIN — LISINOPRIL 20 MG: 20 TABLET ORAL at 08:54

## 2023-01-16 RX ADMIN — HYDRALAZINE HYDROCHLORIDE 5 MG: 20 INJECTION, SOLUTION INTRAMUSCULAR; INTRAVENOUS at 16:22

## 2023-01-16 RX ADMIN — ACETAMINOPHEN 975 MG: 325 TABLET ORAL at 15:29

## 2023-01-16 RX ADMIN — ACETAMINOPHEN 975 MG: 325 TABLET ORAL at 22:52

## 2023-01-16 RX ADMIN — DICLOFENAC SODIUM 2 G: 10 GEL TOPICAL at 18:17

## 2023-01-16 RX ADMIN — ASPIRIN 81 MG: 81 TABLET, COATED ORAL at 08:54

## 2023-01-16 RX ADMIN — SODIUM CHLORIDE 75 ML/HR: 0.9 INJECTION, SOLUTION INTRAVENOUS at 15:29

## 2023-01-16 NOTE — PLAN OF CARE
Problem: PAIN - ADULT  Goal: Verbalizes/displays adequate comfort level or baseline comfort level  Description: Interventions:  - Encourage patient to monitor pain and request assistance  - Assess pain using appropriate pain scale  - Administer analgesics based on type and severity of pain and evaluate response  - Implement non-pharmacological measures as appropriate and evaluate response  - Consider cultural and social influences on pain and pain management  - Notify physician/advanced practitioner if interventions unsuccessful or patient reports new pain  Outcome: Progressing     Problem: SAFETY ADULT  Goal: Patient will remain free of falls  Description: INTERVENTIONS:  - Educate patient/family on patient safety including physical limitations  - Instruct patient to call for assistance with activity   - Consult OT/PT to assist with strengthening/mobility   - Keep Call bell within reach  - Keep bed low and locked with side rails adjusted as appropriate  - Keep care items and personal belongings within reach  - Initiate and maintain comfort rounds  - Make Fall Risk Sign visible to staff  - Offer Toileting every 2 Hours, in advance of need  - Initiate/Maintain bed/chair alarm  - Obtain necessary fall risk management equipment: bed/chair alarm, gripper socks  - Apply yellow socks and bracelet for high fall risk patients  - Consider moving patient to room near nurses station  Outcome: Progressing     Problem: Potential for Falls  Goal: Patient will remain free of falls  Description: INTERVENTIONS:  - Educate patient/family on patient safety including physical limitations  - Instruct patient to call for assistance with activity   - Consult OT/PT to assist with strengthening/mobility   - Keep Call bell within reach  - Keep bed low and locked with side rails adjusted as appropriate  - Keep care items and personal belongings within reach  - Initiate and maintain comfort rounds  - Make Fall Risk Sign visible to staff  - Offer Toileting every 2 Hours, in advance of need  - Initiate/Maintain bed/chair alarm  - Obtain necessary fall risk management equipment: bed/chair alarm, gripper socks  - Apply yellow socks and bracelet for high fall risk patients  - Consider moving patient to room near nurses station  Outcome: Progressing     Problem: MUSCULOSKELETAL - ADULT  Goal: Maintain or return mobility to safest level of function  Description: INTERVENTIONS:  - Assess patient's ability to carry out ADLs; assess patient's baseline for ADL function and identify physical deficits which impact ability to perform ADLs (bathing, care of mouth/teeth, toileting, grooming, dressing, etc )  - Assess/evaluate cause of self-care deficits   - Assess range of motion  - Assess patient's mobility  - Assess patient's need for assistive devices and provide as appropriate  - Encourage maximum independence but intervene and supervise when necessary  - Involve family in performance of ADLs  - Assess for home care needs following discharge   - Consider OT consult to assist with ADL evaluation and planning for discharge  - Provide patient education as appropriate  Outcome: Progressing

## 2023-01-16 NOTE — PROGRESS NOTES
[facility]  Progress Note - Buffy Max 6/4/1932, 80 y o  female MRN: [id number]  Unit/Bed#: E2 -01 Encounter: [id number]  Primary Care Provider: Kallie Bear DO   Date and time admitted to hospital: 1/14/2023  3:40 AM      * Acute pain of right shoulder  Assessment & Plan  • 80year old female presented with acute worsening pain of her right shoulder blade, and her right elbow  • CT of chest without contrast without any acute processes  • XR imaging of the right shoulder/elbow without acute osseous abnormalities, however, did reveal mild right shoulder osteoarthritis   • Reported pain ongoing for the past week prior to arrival described as dull and achy  • Denied recent trauma/falls or heavy lifting  • Appreciate orthopedics input -> feels right shoulder/arm discomfort is possibly referred pain from neck -> in the setting of altered mentation with opioids, holding narcotics and trialing an oral corticosteroid tapering course -> recommending ongoing PT/OT w/ a cervical spine program and outpatient follow-up at the spine/pain center if discomfort persist  • C continue multimodal pain management w/ around-the-clock Tylenol, spasm treatment with Baclofen TID, topical Voltaren gel QID, topical Lidoderm patch, and after mentioned steroid tapering course (Medrol) - if pain still uncontrolled, may consider a lower potency partial-opioid (i e  Tramadol) - PRN Bengay also on board for surrounding muscle soreness  • Still with pain today, will trial IV Decadron x2 doses given concern for referred pain from neck  • Ongoing PT/OT evaluations     Acute metabolic encephalopathy  Assessment & Plan  • Likely secondary to opioids, although mentation has not significantly improved after holding offending medications as of yet  • Check urinalysis and portable CXR - consider CT of head if no improvement or progressive worsening over the next 24 hours  • Limit/avoid other sedating medications if possible    Aneurysm of aortic arch without rupture  Assessment & Plan  • As noted on CT imaging, minimally unchanged at 4 7 cm   • Recommending outpatient follow-up w/ PCP and yearly screenings     Hypothyroidism  Assessment & Plan  • Continue Synthroid  • TSH normal     Hyperlipidemia  Assessment & Plan  • Continue statin    Salivary gland cancer  Assessment & Plan  • History of low-grade salivary gland adenocarcinoma  • Repeat imaging in 2022 with unchanged 2 5 cm destructive soft tissue lesion in the right posterior maxillary hard palate with erosion into the right maxillary sinus floor  • Outpatient follow-up     Paroxysmal atrial fibrillation  Assessment & Plan  • Continue Toprol-XL  • Not on chronic anticoagulation due to risk of bleeding due to salivary tumor     Essential hypertension  Assessment & Plan  • Continue Norvasc/Zestril/Toprol-XL - PRN IV Hydralazine on board  • Low-sodium diet  • Optimize pain control as possible        DVT Prophylaxis:  Lovenox       Patient Centered Rounds:  I have performed bedside rounds and discussed plan of care with nursing today  Discussions with Specialists or Other Care Team Provider:  see above assessments if applicable    Education and Discussions with Family / Patient: At bedside    Time Spent for Care:  32 minutes  More than 50% of total time spent on counseling and coordination of care as described above  Current Length of Stay: 2 day(s)  Current Patient Status: Inpatient   Certification Statement:  Patient will continue to require additional hospital stay due to assessments as noted above  Code Status: Level 1 - Full Code        Subjective:     Encountered earlier in the morning  During my encounter, patient reported uncontrolled right shoulder/elbow pain/discomfort  She was intermittently restless and anxious          Objective:     Vitals:   Temp (24hrs), Av 1 °F (36 7 °C), Min:97 3 °F (36 3 °C), Max:98 8 °F (37 1 °C)    Temp:  [97 3 °F (36 3 °C)-98 8 °F (37 1 °C)] 97 3 °F (36 3 °C)  HR:  [73-76] 73  Resp:  [18-20] 18  BP: (131-174)/(67-82) 166/81  SpO2:  [94 %-97 %] 97 %  There is no height or weight on file to calculate BMI  Input and Output Summary (last 24 hours): Intake/Output Summary (Last 24 hours) at 1/16/2023 1745  Last data filed at 1/16/2023 1529  Gross per 24 hour   Intake 980 ml   Output --   Net 980 ml       Physical Exam:     GENERAL:   Weak/fatigued - waxing/waning distress/discomfort from shoulder pain  HEAD:   Normocephalic - atraumatic  NECK:   Supple - full range of motion  CARDIAC:   Rate controlled - S1/S2 positive  PULMONARY:   Clear breath sounds bilaterally - nonlabored respirations  ABDOMEN:   Soft - nontender/nondistended - active bowel sounds  MUSCULOSKELETAL:   Motor strength/range of motion deconditioned - right shoulder/elbow pain/discomfort on exam but active/uninhibited range of motion currently  NEUROLOGIC:  Encephalopathic  SKIN:   Chronic wrinkles/blemishes   PSYCHIATRIC:   Mood/affect anxious      Additional Data:     Labs & Recent Cultures:    Results from last 7 days   Lab Units 01/15/23  0509   WBC Thousand/uL 7 95   HEMOGLOBIN g/dL 12 1   HEMATOCRIT % 37 3   PLATELETS Thousands/uL 212   NEUTROS PCT % 63   LYMPHS PCT % 24   MONOS PCT % 10   EOS PCT % 2     Results from last 7 days   Lab Units 01/15/23  0509 01/14/23  0421   POTASSIUM mmol/L 3 7 3 8   CHLORIDE mmol/L 101 102   CO2 mmol/L 26 32   BUN mg/dL 16 19   CREATININE mg/dL 0 73 0 71   CALCIUM mg/dL 8 6 8 9   ALK PHOS U/L  --  63   ALT U/L  --  27   AST U/L  --  27                 Lines/Drains:  Invasive Devices     Peripheral Intravenous Line  Duration           Peripheral IV 01/15/23 Dorsal (posterior); Left Forearm 1 day                  Last 24 Hours Medication List:   Current Facility-Administered Medications   Medication Dose Route Frequency Provider Last Rate   • acetaminophen  975 mg Oral Q8H Mary Kearney MD     • albuterol  2 5 mg Nebulization Q6H PRN Cara Kwan MD     • amLODIPine  5 mg Oral Daily Cara Kwan MD      And   • lisinopril  20 mg Oral Daily Cara Kwan MD     • aspirin  81 mg Oral Daily Cara Kwan MD     • baclofen  10 mg Oral TID Cara Kwan MD     • Diclofenac Sodium  2 g Topical 4x Daily Cara Kwan MD     • enoxaparin  40 mg Subcutaneous Daily Cara Kwan MD     • hydrALAZINE  5 mg Intravenous Q6H PRN Christal Charles MD     • HYDROmorphone  0 2 mg Intravenous Q3H PRN Christal Charles MD     • levothyroxine  125 mcg Oral Early Morning Cara Kwan MD     • lidocaine  1 patch Topical Daily Cara Kwan MD     • menthol-methyl salicylate   Apply externally 4x Daily PRN Christal Charles MD     • [START ON 1/17/2023] methylPREDNISolone  20 mg Oral Daily Rolley Pj, PA-C      Followed by   • [START ON 1/18/2023] methylPREDNISolone  16 mg Oral Daily Rolley Pj, PA-C      Followed by   • [START ON 1/19/2023] methylPREDNISolone  12 mg Oral Daily Rolley Pj, PA-C      Followed by   • [START ON 1/20/2023] methylPREDNISolone  8 mg Oral Daily Rolley Pj, PA-C      Followed by   • [START ON 1/21/2023] methylPREDNISolone  4 mg Oral Daily Rolley Pj, PA-C     • metoprolol succinate  25 mg Oral Daily Cara Kwan MD     • ondansetron  4 mg Intravenous Q4H PRN Cara Kwan MD     • pravastatin  20 mg Oral Daily With Breonna Hood MD     • senna-docusate sodium  1 tablet Oral HS Cara Kwan MD     • sodium chloride  75 mL/hr Intravenous Continuous Christal Charles MD 75 mL/hr (01/16/23 4669)   • traMADol  25 mg Oral Q6H PRN Christal Charles MD     • traMADol  50 mg Oral Q6H PRN Christal Charles MD                        ** Please Note: This note is constructed using a voice recognition dictation system  An occasional wrong word/phrase or “sound-a-like” substitution may have been picked up by dictation device due to the inherent limitations of voice recognition software  Read the chart carefully and recognize, using reasonable context, where substitutions may have occurred  **

## 2023-01-16 NOTE — UTILIZATION REVIEW
Initial Clinical Review    Admission: Date/Time/Statement:   Admission Orders (From admission, onward)     Ordered        01/14/23 0823  INPATIENT ADMISSION  Once                      Orders Placed This Encounter   Procedures   • INPATIENT ADMISSION     Standing Status:   Standing     Number of Occurrences:   1     Order Specific Question:   Level of Care     Answer:   Med Surg [16]     Order Specific Question:   Estimated length of stay     Answer:   More than 2 Midnights     Order Specific Question:   Certification     Answer:   I certify that inpatient services are medically necessary for this patient for a duration of greater than two midnights  See H&P and MD Progress Notes for additional information about the patient's course of treatment  ED Arrival Information     Expected   -    Arrival   1/14/2023 03:38    Acuity   Urgent            Means of arrival   Walk-In    Escorted by   Family Member    Service   Hospitalist    Admission type   Emergency            Arrival complaint   arm pain            Chief Complaint   Patient presents with   • Arm Pain     Pt c/o right shoulder pain radiates down arm, rpeorts pain started 1 week ago went away and came back last night  Denies any injury       Initial Presentation: 80 y o  female presents to ED from  Home with acute right shoulder  Pain  Woke early the morning of admission with excrutiating right shoulder pain near scapula  Pain has been intermittent for past week  Initial CT showse no acute process,  But  Aortic arch aneurysm  Unchanged from previous imaging  Patient reports the pain as dull, achy with occasional tenderness on palpation, and unchanged with active and passive flexion  PMH  Is  PAF,  HTN and salivary gland cancer  No acute fracture noted on initial imaging  Admit  Ip with  Acute pain right shoulder,  Aneurysm of aortic arch and plan is  Pain control, monitor labs and continue home meds          Date:    1/15           Day 2:   Ortho consult  Right shoulder and arm pain, likely referred from  Neck  Needs   PT/OT  Continue pain control  May need oral steroid taper  Has  Persistent pain and  Giving  2 doses  IV  Decadron  Resides  Alone,  Not comfortable with d/c  Home  ED Triage Vitals [01/14/23 0345]   Temperature Pulse Respirations Blood Pressure SpO2   97 5 °F (36 4 °C) 76 18 (!) 204/86 96 %      Temp Source Heart Rate Source Patient Position - Orthostatic VS BP Location FiO2 (%)   Oral Monitor Lying Left arm --      Pain Score       10 - Worst Possible Pain          Wt Readings from Last 1 Encounters:   01/11/23 57 6 kg (127 lb)     Additional Vital Signs:    -- -- -- -- -- 94 % None (Room air) --   01/15/23 1545 97 9 °F (36 6 °C) 69 20 163/76 109 92 % None (Room air) Lying   01/15/23 0730 98 6 °F (37 °C) 70 18 161/74 -- 95 % None (Room air) Lying   01/14/23 2347 97 3 °F (36 3 °C) Abnormal  86 18 131/60 -- 92 % None (Room air) Lying   01/14/23 2015 -- -- -- -- -- -- None (Room air) --   01/14/23 1609 98 6 °F (37 °C) 73 18 149/72 96 91 % None (Room air) Lying   01/14/23 0950 -- -- -- -- -- -- None (Room air) --   01/14/23 0945 98 9 °F (37 2 °C) 73 18 156/68 -- 92 % None (Room air) Lying   01/14/23 0800 -- 72 24 Abnormal  157/67 96 95 % None (Room air) --   01/14/23 0600 -- 76 32 Abnormal  128/86 98 -- None (Room air) Lying   01/14/23 0500 -- 70 18 155/67 -- 95 % None (Room air) Lying   01/14/23 0430 -- -- -- -- -- -- None (Room air) --   01/14/23 0400 -- 70 -- 181/74 Abnormal  107 95 % None (Room air) Lying   01/14/23 0345 97 5 °F (36 4 °C) 76 18 204/86 Abnormal  -- 96 % None (Room air) Lying       Pertinent Labs/Diagnostic Test Results:   CT chest without contrast   Final Result by Marianne Muñoz MD (01/14 1885)      No evidence of acute thoracic process insofar as can be detected on a noncontrast CT        Distal aortic arch/proximal descending aortic aneurysm maximum diameter 4 7 cm minimally enlarged since December 2016  Nonemergent vascular surgical consultation is advised  Additional chronic findings and negatives as above  This study demonstrates a significant  finding and was documented as such in [facility] for liaison and referring practitioner notification  Workstation performed: [id number]         XR shoulder 2+ views RIGHT   ED Interpretation by Sonya Benitez DO (01/14 5525)   No acute osseous abnormality      Final Result by Clearance MD Manuelito (01/14 1424)   Mild right shoulder osteoarthritis   No acute osseous abnormality  Workstation performed: [id number]         XR elbow 3+ views RIGHT   ED Interpretation by Sonya Benitez DO (01/14 7352)   No acute osseous abnormality      Final Result by Dona Billings MD (01/14 1420)      No acute osseous abnormality           Workstation performed: [id number]               Results from last 7 days   Lab Units 01/15/23  0509 01/14/23  0421   WBC Thousand/uL 7 95 6 89   HEMOGLOBIN g/dL 12 1 12 0   HEMATOCRIT % 37 3 37 5   PLATELETS Thousands/uL 212 204   NEUTROS ABS Thousands/µL 5 07 4 42         Results from last 7 days   Lab Units 01/15/23  0509 01/14/23  0421   SODIUM mmol/L 138 139   POTASSIUM mmol/L 3 7 3 8   CHLORIDE mmol/L 101 102   CO2 mmol/L 26 32   ANION GAP mmol/L 11 5   BUN mg/dL 16 19   CREATININE mg/dL 0 73 0 71   EGFR ml/min/1 73sq m 72 75   CALCIUM mg/dL 8 6 8 9     Results from last 7 days   Lab Units 01/14/23  0421   AST U/L 27   ALT U/L 27   ALK PHOS U/L 63   TOTAL PROTEIN g/dL 7 2   ALBUMIN g/dL 3 9   TOTAL BILIRUBIN mg/dL 0 52         Results from last 7 days   Lab Units 01/15/23  0509 01/14/23  0421   GLUCOSE RANDOM mg/dL 112 124           Results from last 7 days   Lab Units 01/14/23  0825 01/14/23  0622 01/14/23  0421   HS TNI 0HR ng/L  --   --  7   HS TNI 2HR ng/L  --  7  --    HSTNI D2 ng/L  --  0  --    HS TNI 4HR ng/L 7  --   --    HSTNI D4 ng/L 0  --   --              Results from last 7 days   Lab Units 01/15/23  0509   TSH 3RD GENERATON uIU/mL 1 444               Results from last 7 days   Lab Units 01/15/23  0509   CRP mg/L 3 7*         ED Treatment:   Medication Administration from 01/14/2023 0338 to 01/14/2023 0941       Date/Time Order Dose Route Action Comments     01/14/2023 0422 EST fentanyl citrate (PF) 100 MCG/2ML 25 mcg 25 mcg Intravenous Given --     01/14/2023 0505 EST HYDROmorphone HCl (DILAUDID) injection 0 2 mg 0 2 mg Intravenous Given --     01/14/2023 0505 EST ondansetron (ZOFRAN) injection 4 mg 4 mg Intravenous Given --     01/14/2023 7778 EST HYDROmorphone HCl (DILAUDID) injection 0 2 mg 0 2 mg Intravenous Given --     01/14/2023 0823 EST diazepam (VALIUM) injection 5 mg 5 mg Intravenous Given --        Present on Admission:  • Essential hypertension  • Paroxysmal atrial fibrillation (HCC)  • 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HCC)  • Hypothyroidism      Admitting Diagnosis: Arm pain [M79 603]  Back pain [M54 9]  Right arm pain [M79 601]  Intractable pain [R52]  Age/Sex: 80 y o  female  Admission Orders:  Scheduled Medications:  acetaminophen, 975 mg, Oral, Q8H [facility]  amLODIPine, 5 mg, Oral, Daily   And  lisinopril, 20 mg, Oral, Daily  aspirin, 81 mg, Oral, Daily  baclofen, 10 mg, Oral, TID  Diclofenac Sodium, 2 g, Topical, 4x Daily  enoxaparin, 40 mg, Subcutaneous, Daily  levothyroxine, 125 mcg, Oral, Early Morning  lidocaine, 1 patch, Topical, Daily  [START ON 1/17/2023] methylPREDNISolone, 20 mg, Oral, Daily   Followed by  Monica Contreras ON 1/18/2023] methylPREDNISolone, 16 mg, Oral, Daily   Followed by  Monica Contreras ON 1/19/2023] methylPREDNISolone, 12 mg, Oral, Daily   Followed by  Monica Contreras ON 1/20/2023] methylPREDNISolone, 8 mg, Oral, Daily   Followed by  Monica Contreras ON 1/21/2023] methylPREDNISolone, 4 mg, Oral, Daily  metoprolol succinate, 25 mg, Oral, Daily  pravastatin, 20 mg, Oral, Daily With Dinner  senna-docusate sodium, 1 tablet, Oral, HS  IV  Decadron   Q 8 hrs  X  2 doses  ( 1/15)        Continuous IV Infusions:     PRN Meds:  albuterol, 2 5 mg, Nebulization, Q6H PRN  HYDROmorphone, 0 2 mg, Intravenous, Q4H PRN  ondansetron, 4 mg, Intravenous, Q4H PRN  oxyCODONE, 2 5 mg, Oral, Q4H PRN  oxyCODONE, 5 mg, Oral, Q4H PRN        IP CONSULT TO ORTHOPEDIC SURGERY    Network Utilization Review Department  ATTENTION: Please call with any questions or concerns to [phone] and carefully listen to the prompts so that you are directed to the right person  All voicemails are confidential   Silvia Oconnor all requests for admission clinical reviews, approved or denied determinations and any other requests to dedicated fax number below belonging to the campus where the patient is receiving treatment   List of dedicated fax numbers for the Facilities:  [address] DENIALS (Administrative/Medical Necessity) [phone]   [address] (Maternity/NICU/Pediatrics) [phone]   7 Risa Albert [phone]   [place] [address] [phone]   [address] [place] [address] Renato Salgado 28 [phone]   [address] Lucho Higgins [place] [address] 815 [facility] [phone]

## 2023-01-16 NOTE — PLAN OF CARE
Problem: PHYSICAL THERAPY ADULT  Goal: Performs mobility at highest level of function for planned discharge setting  See evaluation for individualized goals  Description: Treatment/Interventions: Functional transfer training, LE strengthening/ROM, Elevations, Therapeutic exercise, Cognitive reorientation, Patient/family training, Equipment eval/education, Bed mobility, Gait training, Continued evaluation, Compensatory technique education, Spoke to nursing          See flowsheet documentation for full assessment, interventions and recommendations  Note: Prognosis: Fair  Problem List: Impaired balance, Decreased cognition, Decreased safety awareness, Impaired judgement, Pain  Assessment: Buffy James is a 80 y o  female admitted to [facility] on 1/14/2023 for Acute pain of right shoulder  PT was consulted and pt was seen on 1/16/2023 for mobility assessment and d/c planning  Pt presents w medium fall risk, acute R elbow pain  Poor historian secondary to acute confusion/ cognitive changes; social hx and PLOF obtained via chart review  Pt is currently functioning at a supervision assistance x1 level for bed mobility and transfers, CGA for ambulation without an AD  Pt demonstrated significant cognitive impairments impacting participation in therapy and safety awareness  Require constant supervision and cuing for direction and safety  Currently ambulating unlimited household distances  Risk of falls due to impulsive behaviors (frequently bends over and grabs elbow due to pain however redirectable)  Despite reported pain w walking does not report any pain w WB RUE  Pt will benefit from continued skilled IP PT to address the above mentioned impairments of balance in order to maximize recovery and increase functional independence when completing mobility and ADLs  At this time PT recommendations for d/c are OPPT and 24/7 S vs STR pending cognitive improvements and level of assist available at d/c   Pt is unsafe to d/c home alone at current cognitive status  Updated MD via TT to cognitive status  Barriers to Discharge: Decreased caregiver support  Barriers to Discharge Comments: lives alone  PT Discharge Recommendation: (S) Home with outpatient rehabilitation (24/7 S vs STR pending level of support at home and cognitive improvements)    See flowsheet documentation for full assessment

## 2023-01-16 NOTE — PLAN OF CARE
Problem: PAIN - ADULT  Goal: Verbalizes/displays adequate comfort level or baseline comfort level  Description: Interventions:  - Encourage patient to monitor pain and request assistance  - Assess pain using appropriate pain scale  - Administer analgesics based on type and severity of pain and evaluate response  - Implement non-pharmacological measures as appropriate and evaluate response  - Consider cultural and social influences on pain and pain management  - Notify physician/advanced practitioner if interventions unsuccessful or patient reports new pain  Outcome: Progressing     Problem: SAFETY ADULT  Goal: Patient will remain free of falls  Description: INTERVENTIONS:  - Educate patient/family on patient safety including physical limitations  - Instruct patient to call for assistance with activity   - Consult OT/PT to assist with strengthening/mobility   - Keep Call bell within reach  - Keep bed low and locked with side rails adjusted as appropriate  - Keep care items and personal belongings within reach  - Initiate and maintain comfort rounds  - Make Fall Risk Sign visible to staff  - Offer Toileting every 2 Hours, in advance of need  - Initiate/Maintain bed/chair alarm  - Obtain necessary fall risk management equipment: bed/chair alarm, gripper socks  - Apply yellow socks and bracelet for high fall risk patients  - Consider moving patient to room near nurses station  Outcome: Progressing     Problem: DISCHARGE PLANNING  Goal: Discharge to home or other facility with appropriate resources  Description: INTERVENTIONS:  - Identify barriers to discharge w/patient and caregiver  - Arrange for needed discharge resources and transportation as appropriate  - Identify discharge learning needs  - Refer to Case Management Department for coordinating discharge planning if the patient needs post-hospital services based on physician/advanced practitioner order or complex needs related to functional status, cognitive ability, or social support system  Outcome: Progressing     Problem: Knowledge Deficit  Goal: Patient/family/caregiver demonstrates understanding of disease process, treatment plan, medications, and discharge instructions  Description: Complete learning assessment and assess knowledge base    Interventions:  - Provide teaching at level of understanding  - Provide teaching via preferred learning methods  Outcome: Progressing     Problem: CARDIOVASCULAR - ADULT  Goal: Maintains optimal cardiac output and hemodynamic stability  Description: INTERVENTIONS:  - Monitor I/O, vital signs and rhythm  - Monitor for S/S and trends of decreased cardiac output  - Administer and titrate ordered vasoactive medications to optimize hemodynamic stability  - Assess quality of pulses, skin color and temperature  - Assess for signs of decreased coronary artery perfusion  - Instruct patient to report change in severity of symptoms  Outcome: Progressing     Problem: METABOLIC, FLUID AND ELECTROLYTES - ADULT  Goal: Electrolytes maintained within normal limits  Description: INTERVENTIONS:  - Monitor labs and assess patient for signs and symptoms of electrolyte imbalances  - Administer electrolyte replacement as ordered  - Monitor response to electrolyte replacements, including repeat lab results as appropriate  - Instruct patient on fluid and nutrition as appropriate  Outcome: Progressing  Goal: Fluid balance maintained  Description: INTERVENTIONS:  - Monitor labs   - Monitor I/O and WT  - Instruct patient on fluid and nutrition as appropriate  - Assess for signs & symptoms of volume excess or deficit  Outcome: Progressing     Problem: MUSCULOSKELETAL - ADULT  Goal: Maintain or return mobility to safest level of function  Description: INTERVENTIONS:  - Assess patient's ability to carry out ADLs; assess patient's baseline for ADL function and identify physical deficits which impact ability to perform ADLs (bathing, care of mouth/teeth, toileting, grooming, dressing, etc )  - Assess/evaluate cause of self-care deficits   - Assess range of motion  - Assess patient's mobility  - Assess patient's need for assistive devices and provide as appropriate  - Encourage maximum independence but intervene and supervise when necessary  - Involve family in performance of ADLs  - Assess for home care needs following discharge   - Consider OT consult to assist with ADL evaluation and planning for discharge  - Provide patient education as appropriate  Outcome: Progressing     Problem: Potential for Falls  Goal: Patient will remain free of falls  Description: INTERVENTIONS:  - Educate patient/family on patient safety including physical limitations  - Instruct patient to call for assistance with activity   - Consult OT/PT to assist with strengthening/mobility   - Keep Call bell within reach  - Keep bed low and locked with side rails adjusted as appropriate  - Keep care items and personal belongings within reach  - Initiate and maintain comfort rounds  - Make Fall Risk Sign visible to staff  - Offer Toileting every 2 Hours, in advance of need  - Initiate/Maintain bed/chair alarm  - Obtain necessary fall risk management equipment: bed/chair alarm, gripper socks  - Apply yellow socks and bracelet for high fall risk patients  - Consider moving patient to room near nurses station  Outcome: Progressing     Problem: CONFUSION/THOUGHT DISTURBANCE  Goal: Thought disturbances (confusion, delirium, depression, dementia or psychosis) are managed to maintain or return to baseline mental status and functional level  Description: INTERVENTIONS:  - Assess for possible contributors to  thought disturbance, including but not limited to medications, infection, impaired vision or hearing, underlying metabolic abnormalities, dehydration, respiratory compromise,  psychiatric diagnoses and notify attending PHYSICAN/AP  - Monitor and intervene to maintain adequate nutrition, hydration, elimination, sleep and activity  - Decrease environmental stimuli, including noise as appropriate  - Provide frequent contacts to provide refocusing, direction and reassurance as needed  Approach patient calmly with eye contact and at their level    - [place] high risk fall precautions, aspiration precautions and other safety measures, as indicated  - If delirium suspected, notify physician/AP of change in condition and request immediate in-person evaluation  - Pursue consults as appropriate including Geriatric (campus dependent), OT for cognitive evaluation/activity planning, psychiatric, pastoral care, etc   Outcome: Progressing

## 2023-01-16 NOTE — UTILIZATION REVIEW
NOTIFICATION OF INPATIENT ADMISSION   AUTHORIZATION REQUEST   SERVICING FACILITY:   [address] [place] [address] [facility]  Tax ID: [id number]  NPI: [id number] ATTENDING PROVIDER:  Attending Name and NPI#: Crispin Hill Md [[id number]]  Address: [address] [place] [address]  Phone: [phone]     ADMISSION INFORMATION:  [facility]  [address]  Place of Service Code: 21  Inpatient Admission Date/Time: 1/14/23  8:23 AM  Discharge Date/Time: No discharge date for patient encounter  Admitting Diagnosis Code/Description:  Arm pain [M79 603]  Back pain [M54 9]  Right arm pain [M79 601]  Intractable pain [R52]     UTILIZATION REVIEW CONTACT:  Leopoldo Holland, Utilization   Network Utilization Review Department  Phone: [phone]  Fax: [phone]  Email: [email or web address]  Contact for approvals/pending authorizations, clinical reviews, and discharge  PHYSICIAN ADVISORY SERVICES:  Medical Necessity Denial & [facility] Review  Phone: [phone]  Fax: [phone]  Email: [email or web address]

## 2023-01-16 NOTE — PHYSICAL THERAPY NOTE
PHYSICAL THERAPY EVALUATION          Patient Name: Buffy Summers  Today's Date: 1/16/2023  PT EVALUATION    80 y o     [id number]    Arm pain [M79 603]  Back pain [M54 9]  Right arm pain [M79 601]  Intractable pain [R52]    Past Medical History:   Diagnosis Date    A-fib ([facility] [address] [facility] )     Allergy to IVP dye 06/04/2013    pt felt heaviness, palpitations    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), wet (HCC)     bilateral    Aneurysm of aortic root     last assessed - [id number]    Aortic arch aneurysm     Aortic root dilatation ([facility] )     last assessed - [id number]    Arthritis     Ascending aortic aneurysm     last assessed - 04OMY1656    Asthma     Basal cell carcinoma     last assessed - 06Ebj5936    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[facility] )     Disease of thyroid gland     Facet arthropathy, cervical     last assessed - [id number]    History of fracture of vertebral column     Hyperlipidemia     Hypertension     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[facility] )     Hypoxia     last assessed - 42ZTE5757    Junctional rhythm     last assessed - 98Yxu1040    Lightheadedness     last assessed - 44Hci8219    Migraine     Palpitations     last assessed - 84Dgl3814    Pleural effusion, bilateral     last assessed - 09Nvd5967    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HCC)     Shortness of breath     last assessed - 82Yml7286    Thyroid trouble     Trigger finger     last assessed - 40FLH4068    Trigger middle finger of right hand     last assessed - 12Azh6149    Urinary incontinence     last assessed -  22Jul,2013; Resolved - [id number]         Past Surgical History:   Procedure Laterality Date    ABDOMINAL AORTIC ANEURYSM REPAIR W/ ENDOLUMINAL GRAFT  06/28/2015    Ascending aorta and hemiarch replacement with 30 mm Vascutek Gelweave graft;  Managed by Alfredo Romeo; last assessed - [id number]    APPENDECTOMY      BLADDER SURGERY      Reccurent histoy of bladder surgery    [facility] JEREMY PROCEDURE      CARDIAC CATHETERIZATION  04/14/2004    Procedure summary - Luminal irregularities; last assessed - [id number]     SECTION      CORONARY ANEURYSM REPAIR      CYSTOSCOPY      botox injection    HYSTERECTOMY      VT NDSC NJX IMPLT MATRL URT&/BLDR NCK N/A 2017    Procedure: Kiana Certain; Jm Long BULKING AGENT INJECTION ;  Surgeon: Rudi Skiff, MD;  Location: [facility];  Service: Urology    VT TENDON SHEATH INCISION Right 10/18/2016    Procedure: LONG FINGER TRIGGER RELEASE ;  Surgeon: Joan Hassan MD;  Location: [facility];  Service: Orthopedics    THYROIDECTOMY      Total Thyroidectomy    TONSILLECTOMY AND ADENOIDECTOMY          23 1509   PT Last Visit   PT Visit Date 23   Note Type   Note type Evaluation   Pain Assessment   Pain Assessment Tool FLACC   Pain Location/Orientation Orientation: Right;Other (Comment)  (elbow)   Hospital Pain Intervention(s) Ambulation/increased activity; Emotional support; Rest   Pain Rating: FLACC (Rest) - Face 0   Pain Rating: FLACC (Rest) - Legs 0   Pain Rating: FLACC (Rest) - Activity 0   Pain Rating: FLACC (Rest) - Cry 0   Pain Rating: FLACC (Rest) - Consolability 0   Score: FLACC (Rest) 0   Pain Rating: FLACC (Activity) - Face 0   Pain Rating: FLACC (Activity) - Legs 0   Pain Rating: FLACC (Activity) - Activity 0   Pain Rating: FLACC (Activity) - Cry 1   Pain Rating: FLACC (Activity) - Consolability 1   Score: FLACC (Activity) 2   Restrictions/Precautions   Weight Bearing Precautions Per Order No   Other Precautions Cognitive; Chair Alarm; Bed Alarm; Impulsive; Fall Risk;Pain   Home Living   Type of [facility] level   Additional Comments obtained via chart review   Prior Function   Level of [place] Independent with ADLs; Independent with functional mobility   Lives With Alone   IADLs Independent with driving   Comments obtained via chart review   General   Additional Pertinent History pt admitted 23 for acute pain R shoulder  up and oob orders   PMHx significant for afib, arthritis, CA, migraine   Cognition   Overall Cognitive Status Impaired   Arousal/Participation Responsive   Orientation Level Oriented to person;Disoriented to place; Disoriented to time;Disoriented to situation   Memory Decreased recall of precautions;Decreased recall of recent events;Decreased short term memory;Decreased recall of biographical information   Following Commands Follows one step commands with increased time or repetition   Comments unclear baseline mentation however pt significantly confused at time of exam  able to follow simple commands w repetition  frequent redirection needed  occ ? aphasia vs perseveration noted  unable to state last name or provide social hx/PLOF  difficulty making needs known  impulsive at times   RLE Assessment   RLE Assessment WFL  (4/5)   LLE Assessment   LLE Assessment WFL  (4/5)   Bed Mobility   Supine to Sit 5  Supervision   Additional items HOB elevated; Increased time required   Sit to Supine 5  Supervision   Additional items Increased time required   Additional Comments no pain noted w WB RUE during bed mobility   Transfers   Sit to Stand 5  Supervision   Additional items Increased time required;Verbal cues   Stand to Sit 5  Supervision   Additional items Increased time required;Verbal cues   Additional Comments cues for direction   Ambulation/Elevation   Gait pattern Improper Weight shift; Inconsistent mariam  (impulsive)   Gait Assistance 4  Minimal assist  (CGA for direction/guidance)   Additional items Assist x 1;Verbal cues   Assistive Device None   Distance 90'   Balance   Static Standing Fair -   Dynamic Standing Fair -   Ambulatory Fair -   Endurance Deficit   Endurance Deficit No   Activity Tolerance   Activity Tolerance Patient tolerated treatment well;Treatment limited secondary to medical complications (Comment)  (cognition)   Nurse Made Aware Abundio RN   Assessment   Prognosis Fair   Problem List Impaired balance;Decreased cognition;Decreased safety awareness; Impaired judgement;Pain   Assessment Buffy Bourne is a 80 y o  female admitted to [email or web address] on 1/14/2023 for Acute pain of right shoulder  PT was consulted and pt was seen on 1/16/2023 for mobility assessment and d/c planning  Pt presents w medium fall risk, acute R elbow pain  Poor historian secondary to acute confusion/ cognitive changes; social hx and PLOF obtained via chart review  Pt is currently functioning at a supervision assistance x1 level for bed mobility and transfers, CGA for ambulation without an AD  Pt demonstrated significant cognitive impairments impacting participation in therapy and safety awareness  Require constant supervision and cuing for direction and safety  Currently ambulating unlimited household distances  Risk of falls due to impulsive behaviors (frequently bends over and grabs elbow due to pain however redirectable)  Despite reported pain w walking does not report any pain w WB RUE  Pt will benefit from continued skilled IP PT to address the above mentioned impairments of balance in order to maximize recovery and increase functional independence when completing mobility and ADLs  At this time PT recommendations for d/c are OPPT and 24/7 S vs STR pending cognitive improvements and level of assist available at d/c  Pt is unsafe to d/c home alone at current cognitive status  Updated MD via TT to cognitive status  Barriers to Discharge Decreased caregiver support   Barriers to Discharge Comments lives alone   Goals   Patient Goals none stated 2* cognition   STG Expiration Date 01/26/23   Short Term Goal #1 1)  Pt will perform bed mobility with Manuel demonstrating appropriate technique 100% of the time in order to improve function  2)  Perform all transfers with Manuel demonstrating safe and appropriate technique 100% of the time in order to improve ability to negotiate safely in home environment  3) Amb with least restrictive AD > 150'x1 with mod I in order to demonstrate ability to negotiate in home environment  4)  Improve overall strength and balance 1/2 grade in order to optimize ability to perform functional tasks and reduce fall risk  5) Increase activity tolerance to 45 minutes in order to improve endurance to functional tasks  6)  Negotiate stairs using most appropriate technique and S in order to be able to negotiate safely in home environment  7) PT for ongoing patient and family/caregiver education, DME needs and d/c planning in order to promote highest level of function in least restrictive environment  Plan   Treatment/Interventions Functional transfer training;LE strengthening/ROM; Elevations; Therapeutic exercise;Cognitive reorientation;Patient/family training;Equipment eval/education; Bed mobility;Gait training;Continued evaluation; Compensatory technique education;Spoke to nursing   PT Frequency 1-2x/wk   Recommendation   PT Discharge Recommendation (S)  Home with outpatient rehabilitation  (24/7 S vs STR pending level of support at home and cognitive improvements)   AM-PAC Basic Mobility Inpatient   Turning in Flat Bed Without Bedrails 4   Lying on Back to Sitting on Edge of Flat Bed Without Bedrails 3   Moving Bed to Chair 3   Standing Up From Chair Using Arms 3   Walk in Room 3   Climb 3-5 Stairs With Railing 3   Basic Mobility Inpatient Raw Score 19   Basic Mobility Standardized Score 42 48   Highest Level Of Mobility   JH-HLM Goal 6: Walk 10 steps or more   JH-HLM Achieved 7: Walk 25 feet or more   End of Consult   Patient Position at End of Consult Supine;Bed/Chair alarm activated; All needs within reach   History: co - morbidities, social background, fall risk, cognition  Exam: impairments in systems including musculoskeletal (strength), neuromuscular (balance, gait, transfers, motor function), joint integrity, AM-PAC, cognition  Clinical: unstable/unpredictable  Complexity:high      Toma Siddiqui, PT

## 2023-01-17 LAB
BACTERIA UR QL AUTO: ABNORMAL /HPF
BASOPHILS # BLD AUTO: 0.04 THOUSANDS/ÂΜL (ref 0–0.1)
BASOPHILS NFR BLD AUTO: 0 % (ref 0–1)
BILIRUB UR QL STRIP: NEGATIVE
CLARITY UR: CLEAR
COLOR UR: ABNORMAL
EOSINOPHIL # BLD AUTO: 0.03 THOUSAND/ÂΜL (ref 0–0.61)
EOSINOPHIL NFR BLD AUTO: 0 % (ref 0–6)
ERYTHROCYTE [DISTWIDTH] IN BLOOD BY AUTOMATED COUNT: 13.2 % (ref 11.6–15.1)
GLUCOSE UR STRIP-MCNC: NEGATIVE MG/DL
HCT VFR BLD AUTO: 37.2 % (ref 34.8–46.1)
HGB BLD-MCNC: 12.9 G/DL (ref 11.5–15.4)
HGB UR QL STRIP.AUTO: ABNORMAL
IMM GRANULOCYTES # BLD AUTO: 0.05 THOUSAND/UL (ref 0–0.2)
IMM GRANULOCYTES NFR BLD AUTO: 0 % (ref 0–2)
KETONES UR STRIP-MCNC: ABNORMAL MG/DL
LEUKOCYTE ESTERASE UR QL STRIP: NEGATIVE
LYMPHOCYTES # BLD AUTO: 1.84 THOUSANDS/ÂΜL (ref 0.6–4.47)
LYMPHOCYTES NFR BLD AUTO: 15 % (ref 14–44)
MCH RBC QN AUTO: 31.2 PG (ref 26.8–34.3)
MCHC RBC AUTO-ENTMCNC: 34.7 G/DL (ref 31.4–37.4)
MCV RBC AUTO: 90 FL (ref 82–98)
MONOCYTES # BLD AUTO: 1.3 THOUSAND/ÂΜL (ref 0.17–1.22)
MONOCYTES NFR BLD AUTO: 11 % (ref 4–12)
MUCOUS THREADS UR QL AUTO: ABNORMAL
NEUTROPHILS # BLD AUTO: 9.06 THOUSANDS/ÂΜL (ref 1.85–7.62)
NEUTS SEG NFR BLD AUTO: 74 % (ref 43–75)
NITRITE UR QL STRIP: NEGATIVE
NON-SQ EPI CELLS URNS QL MICRO: ABNORMAL /HPF
NRBC BLD AUTO-RTO: 0 /100 WBCS
PH UR STRIP.AUTO: 6 [PH]
PLATELET # BLD AUTO: 229 THOUSANDS/UL (ref 149–390)
PMV BLD AUTO: 11.3 FL (ref 8.9–12.7)
PROT UR STRIP-MCNC: ABNORMAL MG/DL
RBC # BLD AUTO: 4.14 MILLION/UL (ref 3.81–5.12)
RBC #/AREA URNS AUTO: ABNORMAL /HPF
SP GR UR STRIP.AUTO: 1.02 (ref 1–1.03)
UROBILINOGEN UR QL STRIP.AUTO: 0.2 E.U./DL
WBC # BLD AUTO: 12.32 THOUSAND/UL (ref 4.31–10.16)
WBC #/AREA URNS AUTO: ABNORMAL /HPF

## 2023-01-17 RX ADMIN — LEVOTHYROXINE SODIUM 125 MCG: 25 TABLET ORAL at 05:27

## 2023-01-17 RX ADMIN — ACETAMINOPHEN 975 MG: 325 TABLET ORAL at 05:27

## 2023-01-17 RX ADMIN — BACLOFEN 10 MG: 10 TABLET ORAL at 09:19

## 2023-01-17 RX ADMIN — PRAVASTATIN SODIUM 20 MG: 20 TABLET ORAL at 17:07

## 2023-01-17 RX ADMIN — ENOXAPARIN SODIUM 40 MG: 40 INJECTION SUBCUTANEOUS at 09:47

## 2023-01-17 RX ADMIN — AMLODIPINE BESYLATE 5 MG: 5 TABLET ORAL at 09:47

## 2023-01-17 RX ADMIN — TRAMADOL HYDROCHLORIDE 50 MG: 50 TABLET, COATED ORAL at 05:27

## 2023-01-17 RX ADMIN — ASPIRIN 81 MG: 81 TABLET, COATED ORAL at 09:47

## 2023-01-17 RX ADMIN — BACLOFEN 10 MG: 10 TABLET ORAL at 21:52

## 2023-01-17 RX ADMIN — DICLOFENAC SODIUM 2 G: 10 GEL TOPICAL at 11:49

## 2023-01-17 RX ADMIN — BACLOFEN 10 MG: 10 TABLET ORAL at 17:07

## 2023-01-17 RX ADMIN — ACETAMINOPHEN 975 MG: 325 TABLET ORAL at 13:15

## 2023-01-17 RX ADMIN — TRAMADOL HYDROCHLORIDE 50 MG: 50 TABLET, COATED ORAL at 13:16

## 2023-01-17 RX ADMIN — DICLOFENAC SODIUM 2 G: 10 GEL TOPICAL at 09:19

## 2023-01-17 RX ADMIN — ACETAMINOPHEN 975 MG: 325 TABLET ORAL at 21:52

## 2023-01-17 RX ADMIN — METHYLPREDNISOLONE 20 MG: 16 TABLET ORAL at 09:48

## 2023-01-17 RX ADMIN — LIDOCAINE 1 PATCH: 50 PATCH CUTANEOUS at 09:19

## 2023-01-17 RX ADMIN — LISINOPRIL 20 MG: 20 TABLET ORAL at 09:47

## 2023-01-17 RX ADMIN — METOPROLOL SUCCINATE 25 MG: 25 TABLET, EXTENDED RELEASE ORAL at 09:47

## 2023-01-17 RX ADMIN — DICLOFENAC SODIUM 2 G: 10 GEL TOPICAL at 21:52

## 2023-01-17 RX ADMIN — DICLOFENAC SODIUM 2 G: 10 GEL TOPICAL at 17:07

## 2023-01-17 NOTE — ASSESSMENT & PLAN NOTE
History of low-grade salivary gland adenocarcinoma      · Repeat imaging in September 2022 with unchanged 2 5 cm destructive soft tissue lesion in the right posterior maxillary hard palate with erosion into the right maxillary sinus floor  · Continue follow up outpatient

## 2023-01-17 NOTE — PLAN OF CARE
Problem: PAIN - ADULT  Goal: Verbalizes/displays adequate comfort level or baseline comfort level  Description: Interventions:  - Encourage patient to monitor pain and request assistance  - Assess pain using appropriate pain scale  - Administer analgesics based on type and severity of pain and evaluate response  - Implement non-pharmacological measures as appropriate and evaluate response  - Consider cultural and social influences on pain and pain management  - Notify physician/advanced practitioner if interventions unsuccessful or patient reports new pain  Outcome: Progressing     Problem: SAFETY ADULT  Goal: Patient will remain free of falls  Description: INTERVENTIONS:  - Educate patient/family on patient safety including physical limitations  - Instruct patient to call for assistance with activity   - Consult OT/PT to assist with strengthening/mobility   - Keep Call bell within reach  - Keep bed low and locked with side rails adjusted as appropriate  - Keep care items and personal belongings within reach  - Initiate and maintain comfort rounds  - Make Fall Risk Sign visible to staff  - Offer Toileting every 2 Hours, in advance of need  - Initiate/Maintain bed/chair alarm  - Obtain necessary fall risk management equipment: bed/chair alarm, gripper socks  - Apply yellow socks and bracelet for high fall risk patients  - Consider moving patient to room near nurses station  Outcome: Progressing     Problem: DISCHARGE PLANNING  Goal: Discharge to home or other facility with appropriate resources  Description: INTERVENTIONS:  - Identify barriers to discharge w/patient and caregiver  - Arrange for needed discharge resources and transportation as appropriate  - Identify discharge learning needs  - Refer to Case Management Department for coordinating discharge planning if the patient needs post-hospital services based on physician/advanced practitioner order or complex needs related to functional status, cognitive ability, or social support system  Outcome: Progressing     Problem: Knowledge Deficit  Goal: Patient/family/caregiver demonstrates understanding of disease process, treatment plan, medications, and discharge instructions  Description: Complete learning assessment and assess knowledge base    Interventions:  - Provide teaching at level of understanding  - Provide teaching via preferred learning methods  Outcome: Progressing     Problem: CARDIOVASCULAR - ADULT  Goal: Maintains optimal cardiac output and hemodynamic stability  Description: INTERVENTIONS:  - Monitor I/O, vital signs and rhythm  - Monitor for S/S and trends of decreased cardiac output  - Administer and titrate ordered vasoactive medications to optimize hemodynamic stability  - Assess quality of pulses, skin color and temperature  - Assess for signs of decreased coronary artery perfusion  - Instruct patient to report change in severity of symptoms  Outcome: Progressing     Problem: METABOLIC, FLUID AND ELECTROLYTES - ADULT  Goal: Electrolytes maintained within normal limits  Description: INTERVENTIONS:  - Monitor labs and assess patient for signs and symptoms of electrolyte imbalances  - Administer electrolyte replacement as ordered  - Monitor response to electrolyte replacements, including repeat lab results as appropriate  - Instruct patient on fluid and nutrition as appropriate  Outcome: Progressing  Goal: Fluid balance maintained  Description: INTERVENTIONS:  - Monitor labs   - Monitor I/O and WT  - Instruct patient on fluid and nutrition as appropriate  - Assess for signs & symptoms of volume excess or deficit  Outcome: Progressing     Problem: Potential for Falls  Goal: Patient will remain free of falls  Description: INTERVENTIONS:  - Educate patient/family on patient safety including physical limitations  - Instruct patient to call for assistance with activity   - Consult OT/PT to assist with strengthening/mobility   - Keep Call bell within reach  - Keep bed low and locked with side rails adjusted as appropriate  - Keep care items and personal belongings within reach  - Initiate and maintain comfort rounds  - Make Fall Risk Sign visible to staff  - Offer Toileting every 2 Hours, in advance of need  - Initiate/Maintain bed/chair alarm  - Obtain necessary fall risk management equipment: bed/chair alarm, gripper socks  - Apply yellow socks and bracelet for high fall risk patients  - Consider moving patient to room near nurses station  Outcome: Progressing     Problem: MUSCULOSKELETAL - ADULT  Goal: Maintain or return mobility to safest level of function  Description: INTERVENTIONS:  - Assess patient's ability to carry out ADLs; assess patient's baseline for ADL function and identify physical deficits which impact ability to perform ADLs (bathing, care of mouth/teeth, toileting, grooming, dressing, etc )  - Assess/evaluate cause of self-care deficits   - Assess range of motion  - Assess patient's mobility  - Assess patient's need for assistive devices and provide as appropriate  - Encourage maximum independence but intervene and supervise when necessary  - Involve family in performance of ADLs  - Assess for home care needs following discharge   - Consider OT consult to assist with ADL evaluation and planning for discharge  - Provide patient education as appropriate  Outcome: Progressing     Problem: CONFUSION/THOUGHT DISTURBANCE  Goal: Thought disturbances (confusion, delirium, depression, dementia or psychosis) are managed to maintain or return to baseline mental status and functional level  Description: INTERVENTIONS:  - Assess for possible contributors to  thought disturbance, including but not limited to medications, infection, impaired vision or hearing, underlying metabolic abnormalities, dehydration, respiratory compromise,  psychiatric diagnoses and notify attending PHYSICAN/AP  - Monitor and intervene to maintain adequate nutrition, hydration, elimination, sleep and activity  - Decrease environmental stimuli, including noise as appropriate  - Provide frequent contacts to provide refocusing, direction and reassurance as needed  Approach patient calmly with eye contact and at their level    - Chancellor high risk fall precautions, aspiration precautions and other safety measures, as indicated  - If delirium suspected, notify physician/AP of change in condition and request immediate in-person evaluation  - Pursue consults as appropriate including Geriatric (campus dependent), OT for cognitive evaluation/activity planning, psychiatric, pastoral care, etc   Outcome: Progressing     Problem: Prexisting or High Potential for Compromised Skin Integrity  Goal: Skin integrity is maintained or improved  Description: INTERVENTIONS:  - Identify patients at risk for skin breakdown  - Assess and monitor skin integrity  - Assess and monitor nutrition and hydration status  - Monitor labs   - Assess for incontinence   - Turn and reposition patient  - Assist with mobility/ambulation  - Relieve pressure over bony prominences  - Avoid friction and shearing  - Provide appropriate hygiene as needed including keeping skin clean and dry  - Evaluate need for skin moisturizer/barrier cream  - Collaborate with interdisciplinary team   - Patient/family teaching  - Consider wound care consult   Outcome: Progressing     Problem: MOBILITY - ADULT  Goal: Maintain or return to baseline ADL function  Description: INTERVENTIONS:  -  Assess patient's ability to carry out ADLs; assess patient's baseline for ADL function and identify physical deficits which impact ability to perform ADLs (bathing, care of mouth/teeth, toileting, grooming, dressing, etc )  - Assess/evaluate cause of self-care deficits   - Assess range of motion  - Assess patient's mobility; develop plan if impaired  - Assess patient's need for assistive devices and provide as appropriate  - Encourage maximum independence but intervene and supervise when necessary  - Involve family in performance of ADLs  - Assess for home care needs following discharge   - Consider OT consult to assist with ADL evaluation and planning for discharge  - Provide patient education as appropriate  Outcome: Progressing  Goal: Maintains/Returns to pre admission functional level  Description: INTERVENTIONS:  - Perform BMAT or MOVE assessment daily    - Set and communicate daily mobility goal to care team and patient/family/caregiver  - Collaborate with rehabilitation services on mobility goals if consulted  - Perform Range of Motion 3 times a day  - Reposition patient every 2 hours    - Dangle patient 3 times a day  - Stand patient 3 times a day  - Ambulate patient 3 times a day  - Out of bed to chair 3 times a day   - Out of bed for meals 3 times a day  - Out of bed for toileting  - Record patient progress and toleration of activity level   Outcome: Progressing

## 2023-01-17 NOTE — PROGRESS NOTES
[facility]  Progress Note - June Ross Ruma 6/4/1932, 80 y o  female MRN: [id number]  Unit/Bed#: E2 -01 Encounter: [id number]  Primary Care Provider: Sindi Costello DO   Date and time admitted to hospital: 1/14/2023  3:40 AM    * Acute pain of right shoulder  Assessment & Plan  80year old female presented with acute worsening pain of her right shoulder blade, and her right elbow  · CT chest without contrast without any acute processes  · X-ray of shoulder showing mild osteoarthritis  · Continue conservative management  Patient receiving steroid taper  · Discussed case with ortho again to determine if she is a candidate for steroid / lidocaine injection  · PT/OT final recommendations pending    Acute metabolic encephalopathy  Assessment & Plan  Improved  Back to baseline per granddaughter  Aneurysm of aortic arch without rupture  Assessment & Plan  As noted on CT imaging, minimally unchanged at 4 7cm   · Patient recommended to follow up with PCP outpatient with yearly screenings    Hypothyroidism  Assessment & Plan  · Continue levothyroxine    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[facility] [address] )  Assessment & Plan  History of low-grade salivary gland adenocarcinoma      · Repeat imaging in September 2022 with unchanged 2 5 cm destructive soft tissue lesion in the right posterior maxillary hard palate with erosion into the right maxillary sinus floor  · Continue follow up outpatient    Paroxysmal atrial fibrillation (HCC)  Assessment & Plan  · Continue toprol xl 25  · Not on anticoagulation due to risk of bleeding due to salivary tumor    Hyperlipidemia  Assessment & Plan  Continue statin    Essential hypertension  Assessment & Plan  Controlled  · BP remains slightly elevated, continue monitoring with better pain control      VTE Pharmacologic Prophylaxis:   Pharmacologic: Enoxaparin (Lovenox)  Mechanical VTE Prophylaxis in Place: Yes    Discussions with Specialists or Other Care Team Provider: nursing    Education and Discussions with Family / Patient: patient, granddaughter    Current Length of Stay: 3 day(s)    Current Patient Status: Inpatient   Certification Statement: The patient will continue to require additional inpatient hospital stay due to pt/ot reeval, ortho reeval    Discharge Plan: 24 hours    Code Status: Level 1 - Full Code      Subjective:   Patient seen and examined at bedside  Seems to be mentating back at baseline  I was present when her granddaughter was there visiting her  I answered their questions to their satisfaction  No new issues overnight   Objective:     Vitals:   Temp (24hrs), Av 3 °F (36 8 °C), Min:97 3 °F (36 3 °C), Max:98 8 °F (37 1 °C)    Temp:  [97 3 °F (36 3 °C)-98 8 °F (37 1 °C)] 98 6 °F (37 °C)  HR:  [62-76] 76  Resp:  [18-20] 20  BP: (135-174)/(67-82) 135/67  SpO2:  [93 %-97 %] 94 %  There is no height or weight on file to calculate BMI  Input and Output Summary (last 24 hours): Intake/Output Summary (Last 24 hours) at 2023 1000  Last data filed at 2023 1529  Gross per 24 hour   Intake 980 ml   Output --   Net 980 ml       Physical Exam:     Physical Exam  Vitals reviewed  Constitutional:       General: She is not in acute distress  HENT:      Head: Normocephalic  Nose: Nose normal       Mouth/Throat:      Mouth: Mucous membranes are moist    Eyes:      General: No scleral icterus  Cardiovascular:      Rate and Rhythm: Normal rate  Pulmonary:      Effort: Pulmonary effort is normal  No respiratory distress  Abdominal:      General: There is no distension  Palpations: Abdomen is soft  Tenderness: There is no abdominal tenderness  Musculoskeletal:         General: Tenderness present  Comments: Limitation rom right UE shoulder / elbow due to pain   Skin:     General: Skin is warm  Neurological:      Mental Status: She is alert and oriented to person, place, and time     Psychiatric:         Mood and Affect: Mood normal          Behavior: Behavior normal        Additional Data:     Labs:    Results from last 7 days   Lab Units 01/17/23  0622   WBC Thousand/uL 12 32*   HEMOGLOBIN g/dL 12 9   HEMATOCRIT % 37 2   PLATELETS Thousands/uL 229   NEUTROS PCT % 74   LYMPHS PCT % 15   MONOS PCT % 11   EOS PCT % 0     Results from last 7 days   Lab Units 01/15/23  0509 01/14/23  0421   SODIUM mmol/L 138 139   POTASSIUM mmol/L 3 7 3 8   CHLORIDE mmol/L 101 102   CO2 mmol/L 26 32   BUN mg/dL 16 19   CREATININE mg/dL 0 73 0 71   ANION GAP mmol/L 11 5   CALCIUM mg/dL 8 6 8 9   ALBUMIN g/dL  --  3 9   TOTAL BILIRUBIN mg/dL  --  0 52   ALK PHOS U/L  --  63   ALT U/L  --  27   AST U/L  --  27   GLUCOSE RANDOM mg/dL 112 124                           * I Have Reviewed All Lab Data Listed Above  * Additional Pertinent Lab Tests Reviewed: [address] Admission Reviewed      Lines:   Invasive Devices     Peripheral Intravenous Line  Duration           Peripheral IV 01/15/23 Dorsal (posterior); Left Forearm 1 day                   Imaging:    Imaging Reports Reviewed Today Include: xr elbow, shoulder, ct chest    Recent Cultures (last 7 days):           Last 24 Hours Medication List:   Current Facility-Administered Medications   Medication Dose Route Frequency Provider Last Rate   • acetaminophen  975 mg Oral Q8H Evie Agustin MD     • albuterol  2 5 mg Nebulization Q6H PRN Xiao Go MD     • amLODIPine  5 mg Oral Daily Xiao Go MD      And   • lisinopril  20 mg Oral Daily Xiao Go MD     • aspirin  81 mg Oral Daily Xiao Go MD     • baclofen  10 mg Oral TID Xiao Go MD     • Diclofenac Sodium  2 g Topical 4x Daily Xiao Go MD     • enoxaparin  40 mg Subcutaneous Daily Xiao Go MD     • hydrALAZINE  5 mg Intravenous Q6H PRN Tom Hayes MD     • HYDROmorphone  0 2 mg Intravenous Q3H PRN Tom Hayes MD     • levothyroxine  125 mcg Oral Early Morning Xiao Go MD     • lidocaine  1 patch Topical Daily Cara Kwan MD     • menthol-methyl salicylate   Apply externally 4x Daily PRN Christal Charles MD     • [START ON 1/18/2023] methylPREDNISolone  16 mg Oral Daily Jersoney REYMUNDO Oliva      Followed by   • [START ON 1/19/2023] methylPREDNISolone  12 mg Oral Daily Rolley Pj, PA-C      Followed by   • [START ON 1/20/2023] methylPREDNISolone  8 mg Oral Daily Rolley Pj, PA-C      Followed by   • [START ON 1/21/2023] methylPREDNISolone  4 mg Oral Daily Rolley REYMUNDO Oliva     • metoprolol succinate  25 mg Oral Daily Cara Kwan MD     • ondansetron  4 mg Intravenous Q4H PRN Cara Kawn MD     • pravastatin  20 mg Oral Daily With Breonna Hood MD     • senna-docusate sodium  1 tablet Oral HS Cara Kwan MD     • sodium chloride  75 mL/hr Intravenous Continuous Christal Charles MD 75 mL/hr (01/16/23 2374)   • traMADol  25 mg Oral Q6H PRN Christal Charles MD     • traMADol  50 mg Oral Q6H PRN Christal Charles MD          Today, Patient Was Seen By: Lu Marr MD    ** Please Note: Dictation voice to text software may have been used in the creation of this document   **

## 2023-01-17 NOTE — ASSESSMENT & PLAN NOTE
80year old female presented with acute worsening pain of her right shoulder blade, and her right elbow  · CT chest without contrast without any acute processes  · X-ray of shoulder showing mild osteoarthritis  · Continue conservative management   Patient receiving steroid taper  · Discussed case with ortho again to determine if she is a candidate for steroid / lidocaine injection  · PT/OT final recommendations pending

## 2023-01-18 ENCOUNTER — HOME HEALTH ADMISSION (OUTPATIENT)
Dept: HOME HEALTH SERVICES | Facility: HOME HEALTHCARE | Age: 88
End: 2023-01-18

## 2023-01-18 RX ADMIN — ASPIRIN 81 MG: 81 TABLET, COATED ORAL at 07:48

## 2023-01-18 RX ADMIN — ACETAMINOPHEN 975 MG: 325 TABLET ORAL at 13:32

## 2023-01-18 RX ADMIN — ENOXAPARIN SODIUM 40 MG: 40 INJECTION SUBCUTANEOUS at 07:48

## 2023-01-18 RX ADMIN — ACETAMINOPHEN 975 MG: 325 TABLET ORAL at 07:11

## 2023-01-18 RX ADMIN — PRAVASTATIN SODIUM 20 MG: 20 TABLET ORAL at 16:42

## 2023-01-18 RX ADMIN — BACLOFEN 10 MG: 10 TABLET ORAL at 21:29

## 2023-01-18 RX ADMIN — DICLOFENAC SODIUM 2 G: 10 GEL TOPICAL at 21:30

## 2023-01-18 RX ADMIN — METOPROLOL SUCCINATE 25 MG: 25 TABLET, EXTENDED RELEASE ORAL at 07:48

## 2023-01-18 RX ADMIN — LISINOPRIL 20 MG: 20 TABLET ORAL at 07:48

## 2023-01-18 RX ADMIN — DICLOFENAC SODIUM 2 G: 10 GEL TOPICAL at 16:42

## 2023-01-18 RX ADMIN — LIDOCAINE 1 PATCH: 50 PATCH CUTANEOUS at 07:48

## 2023-01-18 RX ADMIN — BACLOFEN 10 MG: 10 TABLET ORAL at 16:42

## 2023-01-18 RX ADMIN — METHYLPREDNISOLONE 16 MG: 16 TABLET ORAL at 07:48

## 2023-01-18 RX ADMIN — BACLOFEN 10 MG: 10 TABLET ORAL at 07:48

## 2023-01-18 RX ADMIN — LEVOTHYROXINE SODIUM 125 MCG: 25 TABLET ORAL at 07:11

## 2023-01-18 RX ADMIN — DICLOFENAC SODIUM 2 G: 10 GEL TOPICAL at 11:38

## 2023-01-18 RX ADMIN — AMLODIPINE BESYLATE 5 MG: 5 TABLET ORAL at 07:48

## 2023-01-18 RX ADMIN — TRAMADOL HYDROCHLORIDE 50 MG: 50 TABLET, COATED ORAL at 07:48

## 2023-01-18 RX ADMIN — DICLOFENAC SODIUM 2 G: 10 GEL TOPICAL at 07:48

## 2023-01-18 NOTE — ASSESSMENT & PLAN NOTE
80year old female presented with acute worsening pain of her right shoulder blade, and her right elbow  Right shoulder pain improved and is down to 2/10, right elbow pain still present  · CT chest without contrast without any acute processes  · X-ray of shoulder showing mild osteoarthritis  · Continue conservative management  Patient receiving steroid taper  · Discussed with orthopedics, no role for iv / joint injection at this time since her pain is mostly referred from her back  · Patient and family will elect discharge to home with PT jordi

## 2023-01-18 NOTE — PROGRESS NOTES
[facility]  Progress Note - June Martín Gallegos 6/4/1932, 80 y o  female MRN: [id number]  Unit/Bed#: E2 -01 Encounter: [id number]  Primary Care Provider: Lu Hutson DO   Date and time admitted to hospital: 1/14/2023  3:40 AM    * Acute pain of right shoulder  Assessment & Plan  80year old female presented with acute worsening pain of her right shoulder blade, and her right elbow  Right shoulder pain improved and is down to 2/10, right elbow pain still present  · CT chest without contrast without any acute processes  · X-ray of shoulder showing mild osteoarthritis  · Continue conservative management  Patient receiving steroid taper  · Discussed with orthopedics, no role for iv / joint injection at this time since her pain is mostly referred from her back  · Patient and family will elect discharge to home with PT jordi  Acute metabolic encephalopathy  Assessment & Plan  Resolved  Back to baseline  Aneurysm of aortic arch without rupture  Assessment & Plan  As noted on CT imaging, minimally unchanged at 4 7cm   · Patient recommended to follow up with PCP outpatient with yearly screenings    Hypothyroidism  Assessment & Plan  · Continue levothyroxine    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[facility] [address] )  Assessment & Plan  History of low-grade salivary gland adenocarcinoma      · Repeat imaging in September 2022 with unchanged 2 5 cm destructive soft tissue lesion in the right posterior maxillary hard palate with erosion into the right maxillary sinus floor  · Continue follow up outpatient    Paroxysmal atrial fibrillation (HCC)  Assessment & Plan  · Continue toprol xl 25  · Not on anticoagulation due to risk of bleeding due to salivary tumor    Hyperlipidemia  Assessment & Plan  Continue statin    Essential hypertension  Assessment & Plan  Above goal  · Continue amlodipine, lisinopril, metoprolol      VTE Pharmacologic Prophylaxis:   Pharmacologic: Enoxaparin (Lovenox)  Mechanical VTE Prophylaxis in Place: Yes    Discussions with Specialists or Other Care Team Provider: nursing    Education and Discussions with Family / Patient: patient    Current Length of Stay: 4 day(s)    Current Patient Status: Inpatient   Certification Statement: The patient will continue to require additional inpatient hospital stay due to dispo planning    Discharge Plan: active    Code Status: Level 1 - Full Code      Subjective:   Patient seen and examined at bedside  Comfortable  Still with right elbow pain, but shoulder pain is much improved  Objective:     Vitals:   Temp (24hrs), Av 2 °F (36 8 °C), Min:97 8 °F (36 6 °C), Max:98 5 °F (36 9 °C)    Temp:  [97 8 °F (36 6 °C)-98 5 °F (36 9 °C)] 98 5 °F (36 9 °C)  HR:  [62-78] 74  Resp:  [18-20] 18  BP: (119-168)/(56-86) 168/86  SpO2:  [93 %-96 %] 95 %  There is no height or weight on file to calculate BMI  Input and Output Summary (last 24 hours): Intake/Output Summary (Last 24 hours) at 2023 1426  Last data filed at 2023 1201  Gross per 24 hour   Intake 1140 ml   Output 0 ml   Net 1140 ml       Physical Exam:     Physical Exam  Vitals reviewed  Constitutional:       General: She is not in acute distress  HENT:      Head: Normocephalic  Nose: Nose normal       Mouth/Throat:      Mouth: Mucous membranes are moist    Eyes:      General: No scleral icterus  Cardiovascular:      Rate and Rhythm: Normal rate  Pulmonary:      Effort: Pulmonary effort is normal  No respiratory distress  Breath sounds: No wheezing  Abdominal:      General: There is no distension  Palpations: Abdomen is soft  Tenderness: There is no abdominal tenderness  Musculoskeletal:         General: Normal range of motion  Skin:     General: Skin is warm  Neurological:      Mental Status: She is alert and oriented to person, place, and time     Psychiatric:         Mood and Affect: Mood normal          Behavior: Behavior normal        Additional Data: Labs:    Results from last 7 days   Lab Units 01/17/23  0622   WBC Thousand/uL 12 32*   HEMOGLOBIN g/dL 12 9   HEMATOCRIT % 37 2   PLATELETS Thousands/uL 229   NEUTROS PCT % 74   LYMPHS PCT % 15   MONOS PCT % 11   EOS PCT % 0     Results from last 7 days   Lab Units 01/15/23  0509 01/14/23  0421   SODIUM mmol/L 138 139   POTASSIUM mmol/L 3 7 3 8   CHLORIDE mmol/L 101 102   CO2 mmol/L 26 32   BUN mg/dL 16 19   CREATININE mg/dL 0 73 0 71   ANION GAP mmol/L 11 5   CALCIUM mg/dL 8 6 8 9   ALBUMIN g/dL  --  3 9   TOTAL BILIRUBIN mg/dL  --  0 52   ALK PHOS U/L  --  63   ALT U/L  --  27   AST U/L  --  27   GLUCOSE RANDOM mg/dL 112 124                           * I Have Reviewed All Lab Data Listed Above  * Additional Pertinent Lab Tests Reviewed: [address] Admission Reviewed      Lines:   Invasive Devices     Peripheral Intravenous Line  Duration           Peripheral IV 01/15/23 Dorsal (posterior); Left Forearm 3 days                   Imaging:    Imaging Reports Reviewed Today Include: no new imaging    Recent Cultures (last 7 days):           Last 24 Hours Medication List:   Current Facility-Administered Medications   Medication Dose Route Frequency Provider Last Rate   • acetaminophen  975 mg Oral Q8H Corine Raymundo MD     • albuterol  2 5 mg Nebulization Q6H PRN Abdelrahman Valenzuela MD     • amLODIPine  5 mg Oral Daily Abdelrahman Valenzuela MD      And   • lisinopril  20 mg Oral Daily Abdelrahman Valenzuela MD     • aspirin  81 mg Oral Daily Abdelrahman Valenzuela MD     • baclofen  10 mg Oral TID Abdelrahman Valenzuela MD     • Diclofenac Sodium  2 g Topical 4x Daily Abdelrahman Valenzuela MD     • enoxaparin  40 mg Subcutaneous Daily Abdelrahman Valenzuela MD     • hydrALAZINE  5 mg Intravenous Q6H PRN Kirsten Parsons MD     • HYDROmorphone  0 2 mg Intravenous Q3H PRN Kirsten Parsons MD     • levothyroxine  125 mcg Oral Early Morning Abdelrahman Valenzuela MD     • lidocaine  1 patch Topical Daily Abdelrahman Valenzuela MD     • menthol-methyl salicylate   Apply externally 4x Daily PRN Kirsten Parsons MD     • [START ON 1/19/2023] methylPREDNISolone  12 mg Oral Daily Jackie Daniels PA-C      Followed by   • [START ON 1/20/2023] methylPREDNISolone  8 mg Oral Daily Jackie Daniels PA-C      Followed by   • [START ON 1/21/2023] methylPREDNISolone  4 mg Oral Daily Jackie Daniels PA-C     • metoprolol succinate  25 mg Oral Daily Abdelrahman Valenzuela MD     • ondansetron  4 mg Intravenous Q4H PRN Abdelrahman Valenzuela MD     • pravastatin  20 mg Oral Daily With Rani Santoyo MD     • senna-docusate sodium  1 tablet Oral HS Abdelrahman Valenzuela MD     • traMADol  25 mg Oral Q6H PRN Kirsten Parsons MD     • traMADol  50 mg Oral Q6H PRN Kirsten Parsons MD          Today, Patient Was Seen By: Jamar Hamilton MD    ** Please Note: Dictation voice to text software may have been used in the creation of this document   **

## 2023-01-18 NOTE — PLAN OF CARE
Problem: OCCUPATIONAL THERAPY ADULT  Goal: Performs self-care activities at highest level of function for planned discharge setting  See evaluation for individualized goals  Description: Treatment Interventions: ADL retraining, Functional transfer training, UE strengthening/ROM, Endurance training, Patient/family training, Equipment evaluation/education, Neuromuscular reeducation, Compensatory technique education, Continued evaluation, Energy conservation, Activityengagement          See flowsheet documentation for full assessment, interventions and recommendations  Note: Limitation: Decreased ADL status, Decreased UE strength, Decreased Safe judgement during ADL, Decreased cognition, Decreased high-level ADLs  Prognosis: Fair  Assessment: Patient is a 80y o  year old female seen for OT eval s/p admit to [facility] on 1/14/2023 with acute pain of R shoulder (which pt states is now in her elbow radiating to her hand), acute metabolic encephalopathy  Comorbidities affecting pt’s functional performance include a significant PMH of: HTN, HLD, a-fib, salivary gland CA, hypothyroidism, aneurysm of aortic arch without rupture, trigger finger R hand, SOB, COPD, diarrhea, headache, malignant neoplasm of hard palate, visual hallucination, macular degeneration, vasovagal syncope, uncomplicated asthma, muscle cramps  Polymyalgia rheumatica, atherosclerosis of aorta, hypoparathyroidism  Patient with active OT orders and activity orders for Activity as tolerated  Personal factors affecting pt at time of IE include: difficulty performing ADLs and IADLs, difficulty with functional mobility/transfers  Prior to admission, patient was (I) with ADLs/IADLs   Upon evaluation, patient’s functional status as follows: eating: independent, grooming: SBA, UB bathing: SBA, LB bathing: SBA, UB dressing: SBA, LB dressing: SBA, toileting: SBA; functional transfers: SBA, bed mobility: SBA, functional mobility: SBA, sitting/standing tolerance: ~4 min without UE support - due to the following deficits impacting occupational performance: decreased B UE strength, decreased static/dynamic sitting/standing balance, decreased activity tolerance, decreased safety awareness, and increased pain  Patient would benefit from continued skilled OT therapy while in acute setting to address deficits as defined above and maximize (I) with ADLs and functional mobility  Due to percieved safety awareness and STM deficits noted t/o evaluation, completed MOCA, with pt scoring 21/30, indicating MCI; will f/u with ACLS, as pt lives alone and states she does drive, although admits she doesn't want to but "has to " Occupational performance areas to address include: grooming, bathing, toileting, UB/LB dressing, functional mobility, household maintenance, and medication management  Based on the aforementioned OT evaluation, functional performance deficits, and assessments, pt has been identified as a moderate complexity evaluation       OT Discharge Recommendation: Home with outpatient rehabilitation (home with 24/7 S and OP OT pending possible improved mentation)

## 2023-01-18 NOTE — PLAN OF CARE
Problem: PHYSICAL THERAPY ADULT  Goal: Performs mobility at highest level of function for planned discharge setting  See evaluation for individualized goals  Description: Treatment/Interventions: Functional transfer training, LE strengthening/ROM, Elevations, Therapeutic exercise, Cognitive reorientation, Patient/family training, Equipment eval/education, Bed mobility, Gait training, Continued evaluation, Compensatory technique education, Spoke to nursing          See flowsheet documentation for full assessment, interventions and recommendations  Outcome: Completed  Note: Prognosis: Good  Problem List: Impaired balance, Decreased endurance, Decreased cognition, Impaired judgement, Decreased safety awareness, Pain  Assessment: Buffy is seen for follow up session for mobility training  Pain remains greater in R elbow than shoulder  Tolerated few pendulums with some improvement, but performing self ROM with noted improvement with RUE elevation  Demonstrates transfers and ambulation at S level  While some inconsistencies in pace and occasional lateral displacement, no overt LOB  Does have cane with occasional use outside of apt reported  Amb without cane 300 ft with S   Cane does improve lateral stability  From functional perspective likely close to or at baseline  Given cognition defer to OT for recommendations on level of S as lives alone  The patient's AM-PAC Basic Mobility Inpatient Short Form Raw Score is 23  A Raw score of greater than 16 suggests the patient may benefit from discharge to home  Please also refer to the recommendation of the Physical Therapist for safe discharge planning  OPPT rec  Barriers to Discharge: Decreased caregiver support  Barriers to Discharge Comments: lives alone  PT Discharge Recommendation: Home with outpatient rehabilitation (level of S as per OT )    See flowsheet documentation for full assessment

## 2023-01-18 NOTE — OCCUPATIONAL THERAPY NOTE
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), wet ([facility] )     bilateral   • Aneurysm of aortic root     last assessed - [id number]   • Aortic arch aneurysm    • Aortic root dilatation (HCC)     last assessed - [id number]   • Arthritis    • Ascending aortic aneurysm     last assessed - 98YPJ3418   • Asthma    • Basal cell carcinoma     last assessed - 12Jts6249   • 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[facility] )    • Disease of thyroid gland    • Facet arthropathy, cervical     last assessed - 70YAU4499   • History of fracture of vertebral column    • Hyperlipidemia    • Hypertension    • 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[facility] )    • Hypoxia     last assessed - 75VCI6233   • Junctional rhythm     last assessed - 42Xbt6807   • Lightheadedness     last assessed - 41Uvd7511   • Migraine    • Palpitations     last assessed - 62Zch6027   • Pleural effusion, bilateral     last assessed - 22Fgh9836   • 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[facility] )    • Shortness of breath     last assessed - 37Era9819   • Thyroid trouble    • Trigger finger     last assessed - 97OBN0727   • Trigger middle finger of right hand     last assessed - 32Tbn3621   • Urinary incontinence     last assessed -  22Jul,2013; Resolved - [id number]     Past Surgical History  Past Surgical History:   Procedure Laterality Date   • ABDOMINAL AORTIC ANEURYSM REPAIR W/ ENDOLUMINAL GRAFT  06/28/2015    Ascending aorta and hemiarch replacement with 30 mm Vascutek Gelweave graft;  Managed by Radhachristie Hilda; last assessed - 92AJF0257   • APPENDECTOMY     • BLADDER SURGERY      Reccurent histoy of bladder surgery   • HOROWITZ PROCEDURE     • CARDIAC CATHETERIZATION  2004    Procedure summary - Luminal irregularities; last assessed - [id number]   •  SECTION     • CORONARY ANEURYSM REPAIR     • CYSTOSCOPY      botox injection   • HYSTERECTOMY     • CA NDSC NJX IMPLT MATRL URT&/BLDR NCK N/A 2017    Procedure: Miguel Angelria Rajua; Ozzyfran Kenny BULKING AGENT INJECTION ;  Surgeon: Jatinder Mazariegos MD;  [facility];  Service: Urology   • CA TENDON SHEATH INCISION Right 10/18/2016    Procedure: LONG FINGER TRIGGER RELEASE ;  Surgeon: Karely Driscoll MD;  Location[facility];  Service: Orthopedics   • THYROIDECTOMY      Total Thyroidectomy   • TONSILLECTOMY AND ADENOIDECTOMY          23 0940   OT Last Visit   OT Visit Date 23   Note Type   Note type Evaluation   Additional Comments Pt presents supine in bed, requesting to utilize bathroom  Agreeable to OT evaluation  Pain Assessment   Pain Assessment Tool 0-10   Pain Score 9   Pain Location/Orientation Orientation: Right;Other (Comment)  ([facility] Pain Intervention(s) Heat applied; Ambulation/increased activity; Elevated;Relaxation technique   Restrictions/Precautions   Weight Bearing Precautions Per Order No   Other Precautions Cognitive; Chair Alarm; Bed Alarm; Impulsive; Fall Risk;Pain   Home Living   Type of Home Apartment   Home Layout One level   Bathroom Shower/Tub   (cut out tub)   Bathroom Toilet Raised   Bathroom Equipment Grab bars in [facility];Cane   Additional Comments Utilizes [facility] for longer distances   Prior Function   Level of [place] Independent with ADLs; Independent with functional mobility   Lives With Alone   Receives Help From Friend(s); Family   IADLs Independent with driving; Independent with meal prep; Independent with medication management   Falls in the last 6 months 0 Vocational Retired   Lifestyle   Autonomy Pt lives alone in an apartment  (I) at baseline with ADLs/IADLs and functional mobility  (+)   2 local children which she seems maybe 1x/week  Has a friend in her apartment complex  Reciprocal Relationships Children, friend   Intrinsic Gratification Reading   Subjective   Subjective "I'm having a harder time finding my words"   ADL   Where Assessed Edge of bed   Eating Assistance 7  Independent   Grooming Assistance 5  Supervision/Setup   UB Bathing Assistance 5  Supervision/Setup   LB Bathing Assistance 5  Supervision/Setup   [address] 5  Supervision/Setup   LB Dressing Assistance 5  [address]  5  Supervision/Setup   Bed Mobility   Supine to Sit 5  Supervision   Additional items HOB elevated; Bedrails   Additional Comments Pt OOB to chair end of session with alarm armed, PT present     Transfers   Sit to Stand 5  Supervision   Additional items Increased time required;Verbal cues   Stand to Sit 5  Supervision   Additional items Increased time required;Verbal cues   Toilet transfer 5  Supervision   Additional items Increased time required;Verbal cues   Functional Mobility   Functional Mobility 5  Supervision   Additional Comments FWW, Ax1   Balance   Static Sitting Good   Dynamic Sitting Fair +   Static Standing Fair   Dynamic Standing Fair -   Ambulatory Fair -   Activity Tolerance   Activity Tolerance Patient limited by pain;Treatment limited secondary to medical complications (Comment)  (Cognition)   Medical Staff Made Aware Vince Blake PT, MD, CM   Nurse Made Aware Yes   RUE Assessment   RUE Assessment WFL  (AROM WFL, deferred MMT 2/2 pain)   LUE Assessment   LUE Assessment WFL  (shoulder 4-/5, elbow 4+/5)   Hand Function   Gross Motor Coordination Functional   Fine Motor Coordination Functional   Sensation   Light Touch No apparent deficits   Proprioception   Proprioception No apparent deficits   Vision-Basic Assessment Current Vision Wears glasses all the time   Vision - Complex Assessment   Ocular Range of Motion Intact   Perception   Inattention/Neglect Appears intact   Cognition   Overall Cognitive Status Impaired   Arousal/Participation Alert; Responsive; Cooperative   Attention Attends with cues to redirect   Orientation Level Oriented X4   Memory Decreased short term memory;Decreased recall of precautions;Decreased recall of recent events   Following Commands Follows one step commands with increased time or repetition   Comments Pt admits to [facility] deficits recently which, per pt, appear to be worse since admission  She reports difficulty with word finding  MOCA and ACLS completed today  Cognition Assessment Tools MOCA   Score 21   Assessment   Limitation Decreased ADL status; Decreased UE strength;Decreased Safe judgement during ADL;Decreased cognition;Decreased high-level ADLs   Prognosis Fair   Assessment Patient is a 80y o  year old female seen for OT eval s/p admit to [facility] on 1/14/2023 with acute pain of R shoulder (which pt states is now in her elbow radiating to her hand), acute metabolic encephalopathy  Comorbidities affecting pt’s functional performance include a significant PMH of: HTN, HLD, a-fib, salivary gland CA, hypothyroidism, aneurysm of aortic arch without rupture, trigger finger R hand, SOB, COPD, diarrhea, headache, malignant neoplasm of hard palate, visual hallucination, macular degeneration, vasovagal syncope, uncomplicated asthma, muscle cramps  Polymyalgia rheumatica, atherosclerosis of aorta, hypoparathyroidism  Patient with active OT orders and activity orders for Activity as tolerated  Personal factors affecting pt at time of IE include: difficulty performing ADLs and IADLs, difficulty with functional mobility/transfers  Prior to admission, patient was (I) with ADLs/IADLs   Upon evaluation, patient’s functional status as follows: eating: independent, grooming: SBA, UB bathing: SBA, LB bathing: SBA, UB dressing: SBA, LB dressing: SBA, toileting: SBA; functional transfers: SBA, bed mobility: SBA, functional mobility: SBA, sitting/standing tolerance: ~4 min without UE support - due to the following deficits impacting occupational performance: decreased B UE strength, decreased static/dynamic sitting/standing balance, decreased activity tolerance, decreased safety awareness, and increased pain  Patient would benefit from continued skilled OT therapy while in acute setting to address deficits as defined above and maximize (I) with ADLs and functional mobility  Due to percieved safety awareness and STM deficits noted t/o evaluation, completed MOCA, with pt scoring 21/30, indicating MCI; will f/u with ACLS, as pt lives alone and states she does drive, although admits she doesn't want to but "has to " Occupational performance areas to address include: grooming, bathing, toileting, UB/LB dressing, functional mobility, household maintenance, and medication management  Based on the aforementioned OT evaluation, functional performance deficits, and assessments, pt has been identified as a moderate complexity evaluation  Goals   Patient Goals less pain   LTG Time Frame 10-14   Plan   Treatment Interventions ADL retraining;Functional transfer training;UE strengthening/ROM; Endurance training;Patient/family training;Equipment evaluation/education; Neuromuscular reeducation; Compensatory technique education;Continued evaluation; Energy conservation; Activityengagement   Goal Expiration Date 02/01/23   OT Treatment Day 0   OT Frequency 1-2x/wk   Recommendation   OT Discharge Recommendation Home with outpatient rehabilitation  (home with 24/7 S and OP OT pending possible improved mentation)   AM-PAC Daily Activity Inpatient   Lower Body Dressing 3   Bathing 3   Toileting 3   Upper Body Dressing 3   Grooming 3   Eating 4   Daily Activity Raw Score 19   Daily Activity Standardized Score (Calc for Raw Score >=11) 40 22   AM-PAC Applied Cognition Inpatient   Following a Speech/Presentation 3   Understanding Ordinary Conversation 3   Taking Medications 2   Remembering Where Things Are Placed or Put Away 3   Remembering List of 4-5 Errands 3   Taking Care of Complicated Tasks 2   Applied Cognition Raw Score 16   Applied Cognition Standardized Score 35 03   Jesus Manuel Cognitive Level   Jesus Manuel Cognitive Level Score 4 2   Jesus Manuel Cognitive Score Recommendation 24 hour supervision   Additional Treatment Session   Start Time 1001   End Time 1025   Treatment Assessment F/u with ACLS: Pt scored 4 2/6 0 indicating 24 Hour supervision is recommended to remove dangerous objects outside the visual field and to solve problems due to minor changes in the environment  Spoke with CM re: cog assessment outcome, recommending geriatric and probable neuro-psych eval to deem competence at this time  Cog seems to improve since PT evaluation on 1/17; however, unclear of pt's baseline cognition  Pt utilizes bathroom after assessment, with safety awareness deficits noted and requiring VCs for safety  Discussed increased S with pt at home, with pt stating she does not understand the need for it  Re-explained assessment results and recommendations  Pt left with PT, needs met and alarm armed; call bell within reach  Additional Treatment Day 1     Occupational Therapy goals: In 7-14 days:     1- Patient will verbalize and demonstrate use of energy conservation/deep breathing technique and work simplification skills during functional activity with no verbal cues       2- Patient will verbalize and demonstrate good body mechanics and joint protection techniques during ADLs/IADLs with no verbal cues     3- Pt will complete bed mobility at a Mod I level w/ G balance/safety demonstrated to decrease caregiver assistance required     4- Patient will increase OOB/ sitting tolerance to 2-4 hours per day for increased participation in self care and leisure tasks with no s/s of exertion  5-Patient will increase standing tolerance time to 5 minutes with unilateral UE support to complete sink level ADLs@ mod I level      5- Pt will follow 100% simple one step verbal commands and be A/Ox4 consistently t/o use of external environmental cues w/ mod I     6- Pt will improve functional transfers to Mod I on/off all surfaces using DME as needed w/ G balance/safety     7- Patient will complete UB ADLs with Manuel utilizing appropriate DME/AE PRN     8- Patient will complete LB ADLs with Manuel utilizing appropriate DME/AE PRN     9- Patient will complete toileting hygiene with Manuel with G hygiene/thoroughness utilizing appropriate DME/AE PRN     10- Pt will improve functional mobility during ADL/IADL/leisure tasks to Mod I using DME as needed w/ G balance/safety      11- Pt will be attentive 100% of the time during ongoing cognitive assessment w/ G participation to assist w/ safe d/c planning/recommendations     12- Pt will participate in simulated IADL management task to increase independence to Mod I w/ G safety and endurance     13- Pt will increase BUE strength by 1MM grade via AROM/AAROM/PROM exercises to increase independence in ADLs and transfers      14- Pt will verbalize 3 potential fall hazards and identify appropriate compensatory techniques to decrease fall risk in home environment      4 2     Administered Jose Munson Cognitive Level Screen (ACLS)  Pt scored 4 2/6 0 indicating 24 Hour supervision is recommended to remove dangerous objects outside the visual field and to solve problems due to minor changes in the environment        Behavior:  Recognizes errors  Identifies problems  Asks for Day/Date  Sequences one activity at a time  Processing speed is slow and may take extra time to complete tasks  Memorization will be very slow  Requires long term repetitive training for new tasks  Keep directions short and concise    Grooming:  Initiates and completes with supplies from familiar accessible locations  Stops and asks for help when an error is made  Expect cuts with use of straight razor  Check every few minutes if left alone  Allow ample time for completion of tasks  Dressing:  Selects clothing items based on striking features like color  May choose to wear a favorite item all the time  May argue with suggestions to change selections  Bathing:  Recognizes need for a bath and may ask if time is appropriate  Collects supplies from a visible location  Follows routine  May miss small hidden areas  Recognizes problem like lack of soap and asks for help  Remove hazards from proximity to bath (ie: electrical appliances)  Walking/exercising:  Ambulates to a familiar location ½ to 1 mile away  May not vary route  May fail to attend to activity or noises outside visual field  May ask for help if lost   May be able to learn a graded exercise program   May become anxious in high stimulus environments (malls, airports, casinos)  May resist going to certain places  Eating:  Initiates coming to table at routine times  Uses utensils  Recognizes errors and asks for help  Attempts to comply with social standards  May have trouble waiting for others  Assist with handling hot foods/liquids  Monitor compliance with special diet  Toileting:  Recognizes difficulties that interfere with toileting routine and asks for help  May be able to report change in bowel or bladder habits  May take much longer than average to complete toileting  Medications:  Initiates taking familiar dose at regular time of day  May not be able to open container and may have incorrect ideas of effects that lead to noncompliance  May not seek assist for problems encountered  Supervise to ensure compliance  Use of adaptive equipment:  Needs help with more complex equipment  May need assistance with fasteners that require fine motor skills  Does not understand the potential hazards of incorrect use    May forget to use equipment  Housekeeping:   Initiates and completes a routine of housekeeping activities  May be able to set priorities but may become fixated on a priority  Cleans, polishes and sweeps one feature at a time  Check for quality of cleaning results  Food preparation:  Does not plan for long term food needs  May go to store repeatedly whenever food is desired  May search cabinet for particular food item and ask for help when items are not found  May prepare a simple meal but may not recognize problems  Store all undesirable equipment away from view  Do not leave alone when using dangerous tools/equipment  Spending money:  May be able to set a priority for an expense that is highly valued  May become fixated on item  May resist help  Shopping:  May go to familiar shops but does not make a list or compare prices  Looks in familiar locations for an item  May ask for help when not found  May insist on purchasing item that is unrealistic or impractical   May resist help  May be anxious in high stimulus environments  Laundry:  May sort laundry by color or other simple striking cues  May view as a priority and initiate regularly  Recognizes errors like too much soap but cannot offer solutions  Traveling:  May go through actions slowly  Can sit unaccompanied up to 1 hour on a bus or train with landmarks as a guide when to get off  Asks for assistance if lost, does not alter familiar routes  May become anxious in high stimulus environments (bus, airport terminals)  Telephone:  Dials a new number written down by checking it 1 digit at a time  Writes down information very slowly  Does not consider a person’s schedule or time of day when calling  Needs assistance to make calls from unfamiliar telephones  May call emergency or familiar numbers excessively  Driving:  Should NOT operate a motor vehicle       Sandra Guzman, OTR/L

## 2023-01-18 NOTE — CASE MANAGEMENT
Case Management Discharge Planning Note    Patient name Buffy ALEXANDRA [place] [address] /E2 -* MRN [id number]  : 1932 Date 2023       Current Admission Date: 2023  Current Admission Diagnosis:Acute pain of right shoulder   Patient Active Problem List    Diagnosis Date Noted   • Acute metabolic encephalopathy 10/95/4705   • Acute pain of right shoulder 2023   • Aneurysm of aortic arch without rupture 2023   • Polymyalgia rheumatica ([address] ) 2021   • Atherosclerosis of aorta ([address] ) 2021   • Hypoparathyroidism, unspecified hypoparathyroidism type ([address] ) 2021   • Pain of left lower extremity 2021   • Amberg-neck deformity of finger 2021   • Muscle cramps 2021   • Skin cyst 2021   • Severe persistent asthma with acute exacerbation 10/20/2020   • Nonrheumatic mitral valve regurgitation    • Uncomplicated asthma    • Encounter for vision screening 04/15/2020   • Need for influenza vaccination 10/14/2019   • Other insomnia 10/14/2019   • Vasovagal syncope 10/04/2019   • Medicare annual wellness visit, subsequent 07/10/2019   • Ankle instability, right 07/10/2019   • Other fatigue 07/10/2019   • Moderate persistent asthma without complication    • Hypothyroidism 2019   • Otalgia of both ears 2019   • Screening for diabetes mellitus 2019   • Hypothyroidism due to Hashimoto's thyroiditis 2018   • Shortness of breath 2016   • Essential hypertension 2016   • Hyperlipidemia 2016   • Asthma-COPD overlap syndrome ([address] ) 2016   • Paroxysmal atrial fibrillation ([address] ) 2016   • Iron deficiency anemia 2016   • Diarrhea 2016   • Headache 2016   • 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   • Visual hallucination 2016   • Macular degeneration, wet ([address] ) 2016   • Trigger middle finger of right hand 10/18/2016   • 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[facility] ) 03/03/2016      LOS (days): 4  Geometric Mean LOS (GMLOS) (days): 2 80  Days to GMLOS:-1 5     OBJECTIVE:  Risk of Unplanned Readmission Score: 16 1         Current admission status: Inpatient   [facility]   [address] [facility] [address]  Phone: [phone] Fax: [phone]    [address], [place] - [address] [facility]  [address] [facility] [address] [place] [address]  Phone: [phone] Fax: [phone] [facility] [address]  Phone: [phone] Fax: [phone]    Primary Care Provider: Amy Mendez DO    Primary Insurance: [facility]ough[facility]  Secondary Insurance:     DISCHARGE DETAILS:    Discharge planning discussed with[de-identified] Patient, son-in-law, and daughter  Freedom of Choice: Yes  Comments - Freedom of Choice: Pt and family agreeable to home health for physical and occupational therapy  Choices provider   ISAURO TREVINO accepted  CM contacted family/caregiver?: Yes  Were Treatment Team discharge recommendations reviewed with patient/caregiver?: Yes  Did patient/caregiver verbalize understanding of patient care needs?: Yes  Were patient/caregiver advised of the risks associated with not following Treatment Team discharge recommendations?: Yes    Contacts  Patient Contacts: Omero Robledo (daughter) and son-in-law  Relationship to Patient[de-identified] Family  Contact Method: Phone, In Person  Phone Number: [phone] Shruti Gama son-in-law (in person)  Reason/Outcome: Emergency Contact, Discharge Planning, Continuity of [facility]         Is the patient interested in [address] at discharge?: Yes  [address]osa requested[de-identified] Occupational Therapy, Physical [address] Name[de-identified] [facility] Provider[de-identified] PCP  Home Health Services Needed[de-identified] Evaluate Functional Status and Safety, Gait/ADL Training, Strengthening/Theraputic Exercises to Improve Function, Other (comment) (cognitive assessment)  Homebound Criteria Met[de-identified] Requires the Assistance of Another Person for Safe Ambulation or to Leave the Home  Supporting Clincal Findings[de-identified] Fatigues Easliy in [employer], Limited Endurance    DME Referral Provided  Referral made for DME?: No    Other Referral/Resources/Interventions Provided:  Interventions: [facility]         Treatment Team Recommendation: Home  Discharge Destination Plan[de-identified] Home with [place] at Discharge : Family                       Accompanied by: Family member              Additional Comments: CM met with pt and son-in-law at the bedside  CM explained that at this time the recommendation is for 24/7 supervision in the home  Pt asked CM if there was concerns about her cognition which CM explained was what the current concern was  Pt reported that she has noticed the change in her cognition while being her but reports she has been improving which has been seen by providers at the hospital as well  Pt's son-in-law also reported that there has been improvement in her overall cognition each day and prior to admission she was not experiencing any issues cognitively  This was also addressed and confirmed by pt's daughter over the phone as well  Family plan at this time is to take the pt to there home with home health  CM expressed the importance with the patient about not driving which pt reported understanding and was supported by the son-in-law and daughter as well

## 2023-01-18 NOTE — RESTORATIVE TECHNICIAN NOTE
Restorative Technician Note      Patient Name: Zuleyma Bob     Restorative Tech Visit Date: 01/18/23  Note Type: Mobility  Patient Position Upon Consult: Bedside chair  Activity Performed: Ambulated  Patient Position at End of Consult: Bedside chair; Bed/Chair alarm activated; All needs within reach;  Other (comment) (Left in the care of RN)

## 2023-01-18 NOTE — ASSESSMENT & PLAN NOTE
As noted on CT imaging, minimally unchanged at 4 7cm   · Patient recommended to follow up with PCP outpatient with yearly screenings

## 2023-01-18 NOTE — PHYSICAL THERAPY NOTE
PHYSICAL THERAPY NOTE          Patient Name: Buffy Martinez  [facility] Date: 1/18/2023  10:17-10:47     01/18/23 1047   PT Last Visit   PT Visit Date 01/18/23   Note Type   Note Type Treatment   End of Consult   Patient Position at End of Consult Supine; All needs within reach;Bed/Chair alarm activated   Pain Assessment   Pain Rating: FLACC (Rest) - Face 0   Pain Rating: FLACC (Rest) - Legs 0   Pain Rating: FLACC (Rest) - Activity 1   Pain Rating: FLACC (Rest) - Cry 1   Pain Rating: FLACC (Rest) - Consolability 1   Score: FLACC (Rest) 3   Pain Rating: FLACC (Activity) - Face 1   Pain Rating: FLACC (Activity) - Legs 0   Pain Rating: FLACC (Activity) - Activity 1   Pain Rating: FLACC (Activity) - Cry 1   Pain Rating: FLACC (Activity) - Consolability 1   Score: FLACC (Activity) 4   Restrictions/Precautions   Weight Bearing Precautions Per Order No   Other Precautions Cognitive; Chair Alarm; Bed Alarm; Fall Risk;Impulsive   General   Chart Reviewed Yes   Additional Pertinent History see OT notes for formal cognitive testing  Response to Previous Treatment Patient with no complaints from previous session  Family/Caregiver Present No   Cognition   Overall Cognitive Status Impaired   Arousal/Participation Alert   Orientation Level Oriented X4   Following Commands Follows one step commands without difficulty   Comments Pleasant  STM limitations  Subjective   Subjective Pain was in the shoulder, but seems more now in the elbow  "Its a buggar"   Bed Mobility   Sit to Supine 6  Modified independent   Additional items HOB elevated; Bedrails   Transfers   Sit to Stand 5  Supervision   Additional items Armrests   Stand to Sit 5  Supervision   Additional items Increased time required;Armrests   Stand pivot 5  Supervision   Additional items Increased time required   Ambulation/Elevation   Gait pattern Improper Weight shift;Decreased foot clearance; Inconsistent mariam   Gait Assistance 5  Supervision   Additional items Verbal cues   Assistive Device None;SPC   Distance Amb without  ft x 1  No overt LOB but some inconsistencies in gait  Hx of L hip OA  Trial of cane for additional 80 ft with S   Has both RW and cane at home for prn use, but does not use  Balance   Static Sitting Normal   Dynamic Sitting Good   Static Standing Fair +   Dynamic Standing Fair   Ambulatory Fair   Endurance Deficit   Endurance Deficit Yes   Endurance Deficit Description pain L elbow  Activity Tolerance   Activity Tolerance Patient tolerated treatment well   Medical Staff Made Aware Freda Chinchilla  Nurse Made Aware yes   Exercises   Pendulum Sitting;10 reps;AROM; Bilateral  (for gentle distraction shoulder, elbow extension  x 10 CW and CCW )   Assessment   Prognosis Good   Problem List Impaired balance;Decreased endurance;Decreased cognition; Impaired judgement;Decreased safety awareness;Pain   Assessment Buffy is seen for follow up session for mobility training  Pain remains greater in R elbow than shoulder  Tolerated few pendulums with some improvement, but performing self ROM with noted improvement with RUE elevation  Demonstrates transfers and ambulation at S level  While some inconsistencies in pace and occasional lateral displacement, no overt LOB  Does have cane with occasional use outside of apt reported  Amb without cane 300 ft with S   Cane does improve lateral stability  From functional perspective likely close to or at baseline  Given cognition defer to OT for recommendations on level of S as lives alone  The patient's AM-PAC Basic Mobility Inpatient Short Form Raw Score is 23  A Raw score of greater than 16 suggests the patient may benefit from discharge to home  Please also refer to the recommendation of the Physical Therapist for safe discharge planning  OPPT rec     Goals   Patient Goals less pain   Plan   Treatment/Interventions Functional transfer training; Therapeutic exercise; Endurance training;Patient/family training;Equipment eval/education; Bed mobility;Gait training; Compensatory technique education;Continued evaluation;Spoke to nursing   Progress Discontinue PT  (continue on restorative program)   Recommendation   PT Discharge Recommendation Home with outpatient rehabilitation  (level of S as per OT )   AM-PAC Basic Mobility Inpatient   Turning in Flat Bed Without Bedrails 4   Lying on Back to Sitting on Edge of Flat Bed Without Bedrails 4   Moving Bed to Chair 4   Standing Up From Chair Using Arms 4   Walk in Room 4   Climb 3-5 Stairs With Railing 3   Basic Mobility Inpatient Raw Score 23   Basic Mobility Standardized Score 50 88   Highest Level Of Mobility   JH-HLM Goal 7: Walk 25 feet or more   JH-HLM Achieved 8: Walk 250 feet ot more   Education   Education Provided Mobility training;Assistive device;Home exercise program   Patient Demonstrates acceptance/verbal understanding   End of Consult   Patient Position at End of Consult Supine;Bed/Chair alarm activated; All needs within reach   Familia Flynn, CRISTY

## 2023-01-18 NOTE — RESTORATIVE TECHNICIAN NOTE
Restorative Technician Note      Patient Name: Braden Cavazos     Restorative Tech Visit Date: 01/18/23  Note Type: Mobility  Patient Position Upon Consult: Bedside chair  Activity Performed: Ambulated  Patient Position at End of Consult: Bedside chair; Bed/Chair alarm activated;  All needs within reach

## 2023-01-19 VITALS
TEMPERATURE: 98.7 F | DIASTOLIC BLOOD PRESSURE: 65 MMHG | OXYGEN SATURATION: 95 % | HEART RATE: 72 BPM | RESPIRATION RATE: 18 BRPM | SYSTOLIC BLOOD PRESSURE: 107 MMHG

## 2023-01-19 PROBLEM — G92.9 TOXIC ENCEPHALOPATHY: Status: ACTIVE | Noted: 2023-01-16

## 2023-01-19 RX ORDER — LIDOCAINE 50 MG/G
1 PATCH TOPICAL DAILY
Qty: 5 PATCH | Refills: 0 | Status: SHIPPED | OUTPATIENT
Start: 2023-01-20 | End: 2023-01-31

## 2023-01-19 RX ORDER — METHYLPREDNISOLONE 8 MG/1
TABLET ORAL
Qty: 1 TABLET | Refills: 0 | Status: SHIPPED | OUTPATIENT
Start: 2023-01-20 | End: 2023-01-22

## 2023-01-19 RX ORDER — BACLOFEN 10 MG/1
10 TABLET ORAL 3 TIMES DAILY
Qty: 9 TABLET | Refills: 0 | Status: SHIPPED | OUTPATIENT
Start: 2023-01-19 | End: 2023-01-31

## 2023-01-19 RX ORDER — TRAMADOL HYDROCHLORIDE 50 MG/1
50 TABLET ORAL EVERY 12 HOURS PRN
Qty: 6 TABLET | Refills: 0 | Status: SHIPPED | OUTPATIENT
Start: 2023-01-19 | End: 2023-01-22

## 2023-01-19 RX ADMIN — TRAMADOL HYDROCHLORIDE 50 MG: 50 TABLET, COATED ORAL at 07:26

## 2023-01-19 RX ADMIN — ENOXAPARIN SODIUM 40 MG: 40 INJECTION SUBCUTANEOUS at 08:20

## 2023-01-19 RX ADMIN — BACLOFEN 10 MG: 10 TABLET ORAL at 16:03

## 2023-01-19 RX ADMIN — LIDOCAINE 1 PATCH: 50 PATCH CUTANEOUS at 08:20

## 2023-01-19 RX ADMIN — LEVOTHYROXINE SODIUM 125 MCG: 25 TABLET ORAL at 06:36

## 2023-01-19 RX ADMIN — METHYLPREDNISOLONE 12 MG: 4 TABLET ORAL at 08:22

## 2023-01-19 RX ADMIN — LISINOPRIL 20 MG: 20 TABLET ORAL at 08:20

## 2023-01-19 RX ADMIN — ACETAMINOPHEN 975 MG: 325 TABLET ORAL at 06:37

## 2023-01-19 RX ADMIN — METOPROLOL SUCCINATE 25 MG: 25 TABLET, EXTENDED RELEASE ORAL at 08:20

## 2023-01-19 RX ADMIN — DICLOFENAC SODIUM 2 G: 10 GEL TOPICAL at 11:06

## 2023-01-19 RX ADMIN — PRAVASTATIN SODIUM 20 MG: 20 TABLET ORAL at 16:02

## 2023-01-19 RX ADMIN — ASPIRIN 81 MG: 81 TABLET, COATED ORAL at 08:20

## 2023-01-19 RX ADMIN — AMLODIPINE BESYLATE 5 MG: 5 TABLET ORAL at 08:20

## 2023-01-19 RX ADMIN — BACLOFEN 10 MG: 10 TABLET ORAL at 08:20

## 2023-01-19 RX ADMIN — ACETAMINOPHEN 975 MG: 325 TABLET ORAL at 14:48

## 2023-01-19 RX ADMIN — DICLOFENAC SODIUM 2 G: 10 GEL TOPICAL at 08:21

## 2023-01-19 NOTE — DISCHARGE SUMMARY
[facility] 48  Discharge- June A Isaak Manrique 6/4/1932, 80 y o  female MRN: [id number]  Unit/Bed#: E2 -01 Encounter: [id number]  Primary Care Provider: Kaela Lopez DO   Date and time admitted to hospital: 1/14/2023  3:40 AM    * Acute pain of right shoulder  Assessment & Plan  80year old female presented with acute worsening pain of her right shoulder blade, and her right elbow  Right shoulder pain improved and is down to 2/10, right elbow pain still present  · CT chest without contrast without any acute processes  · X-ray of shoulder showing mild osteoarthritis  · Continue conservative management  Patient receiving steroid taper  · Discussed with orthopedics, no role for iv / joint injection at this time since her pain is mostly referred from her back  · Discharge to home with VNA    Toxic encephalopathy  Assessment & Plan  Resolved  Back to baseline  Possibly due to the opiates she received due to her severe pain    Aneurysm of aortic arch without rupture  Assessment & Plan  As noted on CT imaging, minimally unchanged at 4 7cm   · Patient recommended to follow up with PCP outpatient with yearly screenings  · Patient aware of these findings    Hypothyroidism  Assessment & Plan  · Continue levothyroxine    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[facility] [address] )  Assessment & Plan  History of low-grade salivary gland adenocarcinoma      · Repeat imaging in September 2022 with unchanged 2 5 cm destructive soft tissue lesion in the right posterior maxillary hard palate with erosion into the right maxillary sinus floor  · Continue follow up outpatient    Paroxysmal atrial fibrillation (H[facility])  Assessment & Plan  · Continue toprol xl 25  · Not on anticoagulation due to risk of bleeding due to salivary tumor    Hyperlipidemia  Assessment & Plan  Continue statin    Essential hypertension  Assessment & Plan  Above goal  · Continue amlodipine, lisinopril, metoprolol      Discharging Physician / Practitioner: Jan Salazar MD Mabel  PCP: Magdiel Cali DO  Admission Date:   Admission Orders (From admission, onward)     Ordered        01/14/23 0823  INPATIENT ADMISSION  Once                      Discharge Date: 01/19/23    Medical Problems     Resolved Problems  Date Reviewed: 1/19/2023   None         Consultations During Hospital Stay:  · Orthopedics, geriatrics    Procedures Performed:   · none    Significant Findings / Test Results:   · Imaging  · X-ray shoulder right:    Mild right shoulder osteoarthritis  No acute osseous abnormality      · X-ray elbow right:    No acute osseous abnormality  · CT chest without contrast:    No evidence of acute thoracic process insofar as can be detected on a noncontrast CT      Distal aortic arch/proximal descending aortic aneurysm maximum diameter 4 7 cm minimally enlarged since December 2016  Nonemergent vascular surgical consultation is advised      Additional chronic findings and negatives as above  · X-ray chest:    No acute cardiopulmonary disease  Incidental Findings:   · As written above     Test Results Pending at Discharge (will require follow up):   · none     Outpatient Tests Requested:  · Pcp, ortho    Complications:  none    Reason for Admission: right shoulder pain    Hospital Course:     June A Toribio Goodpasture is a 80 y o  female patient who originally presented to the hospital on 1/14/2023 due to right shoulder pain  Patient is a 57-year-old female with a history of paroxysmal atrial fibrillation, hypertension, and salivary gland cancer  She presented to institution due to acute right shoulder pain  Images were performed at the time of her admission here which did not show any acute abnormalities except for her aneurysm which she is currently aware of  She is received pain medications as a result of her severe pain  This seem to have caused her to become encephalopathic  She has recovered ever since and is back to her baseline levels    Orthopedics was consulted who state that they have nothing to offer at this time  Recommended conservative management  We will be discharging patient with few days of baclofen and tramadol (which she tolerated during her stay here)  Patient agrees to follow-up with her providers as scheduled and to take her medications as prescribed  All questions were addressed  she understood the need to seek immediate medical attention should she develop any chest pain, shortness of breath, severe pain, fever, chills, or any other concerning symptoms  Please see above list of diagnoses and related plan for additional information  Condition at Discharge: fair     Discharge Day Visit / Exam:     Subjective: Patient seen and examined at bedside  Still with elbow pain, but shoulder pain is minimal at this point  Vitals: Blood Pressure: 146/68 (01/19/23 0729)  Pulse: 75 (01/19/23 0729)  Temperature: 98 2 °F (36 8 °C) (01/19/23 0729)  Temp Source: Temporal (01/19/23 0729)  Respirations: 17 (01/19/23 0729)  SpO2: 96 % (01/19/23 0729)  Exam:   Physical Exam  Vitals reviewed  Constitutional:       General: She is not in acute distress  HENT:      Head: Normocephalic  Mouth/Throat:      Mouth: Mucous membranes are moist    Eyes:      General: No scleral icterus  Cardiovascular:      Rate and Rhythm: Normal rate and regular rhythm  Pulmonary:      Effort: Pulmonary effort is normal  No respiratory distress  Abdominal:      Palpations: Abdomen is soft  Tenderness: There is no abdominal tenderness  Musculoskeletal:      Right lower leg: No edema  Left lower leg: No edema  Skin:     General: Skin is warm  Neurological:      Mental Status: She is alert and oriented to person, place, and time     Psychiatric:         Mood and Affect: Mood normal          Behavior: Behavior normal        Discussion with Family: called both NOK no answer    Discharge instructions/Information to patient and family:   See after visit summary for information provided to patient and family  Provisions for Follow-Up Care:  See after visit summary for information related to follow-up care and any pertinent home health orders  Disposition:     Home with VNA Services (Reminder: Complete face to face encounter)    Planned Readmission: no     Discharge Statement:  I spent 40 minutes discharging the patient  This time was spent on the day of discharge  I had direct contact with the patient on the day of discharge  Greater than 50% of the total time was spent examining patient, answering all patient questions, arranging and discussing plan of care with patient as well as directly providing post-discharge instructions  Additional time then spent on discharge activities  Discharge Medications:  See after visit summary for reconciled discharge medications provided to patient and family        ** Please Note: This note has been constructed using a voice recognition system **

## 2023-01-19 NOTE — PLAN OF CARE
Problem: SAFETY ADULT  Goal: Patient will remain free of falls  Description: INTERVENTIONS:  - Educate patient/family on patient safety including physical limitations  - Instruct patient to call for assistance with activity   - Consult OT/PT to assist with strengthening/mobility   - Keep Call bell within reach  - Keep bed low and locked with side rails adjusted as appropriate  - Keep care items and personal belongings within reach  - Initiate and maintain comfort rounds  - Make Fall Risk Sign visible to staff  - Offer Toileting every 2 Hours, in advance of need  - Initiate/Maintain bed/chair alarm  - Obtain necessary fall risk management equipment: bed/chair alarm, gripper socks  - Apply yellow socks and bracelet for high fall risk patients  - Consider moving patient to room near nurses station  Outcome: Progressing

## 2023-01-19 NOTE — RESTORATIVE TECHNICIAN NOTE
Restorative Technician Note      Patient Name: Luis Antonio Granda     Restorative Tech Visit Date: 01/19/23  Note Type: Mobility  Patient Position Upon Consult: Supine  Activity Performed: Ambulated  Patient Position at End of Consult: All needs within reach;  Supine

## 2023-01-19 NOTE — PLAN OF CARE

## 2023-01-19 NOTE — CONSULTS
Consultation - Geriatrics   Buffy MalikGresham 80 y o  female MRN: [id number]  Unit/Bed#: E2 -01 Encounter: [id number]      Assessment/Plan    Cognitive Screening   · Patient notes no prior history of memory issues or cognitive impairment   · She does state that she is forgetful at times  · Most recent TSH on 1/15/2023 noted to be 1 444  · Most recent vitamin B 12 level on 7/12/2019 noted to be 903  · CT of the head on 9/18/2019 revealed moderate microangiopathic changes  · Patient scored 21/30 on MOCA on 1/18/2023  · Patient notes she was in significant pain at the time of exam and feels that she could do better   · Patient was noted to have periods of confusion while inpatient   · Suspected to be secondary to narcotic pain medication per internal medicine   · Nursing and internal medicine notes improved mentation and state that patient is now back to baseline   · Patient is alert and oriented x 4 on exam today   · Keep physically, mentally, and socially active    Delirium   • Baseline mentation: alert and oriented x 4  • Current mentation: alert and oriented x 4  • Patient is at high risk secondary to age, acute pain, and hospitalization  • Maintain delirium precautions   · Provide redirection, reorientation, and distraction techniques  · Maintain fall and safety precautions   · Assist with ADLs/IADLs  · Avoid deliriogenic medications such as tramadol, benzodiazepines, anticholinergics, benadryl  · Treat pain using geriatric pain protocol   · Encourage oral hydration and nutrition   · Monitor for constipation and urinary retention Implement sleep hygiene and limit night time interuptions   · Maintain sleep-wake cycle   · Encourage early and frequent mobilization   · Encourage participation in group activities  • Would avoid benzodiazepines such as Ativan as these can worsen delirium     Deconditioning   • Baseline function: independent for ADLs and IADLs  • Patient is at increased risk for deconditioning secondary to acute pain and hospitalization   • Continue to optimize diet, hydration, and mobility for healing   · GFR 72 on 1/15/2023  · Keep hydrated   · Anemia  · Patient with history of iron deficiency anemia   · Most recent hemoglobin on 1/17/2023 noted to be 12 9  • Monitor for signs and symptoms of infection, dehydration, DVT, and skin breakdown    Frailty   Clinical Frail Scale: 4- Vulnerable  · Not dependent for daily help, symptoms limit activity  · Feeling slowed up, tired during the day  • Most recent albumin on 1/14/2023 noted to be 3 9  • Encourage protein supplementation     Ambulatory Dysfunction/Falls  • Patient notes no recent falls   • Does not ambulate with any assistive devices at baseline   • PT/OT consulted   • Maintain fall and safety precautions   • Encourage adequate oral hydration and nutrition   • Out of bed as tolerated     Impaired Vision   • Patient with history of macular degeneration of both eyes  · Wears eyeglasses   • Recommend use of corrective lenses at all appropriate times  • Encourage adequate lighting and encourage use of assistance with ambulation  • Keep personal belongings close to avoid reaching  • Encourage appropriate footwear at all times  • Recommend large font for printed materials provided to patient    Impaired Hearing   • Patient denies hearing impairment   • Hearing impairment strongly correlated with depression, cognitive impairment, delirium and falls in the older adults  • Use sound amplifier as needed  • Speak face to face  • Use clear dictation and enunciation of words    Dentition/Appetite   • Patient notes that she has a good appetite at baseline   • Ensure meal consistency is appropriate for all abilities   • Continue aspiration precautions     Elimination   • Patient is incontinent of bowel and bladder at baseline  • Patient denies voiding difficulties   • Last documented bowel movement on 1/17/2023  • Current bowel regimen includes   · Senna-S 8 6-50 mg daily  • Monitor for constipation and urinary retention     Insomnia   • Patient notes chronic insomnia at home  • Does not take any medication for sleep at home  • States that she has been sleeping well here   • First line is behavioral therapy   • Avoid sedative hypnotics including benzodiazepines and benadryl  • Encourage staying awake during the day   • Encourage daytime activities and morning exercise   • Decrease or eliminate daytime naps   • Avoid caffeine especially during late afternoon and evening hours  • Establish a nighttime routine  • Implement sleep hygiene and limit nighttime interruptions  • Can consider melatonin 3 mg daily at bedtime for sleep if needed     Acute Pain of the Right Shoulder  • Patient presented with acute worsening pain of the right shoulder blade and right elbow   • X-ray of the shoulder reveals mild osteoarthritis   • Discussed with orthopedics and they noted no need for IV/joint injection as they believe her pain mostly referred from her back   • Shoulder pain has improved but patient still complains of elbow pain radiating down to the wrist   • She describes the pain as burning in nature   • Patient had been on narcotics but became confused   • Current medication regimen includes   · Tylenol 975 mg Q 8 scheduled   · Baclofen 10 mg TID   · Diclofenac sodium gel QID   · Lidocaine patch   · Methylprednisone taper   · Tramadol 25 mg Q 6 prn moderate pain   · Tramadol 50 mg Q 6 prn severe pain   · Dilaudid 0 2 mg Q 3 hours prn   · Bengay cream QID prn   · Per nursing, patient has been doing well on this regimen with no confusion for at least 48 hours   · Patient notes that heat is helping and the pain improves when her arm is bent and over her head   · If patient is to discharge on this regimen would recommend prescribing low doses of both tramadol and baclofen as these can increase confusion and cause delirium   · Would recommend follow-up with PCP or orthopedics   · Patient may benefit from a medication such as gabapentin that can help with nerve pain as the pain she is describing appears to be neuropathic in nature     Home Safety  · Patient lives at home independently   · She manages the cooking and cleaning   · Notes that she gets food from the food bank  · She manages her finances and medication   · She states that she rarely drives due to her vision   · When she does drive she knows she will be going a short distance and she knows the road is straight     Advanced Care Planning  · Level 1 Full Code    Home Medication Review   Mail order medications:  · Levothyroxine 125mcg po daily last refill 12/22/22 # 90 day supply  · Lovastatin 20 mg po daily  Last refill 11/5/22 #90 day supply  · Metoprolol Succinate ER 25 mg po daily last refill 1/5/23 # 90 supply  · Amlodipine/Benazapril 5/25 mg po daily last refill 10/30/22 # 90 day supply    Not current refills:  · Diclofenac sodium 1% gel- last refill 8/2/22  · Requip 0 25 mg po QHS last refill 9/2022 (not taking)  · Albuterol 90 mcg/act 2 puff prn- last refill 8/8/2018    Over the counter:  · Asa 81 mg po daily  · Calcium carbonate daily      History of Present Illness      Physician Requesting Consult: Tee Sutton MD  Reason for Consult / Principal Problem: Right arm pain  Hx and PE limited by: N/A    HPI: Buffy Manrique is a 80y o  year old female who has hypertension, hyperlipidemia, atrial fibrillation, hypothyroidism, asthma-COPD overlap syndrome, macular degeneration, and insomnia and she presented to the hospital for acute right shoulder pain  She was noted to have severe excruciating pain in her right scapula that had been intermittent over one week  She noted the pain to be dully and achy with occasional tenderness on palpation  An x-ray of the extremity revealed mild right shoulder osteoarthritis and mild chondrocalcinosis of the radiohumeral joint   Orthopedics was consulted and noted they believed the pain to be referred from the neck  They recommended pain control and a steroid taper  She was noted to be encephalopathic likely secondary to narcotic pain medication  Her mentation improved after being off of the oxycodone  She is scheduled to discharge today and geriatrics is consulted for medication review and management for discharge  The patient states that she lives at home independently  She notes that she manages the cooking and cleaning  She states that she gets her food from the food bank  She notes that she manages her finances and medication  She states that she does still drive but notes that she does not drive often secondary to her macular degeneration  She notes that when she does drive she waits for optimal conditions (overcast day) and she does not drive far  She notes that she also only tries to drive on straight roads  She states that she is typically alert and oriented and notes that she is forgetful at times but denies noticing any changes in her memory  She states that at her baseline she does not ambulate with any assistive devices  She notes no recent falls  She does wear glasses for the macular degeneration and she notes no hearing impairment  She notes that she has a good appetite at home  She states that she is incontinent of both bowel and bladder at home but notes that she can sometimes make it to the bathroom  She notes that she wears pads at home  She notes that she has chronic insomnia at home but does not take anything for sleep  The patient was seen and evaluated today at the bedside for geriatric consult  She is noted to be lying in bed comfortably in no acute distress  She is alert and oriented x 4  She notes that her right shoulder pain has improved but she is still experiencing right elbow pain that radiates down to the wrist  She describes the pain as burning  Sh denies dizziness or acute vision changes   She notes that she does occasionally get headaches as she has a malignant tumor of the hard palate that she has not been getting treatment for  She notes that the headaches are not often and are intermittent  She denies chest pain and shortness of breath  She denies nausea, vomiting, constipation, and diarrhea  She states that she has been eating and sleeping well  Inpatient consult to Gerontology  Consult performed by: SARAHI Newton  Consult ordered by: Hammad Benoit MD        Review of Systems   Constitutional: Negative for activity change, appetite change, chills, fatigue and fever  Eyes: Positive for visual disturbance (hx of macular degeneration (glasses))  Respiratory: Negative for cough, chest tightness and shortness of breath  Cardiovascular: Negative for chest pain and leg swelling  Gastrointestinal: Negative for abdominal distention, abdominal pain, constipation, diarrhea, nausea and vomiting  Incontinent of bowel   Genitourinary: Negative for difficulty urinating and dysuria  Incontinent of bladder   Musculoskeletal: Positive for arthralgias (right elbow radiating down to wrist) and gait problem  Negative for myalgias  Skin: Negative for color change and pallor  Neurological: Positive for weakness and headaches (at times)  Negative for dizziness, light-headedness and numbness  Psychiatric/Behavioral: Negative for behavioral problems, confusion and sleep disturbance  The patient is not nervous/anxious          Historical Information   Past Medical History:   Diagnosis Date   • A-fib [facility])    • Allergy to IVP dye 06/04/2013    pt felt heaviness, palpitations   • 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), wet (HCC)     bilateral   • Aneurysm of aortic root     last assessed - [id number]   • Aortic arch aneurysm    • Aortic root dilatation (HCC)     last assessed - [id number]   • Arthritis    • Ascending aortic aneurysm     last assessed - [id number]   • Asthma    • Basal cell carcinoma     last assessed - [id number]   • 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[facility])    • Disease of thyroid gland    • Facet arthropathy, cervical     last assessed -    • History of fracture of vertebral column    • Hyperlipidemia    • Hypertension    • 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[address] )    • Hypoxia     last assessed - [id number]   • Junctional rhythm     last assessed - 2017   • Lightheadedness     last assessed - 2016   • Migraine    • Palpitations     last assessed - 2016   • Pleural effusion, bilateral     last assessed - 2015   • 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HCC)    • Shortness of breath     last assessed - 2017   • Thyroid trouble    • Trigger finger     last assessed - [id number]   • Trigger middle finger of right hand     last assessed - 2016   • Urinary incontinence     last assessed -  ; Resolved - [id number]     Past Surgical History:   Procedure Laterality Date   • ABDOMINAL AORTIC ANEURYSM REPAIR W/ ENDOLUMINAL GRAFT  2015    Ascending aorta and hemiarch replacement with 30 mm Vascutek Gelweave graft;  Managed by Mulugeta Badillo; last assessed - [id number]   • APPENDECTOMY     • BLADDER SURGERY      Reccurent histoy of bladder surgery   • HOROWITZ PROCEDURE     • CARDIAC CATHETERIZATION  2004    Procedure summary - Luminal irregularities; last assessed - [id number]   •  SECTION     • CORONARY ANEURYSM REPAIR     • CYSTOSCOPY      botox injection   • HYSTERECTOMY     • CO NDSC NJX IMPLT MATRL URT&/BLDR NCK N/A 2017    Procedure: Kip Blank; Ernestina Haro BULKING AGENT INJECTION ;  Surgeon: Delphine Coleman MD;  [facility];  Service: Urology   • CO TENDON SHEATH INCISION Right 10/18/2016    Procedure: LONG FINGER TRIGGER RELEASE ;  Surgeon: Burak Lugo MD;  [facility];  Service: Orthopedics   • THYROIDECTOMY      Total Thyroidectomy   • TONSILLECTOMY AND ADENOIDECTOMY       Social History   Social History     Substance and Sexual Activity   Alcohol Use Not Currently    Comment: Social drinker     Social History Substance and Sexual Activity   Drug Use No     Social History     Tobacco Use   Smoking Status Former   • Packs/day: 0 25   • Years: 57 00   • Pack years: 14 25   • Types: Cigarettes   • Start date: 36   • Quit date: 2014   • Years since quittin 0   Smokeless Tobacco Former   Tobacco Comments    smoked 17 yrs 1/2 ppd quit and restarted then quit  10 days ago during 2014        Family History:   Family History   Problem Relation Age of Onset   • Coronary aneurysm Sister    • Coronary artery disease Sister         CABG   • Heart disease Family         cardiac disorder   • Hypertension Family    • Cancer Family    • Thyroid disease Family        Meds/Allergies   Current meds:   Current Facility-Administered Medications   Medication Dose Route Frequency   • acetaminophen (TYLENOL) tablet 975 mg  975 mg Oral Q8H [address]   • albuterol inhalation solution 2 5 mg  2 5 mg Nebulization Q6H PRN   • amLODIPine (NORVASC) tablet 5 mg  5 mg Oral Daily    And   • lisinopril (ZESTRIL) tablet 20 mg  20 mg Oral Daily   • aspirin (ECOTRIN LOW STRENGTH) EC tablet 81 mg  81 mg Oral Daily   • baclofen tablet 10 mg  10 mg Oral TID   • Diclofenac Sodium (VOLTAREN) 1 % topical gel 2 g  2 g Topical 4x Daily   • enoxaparin (LOVENOX) subcutaneous injection 40 mg  40 mg Subcutaneous Daily   • hydrALAZINE (APRESOLINE) injection 5 mg  5 mg Intravenous Q6H PRN   • HYDROmorphone HCl (DILAUDID) injection 0 2 mg  0 2 mg Intravenous Q3H PRN   • levothyroxine tablet 125 mcg  125 mcg Oral Early Morning   • lidocaine (LIDODERM) 5 % patch 1 patch  1 patch Topical Daily   • menthol-methyl salicylate (BENGAY) 08-51 % cream   Apply externally 4x Daily PRN   • [START ON 2023] methylprednisolone (MEDROL) tablet 8 mg  8 mg Oral Daily    Followed by   • [START ON 2023] methylprednisolone (MEDROL) tablet 4 mg  4 mg Oral Daily   • metoprolol succinate (TOPROL-XL) 24 hr tablet 25 mg  25 mg Oral Daily   • ondansetron (ZOFRAN) injection 4 mg  4 mg Intravenous Q4H PRN   • pravastatin (PRAVACHOL) tablet 20 mg  20 mg Oral Daily With Dinner   • senna-docusate sodium (SENOKOT S) 8 6-50 mg per tablet 1 tablet  1 tablet Oral HS   • traMADol (ULTRAM) tablet 25 mg  25 mg Oral Q6H PRN   • traMADol (ULTRAM) tablet 50 mg  50 mg Oral Q6H PRN       Allergies   Allergen Reactions   • Amiodarone Hives   • Omnipaque [Iohexol] Hives and Shortness Of Breath   • Clindamycin Other (See Comments)     When taken causes cdiff immediately   • Other    • Sulfa Antibiotics Hives     itching   • Acetazolamide Hives and Rash     Reaction Date:Unknown   • Iv Dye  [Iodinated Contrast Media] Hypertension and Palpitations     [facility] [id number]: Verified with patient    • Naprosyn [Naproxen] Itching and Rash     Category: Allergy;        Objective   Vitals: Blood pressure 146/68, pulse 75, temperature 98 2 °F (36 8 °C), temperature source Temporal, resp  rate 17, SpO2 96 %  ,There is no height or weight on file to calculate BMI  Physical Exam  Vitals and nursing note reviewed  Constitutional:       General: She is not in acute distress  Appearance: Normal appearance  She is not ill-appearing  HENT:      Head: Normocephalic  Mouth/Throat:      Mouth: Mucous membranes are moist    Eyes:      General: No scleral icterus  Conjunctiva/sclera: Conjunctivae normal    Cardiovascular:      Rate and Rhythm: Normal rate and regular rhythm  Pulmonary:      Effort: Pulmonary effort is normal  No respiratory distress  Abdominal:      General: Bowel sounds are normal  There is no distension  Palpations: Abdomen is soft  Tenderness: There is no abdominal tenderness  Musculoskeletal:         General: Tenderness (right elbow) present  No swelling  Skin:     General: Skin is warm and dry  Neurological:      General: No focal deficit present  Mental Status: She is alert and oriented to person, place, and time  Mental status is at baseline  Lab Results:   Results from last 7 days   Lab Units 01/17/23  0622   WBC Thousand/uL 12 32*   HEMOGLOBIN g/dL 12 9   HEMATOCRIT % 37 2   PLATELETS Thousands/uL 229        Results from last 7 days   Lab Units 01/15/23  0509 01/14/23  0421   POTASSIUM mmol/L 3 7 3 8   CHLORIDE mmol/L 101 102   CO2 mmol/L 26 32   BUN mg/dL 16 19   CREATININE mg/dL 0 73 0 71   CALCIUM mg/dL 8 6 8 9   ALK PHOS U/L  --  63   ALT U/L  --  27   AST U/L  --  27       Imaging Studies: I have personally reviewed pertinent reports  EKG, Pathology, and Other Studies: I have personally reviewed pertinent reports      VTE Prophylaxis: Enoxaparin (Lovenox)    Code Status: Level 1 - Full Code

## 2023-01-19 NOTE — ASSESSMENT & PLAN NOTE
As noted on CT imaging, minimally unchanged at 4 7cm   · Patient recommended to follow up with PCP outpatient with yearly screenings  · Patient aware of these findings

## 2023-01-19 NOTE — CASE MANAGEMENT
Case Management Discharge Planning Note    Patient name Buffy ALEXANDRA [place] [address] /E2 -* MRN [id number]  : 1932 Date 2023       Current Admission Date: 2023  Current Admission Diagnosis:Acute pain of right shoulder   Patient Active Problem List    Diagnosis Date Noted   • Toxic encephalopathy 2023   • Acute pain of right shoulder 2023   • Aneurysm of aortic arch without rupture 2023   • Polymyalgia rheumatica ([address] ) 2021   • Atherosclerosis of aorta ([address] ) 2021   • Hypoparathyroidism, unspecified hypoparathyroidism type ([address] ) 2021   • Pain of left lower extremity 2021   • [place]-neck deformity of finger 2021   • Muscle cramps 2021   • Skin cyst 2021   • Severe persistent asthma with acute exacerbation 10/20/2020   • Nonrheumatic mitral valve regurgitation    • Uncomplicated asthma    • Encounter for vision screening 04/15/2020   • Need for influenza vaccination 10/14/2019   • Other insomnia 10/14/2019   • Vasovagal syncope 10/04/2019   • Medicare annual wellness visit, subsequent 07/10/2019   • Ankle instability, right 07/10/2019   • Other fatigue 07/10/2019   • Moderate persistent asthma without complication    • Hypothyroidism 2019   • Otalgia of both ears 2019   • Screening for diabetes mellitus 2019   • Hypothyroidism due to Hashimoto's thyroiditis 2018   • Shortness of breath 2016   • Essential hypertension 2016   • Hyperlipidemia 2016   • Asthma-COPD overlap syndrome ([address] ) 2016   • Paroxysmal atrial fibrillation ([address] ) 2016   • Iron deficiency anemia 2016   • Diarrhea 2016   • Headache 2016   • 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   • Visual hallucination 2016   • Macular degeneration, wet ([address] ) 2016   • Trigger middle finger of right hand 10/18/2016   • 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[address] ) 03/03/2016      LOS (days): 5  Geometric Mean LOS (GMLOS) (days): 4 40  Days to GMLOS:-0 9     OBJECTIVE:  Risk of Unplanned Readmission Score: 16 81         Current admission status: Inpatient   Preferred Pharmacy:   [address] [facility] [address]  Phone: [phone] Fax: [phone] [address] [place], [place] - [address] [place]  [address] [place] [address]  Phone: [phone] Fax: [phone] [address] [facility] [address]  Phone: [phone] Fax: [phone]    [facility] , [place] - [address] ,  [address] ,  [address] [place] [address] [address] [facility]  Phone: [phone] Fax: [phone]    Primary Care Provider: Sunny Barrow DO    Primary Insurance: Berkley Enriquez [facility] REP  Secondary Insurance:     DISCHARGE DETAILS:    Discharge planning discussed with[de-identified] Patient                                                                                   IMM Given (Date):: 01/19/23  IMM Given to[de-identified] Patient   IMM was reviewed with the pt  Pt reports understanding of the ability to appeal discharge if feeling as though not medically stable for discharge  IMM was signed and placed in the scan bin  Copy provided to the pt

## 2023-01-19 NOTE — ASSESSMENT & PLAN NOTE
80year old female presented with acute worsening pain of her right shoulder blade, and her right elbow  Right shoulder pain improved and is down to 2/10, right elbow pain still present  · CT chest without contrast without any acute processes  · X-ray of shoulder showing mild osteoarthritis  · Continue conservative management  Patient receiving steroid taper  · Discussed with orthopedics, no role for iv / joint injection at this time since her pain is mostly referred from her back     · Discharge to home with [facility]

## 2023-01-20 ENCOUNTER — HOME CARE VISIT (OUTPATIENT)
Dept: HOME HEALTH SERVICES | Facility: HOME HEALTHCARE | Age: 88
End: 2023-01-20

## 2023-01-20 ENCOUNTER — TRANSITIONAL CARE MANAGEMENT (OUTPATIENT)
Dept: FAMILY MEDICINE CLINIC | Facility: CLINIC | Age: 88
End: 2023-01-20

## 2023-01-20 NOTE — PROGRESS NOTES
-- Patient: Kashmir Rich  -- MRN: [id number]  -- Aidin Request ID: [id number]  -- Level of care reserved: [address] [place] [address]  -- Partner Reserved: [facility], [place], [address] [phone]  -- Clinical needs requested: occupational therapy, physical therapy  -- Geography searched: [id number]  -- Start of Service:  -- Request sent: 8:54am EST on 1/18/2023 by Latia Lindsey  -- Partner reserved: 3:59pm EST on 1/18/2023 by Latia Lindsey  -- Choice list shared: 12:36pm EST on 1/18/2023 by Latia Lindsey

## 2023-01-20 NOTE — CASE COMMUNICATION
This is for informational purposes only  Patient is agreeable to a home PT [facility] visit on Tuesday  New SOC date will be 1/24/23   Thank you

## 2023-01-24 ENCOUNTER — TELEPHONE (OUTPATIENT)
Dept: OBGYN CLINIC | Facility: MEDICAL CENTER | Age: 88
End: 2023-01-24

## 2023-01-24 ENCOUNTER — HOME CARE VISIT (OUTPATIENT)
Dept: HOME HEALTH SERVICES | Facility: HOME HEALTHCARE | Age: 88
End: 2023-01-24

## 2023-01-24 DIAGNOSIS — M19.041 OSTEOARTHRITIS OF RIGHT MIDDLE FINGER: Primary | ICD-10-CM

## 2023-01-24 NOTE — TELEPHONE ENCOUNTER
Ondina Lynch from [facility] order pharmacy calling to request a new script for the patient  Medication Name: Diclofenac Sodium           Dosage of Med: 1 %      Frequency of Med: 2 gm 4 x's daily   ( given were  7 tubes = 700 grams last order)       Remaining Medication:       Pharmacy and Location:    [facility] calling         Pt  Preferred Callback Phone Number: [phone]  Abby Ferrell [phone]      Thank you

## 2023-01-25 DIAGNOSIS — M19.041 OSTEOARTHRITIS OF RIGHT MIDDLE FINGER: ICD-10-CM

## 2023-01-26 VITALS — OXYGEN SATURATION: 96 % | DIASTOLIC BLOOD PRESSURE: 70 MMHG | SYSTOLIC BLOOD PRESSURE: 138 MMHG | HEART RATE: 72 BPM

## 2023-01-27 ENCOUNTER — HOME CARE VISIT (OUTPATIENT)
Dept: HOME HEALTH SERVICES | Facility: HOME HEALTHCARE | Age: 88
End: 2023-01-27

## 2023-01-28 ENCOUNTER — RA CDI HCC (OUTPATIENT)
Dept: OTHER | Facility: HOSPITAL | Age: 88
End: 2023-01-28

## 2023-01-28 NOTE — PROGRESS NOTES
[facility]  coding opportunities       Chart reviewed, no opportunity found:   [address]        Patients Insurance     Medicare Insurance: [employer]

## 2023-01-30 ENCOUNTER — HOME CARE VISIT (OUTPATIENT)
Dept: HOME HEALTH SERVICES | Facility: HOME HEALTHCARE | Age: 88
End: 2023-01-30

## 2023-01-30 ENCOUNTER — OFFICE VISIT (OUTPATIENT)
Dept: OBGYN CLINIC | Facility: CLINIC | Age: 88
End: 2023-01-30

## 2023-01-30 VITALS
HEART RATE: 86 BPM | HEIGHT: 62 IN | WEIGHT: 127 LBS | BODY MASS INDEX: 23.37 KG/M2 | DIASTOLIC BLOOD PRESSURE: 70 MMHG | SYSTOLIC BLOOD PRESSURE: 138 MMHG

## 2023-01-30 DIAGNOSIS — M54.12 RADICULOPATHY, CERVICAL REGION: Primary | ICD-10-CM

## 2023-01-30 DIAGNOSIS — M79.601 RIGHT ARM PAIN: ICD-10-CM

## 2023-01-30 DIAGNOSIS — M50.30 DEGENERATIVE CERVICAL DISC: ICD-10-CM

## 2023-01-30 RX ORDER — GABAPENTIN 100 MG/1
100 CAPSULE ORAL
Qty: 30 CAPSULE | Refills: 1 | Status: SHIPPED | OUTPATIENT
Start: 2023-01-30 | End: 2023-02-03 | Stop reason: SDUPTHER

## 2023-01-30 NOTE — PROGRESS NOTES
Assessment/Plan:    Diagnoses and all orders for this visit:    Radiculopathy, cervical region  -     gabapentin (Neurontin) 100 mg capsule; Take 1 capsule (100 mg total) by mouth daily at bedtime  -     MRI cervical spine wo contrast; Future  -     Ambulatory Referral to Pain Management; Future    Right arm pain  -     Ambulatory Referral to Orthopedic Surgery  -     Cancel: XR spine cervical complete 4 or 5 vw non injury; Future  -     gabapentin (Neurontin) 100 mg capsule; Take 1 capsule (100 mg total) by mouth daily at bedtime  -     MRI cervical spine wo contrast; Future  -     Ambulatory Referral to Pain Management; Future    Degenerative cervical disc  -     gabapentin (Neurontin) 100 mg capsule; Take 1 capsule (100 mg total) by mouth daily at bedtime  -     MRI cervical spine wo contrast; Future  -     Ambulatory Referral to Pain Management; Future        No follow-ups on file  Chief Complaint   Patient presents with   • Right Arm - Pain     Started with elbow, radiated to shoulder and wrist        Subjective:   Patient ID: Buffy Schultz is a 80 y o  female  81 yo F pmhx PMR s/p treatment and resolution 40 yr ago, hypothyroidism, malignancy of hard palate, afib, 2016 R hand trigger finger presenting w/2 weeks of ongoing R shoulder and R elbow pain  Patient awoke on 1/19/23 with very sharp R elbow pain with radiation to R shoulder  Patient received imaging - CT neck negative for acute processes, XR shoulder with mild GH joint OA  Diagnosis of cervical radiculopathy in ED  Was given fentanyl w/resultant encephalopathy x 3 days, but cleared  Ortho was curbsided and agreed w/cervical radiculopathy  Patient discharged on steroid taper with some improvement of sx  Today, elbow pain is sharp 5/40, R shoulder is dull 4/10 pain  Has tried some tylenol PRN but w/o much relief  Not taking NSAIDs although has old voltaren gel at home (not using)   Fentanyl in ED reduced pain to 2/10 but unfortunately with profound side effects  Patient states she still feels "not like her usual self" and finds that her shoulder and elbow pain are significantly affecting her quality of life  She is typically fully independent with all IADLs and ADLs and ambulates w/o any assistive devices  Review of Systems   Constitutional: Negative for chills, fatigue and fever  Musculoskeletal: Positive for arthralgias and myalgias  Negative for back pain, gait problem, joint swelling, neck pain and neck stiffness  The following portions of the patient's chart were reviewed and updated as appropriate: Allergy:  Allergies   Allergen Reactions   • Amiodarone Hives   • Omnipaque [Iohexol] Hives and Shortness Of Breath   • Clindamycin Other (See Comments)     When taken causes cdiff immediately   • Other    • Sulfa Antibiotics Hives     itching   • Acetazolamide Hives and Rash     Reaction Date:Unknown   • Iv Dye  [Iodinated Contrast Media] Hypertension and Palpitations     Annotation - [id number]: Verified with patient    • Naprosyn [Naproxen] Itching and Rash     Category: Allergy;         Past Medical History:   Diagnosis Date   • A-fib [facility])    • Allergy to IVP dye 06/04/2013    pt felt heaviness, palpitations   • 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), wet (HCC)     bilateral   • Aneurysm of aortic root     last assessed - [id number]   • Aortic arch aneurysm    • Aortic root dilatation (HCC)     last assessed - [id number]   • Arthritis    • Ascending aortic aneurysm     last assessed - [id number]   • Asthma    • Basal cell carcinoma     last assessed - [id number]   • 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[facility])    • Disease of thyroid gland    • Facet arthropathy, cervical     last assessed - [id number]   • History of fracture of vertebral column    • Hyperlipidemia    • Hypertension    • 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[facility] [address] )    • Hypoxia     last assessed - 91IYG6568   • Junctional rhythm     last assessed - 04Apr2017   • Lightheadedness     last assessed - 01LIF1446   • Migraine    • Palpitations     last assessed - 2016   • Pleural effusion, bilateral     last assessed - 2015   • 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HCC)    • Shortness of breath     last assessed - 2017   • Thyroid trouble    • Trigger finger     last assessed - [id number]   • Trigger middle finger of right hand     last assessed - 2016   • Urinary incontinence     last assessed -  ; Resolved - [id number]       Past Surgical History:   Procedure Laterality Date   • ABDOMINAL AORTIC ANEURYSM REPAIR W/ ENDOLUMINAL GRAFT  2015    Ascending aorta and hemiarch replacement with 30 mm Vascutek Gelweave graft; Managed by Teresa Adams; last assessed - [id number]   • APPENDECTOMY     • BLADDER SURGERY      Reccurent histoy of bladder surgery   • HOROWITZ PROCEDURE     • CARDIAC CATHETERIZATION  2004    Procedure summary - Luminal irregularities; last assessed - [id number]   •  SECTION     • CORONARY ANEURYSM REPAIR     • CYSTOSCOPY      botox injection   • HYSTERECTOMY     • AR NDSC NJX IMPLT MATRL URT&/BLDR NCK N/A 2017    Procedure: Galo Leigh; Terence Polanco BULKING AGENT INJECTION ;  Surgeon: Santana Lopez MD;  [facility];  Service: Urology   • AR TENDON SHEATH INCISION Right 10/18/2016    Procedure: LONG FINGER TRIGGER RELEASE ;  Surgeon: Carolann Riddle MD;  Location[facility];  Service: Orthopedics   • THYROIDECTOMY      Total Thyroidectomy   • TONSILLECTOMY AND ADENOIDECTOMY         Social History     Socioeconomic History   • Marital status:       Spouse name: Not on file   • Number of children: Not on file   • Years of education: Not on file   • Highest education level: Not on file   Occupational History   • Not on file   Tobacco Use   • Smoking status: Former     Packs/day: 0 25     Years: 57 00     Pack years: 14 25     Types: Cigarettes     Start date: 36     Quit date: 2014     Years since quittin 1   • Smokeless tobacco: Former   • Tobacco comments:     smoked 17 yrs 1/2 ppd quit and restarted then quit  10 days ago during Christmas 2014    Vaping Use   • Vaping Use: Never used   Substance and Sexual Activity   • Alcohol use: Not Currently     Comment: Social drinker   • Drug use: No   • Sexual activity: Not Currently   Other Topics Concern   • Not on file   Social History Narrative   • Not on file     Social Determinants of Health     Financial Resource Strain: Medium Risk   • Difficulty of Paying Living Expenses: Somewhat hard   Food Insecurity: Not on file   Transportation Needs: Unmet Transportation Needs   • Lack of Transportation (Medical):  Yes   • Lack of Transportation (Non-Medical): Yes   Physical Activity: Not on file   Stress: Not on file   Social Connections: Not on file   Intimate Partner Violence: Not on file   Housing Stability: Not on file       Family History   Problem Relation Age of Onset   • Coronary aneurysm Sister    • Coronary artery disease Sister         CABG   • Heart disease Family         cardiac disorder   • Hypertension Family    • Cancer Family    • Thyroid disease Family        Medications:    Current Outpatient Medications:   •  albuterol (2 5 mg/3 mL) 0 083 % nebulizer solution, Take 2 5 mg by nebulization every 6 (six) hours as needed for wheezing or shortness of breath, Disp: , Rfl:   •  albuterol (PROVENTIL HFA,VENTOLIN HFA) 90 mcg/act inhaler, Inhale 2 puffs every 6 (six) hours as needed for wheezing, Disp: 1 Inhaler, Rfl: 0  •  amLODIPine-benazepril (LOTREL 5-20) 5-20 MG per capsule, Take 1 capsule by mouth daily, Disp: 90 capsule, Rfl: 3  •  aspirin 81 MG tablet, Take 162 mg by mouth daily in the early morning  , Disp: , Rfl:   •  calcium carbonate (OS-TAE) 600 MG tablet, Take 600 mg by mouth daily in the early morning  , Disp: , Rfl:   •  Diclofenac Sodium (VOLTAREN) 1 %, Apply 2 g topically 4 (four) times a day (Patient taking differently: Apply 2 g topically 4 (four) times a day pt reports she uses this only as needed), Disp: 700 g, Rfl: 2  •  gabapentin (Neurontin) 100 mg capsule, Take 1 capsule (100 mg total) by mouth daily at bedtime, Disp: 30 capsule, Rfl: 1  •  levothyroxine 125 mcg tablet, Take 1 tablet (125 mcg total) by mouth daily in the early morning, Disp: 90 tablet, Rfl: 3  •  lovastatin (MEVACOR) 20 mg tablet, Take 1 tablet (20 mg total) by mouth in the morning , Disp: 90 tablet, Rfl: 3  •  metoprolol succinate (TOPROL-XL) 25 mg 24 hr tablet, Take 1 tablet (25 mg total) by mouth daily, Disp: 90 tablet, Rfl: 1  •  baclofen 10 mg tablet, Take 1 tablet (10 mg total) by mouth 3 (three) times a day for 3 days, Disp: 9 tablet, Rfl: 0  •  lidocaine (LIDODERM) 5 %, Apply 1 patch topically over 12 hours daily for 5 days Remove & Discard patch within 12 hours or as directed by MD Do not start before January 20, 2023 , Disp: 5 patch, Rfl: 0    Patient Active Problem List   Diagnosis   • Trigger middle finger of right hand   • Shortness of breath   • Essential hypertension   • Hyperlipidemia   • Asthma-COPD overlap syndrome (HCC)   • Paroxysmal atrial fibrillation (HCC)   • Iron deficiency anemia   • Diarrhea   • Headache   • 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HCC)   • Visual hallucination   • Macular degeneration, wet (HCC)   • 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HCC)   • Hypothyroidism due to Hashimoto's thyroiditis   • Moderate persistent asthma without complication   • Hypothyroidism   • Otalgia of both ears   • Screening for diabetes mellitus   • Medicare annual wellness visit, subsequent   • Ankle instability, right   • Other fatigue   • Vasovagal syncope   • Need for influenza vaccination   • Other insomnia   • Uncomplicated asthma   • Encounter for vision screening   • Nonrheumatic mitral valve regurgitation   • Severe persistent asthma with acute exacerbation   • Muscle cramps   • Skin cyst   • Pain of left lower extremity   • [place]-neck deformity of finger   • Polymyalgia rheumatica (HCC)   • Atherosclerosis of aorta (HCC)   • Hypoparathyroidism, unspecified hypoparathyroidism type ([facility] [address] )   • Acute pain of right shoulder   • Aneurysm of aortic arch without rupture   • Toxic encephalopathy       Objective:  /70   Pulse 86   Ht 5' 2" (1 575 m)   Wt 57 6 kg (127 lb)   BMI 23 23 kg/m²     Right Hand Exam     Tenderness   The patient is experiencing no tenderness  Range of Motion   Wrist   Extension: normal   Flexion: normal   Pronation: normal   Supination: normal     Muscle Strength   Wrist extension: 5/5   Wrist flexion: 5/5   : 5/5       Left Hand Exam     Tenderness   The patient is experiencing no tenderness  Range of Motion   Wrist   Extension: normal   Flexion: normal   Pronation: normal   Supination: normal     Muscle Strength   Wrist extension: 5/5   Wrist flexion: 5/5   :  5/5       Right Elbow Exam     Tenderness   The patient is experiencing tenderness in the lateral epicondyle  Range of Motion   Pronation: normal   Supination: normal     Muscle Strength   Pronation:  5/5   Supination:  5/5     Tests   Tinel's sign (cubital tunnel): negative    Other   Erythema: absent  Sensation: normal      Left Elbow Exam     Tenderness   The patient is experiencing no tenderness  Range of Motion   Pronation: normal   Supination: normal     Muscle Strength   Pronation:  5/5   Supination:  5/5     Tests   Tinel's sign (cubital tunnel): negative    Other   Erythema: absent  Sensation: normal      Right Shoulder Exam   Right shoulder exam is normal     Tenderness   The patient is experiencing no tenderness  Range of Motion   Active abduction: normal   Passive abduction: normal   Extension: normal   External rotation: normal   Forward flexion: normal     Other   Sensation: normal      Left Shoulder Exam   Left shoulder exam is normal     Tenderness   The patient is experiencing no tenderness       Range of Motion   Active abduction: normal   Passive abduction: normal   Extension: normal   External rotation: normal   Forward flexion: normal     Other   Sensation: normal           Observations   Left Shoulder   Negative for deformity and effusion  Left Elbow   Negative for deformity and effusion  Right Shoulder  Negative for deformity and effusion  Right Elbow   Negative for deformity and effusion  Strength/Myotome Testing     Left Wrist/Hand   Wrist extension: 5  Wrist flexion: 5    Right Wrist/Hand   Wrist extension: 5  Wrist flexion: 5      Physical Exam  Vitals reviewed  Constitutional:       General: She is not in acute distress  Appearance: Normal appearance  She is not ill-appearing or diaphoretic  Musculoskeletal:      Right shoulder: No deformity, effusion or tenderness  Normal range of motion  Normal strength  Left shoulder: No deformity, effusion or tenderness  Normal range of motion  Normal strength  Right upper arm: Tenderness (over lateral epicondyle, very tender) present  Right elbow: No deformity, effusion or lacerations  Left elbow: No deformity, effusion or lacerations  Right forearm: No edema, lacerations or tenderness  Left forearm: No edema, lacerations or tenderness  Comments: 5/5 strength in upper extremities  Shoulder has FROM w/o pain  Some tenderness over R trapezius over scapula  No weakness  Negative tinel test   Skin:     General: Skin is warm  Neurological:      Mental Status: She is alert and oriented to person, place, and time  Coordination: Coordination normal       Gait: Gait normal    Psychiatric:         Mood and Affect: Mood normal          Behavior: Behavior normal          Thought Content: Thought content normal          Judgment: Judgment normal            Neurologic Exam     Mental Status   Oriented to person, place, and time  Oriented to person  Oriented to place  Procedures    I have personally reviewed pertinent films in PACS        Mayuri Gill Olamide Hussein MD   PGY-2 Internal Medicine  [facility]

## 2023-01-30 NOTE — PATIENT INSTRUCTIONS
You may use Advil (ibuprofen) 600mg every 6 hours or at least twice per day OR Aleve (naproxen) 250-500mg every 12 hours as needed for pain and inflammation  You may also take Tylenol 500mg every 4-6 hours as needed OR max 1,000mg per dose up to 3 times per day for a total of 3,000mg per day  Check with your primary care physician to see if these medications are safe to take and to make sure they do not interfere with your other medications and medical issues  Gabapentin (By mouth)   Gabapentin (aracely-a-PEN-tin)  Treats seizures and pain caused by shingles  Brand Name(s): FusePaq Fanatrex, Neurontin   There may be other brand names for this medicine  When This Medicine Should Not Be Used: This medicine is not right for everyone  Do not use it if you had an allergic reaction to gabapentin  How to Use This Medicine:   Capsule, Liquid, Tablet  Take your medicine as directed  Your dose may need to be changed several times to find what works best for you  If you have epilepsy, do not allow more than 12 hours to pass between doses  Capsule: Swallow the capsule whole with plenty of water  Do not open, crush, or chew it  Gralise® tablet: Swallow the tablet whole   Do not crush, break, or chew it  Neurontin® tablet: If you break a tablet into 2 pieces, use the second half as your next dose  Do not use the half-tablet if the whole tablet has been cut or broken after 28 days  Oral liquid: Measure the oral liquid medicine with a marked measuring spoon, oral syringe, or medicine cup  This medicine should come with a Medication Guide  Ask your pharmacist for a copy if you do not have one  Missed dose: Take a dose as soon as you remember  If it is almost time for your next dose, wait until then and take a regular dose  Do not take extra medicine to make up for a missed dose  Store the medicine in a closed container at room temperature, away from heat, moisture, and direct light   Store the [employer] liquid in the refrigerator  Do not freeze  Drugs and Foods to Avoid:   Ask your doctor or pharmacist before using any other medicine, including over-the-counter medicines, vitamins, and herbal products  Some medicines can affect how gabapentin works  Tell your doctor if you also using hydrocodone or morphine  If you take an antacid, wait at least 2 hours before you take gabapentin  Do not drink alcohol while you are using this medicine  Tell your doctor if you use anything else that makes you sleepy  Some examples are allergy medicine, narcotic pain medicine, and alcohol  Tell your doctor if you are also using lorazepam, oxycodone, or zolpidem  Warnings While Using This Medicine:   Tell your doctor if you are pregnant or breastfeeding, or if you have kidney problems (including patients receiving dialysis) or lung problems  Tell your doctor if you have a history of depression or mental health problems  This medicine may cause the following problems:  Drug reaction with eosinophilia and systemic symptoms (DRESS) or multiorgan hypersensitivity, which may damage the liver, kidney, blood, heart, or muscles  Changes in mood or behavior, including suicidal thoughts or behavior  Respiratory depression (serious breathing problem that can be life-threatening), when used with narcotic pain medicines  Do not stop using this medicine suddenly  Your doctor will need to slowly decrease your dose before you stop it completely  This medicine may make you dizzy or drowsy  Do not drive or do anything else that could be dangerous until you know how this medicine affects you  Tell any doctor or dentist who treats you that you are using this medicine  This medicine may affect certain medical test results  Your doctor will check your progress and the effects of this medicine at regular visits  Keep all appointments  Keep all medicine out of the reach of children  Never share your medicine with anyone    Possible Side Effects While Using This Medicine:   Call your doctor right away if you notice any of these side effects: Allergic reaction: Itching or hives, swelling in your face or hands, swelling or tingling in your mouth or throat, chest tightness, trouble breathing  Behavior problems, aggression, restlessness, trouble concentrating, moodiness (especially in children)  Blistering, peeling, red skin rash  Blue lips, fingernails, or skin, chest pain, fast heartbeat, trouble breathing  Change in how much or how often you urinate, bloody or cloudy urine  Dark urine or pale stools, nausea, vomiting, loss of appetite, stomach pain, yellow skin or eyes  Fever, chills, cough, sore throat, body aches  Problems with coordination, shakiness, unsteadiness, unusual eye movement  Rapid weight gain, swelling in your hands, ankles, or feet  Rash, swollen or tender glands in the neck, armpit, or groin  Unusual moods or behaviors, thoughts of hurting yourself, feeling depressed  If you notice these less serious side effects, talk with your doctor:   Dizziness, drowsiness, sleepiness, tiredness  If you notice other side effects that you think are caused by this medicine, tell your doctor  Call your doctor for medical advice about side effects  You may report side effects to FDA at 9-647-FDA-7348    © Copyright [employer] 2022 Information is for End User's use only and may not be sold, redistributed or otherwise used for commercial purposes  The above information is an  only  It is not intended as medical advice for individual conditions or treatments  Talk to your doctor, nurse or pharmacist before following any medical regimen to see if it is safe and effective for you

## 2023-01-31 ENCOUNTER — OFFICE VISIT (OUTPATIENT)
Dept: FAMILY MEDICINE CLINIC | Facility: CLINIC | Age: 88
End: 2023-01-31

## 2023-01-31 VITALS
HEIGHT: 62 IN | TEMPERATURE: 98.2 F | WEIGHT: 120.2 LBS | SYSTOLIC BLOOD PRESSURE: 130 MMHG | BODY MASS INDEX: 22.12 KG/M2 | HEART RATE: 83 BPM | DIASTOLIC BLOOD PRESSURE: 62 MMHG | OXYGEN SATURATION: 98 %

## 2023-01-31 DIAGNOSIS — I10 ESSENTIAL HYPERTENSION: ICD-10-CM

## 2023-01-31 DIAGNOSIS — H35.3290 EXUDATIVE AGE-RELATED MACULAR DEGENERATION, UNSPECIFIED LATERALITY, UNSPECIFIED STAGE (HCC): ICD-10-CM

## 2023-01-31 DIAGNOSIS — M25.511 ACUTE PAIN OF RIGHT SHOULDER: Primary | ICD-10-CM

## 2023-01-31 DIAGNOSIS — I70.0 ATHEROSCLEROSIS OF AORTA (HCC): ICD-10-CM

## 2023-01-31 RX ORDER — AMLODIPINE BESYLATE AND BENAZEPRIL HYDROCHLORIDE 5; 20 MG/1; MG/1
1 CAPSULE ORAL DAILY
Qty: 90 CAPSULE | Refills: 3 | Status: SHIPPED | OUTPATIENT
Start: 2023-01-31

## 2023-01-31 NOTE — PROGRESS NOTES
Assessment & Plan   Patient is a 20-year-old female seen for posthospital follow-up  She was admitted on 1/23 and discharged on 1/27/2023  She went to the emergency room with severe right shoulder pain  She is concerned because she does not feel that her memory is the same since she was in the hospital   She recalls hearing them mention fentanyl and Dilaudid  She feels that she has lost some memory since she had these medications  Patient did see orthopedics yesterday and it is felt that her pain may be secondary to a radiculopathy from the cervical region  An MRI was ordered by orthopedics  She is concerned about Gabapentin that was prescribed  I tried to reasssure her that it would not have the same kind of effect  1  Acute pain of right shoulder    2  Essential hypertension  -     amLODIPine-benazepril (LOTREL 5-20) 5-20 MG per capsule; Take 1 capsule by mouth daily    3  Exudative age-related macular degeneration, unspecified laterality, unspecified stage ([facility] [address] )    4  Atherosclerosis of aorta [facility])  She is going to make an appointment to see Dr Ainsley Estrada follow-up for thoracic aneurysm        Subjective     Transitional Care Management Review:   Buffy ALEXANDRA Marcia Mohr is a 80 y o  female here for TCM follow up  During the TCM phone call patient stated:  TCM Call     Date and time call was made  1/20/2023  4:06 PM    Hospital care reviewed  Records reviewed    Patient was hospitialized at  [facility] - CLOSED    Date of Admission  01/14/23    Date of discharge  01/19/23    Diagnosis  Acute pain of right shoulder    Disposition  Home    Were the patients medications reviewed and updated  Yes    Current Symptoms  --  Pain is still there but not as bad  patient reports her mind has been foggy due to all the pain medication she was given in the hospital      TCM Call     Post hospital issues  Reduced activity    Should patient be enrolled in anticoag monitoring? No    Scheduled for follow up?   Yes    Did you obtain your prescribed medications  Yes    Do you need help managing your prescriptions or medications  No    Is transportation to your appointment needed  Yes    Specify why  Patient doesn't drive    I have advised the patient to call PCP with any new or worsening symptoms  Bhargavi Whitehead, [address]  Family    Are you recieving any outpatient services  No    Are you recieving home care services  Yes    Types of home care services  Nurse visit    Are you using any community resources  No    Current waiver services  No    Have you fallen in the last 12 months  No    Interperter language line needed  No    Counseling  Patient        Patient is here for follow up to hospitalization  She is upset about pain meds she receives in the hospital  Pain is better in that she is able to sleep  Review of Systems   Constitutional: Negative for chills and fever  HENT: Negative for congestion and sore throat  Respiratory: Negative for chest tightness  Cardiovascular: Negative for chest pain and palpitations  Gastrointestinal: Negative for abdominal pain, constipation, diarrhea and nausea  Genitourinary: Negative for difficulty urinating  Musculoskeletal: Positive for arthralgias  Skin: Negative  Neurological: Negative for dizziness and headaches  Psychiatric/Behavioral: Negative  Objective     /62 (BP Location: Left arm, Patient Position: Sitting, Cuff Size: Adult)   Pulse 83   Temp 98 2 °F (36 8 °C)   Ht 5' 2" (1 575 m)   Wt 54 5 kg (120 lb 3 2 oz)   SpO2 98%   BMI 21 98 kg/m²      Physical Exam  Vitals and nursing note reviewed  Constitutional:       General: She is not in acute distress  Appearance: She is well-developed  HENT:      Head: Normocephalic  Neck:      Thyroid: No thyromegaly  Vascular: No carotid bruit  Cardiovascular:      Rate and Rhythm: Normal rate and regular rhythm  Heart sounds: Normal heart sounds   No murmur heard  Pulmonary:      Effort: Pulmonary effort is normal       Breath sounds: Normal breath sounds  Musculoskeletal:         General: Tenderness present  Right lower leg: No edema  Left lower leg: No edema  Lymphadenopathy:      Cervical: No cervical adenopathy  Skin:     General: Skin is warm and dry  Neurological:      Mental Status: She is alert and oriented to person, place, and time  Sensory: Sensory deficit (actually seems hypersensitive on fingers of right hand ) present     Psychiatric:         Mood and Affect: Mood normal        Medications have been reviewed by provider in current encounter    Liz Spencer DO

## 2023-02-01 ENCOUNTER — HOME CARE VISIT (OUTPATIENT)
Dept: HOME HEALTH SERVICES | Facility: HOME HEALTHCARE | Age: 88
End: 2023-02-01

## 2023-02-02 VITALS — OXYGEN SATURATION: 97 % | DIASTOLIC BLOOD PRESSURE: 70 MMHG | HEART RATE: 78 BPM | SYSTOLIC BLOOD PRESSURE: 118 MMHG

## 2023-02-03 ENCOUNTER — HOME CARE VISIT (OUTPATIENT)
Dept: HOME HEALTH SERVICES | Facility: HOME HEALTHCARE | Age: 88
End: 2023-02-03

## 2023-02-03 ENCOUNTER — TELEPHONE (OUTPATIENT)
Dept: OBGYN CLINIC | Facility: MEDICAL CENTER | Age: 88
End: 2023-02-03

## 2023-02-03 DIAGNOSIS — M79.601 RIGHT ARM PAIN: ICD-10-CM

## 2023-02-03 DIAGNOSIS — M50.30 DEGENERATIVE CERVICAL DISC: ICD-10-CM

## 2023-02-03 DIAGNOSIS — M54.12 RADICULOPATHY, CERVICAL REGION: ICD-10-CM

## 2023-02-03 RX ORDER — GABAPENTIN 100 MG/1
100 CAPSULE ORAL
Qty: 90 CAPSULE | Refills: 1 | Status: SHIPPED | OUTPATIENT
Start: 2023-02-03

## 2023-02-03 NOTE — TELEPHONE ENCOUNTER
Caller: Patient    Doctor: Kaylyn Mendez    Reason for call:     Patient is calling about the script for Gabapentin 100 mg, she is stating her [facility] is a mail order and they will not fill it because the script is written for 30 days instead of 90 days  Can the script be sent in for the  90 day supply so she can receive it  [facility] mail order number is [phone]      Call back#: [phone]

## 2023-02-05 ENCOUNTER — HOSPITAL ENCOUNTER (INPATIENT)
Facility: HOSPITAL | Age: 88
LOS: 3 days | Discharge: HOME HEALTH | End: 2023-02-08
Attending: EMERGENCY MEDICINE | Admitting: INTERNAL MEDICINE

## 2023-02-05 ENCOUNTER — APPOINTMENT (INPATIENT)
Dept: CT IMAGING | Facility: HOSPITAL | Age: 88
End: 2023-02-05

## 2023-02-05 ENCOUNTER — ANESTHESIA EVENT (INPATIENT)
Dept: GASTROENTEROLOGY | Facility: HOSPITAL | Age: 88
End: 2023-02-05

## 2023-02-05 DIAGNOSIS — K92.2 GI BLEED: ICD-10-CM

## 2023-02-05 DIAGNOSIS — D64.9 SYMPTOMATIC ANEMIA: Primary | ICD-10-CM

## 2023-02-05 DIAGNOSIS — R45.851 SUICIDAL IDEATION: ICD-10-CM

## 2023-02-05 DIAGNOSIS — R52 PAIN: ICD-10-CM

## 2023-02-05 DIAGNOSIS — R62.7 FAILURE TO THRIVE IN ADULT: ICD-10-CM

## 2023-02-05 DIAGNOSIS — D64.9 ANEMIA, UNSPECIFIED TYPE: ICD-10-CM

## 2023-02-05 LAB
2HR DELTA HS TROPONIN: 1 NG/L
4HR DELTA HS TROPONIN: 1 NG/L
ABO GROUP BLD: NORMAL
ALBUMIN SERPL BCP-MCNC: 3.7 G/DL (ref 3.5–5)
ALP SERPL-CCNC: 33 U/L (ref 34–104)
ALT SERPL W P-5'-P-CCNC: 11 U/L (ref 7–52)
ANION GAP SERPL CALCULATED.3IONS-SCNC: 7 MMOL/L (ref 4–13)
AST SERPL W P-5'-P-CCNC: 12 U/L (ref 13–39)
ATRIAL RATE: 78 BPM
ATRIAL RATE: 89 BPM
BASOPHILS # BLD AUTO: 0.03 THOUSANDS/ÂΜL (ref 0–0.1)
BASOPHILS NFR BLD AUTO: 0 % (ref 0–1)
BILIRUB SERPL-MCNC: 0.35 MG/DL (ref 0.2–1)
BLD GP AB SCN SERPL QL: POSITIVE
BLOOD GROUP ANTIBODIES SERPL: NORMAL
BUN SERPL-MCNC: 53 MG/DL (ref 5–25)
CALCIUM SERPL-MCNC: 8.3 MG/DL (ref 8.4–10.2)
CARDIAC TROPONIN I PNL SERPL HS: 8 NG/L
CARDIAC TROPONIN I PNL SERPL HS: 9 NG/L
CARDIAC TROPONIN I PNL SERPL HS: 9 NG/L
CHLORIDE SERPL-SCNC: 104 MMOL/L (ref 96–108)
CO2 SERPL-SCNC: 26 MMOL/L (ref 21–32)
CREAT SERPL-MCNC: 0.73 MG/DL (ref 0.6–1.3)
EOSINOPHIL # BLD AUTO: 0.03 THOUSAND/ÂΜL (ref 0–0.61)
EOSINOPHIL NFR BLD AUTO: 0 % (ref 0–6)
ERYTHROCYTE [DISTWIDTH] IN BLOOD BY AUTOMATED COUNT: 14.4 % (ref 11.6–15.1)
FERRITIN SERPL-MCNC: 35 NG/ML (ref 8–388)
GFR SERPL CREATININE-BSD FRML MDRD: 72 ML/MIN/1.73SQ M
GLUCOSE SERPL-MCNC: 136 MG/DL (ref 65–140)
HCT VFR BLD AUTO: 24 % (ref 34.8–46.1)
HGB BLD-MCNC: 7 G/DL (ref 11.5–15.4)
HGB BLD-MCNC: 7.8 G/DL (ref 11.5–15.4)
IMM GRANULOCYTES # BLD AUTO: 0.05 THOUSAND/UL (ref 0–0.2)
IMM GRANULOCYTES NFR BLD AUTO: 1 % (ref 0–2)
IRON SATN MFR SERPL: 19 % (ref 15–50)
IRON SERPL-MCNC: 44 UG/DL (ref 50–170)
LYMPHOCYTES # BLD AUTO: 1.71 THOUSANDS/ÂΜL (ref 0.6–4.47)
LYMPHOCYTES NFR BLD AUTO: 18 % (ref 14–44)
MCH RBC QN AUTO: 31.1 PG (ref 26.8–34.3)
MCHC RBC AUTO-ENTMCNC: 32.5 G/DL (ref 31.4–37.4)
MCV RBC AUTO: 96 FL (ref 82–98)
MONOCYTES # BLD AUTO: 0.6 THOUSAND/ÂΜL (ref 0.17–1.22)
MONOCYTES NFR BLD AUTO: 6 % (ref 4–12)
NEUTROPHILS # BLD AUTO: 7.17 THOUSANDS/ÂΜL (ref 1.85–7.62)
NEUTS SEG NFR BLD AUTO: 75 % (ref 43–75)
NRBC BLD AUTO-RTO: 0 /100 WBCS
P AXIS: 35 DEGREES
P AXIS: 68 DEGREES
PLATELET # BLD AUTO: 260 THOUSANDS/UL (ref 149–390)
PMV BLD AUTO: 10.9 FL (ref 8.9–12.7)
POTASSIUM SERPL-SCNC: 4 MMOL/L (ref 3.5–5.3)
PR INTERVAL: 138 MS
PR INTERVAL: 158 MS
PROT SERPL-MCNC: 5.8 G/DL (ref 6.4–8.4)
QRS AXIS: 32 DEGREES
QRS AXIS: 47 DEGREES
QRSD INTERVAL: 72 MS
QRSD INTERVAL: 72 MS
QT INTERVAL: 340 MS
QT INTERVAL: 362 MS
QTC INTERVAL: 412 MS
QTC INTERVAL: 413 MS
RBC # BLD AUTO: 2.51 MILLION/UL (ref 3.81–5.12)
RH BLD: POSITIVE
SODIUM SERPL-SCNC: 137 MMOL/L (ref 135–147)
SPECIMEN EXPIRATION DATE: NORMAL
T WAVE AXIS: 54 DEGREES
T WAVE AXIS: 56 DEGREES
TIBC SERPL-MCNC: 237 UG/DL (ref 250–450)
TSH SERPL DL<=0.05 MIU/L-ACNC: 3.62 UIU/ML (ref 0.45–4.5)
VENTRICULAR RATE: 78 BPM
VENTRICULAR RATE: 89 BPM
WBC # BLD AUTO: 9.59 THOUSAND/UL (ref 4.31–10.16)

## 2023-02-05 PROCEDURE — 30233N1 TRANSFUSION OF NONAUTOLOGOUS RED BLOOD CELLS INTO PERIPHERAL VEIN, PERCUTANEOUS APPROACH: ICD-10-PCS | Performed by: INTERNAL MEDICINE

## 2023-02-05 RX ORDER — HYDROCORTISONE 25 MG/G
CREAM TOPICAL 4 TIMES DAILY PRN
Status: DISCONTINUED | OUTPATIENT
Start: 2023-02-05 | End: 2023-02-06

## 2023-02-05 RX ORDER — ACETAMINOPHEN 325 MG/1
650 TABLET ORAL ONCE
Status: COMPLETED | OUTPATIENT
Start: 2023-02-05 | End: 2023-02-05

## 2023-02-05 RX ORDER — SODIUM CHLORIDE 9 MG/ML
50 INJECTION, SOLUTION INTRAVENOUS CONTINUOUS
Status: DISCONTINUED | OUTPATIENT
Start: 2023-02-05 | End: 2023-02-06

## 2023-02-05 RX ORDER — PANTOPRAZOLE SODIUM 40 MG/10ML
40 INJECTION, POWDER, LYOPHILIZED, FOR SOLUTION INTRAVENOUS ONCE
Status: COMPLETED | OUTPATIENT
Start: 2023-02-05 | End: 2023-02-05

## 2023-02-05 RX ORDER — GABAPENTIN 100 MG/1
100 CAPSULE ORAL
Status: DISCONTINUED | OUTPATIENT
Start: 2023-02-05 | End: 2023-02-08 | Stop reason: HOSPADM

## 2023-02-05 RX ORDER — ONDANSETRON 2 MG/ML
4 INJECTION INTRAMUSCULAR; INTRAVENOUS EVERY 6 HOURS PRN
Status: DISCONTINUED | OUTPATIENT
Start: 2023-02-05 | End: 2023-02-08 | Stop reason: HOSPADM

## 2023-02-05 RX ORDER — ALBUTEROL SULFATE 90 UG/1
2 AEROSOL, METERED RESPIRATORY (INHALATION) EVERY 6 HOURS PRN
Status: DISCONTINUED | OUTPATIENT
Start: 2023-02-05 | End: 2023-02-08 | Stop reason: HOSPADM

## 2023-02-05 RX ORDER — PRAVASTATIN SODIUM 20 MG
20 TABLET ORAL
Status: DISCONTINUED | OUTPATIENT
Start: 2023-02-05 | End: 2023-02-08 | Stop reason: HOSPADM

## 2023-02-05 RX ORDER — PANTOPRAZOLE SODIUM 40 MG/10ML
40 INJECTION, POWDER, LYOPHILIZED, FOR SOLUTION INTRAVENOUS EVERY 12 HOURS SCHEDULED
Status: DISCONTINUED | OUTPATIENT
Start: 2023-02-05 | End: 2023-02-06

## 2023-02-05 RX ORDER — LIDOCAINE 50 MG/G
1 PATCH TOPICAL ONCE
Status: COMPLETED | OUTPATIENT
Start: 2023-02-05 | End: 2023-02-05

## 2023-02-05 RX ORDER — POLYETHYLENE GLYCOL 3350 17 G/17G
17 POWDER, FOR SOLUTION ORAL ONCE
Status: DISCONTINUED | OUTPATIENT
Start: 2023-02-05 | End: 2023-02-05

## 2023-02-05 RX ORDER — ACETAMINOPHEN 325 MG/1
975 TABLET ORAL EVERY 8 HOURS SCHEDULED
Status: DISCONTINUED | OUTPATIENT
Start: 2023-02-05 | End: 2023-02-08 | Stop reason: HOSPADM

## 2023-02-05 RX ORDER — ALBUTEROL SULFATE 2.5 MG/3ML
2.5 SOLUTION RESPIRATORY (INHALATION) EVERY 6 HOURS PRN
Status: DISCONTINUED | OUTPATIENT
Start: 2023-02-05 | End: 2023-02-08 | Stop reason: HOSPADM

## 2023-02-05 RX ORDER — METOPROLOL SUCCINATE 25 MG/1
25 TABLET, EXTENDED RELEASE ORAL DAILY
Status: DISCONTINUED | OUTPATIENT
Start: 2023-02-05 | End: 2023-02-08 | Stop reason: HOSPADM

## 2023-02-05 RX ORDER — LEVOTHYROXINE SODIUM 0.12 MG/1
125 TABLET ORAL
Status: DISCONTINUED | OUTPATIENT
Start: 2023-02-05 | End: 2023-02-08 | Stop reason: HOSPADM

## 2023-02-05 RX ADMIN — DICLOFENAC SODIUM 2 G: 10 GEL TOPICAL at 17:37

## 2023-02-05 RX ADMIN — POLYETHYLENE GLYCOL 3350, SODIUM SULFATE ANHYDROUS, SODIUM BICARBONATE, SODIUM CHLORIDE, POTASSIUM CHLORIDE 4000 ML: 236; 22.74; 6.74; 5.86; 2.97 POWDER, FOR SOLUTION ORAL at 18:27

## 2023-02-05 RX ADMIN — PANTOPRAZOLE SODIUM 40 MG: 40 INJECTION, POWDER, FOR SOLUTION INTRAVENOUS at 21:36

## 2023-02-05 RX ADMIN — DICLOFENAC SODIUM 2 G: 10 GEL TOPICAL at 21:35

## 2023-02-05 RX ADMIN — GABAPENTIN 100 MG: 100 CAPSULE ORAL at 21:36

## 2023-02-05 RX ADMIN — ACETAMINOPHEN 325MG 975 MG: 325 TABLET ORAL at 21:35

## 2023-02-05 RX ADMIN — SODIUM CHLORIDE 50 ML/HR: 0.9 INJECTION, SOLUTION INTRAVENOUS at 12:42

## 2023-02-05 RX ADMIN — BISACODYL 10 MG: 5 TABLET, COATED ORAL at 17:01

## 2023-02-05 RX ADMIN — PRAVASTATIN SODIUM 20 MG: 20 TABLET ORAL at 17:01

## 2023-02-05 RX ADMIN — ACETAMINOPHEN 325MG 975 MG: 325 TABLET ORAL at 14:15

## 2023-02-05 RX ADMIN — LEVOTHYROXINE SODIUM 125 MCG: 125 TABLET ORAL at 12:44

## 2023-02-05 RX ADMIN — LIDOCAINE 1 PATCH: 50 PATCH CUTANEOUS at 10:39

## 2023-02-05 RX ADMIN — ACETAMINOPHEN 650 MG: 325 TABLET ORAL at 10:37

## 2023-02-05 RX ADMIN — PANTOPRAZOLE SODIUM 40 MG: 40 INJECTION, POWDER, FOR SOLUTION INTRAVENOUS at 10:24

## 2023-02-05 RX ADMIN — BISACODYL 10 MG: 5 TABLET, COATED ORAL at 14:16

## 2023-02-05 NOTE — UTILIZATION REVIEW
NOTIFICATION OF INPATIENT ADMISSION   AUTHORIZATION REQUEST   SERVICING FACILITY:   [address] [place] [address] [facility]  Tax ID: [id number]  NPI: [id number] ATTENDING PROVIDER:  Attending Name and NPI#: Sonia Robledo [[id number]]  Address: [address] [place] [address] [facility]  Phone: [phone]     ADMISSION INFORMATION:  Place of Service: Inpatient [address] [place] [address]  [address]  Place of Service Code: 21  Inpatient Admission Date/Time: 2/5/23 10:34 AM  Discharge Date/Time: No discharge date for patient encounter  Admitting Diagnosis Code/Description:  Failure to thrive in adult [R62 7]     UTILIZATION REVIEW CONTACT:  Vania Mckenzie Utilization   Network Utilization Review Department  Phone: [phone]  Fax: [phone]  Email: [email or web address]  Contact for approvals/pending authorizations, clinical reviews, and discharge  PHYSICIAN ADVISORY SERVICES:  Medical Necessity Denial & [facility]uf[facility]-bg-Sntm Review  Phone: [phone]  Fax: [phone]  Email: [email or web address]

## 2023-02-05 NOTE — ASSESSMENT & PLAN NOTE
Recently admitted for right shoulder pain in jan  Pain in the right shoulder blade and right elbow  Had been on narcotics but d/jeffy due to encpehalopathy  Was to follow up with ortho outpt  Has mri scheduled outpt here on Tuesday  Started on gabapentin  Ortho had evaluated pt in hospital and determined no role for iv/joint injection  Pain is a huge hindrance and stressor in her life  Causing suicidial ideation at times and failure to thrive  Will consult geriatrics to help assist  Will start scheduled tylenol  Continue lidoderm patch   Consider low dose vicoden to help with pain as higher doses cause encephalopathy

## 2023-02-05 NOTE — ASSESSMENT & PLAN NOTE
History of liable bp  Pt had a syncopal event after substernal chest pain on ambulating to the bathroom  Possibly vasovagal given the gi bleed and pain  Will monitor on tele  ekg wnl  Cycle out troponin  Check echo

## 2023-02-05 NOTE — CONSULTS
TeleConsultation - [address] 80 y o  female MRN: [id number]  Unit/Bed#: E5 -01 Encounter: [id number]        REQUIRED DOCUMENTATION:     1  This service was provided via Telemedicine  2  Provider located at [place]  3  TeleMed provider: Michele Helms MD   4  Identify all parties in room with patient during tele consult:  Patient and 1:1  5 Patient was then informed that this was a Telemedicine visit and that the exam was being conducted confidentially over secure lines  My office door was closed  No one else was in the room  Patient acknowledged consent and understanding of privacy and security of the Telemedicine visit, and gave us permission to have the assistant stay in the room in order to assist with the history and to conduct the exam   I informed the patient that I have reviewed their record in Epic and presented the opportunity for them to ask any questions regarding the visit today  The patient agreed to participate  Assessment/Plan     Principal Problem:    Anemia  Active Problems:    Essential hypertension    Asthma-COPD overlap syndrome (HCC)    Paroxysmal atrial fibrillation (HCC)    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HCC)    Hypothyroidism due to Hashimoto's thyroiditis    Syncope    Acute pain of right shoulder    Suicidal ideation    Assessment:  Patient is a 80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, domiciled alone, retired nurse, 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, no substance use, with significant medical issues such as COPD, hypertension, cancer of the salivary glands, anemia and acute right shoulder pain who presents for consultation because of passive death wish  Patient verbalizes "I hate my life" but states that she has no intent or plan to carry out suicidal ideation  She states that she recognizes "I need to get better" and verbalizes the desire to be closer with her children and belong to her community    At this time patient does not meet criteria for involuntary inpatient psychiatric hospitalization  Voluntary psychiatric hospitalization would be helpful, however, patient has declined  She is amenable to starting psychiatric medication and outpatient follow-up  Major Depressive Disorder, single, moderate    Treatment Plan:    Planned Medication Changes:    Recommend starting Remeron 7 5 mg p o  at bedtime for insomnia, poor appetite, and depressed mood  Interaction checked is only concerning for gabapentin, which is at very low doses  Patient needs outpatient follow-up treatment  Would benefit from partial hospitalization or intensive outpatient programming  If this is unavailable, would suggest inpatient geriatric psychiatry follow-up  Can discontinue constant observation    Current Medications:     Current Facility-Administered Medications   Medication Dose Route Frequency Provider Last Rate   • acetaminophen  975 mg Oral Q8H [facility] Kia Ambron, DO     • albuterol  2 puff Inhalation Q6H PRN Kia Ambron, DO     • albuterol  2 5 mg Nebulization Q6H PRN Kia Ambron, DO     • Diclofenac Sodium  2 g Topical 4x Daily Kia Ambron, DO     • gabapentin  100 mg Oral HS Kia Ambron, DO     • levothyroxine  125 mcg Oral Early Morning Kia Ambron, DO     • lidocaine  1 patch Topical Once Mikie July, DO     • metoprolol succinate  25 mg Oral Daily Kia Ambron, DO     • ondansetron  4 mg Intravenous Q6H PRN Kia Ambron, DO     • pantoprazole  40 mg Intravenous Q12H [facility] Ambron, DO     • pravastatin  20 mg Oral Daily With Mohamud-Byron, DO     • sodium chloride  50 mL/hr Intravenous Continuous Kia Ambron, DO 50 mL/hr (02/05/23 1242)       Risks / Benefits of Treatment:    Risks, benefits, and possible side effects of medications explained to patient and patient verbalizes understanding     Blackbox warning as well as common adverse reactions discussed with patient    Consults  Physician Requesting Consult: Rosenda Dow, DO  Principal Problem:Anemia    Reason for Consult: depressive symptoms      History of Present Illness      Patient is a 80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, domiciled alone, retired nurse, 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, no substance use, with significant medical issues such as COPD, hypertension, cancer of the salivary glands, anemia and acute right shoulder pain who presents for consultation because of passive death wish  Patient states "I hate my life"  She states that she does not see her family in the house and because of COVID restrictions does not interact in the community  She reports poor sleep and poor appetite and a feeling of loneliness  She states that she has passive death wishes, which are chronic, however she "does not have the balls" to harm herself  She states that harming herself is against her cultural release and that she is too scared to do anything to herself  She was unable to verbalize a plan  She states that she knows she needs help and is willing to seek outpatient treatment  She denies manic symptoms, psychosis, and active SHIIP  She states that she has been on Paxil in 2015 which caused manic symptoms      Psychiatric Review Of Systems:    sleep: no, impaired  appetite changes: yes, impaired  weight changes: no  energy/anergy: yes  interest/pleasure/anhedonia: yes  somatic symptoms: no  anxiety/panic: no  sandhya: no  guilty/hopeless: no  self injurious behavior/risky behavior: no    Historical Information     Past Psychiatric History:     Psychiatric Hospitalizations:   • No history of past inpatient psychiatric admissions  Outpatient Treatment History:   • In 2015, was on Paxil  Suicide Attempts:   • None  History of self-harm:   • None  Violence History:   • no  Past Psychiatric medication trials: paxil    Substance Abuse History:    denies  Family Psychiatric History:      denies    Social History:    Retired RN, lives alone, 5 living children      Traumatic History: Abuse: did not report  Other Traumatic Events: death of 2 adult children    Past Medical History:   Diagnosis Date   • A-fib ([facility] )    • Allergy to IVP dye 06/04/2013    pt felt heaviness, palpitations   • 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), 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[facility])     bilateral   • Aneurysm of aortic root     last assessed - 54YAQ7596   • Aortic arch aneurysm    • Aortic root dilatation (HCC)     last assessed - 49LEM9802   • Arthritis    • Ascending aortic aneurysm     last assessed - 62GEX0003   • Asthma    • Basal cell carcinoma     last assessed - 67Tkp4964   • 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[facility] )    • Disease of thyroid gland    • Facet arthropathy, cervical     last assessed - 56PSB0545   • History of fracture of vertebral column    • Hyperlipidemia    • Hypertension    • 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[facility] )    • Hypoxia     last assessed - 12HNU0378   • Junctional rhythm     last assessed - 72Vpl1486   • Lightheadedness     last assessed - 29Pje0693   • Migraine    • Palpitations     last assessed - 68Ijs4393   • Pleural effusion, bilateral     last assessed - 00Zst5525   • 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[facility])    • Shortness of breath     last assessed - 69Orh7161   • Thyroid trouble    • Trigger finger     last assessed - 38RUS7560   • Trigger middle finger of right hand     last assessed - 75Pnr5956   • Urinary incontinence     last assessed -  22Jul,2013;  Resolved - 66ZQP8686       Medical Review Of Systems:    Review of Systems    Meds/Allergies     all current active meds have been reviewed  Allergies   Allergen Reactions   • Amiodarone Hives   • Omnipaque [Iohexol] Hives and Shortness Of Breath   • Clindamycin Other (See Comments)     When taken causes cdiff immediately   • Other    • Sulfa Antibiotics Hives     itching   • Acetazolamide Hives and Rash     Reaction Date:Unknown   • Iv Dye  [Iodinated Contrast Media] Hypertension and Palpitations     Annotation - [id number]: Verified with patient    • Naprosyn [Naproxen] Itching and Rash Category: Allergy;        Objective     Vital signs in last 24 hours:  Temp:  [97 3 °F (36 3 °C)-98 3 °F (36 8 °C)] 97 7 °F (36 5 °C)  HR:  [] 85  Resp:  [16-18] 18  BP: (113-141)/(57-74) 124/61    No intake or output data in the 24 hours ending 02/05/23 1849    Mental Status Evaluation:    Appearance:  age appropriate   Behavior:  normal   Speech:  normal pitch and normal volume   Mood:  depressed   Affect:  mood-congruent   Language: wnl   Thought Process:  normal   Associations intact associations   Thought Content:  normal   Perceptual Disturbances: None   Risk Potential: Suicidal Ideations ; patient with passive death wish  No active plan or intent  Risk factors include chronic pain and depressed mood along with poor sleep  Protective factors include support system, no prior psychiatric hospitalizations, no prior substance use  Homicidal Ideations none  Potential for Aggression No   Sensorium:  person, place, time/date and situation   Cognition:  recent and remote memory grossly intact   Consciousness:  alert and awake    Attention: attention span and concentration were age appropriate   Intellect: within normal limits   Fund of Knowledge: awareness of current events: snl   Insight:  age appropriate   Judgment: age appropriate   Muscle Strength:  Muscle Tone: not observed not observed  None     Gait/Station: not observed   Motor Activity: no abnormal movements       Lab Results: I have personally reviewed all pertinent laboratory/tests results       Most Recent Labs:   Lab Results   Component Value Date    WBC 9 59 02/05/2023    RBC 2 51 (L) 02/05/2023    HGB 7 0 (L) 02/05/2023    HCT 24 0 (L) 02/05/2023     02/05/2023    RDW 14 4 02/05/2023    NEUTROABS 7 17 02/05/2023    SODIUM 137 02/05/2023    K 4 0 02/05/2023     02/05/2023    CO2 26 02/05/2023    BUN 53 (H) 02/05/2023    CREATININE 0 73 02/05/2023    GLUC 136 02/05/2023    GLUF 106 (H) 09/06/2022    CALCIUM 8 3 (L) 02/05/2023    AST 12 (L) 02/05/2023    ALT 11 02/05/2023    ALKPHOS 33 (L) 02/05/2023    TP 5 8 (L) 02/05/2023    ALB 3 7 02/05/2023    TBILI 0 35 02/05/2023    CHOLESTEROL 173 11/13/2021    HDL 76 11/13/2021    TRIG 53 11/13/2021    LDLCALC 86 11/13/2021    VALPROICTOT 67 06/16/2015    EYF4NFJCZJPC 3 620 02/05/2023    FREET4 1 14 09/06/2022    RPR Nonreactive 06/08/2015    HGBA1C 5 7 (H) 10/12/2020     10/12/2020       Imaging Studies: CT abdomen pelvis wo contrast    Result Date: 2/5/2023  Narrative: CT ABDOMEN AND PELVIS WITHOUT IV CONTRAST INDICATION:   Left lower quadrant abdominal pain, melena, constipation, gastrointestinal bleed  COMPARISON:  CT abdomen pelvis 3/25/2008  PET CT 4/6/2016  TECHNIQUE:  CT examination of the abdomen and pelvis was performed without intravenous contrast  Axial, sagittal, and coronal 2D reformatted images were created from the source data and submitted for interpretation  Radiation dose length product (DLP) for this visit:  377 mGy-cm   This examination, like all CT scans performed in the [facility], was performed utilizing techniques to minimize radiation dose exposure, including the use of iterative reconstruction and automated exposure control  Enteric contrast was not administered  FINDINGS: ABDOMEN LOWER CHEST:  No clinically significant abnormality identified in the visualized lower chest  LIVER/BILIARY TREE:  There are one or more hepatic simple cyst(s) present  No CT evidence of suspicious solid hepatic mass  Normal hepatic contours  No biliary dilatation  GALLBLADDER:  No calcified gallstones  No pericholecystic inflammatory change  SPLEEN:  Unremarkable  PANCREAS:  Unremarkable  ADRENAL GLANDS:  Unremarkable  KIDNEYS/URETERS:  Unremarkable  No hydronephrosis  STOMACH AND BOWEL: Moderate colonic stool burden  No bowel obstruction  APPENDIX:  No findings to suggest appendicitis  ABDOMINOPELVIC CAVITY:  No ascites  No pneumoperitoneum  No lymphadenopathy  VESSELS:  Atherosclerotic changes are present  No evidence of aneurysm  PELVIS REPRODUCTIVE ORGANS:  Surgical changes of prior hysterectomy  URINARY BLADDER:  Unremarkable  ABDOMINAL WALL/INGUINAL REGIONS:  Unremarkable  OSSEOUS STRUCTURES:  No acute fracture or destructive osseous lesion  Unchanged moderate anterior wedge compression deformity of the T12 vertebral body since 12/30/2016  Dextroconvex scoliotic curvature of the thoracolumbar spine with associated degenerative changes  Mild degenerative changes of bilateral hips, symphysis pubis, and the bilateral sacroiliac joints  Impression: No acute process in the abdomen and pelvis identified on this unenhanced CT  Moderate colonic stool burden  Workstation performed: [id number]     XR chest portable    Result Date: 1/17/2023  Narrative: CHEST INDICATION:   altered mentation  COMPARISON:  Chest radiograph October 20, 2020 EXAM PERFORMED/VIEWS:  XR CHEST PORTABLE FINDINGS:  There are median sternotomy wires indicating prior cardiac surgery  Cardiomediastinal silhouette appears unremarkable  The lungs are clear  No pneumothorax or pleural effusion  Osseous structures appear within normal limits for patient age  Impression: No acute cardiopulmonary disease  Workstation performed: [id number]     XR spine cervical 2 or 3 vw injury    Result Date: 2/2/2023  Narrative: CERVICAL SPINE INDICATION:   M79 601: Pain in right arm  COMPARISON:  Cervical spine x-ray dated March 16, 2017  VIEWS:  XR SPINE CERVICAL 2 OR 3 VW INJURY FINDINGS: No acute fracture or subluxation  There is grade 1 retrolisthesis of C3 on C4  There is intervertebral disc space narrowing at C5/C6 C6-C7  There is mild multilevel bilateral facet arthropathy  Postoperative changes of prior median sternotomy are present  There is aneurysmal dilatation of the ascending thoracic aorta  Clear lung apices  Impression: No acute osseous abnormality  Degenerative changes as above   Workstation performed: [id number]     XR shoulder 2+ views RIGHT    Result Date: 1/14/2023  Narrative: RIGHT SHOULDER INDICATION:   Right shoulder pain  COMPARISON:  None VIEWS:  XR SHOULDER 2+ VW RIGHT FINDINGS: There is no acute fracture or dislocation  Mild osteoarthritis of the glenohumeral and acromioclavicular joints  No lytic or blastic osseous lesion  Soft tissues are unremarkable  Impression: Mild right shoulder osteoarthritis No acute osseous abnormality  Workstation performed: [id number]     XR elbow 3+ views RIGHT    Result Date: 1/14/2023  Narrative: RIGHT ELBOW INDICATION:   Right elbow pain  COMPARISON:  None VIEWS:  XR ELBOW 3+ VW RIGHT FINDINGS: There is no acute fracture or dislocation  There is no joint effusion  No significant degenerative changes  No lytic or blastic osseous lesion  Mild chondrocalcinosis noted in the radiohumeral joint     Impression: No acute osseous abnormality  Workstation performed: [id number]     CT chest without contrast    Result Date: 1/14/2023  Narrative: CT CHEST WITHOUT IV CONTRAST INDICATION:   Right arm pain  COMPARISON:  CT chest 12/30/2016 TECHNIQUE: CT examination of the chest was performed without intravenous contrast  Axial, sagittal, and coronal 2D reformatted images were created from the source data and submitted for interpretation  Radiation dose length product (DLP) for this visit:  141 mGy-cm   This examination, like all CT scans performed in the [facility], was performed utilizing techniques to minimize radiation dose exposure, including the use of iterative reconstruction and automated exposure control  FINDINGS: LUNGS:  No infiltrate  Chronic biapical fibronodular partially calcified pleural parenchymal scarring  Trace right lower lobe paraspinal subpleural scarring  No suspicious pulmonary nodule  Scattered punctate calcified granulomata are present  Central airways are clear   PLEURA:  Chronic biapical fibronodular partially calcified pleural parenchymal scarring  HEART/GREAT VESSELS: Heart is not enlarged  No pericardial effusion  Aortic and coronary artery calcification  Post median sternotomy  Post ascending aortic aneurysm repair  Stable ectasia of the ascending aorta maximum diameter 4 3 cm  Distal aortic  arch /proximal descending aortic aneurysm maximum diameter 4 7 cm previously 4 4 cm  MEDIASTINUM AND ALL:  Unremarkable  CHEST WALL AND LOWER NECK:  Unremarkable  VISUALIZED STRUCTURES IN THE UPPER ABDOMEN:  Subcentimeter hepatic hypodensities too small to characterize statistically representing simple cysts  OSSEOUS STRUCTURES:  No acute fracture or osseous destructive lesion identified  Stable chronic compression fracture of T2 and T12  Dextroconvex lumbar scoliosis  Degenerative changes of the spine  Impression: No evidence of acute thoracic process insofar as can be detected on a noncontrast CT  Distal aortic arch/proximal descending aortic aneurysm maximum diameter 4 7 cm minimally enlarged since December 2016  Nonemergent vascular surgical consultation is advised  Additional chronic findings and negatives as above  This study demonstrates a significant  finding and was documented as such in [facility] for liaison and referring practitioner notification  Workstation performed: [id number]     EKG/Pathology/Other Studies:   Lab Results   Component Value Date    VENTRATE 78 02/05/2023    ATRIALRATE 78 02/05/2023    PRINT 138 02/05/2023    QRSDINT 72 02/05/2023    QTINT 362 02/05/2023    QTCINT 412 02/05/2023    PAXIS 35 02/05/2023    QRSAXIS 32 02/05/2023    TWAVEAXIS 56 02/05/2023        Code Status: Level 3 - DNAR and DNI  Advance Directive and Living Will: Yes    Power of :    POLST:      Counseling / Coordination of Care: Total floor / unit time spent today 70 minutes  Greater than 50% of total time was spent with the patient and / or family counseling and / or coordination of care   A description of the counseling / coordination of care: chart review, direct patient care, supportive counseling, handoff to provider

## 2023-02-05 NOTE — H&P
[address] [facility]  H&P- June [facility] 6/4/1932, 80 y o  female MRN: [id number]  Unit/Bed#: E5 -01 Encounter: [id number]  Primary Care Provider: Juancarlos Moran DO   Date and time admitted to hospital: 2/5/2023  8:25 AM    * Anemia  Assessment & Plan  Baseline hemoglobin is 12  Hemoglobin is 7 9  Heme positive in the ed  Having lower abd pain but recent steroid use  No bm for 1 week  Recent narcotic use  Likely gi bleed  Differential includes pud with recent steroid use, stercoral ulcer with constipation, diverticular dz, hemorrhoidal,   Will obtain ct abd  Obtain iron panel, vitamin b12, folate  Start protonix iv bid  Check hemoglobin q8  Transfuse as needed    Consult gi  Suicidal ideation  Assessment & Plan  Admits to thinking about death  Denies plan  States pain, family stress, and recent dependence playing a part in her thoughts of death  She does have increase sleep, feeling of hopelessness, increase sadness and admits to depression  Declined antidepressant  Had living will prior to depression of dnr/dni that pt and family would like to uphold  Will consult psych  Hopefully will help convince pt to start antidepressant  Also will consult geriatrics for pain management as this stressor is impacting her thoughts  Was planning for having a virtual one to one however pt told nursing staff that she was looking up ways to kill herself  Will have one to one  Acute pain of right shoulder  Assessment & Plan  Recently admitted for right shoulder pain in jan  Pain in the right shoulder blade and right elbow  Had been on narcotics but d/jeffy due to encpehalopathy  Was to follow up with ortho outpt  Has mri scheduled outpt here on Tuesday  Started on gabapentin  Ortho had evaluated pt in hospital and determined no role for iv/joint injection  Pain is a huge hindrance and stressor in her life  Causing suicidial ideation at times and failure to thrive     Will consult geriatrics to help assist  Will start scheduled tylenol  Continue lidoderm patch  Consider low dose vicoden to help with pain as higher doses cause encephalopathy      Syncope  Assessment & Plan  History of liable bp  Pt had a syncopal event after substernal chest pain on ambulating to the bathroom  Possibly vasovagal given the gi bleed and pain  Will monitor on tele  ekg wnl  Cycle out troponin  Check echo     Hypothyroidism due to Hashimoto's thyroiditis  Assessment & Plan  tsh wnl  Continue synthroid     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[facility] [address] )  Assessment & Plan  H/o low grade salivary gland adenocarcinoma  Follows with oncology  Declined treatment  Repeat imagining was unchanged  Imagining from sept 2022 did reveal unchanged 2 5cm destructive soft tissue lesion in the right posterior maxillary hard palate with erosion into the right maxillary sinus floor     Paroxysmal atrial fibrillation (HCC)  Assessment & Plan  Continue toprol   Not on ac due to bleeding risk from tumor    Asthma-COPD overlap syndrome (HCC)  Assessment & Plan  No exacerbation  Continue albuterol     Essential hypertension  Assessment & Plan  Continue metoprolol  Hold lotrel in the setting of gi bleed  VTE Prophylaxis: Pharmacologic VTE Prophylaxis contraindicated due to due to gi bleed  / sequential compression device   Code Status: dnr/dni    Anticipated Length of Stay:  Patient will be admitted on an Inpatient basis with an anticipated length of stay of  Greater than 2 midnights  Chief Complaint:   Weakness and decrease po intake    History of Present Illness:    Buffy Schuster is a 80 y o  female who presents with pmhx of salivary gland cancer, asthma, paf, hypothyroidism, and pain in right arm  She was recently admitted 1/19/23 for acute pain in her right shoulder  She had a ct chest that was negative  She had a xray showing mild oa  She was treated with a steroid taper and evaluated by ortho who stated that there was no role for iv/joint injection  Pt was trialed on narcotics at that time and had encephalopathy in which the narcotics were stopped  She continues to have pain in her arm and that is hindering her overall quality of life  She has become depressed due to this and also contemplated ending her life  She denied having a plan or actually following through with it  She told nursing staff that she was looking up ways to end her life  She also appears to be very depressed  She has been sleeping more, feels hopeless, increased sadness, and mood swings  She had a living will that was active prior to depression in which she is a dnr/dni  Both her family and pt want to up hold this  She has a history of adenocarcinoma of the salivary gland in which she declined treatment and is not a candidate for surgery  This causes some difficulty with swallowing at times  Pt was also found to be anemic in the ed  Her hemoglobin usually is 12 and it was 7 8  pt is oriented x3 but a poor historian  She reported at first that she was constipated for 1 week but later stated she had a viral gastroenteritis and diarrhea right after discharge from hospital  She had lower abd pain that has since resolved  She has not noticed melena or brbpr  She was heme positive in the ed  After my initial eval with pt she was moved to her room inpt  She was ambulating to the bathroom with the help of nurses and syncopized  They had to carry her back to the bed in which she woke up  No seizure activity  no loss of bowel or bladder  She did have substernal chest pressure prior  She is having increased pain in her right arm but denies radiation to her chest  Family also reports that she has had variable blood pressures in the past       Review of Systems:    Review of Systems   Constitutional: Positive for activity change, appetite change and fatigue  HENT: Positive for trouble swallowing  Eyes: Negative  Respiratory: Negative      Cardiovascular: Positive for chest pain    Gastrointestinal: Positive for abdominal pain and constipation  Negative for blood in stool, diarrhea, nausea and vomiting  Endocrine: Negative  Musculoskeletal: Negative  Skin: Negative  Allergic/Immunologic: Negative  Neurological: Positive for syncope  Hematological: Negative  Psychiatric/Behavioral: Positive for sleep disturbance and suicidal ideas  Negative for confusion  Past Medical and Surgical History:     Past Medical History:   Diagnosis Date   • [facility]    • Allergy to IVP dye 06/04/2013    pt felt heaviness, palpitations   • 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), wet (HCC)     bilateral   • Aneurysm of aortic root     last assessed - [id number]   • Aortic arch aneurysm    • Aortic root dilatation (HCC)     last assessed - [id number]   • Arthritis    • Ascending aortic aneurysm     last assessed - [id number]   • Asthma    • Basal cell carcinoma     last assessed - 85Fov3037   • 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HCC)    • Disease of thyroid gland    • Facet arthropathy, cervical     last assessed - 29IMY2744   • History of fracture of vertebral column    • Hyperlipidemia    • Hypertension    • 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[facility] )    • Hypoxia     last assessed - [id number]   • Junctional rhythm     last assessed - 04Gmn0166   • Lightheadedness     last assessed - 28Jun2016   • Migraine    • Palpitations     last assessed - 28Jun2016   • Pleural effusion, bilateral     last assessed - 20Apr2015   • 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HCC)    • Shortness of breath     last assessed - 04Apr2017   • Thyroid trouble    • Trigger finger     last assessed - 90CBN0413   • Trigger middle finger of right hand     last assessed - 22Sdm3192   • Urinary incontinence     last assessed -  22Jul,2013;  Resolved - [id number]       Past Surgical History:   Procedure Laterality Date   • ABDOMINAL AORTIC ANEURYSM REPAIR W/ ENDOLUMINAL GRAFT  06/28/2015    Ascending aorta and hemiarch replacement with 30 mm Vascutek Gelweave graft; Managed by Monica Sargent; last assessed -    • APPENDECTOMY     • BLADDER SURGERY      Reccurent histoy of bladder surgery   • HOROWITZ PROCEDURE     • CARDIAC CATHETERIZATION  2004    Procedure summary - Luminal irregularities; last assessed - [id number]   •  SECTION     • CORONARY ANEURYSM REPAIR     • CYSTOSCOPY      botox injection   • HYSTERECTOMY     • SD NDSC NJX IMPLT MATRL URT&/BLDR NCK N/A 2017    Procedure: Shyam Beam; Jorge Re BULKING AGENT INJECTION ;  Surgeon: Renetta Guzman MD;  Location: [facility];  Service: Urology   • SD TENDON SHEATH INCISION Right 10/18/2016    Procedure: LONG FINGER TRIGGER RELEASE ;  Surgeon: Radha Herzog MD;  Location: [facility];  Service: Orthopedics   • THYROIDECTOMY      Total Thyroidectomy   • TONSILLECTOMY AND ADENOIDECTOMY         Meds/Allergies:    Prior to Admission medications    Medication Sig Start Date End Date Taking?  Authorizing Provider   albuterol (2 5 mg/3 mL) 0 083 % nebulizer solution Take 2 5 mg by nebulization every 6 (six) hours as needed for wheezing or shortness of breath    Historical Provider, MD   albuterol (PROVENTIL HFA,VENTOLIN HFA) 90 mcg/act inhaler Inhale 2 puffs every 6 (six) hours as needed for wheezing 18   Mohit Willard,    amLODIPine-benazepril (LOTREL 5-20) 5-20 MG per capsule Take 1 capsule by mouth daily 23   Anthony Marlow DO   aspirin 81 MG tablet Take 162 mg by mouth daily in the early morning      Historical Provider, MD   calcium carbonate (OS-TAE) 600 MG tablet Take 600 mg by mouth daily in the early morning      Historical Provider, MD   Diclofenac Sodium (VOLTAREN) 1 % Apply 2 g topically 4 (four) times a day  Patient taking differently: Apply 2 g topically 4 (four) times a day pt reports she uses this only as needed 23   Pedro Rousseau MD   gabapentin (Neurontin) 100 mg capsule Take 1 capsule (100 mg total) by mouth daily at bedtime 2/3/23   Leena Arriaga Candis Cummings MD   levothyroxine 125 mcg tablet Take 1 tablet (125 mcg total) by mouth daily in the early morning 22   Anthony Marlow DO   lovastatin (MEVACOR) 20 mg tablet Take 1 tablet (20 mg total) by mouth in the morning  22   Anthony Marlow DO   metoprolol succinate (TOPROL-XL) 25 mg 24 hr tablet Take 1 tablet (25 mg total) by mouth daily 10/10/22   Bishnu De Leon MD   Multiple Vitamins-Minerals (One Daily Multivitamin Women) TABS Apply 1 tablet to the mouth or throat daily  Indications: Treatment to Prevent Vitamin Deficiency 23   Anthony Marlow DO     I have reviewed home medications with patient personally  Allergies: Allergies   Allergen Reactions   • Amiodarone Hives   • Omnipaque [Iohexol] Hives and Shortness Of Breath   • Clindamycin Other (See Comments)     When taken causes cdiff immediately   • Other    • Sulfa Antibiotics Hives     itching   • Acetazolamide Hives and Rash     Reaction Date:Unknown   • Iv Dye  [Iodinated Contrast Media] Hypertension and Palpitations     Annotation - [id number]: Verified with patient    • Naprosyn [Naproxen] Itching and Rash     Category: Allergy;        Social History:     Marital Status:       Substance Use History:   Social History     Substance and Sexual Activity   Alcohol Use Not Currently    Comment: Social drinker     Social History     Tobacco Use   Smoking Status Former   • Packs/day: 0 25   • Years: 57 00   • Pack years: 14 25   • Types: Cigarettes   • Start date: 36   • Quit date: 2014   • Years since quittin 1   Smokeless Tobacco Former   Tobacco Comments    smoked 17 yrs 1/2 ppd quit and restarted then quit  10 days ago during 2014      Social History     Substance and Sexual Activity   Drug Use No       Family History:    non-contributory    Physical Exam:     Vitals:   Blood Pressure: 141/74 (23 1149)  Pulse: 81 (23 1149)  Temperature: (!) 97 3 °F (36 3 °C) (23 1149)  Temp Source: Oral (23 0830)  Respirations: 16 (02/05/23 0913)  Weight - Scale: 57 kg (125 lb 10 6 oz) (02/05/23 0830)  SpO2: 98 % (02/05/23 1149)    Physical Exam  Constitutional:       Appearance: Normal appearance  HENT:      Head: Normocephalic and atraumatic  Eyes:      Extraocular Movements: Extraocular movements intact  Pupils: Pupils are equal, round, and reactive to light  Cardiovascular:      Rate and Rhythm: Normal rate and regular rhythm  Heart sounds: No murmur heard  No friction rub  No gallop  Pulmonary:      Effort: Pulmonary effort is normal  No respiratory distress  Breath sounds: Normal breath sounds  No wheezing, rhonchi or rales  Abdominal:      General: Bowel sounds are normal  There is no distension  Palpations: Abdomen is soft  Tenderness: There is abdominal tenderness  There is no guarding or rebound  Comments: Mild lower quadrant but pt claimed she had to urinate    Musculoskeletal:      Right lower leg: No edema  Left lower leg: No edema  Neurological:      Mental Status: She is alert and oriented to person, place, and time  Additional Data:     Lab Results: I have personally reviewed pertinent reports  Results from last 7 days   Lab Units 02/05/23  0909   WBC Thousand/uL 9 59   HEMOGLOBIN g/dL 7 8*   HEMATOCRIT % 24 0*   PLATELETS Thousands/uL 260   NEUTROS PCT % 75   LYMPHS PCT % 18   MONOS PCT % 6   EOS PCT % 0     Results from last 7 days   Lab Units 02/05/23  0909   POTASSIUM mmol/L 4 0   CHLORIDE mmol/L 104   CO2 mmol/L 26   BUN mg/dL 53*   CREATININE mg/dL 0 73   CALCIUM mg/dL 8 3*   ALK PHOS U/L 33*   ALT U/L 11   AST U/L 12*           Imaging: I have personally reviewed pertinent reports  XR spine cervical 2 or 3 vw injury    Result Date: 2/2/2023  Narrative: CERVICAL SPINE INDICATION:   [id number]: Pain in right arm  COMPARISON:  Cervical spine x-ray dated March 16, 2017   VIEWS:  XR SPINE CERVICAL 2 OR 3 VW INJURY FINDINGS: No acute fracture or subluxation  There is grade 1 retrolisthesis of C3 on C4  There is intervertebral disc space narrowing at C5/C6 C6-C7  There is mild multilevel bilateral facet arthropathy  Postoperative changes of prior median sternotomy are present  There is aneurysmal dilatation of the ascending thoracic aorta  Clear lung apices  Impression: No acute osseous abnormality  Degenerative changes as above  Workstation performed: [id number]     EKG, Pathology, and Other Studies Reviewed on Admission:   · EKG: NS at 86 bpm no st change  Epic / Care Everywhere Records Reviewed:  Yes

## 2023-02-05 NOTE — ASSESSMENT & PLAN NOTE
Admits to thinking about death  Denies plan  States pain, family stress, and recent dependence playing a part in her thoughts of death  She does have increase sleep, feeling of hopelessness, increase sadness and admits to depression  Declined antidepressant  Had living will prior to depression of dnr/dni that pt and family would like to uphold  Will consult psych  Hopefully will help convince pt to start antidepressant  Also will consult geriatrics for pain management as this stressor is impacting her thoughts  Was planning for having a virtual one to one however pt told nursing staff that she was looking up ways to kill herself  Will have one to one

## 2023-02-05 NOTE — ED PROVIDER NOTES
History  Chief Complaint   Patient presents with   • Weakness - Generalized     Pt noted last night to have weakness and also reports decreased PO intake  49-year-old female with history of atrial fibrillation, hypothyroidism, hypertension presents to the ED with generalized weakness, decreased appetite  On further history, the patient is also very depressed and anxious  She states she does not want to be alone when she dies and feels nobody visits her  She states "I just want it all to end" she is having chronic right shoulder pain which is unrelieved with Tylenol  She states "I have gone on the Internet to find ways to end it all and I am just afraid I will end up with kidney or liver disease"  Patient feels that none of her family visits her and she states she hears them saying "horrible things" about her  Patient states physically she is able to care for herself  In speaking with the family, they live very close to her and frequently visit her  They also offered to take her to a community center for older adults and she keeps telling them "maybe next week" patient has had no recent illness  No fevers or chills, nausea or vomiting  No chest pain or shortness of breath         History provided by:  Patient and relative   used: No    Fatigue  Severity:  Unable to specify  Onset quality:  Gradual  Duration:  1 week  Timing:  Constant  Progression:  Worsening  Chronicity:  New  Context: not alcohol use, not drug use, not recent infection, not stress and not urinary tract infection    Relieved by:  Nothing  Worsened by:  Nothing  Associated symptoms: anorexia    Associated symptoms: no abdominal pain, no aphasia, no arthralgias, no ataxia, no chest pain, no cough, no diarrhea, no difficulty walking, no dizziness, no drooling, no dysuria, no numbness in extremities, no falls, no fever, no foul-smelling urine, no frequency, no headaches, no hematochezia, no lethargy, no loss of consciousness, no melena, no myalgias, no nausea, no near-syncope, no seizures, no shortness of breath, no stroke symptoms, no syncope, no urgency, no vision change and no vomiting    Risk factors: heart disease and recent stressors    Risk factors: no anemia, no congestive heart failure, no coronary artery disease, no diabetes, no neurologic disease and no new medications        Prior to Admission Medications   Prescriptions Last Dose Informant Patient Reported? Taking? Diclofenac Sodium (VOLTAREN) 1 %   No No   Sig: Apply 2 g topically 4 (four) times a day   Patient taking differently: Apply 2 g topically 4 (four) times a day pt reports she uses this only as needed   Multiple Vitamins-Minerals (One Daily Multivitamin Women) TABS   Yes No   Sig: Apply 1 tablet to the mouth or throat daily  Indications: Treatment to Prevent Vitamin Deficiency   albuterol (2 5 mg/3 mL) 0 083 % nebulizer solution   Yes No   Sig: Take 2 5 mg by nebulization every 6 (six) hours as needed for wheezing or shortness of breath   albuterol (PROVENTIL HFA,VENTOLIN HFA) 90 mcg/act inhaler   No No   Sig: Inhale 2 puffs every 6 (six) hours as needed for wheezing   amLODIPine-benazepril (LOTREL 5-20) 5-20 MG per capsule   No No   Sig: Take 1 capsule by mouth daily   aspirin 81 MG tablet   Yes No   Sig: Take 162 mg by mouth daily in the early morning     calcium carbonate (OS-TAE) 600 MG tablet   Yes No   Sig: Take 600 mg by mouth daily in the early morning     gabapentin (Neurontin) 100 mg capsule   No No   Sig: Take 1 capsule (100 mg total) by mouth daily at bedtime   levothyroxine 125 mcg tablet   No No   Sig: Take 1 tablet (125 mcg total) by mouth daily in the early morning   lovastatin (MEVACOR) 20 mg tablet   No No   Sig: Take 1 tablet (20 mg total) by mouth in the morning     metoprolol succinate (TOPROL-XL) 25 mg 24 hr tablet   No No   Sig: Take 1 tablet (25 mg total) by mouth daily      Facility-Administered Medications: None       Past Medical History:   Diagnosis Date   • A-fib [facility])    • Allergy to IVP dye 2013    pt felt heaviness, palpitations   • 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), wet (HCC)     bilateral   • Aneurysm of aortic root     last assessed -    • Aortic arch aneurysm    • Aortic root dilatation (HCC)     last assessed - [id number]   • Arthritis    • Ascending aortic aneurysm     last assessed -    • Asthma    • Basal cell carcinoma     last assessed - 2016   • 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)    • Shortness of breath     last assessed - 2017   • Thyroid trouble    • Trigger finger     last assessed - [id number]   • Trigger middle finger of right hand     last assessed - 2016   • Urinary incontinence     last assessed -  ; Resolved - [id number]       Past Surgical History:   Procedure Laterality Date   • ABDOMINAL AORTIC ANEURYSM REPAIR W/ ENDOLUMINAL GRAFT  2015    Ascending aorta and hemiarch replacement with 30 mm Vascutek Gelweave graft;  Managed by Sascha Tan; last assessed -    • APPENDECTOMY     • BLADDER SURGERY      Reccurent histoy of bladder surgery   • HOROWITZ PROCEDURE     • CARDIAC CATHETERIZATION  2004    Procedure summary - Luminal irregularities; last assessed -    •  SECTION     • CORONARY ANEURYSM REPAIR     • CYSTOSCOPY      botox injection   • HYSTERECTOMY     • TX NDSC NJX IMPLT MATRL URT&/BLDR NCK N/A 2017    Procedure: CYSTOSCOPY; DURASPHERE-PERIURETHRAL BULKING AGENT INJECTION ;  Surgeon: Nataly Silva MD;  Location: [facility];  Service: Urology   • SD TENDON SHEATH INCISION Right 10/18/2016    Procedure: LONG FINGER TRIGGER RELEASE ;  Surgeon: Chandana Kent MD;  Location: [facility];  Service: Orthopedics   • THYROIDECTOMY      Total Thyroidectomy   • TONSILLECTOMY AND ADENOIDECTOMY         Family History   Problem Relation Age of Onset   • Coronary aneurysm Sister    • Coronary artery disease Sister         CABG   • Heart disease Family         cardiac disorder   • Hypertension Family    • Cancer Family    • Thyroid disease Family      I have reviewed and agree with the history as documented  E-Cigarette/Vaping   • E-Cigarette Use Never User      E-Cigarette/Vaping Substances   • Nicotine No    • THC No    • CBD No    • Flavoring No    • Other No    • Unknown No      Social History     Tobacco Use   • Smoking status: Former     Packs/day: 0 25     Years: 57 00     Pack years: 14 25     Types: Cigarettes     Start date: 36     Quit date: 2014     Years since quittin 1   • Smokeless tobacco: Former   • Tobacco comments:     smoked 17 yrs 1/2 ppd quit and restarted then quit  10 days ago during 2014    Vaping Use   • Vaping Use: Never used   Substance Use Topics   • Alcohol use: Not Currently     Comment: Social drinker   • Drug use: No       Review of Systems   Constitutional: Positive for activity change, appetite change and fatigue  Negative for chills, diaphoresis, fever and unexpected weight change  HENT: Negative  Negative for congestion, drooling, rhinorrhea and sore throat  Eyes: Negative  Negative for discharge, redness and itching  Respiratory: Negative  Negative for apnea, cough, chest tightness, shortness of breath and wheezing  Cardiovascular: Negative for chest pain, palpitations, leg swelling, syncope and near-syncope  Gastrointestinal: Positive for anorexia   Negative for abdominal pain, diarrhea, hematochezia, melena, nausea and vomiting  Endocrine: Negative  Genitourinary: Negative  Negative for dysuria, flank pain, frequency and urgency  Musculoskeletal: Negative  Negative for arthralgias, back pain, falls and myalgias  Skin: Negative  Allergic/Immunologic: Negative  Neurological: Positive for weakness  Negative for dizziness, tremors, seizures, loss of consciousness, syncope, facial asymmetry, speech difficulty, light-headedness, numbness and headaches  Hematological: Negative  Psychiatric/Behavioral: Positive for dysphoric mood and suicidal ideas  Negative for agitation, behavioral problems, confusion, decreased concentration, hallucinations, self-injury and sleep disturbance  The patient is nervous/anxious  All other systems reviewed and are negative  Physical Exam  Physical Exam  Vitals and nursing note reviewed  Constitutional:       General: She is awake  She is not in acute distress  Appearance: Normal appearance  She is well-developed and normal weight  She is not ill-appearing, toxic-appearing or diaphoretic  HENT:      Head: Normocephalic and atraumatic  Right Ear: External ear normal       Left Ear: External ear normal       Nose: Nose normal       Mouth/Throat:      Mouth: Mucous membranes are moist       Pharynx: No oropharyngeal exudate  Eyes:      General: No scleral icterus  Right eye: No discharge  Left eye: No discharge  Extraocular Movements: Extraocular movements intact  Conjunctiva/sclera: Conjunctivae normal       Pupils: Pupils are equal, round, and reactive to light  Neck:      Thyroid: No thyromegaly  Vascular: No JVD  Trachea: No tracheal deviation  Cardiovascular:      Rate and Rhythm: Normal rate and regular rhythm  Heart sounds: Normal heart sounds  No murmur heard  No friction rub  No gallop     Pulmonary:      Effort: Pulmonary effort is normal  No respiratory distress  Breath sounds: Normal breath sounds  No stridor  No wheezing, rhonchi or rales  Chest:      Chest wall: No tenderness  Abdominal:      General: Bowel sounds are normal  There is no distension  Palpations: Abdomen is soft  There is no mass  Tenderness: There is no abdominal tenderness  Hernia: No hernia is present  Genitourinary:     Rectum: Guaiac result positive (Black heme positive)  Musculoskeletal:         General: No tenderness, deformity or signs of injury  Normal range of motion  Right lower leg: No edema  Left lower leg: No edema  Skin:     General: Skin is warm and dry  Coloration: Skin is pale  Skin is not jaundiced  Findings: No bruising, erythema, lesion or rash  Neurological:      General: No focal deficit present  Mental Status: She is alert and oriented to person, place, and time  Cranial Nerves: No cranial nerve deficit  Motor: No weakness or abnormal muscle tone  Coordination: Coordination normal       Deep Tendon Reflexes: Reflexes are normal and symmetric  Reflexes normal    Psychiatric:         Attention and Perception: Attention and perception normal          Mood and Affect: Mood is anxious  Affect is tearful  Speech: Speech normal          Behavior: Behavior is cooperative  Thought Content: Thought content is not paranoid or delusional  Thought content includes suicidal ideation  Thought content does not include homicidal ideation  Thought content does not include homicidal or suicidal plan           Vital Signs  ED Triage Vitals [02/05/23 0830]   Temperature Pulse Resp Blood Pressure SpO2   98 3 °F (36 8 °C) 90 -- 121/57 98 %      Temp Source Heart Rate Source Patient Position - Orthostatic VS BP Location FiO2 (%)   Oral Monitor Lying Left arm --      Pain Score       4           Vitals:    02/05/23 0830   BP: 121/57   Pulse: 90   Patient Position - Orthostatic VS: Lying         Visual Acuity      ED Medications  Medications - No data to display    Diagnostic Studies  Results Reviewed     Procedure Component Value Units Date/Time    CBC and differential [[id number]]     Lab Status: No result Specimen: Blood     Comprehensive metabolic panel [[id number]]     Lab Status: No result Specimen: Blood     TSH [[id number]]     Lab Status: No result Specimen: Blood                  No orders to display              Procedures  ECG 12 Lead Documentation Only    Date/Time: 2/5/2023 9:16 AM  Performed by: Nahomy Lee DO  Authorized by: Nahomy Lee DO     Indications / Diagnosis:  Weak  ECG reviewed by me, the ED Provider: yes    Patient location:  ED  Interpretation:     Interpretation: normal    Rate:     ECG rate:  86    ECG rate assessment: normal    Rhythm:     Rhythm: sinus rhythm    Ectopy:     Ectopy: none    Conduction:     Conduction: normal    ST segments:     ST segments:  Normal  T waves:     T waves: normal               ED Course  ED Course as of 02/05/23 0933   Sun Feb 05, 2023   0932 Hemoglobin(!): 7 8  12 9 1/2023                                             Medical Decision Making  57-year-old female presents to the ED with generalized weakness and fatigue, decreased appetite  On further history, the patient is also very depressed and anxious and has thought about ways of "ending it" but she states she is afraid she will just end up with kidney or liver disease instead  Patient is also dealing with chronic right shoulder pain which is unrelieved with Tylenol, however she states she cannot take stronger medication because she feels like "I lose days"  her family states that they live very close to her and see her almost on a daily basis and her son-in-law takes her to most appointments  They have offered to take her to the [facility] but she keeps telling them "maybe next week" on exam she is alert in no acute distress  She does seem somewhat tearful and anxious  Physical exam is otherwise normal   We will do older adult work-up with CBC, CMP, EKG, TSH  We will consult crisis for possible placement in older adult unit  Amount and/or Complexity of Data Reviewed  Labs: ordered  Disposition  Final diagnoses:   None     ED Disposition     None      Follow-up Information    None         Patient's Medications   Discharge Prescriptions    No medications on file       No discharge procedures on file      PDMP Review       Value Time User    PDMP Reviewed  Yes 1/19/2023 12:26 PM Tuesday Navin Perez, [address] [place] [address]          ED Provider  Electronically Signed by           Dustin Kraus,   02/05/23 [address] [place] [address],   02/05/23 6944

## 2023-02-05 NOTE — ASSESSMENT & PLAN NOTE
H/o low grade salivary gland adenocarcinoma  Follows with oncology  Declined treatment  Repeat imagining was unchanged   Imagining from sept 2022 did reveal unchanged 2 5cm destructive soft tissue lesion in the right posterior maxillary hard palate with erosion into the right maxillary sinus floor

## 2023-02-05 NOTE — ASSESSMENT & PLAN NOTE
Baseline hemoglobin is 12  Hemoglobin is 7 9  Heme positive in the ed  Having lower abd pain but recent steroid use  No bm for 1 week  Recent narcotic use  Likely gi bleed  Differential includes pud with recent steroid use, stercoral ulcer with constipation, diverticular dz, hemorrhoidal,   Will obtain ct abd  Obtain iron panel, vitamin b12, folate  Start protonix iv bid  Check hemoglobin q8  Transfuse as needed    Consult gi

## 2023-02-05 NOTE — CONSULTS
Consultation - [address] [place] [address] [facility]  Buffy Giraldo 80 y o  female MRN: [id number]  Unit/Bed#: E5 -01 Encounter: [id number]        Inpatient consult to gastroenterology  Consult performed by: Bebe Kaiser PA-C  Consult ordered by: Cherrie Hinojosa DO          Reason for Consult / Principal Problem:     1  GI Bleed      ASSESSMENT AND PLAN:      Buffy is a 81 y/o female with HTN, COPD/asthma, PAF now on AC due to risk of bleeding from hx of salivary gland cancer, hypothyroid who presents with R arm pain  1  Acute symptomatic anemia  2  Abnormal bowel habits  Pt was admitted from 1/14-1/19 for R arm pain and presents again for ongoing pain despite being tx with steroids for this  She was incidentally found to have Hgb of 7 8 (normal MCV), which is a significant drop from her Hgb that is usually WNL  Pt admits to passing out after having an episode of chest pain on her way to the bathroom, but she is unsure when this occurred  Pt is accompanied by her daughters, al of whom note she had diarrhea last week but they + the pt are unsure if she still has diarrhea, is now constipated, or is having normal, formed BMs  However they all deny overt GI bleeding  Pt denies NSAIDs except for baby ASA and is not on other blood thinners  It appears her last EGD/Colonoscopy were in 2013  BUN elevated at 53  CT A/P without contrast was done that noted moderate stool burden in colon  TSH WNL   Pt denies abdominal or rectal exam   -discussed with family: as it is unclear what is occurring with her bowel habits, I would like to await CT results   -monitor H&H and transfuse as needed  -monitor stool output and color  -continue PPI BID  -due to acute drop in Hgb + elevated BUN, pt certainly warrants bidirectional endoscopy   - EGD and colonoscopy tomorrow   -clear liquid diet today; NPO midnight   -2 doses of dulcolax 10 mg ordered for today; GOLYTELY prep this afternoon    -as she does have a large amount of stool in the colon: if pt is not properly cleared out by tomorrow, pt should undergo a second bowel prep tomorrow with scopes on Tuesday          ______________________________________________________________________    HPI:  Buffy is a 79 y/o female with HTN, COPD/asthma, PAF now on AC due to risk of bleeding from hx of salivary gland cancer, hypothyroid who presents with R arm pain  She was recently admitted 1/14 for R arm pain  Pt also presented with SI in the ED but was found to be anemic with heme + stool, thus [facility] was consulted  Pt's HPI is unclear at this time due to changing stories  Pt's 2 daughters were with her, who both note that she had non-bloody diarrhea up until last week but are unsure if she has continued to have diarrhea, is constipated now, or is having formed BMs daily  she says she thinks she had a stomach bug during this time  The pt says she is not constipated but is not sure when her last BM was  She and her family both deny overt GI bleeding  Pt says she "thinks" she had an episode of passing out after experiencing chest pain upon ambulation to the bathroom  Pt denies abdominal pain, n/v, fevers, chills  REVIEW OF SYSTEMS:    CONSTITUTIONAL: Denies any fever, chills, rigors, and weight loss  HEENT: No earache or tinnitus  Denies hearing loss or visual disturbances  CARDIOVASCULAR: No chest pain or palpitations  RESPIRATORY: Denies any cough, hemoptysis, shortness of breath or dyspnea on exertion  GASTROINTESTINAL: As noted in the History of Present Illness  GENITOURINARY: No problems with urination  Denies any hematuria or dysuria  NEUROLOGIC: No dizziness or vertigo, denies headaches  MUSCULOSKELETAL: Denies any muscle or joint pain  SKIN: Denies skin rashes or itching  ENDOCRINE: Denies excessive thirst  Denies intolerance to heat or cold  PSYCHOSOCIAL: Denies depression or anxiety  Denies any recent memory loss         Historical Information   Past Medical History: Diagnosis Date   • A-fib [facility])    • Allergy to IVP dye 2013    pt felt heaviness, palpitations   • 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), wet ([facility])     bilateral   • Aneurysm of aortic root     last assessed - [id number]   • Aortic arch aneurysm    • Aortic root dilatation ([facility])     last assessed - [id number]   • Arthritis    • Ascending aortic aneurysm     last assessed - [id number]   • Asthma    • Basal cell carcinoma     last assessed - [id number]   • 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[facility])    • Disease of thyroid gland    • Facet arthropathy, cervical     last assessed - [id number]   • History of fracture of vertebral column    • Hyperlipidemia    • Hypertension    • 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[address] )    • Hypoxia     last assessed - [id number]   • Junctional rhythm     last assessed - 69Gvj1817   • Lightheadedness     last assessed - 2016   • Migraine    • Palpitations     last assessed - 2016   • Pleural effusion, bilateral     last assessed - 2015   • 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[facility])    • Shortness of breath     last assessed - [id number]   • Thyroid trouble    • Trigger finger     last assessed - [id number]   • Trigger middle finger of right hand     last assessed - [id number]   • Urinary incontinence     last assessed -  ; Resolved - [id number]     Past Surgical History:   Procedure Laterality Date   • ABDOMINAL AORTIC ANEURYSM REPAIR W/ ENDOLUMINAL GRAFT  2015    Ascending aorta and hemiarch replacement with 30 mm Vascutek Gelweave graft;  Managed by Irene Weber; last assessed -    • APPENDECTOMY     • BLADDER SURGERY      Reccurent histoy of bladder surgery   • HOROWITZ PROCEDURE     • CARDIAC CATHETERIZATION  2004    Procedure summary - Luminal irregularities; last assessed - [id number]   •  SECTION     • CORONARY ANEURYSM REPAIR     • CYSTOSCOPY      botox injection   • HYSTERECTOMY     • UT NDSC NJX IMPLT MATRL URT&/BLDR NCK N/A 2017    Procedure: Juhi Pryor; DURASPHERE-PERIURETHRAL BULKING AGENT INJECTION ;  Surgeon: Young Ortiz MD;  Location: [facility];  Service: Urology   • NY TENDON SHEATH INCISION Right 10/18/2016    Procedure: LONG FINGER TRIGGER RELEASE ;  Surgeon: Chapito Armendariz MD;  Location: [facility];  Service: Orthopedics   • THYROIDECTOMY      Total Thyroidectomy   • TONSILLECTOMY AND ADENOIDECTOMY       Social History   Social History     Substance and Sexual Activity   Alcohol Use Not Currently    Comment: Social drinker     Social History     Substance and Sexual Activity   Drug Use No     Social History     Tobacco Use   Smoking Status Former   • Packs/day: 0 25   • Years: 57 00   • Pack years: 14 25   • Types: Cigarettes   • Start date: 36   • Quit date: 2014   • Years since quittin 1   Smokeless Tobacco Former   Tobacco Comments    smoked 17 yrs 1/2 ppd quit and restarted then quit  10 days ago during 2014      Family History   Problem Relation Age of Onset   • Coronary aneurysm Sister    • Coronary artery disease Sister         CABG   • Heart disease Family         cardiac disorder   • Hypertension Family    • Cancer Family    • Thyroid disease Family        Meds/Allergies     Medications Prior to Admission   Medication   • albuterol (2 5 mg/3 mL) 0 083 % nebulizer solution   • albuterol (PROVENTIL HFA,VENTOLIN HFA) 90 mcg/act inhaler   • amLODIPine-benazepril (LOTREL 5-20) 5-20 MG per capsule   • aspirin 81 MG tablet   • calcium carbonate (OS-TAE) 600 MG tablet   • Diclofenac Sodium (VOLTAREN) 1 %   • gabapentin (Neurontin) 100 mg capsule   • levothyroxine 125 mcg tablet   • lovastatin (MEVACOR) 20 mg tablet   • metoprolol succinate (TOPROL-XL) 25 mg 24 hr tablet   • Multiple Vitamins-Minerals (One Daily Multivitamin Women) TABS     Current Facility-Administered Medications   Medication Dose Route Frequency   • acetaminophen (TYLENOL) tablet 975 mg  975 mg Oral Q8H [address]   • albuterol (PROVENTIL HFA,VENTOLIN HFA) inhaler 2 puff  2 puff Inhalation Q6H PRN   • albuterol inhalation solution 2 5 mg  2 5 mg Nebulization Q6H PRN   • gabapentin (NEURONTIN) capsule 100 mg  100 mg Oral HS   • levothyroxine tablet 125 mcg  125 mcg Oral Early Morning   • lidocaine (LIDODERM) 5 % patch 1 patch  1 patch Topical Once   • metoprolol succinate (TOPROL-XL) 24 hr tablet 25 mg  25 mg Oral Daily   • ondansetron (ZOFRAN) injection 4 mg  4 mg Intravenous Q6H PRN   • pantoprazole (PROTONIX) injection 40 mg  40 mg Intravenous Q12H [address]   • pravastatin (PRAVACHOL) tablet 20 mg  20 mg Oral Daily With Dinner   • sodium chloride 0 9 % infusion  50 mL/hr Intravenous Continuous       Allergies   Allergen Reactions   • Amiodarone Hives   • Omnipaque [Iohexol] Hives and Shortness Of Breath   • Clindamycin Other (See Comments)     When taken causes cdiff immediately   • Other    • Sulfa Antibiotics Hives     itching   • Acetazolamide Hives and Rash     Reaction Date:Unknown   • Iv Dye  [Iodinated Contrast Media] Hypertension and Palpitations     [facility] [id number]: Verified with patient    • Naprosyn [Naproxen] Itching and Rash     Category: Allergy;            Objective     Blood pressure 113/64, pulse 85, temperature (!) 97 3 °F (36 3 °C), resp  rate 16, weight 57 kg (125 lb 10 6 oz), SpO2 94 %  Body mass index is 22 98 kg/m²  No intake or output data in the 24 hours ending 02/05/23 1244      PHYSICAL EXAM:      General Appearance:   Alert, cooperative, no distress   HEENT:   Normocephalic, atraumatic, anicteric      Neck:  Supple, symmetrical, trachea midline   Lungs:   Clear to auscultation bilaterally; no rales, rhonchi or wheezing; respirations unlabored    Heart[de-identified]   Regular rate and rhythm; no murmur, rub, or gallop     Abdomen:   Soft, non-tender, non-distended; normal bowel sounds; no masses, no organomegaly    Genitalia:   Deferred    Rectal:   Deferred    Extremities:  No cyanosis, clubbing or edema    Pulses:  2+ and symmetric all extremities  Skin:  No jaundice, rashes, or lesions    Lymph nodes:  No palpable cervical lymphadenopathy        Lab Results:   Admission on 02/05/2023   Component Date Value   • Ventricular Rate 02/05/2023 89    • Atrial Rate 02/05/2023 89    • GA Interval 02/05/2023 158    • QRSD Interval 02/05/2023 72    • QT Interval 02/05/2023 340    • QTC Interval 02/05/2023 413    • P Axis 02/05/2023 68    • QRS Axis 02/05/2023 47    • T Wave Axis 02/05/2023 54    • WBC 02/05/2023 9 59    • RBC 02/05/2023 2 51 (L)    • Hemoglobin 02/05/2023 7 8 (L)    • Hematocrit 02/05/2023 24 0 (L)    • MCV 02/05/2023 96    • MCH 02/05/2023 31 1    • MCHC 02/05/2023 32 5    • RDW 02/05/2023 14 4    • MPV 02/05/2023 10 9    • Platelets 77/79/5560 260    • nRBC 02/05/2023 0    • Neutrophils Relative 02/05/2023 75    • Immat GRANS % 02/05/2023 1    • Lymphocytes Relative 02/05/2023 18    • Monocytes Relative 02/05/2023 6    • Eosinophils Relative 02/05/2023 0    • Basophils Relative 02/05/2023 0    • Neutrophils Absolute 02/05/2023 7 17    • Immature Grans Absolute 02/05/2023 0 05    • Lymphocytes Absolute 02/05/2023 1 71    • Monocytes Absolute 02/05/2023 0 60    • Eosinophils Absolute 02/05/2023 0 03    • Basophils Absolute 02/05/2023 0 03    • Sodium 02/05/2023 137    • Potassium 02/05/2023 4 0    • Chloride 02/05/2023 104    • CO2 02/05/2023 26    • ANION GAP 02/05/2023 7    • BUN 02/05/2023 53 (H)    • Creatinine 02/05/2023 0 73    • Glucose 02/05/2023 136    • Calcium 02/05/2023 8 3 (L)    • AST 02/05/2023 12 (L)    • ALT 02/05/2023 11    • Alkaline Phosphatase 02/05/2023 33 (L)    • Total Protein 02/05/2023 5 8 (L)    • Albumin 02/05/2023 3 7    • Total Bilirubin 02/05/2023 0 35    • eGFR 02/05/2023 72    • TSH 3RD GENERATON 02/05/2023 3 620    • ABO Grouping 02/05/2023 O    • Rh Factor 02/05/2023 Positive    • Antibody Screen 02/05/2023 Positive    • Specimen Expiration Date 02/05/2023 [id number]        Imaging Studies: I have personally reviewed pertinent imaging studies

## 2023-02-06 ENCOUNTER — APPOINTMENT (INPATIENT)
Dept: NON INVASIVE DIAGNOSTICS | Facility: HOSPITAL | Age: 88
End: 2023-02-06

## 2023-02-06 ENCOUNTER — APPOINTMENT (INPATIENT)
Dept: GASTROENTEROLOGY | Facility: HOSPITAL | Age: 88
End: 2023-02-06

## 2023-02-06 ENCOUNTER — ANESTHESIA (INPATIENT)
Dept: GASTROENTEROLOGY | Facility: HOSPITAL | Age: 88
End: 2023-02-06

## 2023-02-06 LAB
ABO GROUP BLD BPU: NORMAL
ANION GAP SERPL CALCULATED.3IONS-SCNC: 9 MMOL/L (ref 4–13)
BPU ID: NORMAL
BUN SERPL-MCNC: 36 MG/DL (ref 5–25)
CALCIUM SERPL-MCNC: 7.6 MG/DL (ref 8.4–10.2)
CHLORIDE SERPL-SCNC: 107 MMOL/L (ref 96–108)
CO2 SERPL-SCNC: 23 MMOL/L (ref 21–32)
CREAT SERPL-MCNC: 0.66 MG/DL (ref 0.6–1.3)
CROSSMATCH: NORMAL
GFR SERPL CREATININE-BSD FRML MDRD: 78 ML/MIN/1.73SQ M
GLUCOSE SERPL-MCNC: 103 MG/DL (ref 65–140)
HCT VFR BLD AUTO: 25.5 % (ref 34.8–46.1)
HCT VFR BLD AUTO: 26.6 % (ref 34.8–46.1)
HGB BLD-MCNC: 8.3 G/DL (ref 11.5–15.4)
HGB BLD-MCNC: 8.9 G/DL (ref 11.5–15.4)
POTASSIUM SERPL-SCNC: 3.7 MMOL/L (ref 3.5–5.3)
SODIUM SERPL-SCNC: 139 MMOL/L (ref 135–147)
UNIT DISPENSE STATUS: NORMAL
UNIT PRODUCT CODE: NORMAL
UNIT PRODUCT VOLUME: 350 ML
UNIT RH: NORMAL

## 2023-02-06 PROCEDURE — 0DB68ZX EXCISION OF STOMACH, VIA NATURAL OR ARTIFICIAL OPENING ENDOSCOPIC, DIAGNOSTIC: ICD-10-PCS | Performed by: INTERNAL MEDICINE

## 2023-02-06 PROCEDURE — 0DJD8ZZ INSPECTION OF LOWER INTESTINAL TRACT, VIA NATURAL OR ARTIFICIAL OPENING ENDOSCOPIC: ICD-10-PCS | Performed by: INTERNAL MEDICINE

## 2023-02-06 RX ORDER — SODIUM CHLORIDE 9 MG/ML
INJECTION, SOLUTION INTRAVENOUS CONTINUOUS PRN
Status: DISCONTINUED | OUTPATIENT
Start: 2023-02-06 | End: 2023-02-06

## 2023-02-06 RX ORDER — SODIUM CHLORIDE 9 MG/ML
75 INJECTION, SOLUTION INTRAVENOUS CONTINUOUS
Status: DISCONTINUED | OUTPATIENT
Start: 2023-02-06 | End: 2023-02-07

## 2023-02-06 RX ORDER — SODIUM CHLORIDE 9 MG/ML
125 INJECTION, SOLUTION INTRAVENOUS CONTINUOUS
Status: DISCONTINUED | OUTPATIENT
Start: 2023-02-06 | End: 2023-02-06

## 2023-02-06 RX ORDER — TRAMADOL HYDROCHLORIDE 50 MG/1
25 TABLET ORAL EVERY 6 HOURS PRN
Status: DISCONTINUED | OUTPATIENT
Start: 2023-02-06 | End: 2023-02-08 | Stop reason: HOSPADM

## 2023-02-06 RX ORDER — PROPOFOL 10 MG/ML
INJECTION, EMULSION INTRAVENOUS AS NEEDED
Status: DISCONTINUED | OUTPATIENT
Start: 2023-02-06 | End: 2023-02-06

## 2023-02-06 RX ORDER — PANTOPRAZOLE SODIUM 40 MG/1
40 TABLET, DELAYED RELEASE ORAL
Status: DISCONTINUED | OUTPATIENT
Start: 2023-02-06 | End: 2023-02-08 | Stop reason: HOSPADM

## 2023-02-06 RX ADMIN — PRAVASTATIN SODIUM 20 MG: 20 TABLET ORAL at 17:14

## 2023-02-06 RX ADMIN — LEVOTHYROXINE SODIUM 125 MCG: 125 TABLET ORAL at 05:13

## 2023-02-06 RX ADMIN — ACETAMINOPHEN 325MG 975 MG: 325 TABLET ORAL at 21:12

## 2023-02-06 RX ADMIN — SODIUM CHLORIDE 125 ML/HR: 0.9 INJECTION, SOLUTION INTRAVENOUS at 15:30

## 2023-02-06 RX ADMIN — LIDOCAINE HYDROCHLORIDE 100 MG: 20 INJECTION INTRAVENOUS at 15:57

## 2023-02-06 RX ADMIN — SODIUM CHLORIDE 50 ML/HR: 0.9 INJECTION, SOLUTION INTRAVENOUS at 11:55

## 2023-02-06 RX ADMIN — DICLOFENAC SODIUM 2 G: 10 GEL TOPICAL at 21:13

## 2023-02-06 RX ADMIN — DICLOFENAC SODIUM 2 G: 10 GEL TOPICAL at 07:57

## 2023-02-06 RX ADMIN — ACETAMINOPHEN 325MG 975 MG: 325 TABLET ORAL at 05:13

## 2023-02-06 RX ADMIN — PANTOPRAZOLE SODIUM 40 MG: 40 INJECTION, POWDER, FOR SOLUTION INTRAVENOUS at 07:55

## 2023-02-06 RX ADMIN — GLYCERIN, PETROLATUM, PHENYLEPHRINE HCL, PRAMOXINE HCL 1 APPLICATION: 144; 2.5; 10; 15 CREAM TOPICAL at 05:15

## 2023-02-06 RX ADMIN — PANTOPRAZOLE SODIUM 40 MG: 40 TABLET, DELAYED RELEASE ORAL at 17:14

## 2023-02-06 RX ADMIN — PROPOFOL 50 MG: 10 INJECTION, EMULSION INTRAVENOUS at 15:57

## 2023-02-06 RX ADMIN — SODIUM CHLORIDE: 0.9 INJECTION, SOLUTION INTRAVENOUS at 15:37

## 2023-02-06 RX ADMIN — PROPOFOL 20 MG: 10 INJECTION, EMULSION INTRAVENOUS at 16:01

## 2023-02-06 RX ADMIN — DICLOFENAC SODIUM 2 G: 10 GEL TOPICAL at 17:16

## 2023-02-06 RX ADMIN — DICLOFENAC SODIUM 2 G: 10 GEL TOPICAL at 11:56

## 2023-02-06 RX ADMIN — POLYETHYLENE GLYCOL 3350, SODIUM SULFATE ANHYDROUS, SODIUM BICARBONATE, SODIUM CHLORIDE, POTASSIUM CHLORIDE 2000 ML: 236; 22.74; 6.74; 5.86; 2.97 POWDER, FOR SOLUTION ORAL at 07:01

## 2023-02-06 RX ADMIN — METOPROLOL SUCCINATE 25 MG: 25 TABLET, EXTENDED RELEASE ORAL at 07:56

## 2023-02-06 RX ADMIN — GABAPENTIN 100 MG: 100 CAPSULE ORAL at 21:13

## 2023-02-06 RX ADMIN — PROPOFOL 50 MG: 10 INJECTION, EMULSION INTRAVENOUS at 16:11

## 2023-02-06 RX ADMIN — SODIUM CHLORIDE 75 ML/HR: 0.9 INJECTION, SOLUTION INTRAVENOUS at 17:21

## 2023-02-06 RX ADMIN — PROPOFOL 10 MG: 10 INJECTION, EMULSION INTRAVENOUS at 16:05

## 2023-02-06 NOTE — CONSULTS
Consultation - Geriatrics   [place] 80 y o  female MRN: [id number]  Unit/Bed#: E5 -01 Encounter: [id number]      Assessment/Plan    Cognitive Screening   · Patient had not previously noted any memory issues or cognitive impairment   · Since recent hospital discharge has noticed some increased forgetfulness and memory issues  · Notes that she typically handles the bills but was having some difficulty with simple math at home   · Noted that after PT she was feeling well and was able to do simple math   · Most recent TSH on 2/5/2023 noted to be 3 620  · Most recent vitamin B 12 level on 7/12/2019 noted to be 903  · Would recommend rechecking this  · CT of the head on 9/18/2019 revealed moderate microangiopathic changes   · Patient scored 21/30 on MOCA from 1/18/2023  · She noted at that time she was having significant pain   · At the time she felt that she could have done better if her pain were more controlled  · She was noted to have confusion on her last hospital admission   · This was suspected to be secondary to narcotic pain medication per internal medicine  · She was oriented and was doing well on the day of her discharge   · She is alert and oriented x 4 on exam today   · She is forgetful and she feels more confused than normal   · Keep physically, mentally, and socially active     Delirium   • Baseline mentation: alert and oriented x 4  • Current mentation: alert and oriented x 4   • Patient is at high risk secondary to age, acute pain, anemia, upcoming EGD/colonoscopy under anesthesia, and hospitalization   • Maintain delirium precautions   · Provide redirection, reorientation, and distraction techniques  · Maintain fall and safety precautions   · Assist with ADLs/IADLs  · Avoid deliriogenic medications such as tramadol, benzodiazepines, anticholinergics, benadryl  · Treat pain using geriatric pain protocol   · Encourage oral hydration and nutrition   · Monitor for constipation and urinary retention   · Implement sleep hygiene and limit night time interuptions   · Maintain sleep-wake cycle   · Encourage early and frequent mobilization   · Encourage participation in group activities  • Most recent EKG on 2/5/2023 revealed a QTc interval of 412  · If all other interventions are unsuccessful for acute behaviors and agitation, would recommend using Zyprexa 2 5 mg IM Q 8 hours prn   • Would avoid benzodiazepines such as Ativan as these can worsen delirium     Deconditioning   • Baseline function: independent for ADLs and IADLs  · Notes that since returning home from her last admission she has been having some trouble with cleaning and simple math to pay her bills   · She notes that she has not been eating or cooking   • Patient is at increased risk for deconditioning secondary to acute pain, anemia, weakness, and hospitalization   • Continue to optimize diet, hydration, and mobility for healing   · GFR 78 on labs today   · Keep hydrated   · PO intake  · Patient with poor oral intake prior to admission   · Notes that food was not tasting very good   · States she has not eaten in nearly 2 weeks   · Was started on Remeron by psych yesterday for depression which also is an appetite stimulant   · Recommend nutrition consult  · Anemia  · Patient with history of iron deficiency anemia  · Noted to have a hemoglobin of 7 8 on admission   · Repeat hemoglobin was 7 0  · GI consulted for suspected GI bleed  · Patient to undergo EGD/colonoscopy today   · Monitor labs   • Monitor for signs and symptoms of infection, dehydration, DVT, and skin breakdown    Frailty   Clinical Frail Scale: 5- Mildly Frail  · More evident slowing, needs help high order IADLs (transport, bills, medications)  · Progressively impairs shopping and walking outside alone, meal prep and housework  • Most recent albumin on 2/5/2023 noted to be 3 7  • Consider nutrition consult  • Encourage protein supplementation     Ambulatory Dysfunction/Falls  • Patient notes no recent falls   • Does not ambulate with any assistive devices at baseline   • PT/OT consulted   • Maintain fall and safety precautions   • Encourage adequate oral hydration and nutrition   • Out of bed as tolerated     Impaired Vision   • Patient with history of macular degeneration in both eyes  · Wears glasses   · Notes that she was supposed to go for an eye injection today   • Recommend use of corrective lenses at all appropriate times  • Encourage adequate lighting and encourage use of assistance with ambulation  • Keep personal belongings close to avoid reaching  • Encourage appropriate footwear at all times  • Recommend large font for printed materials provided to patient    Impaired Hearing   • Denies hearing impairment   • Hearing impairment strongly correlated with depression, cognitive impairment, delirium and falls in the older adult  • Use sound amplifier as needed   • Speak face to face  • Use clear dictation and enunciation of words    Dentition/Appetite   • Patient notes that she has a good appetite at baseline   • States that she has not been eating well since she was discharged from the hospital a few weeks ago   • States that food does not taste good to her  • Would recommend nutrition consult  • Ensure meal consistency is appropriate for all abilities   • Continue aspiration precautions     Elimination   • Patient is incontinent of bowel and bladder at baseline  • Denies any voiding difficulties   • Has not been having bowel movements at home as she states she has not been eating   • Is currently receiving bowel prep for an EGD/colonoscopy today   • Monitor for constipation and urinary retention     Insomnia   • Patient notes chronic insomnia at home   • States that recently she had been taking a hot shower and then going right to bed which was helping   • She does not take any medication for sleep at baseline   • First line is behavioral therapy   • Avoid sedative hypnotics including benzodiazepines and benadryl  • Encourage staying awake during the day   • Encourage daytime activities and morning exercise   • Decrease or eliminate daytime naps   • Avoid caffeine especially during late afternoon and evening hours  • Establish a nighttime routine  • Implement sleep hygiene and limit nighttime interruptions  • Can consider melatonin 3 mg daily at bedtime for sleep if needed     Anxiety/Depression  • Patient noted on admission that she was feeling depressed  · Noted that her pain, family stress, and recent dependence were playing a part in her thoughts of death   · Denied a plan   · On admission, she admitted to increased sleep, feeling of hopelessness, increased sadness, and depression   · Psych consulted and patient was agreeable to anti-depressant   · Patient was stared on mirtazapine 7 5 mg daily at bedtime  · Would recommend monitoring for increased lethargy as lower doses can be more sedating   · Psych recommending voluntary psych hospitalization but patient has declined  · Patient does appear to be depressed on exam today   · Would continue current medication recommended by psych   · Continue supportive care    Anemia  · Patient with a hemoglobin of 7 9 on admission   · Baseline is noted to be around 12  · Heme test was positive   · She admitted to no bowel movement in one week   · States to me that this is secondary to not eating   · Suspected to have GI bleed  · GI consulted and patient is to undergo an EGD/colonoscopy with them later today   · Continue to monitor labs     Acute Pain of the Right Shoulder   · Patient was recently admitted to the hospital from 1/14-1/19 for acute worsening pain of the right shoulder blade and right elbow  · X-ray imaging revealed osteomyelitis   · Orthopedics noted there was no need for IV/joint injection as they believed her pain to be referred back pain   · She was on an extensive medication regimen for pain on her last admission (please see my consult note from 1/19 for a full list of her pain regimen on her last admission)   · Was noted to be confused with oxycodone at all doses   · Appeared to tolerate tramadol well   · Was also placed on a steroid taper that seemed to be working well   · Patient notes that when she got home she was not taking the Baclofen and was saving the tramadol for only when the pain was very significant   · She did follow-up with orthopedics in the outpatient setting and was started on gabapentin   · MRI was ordered and is scheduled for tomorrow (2/7)  · She notes that she was trying to use the lidocaine patches at home but had a difficult time putting them on and noted that when she would put her shirt on the patch would come off  · She notes that PT did seem to help (some days more than others) and some days she noted improvement in the pain after her session   · She notes that heat improved the pain in the past but she was unable to get a heating pad for the house  · Would recommend aqua-K pad for the shoulder/elbow  · Patient rates her pain a 5/10 on exam today   · She notes that the shoulder is an achy pain   · She notes that the elbow is more tingly and sharper   · She admits to numbness and tingling in her hand  · Based on her prior hospitalization would recommend the following pain regimen   · Tylenol 975 mg Q 8 hours scheduled   · Diclofenac gel QID prn   · Tramadol 25 mg moderate/severe pain   · Would hold off 50 mg for severe pain as patient will be undergoing anesthesia later today which can increase confusion   · Can consider 50 mg if patient does well with her procedure today and tolerates the 25 mg without issue  · Continue gabapentin 100 mg at bedtime   · Continue non-pharmacological methods of pain management    Home Safety  · Patient lives at home independently   · She had been managing cooking and cleaning   · Seems that she was more limited in being able to do this since her last admission   · She has been managing her finances and medication   · Notes increased difficulty doing simple math at home   · She noted that she had been rarely driving secondary to her vision   · She was only driving when she knew she would be traveling a short distance and knew the road was straight     Advanced Care Planning  · Level 3 DNR    Home Medication Review   Mail order medications (obtained from prior consult note)  · Levothyroxine 125 mcg po daily (last filled 12/22/2022 for 90 day supply)  · Lovastatin 20 mg daily (last filled 11/5/2022 for 90 day supply)  · Metoprolol succinate ER 25 mg daily (last filled 1/5/2023 for 90 day supply)  · Amlodipine/Benzapril 5/25 mg daily (last refill on 10/30/2022 for 90 day supply)    Patient recently prescribed gabapentin 100 mg daily at bedtime but had not taken any doses as the pharmacy had not filled it    History of Present Illness      Physician Requesting Consult: Jane Bernal MD  Reason for Consult / Principal Problem: Pain and failure to thrive  Hx and PE limited by: N/A     HPI: Buffy Griffin is a 80y o  year old female who has hypertension, hyperlipidemia, atrial fibrillation, hypothyroidism, asthma-COPD overlap syndrome, macular degeneration, anemia, and insomnia and presented to the hospital for generalized weakness and decreased appetite  She also noted she was feeling anxious and depressed  She was recently admitted to the hospital from 1/14 to 1/19 for acute right shoulder pain  A workup was negative for acute injury and orthopedics noted she was not a candidate for an IV/joint injection as they believed her pain to be referred back pain  She was started on a steroid taper which she was also discharged on  Her pain appeared to be improved with tramadol  She was trialed on oxycodone but was noted to be confused on all doses  Her pain was a 2/10 on the day of her discharge   On this admission, she was noted to have a hemoglobin of 7 8 and a heme test was positive  GI was consulted and she is scheduled to undergo an EGD/colonoscopy today  She also noted that she was having depression at home  She noted that her pain, family stress, and recent dependence on others was playing a part in her thoughts of death  She denied having a plan to carry out her death  She initially declined an antidepressant, however, when she was evaluated by psych, she was agreeable  In addition, she continued to admit right shoulder pain  She was recently seen by orthopedics in the outpatient setting and was started on gabapentin  An MRI was also ordered which is scheduled for tomorrow  Geriatrics is consulted for pain and failure to thrive  The patient was seen and evaluated today at the bedside for geriatric consult  She states that since her discharge she has continued to live independently but has been requiring some additional help  She notes that approximately 2 days after discharge, she was having diarrhea from a suspected GI bug  She states that she felt very weak and ended up calling her son-in-law to get her some pedia-lyte  She notes that this only really lasted for up to 24 hours  She notes that in the past two weeks she has not been eating  She notes that the food she is eating does not taste good  She states that she has not had a bowel movement recently as she has not been eating  She notes that she has not been cleaning much secondary to pain  She states that she has been able to go get the mail but has not been able to vacuum the house  She states that since being home she has noticed some increased cognitive issues  She notes that she is normally able to manage her bills but she has been having some difficulty with basic math  She also notes some general forgetfulness  She notes no recent falls at home  She does have a history of macular degeneration and notes that she was supposed to be getting an injection in her eyes today   She notes that she does have chronic insomnia but she has been taking hot showers before bed which have been helping  Attempted to call patients daughter Chayito Riggins but was unable to reach her  The patient is alert and oriented x 4 on exam today but she notes that she is having some forgetfulness  She denies dizziness and headaches  She states that she sometimes does have acute vision changes that her ophthalmologist is aware of but notes that these typically do resolve  She notes them to be most pronounced after receiving the injections  She denies any of these changes on exam today  She denies chest pain and shortness of breath  She denies nausea and vomiting  She is noted to have diarrhea secondary to undergoing bowel prep  She notes that she is feeling a little bit depressed and is unsure what she would like to do with treatment going forward  She did mention the possibility of palliative care  Inpatient consult to Gerontology  Consult performed by: SARAHI Nuñez  Consult ordered by: Ammy Mcbride DO        Review of Systems   Constitutional: Positive for activity change and appetite change (poor appetite per patient)  Negative for chills, fatigue and fever  Eyes: Positive for visual disturbance (hx of macular degeneration)  Respiratory: Negative for cough, chest tightness and shortness of breath  Cardiovascular: Negative for chest pain  Gastrointestinal: Positive for diarrhea (bowel prep)  Negative for abdominal distention, abdominal pain, constipation, nausea and vomiting  Genitourinary: Negative for difficulty urinating and dysuria  Musculoskeletal: Positive for arthralgias (right shoulder and elbow) and gait problem  Negative for myalgias  Skin: Positive for pallor  Negative for color change  Neurological: Positive for weakness and numbness (right hand)  Negative for dizziness and headaches  Psychiatric/Behavioral: Negative for behavioral problems, confusion and sleep disturbance   The patient is not nervous/anxious  Historical Information   Past Medical History:   Diagnosis Date   • A-fib ([facility] [address] )    • Allergy to IVP dye 2013    pt felt heaviness, palpitations   • 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), wet ([facility])     bilateral   • Aneurysm of aortic root     last assessed - [id number]   • Aortic arch aneurysm    • Aortic root dilatation ([facility])     last assessed - [id number]   • Arthritis    • Ascending aortic aneurysm     last assessed - [id number]   • Asthma    • Basal cell carcinoma     last assessed - [id number]   • 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[facility])    • Disease of thyroid gland    • Facet arthropathy, cervical     last assessed - [id number]   • History of fracture of vertebral column    • Hyperlipidemia    • Hypertension    • 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[facility] [address] )    • Hypoxia     last assessed - [id number]   • Junctional rhythm     last assessed - 18Ajq9363   • Lightheadedness     last assessed - 58Icz6529   • Migraine    • Palpitations     last assessed - 01Tef0553   • Pleural effusion, bilateral     last assessed - 43Gat3180   • 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[facility])    • Shortness of breath     last assessed - 11Lty3906   • Thyroid trouble    • Trigger finger     last assessed - 49MYM9213   • Trigger middle finger of right hand     last assessed - 24Nyg2160   • Urinary incontinence     last assessed -  ; Resolved - [id number]     Past Surgical History:   Procedure Laterality Date   • ABDOMINAL AORTIC ANEURYSM REPAIR W/ ENDOLUMINAL GRAFT  2015    Ascending aorta and hemiarch replacement with 30 mm Vascutek Gelweave graft;  Managed by Rosa Braden; last assessed - [id number]   • APPENDECTOMY     • BLADDER SURGERY      Reccurent histoy of bladder surgery   • HOROWITZ PROCEDURE     • CARDIAC CATHETERIZATION  2004    Procedure summary - Luminal irregularities; last assessed - [id number]   •  SECTION     • CORONARY ANEURYSM REPAIR     • CYSTOSCOPY      botox injection   • HYSTERECTOMY     • OH NDSC NJX IMPLT MATRL URT&/BLDR NCK N/A 2017    Procedure: CYSTOSCOPY; DURASPHERE-PERIURETHRAL BULKING AGENT INJECTION ;  Surgeon: Peggy Leiva MD;  Location: [facility];  Service: Urology   • NC TENDON SHEATH INCISION Right 10/18/2016    Procedure: LONG FINGER TRIGGER RELEASE ;  Surgeon: Naomi Licona MD;  Location: [facility];  Service: Orthopedics   • THYROIDECTOMY      Total Thyroidectomy   • TONSILLECTOMY AND ADENOIDECTOMY       Social History   Social History     Substance and Sexual Activity   Alcohol Use Not Currently    Comment: Social drinker     Social History     Substance and Sexual Activity   Drug Use No     Social History     Tobacco Use   Smoking Status Former   • Packs/day: 0 25   • Years: 57 00   • Pack years: 14 25   • Types: Cigarettes   • Start date: 36   • Quit date: 2014   • Years since quittin 1   Smokeless Tobacco Former   Tobacco Comments    smoked 17 yrs 1/2 ppd quit and restarted then quit  10 days ago during 2014        Family History:   Family History   Problem Relation Age of Onset   • Coronary aneurysm Sister    • Coronary artery disease Sister         CABG   • Heart disease Family         cardiac disorder   • Hypertension Family    • Cancer Family    • Thyroid disease Family        Meds/Allergies   Current meds:   Current Facility-Administered Medications   Medication Dose Route Frequency   • acetaminophen (TYLENOL) tablet 975 mg  975 mg Oral Q8H [address]   • albuterol (PROVENTIL HFA,VENTOLIN HFA) inhaler 2 puff  2 puff Inhalation Q6H PRN   • albuterol inhalation solution 2 5 mg  2 5 mg Nebulization Q6H PRN   • Diclofenac Sodium (VOLTAREN) 1 % topical gel 2 g  2 g Topical 4x Daily   • gabapentin (NEURONTIN) capsule 100 mg  100 mg Oral HS   • levothyroxine tablet 125 mcg  125 mcg Oral Early Morning   • metoprolol succinate (TOPROL-XL) 24 hr tablet 25 mg  25 mg Oral Daily   • ondansetron (ZOFRAN) injection 4 mg  4 mg Intravenous Q6H PRN   • pantoprazole (PROTONIX) injection 40 mg  40 mg Intravenous Q12H [facility]   • Pramox-PE-Glycerin-Petrolatum (PREPARATION H MAX) 1-0 25-14 4-15 % rectal cream   Rectal BID PRN   • pravastatin (PRAVACHOL) tablet 20 mg  20 mg Oral Daily With Dinner   • sodium chloride 0 9 % infusion  50 mL/hr Intravenous Continuous   • witch hazel-glycerin (TUCKS) topical pad 1 pad  1 pad Topical Q4H PRN        Allergies   Allergen Reactions   • Amiodarone Hives   • Omnipaque [Iohexol] Hives and Shortness Of Breath   • Clindamycin Other (See Comments)     When taken causes cdiff immediately   • Other    • Sulfa Antibiotics Hives     itching   • Acetazolamide Hives and Rash     Reaction Date:Unknown   • Iv Dye  [Iodinated Contrast Media] Hypertension and Palpitations     Annotation - [id number]: Verified with patient    • Naprosyn [Naproxen] Itching and Rash     Category: Allergy;        Objective   Vitals: Blood pressure 139/72, pulse 88, temperature 97 9 °F (36 6 °C), temperature source Axillary, resp  rate 17, weight 57 kg (125 lb 10 6 oz), SpO2 97 %  ,Body mass index is 22 98 kg/m²  Physical Exam  Vitals and nursing note reviewed  Constitutional:       General: She is not in acute distress  Appearance: Normal appearance  She is not ill-appearing  HENT:      Head: Normocephalic  Mouth/Throat:      Mouth: Mucous membranes are dry  Eyes:      General: No scleral icterus  Conjunctiva/sclera: Conjunctivae normal    Cardiovascular:      Rate and Rhythm: Normal rate and regular rhythm  Pulmonary:      Effort: Pulmonary effort is normal  No respiratory distress  Abdominal:      General: Bowel sounds are normal  There is no distension  Palpations: Abdomen is soft  Tenderness: There is no abdominal tenderness  Musculoskeletal:         General: No swelling or tenderness  Skin:     General: Skin is warm and dry  Coloration: Skin is pale  Neurological:      General: No focal deficit present        Mental Status: She is alert and oriented to person, place, and time  Mental status is at baseline  Motor: Weakness present  Comments: Forgetful at times         Lab Results:   Results from last 7 days   Lab Units 02/06/23  0354 02/05/23  1541 02/05/23  0909   WBC Thousand/uL  --   --  9 59   HEMOGLOBIN g/dL 8 3*   < > 7 8*   HEMATOCRIT % 25 5*  --  24 0*   PLATELETS Thousands/uL  --   --  260    < > = values in this interval not displayed  Results from last 7 days   Lab Units 02/06/23  0355 02/05/23  0909   POTASSIUM mmol/L 3 7 4 0   CHLORIDE mmol/L 107 104   CO2 mmol/L 23 26   BUN mg/dL 36* 53*   CREATININE mg/dL 0 66 0 73   CALCIUM mg/dL 7 6* 8 3*   ALK PHOS U/L  --  33*   ALT U/L  --  11   AST U/L  --  12*       Imaging Studies: I have personally reviewed pertinent reports  EKG, Pathology, and Other Studies: I have personally reviewed pertinent reports      VTE Prophylaxis: Reason for no pharmacologic prophylaxis rule out GI bleed    Code Status: Level 3 - DNAR and DNI

## 2023-02-06 NOTE — ASSESSMENT & PLAN NOTE
Recently admitted for right shoulder pain in jan  Pain in the right shoulder blade and right elbow  Had been on narcotics but d/jeffy due to encpehalopathy  Was to follow up with ortho outpt  Has mri scheduled outpt here on Tuesday  Started on gabapentin by ortho outpt  Ortho had evaluated pt in hospital and determined no role for iv/joint injection  Pain is a huge hindrance and stressor in her life   Causing suicidial ideation at times and failure to thrive  Consider MRI C spine while inpatient  Appreciate Geriatrics evaluation

## 2023-02-06 NOTE — ASSESSMENT & PLAN NOTE
History of liable bp  Pt had a syncopal event after substernal chest pain on ambulating to the bathroom  Possibly vasovagal due to anemia and hypotension  2D echo pending

## 2023-02-06 NOTE — PROGRESS NOTES
[facility]  Progress Note - June Mariela Lakhani 6/4/1932, 80 y o  female MRN: [id number]  Unit/Bed#: E5 -01 Encounter: [id number]  Primary Care Provider: Magdiel Cali DO   Date and time admitted to hospital: 2/5/2023  8:25 AM    Acute pain of right shoulder  Assessment & Plan  Recently admitted for right shoulder pain in jan  Pain in the right shoulder blade and right elbow  Had been on narcotics but d/jeffy due to encpehalopathy  Was to follow up with ortho outpt  Has mri scheduled outpt here on Tuesday  Started on gabapentin by ortho outpt  Ortho had evaluated pt in hospital and determined no role for iv/joint injection  Pain is a huge hindrance and stressor in her life  Causing suicidial ideation at times and failure to thrive  Consider MRI C spine while inpatient  Appreciate Geriatrics evaluation      Suicidal ideation  Assessment & Plan  Admits to thinking about death currently without plan  Declined antidepressants  Psych consulted    Syncope  Assessment & Plan  History of liable bp  Pt had a syncopal event after substernal chest pain on ambulating to the bathroom  Possibly vasovagal due to anemia and hypotension  2D echo pending    Hypothyroidism due to Hashimoto's thyroiditis  Assessment & Plan  Continue synthroid     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[address] )  Assessment & Plan  H/o low grade salivary gland adenocarcinoma  Follows with oncology  Declined treatment in the past  Repeat imagining was unchanged   Imagining from sept 2022 did reveal unchanged 2 5cm destructive soft tissue lesion in the right posterior maxillary hard palate with erosion into the right maxillary sinus floor     Paroxysmal atrial fibrillation (HCC)  Assessment & Plan  Continue toprol   Not on ac due to bleeding risk from tumor    Asthma-COPD overlap syndrome (HCC)  Assessment & Plan  No exacerbation  Continue albuterol     Essential hypertension  Assessment & Plan  Continue metoprolol, currently controlled    * Anemia  Assessment & Plan  Baseline hemoglobin is 12  Trended as low as 7 0  CT abd pelvis without acute processes or bleed  S/p 1 unit transfused  Heme positive in the ed  GI consulted, plan for EGD, colonoscopy  Continue PPI BID  NPO       VTE Pharmacologic Prophylaxis:   Pharmacologic: none, anemia  Mechanical VTE Prophylaxis in Place: Yes    Patient Centered Rounds: I have performed bedside rounds with nursing staff today  Discussions with Specialists or Other Care Team Provider: GI    Current Length of Stay: 1 day(s)    Current Patient Status: Inpatient   Certification Statement: The patient will continue to require additional inpatient hospital stay due to pending EGD, Colonoscopy    Discharge Plan: pending    Code Status: Level 3 - DNAR and DNI      Subjective:   No events overnight  NPO awaiting EGD, colon  Denies any nausea, vomiting or abd pain  Objective:     Vitals:   Temp (24hrs), Av 7 °F (36 5 °C), Min:97 3 °F (36 3 °C), Max:98 °F (36 7 °C)    Temp:  [97 3 °F (36 3 °C)-98 °F (36 7 °C)] 97 9 °F (36 6 °C)  HR:  [82-88] 88  Resp:  [17-18] 17  BP: (119-146)/(59-72) 139/72  SpO2:  [93 %-97 %] 97 %  Body mass index is 22 98 kg/m²  Input and Output Summary (last 24 hours): Intake/Output Summary (Last 24 hours) at 2023 1327  Last data filed at 2023 1945  Gross per 24 hour   Intake 700 ml   Output --   Net 700 ml       Physical Exam:     Physical Exam  Vitals and nursing note reviewed  Constitutional:       Appearance: Normal appearance  HENT:      Head: Normocephalic and atraumatic  Eyes:      General: No scleral icterus  Conjunctiva/sclera: Conjunctivae normal    Cardiovascular:      Rate and Rhythm: Normal rate and regular rhythm  Pulmonary:      Breath sounds: No wheezing or rhonchi  Abdominal:      General: Bowel sounds are normal  There is no distension  Palpations: Abdomen is soft  Tenderness: There is no abdominal tenderness     Musculoskeletal: General: No swelling  Normal range of motion  Skin:     General: Skin is warm and dry  Neurological:      General: No focal deficit present  Mental Status: She is alert and oriented to person, place, and time  Mental status is at baseline  Additional Data:     Labs:    Results from last 7 days   Lab Units 02/06/23  1252 02/05/23  1541 02/05/23  0909   WBC Thousand/uL  --   --  9 59   HEMOGLOBIN g/dL 8 9*   < > 7 8*   HEMATOCRIT % 26 6*   < > 24 0*   PLATELETS Thousands/uL  --   --  260   NEUTROS PCT %  --   --  75   LYMPHS PCT %  --   --  18   MONOS PCT %  --   --  6   EOS PCT %  --   --  0    < > = values in this interval not displayed  Results from last 7 days   Lab Units 02/06/23  0355 02/05/23  0909   SODIUM mmol/L 139 137   POTASSIUM mmol/L 3 7 4 0   CHLORIDE mmol/L 107 104   CO2 mmol/L 23 26   BUN mg/dL 36* 53*   CREATININE mg/dL 0 66 0 73   ANION GAP mmol/L 9 7   CALCIUM mg/dL 7 6* 8 3*   ALBUMIN g/dL  --  3 7   TOTAL BILIRUBIN mg/dL  --  0 35   ALK PHOS U/L  --  33*   ALT U/L  --  11   AST U/L  --  12*   GLUCOSE RANDOM mg/dL 103 136                           * I Have Reviewed All Lab Data Listed Above  * Additional Pertinent Lab Tests Reviewed: [address] Admission Reviewed    Imaging:    CT abdomen pelvis wo contrast    Result Date: 2/5/2023  Impression: No acute process in the abdomen and pelvis identified on this unenhanced CT  Moderate colonic stool burden   Workstation performed: [id number]       Recent Cultures (last 7 days):           Last 24 Hours Medication List:   Current Facility-Administered Medications   Medication Dose Route Frequency Provider Last Rate   • acetaminophen  975 mg Oral Q8H [address] Kia Ambron, DO     • albuterol  2 puff Inhalation Q6H PRN Kia Ambron, DO     • albuterol  2 5 mg Nebulization Q6H PRN Kia Ambron, DO     • Diclofenac Sodium  2 g Topical 4x Daily Kia Ambron, DO     • gabapentin  100 mg Oral HS Kia Ambron, DO     • levothyroxine  125 mcg Oral Early Morning Kia Burger DO     • metoprolol succinate  25 mg Oral Daily Kia Burger DO     • ondansetron  4 mg Intravenous Q6H PRN Kia Burger DO     • pantoprazole  40 mg Intravenous Q12H [address] Kia Burger DO     • Pramox-PE-Glycerin-Petrolatum   Rectal BID PRN SARAHI James     • pravastatin  20 mg Oral Daily With Hayes-Byron, DO     • sodium chloride  50 mL/hr Intravenous Continuous Kia Burger DO 50 mL/hr (02/06/23 1155)   • witch hazel-glycerin  1 pad Topical Q4H PRN SARAHI James          Today, Patient Was Seen By: Charu Mckeon MD    ** Please Note: Dictation voice to text software may have been used in the creation of this document   **

## 2023-02-06 NOTE — NURSING NOTE
Golytely prep stopped at approximately 1230, GI lab informed that pt still not clear at time of dicontinuation  Pt continues with frequent dark watery BMs

## 2023-02-06 NOTE — UTILIZATION REVIEW
Initial Clinical Review    Admission: Date/Time/Statement:   Admission Orders (From admission, onward)     Ordered        02/05/23 1034  [address] [facility] [address]  Once                      Orders Placed This Encounter   Procedures   • INPATIENT ADMISSION     Standing Status:   Standing     Number of Occurrences:   1     Order Specific Question:   Level of Care     Answer:   Med Surg [16]     Order Specific Question:   Estimated length of stay     Answer:   More than 2 Midnights     Order Specific Question:   Certification     Answer:   I certify that inpatient services are medically necessary for this patient for a duration of greater than two midnights  See H&P and MD Progress Notes for additional information about the patient's course of treatment  ED Arrival Information     Expected   -    Arrival   2/5/2023 08:25    Acuity   Urgent            Means of arrival   Ambulance    Escorted by   [facility]    Service   Hospitalist    Admission type   Emergency            Arrival complaint   failure to thrieve           Chief Complaint   Patient presents with   • Weakness - Generalized     Pt noted last night to have weakness and also reports decreased PO intake  Initial Presentation: 80 y o  female   with a pmh of salivary gland cancer, asthma, Paf, hypothyroidism, and pain in herright arm  She had a recent admission 1/19 for acute pain in her R shoulder  She was treated with a steroid taper, a trial of narcotics resulted in encephalopathy  She reports continued pain in her arm, that is hindering her quality of life  She has become depressed and contemplated ending her life, had looked up ways to end her life  She appears depressed  She reports sleeping more , feels hopeless, increased sadness and mood swings  She is a DNR/DNI on her living will that was active prior to her current depression  She was found to be anemic Hg of 7 8( base 12_ she is A& O x 3 but a poor historian   She reports she was constipated for 1 week, but had had a viral gastroenteritis and diarrhea after her recent discharge  She was heme positive in the ED/ She ambulated to the bathroom and had a syncopal episode, she reports having substernal chest pain prior, along with increasing pain in her right arm  Family reports that she had variable blood pressures in the past  She is admitted inpatient due to anemia, likely GI bleed,  R shoulder pain, Syncope,        Gastroenterology Consult - 2/5/24  -  Hx of afib on anticoagulation, noted to have Hg 7 8  Possible peptic ulcer disease, reflux esophagitis, erosive gastritis, polyps, AVMs, or malignancy  Plan for EGD & colonoscopy on 2/6  Behavioral Health Consult  - 2/5/24 - 79 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, retire nurse, 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  Consulted for a passive death wish  She reports she has no intent or plan for suicide  She recognizes her need to get better and verbalizes  The desire to be closer to her children and being to her community  Plan start Remeron 7 5 mg po hs for insomnia, poor appetite, and depressed mood  She need OP follow up treatment, partial hospitalization or intensive OP programing  vrs inpatient geriatric psychiatry  follow-up  Date: 2/6/2023     Day 2:  No acute events overnight  Pt encouraged to continue to drink bowel prep, Bowel movements continue to be black  Hg to 8 3 s/p 1 unit of PRBC, Plan EGD, colonoscopy per GI  Continue PPI bid, NPO   R shoulder  Pain on gabapentin as per Ortho, to consider MRI C spine  Syncope,  due to anemia & hypotension  Geriatric Medicine Consult - 2/6 -  Pt with no prior memory issues , she has noted some increased forgetfulness and memory issues, She is A & O x 4 on exam   CT head 9/18/19 revealed moderate microangiopathic changes  Delirium precautions maintained  Baseline function, independent for ADL's, she reports some trouble cleaning and simple math to pay her bills     Optimize diet, keep hydrated, PT/OT eval, Psych eval started on mirtazapine Hs, Monitor for increased lethargy  Anemia - GI workup - EGD, colonoscopy planned  R shoulder pain on Tylenol 975 mg q8, Diclofenac gel qid, Tramadol po prn  Continue gabapentin 100 mg hs         ED Triage Vitals   Temperature Pulse Respirations Blood Pressure SpO2   02/05/23 0830 02/05/23 0830 02/05/23 0913 02/05/23 0830 02/05/23 0830   98 3 °F (36 8 °C) 90 16 121/57 98 %      Temp Source Heart Rate Source Patient Position - Orthostatic VS BP Location FiO2 (%)   02/05/23 0830 02/05/23 0830 02/05/23 0830 02/05/23 0830 --   Oral Monitor Lying Left arm       Pain Score       02/05/23 0830       4          Wt Readings from Last 1 Encounters:   02/05/23 57 kg (125 lb 10 6 oz)     Additional Vital Signs:   Date/Time Temp Pulse Resp BP MAP (mmHg) SpO2 O2 Device Patient Position - Orthostatic VS   02/06/23 07:32:24 97 9 °F (36 6 °C) 88 17 139/72 94 97 % None (Room air) Lying   02/06/23 00:10:50 97 3 °F (36 3 °C) Abnormal  82 -- 146/62 90 95 % -- --   02/05/23 1958 -- -- -- -- -- -- None (Room air) --   02/05/23 19:41:01 97 6 °F (36 4 °C) 82 -- 119/67 84 93 % -- --   02/05/23 16:15:38 97 7 °F (36 5 °C) 85 18 124/61 82 94 % None (Room air) --   02/05/23 1556 -- -- 18 -- -- -- -- --   02/05/23 15:49:34 98 °F (36 7 °C) 87 -- 126/59 81 95 % None (Room air) Lying   02/05/23 12:41:13 -- 85 -- 113/64 80 94 % -- --   02/05/23 11:49:20 97 3 °F (36 3 °C) Abnormal  81 -- 141/74 96 98 % None (Room air) --   02/05/23 11:48:07 -- 79 -- 141/74 96 97 % -- --   02/05/23 1115 -- 102 -- -- -- 96 % -- --   02/05/23 1100 -- 92 -- -- -- 97 % -- --   02/05/23 0915 -- -- -- -- -- -- None (Room air) --   02/05/23 0913 -- -- 16 -- -- -- -- --   02/05/23 0830 98 3 °F (36 8 °C) 90 -- 121/57 -- 98 % None (Room air) Lying       Pertinent Labs/Diagnostic Test Results:   CT abdomen pelvis wo contrast   Final Result by Doreen Woodruff MD (02/05 6127)      No acute process in the abdomen and pelvis identified on this unenhanced CT  Moderate colonic stool burden        Workstation performed: [id number]            EKG - NS 86 bpm no ST change     Colonoscopy - 2/63/23        EGD - 2/6/23        Results from last 7 days   Lab Units 02/06/23  0354 02/05/23  1541 02/05/23  0909   WBC Thousand/uL  --   --  9 59   HEMOGLOBIN g/dL 8 3* 7 0* 7 8*   HEMATOCRIT % 25 5*  --  24 0*   PLATELETS Thousands/uL  --   --  260   NEUTROS ABS Thousands/µL  --   --  7 17         Results from last 7 days   Lab Units 02/06/23  0355 02/05/23  0909   SODIUM mmol/L 139 137   POTASSIUM mmol/L 3 7 4 0   CHLORIDE mmol/L 107 104   CO2 mmol/L 23 26   ANION GAP mmol/L 9 7   BUN mg/dL 36* 53*   CREATININE mg/dL 0 66 0 73   EGFR ml/min/1 73sq m 78 72   CALCIUM mg/dL 7 6* 8 3*     Results from last 7 days   Lab Units 02/05/23  0909   AST U/L 12*   ALT U/L 11   ALK PHOS U/L 33*   TOTAL PROTEIN g/dL 5 8*   ALBUMIN g/dL 3 7   TOTAL BILIRUBIN mg/dL 0 35       Results from last 7 days   Lab Units 02/06/23  0355 02/05/23  0909   GLUCOSE RANDOM mg/dL 103 136       Results from last 7 days   Lab Units 02/05/23  1719 02/05/23  1541 02/05/23  1300   HS TNI 0HR ng/L  --   --  8   HS TNI 2HR ng/L  --  9  --    HSTNI D2 ng/L  --  1  --    HS TNI 4HR ng/L 9  --   --    HSTNI D4 ng/L 1  --   --        Results from last 7 days   Lab Units 02/05/23  0909   TSH 3RD GENERATON uIU/mL 3 620       Results from last 7 days   Lab Units 02/05/23  1300   FERRITIN ng/mL 35       ED Treatment:   Medication Administration from 02/05/2023 0825 to 02/05/2023 1134       Date/Time Order Dose Route Action Comments     02/05/2023 1024 EST pantoprazole (PROTONIX) injection 40 mg 40 mg Intravenous Given --     02/05/2023 1037 EST acetaminophen (TYLENOL) tablet 650 mg 650 mg Oral Given --     02/05/2023 1039 EST lidocaine (LIDODERM) 5 % patch 1 patch 1 patch Topical Medication Applied --        Past Medical History:   Diagnosis Date   • [facility]    • Allergy to IVP dye 06/04/2013    pt felt heaviness, palpitations   • 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), wet (HCC)     bilateral   • Aneurysm of aortic root     last assessed - [id number]   • Aortic arch aneurysm    • Aortic root dilatation (HCC)     last assessed - [id number]   • Arthritis    • Ascending aortic aneurysm     last assessed - 51UIG9821   • Asthma    • Basal cell carcinoma     last assessed - 68Edq3173   • 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[facility])    • Disease of thyroid gland    • Facet arthropathy, cervical     last assessed - 53MVQ4294   • History of fracture of vertebral column    • Hyperlipidemia    • Hypertension    • 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[facility] [address] )    • Hypoxia     last assessed - 43SCH1563   • Junctional rhythm     last assessed - 07Cwk1914   • Lightheadedness     last assessed - 13Efc4930   • Migraine    • Palpitations     last assessed - 28Jun2016   • Pleural effusion, bilateral     last assessed - 21Ufz1015   • 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HCC)    • Shortness of breath     last assessed - 09Qwn4438   • Thyroid trouble    • Trigger finger     last assessed - 95FWV8108   • Trigger middle finger of right hand     last assessed - 67Eot0270   • Urinary incontinence     last assessed -  22Jul,2013;  Resolved - 55UGS3146     Present on Admission:  • Essential hypertension  • Paroxysmal atrial fibrillation (HCC)  • Asthma-COPD overlap syndrome (HCC)  • 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  • Hypothyroidism due to Hashimoto's thyroiditis  • Acute pain of right shoulder      Admitting Diagnosis: Suicidal ideation [R45 851]  Pain [R52]  GI bleed [K92 2]  Failure to thrive in adult [R62 7]  Symptomatic anemia [D64 9]  Age/Sex: 80 y o  female         Admission Orders:  Scheduled Medications:  acetaminophen, 975 mg, Oral, Q8H RIZWANA  Diclofenac Sodium, 2 g, Topical, 4x Daily  gabapentin, 100 mg, Oral, HS  levothyroxine, 125 mcg, Oral, Early Morning  metoprolol succinate, 25 mg, Oral, Daily  pantoprazole, 40 mg, Intravenous, Q12H RIZWANA  pravastatin, 20 mg, Oral, Daily With Dinner  1 Unit of PRBC - 2/5 x 1   Dulcolax 10 mg po once - 2/5 x 2   Lidocaine patch  5% once - 2/5 x1  Golytely 4000 ml PO - 2/5 x1 - 2/6 x1       Continuous IV Infusions:  sodium chloride, 50 mL/hr, Intravenous, Continuous      PRN Meds:  albuterol, 2 puff, Inhalation, Q6H PRN  albuterol, 2 5 mg, Nebulization, Q6H PRN  ondansetron, 4 mg, Intravenous, Q6H PRN  Pramox-PE-Glycerin-Petrolatum, , Rectal, BID PRN-2/6 x 1  witch hazel-glycerin, 1 pad, Topical, Q4H PRN      Nursing Orders  - VS - Telem - I/O q shift - SCD's to le's - up with assistance - Diet Clears - (NPO prior to procedure on 2/6)        Network Utilization Review Department  ATTENTION: Please call with any questions or concerns to [phone] and carefully listen to the prompts so that you are directed to the right person  All voicemails are confidential   Mateo Araiza all requests for admission clinical reviews, approved or denied determinations and any other requests to dedicated fax number below belonging to the campus where the patient is receiving treatment   List of dedicated fax numbers for the Facilities:  [address] DENIALS (Administrative/Medical Necessity) [phone]   [address] (Maternity/NICU/Pediatrics) [phone]   [address] [phone]   [place] [address] [phone]   [address] [place] [address] [facility] [place] [place] [id number] [place] [address] [address] [phone]   [address] [place] [facility] [place] [address] [phone]

## 2023-02-06 NOTE — ANESTHESIA PREPROCEDURE EVALUATION
Procedure:  COLONOSCOPY  EGD    Relevant Problems   CARDIO   (+) Aneurysm of aortic arch without rupture   (+) Atherosclerosis of aorta (HCC)   (+) Essential hypertension   (+) Hyperlipidemia   (+) Nonrheumatic mitral valve regurgitation   (+) Paroxysmal atrial fibrillation (HCC)      ENDO   (+) Hypoparathyroidism, unspecified hypoparathyroidism type (HCC)   (+) Hypothyroidism   (+) Hypothyroidism due to Hashimoto's thyroiditis      HEMATOLOGY   (+) Anemia   (+) Iron deficiency anemia      NEURO/PSYCH   (+) Headache      PULMONARY   (+) Asthma-COPD overlap syndrome (HCC)   (+) Moderate persistent asthma without complication   (+) Severe persistent asthma with acute exacerbation   (+) Shortness of breath   (+) Uncomplicated asthma        Physical Exam    Airway    Mallampati score: II  TM Distance: >3 FB  Neck ROM: full     Dental   lower dentures,     Cardiovascular  Rhythm: irregular, Rate: normal,     Pulmonary  Pulmonary exam normal Breath sounds clear to auscultation,     Other Findings        Anesthesia Plan  ASA Score- 3     Anesthesia Type- general with ASA Monitors  Additional Monitors:   Airway Plan:     Comment: 8 9 from 7 gms    Plan Factors-    Chart reviewed  EKG reviewed  Imaging results reviewed  Existing labs reviewed  Patient summary reviewed  Induction-     Postoperative Plan-     Informed Consent- Anesthetic plan and risks discussed with patient

## 2023-02-06 NOTE — ASSESSMENT & PLAN NOTE
H/o low grade salivary gland adenocarcinoma  Follows with oncology  Declined treatment in the past  Repeat imagining was unchanged   Imagining from sept 2022 did reveal unchanged 2 5cm destructive soft tissue lesion in the right posterior maxillary hard palate with erosion into the right maxillary sinus floor

## 2023-02-06 NOTE — ANESTHESIA POSTPROCEDURE EVALUATION
Post-Op Assessment Note    CV Status:  Stable  Pain Score: 0    Pain management: adequate     Mental Status:  Alert and awake   Hydration Status:  Euvolemic and stable   PONV Controlled:  Controlled   Airway Patency:  Patent      Post Op Vitals Reviewed: Yes      Staff: Anesthesiologist         No notable events documented      BP      Temp      Pulse     Resp      SpO2      /81   Pulse 82   Temp 98 2 °F (36 8 °C)   Resp 18   Wt 57 kg (125 lb 10 6 oz)   SpO2 95%   BMI 22 98 kg/m²

## 2023-02-06 NOTE — QUICK NOTE
Patient seen and examined  Completed only half of bowel prep per nursing staff  Per patient she drinks 6-7 cups of GoLytely  She was given a new bottle of GoLytely this morning  Bowel movements continue to be black  Encouraged her to continue to drink preparation until approximately 1 PM   Hemoglobin currently stable at 8 3  Plan for EGD/colonoscopy later today  Further recommendations based on findings

## 2023-02-06 NOTE — ASSESSMENT & PLAN NOTE
Admits to thinking about death currently without plan  Declined antidepressants  Psych consulted, recommending remeron 7 5mg bedtime  Follow outpatient with psych

## 2023-02-06 NOTE — ASSESSMENT & PLAN NOTE
Baseline hemoglobin is 12   Trended as low as 7 0  CT abd pelvis without acute processes or bleed  S/p 1 unit transfused  Heme positive in the ed  GI consulted, plan for EGD, colonoscopy  Continue PPI BID  NPO

## 2023-02-06 NOTE — PLAN OF CARE
Problem: Potential for Falls  Goal: Patient will remain free of falls  Description: INTERVENTIONS:  - Educate patient/family on patient safety including physical limitations  - Instruct patient to call for assistance with activity   - Consult OT/PT to assist with strengthening/mobility   - Keep Call bell within reach  - Keep bed low and locked with side rails adjusted as appropriate  - Keep care items and personal belongings within reach  - Initiate and maintain comfort rounds  - Make Fall Risk Sign visible to staff  - Offer Toileting every 2 Hours, in advance of need  - Initiate/Maintain bed/chair alarm  - Obtain necessary fall risk management equipment: chair alarm  - Apply yellow socks and bracelet for high fall risk patients  - Consider moving patient to room near nurses station  Outcome: Progressing

## 2023-02-07 ENCOUNTER — HOME CARE VISIT (OUTPATIENT)
Dept: HOME HEALTH SERVICES | Facility: HOME HEALTHCARE | Age: 88
End: 2023-02-07

## 2023-02-07 ENCOUNTER — APPOINTMENT (INPATIENT)
Dept: MRI IMAGING | Facility: HOSPITAL | Age: 88
End: 2023-02-07

## 2023-02-07 ENCOUNTER — APPOINTMENT (INPATIENT)
Dept: NON INVASIVE DIAGNOSTICS | Facility: HOSPITAL | Age: 88
End: 2023-02-07

## 2023-02-07 LAB
ANION GAP SERPL CALCULATED.3IONS-SCNC: 6 MMOL/L (ref 4–13)
BUN SERPL-MCNC: 12 MG/DL (ref 5–25)
CALCIUM SERPL-MCNC: 7.1 MG/DL (ref 8.4–10.2)
CHLORIDE SERPL-SCNC: 110 MMOL/L (ref 96–108)
CO2 SERPL-SCNC: 25 MMOL/L (ref 21–32)
CREAT SERPL-MCNC: 0.53 MG/DL (ref 0.6–1.3)
ERYTHROCYTE [DISTWIDTH] IN BLOOD BY AUTOMATED COUNT: 15.3 % (ref 11.6–15.1)
GFR SERPL CREATININE-BSD FRML MDRD: 83 ML/MIN/1.73SQ M
GLUCOSE SERPL-MCNC: 87 MG/DL (ref 65–140)
HCT VFR BLD AUTO: 24.2 % (ref 34.8–46.1)
HGB BLD-MCNC: 8.1 G/DL (ref 11.5–15.4)
MCH RBC QN AUTO: 31.4 PG (ref 26.8–34.3)
MCHC RBC AUTO-ENTMCNC: 33.5 G/DL (ref 31.4–37.4)
MCV RBC AUTO: 94 FL (ref 82–98)
PLATELET # BLD AUTO: 189 THOUSANDS/UL (ref 149–390)
PMV BLD AUTO: 10.5 FL (ref 8.9–12.7)
POTASSIUM SERPL-SCNC: 3.3 MMOL/L (ref 3.5–5.3)
RBC # BLD AUTO: 2.58 MILLION/UL (ref 3.81–5.12)
SODIUM SERPL-SCNC: 141 MMOL/L (ref 135–147)
WBC # BLD AUTO: 8.16 THOUSAND/UL (ref 4.31–10.16)

## 2023-02-07 RX ORDER — POTASSIUM CHLORIDE 20 MEQ/1
40 TABLET, EXTENDED RELEASE ORAL ONCE
Status: COMPLETED | OUTPATIENT
Start: 2023-02-07 | End: 2023-02-07

## 2023-02-07 RX ORDER — MAGNESIUM HYDROXIDE/ALUMINUM HYDROXICE/SIMETHICONE 120; 1200; 1200 MG/30ML; MG/30ML; MG/30ML
30 SUSPENSION ORAL EVERY 4 HOURS PRN
Status: DISCONTINUED | OUTPATIENT
Start: 2023-02-07 | End: 2023-02-08 | Stop reason: HOSPADM

## 2023-02-07 RX ADMIN — ACETAMINOPHEN 325MG 975 MG: 325 TABLET ORAL at 21:23

## 2023-02-07 RX ADMIN — DICLOFENAC SODIUM 2 G: 10 GEL TOPICAL at 17:34

## 2023-02-07 RX ADMIN — ACETAMINOPHEN 325MG 975 MG: 325 TABLET ORAL at 15:11

## 2023-02-07 RX ADMIN — DICLOFENAC SODIUM 2 G: 10 GEL TOPICAL at 21:23

## 2023-02-07 RX ADMIN — GABAPENTIN 100 MG: 100 CAPSULE ORAL at 21:23

## 2023-02-07 RX ADMIN — ACETAMINOPHEN 325MG 975 MG: 325 TABLET ORAL at 05:02

## 2023-02-07 RX ADMIN — DICLOFENAC SODIUM 2 G: 10 GEL TOPICAL at 12:48

## 2023-02-07 RX ADMIN — POTASSIUM CHLORIDE 40 MEQ: 1500 TABLET, EXTENDED RELEASE ORAL at 08:31

## 2023-02-07 RX ADMIN — DICLOFENAC SODIUM 2 G: 10 GEL TOPICAL at 08:31

## 2023-02-07 RX ADMIN — LEVOTHYROXINE SODIUM 125 MCG: 125 TABLET ORAL at 05:02

## 2023-02-07 RX ADMIN — PANTOPRAZOLE SODIUM 40 MG: 40 TABLET, DELAYED RELEASE ORAL at 15:12

## 2023-02-07 RX ADMIN — PANTOPRAZOLE SODIUM 40 MG: 40 TABLET, DELAYED RELEASE ORAL at 05:02

## 2023-02-07 RX ADMIN — TRAMADOL HYDROCHLORIDE 25 MG: 50 TABLET, COATED ORAL at 15:11

## 2023-02-07 RX ADMIN — PRAVASTATIN SODIUM 20 MG: 20 TABLET ORAL at 17:34

## 2023-02-07 NOTE — SPEECH THERAPY NOTE
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[facility] [place] [facility] )    Hypothyroidism due to Hashimoto's thyroiditis    Syncope    Acute pain of right shoulder    Suicidal ideation         Past Medical History  Past Medical History:   Diagnosis Date   • A-fib ([place] [address] )    • Allergy to IVP dye 06/04/2013    pt felt heaviness, palpitations   • 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), wet (HCC)     bilateral   • Aneurysm of aortic root     last assessed - 66ALQ3847   • Aortic arch aneurysm    • Aortic root dilatation (HCC)     last assessed - 51JAE1876   • Arthritis    • Ascending aortic aneurysm     last assessed - 83HZA5434   • Asthma    • Basal cell carcinoma     last assessed - 09Sep2016   • 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[place] [address] )    • Disease of thyroid gland    • Facet arthropathy, cervical     last assessed - 87RTV6250   • History of fracture of vertebral column    • Hyperlipidemia    • Hypertension    • 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[place] [address] )    • Hypoxia     last assessed - 34ASN3524   • Junctional rhythm     last assessed - 04Apr2017   • Lightheadedness     last assessed - 08Dha9602   • Migraine    • Palpitations     last assessed - 28Jun2016   • Pleural effusion, bilateral     last assessed - 20Apr2015   • 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[place] [address] )    • Shortness of breath     last assessed - 04Apr2017   • Thyroid trouble    • Trigger finger     last assessed - 48TIN1083   • Trigger middle finger of right hand     last assessed - 14Oct2016   • Urinary incontinence     last assessed -  22Jul,2013;  Resolved - [id number]       Past Surgical History  Past Surgical History:   Procedure Laterality Date   • ABDOMINAL AORTIC ANEURYSM REPAIR W/ ENDOLUMINAL GRAFT  06/28/2015    Ascending aorta and hemiarch replacement with 30 mm Vascutek Gelweave graft; Managed by Ramesh Monique; last assessed -    • APPENDECTOMY     • BLADDER SURGERY      Reccurent histoy of bladder surgery   • HOROWITZ PROCEDURE     • CARDIAC CATHETERIZATION  2004    Procedure summary - Luminal irregularities; last assessed - [id number]   •  SECTION     • CORONARY ANEURYSM REPAIR     • CYSTOSCOPY      botox injection   • HYSTERECTOMY     • HI NDSC NJX IMPLT MATRL URT&/BLDR NCK N/A 2017    Procedure: Layman Ahumada; Daksha Basslaura BULKING AGENT INJECTION ;  Surgeon: Marychuy Flowers MD;  [facility];  Service: Urology   • HI TENDON SHEATH INCISION Right 10/18/2016    Procedure: LONG FINGER TRIGGER RELEASE ;  Surgeon: Merced Fischer MD;  [facility];  Service: Orthopedics   • THYROIDECTOMY      Total Thyroidectomy   • TONSILLECTOMY AND ADENOIDECTOMY         Summary    Pt presents with oropharyngeal swallow that appears WNL  Swallows appeared prompt  There were no overt s/s of aspiration  She had some difficulty transferring multiple pills at once into the pharynx, requiring multiple sips of water  Risk of aspiration appears low  Suspect poor PO is related to poor appetite, depression  Noted EGD results from yesterday, which recommended PPI for GERD  Reflux precautions should be followed  Pt would benefit from consult with dietician  No further ST intervention indicated at this time  Recommendations:   Diet: regular diet and thin liquids   Meds: whole with liquid, one at a time  Frequent Oral care  Reflux precautions  Aspiration precautions and compensatory swallowing strategies: upright posture, and remain upright at least 30-45 minutes after meals  Other Recommendations/ considerations: Follow up with GI  Consult with dietician for poor PO intake recently  Discharge from [address] [place] [address]          Current Medical Status  Copied from admission/physician notes:  Buffy Mosley is a 80 y o  female who presents with pmhx of salivary gland cancer, asthma, paf, hypothyroidism, and pain in right arm  She was recently admitted 1/19/23 for acute pain in her right shoulder  She had a ct chest that was negative  She had a xray showing mild oa  She was treated with a steroid taper and evaluated by ortho who stated that there was no role for iv/joint injection  Pt was trialed on narcotics at that time and had encephalopathy in which the narcotics were stopped  She continues to have pain in her arm and that is hindering her overall quality of life  She has become depressed due to this and also contemplated ending her life  She denied having a plan or actually following through with it  She told nursing staff that she was looking up ways to end her life  She also appears to be very depressed  She has been sleeping more, feels hopeless, increased sadness, and mood swings  She had a living will that was active prior to depression in which she is a dnr/dni  Both her family and pt want to up hold this  She has a history of adenocarcinoma of the salivary gland in which she declined treatment and is not a candidate for surgery  This causes some difficulty with swallowing at times  Pt was also found to be anemic in the ed  Her hemoglobin usually is 12 and it was 7 8  pt is oriented x3 but a poor historian  She reported at first that she was constipated for 1 week but later stated she had a viral gastroenteritis and diarrhea right after discharge from hospital  She had lower abd pain that has since resolved  She has not noticed melena or brbpr  She was heme positive in the ed  After my initial eval with pt she was moved to her room inpt  She was ambulating to the bathroom with the help of nurses and syncopized  They had to carry her back to the bed in which she woke up  No seizure activity  no loss of bowel or bladder  She did have substernal chest pressure prior   She is having increased pain in her right arm but denies radiation to her chest  Family also reports that she has had variable blood pressures in the past     Salivary gland cancer [facility])  Assessment & Plan  H/o low grade salivary gland adenocarcinoma  Follows with oncology  Declined treatment  Repeat imagining was unchanged  Imagining from sept 2022 did reveal unchanged 2 5cm destructive soft tissue lesion in the right posterior maxillary hard palate with erosion into the right maxillary sinus floor     Order received and chart reviewed  Discussed with RN  Pt was NPO yesterday for EGD/colonoscopy  She has resumed a regular low sodium diet and thin liquids  Pt denies dysphagia, but has had recent poor PO intake due to poor appetite, depression  Past medical history:   Please see H&P for details    Special Studies:  EGD 2/6-  FINDINGS:  • Mild, patchy erythematous mucosa with erosion in the body of the stomach and antrum; no bleeding was identified  • The esophagus appeared normal   • 3 cm sliding hiatal hernia (type I hiatal hernia) without Hailey Lesches lesions present - GE junction 39 cm from the incisors, diaphragmatic impression 42 cm from the incisors  • Performed forceps biopsies in the stomach  2 biopsies taken from the antrum, 1 from the incisura, 2 from the body to rule out H pylori    • Single large, cratered, round, benign-appearing ulcer in the duodenal bulb with clean base (Gerald III)  • The 2nd part of the duodenum appeared normal   • Moderate, generalized erythematous mucosa in the fundus of the stomach and body of the stomach        SPECIMENS:  ID Type Source Tests Collected by Time Destination   1 : biopsy retrieved by cold biopsy forceps, R/O H  pylori Tissue Stomach TISSUE EXAM Kyler Benavides DO 2/6/2023  4:02 PM              IMPRESSION:  • Mild, patchy erythematous mucosa with erosion in the body of the stomach and antrum; no bleeding was identified  • The esophagus appeared normal   • 3 cm sliding hiatal hernia (type I hiatal hernia) without Sammi Sarai lesions present - GE junction 39 cm from the incisors, diaphragmatic impression 42 cm from the incisors  • Performed forceps biopsies in the stomach  2 biopsies taken from the antrum, 1 from the incisura, 2 from the body to rule out H pylori  • Single large, cratered, round, benign-appearing ulcer in the duodenal bulb with clean base (Gerald III)  • The 2nd part of the duodenum appeared normal      RECOMMENDATION:    Await pathology results      Return to floor  Resume diet  BID ppi for 8-12 weeks  Monitor hemoglobin, transfuse for less than 7  CXR 1/16/23-  No acute cardiopulmonary disease       CT chest 1/14/23-  No evidence of acute thoracic process insofar as can be detected on a noncontrast CT  Distal aortic arch/proximal descending aortic aneurysm maximum diameter 4 7 cm minimally enlarged since December 2016  Nonemergent vascular surgical consultation is advised  Additional chronic findings and negatives as above  CT soft tissue neck 9/13/22-  Unchanged 2 5 cm destructive soft tissue lesion centered in right posterior maxillary hard palate with osseous erosion of right maxillary sinus floor, involvement of right pterygoid process, probable involvement of right greater palatine foramen, and   extending towards the inferior aspect of right nasal cavity  Consider future examinations to be done with metal artifact reduction and puffed cheek technique to see if this improves lesion conspicuity given noncontrast examination  No suspicious cervical lymphadenopathy on this noncontrast exam      Unchanged 4 8 cm aneurysmal dilation of aortic arch status post partially imaged thoracic aortic aneurysm repair  Recommend follow-up CT chest without contrast in 12 months for further evaluation        Social/Education/Vocational Hx:  Pt lives alone    Swallow Information   Current Risks for Dysphagia & Aspiration: advanced age, salivary gland tumor, pt has decided against treatment  Current Symptoms/Concerns: poor PO, FTT  Current Diet: regular diet, thin liquids and low sodium   Baseline Diet: regular diet and thin liquids    Baseline Assessment   Behavior/Cognition: alert  Speech/Language Status: able to participate in conversation and able to follow commands  Patient Positioning: upright in bed     Swallow Mechanism Exam   Facial: symmetrical  Labial: WFL  Lingual: WFL  Velum: symmetrical  Mandible: adequate ROM  Dentition: some missing teeth; pt has a new partial at home  Vocal quality:clear/adequate   Volitional Cough: strong/productive   Respiratory: RA, sats in upper 90s    Consistencies Assessed and Performance   Consistencies Administered: thin liquids, soft solids and hard solids  -pt fed herself breakfast, including an omelette, [place] toast with syrup, crackers, thin liquids by straw and cup, meds whole with water    Oral Stage: Adequate bolus retrieval and draw from straw, prompt mastication, bolus formation and transfer, no residue or pocketing, adequate lip seal  Some difficulty taking multiple pills at once; she needed multiple sips of water to transfer to pharynx  Pharyngeal Stage: Hyolaryngeal elevation observed and palpated  Swallows appeared prompt  There were no overt s/s of aspiration         Esophageal Concerns: none reported by pt, none observed, however pt does have GERD      Results Reviewed with: patient and RN   Dysphagia Goals: none at this time  Discharge recommendation: no follow up needed

## 2023-02-07 NOTE — PROGRESS NOTES
Progress Note -  [facility]  June Vernestine Jan 80 y o  female MRN: [id number]  Unit/Bed#: E5 -01 Encounter: [id number]      ASSESSMENT AND PLAN:      25-year-old female with past medical history of A-fib not on anticoagulation, asthma, hypertension who was admitted for failure to thrive  GI is consulted for anemia  1  Acute anemia  2  Duodenal ulcer  Not have signs of overt bleeding  Had EGD yesterday that showed large clean-based duodenal ulcer that was nonbleeding  Likely source of anemia  Unclear etiology, denies any excessive NSAID use  Biopsies taken to rule out H  pylori  Colonoscopy not completed due to poor preparation  Globin now stable at 8 1   • Given overall stability as well as likely source being duodenal ulcer will not pursue colonoscopy at this time  • Continue Protonix 40 mg twice daily for 8 weeks  • Follow-up pathology from endoscopy  • Avoid NSAIDs  • Monitor hemoglobin, transfuse for less than 7   • Stable from GI perspective for discharge  Gastroenterology to sign off at this time  Please contact with any questions  Rest of care per primary team     ______________________________________________________________________    Subjective: Seen and examined  Had reflux symptoms this morning which resolved with Mylanta  Otherwise denies any significant abdominal pain, nausea, vomiting  Able to tolerate p o  diet  Rest of ROS was negative  REVIEW OF SYSTEMS:    Review of Systems   Constitutional: Negative for chills and fever  HENT: Negative for congestion and sinus pressure  Respiratory: Negative for cough and shortness of breath  Cardiovascular: Negative for chest pain, palpitations and leg swelling  Gastrointestinal: Negative for abdominal pain, diarrhea, nausea and vomiting  Genitourinary: Negative for dysuria and hematuria  Musculoskeletal: Negative for arthralgias and back pain  Skin: Negative for color change and rash  Neurological: Negative for dizziness and headaches  Psychiatric/Behavioral: Negative for agitation and confusion  All other systems reviewed and are negative  Historical Information   Past Medical History:   Diagnosis Date   • A-fib ([facility] [place] [address] )    • Allergy to IVP dye 06/04/2013    pt felt heaviness, palpitations   • 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), wet (HCC)     bilateral   • Aneurysm of aortic root     last assessed - 39MAD4017   • Aortic arch aneurysm    • Aortic root dilatation (HCC)     last assessed - 28JKY5833   • Arthritis    • Ascending aortic aneurysm     last assessed - 41KQJ7061   • Asthma    • Basal cell carcinoma     last assessed - 08Jjc8819   • 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[facility])    • Disease of thyroid gland    • Facet arthropathy, cervical     last assessed - 80FBN6031   • History of fracture of vertebral column    • Hyperlipidemia    • Hypertension    • 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[facility] [address] )    • Hypoxia     last assessed - 90KSB1627   • Junctional rhythm     last assessed - 31Ute2509   • Lightheadedness     last assessed - 51Tpk2927   • Migraine    • Palpitations     last assessed - 66Ake2574   • Pleural effusion, bilateral     last assessed - 31Rmv7159   • 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HCC)    • Shortness of breath     last assessed - 84Law7047   • Thyroid trouble    • Trigger finger     last assessed - 75QPD7523   • Trigger middle finger of right hand     last assessed - 37Aul6312   • Urinary incontinence     last assessed -  22Jul,2013; Resolved - [id number]     Past Surgical History:   Procedure Laterality Date   • ABDOMINAL AORTIC ANEURYSM REPAIR W/ ENDOLUMINAL GRAFT  06/28/2015    Ascending aorta and hemiarch replacement with 30 mm Vascutek Gelweave graft;  Managed by Sierra Heller; last assessed - 04XEU4836   • APPENDECTOMY     • BLADDER SURGERY      Reccurent histoy of bladder surgery   • HOROWITZ PROCEDURE     • CARDIAC CATHETERIZATION  04/14/2004    Procedure summary - Luminal irregularities; last assessed - [id number]   •  SECTION     • CORONARY ANEURYSM REPAIR     • CYSTOSCOPY      botox injection   • HYSTERECTOMY     • [place] NDSC NJX IMPLT MATRL URT&/BLDR NCK N/A 2017    Procedure: Lennie Zhang; Job Parker BULKING AGENT INJECTION ;  Surgeon: Jarocho Carl MD;  [facility];  Service: Urology   • WI TENDON SHEATH INCISION Right 10/18/2016    Procedure: LONG FINGER TRIGGER RELEASE ;  Surgeon: Bobby Spain MD;  [facility];  Service: Orthopedics   • THYROIDECTOMY      Total Thyroidectomy   • TONSILLECTOMY AND ADENOIDECTOMY       Social History   Social History     Substance and Sexual Activity   Alcohol Use Not Currently    Comment: Social drinker     Social History     Substance and Sexual Activity   Drug Use No     Social History     Tobacco Use   Smoking Status Former   • Packs/day: 0 25   • Years: 57 00   • Pack years: 14 25   • Types: Cigarettes   • Start date: 36   • Quit date: 2014   • Years since quittin 1   Smokeless Tobacco Former   Tobacco Comments    smoked 17 yrs 1/2 ppd quit and restarted then quit  10 days ago during 2014      Family History   Problem Relation Age of Onset   • Coronary aneurysm Sister    • Coronary artery disease Sister         CABG   • Heart disease Family         cardiac disorder   • Hypertension Family    • Cancer Family    • Thyroid disease Family        Meds/Allergies     Medications Prior to Admission   Medication   • albuterol (2 5 mg/3 mL) 0 083 % nebulizer solution   • albuterol (PROVENTIL HFA,VENTOLIN HFA) 90 mcg/act inhaler   • amLODIPine-benazepril (LOTREL 5-20) 5-20 MG per capsule   • aspirin 81 MG tablet   • calcium carbonate (OS-TAE) 600 MG tablet   • Diclofenac Sodium (VOLTAREN) 1 %   • gabapentin (Neurontin) 100 mg capsule   • levothyroxine 125 mcg tablet   • lovastatin (MEVACOR) 20 mg tablet   • metoprolol succinate (TOPROL-XL) 25 mg 24 hr tablet   • Multiple Vitamins-Minerals (One Daily Multivitamin Women) TABS     Current Facility-Administered Medications   Medication Dose Route Frequency   • acetaminophen (TYLENOL) tablet 975 mg  975 mg Oral Q8H [address]   • albuterol (PROVENTIL HFA,VENTOLIN HFA) inhaler 2 puff  2 puff Inhalation Q6H PRN   • albuterol inhalation solution 2 5 mg  2 5 mg Nebulization Q6H PRN   • Diclofenac Sodium (VOLTAREN) 1 % topical gel 2 g  2 g Topical 4x Daily   • gabapentin (NEURONTIN) capsule 100 mg  100 mg Oral HS   • levothyroxine tablet 125 mcg  125 mcg Oral Early Morning   • metoprolol succinate (TOPROL-XL) 24 hr tablet 25 mg  25 mg Oral Daily   • ondansetron (ZOFRAN) injection 4 mg  4 mg Intravenous Q6H PRN   • pantoprazole (PROTONIX) EC tablet 40 mg  40 mg Oral BID AC   • Pramox-PE-Glycerin-Petrolatum (PREPARATION H MAX) 1-0 25-14 4-15 % rectal cream   Rectal BID PRN   • pravastatin (PRAVACHOL) tablet 20 mg  20 mg Oral Daily With Dinner   • traMADol (ULTRAM) tablet 25 mg  25 mg Oral Q6H PRN   • witch hazel-glycerin (TUCKS) topical pad 1 pad  1 pad Topical Q4H PRN       Allergies   Allergen Reactions   • Amiodarone Hives   • Omnipaque [Iohexol] Hives and Shortness Of Breath   • Clindamycin Other (See Comments)     When taken causes cdiff immediately   • Other    • Sulfa Antibiotics Hives     itching   • Acetazolamide Hives and Rash     Reaction Date:Unknown   • Iv Dye  [Iodinated Contrast Media] Hypertension and Palpitations     [facility] - [id number]: Verified with patient    • Naprosyn [Naproxen] Itching and Rash     Category: Allergy;            Objective     Blood pressure 130/64, pulse 82, temperature 98 3 °F (36 8 °C), temperature source Oral, resp  rate 16, weight 57 kg (125 lb 10 6 oz), SpO2 92 %  Body mass index is 22 98 kg/m²  Intake/Output Summary (Last 24 hours) at 2/7/2023 0657  Last data filed at 2/6/2023 1618  Gross per 24 hour   Intake 300 ml   Output --   Net 300 ml         PHYSICAL EXAM:      Physical Exam  Vitals and nursing note reviewed  Constitutional:       General: She is not in acute distress  Appearance: Normal appearance  She is not ill-appearing  HENT:      Head: Normocephalic and atraumatic  Mouth/Throat:      Mouth: Mucous membranes are moist    Eyes:      Extraocular Movements: Extraocular movements intact  Conjunctiva/sclera: Conjunctivae normal    Cardiovascular:      Pulses: Normal pulses  Pulmonary:      Effort: Pulmonary effort is normal    Abdominal:      General: Abdomen is flat  Bowel sounds are normal  There is no distension  Palpations: Abdomen is soft  Tenderness: There is no abdominal tenderness  There is no guarding  Skin:     General: Skin is warm and dry  Neurological:      General: No focal deficit present  Mental Status: She is alert and oriented to person, place, and time     Psychiatric:         Mood and Affect: Mood normal          Behavior: Behavior normal           Lab Results:   Admission on 02/05/2023   Component Date Value   • Ventricular Rate 02/05/2023 89    • Atrial Rate 02/05/2023 89    • NC Interval 02/05/2023 158    • QRSD Interval 02/05/2023 72    • QT Interval 02/05/2023 340    • QTC Interval 02/05/2023 413    • P Axis 02/05/2023 68    • QRS Axis 02/05/2023 47    • T Wave Axis 02/05/2023 54    • WBC 02/05/2023 9 59    • RBC 02/05/2023 2 51 (L)    • Hemoglobin 02/05/2023 7 8 (L)    • Hematocrit 02/05/2023 24 0 (L)    • MCV 02/05/2023 96    • MCH 02/05/2023 31 1    • MCHC 02/05/2023 32 5    • RDW 02/05/2023 14 4    • MPV 02/05/2023 10 9    • Platelets 25/53/4834 260    • nRBC 02/05/2023 0    • Neutrophils Relative 02/05/2023 75    • Immat GRANS % 02/05/2023 1    • Lymphocytes Relative 02/05/2023 18    • Monocytes Relative 02/05/2023 6    • Eosinophils Relative 02/05/2023 0    • Basophils Relative 02/05/2023 0    • Neutrophils Absolute 02/05/2023 7 17    • Immature Grans Absolute 02/05/2023 0 05    • Lymphocytes Absolute 02/05/2023 1 71    • Monocytes Absolute 02/05/2023 0 60    • Eosinophils Absolute 02/05/2023 0 03    • Basophils Absolute 02/05/2023 0 03    • Sodium 02/05/2023 137    • Potassium 02/05/2023 4 0    • Chloride 02/05/2023 104    • CO2 02/05/2023 26    • ANION GAP 02/05/2023 7    • BUN 02/05/2023 53 (H)    • Creatinine 02/05/2023 0 73    • Glucose 02/05/2023 136    • Calcium 02/05/2023 8 3 (L)    • AST 02/05/2023 12 (L)    • ALT 02/05/2023 11    • Alkaline Phosphatase 02/05/2023 33 (L)    • Total Protein 02/05/2023 5 8 (L)    • Albumin 02/05/2023 3 7    • Total Bilirubin 02/05/2023 0 35    • eGFR 02/05/2023 72    • TSH 3RD GENERATON 02/05/2023 3 620    • ABO Grouping 02/05/2023 O    • Rh Factor 02/05/2023 Positive    • Antibody Screen 02/05/2023 Positive    • Specimen Expiration Date 02/05/2023 [id number]    • Unit Product Code 02/06/2023 [id number]    • Unit Number 02/06/2023 [id number]    • Unit ABO 02/06/2023 O    • Unit [facility] 02/06/2023 POS    • Crossmatch 02/06/2023 Compatible    • Unit Dispense Status 02/06/2023 Presumed Trans    • Unit Product Volume 02/06/2023 350    • ANTIBODY ID   #1 02/05/2023 Anti-Fya    • Hemoglobin 02/05/2023 7 0 (L)    • hs TnI 0hr 02/05/2023 8    • Iron Saturation 02/05/2023 19    • TIBC 02/05/2023 237 (L)    • Iron 02/05/2023 44 (L)    • Ferritin 02/05/2023 35    • hs TnI 2hr 02/05/2023 9    • Delta 2hr hsTnI 02/05/2023 1    • hs TnI 4hr 02/05/2023 9    • Delta 4hr hsTnI 02/05/2023 1    • Ventricular Rate 02/05/2023 78    • Atrial Rate 02/05/2023 78    • UT Interval 02/05/2023 138    • QRSD Interval 02/05/2023 72    • QT Interval 02/05/2023 362    • QTC Interval 02/05/2023 412    • P Axis 02/05/2023 35    • QRS Axis 02/05/2023 32    • T Wave Axis 02/05/2023 56    • Sodium 02/06/2023 139    • Potassium 02/06/2023 3 7    • Chloride 02/06/2023 107    • CO2 02/06/2023 23    • ANION GAP 02/06/2023 9    • BUN 02/06/2023 36 (H)    • Creatinine 02/06/2023 0 66    • Glucose 02/06/2023 103    • Calcium 02/06/2023 7 6 (L)    • eGFR 02/06/2023 78 • Hemoglobin 02/06/2023 8 3 (L)    • Hematocrit 02/06/2023 25 5 (L)    • Hemoglobin 02/06/2023 8 9 (L)    • Hematocrit 02/06/2023 26 6 (L)    • Sodium 02/07/2023 141    • Potassium 02/07/2023 3 3 (L)    • Chloride 02/07/2023 110 (H)    • CO2 02/07/2023 25    • ANION GAP 02/07/2023 6    • BUN 02/07/2023 12    • Creatinine 02/07/2023 0 53 (L)    • Glucose 02/07/2023 87    • Calcium 02/07/2023 7 1 (L)    • eGFR 02/07/2023 83    • WBC 02/07/2023 8 16    • RBC 02/07/2023 2 58 (L)    • Hemoglobin 02/07/2023 8 1 (L)    • Hematocrit 02/07/2023 24 2 (L)    • MCV 02/07/2023 94    • MCH 02/07/2023 31 4    • MCHC 02/07/2023 33 5    • RDW 02/07/2023 15 3 (H)    • Platelets 84/16/7931 189    • MPV 02/07/2023 10 5        Imaging Studies: I have personally reviewed pertinent imaging studies  [address] [place]    Gastroenterology Fellow  PGY-4  Available via Wire  2/7/2023 6:57 AM

## 2023-02-07 NOTE — UTILIZATION REVIEW
Colonoscopy  ( 2/6)     FINDINGS:   Large amount of solid stool visualized in the rectum  Procedure was aborted        EGD  ( 2/6)    FINDINGS:  • Mild, patchy erythematous mucosa with erosion in the body of the stomach and antrum; no bleeding was identified  • The esophagus appeared normal   • 3 cm sliding hiatal hernia (type I hiatal hernia) without Tracey Pattee lesions present - GE junction 39 cm from the incisors, diaphragmatic impression 42 cm from the incisors  • Performed forceps biopsies in the stomach  2 biopsies taken from the antrum, 1 from the incisura, 2 from the body to rule out H pylori    • Single large, cratered, round, benign-appearing ulcer in the duodenal bulb with clean base (Gerald III)  • The 2nd part of the duodenum appeared normal   • Moderate, generalized erythematous mucosa in the fundus of the stomach and body of the stomach

## 2023-02-07 NOTE — ASSESSMENT & PLAN NOTE
Recently admitted for right shoulder pain in [place]  Pain in the right shoulder blade and right elbow     Had been on narcotics but d/jeffy due to encpehalopathy  Started on gabapentin by ortho outpt  Ortho had evaluated pt in hospital and determined no role for iv/joint injection  MRI C spine ordered and pending  Appreciate Geriatrics evaluation

## 2023-02-07 NOTE — PLAN OF CARE
Problem: Potential for Falls  Goal: Patient will remain free of falls  Description: INTERVENTIONS:  - Educate patient/family on patient safety including physical limitations  - Instruct patient to call for assistance with activity   - Consult OT/PT to assist with strengthening/mobility   - Keep Call bell within reach  - Keep bed low and locked with side rails adjusted as appropriate  - Keep care items and personal belongings within reach  - Initiate and maintain comfort rounds  - Make Fall Risk Sign visible to staff  - Offer Toileting every 2 Hours, in advance of need  - Initiate/Maintain bed/chair alarm  - Obtain necessary fall risk management equipment: chair alarm  - Apply yellow socks and bracelet for high fall risk patients  - Consider moving patient to room near nurses station  Outcome: Progressing     Problem: MOBILITY - ADULT  Goal: Maintain or return to baseline ADL function  Description: INTERVENTIONS:  -  Assess patient's ability to carry out ADLs; assess patient's baseline for ADL function and identify physical deficits which impact ability to perform ADLs (bathing, care of mouth/teeth, toileting, grooming, dressing, etc )  - Assess/evaluate cause of self-care deficits   - Assess range of motion  - Assess patient's mobility; develop plan if impaired  - Assess patient's need for assistive devices and provide as appropriate  - Encourage maximum independence but intervene and supervise when necessary  - Involve family in performance of ADLs  - Assess for home care needs following discharge   - Consider OT consult to assist with ADL evaluation and planning for discharge  - Provide patient education as appropriate  Outcome: Progressing  Goal: Maintains/Returns to pre admission functional level  Description: INTERVENTIONS:  - Perform BMAT or MOVE assessment daily    - Set and communicate daily mobility goal to care team and patient/family/caregiver     - Collaborate with rehabilitation services on mobility goals if consulted  - Perform Range of Motion 3 times a day  - Reposition patient every 2 hours    - Dangle patient 3 times a day  - Stand patient 3 times a day  - Ambulate patient 3 times a day  - Out of bed to chair 3 times a day   - Out of bed for meals 3 times a day  - Out of bed for toileting  - Record patient progress and toleration of activity level   Outcome: Progressing     Problem: PAIN - ADULT  Goal: Verbalizes/displays adequate comfort level or baseline comfort level  Description: Interventions:  - Encourage patient to monitor pain and request assistance  - Assess pain using appropriate pain scale  - Administer analgesics based on type and severity of pain and evaluate response  - Implement non-pharmacological measures as appropriate and evaluate response  - Consider cultural and social influences on pain and pain management  - Notify physician/advanced practitioner if interventions unsuccessful or patient reports new pain  Outcome: Progressing     Problem: INFECTION - ADULT  Goal: Absence or prevention of progression during hospitalization  Description: INTERVENTIONS:  - Assess and monitor for signs and symptoms of infection  - Monitor lab/diagnostic results  - Monitor all insertion sites, i e  indwelling lines, tubes, and drains  - Monitor endotracheal if appropriate and nasal secretions for changes in amount and color  - [place] appropriate cooling/warming therapies per order  - Administer medications as ordered  - Instruct and encourage patient and family to use good hand hygiene technique  - Identify and instruct in appropriate isolation precautions for identified infection/condition  Outcome: Progressing     Problem: SAFETY ADULT  Goal: Patient will remain free of falls  Description: INTERVENTIONS:  - Educate patient/family on patient safety including physical limitations  - Instruct patient to call for assistance with activity   - Consult OT/PT to assist with strengthening/mobility   - Keep Call bell within reach  - Keep bed low and locked with side rails adjusted as appropriate  - Keep care items and personal belongings within reach  - Initiate and maintain comfort rounds  - Make Fall Risk Sign visible to staff  - Offer Toileting every 2 Hours, in advance of need  - Initiate/Maintain bed/chair alarm  - Obtain necessary fall risk management equipment: chair alarm  - Apply yellow socks and bracelet for high fall risk patients  - Consider moving patient to room near nurses station  Outcome: Progressing     Problem: DISCHARGE PLANNING  Goal: Discharge to home or other facility with appropriate resources  Description: INTERVENTIONS:  - Identify barriers to discharge w/patient and caregiver  - Arrange for needed discharge resources and transportation as appropriate  - Identify discharge learning needs (meds, wound care, etc )  - Arrange for interpretive services to assist at discharge as needed  - Refer to Case Management Department for coordinating discharge planning if the patient needs post-hospital services based on physician/advanced practitioner order or complex needs related to functional status, cognitive ability, or social support system  Outcome: Progressing     Problem: Knowledge Deficit  Goal: Patient/family/caregiver demonstrates understanding of disease process, treatment plan, medications, and discharge instructions  Description: Complete learning assessment and assess knowledge base    Interventions:  - Provide teaching at level of understanding  - Provide teaching via preferred learning methods  Outcome: Progressing     Problem: CARDIOVASCULAR - ADULT  Goal: Absence of cardiac dysrhythmias or at baseline rhythm  Description: INTERVENTIONS:  - Continuous cardiac monitoring, vital signs, obtain 12 lead EKG if ordered  - Administer antiarrhythmic and heart rate control medications as ordered  - Monitor electrolytes and administer replacement therapy as ordered  Outcome: Progressing     Problem: GASTROINTESTINAL - ADULT  Goal: Minimal or absence of nausea and/or vomiting  Description: INTERVENTIONS:  - Administer IV fluids if ordered to ensure adequate hydration  - Maintain NPO status until nausea and vomiting are resolved  - Nasogastric tube if ordered  - Administer ordered antiemetic medications as needed  - Provide nonpharmacologic comfort measures as appropriate  - Advance diet as tolerated, if ordered  - Consider nutrition services referral to assist patient with adequate nutrition and appropriate food choices  Outcome: Progressing  Goal: Maintains adequate nutritional intake  Description: INTERVENTIONS:  - Monitor percentage of each meal consumed  - Identify factors contributing to decreased intake, treat as appropriate  - Assist with meals as needed  - Monitor I&O, weight, and lab values if indicated  - Obtain nutrition services referral as needed  Outcome: Progressing     Problem: METABOLIC, FLUID AND ELECTROLYTES - ADULT  Goal: Electrolytes maintained within normal limits  Description: INTERVENTIONS:  - Monitor labs and assess patient for signs and symptoms of electrolyte imbalances  - Administer electrolyte replacement as ordered  - Monitor response to electrolyte replacements, including repeat lab results as appropriate  - Instruct patient on fluid and nutrition as appropriate  Outcome: Progressing     Problem: SKIN/TISSUE INTEGRITY - ADULT  Goal: Skin Integrity remains intact(Skin Breakdown Prevention)  Description: Assess:  -Perform Chaz assessment every shift  -Clean and moisturize skin every shift  -Inspect skin when repositioning, toileting, and assisting with ADLS  -Assess extremities for adequate circulation and sensation     Bed Management:  -Have minimal linens on bed & keep smooth, unwrinkled  -Change linens as needed when moist or perspiring  Activity:  -Mobilize patient 3 times a day  -Encourage activity and walks on unit  -Encourage or provide ROM exercises   -Turn and reposition patient every 2 Hours  -Use appropriate equipment to lift or move patient in bed  -Instruct/ Assist with weight shifting every 2 when out of bed in chair    Skin Care:  -Avoid use of baby powder, tape, friction and shearing, hot water or constrictive clothing  -Do not massage red bony areas      Outcome: Progressing     Problem: HEMATOLOGIC - ADULT  Goal: Maintains hematologic stability  Description: INTERVENTIONS  - Assess for signs and symptoms of bleeding or hemorrhage  - Monitor labs  - Administer supportive blood products/factors as ordered and appropriate  Outcome: Progressing     Problem: Prexisting or High Potential for Compromised Skin Integrity  Goal: Skin integrity is maintained or improved  Description: INTERVENTIONS:  - Identify patients at risk for skin breakdown  - Assess and monitor skin integrity  - Assess and monitor nutrition and hydration status  - Monitor labs   - Assess for incontinence   - Turn and reposition patient  - Assist with mobility/ambulation  - Relieve pressure over bony prominences  - Avoid friction and shearing  - Provide appropriate hygiene as needed including keeping skin clean and dry  - Evaluate need for skin moisturizer/barrier cream  - Collaborate with interdisciplinary team   - Patient/family teaching  - Consider wound care consult   Outcome: Progressing     Problem: Nutrition/Hydration-ADULT  Goal: Nutrient/Hydration intake appropriate for improving, restoring or maintaining nutritional needs  Description: Monitor and assess patient's nutrition/hydration status for malnutrition  Collaborate with interdisciplinary team and initiate plan and interventions as ordered  Monitor patient's weight and dietary intake as ordered or per policy  Utilize nutrition screening tool and intervene as necessary  Determine patient's food preferences and provide high-protein, high-caloric foods as appropriate       INTERVENTIONS:  - Monitor oral intake, urinary output, labs, and treatment plans  - Assess nutrition and hydration status and recommend course of action  - Evaluate amount of meals eaten  - Assist patient with eating if necessary   - Allow adequate time for meals  - Recommend/ encourage appropriate diets, oral nutritional supplements, and vitamin/mineral supplements  - Order, calculate, and assess calorie counts as needed  - Recommend, monitor, and adjust tube feedings and TPN/PPN based on assessed needs  - Assess need for intravenous fluids  - Provide specific nutrition/hydration education as appropriate  - Include patient/family/caregiver in decisions related to nutrition  Outcome: Progressing

## 2023-02-07 NOTE — RESTORATIVE TECHNICIAN NOTE
Restorative Technician Note      Patient Name: Quoc Borges     Restorative Tech Visit Date: 02/07/23  Note Type: Mobility  Patient Position Upon Consult: Bedside chair  Activity Performed: Ambulated; Other (Comment) (To bathroom)  Patient Position at End of Consult: Bedside chair;  All needs within reach

## 2023-02-07 NOTE — ASSESSMENT & PLAN NOTE
Baseline hemoglobin is 12   Trended as low as 7 0  CT abd pelvis without acute processes or bleed  S/p 1 unit transfused  EGD with duodenal ulcer  Continue PPI 40mg BID  Hemoglobin of 8 today, no reports of BM overnight

## 2023-02-07 NOTE — PLAN OF CARE
Problem: GASTROINTESTINAL - ADULT  Goal: Minimal or absence of nausea and/or vomiting  Description: INTERVENTIONS:  - Administer IV fluids if ordered to ensure adequate hydration  - Maintain NPO status until nausea and vomiting are resolved  - Nasogastric tube if ordered  - Administer ordered antiemetic medications as needed  - Provide nonpharmacologic comfort measures as appropriate  - Advance diet as tolerated, if ordered  - Consider nutrition services referral to assist patient with adequate nutrition and appropriate food choices  Outcome: Progressing     Problem: HEMATOLOGIC - ADULT  Goal: Maintains hematologic stability  Description: INTERVENTIONS  - Assess for signs and symptoms of bleeding or hemorrhage  - Monitor labs  - Administer supportive blood products/factors as ordered and appropriate  Outcome: Progressing     Problem: Nutrition/Hydration-ADULT  Goal: Nutrient/Hydration intake appropriate for improving, restoring or maintaining nutritional needs  Description: Monitor and assess patient's nutrition/hydration status for malnutrition  Collaborate with interdisciplinary team and initiate plan and interventions as ordered  Monitor patient's weight and dietary intake as ordered or per policy  Utilize nutrition screening tool and intervene as necessary  Determine patient's food preferences and provide high-protein, high-caloric foods as appropriate       INTERVENTIONS:  - Monitor oral intake, urinary output, labs, and treatment plans  - Assess nutrition and hydration status and recommend course of action  - Evaluate amount of meals eaten  - Assist patient with eating if necessary   - Allow adequate time for meals  - Recommend/ encourage appropriate diets, oral nutritional supplements, and vitamin/mineral supplements  - Order, calculate, and assess calorie counts as needed  - Recommend, monitor, and adjust tube feedings and TPN/PPN based on assessed needs  - Assess need for intravenous fluids  - Provide specific nutrition/hydration education as appropriate  - Include patient/family/caregiver in decisions related to nutrition  Outcome: Progressing

## 2023-02-07 NOTE — PROGRESS NOTES
[address]  Progress Note - June Crystal Cummins 6/4/1932, 80 y o  female MRN: [id number]  Unit/Bed#: E5 -01 Encounter: [id number]  Primary Care Provider: Kassie Devi DO   Date and time admitted to hospital: 2/5/2023  8:25 AM    Acute pain of right shoulder  Assessment & Plan  Recently admitted for right shoulder pain in [place]  Pain in the right shoulder blade and right elbow  Had been on narcotics but d/jeffy due to encpehalopathy  Started on gabapentin by ortho outpt  Ortho had evaluated pt in hospital and determined no role for iv/joint injection  MRI C spine ordered and pending  Appreciate Geriatrics evaluation      Suicidal ideation  Assessment & Plan  Admits to thinking about death currently without plan  Declined antidepressants  Psych consulted, recommending remeron 7 5mg bedtime  Follow outpatient with psych    Syncope  Assessment & Plan  History of liable bp  Pt had a syncopal event after substernal chest pain on ambulating to the bathroom  Possibly vasovagal due to anemia and hypotension  2D echo pending    Hypothyroidism due to Hashimoto's thyroiditis  Assessment & Plan  Continue synthroid     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[facility] [address] )  Assessment & Plan  H/o low grade salivary gland adenocarcinoma  Follows with oncology  Declined treatment in the past  Repeat imagining was unchanged  Imagining from sept 2022 did reveal unchanged 2 5cm destructive soft tissue lesion in the right posterior maxillary hard palate with erosion into the right maxillary sinus floor     Paroxysmal atrial fibrillation (HCC)  Assessment & Plan  Continue toprol   Not on ac due to bleeding risk from tumor    Asthma-COPD overlap syndrome (HCC)  Assessment & Plan  No exacerbation  Continue albuterol     Essential hypertension  Assessment & Plan  Continue metoprolol, currently controlled    * Anemia  Assessment & Plan  Baseline hemoglobin is 12   Trended as low as 7 0  CT abd pelvis without acute processes or bleed  S/p 1 unit transfused  EGD with duodenal ulcer  Continue PPI 40mg BID  Hemoglobin of 8 today, no reports of BM overnight        VTE Pharmacologic Prophylaxis:   Pharmacologic: none, anemia  Mechanical VTE Prophylaxis in Place: Yes    Patient Centered Rounds: I have performed bedside rounds with nursing staff today  Discussions with Specialists or Other Care Team Provider: GI    Current Length of Stay: 2 day(s)    Current Patient Status: Inpatient   Certification Statement: The patient will continue to require additional inpatient hospital stay due to monitor H/H, pending MRI    Discharge Plan: tomorrow    Code Status: Level 3 - DNAR and DNI      Subjective:   No events overnight  Denies any further BMs  Has some reported epigastric, heart burns  Tolerating diet  Continues to have some right elbow and shoulder pain with finger numbness  MRI c spine pending  Objective:     Vitals:   Temp (24hrs), Av 4 °F (36 9 °C), Min:98 2 °F (36 8 °C), Max:98 6 °F (37 °C)    Temp:  [98 2 °F (36 8 °C)-98 6 °F (37 °C)] 98 3 °F (36 8 °C)  HR:  [61-85] 77  Resp:  [16-18] 16  BP: (100-145)/(58-81) 100/58  SpO2:  [92 %-96 %] 94 %  Body mass index is 22 14 kg/m²  Input and Output Summary (last 24 hours): Intake/Output Summary (Last 24 hours) at 2023 1244  Last data filed at 2023 1618  Gross per 24 hour   Intake 300 ml   Output --   Net 300 ml       Physical Exam:     Physical Exam  Vitals and nursing note reviewed  Constitutional:       Appearance: Normal appearance  HENT:      Head: Normocephalic and atraumatic  Eyes:      General: No scleral icterus  Conjunctiva/sclera: Conjunctivae normal    Cardiovascular:      Rate and Rhythm: Normal rate and regular rhythm  Pulmonary:      Breath sounds: No wheezing or rhonchi  Abdominal:      General: Bowel sounds are normal  There is no distension  Palpations: Abdomen is soft  Musculoskeletal:         General: No swelling  Normal range of motion     Skin: General: Skin is warm and dry  Neurological:      General: No focal deficit present  Mental Status: She is alert  Mental status is at baseline  Additional Data:     Labs:    Results from last 7 days   Lab Units 02/07/23  0428 02/05/23  1541 02/05/23  0909   WBC Thousand/uL 8 16  --  9 59   HEMOGLOBIN g/dL 8 1*   < > 7 8*   HEMATOCRIT % 24 2*   < > 24 0*   PLATELETS Thousands/uL 189  --  260   NEUTROS PCT %  --   --  75   LYMPHS PCT %  --   --  18   MONOS PCT %  --   --  6   EOS PCT %  --   --  0    < > = values in this interval not displayed  Results from last 7 days   Lab Units 02/07/23  0428 02/06/23  0355 02/05/23  0909   SODIUM mmol/L 141   < > 137   POTASSIUM mmol/L 3 3*   < > 4 0   CHLORIDE mmol/L 110*   < > 104   CO2 mmol/L 25   < > 26   BUN mg/dL 12   < > 53*   CREATININE mg/dL 0 53*   < > 0 73   ANION GAP mmol/L 6   < > 7   CALCIUM mg/dL 7 1*   < > 8 3*   ALBUMIN g/dL  --   --  3 7   TOTAL BILIRUBIN mg/dL  --   --  0 35   ALK PHOS U/L  --   --  33*   ALT U/L  --   --  11   AST U/L  --   --  12*   GLUCOSE RANDOM mg/dL 87   < > 136    < > = values in this interval not displayed  * I Have Reviewed All Lab Data Listed Above  * Additional Pertinent Lab Tests Reviewed: [place] [address] Admission Reviewed    Imaging:    EGD    Result Date: 2/6/2023  Impression: Mild, patchy erythematous mucosa with erosion in the body of the stomach and antrum; no bleeding was identified The esophagus appeared normal  3 cm sliding hiatal hernia (type I hiatal hernia) without Adenike Quitter lesions present - GE junction 39 cm from the incisors, diaphragmatic impression 42 cm from the incisors Performed forceps biopsies in the stomach  2 biopsies taken from the antrum, 1 from the incisura, 2 from the body to rule out H pylori   Single large, cratered, round, benign-appearing ulcer in the duodenal bulb with clean base (Gerald III) The 2nd part of the duodenum appeared normal  RECOMMENDATION:  Await pathology results Return to floor Resume diet  BID ppi for 8-12 weeks Monitor hemoglobin, transfuse for less than 7  No Staff Documented     Colonoscopy    Result Date: 2/6/2023  Impression: Large amount of solid stool visualized in the rectum precluding visualization  Procedure was aborted  RECOMMENDATION:  No further screening colonoscopies necessary  Age greater than 72    No Staff Documented     CT abdomen pelvis wo contrast    Result Date: 2/5/2023  Impression: No acute process in the abdomen and pelvis identified on this unenhanced CT  Moderate colonic stool burden  Workstation performed: [id number]       Recent Cultures (last 7 days):           Last 24 Hours Medication List:   Current Facility-Administered Medications   Medication Dose Route Frequency Provider Last Rate   • acetaminophen  975 mg Oral Q8H [address] Kia Tao, DO     • albuterol  2 puff Inhalation Q6H PRN Kia Burger, DO     • albuterol  2 5 mg Nebulization Q6H PRN Kia Burger, DO     • aluminum-magnesium hydroxide-simethicone  30 mL Oral Q4H PRN Conor London MD     • Diclofenac Sodium  2 g Topical 4x Daily Kia Burger, DO     • gabapentin  100 mg Oral HS Kia Burger, DO     • levothyroxine  125 mcg Oral Early Morning Kia Burger, DO     • metoprolol succinate  25 mg Oral Daily Kia Burger DO     • ondansetron  4 mg Intravenous Q6H PRN Kia Burger DO     • pantoprazole  40 mg Oral BID AC Kyler G Oscarayamary, DO     • Pramox-PE-Glycerin-Petrolatum   Rectal BID PRN SARAHI Tomlinson     • pravastatin  20 mg Oral Daily With [place], DO     • traMADol  25 mg Oral Q6H PRN Hammad Braden MD     • witch hazel-glycerin  1 pad Topical Q4H PRN SARAHI Tomlinson          Today, Patient Was Seen By: Conor London MD    ** Please Note: Dictation voice to text software may have been used in the creation of this document   **

## 2023-02-07 NOTE — PROGRESS NOTES
Progress Note - Geriatric Medicine   [facility] 80 [facility] MRN: [id number]  Unit/Bed#: E5 -01 Encounter: [id number]      Assessment/Plan:    Cognitive Screening   · Patient had not previously noted any memory issues or cognitive impairment   · She notes some increased forgetfulness and memory issues since her most recent hospital discharge   · Most recent TSH on 2/5/2023 noted to be 3 620  · Most recent vitamin B 12 level on 7/12/2019 noted to be 903  · Would recommend rechecking   · CT of the head on 9/18/2019 revealed moderate microangiopathic changes   · Patient scored 21/30 on MOCA from 1/18/2023  · She was noted to have confusion on her last hospital admission   · Suspected to be secondary to narcotic pain medication  · She was oriented and was doing well on her last hospitalization   · She is alert and oriented x 4 on exam today   · Maintain delirium precautions as discussed below   · Re-direct and re-orient as needed  · Keep physically, mentally, and socially active     Delirium   • Baseline mentation: alert and oriented x 4  • Current mentation: alert and oriented x 4  • Patient is at high risk secondary to age, acute pain, anemia, and hospitalization   • Maintain delirium precautions   · Provide redirection, reorientation, and distraction techniques  · Maintain fall and safety precautions   · Assist with ADLs/IADLs  · Avoid deliriogenic medications such as tramadol, benzodiazepines, anticholinergics, benadryl  · Treat pain using geriatric pain protocol   · Encourage oral hydration and nutrition   · Monitor for constipation and urinary retention   · Implement sleep hygiene and limit night time interuptions   · Maintain sleep-wake cycle   · Encourage early and frequent mobilization  · Encourage participation in group activities  • Most recent EKG on 2/5/2023 revealed a QTc interval of 412  · If all other interventions are unsuccessful for acute behaviors and agitation, would recommend using Zyprexa 2 5 mg IM Q 8 hours prn   • Would avoid benzodiazepines such as Ativan as these can worsen delirium     Deconditioning   • Patient is at increased risk for deconditioning secondary to acute pain, anemia, weakness, and hospitalization   • Continue to optimize diet, hydration, and mobility for healing   · GFR 83 on labs today  · Keep hydrated  · PO intake   · Patient with poor PO intake prior to admission   · Notes that food was not tasting very good   · Notes that she had not eaten in nearly 2 weeks  · States that she did eat breakfast this morning and the food tasted good   · Consider nutrition consult   · Anemia   · Patient with history of iron deficiency anemia   · Noted to have a hemoglobin of 7 8 on admission   · Repeat hemoglobin was 7 0  · GI consulted for suspected GI bleed  · Hemoglobin 8 1 on labs today  · Monitor labs  • Monitor for signs and symptoms of infection, dehydration, DVT, and skin breakdown    Frailty   Clinical Frail Scale: 5- Mildly Frail  · More evident slowing, needs help high order IADLs (transport, bills, medications)  · Progressively impairs shopping and walking outside alone, meal prep and housework  • Most recent albumin on 2/5/2023 noted to be 3 7  • Consider nutrition consult  • Encourage protein supplementation     Ambulatory Dysfunction/Falls  • Patient notes no recent falls   • Does not ambulate with any assistive devices at baseline   • PT/OT consulted   • Maintain fall and safety precautions   • Encourage adequate oral hydration and nutrition   • Out of bed as tolerated     Impaired Vision   • Patient with history of macular degeneration in both eyes  · Wears glasses  · Notes that she was supposed to go for an eye injection on 2/6  • Recommend use of corrective lenses at all appropriate times  • Encourage adequate lighting and encourage use of assistance with ambulation  • Keep personal belongings close to avoid reaching  • Encourage appropriate footwear at all times  • Recommend large font for printed materials provided to patient    Dentition/Appetite   • Patient notes that she has a good appetite at baseline   • She states that she has not been eating well at home since her prior discharge   • She states that she did eat breakfast this morning and it tasted good  • Ensure meal consistency is appropriate for all abilities   • Consider nutrition consult   • Continue aspiration precautions     Elimination   • Patient is incontinent of bowel and bladder at baseline  • Denies any voiding difficulties   • Has not been having bowel movements at home as she states she has not been eating   • She was having diarrhea yesterday as she was taking bowel prep for colonoscopy   • Monitor for constipation and urinary retention     Insomnia   • Patient notes chronic insomnia at home  • She notes that she had been taking a hot shower and then going right to bed which was helping   • She does not take any medication for sleep at baseline   • First line is behavioral therapy   • Avoid sedative hypnotics including benzodiazepines and benadryl  • Encourage staying awake during the day   • Encourage daytime activities and morning exercise   • Decrease or eliminate daytime naps   • Avoid caffeine especially during late afternoon and evening hours  • Establish a nighttime routine  • Implement sleep hygiene and limit nighttime interruptions  • Can consider melatonin 3 mg daily at bedtime for sleep if needed     Anxiety/Depression  • Patient noted on admission that she was feeling depressed  · Noted that her pain, family stress, and recent dependence were playing part in her thoughts of death   · Denied a plan  · On admission, she admitted to increased sleep, feeling of hopelessness, increased sadness, and depression  · Psych was consulted and she was agreeable to an anti-depressant  · She was started on mirtazapine 7 5 mg daily at bedtime   · Would recommend monitoring for increased lethargy as lower doses of this medication can be more sedating  · Psych recommending voluntary inpatient stay but patient declined  · Mood appears to be much improved from yesterday   · She notes that she is feeling lonely at home and thinks that her family is upset with her   · We did discuss adult  and she is interested in this when she is discharged   · Will provide information   · Continue supportive care     Anemia   · Patient with hemoglobin of 7 9 on admission   · Baseline noted to be around 12  · Heme test was positive   · She admitted to no bowel movement in one week   · States that this was secondary to no oral intake   · Suspected to have GI bleed  · GI consulted and patient underwent EGD/colonoscopy yesterday   · EGD revealed hiatal hernia and a large ulcer in the duodenal bulb with clean base   · Biopsies in the stomach were also obtained   · Colonoscopy was unable to be performed secondary to the amount of stool in the colon  · Patient was started on PPI BID x 8-12 weeks   · Hemoglobin on labs today noted to be 8 1  · Continue to monitor hemoglobin  · Transfuse for hemoglobin < 7    Acute Pain of the Right Shoulder  · Patient recently admitted to the hospital from 1/14-1/19 for acute worsening pain of the right shoulder blade and right elbow  · X-ray imaging revealed osteomyelitis   · Orthopedics noted there was no need for IV/joint injection as they believed her pain to be referred back pain   · She had been on an extensive pain regimen on her last admission (please see consult note from 1/19 for a full list of her pain regimen on her last admission)  · Was noted to be confused with oxycodone at all doses   · Appeared to tolerate tramadol well   · Was also placed on steroid taper that seemed to be working well   · Patient notes that when she got home she was not taking the Baclofen and was saving the tramadol for only when the pain was very significant   · She did follow-up with orthopedics in the outpatient setting and was started on gabapentin   · MRI is scheduled for today and she will be able to have this completed today   · She notes that she had been trying to use the lidocaine patches at home but had a difficult time putting them on and noted that when she would put her shirt on the patch would fall off  · She notes that PT did seem to help and some days she noted significant improvement in the pain after the session   · She notes that heat improved the pain in the past but she was unable to get a heating pad for the house   · Would recommend aqua-K pad   · Patient did not receive any prn medication   · She was not aware that any was ordered   · Would continue current pain regimen   · Tylenol 975 mg Q 8 hours scheduled   · Diclofenac gel QID prn   · Tramadol 25 mg for moderate/severe pain   · If patient tolerates the 25 mg and she is still having severe pain can consider adding 50 mg for severe pain   · Gabapentin 100 mg at bedtime   · Continue non-pharmacological methods of pain management       Subjective: The patient is being seen and evaluated today at the bedside for geriatric follow-up  She is noted to be lying in bed comfortably in no acute distress  Her mood appears to be much improved from yesterday  She is alert and oriented x 4 and offers no acute complaints  She denies dizziness and headaches  She denies chest pain and shortness of breath  She denies nausea, vomiting, constipation, and diarrhea  She states that she did eat breakfast this morning and that it tasted good  She notes that she had been sleeping well until around 0300 when her IV pump was going off for a long period of time  We did have a discussion about her goals on discharge  She notes that she is very lonely at home and would enjoy spending more time with her children  She notes that her children are upset with her at this time and she is unsure why  We did discuss adult  which she would be interested in   I informed her that I would obtain some information and provide her with it  Care was coordinated with patients nurse Kin Vera  She notes that the patient is scheduled for the MRI today  She notes no acute issues or events overnight  Review of Systems   Constitutional: Positive for activity change and appetite change  Negative for chills, fatigue and fever  Eyes: Positive for visual disturbance (hx of macular degeneration (glasses))  Respiratory: Negative for cough, chest tightness and shortness of breath  Cardiovascular: Negative for chest pain and palpitations  Gastrointestinal: Negative for abdominal distention, abdominal pain, constipation, diarrhea, nausea and vomiting  Genitourinary: Negative for difficulty urinating and dysuria  Musculoskeletal: Positive for arthralgias (right shoulder/elbow) and gait problem  Negative for myalgias  Skin: Positive for pallor  Negative for color change  Neurological: Positive for weakness and numbness (right hand)  Negative for dizziness, light-headedness and headaches  Psychiatric/Behavioral: Negative for behavioral problems, confusion and sleep disturbance  The patient is not nervous/anxious  Objective:     Vitals: Blood pressure 100/58, pulse 77, temperature 98 3 °F (36 8 °C), temperature source Oral, resp  rate 16, height 5' 3" (1 6 m), weight 56 7 kg (125 lb), SpO2 94 %  ,Body mass index is 22 14 kg/m²  Intake/Output Summary (Last 24 hours) at 2/7/2023 1045  Last data filed at 2/6/2023 1618  Gross per 24 hour   Intake 300 ml   Output --   Net 300 ml       Current Medications: Reviewed    Physical Exam:   Physical Exam  Vitals and nursing note reviewed  Constitutional:       Appearance: Normal appearance  HENT:      Head: Normocephalic  Mouth/Throat:      Mouth: Mucous membranes are dry  Eyes:      General: No scleral icterus       Conjunctiva/sclera: Conjunctivae normal    Cardiovascular:      Rate and Rhythm: Normal rate and regular rhythm  Pulmonary:      Effort: Pulmonary effort is normal  No respiratory distress  Abdominal:      General: Bowel sounds are normal  There is no distension  Palpations: Abdomen is soft  Tenderness: There is no abdominal tenderness  Musculoskeletal:         General: Tenderness (right shoulder/elbow) present  No swelling or signs of injury  Skin:     General: Skin is warm and dry  Coloration: Skin is pale  Neurological:      General: No focal deficit present  Mental Status: She is alert and oriented to person, place, and time  Mental status is at baseline  Motor: Weakness present  Invasive Devices     Peripheral Intravenous Line  Duration           Peripheral IV Left Antecubital -- days                Lab, Imaging and other studies: I have personally reviewed pertinent reports

## 2023-02-08 ENCOUNTER — HOME CARE VISIT (OUTPATIENT)
Dept: HOME HEALTH SERVICES | Facility: HOME HEALTHCARE | Age: 88
End: 2023-02-08

## 2023-02-08 VITALS
DIASTOLIC BLOOD PRESSURE: 61 MMHG | HEIGHT: 63 IN | SYSTOLIC BLOOD PRESSURE: 119 MMHG | RESPIRATION RATE: 14 BRPM | BODY MASS INDEX: 22.15 KG/M2 | TEMPERATURE: 97.7 F | WEIGHT: 125 LBS | HEART RATE: 86 BPM | OXYGEN SATURATION: 94 %

## 2023-02-08 LAB
ANION GAP SERPL CALCULATED.3IONS-SCNC: 6 MMOL/L (ref 4–13)
AORTIC ROOT: 3.8 CM
AORTIC VALVE MEAN VELOCITY: 12.6 M/S
APICAL FOUR CHAMBER EJECTION FRACTION: 58 %
AV AREA BY CONTINUOUS VTI: 1.9 CM2
AV AREA PEAK VELOCITY: 1.6 CM2
AV LVOT MEAN GRADIENT: 2 MMHG
AV LVOT PEAK GRADIENT: 4 MMHG
AV MEAN GRADIENT: 7 MMHG
AV PEAK GRADIENT: 13 MMHG
AV REGURGITATION PRESSURE HALF TIME: 384 MS
AV VALVE AREA: 1.86 CM2
AV VELOCITY RATIO: 0.58
BASOPHILS # BLD AUTO: 0.03 THOUSANDS/ÂΜL (ref 0–0.1)
BASOPHILS NFR BLD AUTO: 1 % (ref 0–1)
BUN SERPL-MCNC: 11 MG/DL (ref 5–25)
CALCIUM SERPL-MCNC: 7.4 MG/DL (ref 8.4–10.2)
CHLORIDE SERPL-SCNC: 109 MMOL/L (ref 96–108)
CO2 SERPL-SCNC: 25 MMOL/L (ref 21–32)
CREAT SERPL-MCNC: 0.5 MG/DL (ref 0.6–1.3)
DOP CALC AO PEAK VEL: 1.77 M/S
DOP CALC AO VTI: 38.82 CM
DOP CALC LVOT AREA: 2.83 CM2
DOP CALC LVOT DIAMETER: 1.9 CM
DOP CALC LVOT PEAK VEL VTI: 25.54 CM
DOP CALC LVOT PEAK VEL: 1.02 M/S
DOP CALC LVOT STROKE INDEX: 47.1 ML/M2
DOP CALC LVOT STROKE VOLUME: 72.38 CM3
E WAVE DECELERATION TIME: 217 MS
EOSINOPHIL # BLD AUTO: 0.23 THOUSAND/ÂΜL (ref 0–0.61)
EOSINOPHIL NFR BLD AUTO: 4 % (ref 0–6)
ERYTHROCYTE [DISTWIDTH] IN BLOOD BY AUTOMATED COUNT: 15.4 % (ref 11.6–15.1)
FRACTIONAL SHORTENING: 30 % (ref 28–44)
GFR SERPL CREATININE-BSD FRML MDRD: 85 ML/MIN/1.73SQ M
GLUCOSE SERPL-MCNC: 93 MG/DL (ref 65–140)
HCT VFR BLD AUTO: 23.6 % (ref 34.8–46.1)
HGB BLD-MCNC: 7.7 G/DL (ref 11.5–15.4)
IMM GRANULOCYTES # BLD AUTO: 0.03 THOUSAND/UL (ref 0–0.2)
IMM GRANULOCYTES NFR BLD AUTO: 1 % (ref 0–2)
INTERVENTRICULAR SEPTUM IN DIASTOLE (PARASTERNAL SHORT AXIS VIEW): 1 CM
INTERVENTRICULAR SEPTUM: 1 CM (ref 0.6–1.1)
LAAS-AP2: 19.3 CM2
LAAS-AP4: 21.8 CM2
LEFT ATRIUM AREA SYSTOLE SINGLE PLANE A4C: 20.1 CM2
LEFT ATRIUM SIZE: 3.6 CM
LEFT INTERNAL DIMENSION IN SYSTOLE: 3 CM (ref 2.1–4)
LEFT VENTRICULAR INTERNAL DIMENSION IN DIASTOLE: 4.3 CM (ref 3.5–6)
LEFT VENTRICULAR POSTERIOR WALL IN END DIASTOLE: 1 CM
LEFT VENTRICULAR STROKE VOLUME: 48 ML
LVSV (TEICH): 48 ML
LYMPHOCYTES # BLD AUTO: 1.46 THOUSANDS/ÂΜL (ref 0.6–4.47)
LYMPHOCYTES NFR BLD AUTO: 24 % (ref 14–44)
MCH RBC QN AUTO: 30.7 PG (ref 26.8–34.3)
MCHC RBC AUTO-ENTMCNC: 32.6 G/DL (ref 31.4–37.4)
MCV RBC AUTO: 94 FL (ref 82–98)
MONOCYTES # BLD AUTO: 0.48 THOUSAND/ÂΜL (ref 0.17–1.22)
MONOCYTES NFR BLD AUTO: 8 % (ref 4–12)
MV E'TISSUE VEL-LAT: 9 CM/S
MV E'TISSUE VEL-SEP: 7 CM/S
MV PEAK A VEL: 0.78 M/S
MV PEAK E VEL: 102 CM/S
MV STENOSIS PRESSURE HALF TIME: 63 MS
MV VALVE AREA P 1/2 METHOD: 3.49 CM2
NEUTROPHILS # BLD AUTO: 3.82 THOUSANDS/ÂΜL (ref 1.85–7.62)
NEUTS SEG NFR BLD AUTO: 62 % (ref 43–75)
NRBC BLD AUTO-RTO: 0 /100 WBCS
PLATELET # BLD AUTO: 189 THOUSANDS/UL (ref 149–390)
PMV BLD AUTO: 10.1 FL (ref 8.9–12.7)
POTASSIUM SERPL-SCNC: 3.6 MMOL/L (ref 3.5–5.3)
RBC # BLD AUTO: 2.51 MILLION/UL (ref 3.81–5.12)
RIGHT ATRIUM AREA SYSTOLE A4C: 16.8 CM2
RIGHT VENTRICLE ID DIMENSION: 4 CM
SL CV AV DECELERATION TIME RETROGRADE: 1326 MS
SL CV AV PEAK GRADIENT RETROGRADE: 30 MMHG
SL CV LEFT ATRIUM LENGTH A2C: 4.7 CM
SL CV LV EF: 65
SL CV PED ECHO LEFT VENTRICLE DIASTOLIC VOLUME (MOD BIPLANE) 2D: 84 ML
SL CV PED ECHO LEFT VENTRICLE SYSTOLIC VOLUME (MOD BIPLANE) 2D: 36 ML
SODIUM SERPL-SCNC: 140 MMOL/L (ref 135–147)
TR MAX PG: 34 MMHG
TR PEAK VELOCITY: 2.9 M/S
TRICUSPID ANNULAR PLANE SYSTOLIC EXCURSION: 1.7 CM
TRICUSPID VALVE PEAK REGURGITATION VELOCITY: 2.9 M/S
WBC # BLD AUTO: 6.05 THOUSAND/UL (ref 4.31–10.16)

## 2023-02-08 RX ORDER — PANTOPRAZOLE SODIUM 40 MG/1
40 TABLET, DELAYED RELEASE ORAL
Qty: 60 TABLET | Refills: 0 | Status: SHIPPED | OUTPATIENT
Start: 2023-02-08 | End: 2023-03-10

## 2023-02-08 RX ORDER — MIRTAZAPINE 7.5 MG/1
7.5 TABLET, FILM COATED ORAL
Qty: 30 TABLET | Refills: 0 | Status: SHIPPED | OUTPATIENT
Start: 2023-02-08 | End: 2023-03-10

## 2023-02-08 RX ADMIN — PANTOPRAZOLE SODIUM 40 MG: 40 TABLET, DELAYED RELEASE ORAL at 05:02

## 2023-02-08 RX ADMIN — DICLOFENAC SODIUM 2 G: 10 GEL TOPICAL at 08:02

## 2023-02-08 RX ADMIN — LEVOTHYROXINE SODIUM 125 MCG: 125 TABLET ORAL at 05:02

## 2023-02-08 RX ADMIN — ACETAMINOPHEN 325MG 975 MG: 325 TABLET ORAL at 05:02

## 2023-02-08 RX ADMIN — METOPROLOL SUCCINATE 25 MG: 25 TABLET, EXTENDED RELEASE ORAL at 08:02

## 2023-02-08 NOTE — DISCHARGE SUMMARY
[facility]  Discharge- June A Sage Tian 6/4/1932, 80 y o  female MRN: [id number]  Unit/Bed#: E5 -01 Encounter: [id number]  Primary Care Provider: Liz Spencer DO   Date and time admitted to hospital: 2/5/2023  8:25 AM    Acute pain of right shoulder  Assessment & Plan  Recently admitted for right shoulder pain in [place]  Pain in the right shoulder blade and right elbow  Had been on narcotics but d/jeffy due to encpehalopathy  Started on gabapentin by ortho outpt  Ortho had evaluated pt in hospital and determined no role for iv/joint injection  MRI C spine completed, shows multilevel degenerative disease involving C5-C6  Neurosurgery evaluated recommended outpatient follow-up, supportive care and pain management, patient may need ERNA  Patient had previously followed with Ortho outpatient, pain management referral was given, to evaluate for ERNA      * Anemia  Assessment & Plan  Baseline hemoglobin is 12  Trended as low as 7 0  CT abd pelvis without acute processes or bleed  S/p 1 unit transfused  EGD with duodenal ulcer  Continue PPI 40mg BID  Hemoglobin stable, 7 7 on discharge  No further bowel movement  Follow-up with GI, recommending Protonix 40 twice daily for at least 8 weeks    Suicidal ideation  Assessment & Plan  Admits to thinking about death currently without plan  Declined antidepressants  Psych consulted, recommending remeron 7 5mg bedtime  Follow outpatient with psych    Syncope  Assessment & Plan  History of liable bp  Pt had a syncopal event after substernal chest pain on ambulating to the bathroom  Possibly vasovagal due to anemia and hypotension  2D echo without sig valvular or wall motion abnormality, normal ef    Hypothyroidism due to Hashimoto's thyroiditis  Assessment & Plan  Continue synthroid     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[facility] [address] )  Assessment & Plan  H/o low grade salivary gland adenocarcinoma  Follows with oncology   Declined treatment in the past  Repeat imagining was unchanged  Imagining from sept 2022 did reveal unchanged 2 5cm destructive soft tissue lesion in the right posterior maxillary hard palate with erosion into the right maxillary sinus floor     Paroxysmal atrial fibrillation (HCC)  Assessment & Plan  Continue toprol   Not on ac due to bleeding risk from tumor    Asthma-COPD overlap syndrome ([facility] [address] )  Assessment & Plan  No exacerbation  Continue albuterol     Essential hypertension  Assessment & Plan  Continue metoprolol, currently controlled        Discharging Physician / Practitioner: Uche Barton MD  PCP: Kassie Devi DO  Admission Date:   Admission Orders (From admission, onward)     Ordered        02/05/23 1034  INPATIENT ADMISSION  Once                      Discharge Date: 02/08/23    Medical Problems     Resolved Problems  Date Reviewed: 2/8/2023   None         Consultations During Hospital Stay:  · GI  · Geriatrics    Procedures Performed:   · EGD    Significant Findings / Test Results:   EGD    Result Date: 2/6/2023  Impression: Mild, patchy erythematous mucosa with erosion in the body of the stomach and antrum; no bleeding was identified The esophagus appeared normal  3 cm sliding hiatal hernia (type I hiatal hernia) without Jodell Prim lesions present - GE junction 39 cm from the incisors, diaphragmatic impression 42 cm from the incisors Performed forceps biopsies in the stomach  2 biopsies taken from the antrum, 1 from the incisura, 2 from the body to rule out H pylori  Single large, cratered, round, benign-appearing ulcer in the duodenal bulb with clean base (Gerald III) The 2nd part of the duodenum appeared normal  RECOMMENDATION:  Await pathology results Return to floor Resume diet  BID ppi for 8-12 weeks Monitor hemoglobin, transfuse for less than 7  No Staff Documented     Colonoscopy    Result Date: 2/6/2023  Impression: Large amount of solid stool visualized in the rectum precluding visualization  Procedure was aborted   RECOMMENDATION:  No further screening colonoscopies necessary  Age greater than 72    No Staff Documented     CT abdomen pelvis wo contrast    Result Date: 2/5/2023  Impression: No acute process in the abdomen and pelvis identified on this unenhanced CT  Moderate colonic stool burden  Workstation performed: [id number]     MRI cervical spine wo contrast    Result Date: 2/7/2023  · Impression: No osseous metastasis in cervical spine  Small osseous lesion in left T3 superior articular facet without aggressive osseous features, indeterminate  Differential includes small osseous metastasis or atypical intraosseous vertebral body hemangioma  Mild bone marrow edema in left C4-C5 facet joints, likely due to active facet arthritis  Multilevel degenerative changes of cervical spine with varying degrees of canal stenosis (moderate C5-C6) and foraminal narrowing (moderate bilateral C5-C6), as detailed above  Partially imaged 2 4 cm heterogeneous lesion in posterior right maxillary/palate region, 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 - which corresponds destructive osseous lesion seen on CT soft tissue neck 9/13/2022  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  The study was marked in [facility] for immediate notification  Workstation performed: [id number]   ·     Incidental Findings:   · none     Test Results Pending at Discharge (will require follow up):   · none     Outpatient Tests Requested:  · none    Complications:  none    Reason for Admission: Weakness    Hospital Course:     Buffy Cuevas is a 80 y o  female patient who originally presented to the hospital on 2/5/2023 due to weakness  She was found to be anemic in the ED with hemoglobin dropping down to 7, requiring 1 unit PRBC transfused  Evaluated by GI, with suspected melena, dark tarry stools patient underwent EGD  EGD showed large duodenal ulcer  GI recommending PPI 40 twice daily for at least 8 weeks    Moderate course of several days, hemoglobin currently stable and GI cleared for discharge  Hemoglobin was 7 7 on discharge  Patient does have history of this shoulder, elbow pain  Has been following orthopedics outpatient  MRI cervical spine completed inpatient as it was previously scheduled on same day  Does show degenerative disease of the cervical spine C5 and C6  Neurosurgery evaluated saying that this could likely contribute to her pain  Ortho has already given patient pain management referral, recommend patient to follow-up outpatient for [facility] evaluation and potential therapy  Repeat CBC in 2 weeks  Please see above list of diagnoses and related plan for additional information  Condition at Discharge: fair     Discharge Day Visit / Exam:     Subjective:    No events overnight  No further heart burn  Has not had any bowel movement today  Tolerating diet  Denies any fevers, chills nausea or vomiting  Vitals: Blood Pressure: 119/61 (02/08/23 0717)  Pulse: 86 (02/08/23 0717)  Temperature: 97 7 °F (36 5 °C) (02/08/23 0717)  Temp Source: Oral (02/07/23 1518)  Respirations: 14 (02/08/23 0717)  Height: 5' 3" (160 cm) (02/07/23 0815)  Weight - Scale: 56 7 kg (125 lb) (02/07/23 0815)  SpO2: 94 % (02/08/23 0717)  Exam:   Physical Exam  Vitals and nursing note reviewed  Constitutional:       Appearance: Normal appearance  HENT:      Head: Normocephalic and atraumatic  Eyes:      General: No scleral icterus  Conjunctiva/sclera: Conjunctivae normal    Cardiovascular:      Rate and Rhythm: Normal rate and regular rhythm  Pulmonary:      Breath sounds: No wheezing or rhonchi  Abdominal:      General: Bowel sounds are normal  There is no distension  Palpations: Abdomen is soft  Musculoskeletal:         General: No swelling  Normal range of motion  Skin:     General: Skin is warm and dry  Neurological:      General: No focal deficit present  Mental Status: She is alert  Mental status is at baseline         Discharge instructions/Information to patient and family:   See after visit summary for information provided to patient and family  Provisions for Follow-Up Care:  See after visit summary for information related to follow-up care and any pertinent home health orders  Disposition:     Home    Planned Readmission: none     Discharge Statement:  I spent 40 minutes discharging the patient  This time was spent on the day of discharge  I had direct contact with the patient on the day of discharge  Greater than 50% of the total time was spent examining patient, answering all patient questions, arranging and discussing plan of care with patient as well as directly providing post-discharge instructions  Additional time then spent on discharge activities  Discharge Medications:  See after visit summary for reconciled discharge medications provided to patient and family        ** Please Note: This note has been constructed using a voice recognition system **

## 2023-02-08 NOTE — APP STUDENT NOTE
Acute pain of right shoulder  Assessment & Plan  Recently admitted for right shoulder pain in [place]  Pain in the right shoulder blade and right elbow, with paresthesias in right hand  Had been on narcotics but d/jeffy due to encpehalopathy  Started on gabapentin by ortho outpt, reports improvement  Ortho had evaluated pt in hospital and determined no role for iv/joint injection  MRI C spine showed bone marrow edema in left C4-C5 facet joints, multilevel degenerative changes of C-spine with canal stenosis and foraminal narrowing (moderate C5-C6), and a small osseous lesion in left T3 superior articular facet  Neuro consulted for evaluation and recommendations  Geriatrics have evaluated, appreciate recommendations        Suicidal ideation  Assessment & Plan  Admits to thinking about death currently without plan  Declined antidepressants  Psych consulted, recommending remeron 7 5mg bedtime  Patient requests remeron sent to [facility] so she can  prior to leaving  Follow outpatient with psych     Syncope  Assessment & Plan  History of liable bp  Pt had a syncopal event after substernal chest pain on ambulating to the bathroom  Possibly vasovagal due to anemia and hypotension  2D echo completed, pending read     Hypothyroidism due to Hashimoto's thyroiditis  Assessment & Plan  Continue synthroid      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[facility] [address] )  Assessment & Plan  H/o low grade salivary gland adenocarcinoma  Follows with oncology  Declined treatment in the past  Repeat imagining was unchanged   Imagining from sept 2022 did reveal unchanged 2 5cm destructive soft tissue lesion in the right posterior maxillary hard palate with erosion into the right maxillary sinus floor      Paroxysmal atrial fibrillation (HCC)  Assessment & Plan  Continue toprol   Not on ac due to bleeding risk from tumor     Asthma-COPD overlap syndrome (HCC)  Assessment & Plan  No exacerbation  Continue albuterol      Essential hypertension  Assessment & Plan  Continue metoprolol, currently controlled     * Anemia  Assessment & Plan  Baseline hemoglobin is 12  Trended as low as 7 0  CT abd pelvis without acute processes or bleed  S/p 1 unit transfused  EGD with duodenal ulcer  Continue PPI 40mg BID  Hemoglobin of 7 7 today, no reports of BM overnight, complains of CASTELLANO     Subjective/Objective:    Chief Complaint: "My arm and hand are much better"    Subjective: Patient claims the pain in her right shoulder/elbow is currently improved, with the pain at a 1-2/10  The tingling in her right hand is gone at the moment  She slept well, attributing it to the gabapentin, and has been tolerating her diet  She ambulates with assistance, but describes becoming short of breath immediately upon moving  She has not had a bowel movement yet, but feels she might soon  Patient complains of vision difficulties and request eye drops  Patient also requests information about when she can start the Remeron that psych recommended  No acute overnight events  Denies chest pain, dizziness, abdominal pain, N/V  Objective: Patient sitting upright and comfortably in chair in no acute distress  I/O       02/06 0701 02/07 0700 02/07 0701 02/08 0700 02/08 0701 02/09 0700    I V  (mL/kg) 300 (5 3)      Blood       Total Intake(mL/kg) 300 (5 3)      Net +300             Unmeasured Urine Occurrence  1 x           Invasive Devices     Peripheral Intravenous Line  Duration           Peripheral IV Left Antecubital -- days                Physical Exam:  Vitals: Blood pressure 119/61, pulse 86, temperature 97 7 °F (36 5 °C), resp  rate 14, height 5' 3" (1 6 m), weight 56 7 kg (125 lb), SpO2 94 %  ,Body mass index is 22 14 kg/m²  General appearance: alert, appears stated age, cooperative and no distress  Head: Normocephalic, without obvious abnormality, atraumatic  Neck: supple, symmetrical, trachea midline     Lungs: non labored breathing  Heart: regular heart rate  Abdomen: Soft and nondistended  Non-TTP  Neurologic:   Mental status: Alert, oriented, thought content appropriate  Cranial nerves: grossly intact (Cranial nerves II-XII)  Sensory: normal to light touch in all extremities  Motor: moving all extremities without focal weakness  Non-TTP in right shoulder, TTP over olecranon in right elbow  5/5 strength in all extremities  Lab Results:  Results from last 7 days   Lab Units 02/08/23  0449 02/07/23  0428 02/06/23  1252 02/05/23  1541 02/05/23  0909   WBC Thousand/uL 6 05 8 16  --   --  9 59   HEMOGLOBIN g/dL 7 7* 8 1* 8 9*   < > 7 8*   HEMATOCRIT % 23 6* 24 2* 26 6*   < > 24 0*   PLATELETS Thousands/uL 189 189  --   --  260   NEUTROS PCT % 62  --   --   --  75   MONOS PCT % 8  --   --   --  6    < > = values in this interval not displayed  Results from last 7 days   Lab Units 02/08/23 0449 02/07/23  0428 02/06/23  0355 02/05/23  0909   POTASSIUM mmol/L 3 6 3 3* 3 7 4 0   CHLORIDE mmol/L 109* 110* 107 104   CO2 mmol/L 25 25 23 26   BUN mg/dL 11 12 36* 53*   CREATININE mg/dL 0 50* 0 53* 0 66 0 73   CALCIUM mg/dL 7 4* 7 1* 7 6* 8 3*   ALK PHOS U/L  --   --   --  33*   ALT U/L  --   --   --  11   AST U/L  --   --   --  12*                 No results found for: TROPONINT  ABG:  Lab Results   Component Value Date    PHART 7 446 02/11/2015    GXL5YTV 35 9 (L) 02/11/2015    PO2ART 151 8 (H) 02/11/2015    RGS3TJA 24 02/11/2015    BEART 0 2 02/11/2015       Imaging Studies: I have personally reviewed pertinent reports  and I have personally reviewed pertinent films in PACS    EGD    Result Date: 2/6/2023  Impression: Mild, patchy erythematous mucosa with erosion in the body of the stomach and antrum; no bleeding was identified The esophagus appeared normal  3 cm sliding hiatal hernia (type I hiatal hernia) without Frann Williams lesions present - GE junction 39 cm from the incisors, diaphragmatic impression 42 cm from the incisors Performed forceps biopsies in the stomach   2 biopsies taken from the antrum, 1 from the incisura, 2 from the body to rule out H pylori  Single large, cratered, round, benign-appearing ulcer in the duodenal bulb with clean base (Gerald III) The 2nd part of the duodenum appeared normal  RECOMMENDATION:  Await pathology results Return to floor Resume diet  BID ppi for 8-12 weeks Monitor hemoglobin, transfuse for less than 7  No Staff Documented     Colonoscopy    Result Date: 2/6/2023  Impression: Large amount of solid stool visualized in the rectum precluding visualization  Procedure was aborted  RECOMMENDATION:  No further screening colonoscopies necessary  Age greater than 72    No Staff Documented     CT abdomen pelvis wo contrast    Result Date: 2/5/2023  Impression: No acute process in the abdomen and pelvis identified on this unenhanced CT  Moderate colonic stool burden  Workstation performed: [id number]     MRI cervical spine wo contrast    Result Date: 2/7/2023  Impression: No osseous metastasis in cervical spine  Small osseous lesion in left T3 superior articular facet without aggressive osseous features, indeterminate  Differential includes small osseous metastasis or atypical intraosseous vertebral body hemangioma  Mild bone marrow edema in left C4-C5 facet joints, likely due to active facet arthritis  Multilevel degenerative changes of cervical spine with varying degrees of canal stenosis (moderate C5-C6) and foraminal narrowing (moderate bilateral C5-C6), as detailed above  Partially imaged 2 4 cm heterogeneous lesion in posterior right maxillary/palate region, 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 - which corresponds destructive osseous lesion seen on CT soft tissue neck 9/13/2022  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  The study was marked in [facility] for immediate notification  Workstation performed: [id number]       EKG, Pathology, and Other Studies: I have personally reviewed pertinent reports        VTE Mechanical Prophylaxis: sequential compression device

## 2023-02-08 NOTE — ASSESSMENT & PLAN NOTE
Baseline hemoglobin is 12  Trended as low as 7 0  CT abd pelvis without acute processes or bleed  S/p 1 unit transfused  EGD with duodenal ulcer  Continue PPI 40mg BID  Hemoglobin stable, 7 7 on discharge    No further bowel movement  Follow-up with GI, recommending Protonix 40 twice daily for at least 8 weeks

## 2023-02-08 NOTE — PROGRESS NOTES
Progress Note - Geriatric Medicine   Buffy James 80 y o  female MRN: [id number]  Unit/Bed#: E5 -01 Encounter: [id number]      Assessment/Plan:    Cognitive Screening   · Patient had not previously noted any memory issues or cognitive impairment   · She notes some increased forgetfulness and memory issues since her most recent hospital discharge   · Most recent TSH on 2/5/2023 noted to be 3 620  · Most recent vitamin B 12 level on 7/12/2019 noted to be 903  · Would recommend rechecking   · CT of the head on 9/18/2019 revealed moderate microangiopathic changes   · Patient scored 21/30 on MOCA from 1/18/2023  · She was noted to have confusion on her last hospital admission   · Suspected to be secondary to narcotic pain medication  · She was oriented and was doing well on her last hospitalization   · She is alert and oriented x 4 on exam today   · Maintain delirium precautions as discussed below   · Re-direct and re-orient as needed  · Keep physically, mentally, and socially active     Delirium   • Baseline mentation: alert and oriented x 4  • Current mentation: alert and oriented x 4  • Patient is at high risk secondary to age, acute pain, anemia, and hospitalization   • Maintain delirium precautions   · Provide redirection, reorientation, and distraction techniques  · Maintain fall and safety precautions   · Assist with ADLs/IADLs  · Avoid deliriogenic medications such as tramadol, benzodiazepines, anticholinergics, benadryl  · Treat pain using geriatric pain protocol   · Encourage oral hydration and nutrition   · Monitor for constipation and urinary retention   · Implement sleep hygiene and limit night time interuptions   · Maintain sleep-wake cycle   · Encourage early and frequent mobilization  · Encourage participation in group activities  • Most recent EKG on 2/5/2023 revealed a QTc interval of 412  · If all other interventions are unsuccessful for acute behaviors and agitation, would recommend using Zyprexa 2 5 mg IM Q 8 hours prn   • Would avoid benzodiazepines such as Ativan as these can worsen delirium     Deconditioning   • Patient is at increased risk for deconditioning secondary to acute pain, anemia, weakness, and hospitalization   • Continue to optimize diet, hydration, and mobility for healing   · GFR 85 on labs today  · Keep hydrated  · PO intake   · Patient with poor PO intake prior to admission   · Notes that food was not tasting very good   · Notes that she had not eaten in nearly 2 weeks  · States that she has been eating over the past 24 hours   · Anemia   · Patient with history of iron deficiency anemia   · Noted to have a hemoglobin of 7 8 on admission   · Repeat hemoglobin was 7 0  · GI consulted for suspected GI bleed  · Hemoglobin 7 7 on labs today  · Monitor labs  • Monitor for signs and symptoms of infection, dehydration, DVT, and skin breakdown    Frailty   Clinical Frail Scale: 5- Mildly Frail  · More evident slowing, needs help high order IADLs (transport, bills, medications)  · Progressively impairs shopping and walking outside alone, meal prep and housework  • Most recent albumin on 2/5/2023 noted to be 3 7  • Consider nutrition consult  • Encourage protein supplementation     Ambulatory Dysfunction/Falls  • Patient notes no recent falls   • Does not ambulate with any assistive devices at baseline   • PT/OT consulted   • Maintain fall and safety precautions   • Encourage adequate oral hydration and nutrition   • Out of bed as tolerated     Impaired Vision   • Patient with history of macular degeneration in both eyes  · Wears glasses  · Notes that she was supposed to go for an eye injection on 2/6  • Recommend use of corrective lenses at all appropriate times  • Encourage adequate lighting and encourage use of assistance with ambulation  • Keep personal belongings close to avoid reaching  • Encourage appropriate footwear at all times  • Recommend large font for printed materials provided to patient    Dentition/Appetite   • Patient notes that she has a good appetite at baseline   • She states that she has not been eating well at home since her prior discharge   • She states that she did eat breakfast this morning and it tasted good  • Ensure meal consistency is appropriate for all abilities   • Consider nutrition consult   • Continue aspiration precautions     Elimination   • Patient is incontinent of bowel and bladder at baseline  • Denies any voiding difficulties   • Has not been having bowel movements at home as she states she has not been eating   • Last documented bowel movement on 2/5  • Monitor for constipation and urinary retention     Insomnia   • Patient notes chronic insomnia at home  • She notes that she had been taking a hot shower and then going right to bed which was helping   • She does not take any medication for sleep at baseline   • First line is behavioral therapy   • Avoid sedative hypnotics including benzodiazepines and benadryl  • Encourage staying awake during the day   • Encourage daytime activities and morning exercise   • Decrease or eliminate daytime naps   • Avoid caffeine especially during late afternoon and evening hours  • Establish a nighttime routine  • Implement sleep hygiene and limit nighttime interruptions  • Can consider melatonin 3 mg daily at bedtime for sleep if needed     Anxiety/Depression  • Patient noted on admission that she was feeling depressed  · Noted that her pain, family stress, and recent dependence were playing part in her thoughts of death   · Denied a plan  · On admission, she admitted to increased sleep, feeling of hopelessness, increased sadness, and depression  · Psych was consulted and she was agreeable to an anti-depressant  · She was started on mirtazapine 7 5 mg daily at bedtime   · Would recommend monitoring for increased lethargy as lower doses of this medication can be more sedating  · Psych recommending voluntary inpatient stay but patient declined  · Mood appears to be much improved from admission   · She notes that she is feeling lonely at home and thinks that her family is upset with her   · We did discuss adult  and she is interested in this when she is discharged   · Information was provided on local day centers   · Case management to set her up with a  on discharge   · Continue supportive care     Anemia   · Patient with hemoglobin of 7 9 on admission   · Baseline noted to be around 12  · Heme test was positive   · She admitted to no bowel movement in one week   · States that this was secondary to no oral intake   · Suspected to have GI bleed  · GI consulted and patient underwent EGD/colonoscopy yesterday   · EGD revealed hiatal hernia and a large ulcer in the duodenal bulb with clean base   · Biopsies in the stomach were also obtained   · Colonoscopy was unable to be performed secondary to the amount of stool in the colon  · Patient was started on PPI BID x 8-12 weeks   · Hemoglobin on labs today noted to be 7 7  · Continue to monitor hemoglobin  · Transfuse for hemoglobin < 7    Acute Pain of the Right Shoulder  · Patient recently admitted to the hospital from 1/14-1/19 for acute worsening pain of the right shoulder blade and right elbow  · X-ray imaging revealed osteomyelitis   · Orthopedics noted there was no need for IV/joint injection as they believed her pain to be referred back pain   · She had been on an extensive pain regimen on her last admission (please see consult note from 1/19 for a full list of her pain regimen on her last admission)  · Was noted to be confused with oxycodone at all doses   · Appeared to tolerate tramadol well   · Was also placed on steroid taper that seemed to be working well   · Patient notes that when she got home she was not taking the Baclofen and was saving the tramadol for only when the pain was very significant   · She did follow-up with orthopedics in the outpatient setting and was started on gabapentin   · MRI yesterday revealed the following  · No osseous metastasis in cervical spine  · Small osseous lesion in left T3 superior articular facet without aggressive osseous features, indeterminate  Differential includes small osseous metastasis or atypical intraosseous vertebral body hemangioma  · Mild bone marrow edema in left C4-C5 facet joints, likely due to active facet arthritis  · Multilevel degenerative changes of cervical spine with varying degrees of canal stenosis (moderate C5-C6) and foraminal narrowing (moderate bilateral C5-C6), as detailed above  · Partially imaged 2 4 cm heterogeneous lesion in posterior right maxillary/palate region, 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 - which corresponds destructive osseous lesion seen on CT soft tissue neck 9/13/2022  · 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  · Neurosurgery consulted and recommended conservative management   · She notes that she had been trying to use the lidocaine patches at home but had a difficult time putting them on and noted that when she would put her shirt on the patch would fall off  · She notes that PT did seem to help and some days she noted significant improvement in the pain after the session   · PT and OT will resume on discharge   · She notes that heat improved the pain in the past but she was unable to get a heating pad for the house   · Would recommend aqua-K pad   · Patient did not one dose of tramadol which she believed was helpful    · She was not aware that any was ordered   · Would continue current pain regimen   · Tylenol 975 mg Q 8 hours scheduled   · Diclofenac gel QID prn   · Tramadol 25 mg for moderate/severe pain   · Gabapentin 100 mg at bedtime   · Continue non-pharmacological methods of pain management       Subjective: The patient is being seen and evaluated today at the bedside for geriatric follow-up   She is noted to be sitting up in her chair in no acute distress  Her mood appears to be much improved from admission  She is alert and oriented x 4 and offers no acute complaints  She denies dizziness and headaches  She denies chest pain and shortness of breath  She denies nausea, vomiting, constipation, and diarrhea  She states that she has been eating and sleeping  Care was coordinated with patients nurse Esha Laurent  She notes no acute issues or events overnight  Care was also coordinated with patients  Ivan Flynn at the bedside  She confirms that patient will be discharged with PT, OT, and a   Review of Systems   Constitutional: Positive for activity change and appetite change (improving)  Negative for chills, fatigue and fever  Eyes: Positive for visual disturbance (hx of macular degeneration (glasses))  Respiratory: Negative for cough, chest tightness and shortness of breath  Cardiovascular: Negative for chest pain and palpitations  Gastrointestinal: Negative for abdominal distention, abdominal pain, constipation, diarrhea, nausea and vomiting  Genitourinary: Negative for difficulty urinating and dysuria  Musculoskeletal: Positive for arthralgias (right shoulder/elbow at times) and gait problem  Negative for myalgias  Skin: Positive for pallor  Negative for color change  Neurological: Positive for weakness and numbness (right hand at times)  Negative for dizziness, light-headedness and headaches  Psychiatric/Behavioral: Negative for behavioral problems, confusion and sleep disturbance  The patient is not nervous/anxious  Objective:     Vitals: Blood pressure 119/61, pulse 86, temperature 97 7 °F (36 5 °C), resp  rate 14, height 5' 3" (1 6 m), weight 56 7 kg (125 lb), SpO2 94 %  ,Body mass index is 22 14 kg/m²  No intake or output data in the 24 hours ending 02/08/23 1635    Current Medications: Reviewed    Physical Exam:   Physical Exam  Vitals and nursing note reviewed     Constitutional: Appearance: Normal appearance  HENT:      Head: Normocephalic  Mouth/Throat:      Mouth: Mucous membranes are dry  Eyes:      General: No scleral icterus  Conjunctiva/sclera: Conjunctivae normal    Cardiovascular:      Rate and Rhythm: Normal rate and regular rhythm  Pulmonary:      Effort: Pulmonary effort is normal  No respiratory distress  Abdominal:      General: Bowel sounds are normal  There is no distension  Palpations: Abdomen is soft  Tenderness: There is no abdominal tenderness  Musculoskeletal:         General: No swelling, tenderness or signs of injury  Skin:     General: Skin is warm and dry  Coloration: Skin is pale  Neurological:      General: No focal deficit present  Mental Status: She is alert and oriented to person, place, and time  Mental status is at baseline  Motor: Weakness present  Invasive Devices     None                 Lab, Imaging and other studies: I have personally reviewed pertinent reports

## 2023-02-08 NOTE — PLAN OF CARE
Problem: Potential for Falls  Goal: Patient will remain free of falls  Description: INTERVENTIONS:  - Educate patient/family on patient safety including physical limitations  - Instruct patient to call for assistance with activity   - Consult OT/PT to assist with strengthening/mobility   - Keep Call bell within reach  - Keep bed low and locked with side rails adjusted as appropriate  - Keep care items and personal belongings within reach  - Initiate and maintain comfort rounds  - Make Fall Risk Sign visible to staff  - Offer Toileting every 2 Hours, in advance of need  - Initiate/Maintain bed/chair alarm  - Obtain necessary fall risk management equipment: chair alarm  - Apply yellow socks and bracelet for high fall risk patients  - Consider moving patient to room near nurses station  Outcome: Progressing     Problem: MOBILITY - ADULT  Goal: Maintain or return to baseline ADL function  Description: INTERVENTIONS:  -  Assess patient's ability to carry out ADLs; assess patient's baseline for ADL function and identify physical deficits which impact ability to perform ADLs (bathing, care of mouth/teeth, toileting, grooming, dressing, etc )  - Assess/evaluate cause of self-care deficits   - Assess range of motion  - Assess patient's mobility; develop plan if impaired  - Assess patient's need for assistive devices and provide as appropriate  - Encourage maximum independence but intervene and supervise when necessary  - Involve family in performance of ADLs  - Assess for home care needs following discharge   - Consider OT consult to assist with ADL evaluation and planning for discharge  - Provide patient education as appropriate  Outcome: Progressing  Goal: Maintains/Returns to pre admission functional level  Description: INTERVENTIONS:  - Perform BMAT or MOVE assessment daily    - Set and communicate daily mobility goal to care team and patient/family/caregiver     - Collaborate with rehabilitation services on mobility goals if consulted  - Perform Range of Motion 3 times a day  - Reposition patient every 2 hours    - Dangle patient 3 times a day  - Stand patient 3 times a day  - Ambulate patient 3 times a day  - Out of bed to chair 3 times a day   - Out of bed for meals 3 times a day  - Out of bed for toileting  - Record patient progress and toleration of activity level   Outcome: Progressing     Problem: PAIN - ADULT  Goal: Verbalizes/displays adequate comfort level or baseline comfort level  Description: Interventions:  - Encourage patient to monitor pain and request assistance  - Assess pain using appropriate pain scale  - Administer analgesics based on type and severity of pain and evaluate response  - Implement non-pharmacological measures as appropriate and evaluate response  - Consider cultural and social influences on pain and pain management  - Notify physician/advanced practitioner if interventions unsuccessful or patient reports new pain  Outcome: Progressing     Problem: INFECTION - ADULT  Goal: Absence or prevention of progression during hospitalization  Description: INTERVENTIONS:  - Assess and monitor for signs and symptoms of infection  - Monitor lab/diagnostic results  - Monitor all insertion sites, i e  indwelling lines, tubes, and drains  - Monitor endotracheal if appropriate and nasal secretions for changes in amount and color  - Anderson appropriate cooling/warming therapies per order  - Administer medications as ordered  - Instruct and encourage patient and family to use good hand hygiene technique  - Identify and instruct in appropriate isolation precautions for identified infection/condition  Outcome: Progressing     Problem: SAFETY ADULT  Goal: Patient will remain free of falls  Description: INTERVENTIONS:  - Educate patient/family on patient safety including physical limitations  - Instruct patient to call for assistance with activity   - Consult OT/PT to assist with strengthening/mobility   - Keep Call bell within reach  - Keep bed low and locked with side rails adjusted as appropriate  - Keep care items and personal belongings within reach  - Initiate and maintain comfort rounds  - Make Fall Risk Sign visible to staff  - Offer Toileting every 2 Hours, in advance of need  - Initiate/Maintain bed/chair alarm  - Obtain necessary fall risk management equipment: chair alarm  - Apply yellow socks and bracelet for high fall risk patients  - Consider moving patient to room near nurses station  Outcome: Progressing     Problem: DISCHARGE PLANNING  Goal: Discharge to home or other facility with appropriate resources  Description: INTERVENTIONS:  - Identify barriers to discharge w/patient and caregiver  - Arrange for needed discharge resources and transportation as appropriate  - Identify discharge learning needs (meds, wound care, etc )  - Arrange for interpretive services to assist at discharge as needed  - Refer to Case Management Department for coordinating discharge planning if the patient needs post-hospital services based on physician/advanced practitioner order or complex needs related to functional status, cognitive ability, or social support system  Outcome: Progressing     Problem: Knowledge Deficit  Goal: Patient/family/caregiver demonstrates understanding of disease process, treatment plan, medications, and discharge instructions  Description: Complete learning assessment and assess knowledge base    Interventions:  - Provide teaching at level of understanding  - Provide teaching via preferred learning methods  Outcome: Progressing     Problem: CARDIOVASCULAR - ADULT  Goal: Absence of cardiac dysrhythmias or at baseline rhythm  Description: INTERVENTIONS:  - Continuous cardiac monitoring, vital signs, obtain 12 lead EKG if ordered  - Administer antiarrhythmic and heart rate control medications as ordered  - Monitor electrolytes and administer replacement therapy as ordered  Outcome: Progressing     Problem: GASTROINTESTINAL - ADULT  Goal: Minimal or absence of nausea and/or vomiting  Description: INTERVENTIONS:  - Administer IV fluids if ordered to ensure adequate hydration  - Maintain NPO status until nausea and vomiting are resolved  - Nasogastric tube if ordered  - Administer ordered antiemetic medications as needed  - Provide nonpharmacologic comfort measures as appropriate  - Advance diet as tolerated, if ordered  - Consider nutrition services referral to assist patient with adequate nutrition and appropriate food choices  Outcome: Progressing  Goal: Maintains adequate nutritional intake  Description: INTERVENTIONS:  - Monitor percentage of each meal consumed  - Identify factors contributing to decreased intake, treat as appropriate  - Assist with meals as needed  - Monitor I&O, weight, and lab values if indicated  - Obtain nutrition services referral as needed  Outcome: Progressing     Problem: METABOLIC, FLUID AND ELECTROLYTES - ADULT  Goal: Electrolytes maintained within normal limits  Description: INTERVENTIONS:  - Monitor labs and assess patient for signs and symptoms of electrolyte imbalances  - Administer electrolyte replacement as ordered  - Monitor response to electrolyte replacements, including repeat lab results as appropriate  - Instruct patient on fluid and nutrition as appropriate  Outcome: Progressing     Problem: SKIN/TISSUE INTEGRITY - ADULT  Goal: Skin Integrity remains intact(Skin Breakdown Prevention)  Description: Assess:  -Perform Chaz assessment every shift  -Clean and moisturize skin every shift  -Inspect skin when repositioning, toileting, and assisting with ADLS  -Assess extremities for adequate circulation and sensation     Bed Management:  -Have minimal linens on bed & keep smooth, unwrinkled  -Change linens as needed when moist or perspiring  Activity:  -Mobilize patient 3 times a day  -Encourage activity and walks on unit  -Encourage or provide ROM exercises   -Turn and reposition patient every 2 Hours  -Use appropriate equipment to lift or move patient in bed  -Instruct/ Assist with weight shifting every 2 when out of bed in chair    Skin Care:  -Avoid use of baby powder, tape, friction and shearing, hot water or constrictive clothing  -Do not massage red bony areas      Outcome: Progressing     Problem: HEMATOLOGIC - ADULT  Goal: Maintains hematologic stability  Description: INTERVENTIONS  - Assess for signs and symptoms of bleeding or hemorrhage  - Monitor labs  - Administer supportive blood products/factors as ordered and appropriate  Outcome: Progressing     Problem: Prexisting or High Potential for Compromised Skin Integrity  Goal: Skin integrity is maintained or improved  Description: INTERVENTIONS:  - Identify patients at risk for skin breakdown  - Assess and monitor skin integrity  - Assess and monitor nutrition and hydration status  - Monitor labs   - Assess for incontinence   - Turn and reposition patient  - Assist with mobility/ambulation  - Relieve pressure over bony prominences  - Avoid friction and shearing  - Provide appropriate hygiene as needed including keeping skin clean and dry  - Evaluate need for skin moisturizer/barrier cream  - Collaborate with interdisciplinary team   - Patient/family teaching  - Consider wound care consult   Outcome: Progressing     Problem: Nutrition/Hydration-ADULT  Goal: Nutrient/Hydration intake appropriate for improving, restoring or maintaining nutritional needs  Description: Monitor and assess patient's nutrition/hydration status for malnutrition  Collaborate with interdisciplinary team and initiate plan and interventions as ordered  Monitor patient's weight and dietary intake as ordered or per policy  Utilize nutrition screening tool and intervene as necessary  Determine patient's food preferences and provide high-protein, high-caloric foods as appropriate       INTERVENTIONS:  - Monitor oral intake, urinary output, labs, and treatment plans  - Assess nutrition and hydration status and recommend course of action  - Evaluate amount of meals eaten  - Assist patient with eating if necessary   - Allow adequate time for meals  - Recommend/ encourage appropriate diets, oral nutritional supplements, and vitamin/mineral supplements  - Order, calculate, and assess calorie counts as needed  - Recommend, monitor, and adjust tube feedings and TPN/PPN based on assessed needs  - Assess need for intravenous fluids  - Provide specific nutrition/hydration education as appropriate  - Include patient/family/caregiver in decisions related to nutrition  Outcome: Progressing

## 2023-02-08 NOTE — ASSESSMENT & PLAN NOTE
Recently admitted for right shoulder pain in [place]  Pain in the right shoulder blade and right elbow     Had been on narcotics but d/jeffy due to encpehalopathy  Started on gabapentin by ortho outpt  Ortho had evaluated pt in hospital and determined no role for iv/joint injection  MRI C spine completed, shows multilevel degenerative disease involving C5-C6  Neurosurgery evaluated recommended outpatient follow-up, supportive care and pain management, patient may need ERNA  Patient had previously followed with Ortho outpatient, pain management referral was given, to evaluate for [facility]

## 2023-02-08 NOTE — PLAN OF CARE
Problem: Potential for Falls  Goal: Patient will remain free of falls  Description: INTERVENTIONS:  - Educate patient/family on patient safety including physical limitations  - Instruct patient to call for assistance with activity   - Consult OT/PT to assist with strengthening/mobility   - Keep Call bell within reach  - Keep bed low and locked with side rails adjusted as appropriate  - Keep care items and personal belongings within reach  - Initiate and maintain comfort rounds  - Make Fall Risk Sign visible to staff  - Offer Toileting every 2 Hours, in advance of need  - Initiate/Maintain bed/chair alarm  - Obtain necessary fall risk management equipment: chair alarm  - Apply yellow socks and bracelet for high fall risk patients  - Consider moving patient to room near nurses station  Outcome: Progressing     Problem: PAIN - ADULT  Goal: Verbalizes/displays adequate comfort level or baseline comfort level  Description: Interventions:  - Encourage patient to monitor pain and request assistance  - Assess pain using appropriate pain scale  - Administer analgesics based on type and severity of pain and evaluate response  - Implement non-pharmacological measures as appropriate and evaluate response  - Consider cultural and social influences on pain and pain management  - Notify physician/advanced practitioner if interventions unsuccessful or patient reports new pain  Outcome: Progressing     Problem: GASTROINTESTINAL - ADULT  Goal: Minimal or absence of nausea and/or vomiting  Description: INTERVENTIONS:  - Administer IV fluids if ordered to ensure adequate hydration  - Maintain NPO status until nausea and vomiting are resolved  - Nasogastric tube if ordered  - Administer ordered antiemetic medications as needed  - Provide nonpharmacologic comfort measures as appropriate  - Advance diet as tolerated, if ordered  - Consider nutrition services referral to assist patient with adequate nutrition and appropriate food choices  Outcome: Progressing     Problem: HEMATOLOGIC - ADULT  Goal: Maintains hematologic stability  Description: INTERVENTIONS  - Assess for signs and symptoms of bleeding or hemorrhage  - Monitor labs  - Administer supportive blood products/factors as ordered and appropriate  Outcome: Progressing

## 2023-02-08 NOTE — TELEMEDICINE
e-Consult ([facility])     Consults     Contacted by BROCK at [address]  June A Weddermann 80 y o  female MRN: [id number]  Unit/Bed#: E5 -01 Encounter: [id number]    Reason for Consult    Per provider report, patient presents with right shoulder / elbow pain with right hand paresthesias since early January  She was admitted 1/14/2023 due to these symptoms, shoulder XR completed at that time with osteoarthritis  She presents now with ongoing symptoms  NSX asked to comment on MRI findings  Of note, patient is currently with SI, reportedly admits to thinking about death but no plan, psych is following currently  She also has a-fib, prior syncopal episodes due to labile BP, asthma, anemia, salivary gland cancer  Available past medical history,social history, surgical history, medication list, drug allergies and review of systems were reviewed  /61   Pulse 86   Temp 97 7 °F (36 5 °C)   Resp 14   Ht 5' 3" (1 6 m)   Wt 56 7 kg (125 lb)   SpO2 94%   BMI 22 14 kg/m²      Clinical exam per provider report, no TTP of the spine and shoulder, mild TTP at the elbow on the right   5/5 strength throughout and full sensation  Doing much better on gabapentin  Imaging personally reviewed  · MRI cervical spine 2/7/2023:  No osseous metastasis in cervical spine  Small osseous lesion in left T3 superior articular facet without aggressive osseous features, indeterminate  Differential includes small osseous metastasis or atypical intraosseous vertebral body hemangioma  Mild bone marrow edema in left C4-C5 facet joints, likely due to active facet arthritis  Multilevel degenerative changes of cervical spine with varying degrees of canal stenosis (moderate C5-C6) and foraminal narrowing (moderate bilateral C5-C6), as detailed above  Assessment and Recommendations  1  Imaging reviewed, noted to have multilevel degenerative disease most notable at C5-6 with central and foraminal narrowing    This may be contributing to her current complaints as I do feel they correlate  However given her age, comorbidities, length of symptoms as well as grossly nonfocal exam, would recommend exhausting conservative management at this time  No surgery to be offered  2  Continue multimodal pain regimen including gabapentin for neuropathic pain  Trial steroids for nerve inflammation  Topical agents are also a good idea  Hold off on muscle relaxers given her age  Continue with low dose narcotics and wean off as appropriate, again given her advanced age would not continue this long term  3  Continue with PT for neck mobility and strengthening  4  Consider outpatient pain management for possible ERNA if warranted  5  Neurosurgery to sign off, outpatient follow up PRN  6  Please call with questions or concerns    All questions answered  Provider is in agreement with the course of action  5-10 minutes, >50% of the total time devoted to medical consultative verbal/EMR discussion between providers  Written report will be generated in the EMR

## 2023-02-08 NOTE — ASSESSMENT & PLAN NOTE
History of liable bp  Pt had a syncopal event after substernal chest pain on ambulating to the bathroom  Possibly vasovagal due to anemia and hypotension  2D echo without sig valvular or wall motion abnormality, normal ef

## 2023-02-08 NOTE — CASE MANAGEMENT
Case Management Assessment & Discharge Planning Note    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   • Polymyalgia rheumatica ([address] ) 2021   • Atherosclerosis of aorta ([facility] ) 2021   • Hypoparathyroidism, unspecified hypoparathyroidism type ([facility] ) 2021   • Pain of left lower extremity 2021   • [place]-neck deformity of finger 2021   • Muscle cramps 2021   • Skin cyst 2021   • Severe persistent asthma with acute exacerbation 10/20/2020   • Nonrheumatic mitral valve regurgitation    • Uncomplicated asthma    • Encounter for vision screening 04/15/2020   • Need for influenza vaccination 10/14/2019   • Other insomnia 10/14/2019   • Syncope 10/04/2019   • Medicare annual wellness visit, subsequent 07/10/2019   • Ankle instability, right 07/10/2019   • Other fatigue 07/10/2019   • Moderate persistent asthma without complication 242   • Hypothyroidism 2019   • Otalgia of both ears 2019   • Screening for diabetes mellitus 2019   • Hypothyroidism due to Hashimoto's thyroiditis 2018   • Shortness of breath 2016   • Essential hypertension 2016   • Hyperlipidemia 2016   • Asthma-COPD overlap syndrome ([address] ) 2016   • Paroxysmal atrial fibrillation ([facility] ) 2016   • Iron deficiency anemia 2016   • Diarrhea 2016   • Headache 2016   • 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   • Visual hallucination 2016   • Macular degeneration, 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   • Trigger middle finger of right hand 10/18/2016   • 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      LOS (days): 3  Geometric Mean LOS (GMLOS) (days): 3 00  Days to GMLOS:0     OBJECTIVE:  PATIENT READMITTED TO HOSPITAL  Risk of Unplanned Readmission Score: 19 13         Current admission status: Inpatient       Preferred Pharmacy:   [address] [facility] [address]  Phone: [phone] Fax: [facility] [place] - [place] [address] [place] [facility]  Phone: [phone] Fax: [phone] [place], [address] [facility] [address]  Phone: [phone] Fax: [phone]    [facility] [address] [facility] [address] ,  [facility] [address] ,  [facility] [address]   Phone: [phone] Fax: [phone]    Primary Care Provider: Sunny Barrow DO    Primary Insurance: [employer] [facility]  Secondary Insurance:     ASSESSMENT:  [address], [address] 83 - Daughter   Primary Phone: [phone] (Mobile)               Readmission Root Cause  30 Day Readmission: Yes  During previous admission, was a post-acute recommendation made?: Yes  What post-acute resources were offered?: Dee[place] Jaya  Patient was readmitted due to: Anemia  Action Plan: Home with [address] from [address] [facility]    Patient Information  Admitted from[de-identified] Home  Mental Status: Alert  During Assessment patient was accompanied by: Not accompanied during assessment  Assessment information provided by[de-identified] Patient  Primary Caregiver: Self  Support Systems: Children, Family members  [place] of Residence: [address] [facility] do you live in?: [address] entry access options   Select all that apply : No steps to enter home  Type of Current Residence: Apartment  Floor Level: 1  Upon entering residence, is there a bedroom on the main floor (no further steps)?: Yes  Upon entering residence, is there a bathroom on the main floor (no further steps)?: Yes  In the last 12 months, was there a time when you were not able to pay the mortgage or rent on time?: No  In the last 12 months, how many places have you lived?: 1  In the last 12 months, was there a time when you did not have a steady place to sleep or slept in a shelter (including now)?: No  Homeless/housing insecurity resource given?: N/A  Living Arrangements: Lives Alone  Is patient a ?: No    Activities of Daily Living Prior to Admission  Functional Status: Independent  Completes ADLs independently?: Yes  Ambulates independently?: Yes  Does patient use assisted devices?: No  Does patient currently own DME?: No  Does patient have a history of Outpatient Therapy (PT/OT)?: No  Does the patient have a history of Short-Term Rehab?: No  Does patient have a history of HHC?: Yes ([facility])  Does patient currently have [facility]?: Yes  Current Home Health Care  Type of Current Home Care Services: Home PT, Home OT  [address] [facility] [address][de-identified] [address] [facility] Provider[de-identified] PCP    Patient Information Continued  Income Source: Pension/halfway  Does patient have prescription coverage?: Yes  Within the past 12 months, you worried that your food would run out before you got the money to buy more : Never true  Within the past 12 months, the food you bought just didn't last and you didn't have money to get more : Never true  Food insecurity resource given?: N/A  Does patient receive dialysis treatments?: No  Does patient have a history of substance abuse?: No  Does patient have a history of Mental Health Diagnosis?: No     Means of Transportation  Means of Transport to Appts[de-identified] Drives Self  In the past 12 months, has lack of transportation kept you from medical appointments or from getting medications?: Yes (Will not drive self more that 2-3 miles from home)  In the past 12 months, has lack of transportation kept you from meetings, work, or from getting things needed for daily living?: Yes (No longer feels safe driving)  Was application for public transport provided?: N/A        DISCHARGE DETAILS:    Discharge planning discussed with[de-identified] Patient  Freedom of Choice: Yes  Comments - Freedom of Choice: Pt will discharge home to resume PT/OT w/ [facility]  CM requested MSW as well  Pt is in agreement    Were Treatment Team discharge recommendations reviewed with patient/caregiver?: Yes  Did patient/caregiver verbalize understanding of patient care needs?: Yes  Were patient/caregiver advised of the risks associated with not following Treatment Team discharge recommendations?: Yes    Contacts  Patient Contacts: Lili Tejada (Daughter) [phone] [facility]) & Alexey Oliva (Daughter) [phone] (M)  Relationship to Patient[de-identified] Family  Contact Method: Phone  Phone Number: Lili Tejada (Daughter) [phone] (M) & Alexey Oliva (Daughter) [phone] [facility])  Reason/Outcome: Emergency Contact, Discharge Planning, Continuity of [address] [place] [address]         Is the patient interested in [facility] at discharge?: Yes  [address] requested[de-identified] Medical Social Work, Occupational Therapy, Physical [address] Name[de-identified] [address] [place] [address] Provider[de-identified] PCP  Home Health Services Needed[de-identified] Strengthening/Theraputic Exercises to Improve Function, Gait/ADL Training, Evaluate Functional Status and Safety  Homebound Criteria Met[de-identified] Requires the Assistance of Another Person for Safe Ambulation or to Leave the Home  Supporting Clincal Findings[de-identified] Limited Endurance, Fatigues Easliy in Short Distances    Discharge Destination Plan[de-identified] Home with [place] at Discharge : John by: Family member    IMM Given (Date):: 02/08/23  IMM Given to[de-identified] Patient

## 2023-02-09 ENCOUNTER — TELEPHONE (OUTPATIENT)
Dept: PULMONOLOGY | Facility: CLINIC | Age: 88
End: 2023-02-09

## 2023-02-09 ENCOUNTER — TRANSITIONAL CARE MANAGEMENT (OUTPATIENT)
Dept: FAMILY MEDICINE CLINIC | Facility: CLINIC | Age: 88
End: 2023-02-09

## 2023-02-09 NOTE — TELEPHONE ENCOUNTER
Called patient and said that they are calling back to set up their one year follow up with Dr Creston Baumgarten in [place] for April

## 2023-02-09 NOTE — UTILIZATION REVIEW
NOTIFICATION OF ADMISSION DISCHARGE   This is a Notification of Discharge from [facility]  Please be advised that this patient has been discharge from our facility  Below you will find the admission and discharge date and time including the patient’s disposition  UTILIZATION REVIEW CONTACT:  Simona Maldonado  Utilization   Network Utilization Review Department  Phone: [phone] x carefully listen to the prompts  All voicemails are confidential   Email: [email or web address]     ADMISSION INFORMATION  PRESENTATION DATE: 2/5/2023  8:25 AM  OBERVATION ADMISSION DATE:   INPATIENT ADMISSION DATE: 2/5/23 10:34 AM   DISCHARGE DATE: 2/8/2023  3:22 PM   DISPOSITION:Home/Self Care    IMPORTANT INFORMATION:  Send all requests for admission clinical reviews, approved or denied determinations and any other requests to dedicated fax number below belonging to the campus where the patient is receiving treatment   List of dedicated fax numbers:  [address] [place] [address] DENIALS (Administrative/Medical Necessity) [phone]   [address] (Maternity/NICU/Pediatrics) [phone]   [facility] [phone]   [facility] 87 [phone]   [address] [phone]   220 [facility] [phone]   90 [facility] [phone]   14[facility] 119 [phone]   [facility]  [phone]   [facility] [phone]   [facility] [address] [facility] [phone]

## 2023-02-10 ENCOUNTER — HOME CARE VISIT (OUTPATIENT)
Dept: HOME HEALTH SERVICES | Facility: HOME HEALTHCARE | Age: 88
End: 2023-02-10

## 2023-02-10 NOTE — RESULT ENCOUNTER NOTE
Please call the patient with the results    Stomach biopsy showed mild inflammation and no infection  she should continue to take her pantoprazole as prescribed

## 2023-02-12 VITALS — DIASTOLIC BLOOD PRESSURE: 62 MMHG | SYSTOLIC BLOOD PRESSURE: 120 MMHG | OXYGEN SATURATION: 99 % | HEART RATE: 107 BPM

## 2023-02-13 ENCOUNTER — HOME CARE VISIT (OUTPATIENT)
Dept: HOME HEALTH SERVICES | Facility: HOME HEALTHCARE | Age: 88
End: 2023-02-13

## 2023-02-13 VITALS — OXYGEN SATURATION: 97 % | SYSTOLIC BLOOD PRESSURE: 104 MMHG | DIASTOLIC BLOOD PRESSURE: 60 MMHG | HEART RATE: 67 BPM

## 2023-02-15 ENCOUNTER — HOME CARE VISIT (OUTPATIENT)
Dept: HOME HEALTH SERVICES | Facility: HOME HEALTHCARE | Age: 88
End: 2023-02-15

## 2023-02-15 VITALS — SYSTOLIC BLOOD PRESSURE: 132 MMHG | HEART RATE: 77 BPM | DIASTOLIC BLOOD PRESSURE: 70 MMHG | OXYGEN SATURATION: 97 %

## 2023-02-17 ENCOUNTER — TELEPHONE (OUTPATIENT)
Dept: LAB | Facility: HOSPITAL | Age: 88
End: 2023-02-17

## 2023-02-17 ENCOUNTER — HOME CARE VISIT (OUTPATIENT)
Dept: HOME HEALTH SERVICES | Facility: HOME HEALTHCARE | Age: 88
End: 2023-02-17

## 2023-02-18 VITALS — HEART RATE: 82 BPM | OXYGEN SATURATION: 98 % | SYSTOLIC BLOOD PRESSURE: 110 MMHG | DIASTOLIC BLOOD PRESSURE: 56 MMHG

## 2023-02-20 ENCOUNTER — HOME CARE VISIT (OUTPATIENT)
Dept: HOME HEALTH SERVICES | Facility: HOME HEALTHCARE | Age: 88
End: 2023-02-20

## 2023-02-20 VITALS — OXYGEN SATURATION: 98 % | HEART RATE: 67 BPM

## 2023-02-21 ENCOUNTER — HOME CARE VISIT (OUTPATIENT)
Dept: HOME HEALTH SERVICES | Facility: HOME HEALTHCARE | Age: 88
End: 2023-02-21

## 2023-02-21 ENCOUNTER — APPOINTMENT (OUTPATIENT)
Dept: LAB | Facility: HOSPITAL | Age: 88
End: 2023-02-21
Attending: STUDENT IN AN ORGANIZED HEALTH CARE EDUCATION/TRAINING PROGRAM

## 2023-02-21 DIAGNOSIS — D64.9 SYMPTOMATIC ANEMIA: ICD-10-CM

## 2023-02-21 DIAGNOSIS — K92.2 GI BLEED: ICD-10-CM

## 2023-02-21 LAB
ERYTHROCYTE [DISTWIDTH] IN BLOOD BY AUTOMATED COUNT: 15.3 % (ref 11.6–15.1)
HCT VFR BLD AUTO: 28.4 % (ref 34.8–46.1)
HGB BLD-MCNC: 8.8 G/DL (ref 11.5–15.4)
MCH RBC QN AUTO: 29.2 PG (ref 26.8–34.3)
MCHC RBC AUTO-ENTMCNC: 31 G/DL (ref 31.4–37.4)
MCV RBC AUTO: 94 FL (ref 82–98)
PLATELET # BLD AUTO: 310 THOUSANDS/UL (ref 149–390)
PMV BLD AUTO: 11.5 FL (ref 8.9–12.7)
RBC # BLD AUTO: 3.01 MILLION/UL (ref 3.81–5.12)
WBC # BLD AUTO: 7.9 THOUSAND/UL (ref 4.31–10.16)

## 2023-02-22 ENCOUNTER — HOME CARE VISIT (OUTPATIENT)
Dept: HOME HEALTH SERVICES | Facility: HOME HEALTHCARE | Age: 88
End: 2023-02-22

## 2023-02-23 NOTE — PROGRESS NOTES
Cardiology Follow Up    June A Akosua Arguello  6/4/1932  [id number]  [address] [place], [place] [id number] [phone]    1  Pedal edema  furosemide (LASIX) 20 mg tablet      2  Essential hypertension        3  Anemia, unspecified type        4  Duodenal ulcer        5  Hyperlipidemia, unspecified hyperlipidemia type        6  Paroxysmal atrial fibrillation [facility])            Interval History:   Ms Kendra Porras presents to our office with a 2 week hx of LE edema  She was hospitalized on 2/05 - 2/08/23 with anemia  She presented to the emergency room with weakness  On presentation  hemoglobin 7 requiring 1 unit PRBC  GI consulted  She admitted to dark tarry stools  June underwent EGD which showed a large duodenal ulcer, PPI twice a day for 8 weeks  June continue with fatigue  She finds it difficult to walk to the mail box  She does not find melida in life and admits to depression  She denies suicidal ideation  June has a poor appetite  She woke up 2 weeks ago with patrick feet and lower leg swelling  The swelling has improved but continues  June admits to dyspnea with minimal exertion  Her weight at home runs 119-120 pounds  Discharge weight was 125 pounds and weight in the office is 121 pounds          Medical History   Primary Cardiologist Dr Mendoza Angora  Hypertension  Hyperlipidemia  Paroxysmal atrial fibrillation no recurrence not on AC due to hx of nasopharyngeal CA and bleeding risk    Shortness of breath       Patient Active Problem List   Diagnosis   • Trigger middle finger of right hand   • Shortness of breath   • Essential hypertension   • Hyperlipidemia   • Asthma-COPD overlap syndrome (HCC)   • Paroxysmal atrial fibrillation (HCC)   • Iron deficiency anemia   • Diarrhea   • Headache   • 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HCC)   • Visual hallucination   • Macular degeneration, wet (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[address] )   • Hypothyroidism due to Hashimoto's thyroiditis   • Moderate persistent asthma without complication   • Hypothyroidism   • Otalgia of both ears   • Screening for diabetes mellitus   • Medicare annual wellness visit, subsequent   • Ankle instability, right   • Other fatigue   • Syncope   • Need for influenza vaccination   • Other insomnia   • Uncomplicated asthma   • Encounter for vision screening   • Nonrheumatic mitral valve regurgitation   • Severe persistent asthma with acute exacerbation   • Muscle cramps   • Skin cyst   • Pain of left lower extremity   • Evart-neck deformity of finger   • Polymyalgia rheumatica (HCC)   • Atherosclerosis of aorta ([facility])   • Hypoparathyroidism, unspecified hypoparathyroidism type ([facility])   • Acute pain of right shoulder   • Aneurysm of aortic arch without rupture   • Toxic encephalopathy   • Anemia   • Suicidal ideation     Past Medical History:   Diagnosis Date   • A-fib ([place] [address] )    • Allergy to IVP dye 06/04/2013    pt felt heaviness, palpitations   • 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), wet ([facility])     bilateral   • Aneurysm of aortic root     last assessed - [id number]   • Aortic arch aneurysm    • Aortic root dilatation ([facility])     last assessed - [id number]   • Arthritis    • Ascending aortic aneurysm     last assessed - [id number]   • Asthma    • Basal cell carcinoma     last assessed - 09Sep2016   • 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[facility])    • Disease of thyroid gland    • Facet arthropathy, cervical     last assessed - [id number]   • History of fracture of vertebral column    • Hyperlipidemia    • Hypertension    • 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[place] [address] )    • Hypoxia     last assessed - [id number]   • Junctional rhythm     last assessed - 04Apr2017   • Lightheadedness     last assessed - 28Jun2016   • Migraine    • Palpitations     last assessed - 28Jun2016   • Pleural effusion, bilateral     last assessed - 20Apr2015   • 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[address] )    • Shortness of breath     last assessed - [id number]   • Thyroid trouble    • Trigger finger     last assessed - [id number]   • Trigger middle finger of right hand     last assessed - 2016   • Urinary incontinence     last assessed -  ; Resolved - [id number]     Social History     Socioeconomic History   • Marital status:      Spouse name: Not on file   • Number of children: Not on file   • Years of education: Not on file   • Highest education level: Not on file   Occupational History   • Not on file   Tobacco Use   • Smoking status: Former     Packs/day: 0 25     Years: 57 00     Pack years: 14 25     Types: Cigarettes     Start date: 36     Quit date: 2014     Years since quittin 1   • Smokeless tobacco: Former   • Tobacco comments:     smoked 17 yrs  ppd quit and restarted then quit  10 days ago during 2014    Vaping Use   • Vaping Use: Never used   Substance and Sexual Activity   • Alcohol use: Not Currently     Comment: Social drinker   • Drug use: No   • Sexual activity: Not Currently   Other Topics Concern   • Not on file   Social History Narrative   • Not on file     Social Determinants of Health     Financial Resource Strain: Medium Risk   • Difficulty of Paying Living Expenses: Somewhat hard   Food Insecurity: No Food Insecurity   • Worried About Running Out of Food in the Last Year: Never true   • Ran Out of Food in the Last Year: Never true   Transportation Needs: Unmet Transportation Needs   • Lack of Transportation (Medical):  Yes   • Lack of Transportation (Non-Medical): Yes   Physical Activity: Not on file   Stress: Not on file   Social Connections: Not on file   Intimate Partner Violence: Not on file   Housing Stability: Low Risk    • Unable to Pay for Housing in the Last Year: No   • Number of Places Lived in the Last Year: 1   • Unstable Housing in the Last Year: No      Family History   Problem Relation Age of Onset   • Coronary aneurysm Sister    • Coronary artery disease Sister         CABG • Heart disease Family         cardiac disorder   • Hypertension Family    • Cancer Family    • Thyroid disease Family      Past Surgical History:   Procedure Laterality Date   • ABDOMINAL AORTIC ANEURYSM REPAIR W/ ENDOLUMINAL GRAFT  2015    Ascending aorta and hemiarch replacement with 30 mm Vascutek Gelweave graft;  Managed by Teresa Adams; last assessed - [id number]   • APPENDECTOMY     • BLADDER SURGERY      Reccurent histoy of bladder surgery   • HOROWITZ PROCEDURE     • CARDIAC CATHETERIZATION  2004    Procedure summary - Luminal irregularities; last assessed - [id number]   •  SECTION     • CORONARY ANEURYSM REPAIR     • CYSTOSCOPY      botox injection   • HYSTERECTOMY     • RI NDSC NJX IMPLT MATRL URT&/BLDR NCK N/A 2017    Procedure: CYSTOSCOPY; DURASPHERE-PERIURETHRAL BULKING AGENT INJECTION ;  Surgeon: Santana Lopez MD;  Location: [facility];  Service: Urology   • RI TENDON SHEATH INCISION Right 10/18/2016    Procedure: LONG FINGER TRIGGER RELEASE ;  Surgeon: Carolann Riddle MD;  Location: [facility];  Service: Orthopedics   • THYROIDECTOMY      Total Thyroidectomy   • TONSILLECTOMY AND ADENOIDECTOMY         Current Outpatient Medications:   •  albuterol (2 5 mg/3 mL) 0 083 % nebulizer solution, Take 2 5 mg by nebulization every 6 (six) hours as needed for wheezing or shortness of breath, Disp: , Rfl:   •  albuterol (PROVENTIL HFA,VENTOLIN HFA) 90 mcg/act inhaler, Inhale 2 puffs every 6 (six) hours as needed for wheezing, Disp: 1 Inhaler, Rfl: 0  •  amLODIPine-benazepril (LOTREL 5-20) 5-20 MG per capsule, Take 1 capsule by mouth daily, Disp: 90 capsule, Rfl: 3  •  aspirin 81 MG tablet, Take 162 mg by mouth daily in the early morning  , Disp: , Rfl:   •  calcium carbonate (OS-TAE) 600 MG tablet, Take 600 mg by mouth daily in the early morning  , Disp: , Rfl:   •  Diclofenac Sodium (VOLTAREN) 1 %, Apply 2 g topically 4 (four) times a day, Disp: 700 g, Rfl: 2  •  gabapentin (Neurontin) 100 mg capsule, Take 1 capsule (100 mg total) by mouth daily at bedtime, Disp: 90 capsule, Rfl: 1  •  levothyroxine 125 mcg tablet, Take 1 tablet (125 mcg total) by mouth daily in the early morning, Disp: 90 tablet, Rfl: 3  •  lovastatin (MEVACOR) 20 mg tablet, Take 1 tablet (20 mg total) by mouth in the morning , Disp: 90 tablet, Rfl: 3  •  metoprolol succinate (TOPROL-XL) 25 mg 24 hr tablet, Take 1 tablet (25 mg total) by mouth daily, Disp: 90 tablet, Rfl: 1  •  mirtazapine (REMERON) 7 5 MG tablet, Take 1 tablet (7 5 mg total) by mouth daily at bedtime, Disp: 30 tablet, Rfl: 0  •  Multiple Vitamins-Minerals (One Daily Multivitamin Women) TABS, Apply 1 tablet to the mouth or throat daily  Indications: Treatment to Prevent Vitamin Deficiency, Disp: , Rfl:   •  pantoprazole (PROTONIX) 40 mg tablet, Take 1 tablet (40 mg total) by mouth 2 (two) times a day before meals, Disp: 60 tablet, Rfl: 0  Allergies   Allergen Reactions   • Amiodarone Hives   • Omnipaque [Iohexol] Hives and Shortness Of Breath   • Clindamycin Other (See Comments)     When taken causes cdiff immediately   • Other    • Sulfa Antibiotics Hives     itching   • Acetazolamide Hives and Rash     Reaction Date:Unknown   • Iv Dye  [Iodinated Contrast Media] Hypertension and Palpitations     [facility] - [id number]: Verified with patient    • Naprosyn [Naproxen] Itching and Rash     Category: Allergy;        Labs:  Appointment on 02/21/2023   Component Date Value   • WBC 02/21/2023 7 90    • RBC 02/21/2023 3 01 (L)    • Hemoglobin 02/21/2023 8 8 (L)    • Hematocrit 02/21/2023 28 4 (L)    • MCV 02/21/2023 94    • MCH 02/21/2023 29 2    • MCHC 02/21/2023 31 0 (L)    • RDW 02/21/2023 15 3 (H)    • Platelets 21/55/4364 310    • MPV 02/21/2023 11 5    No results displayed because visit has over 200 results        Admission on 01/14/2023, Discharged on 01/19/2023   Component Date Value   • WBC 01/14/2023 6 89    • RBC 01/14/2023 4 00    • Hemoglobin 01/14/2023 12 0 • Hematocrit 01/14/2023 37 5    • MCV 01/14/2023 94    • MCH 01/14/2023 30 0    • MCHC 01/14/2023 32 0    • RDW 01/14/2023 13 0    • MPV 01/14/2023 11 1    • Platelets 78/78/8407 204    • nRBC 01/14/2023 0    • Neutrophils Relative 01/14/2023 64    • Immat GRANS % 01/14/2023 0    • Lymphocytes Relative 01/14/2023 23    • Monocytes Relative 01/14/2023 10    • Eosinophils Relative 01/14/2023 2    • Basophils Relative 01/14/2023 1    • Neutrophils Absolute 01/14/2023 4 42    • Immature Grans Absolute 01/14/2023 0 03    • Lymphocytes Absolute 01/14/2023 1 58    • Monocytes Absolute 01/14/2023 0 68    • Eosinophils Absolute 01/14/2023 0 14    • Basophils Absolute 01/14/2023 0 04    • Sodium 01/14/2023 139    • Potassium 01/14/2023 3 8    • Chloride 01/14/2023 102    • CO2 01/14/2023 32    • ANION GAP 01/14/2023 5    • BUN 01/14/2023 19    • Creatinine 01/14/2023 0 71    • Glucose 01/14/2023 124    • Calcium 01/14/2023 8 9    • AST 01/14/2023 27    • ALT 01/14/2023 27    • Alkaline Phosphatase 01/14/2023 63    • Total Protein 01/14/2023 7 2    • Albumin 01/14/2023 3 9    • Total Bilirubin 01/14/2023 0 52    • eGFR 01/14/2023 75    • hs TnI 0hr 01/14/2023 7    • hs TnI 2hr 01/14/2023 7    • Delta 2hr hsTnI 01/14/2023 0    • hs TnI 4hr 01/14/2023 7    • Delta 4hr hsTnI 01/14/2023 0    • Ventricular Rate 01/14/2023 70    • Atrial Rate 01/14/2023 70    • CT Interval 01/14/2023 184    • QRSD Interval 01/14/2023 70    • QT Interval 01/14/2023 388    • QTC Interval 01/14/2023 419    • P Axis 01/14/2023 73    • QRS Axis 01/14/2023 76    • T Wave Axis 01/14/2023 72    • Ventricular Rate 01/14/2023 62    • Atrial Rate 01/14/2023 62    • CT Interval 01/14/2023 184    • QRSD Interval 01/14/2023 74    • QT Interval 01/14/2023 412    • QTC Interval 01/14/2023 418    • P Axis 01/14/2023 97    • QRS Axis 01/14/2023 90    • T Wave Axis 01/14/2023 85    • Ventricular Rate 01/14/2023 85    • Atrial Rate 01/14/2023 102    • QRSD Interval 01/14/2023 82    • QT Interval 01/14/2023 372    • QTC Interval 01/14/2023 442    • P Axis 01/14/2023 83    • QRS Axis 01/14/2023 84    • T Wave Axis 01/14/2023 78    • WBC 01/15/2023 7 95    • RBC 01/15/2023 4 03    • Hemoglobin 01/15/2023 12 1    • Hematocrit 01/15/2023 37 3    • MCV 01/15/2023 93    • MCH 01/15/2023 30 0    • MCHC 01/15/2023 32 4    • RDW 01/15/2023 13 0    • MPV 01/15/2023 11 0    • Platelets 07/31/1481 212    • nRBC 01/15/2023 0    • Neutrophils Relative 01/15/2023 63    • Immat GRANS % 01/15/2023 0    • Lymphocytes Relative 01/15/2023 24    • Monocytes Relative 01/15/2023 10    • Eosinophils Relative 01/15/2023 2    • Basophils Relative 01/15/2023 1    • Neutrophils Absolute 01/15/2023 5 07    • Immature Grans Absolute 01/15/2023 0 02    • Lymphocytes Absolute 01/15/2023 1 93    • Monocytes Absolute 01/15/2023 0 77    • Eosinophils Absolute 01/15/2023 0 12    • Basophils Absolute 01/15/2023 0 04    • Sodium 01/15/2023 138    • Potassium 01/15/2023 3 7    • Chloride 01/15/2023 101    • CO2 01/15/2023 26    • ANION GAP 01/15/2023 11    • BUN 01/15/2023 16    • Creatinine 01/15/2023 0 73    • Glucose 01/15/2023 112    • Calcium 01/15/2023 8 6    • eGFR 01/15/2023 72    • TSH 3RD GENERATON 01/15/2023 1 444    • CRP 01/15/2023 3 7 (H)    • Color, UA 01/17/2023 Freda    • Clarity, UA 01/17/2023 Clear    • Specific Gravity, UA 01/17/2023 1 020    • pH, UA 01/17/2023 6 0    • Leukocytes, UA 01/17/2023 Negative    • Nitrite, UA 01/17/2023 Negative    • Protein, UA 01/17/2023 100 (2+) (A)    • Glucose, UA 01/17/2023 Negative    • Ketones, UA 01/17/2023 15 (1+) (A)    • Urobilinogen, UA 01/17/2023 0 2    • Bilirubin, UA 01/17/2023 Negative    • Occult Blood, UA 01/17/2023 Trace-Intact (A)    • WBC 01/17/2023 12 32 (H)    • RBC 01/17/2023 4 14    • Hemoglobin 01/17/2023 12 9    • Hematocrit 01/17/2023 37 2    • MCV 01/17/2023 90    • MCH 01/17/2023 31 2    • MCHC 01/17/2023 34 7    • RDW 01/17/2023 13 2 • MPV 01/17/2023 11 3    • Platelets 94/20/8348 229    • nRBC 01/17/2023 0    • Neutrophils Relative 01/17/2023 74    • Immat GRANS % 01/17/2023 0    • Lymphocytes Relative 01/17/2023 15    • Monocytes Relative 01/17/2023 11    • Eosinophils Relative 01/17/2023 0    • Basophils Relative 01/17/2023 0    • Neutrophils Absolute 01/17/2023 9 06 (H)    • Immature Grans Absolute 01/17/2023 0 05    • Lymphocytes Absolute 01/17/2023 1 84    • Monocytes Absolute 01/17/2023 1 30 (H)    • Eosinophils Absolute 01/17/2023 0 03    • Basophils Absolute 01/17/2023 0 04    • RBC, UA 01/17/2023 1-2 (A)    • WBC, UA 01/17/2023 0-1 (A)    • Epithelial Cells 01/17/2023 Occasional    • Bacteria, UA 01/17/2023 Occasional    • MUCUS THREADS 01/17/2023 Occasional (A)    Appointment on 01/06/2023   Component Date Value   • Sodium 01/06/2023 137    • Potassium 01/06/2023 4 9    • Chloride 01/06/2023 103    • CO2 01/06/2023 29    • ANION GAP 01/06/2023 5    • BUN 01/06/2023 34 (H)    • Creatinine 01/06/2023 0 81    • Glucose 01/06/2023 100    • Calcium 01/06/2023 9 0    • AST 01/06/2023 17    • ALT 01/06/2023 23    • Alkaline Phosphatase 01/06/2023 54    • Total Protein 01/06/2023 6 9    • Albumin 01/06/2023 3 8    • Total Bilirubin 01/06/2023 0 60    • eGFR 01/06/2023 64    • WBC 01/06/2023 7 49    • RBC 01/06/2023 4 09    • Hemoglobin 01/06/2023 12 3    • Hematocrit 01/06/2023 38 8    • MCV 01/06/2023 95    • MCH 01/06/2023 30 1    • MCHC 01/06/2023 31 7    • RDW 01/06/2023 13 3    • MPV 01/06/2023 11 9    • Platelets 86/69/2295 233    • nRBC 01/06/2023 0    • Neutrophils Relative 01/06/2023 57    • Immat GRANS % 01/06/2023 0    • Lymphocytes Relative 01/06/2023 29    • Monocytes Relative 01/06/2023 11    • Eosinophils Relative 01/06/2023 2    • Basophils Relative 01/06/2023 1    • Neutrophils Absolute 01/06/2023 4 24    • Immature Grans Absolute 01/06/2023 0 02    • Lymphocytes Absolute 01/06/2023 2 19    • Monocytes Absolute 01/06/2023 0 82    • Eosinophils Absolute 01/06/2023 0 15    • Basophils Absolute 01/06/2023 0 07      Imaging: EGD    Result Date: 2/6/2023  Narrative: Table formatting from the original result was not included  [address]  [facility] [address] [place] [address] [phone] DATE OF SERVICE: 2/06/23 PHYSICIAN(S): Attending: No Staff Documented Fellow: Bianka Espinoza DO INDICATION: Anemia, unspecified type POST-OP DIAGNOSIS: See the impression below  PREPROCEDURE: Informed consent was obtained for the procedure, including sedation  Risks of perforation, hemorrhage, adverse drug reaction and aspiration were discussed  The patient was placed in the left lateral decubitus position  Patient was explained about the risks and benefits of the procedure  Risks including but not limited to bleeding, infection, and perforation were explained in detail  Also explained about less than 100% sensitivity with the exam and other alternatives  PROCEDURE: EGD DETAILS OF PROCEDURE: Patient was taken to the procedure room where a time out was performed to confirm correct patient and correct procedure  The patient underwent monitored anesthesia care, which was administered by an anesthesia professional  The patient's blood pressure, heart rate, level of consciousness, respirations, oxygen and ETCO2 were monitored throughout the procedure  The scope was advanced to the second part of the duodenum  Retroflexion was performed in the fundus  Prior to the procedure, the patient's H  Pylori status was unknown  The patient experienced no blood loss  The procedure was not difficult  The patient tolerated the procedure well  There were no apparent complications   ANESTHESIA INFORMATION: ASA: III Anesthesia Type: General MEDICATIONS: No administrations occurring from 1554 to 1623 on 02/06/23 FINDINGS: Mild, patchy erythematous mucosa with erosion in the body of the stomach and antrum; no bleeding was identified The esophagus appeared normal  3 cm sliding hiatal hernia (type I hiatal hernia) without Tracey Pattee lesions present - GE junction 39 cm from the incisors, diaphragmatic impression 42 cm from the incisors Performed forceps biopsies in the stomach  2 biopsies taken from the antrum, 1 from the incisura, 2 from the body to rule out H pylori  Single large, cratered, round, benign-appearing ulcer in the duodenal bulb with clean base (Gerald III) The 2nd part of the duodenum appeared normal  Moderate, generalized erythematous mucosa in the fundus of the stomach and body of the stomach SPECIMENS: ID Type Source Tests Collected by Time Destination 1 : biopsy retrieved by cold biopsy forceps, R/O H  pylori Tissue Stomach TISSUE EXAM Haider Joseph DO 2/6/2023  4:02 PM      Impression: Mild, patchy erythematous mucosa with erosion in the body of the stomach and antrum; no bleeding was identified The esophagus appeared normal  3 cm sliding hiatal hernia (type I hiatal hernia) without Tracey Pattee lesions present - GE junction 39 cm from the incisors, diaphragmatic impression 42 cm from the incisors Performed forceps biopsies in the stomach  2 biopsies taken from the antrum, 1 from the incisura, 2 from the body to rule out H pylori  Single large, cratered, round, benign-appearing ulcer in the duodenal bulb with clean base (Gerald III) The 2nd part of the duodenum appeared normal  RECOMMENDATION:  Await pathology results Return to floor Resume diet  BID ppi for 8-12 weeks Monitor hemoglobin, transfuse for less than 7  No Staff Documented     Colonoscopy    Result Date: 2/6/2023  Narrative: Table formatting from the original result was not included  [address] [facility] [address] [place] [address] [phone] DATE OF SERVICE: 2/06/23 PHYSICIAN(S): Attending: No Staff Documented Fellow: Haider Joseph DO INDICATION: Anemia, unspecified type POST-OP DIAGNOSIS: See the impression below   HISTORY: Prior colonoscopy: More than 10 years ago  BOWEL PREPARATION: Golytely/Colyte/Trilyte PREPROCEDURE: Informed consent was obtained for the procedure, including sedation  Risks including but not limited to bleeding, infection, perforation, adverse drug reaction and aspiration were explained in detail  Also explained about less than 100% sensitivity with the exam and other alternatives  The patient was placed in the left lateral decubitus position  Procedure: Colonoscopy DETAILS OF PROCEDURE: Patient was taken to the procedure room where a time out was performed to confirm correct patient and correct procedure  The patient underwent monitored anesthesia care, which was administered by an anesthesia professional  The patient's blood pressure, heart rate, level of consciousness, oxygen and respirations were monitored throughout the procedure  A digital rectal exam was performed  The scope was introduced through the anus and advanced to the rectum  Retroflexion was performed in the rectum  The quality of bowel preparation was evaluated using the [facility] Bowel Preparation Scale with scores of: right colon = not assessed, transverse colon = not assessed, left colon = 0  The total BBPS score was 0  Bowel prep was not adequate  The patient experienced no blood loss  The procedure was not difficult  The patient tolerated the procedure well  There were no apparent complications  ANESTHESIA INFORMATION: ASA: III Anesthesia Type: General MEDICATIONS: No administrations occurring from 1554 to 1621 on 02/06/23 FINDINGS: Large amount of solid stool visualized in the rectum   Procedure was aborted EVENTS: Procedure Events Event Event Time ENDO SCOPE OUT TIME 2/6/2023  4:13 PM SPECIMENS: ID Type Source Tests Collected by Time Destination 1 : biopsy retrieved by cold biopsy forceps, R/O H  pylori Tissue Stomach TISSUE EXAM Kyler Benavides DO 2/6/2023  4:02 PM  EQUIPMENT: Colonoscope -     Impression: Large amount of solid stool visualized in the rectum precluding visualization  Procedure was aborted  RECOMMENDATION:  No further screening colonoscopies necessary  Age greater than 72    No Staff Documented     CT abdomen pelvis wo contrast    Result Date: 2/5/2023  Narrative: CT ABDOMEN AND PELVIS WITHOUT IV CONTRAST INDICATION:   Left lower quadrant abdominal pain, melena, constipation, gastrointestinal bleed  COMPARISON:  CT abdomen pelvis 3/25/2008  PET CT 4/6/2016  TECHNIQUE:  CT examination of the abdomen and pelvis was performed without intravenous contrast  Axial, sagittal, and coronal 2D reformatted images were created from the source data and submitted for interpretation  Radiation dose length product (DLP) for this visit:  377 mGy-cm   This examination, like all CT scans performed in the [facility], was performed utilizing techniques to minimize radiation dose exposure, including the use of iterative reconstruction and automated exposure control  Enteric contrast was not administered  FINDINGS: ABDOMEN LOWER CHEST:  No clinically significant abnormality identified in the visualized lower chest  LIVER/BILIARY TREE:  There are one or more hepatic simple cyst(s) present  No CT evidence of suspicious solid hepatic mass  Normal hepatic contours  No biliary dilatation  GALLBLADDER:  No calcified gallstones  No pericholecystic inflammatory change  SPLEEN:  Unremarkable  PANCREAS:  Unremarkable  ADRENAL GLANDS:  Unremarkable  KIDNEYS/URETERS:  Unremarkable  No hydronephrosis  STOMACH AND BOWEL: Moderate colonic stool burden  No bowel obstruction  APPENDIX:  No findings to suggest appendicitis  ABDOMINOPELVIC CAVITY:  No ascites  No pneumoperitoneum  No lymphadenopathy  VESSELS:  Atherosclerotic changes are present  No evidence of aneurysm  PELVIS REPRODUCTIVE ORGANS:  Surgical changes of prior hysterectomy  URINARY BLADDER:  Unremarkable  ABDOMINAL WALL/INGUINAL REGIONS:  Unremarkable   OSSEOUS STRUCTURES:  No acute fracture or destructive osseous lesion  Unchanged moderate anterior wedge compression deformity of the T12 vertebral body since 12/30/2016  Dextroconvex scoliotic curvature of the thoracolumbar spine with associated degenerative changes  Mild degenerative changes of bilateral hips, symphysis pubis, and the bilateral sacroiliac joints  Impression: No acute process in the abdomen and pelvis identified on this unenhanced CT  Moderate colonic stool burden  Workstation performed: [id number]     XR spine cervical 2 or 3 vw injury    Result Date: 2/2/2023  Narrative: CERVICAL SPINE INDICATION:   [id number]: Pain in right arm  COMPARISON:  Cervical spine x-ray dated March 16, 2017  VIEWS:  XR SPINE CERVICAL 2 OR 3 VW INJURY FINDINGS: No acute fracture or subluxation  There is grade 1 retrolisthesis of C3 on C4  There is intervertebral disc space narrowing at C5/C6 C6-C7  There is mild multilevel bilateral facet arthropathy  Postoperative changes of prior median sternotomy are present  There is aneurysmal dilatation of the ascending thoracic aorta  Clear lung apices  Impression: No acute osseous abnormality  Degenerative changes as above  Workstation performed: [id number]     MRI cervical spine wo contrast    Result Date: 2/7/2023  Narrative: MRI CERVICAL SPINE WITHOUT CONTRAST INDICATION: cervical spine wo contrast, neck pain  COMPARISON: CT abdomen pelvis without contrast February 5, 2023  CT chest without contrast January 14, 2023  CT soft tissue neck without contrast 9/13/2022  MRI cervical spine without contrast 4/4/2017  TECHNIQUE:  Multiplanar, multisequence imaging of the cervical spine was performed     IMAGE QUALITY:  Diagnostic FINDINGS: ALIGNMENT:  Normal alignment of the cervical spine  No acute compression fracture  Chronic superior endplate compression deformities of T2, T3, and T4 with mild height loss  No subluxation  No scoliosis  MARROW SIGNAL:  Mild bone marrow edema in left C4-C5 facet joints (7:2)    Small T1 hypointense, T2/STIR hyperintense signal abnormality in left T3 superior articular facet without aggressive osseous features (7:4)  Type I Modic complete change at anterior inferior corner endplate of T5 vertebral body  CERVICAL AND VISUALIZED THORACIC CORD:  Normal signal within the visualized cord  PREVERTEBRAL AND PARASPINAL SOFT TISSUES:  Normal  VISUALIZED POSTERIOR FOSSA:  The visualized posterior fossa demonstrates no abnormal signal  CERVICAL DISC SPACES: Multilevel osteophytes, mild disc height loss, uncovertebral hypertrophy, and facet arthropathy  C1-C2: Normal  C2-C3: Facet arthropathy and ligamentum flavum thickening  No significant canal stenosis or foraminal narrowing  C3-C4: Diffuse disc bulge, small central disc protrusion  Facet arthropathy and ligamentum flavum thickening  Mild canal stenosis  Is no significant foraminal narrowing  C4-C5: Diffuse disc bulge, small central disc protrusion  Uncovertebral hypertrophy, facet arthropathy, ligamentum flavum thickening  Mild canal stenosis  Mild right foraminal narrowing  C5-C6: Diffuse disc bulge, small central disc extrusion  Uncovertebral hypertrophy, facet arthropathy, and ligamentum flavum thickening  Moderate canal stenosis  Moderate bilateral foraminal narrowing  C6-C7: Diffuse disc bulge, small central disc protrusion  Uncovertebral hypertrophy  Mild canal stenosis  No significant foraminal narrowing  C7-T1: Diffuse disc bulge, small right subarticular disc extrusion with slight inferior migration  Mild canal stenosis  Mild right foraminal narrowing  UPPER THORACIC DISC SPACES:  Normal  OTHER FINDINGS:  Partially imaged 2 4 cm heterogeneous lesion in the posterior right maxillary/hard palate region (7:16) - corresponding to destructive osseous lesion seen on CT soft tissue neck 9/13/2022  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  Impression: No osseous metastasis in cervical spine   Small osseous lesion in left T3 superior articular facet without aggressive osseous features, indeterminate  Differential includes small osseous metastasis or atypical intraosseous vertebral body hemangioma  Mild bone marrow edema in left C4-C5 facet joints, likely due to active facet arthritis  Multilevel degenerative changes of cervical spine with varying degrees of canal stenosis (moderate C5-C6) and foraminal narrowing (moderate bilateral C5-C6), as detailed above  Partially imaged 2 4 cm heterogeneous lesion in posterior right maxillary/palate region, 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 - which corresponds destructive osseous lesion seen on CT soft tissue neck 9/13/2022  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  The study was marked in [facility] for immediate notification  Workstation performed: [id number]     Echo complete w/ contrast if indicated    Result Date: 2/8/2023  Narrative: •  Left Ventricle: Left ventricular cavity size is normal  Wall thickness is normal  The left ventricular ejection fraction is 65% by visual estimation  Systolic function is normal  Wall motion is normal  Diastolic function is moderately abnormal, consistent with grade II (pseudonormal) relaxation  •  Right Ventricle: Right ventricular cavity size is normal  Systolic function is normal  •  Left Atrium: The atrium is moderately dilated  •  Aortic Valve: There is mild regurgitation  There is aortic valve sclerosis  •  Mitral Valve: There is mild annular calcification  There is mild regurgitation  •  Tricuspid Valve: There is mild regurgitation  Pulmonary artery systolic pressures are estimated at 37 mmHg  •  Aorta: The aortic root is ectatic at 3 8 cm  •  Compared to report from November 22, 2019, there is now grade 2 diastolic dysfunction with aortic root ectasia and aortic valve sclerosis  Review of Systems:  Review of Systems   Constitutional: Positive for fatigue  Respiratory: Positive for shortness of breath  Musculoskeletal: Positive for arthralgias and myalgias  Psychiatric/Behavioral: Positive for dysphoric mood  All other systems reviewed and are negative  Physical Exam:  Physical Exam  Vitals reviewed  Constitutional:       Appearance: Normal appearance  Cardiovascular:      Rate and Rhythm: Normal rate and regular rhythm  Pulses: Normal pulses  Heart sounds: Normal heart sounds  Pulmonary:      Effort: Pulmonary effort is normal       Breath sounds: Normal breath sounds  Abdominal:      General: Bowel sounds are normal       Palpations: Abdomen is soft  Musculoskeletal:         General: Normal range of motion  Cervical back: Normal range of motion and neck supple  Right lower leg: Edema present  Left lower leg: Edema present  Comments: Trace pedal and ankle edema    Skin:     General: Skin is warm and dry  Capillary Refill: Capillary refill takes less than 2 seconds  Neurological:      General: No focal deficit present  Mental Status: She is alert and oriented to person, place, and time  Psychiatric:         Mood and Affect: Mood normal          Behavior: Behavior normal          Discussion/Summary:  1  Hasmukh pedal edema, trace, possibly due to hx of one unit PRBC and grade II pseudo normal relaxation, I have ordered Lasix 20mg x 1 dose, eat a banana, 2gm sodium diet, increase protein if not eating well with an Ensure, boost or protein bar  DASH diet   2  Hypertension /60 controlled on LOTREL 5-20mg daily, Metoprolol succinate 25mg daily, DASH diet   3  Anemia secondary to GIB with large duodenal ulcer, continue with fatigue, Continues on Protonix 40mg BID, H and H slowly improving   4  Large duodenal ulcer continue on Protonix 40 mg twice daily, follow up with GI  5  Hyperlipidemia 11/13/21 , TG 53, HDL 76, LDL 86 Continue on Mevacorr 20 mg daily no further monitoring needed due to age   10   Paroxysmal atrial fibrillation no recurrence of AF,  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

## 2023-02-24 VITALS — OXYGEN SATURATION: 98 % | HEART RATE: 80 BPM | DIASTOLIC BLOOD PRESSURE: 70 MMHG | SYSTOLIC BLOOD PRESSURE: 110 MMHG

## 2023-02-27 ENCOUNTER — OFFICE VISIT (OUTPATIENT)
Dept: CARDIOLOGY CLINIC | Facility: CLINIC | Age: 88
End: 2023-02-27

## 2023-02-27 VITALS
HEIGHT: 63 IN | OXYGEN SATURATION: 98 % | HEART RATE: 74 BPM | SYSTOLIC BLOOD PRESSURE: 108 MMHG | WEIGHT: 121 LBS | BODY MASS INDEX: 21.44 KG/M2 | DIASTOLIC BLOOD PRESSURE: 58 MMHG

## 2023-02-27 DIAGNOSIS — I10 ESSENTIAL HYPERTENSION: ICD-10-CM

## 2023-02-27 DIAGNOSIS — E78.5 HYPERLIPIDEMIA, UNSPECIFIED HYPERLIPIDEMIA TYPE: ICD-10-CM

## 2023-02-27 DIAGNOSIS — R60.0 PEDAL EDEMA: Primary | ICD-10-CM

## 2023-02-27 DIAGNOSIS — K26.9 DUODENAL ULCER: ICD-10-CM

## 2023-02-27 DIAGNOSIS — I48.0 PAROXYSMAL ATRIAL FIBRILLATION (HCC): ICD-10-CM

## 2023-02-27 DIAGNOSIS — D64.9 ANEMIA, UNSPECIFIED TYPE: ICD-10-CM

## 2023-02-27 RX ORDER — FUROSEMIDE 20 MG/1
TABLET ORAL
Qty: 1 TABLET | Refills: 0 | Status: SHIPPED | OUTPATIENT
Start: 2023-02-27

## 2023-03-11 ENCOUNTER — RA CDI HCC (OUTPATIENT)
Dept: OTHER | Facility: HOSPITAL | Age: 88
End: 2023-03-11

## 2023-03-11 NOTE — PROGRESS NOTES
[facility]  coding opportunities       Chart reviewed, no opportunity found:   [address]        Patients Insurance     Medicare Insurance: [employer]

## 2023-03-14 ENCOUNTER — OFFICE VISIT (OUTPATIENT)
Dept: FAMILY MEDICINE CLINIC | Facility: CLINIC | Age: 88
End: 2023-03-14

## 2023-03-14 VITALS
DIASTOLIC BLOOD PRESSURE: 78 MMHG | HEART RATE: 83 BPM | HEIGHT: 62 IN | WEIGHT: 122 LBS | SYSTOLIC BLOOD PRESSURE: 120 MMHG | BODY MASS INDEX: 22.45 KG/M2 | TEMPERATURE: 98.5 F | OXYGEN SATURATION: 96 %

## 2023-03-14 DIAGNOSIS — K25.0 ACUTE GASTRIC ULCER WITH HEMORRHAGE: ICD-10-CM

## 2023-03-14 DIAGNOSIS — K26.9 DUODENAL ULCER: ICD-10-CM

## 2023-03-14 DIAGNOSIS — K92.2 GI BLEED: ICD-10-CM

## 2023-03-14 DIAGNOSIS — D64.9 SYMPTOMATIC ANEMIA: ICD-10-CM

## 2023-03-14 DIAGNOSIS — R32 URINARY INCONTINENCE, UNSPECIFIED TYPE: ICD-10-CM

## 2023-03-14 DIAGNOSIS — R60.0 PEDAL EDEMA: ICD-10-CM

## 2023-03-14 DIAGNOSIS — Z00.00 MEDICARE ANNUAL WELLNESS VISIT, SUBSEQUENT: Primary | ICD-10-CM

## 2023-03-14 DIAGNOSIS — E20.9 HYPOPARATHYROIDISM, UNSPECIFIED HYPOPARATHYROIDISM TYPE (HCC): ICD-10-CM

## 2023-03-14 DIAGNOSIS — M35.3 POLYMYALGIA RHEUMATICA (HCC): ICD-10-CM

## 2023-03-14 RX ORDER — FUROSEMIDE 20 MG/1
TABLET ORAL
Qty: 30 TABLET | Refills: 0 | Status: SHIPPED | OUTPATIENT
Start: 2023-03-14

## 2023-03-14 RX ORDER — PANTOPRAZOLE SODIUM 40 MG/1
40 TABLET, DELAYED RELEASE ORAL
Qty: 60 TABLET | Refills: 1 | Status: SHIPPED | OUTPATIENT
Start: 2023-03-14 | End: 2023-03-17 | Stop reason: SDUPTHER

## 2023-03-14 NOTE — PROGRESS NOTES
Name: Jessie Howard      : 1932      MRN: [id number]  Encounter Provider: Jaquan Romeo DO  Encounter Date: 3/14/2023   Encounter department: [address] [place] [address]     1  Medicare annual wellness visit, subsequent    2  Symptomatic anemia    3  GI bleed  -     CBC and differential; Future; Expected date: 2023    4  Pedal edema  -     furosemide (LASIX) 20 mg tablet; Take every other day  -     Basic metabolic panel; Future; Expected date: 2023    5  Acute gastric ulcer with hemorrhage    6  Duodenal ulcer    7  Polymyalgia rheumatica ([facility] [address] )    8  Hypoparathyroidism, unspecified hypoparathyroidism type ([facility] )    9  Urinary incontinence, unspecified type  She is determined to go to [facility] to see her sister  Subjective      Chief Complaint   Patient presents with   • Medicare Wellness Visit     AWV   • Foot Swelling       Patient is here for follow up  She was in hospital for GI bleed  She did have a blood transfusion in the hospital   She has noticed edema of both lower legs - did improve with one dose of Lasix prescribed by cardiology  Review of Systems   Constitutional: Negative for chills and fever  HENT: Negative for congestion and sore throat  Respiratory: Negative for chest tightness  Cardiovascular: Positive for leg swelling  Negative for chest pain and palpitations  Gastrointestinal: Negative for abdominal pain, constipation, diarrhea and nausea  Genitourinary: Negative for difficulty urinating  Skin: Negative  Neurological: Negative for dizziness and headaches  Psychiatric/Behavioral: Negative          Current Outpatient Medications on File Prior to Visit   Medication Sig   • albuterol (2 5 mg/3 mL) 0 083 % nebulizer solution Take 2 5 mg by nebulization every 6 (six) hours as needed for wheezing or shortness of breath   • albuterol (PROVENTIL HFA,VENTOLIN HFA) 90 mcg/act inhaler Inhale 2 puffs every 6 (six) hours as needed for wheezing   • amLODIPine-benazepril (LOTREL 5-20) 5-20 MG per capsule Take 1 capsule by mouth daily   • aspirin 81 MG tablet Take 162 mg by mouth daily in the early morning     • calcium carbonate (OS-TAE) 600 MG tablet Take 600 mg by mouth daily in the early morning     • Diclofenac Sodium (VOLTAREN) 1 % Apply 2 g topically 4 (four) times a day   • levothyroxine 125 mcg tablet Take 1 tablet (125 mcg total) by mouth daily in the early morning   • lovastatin (MEVACOR) 20 mg tablet Take 1 tablet (20 mg total) by mouth in the morning  • metoprolol succinate (TOPROL-XL) 25 mg 24 hr tablet Take 1 tablet (25 mg total) by mouth daily   • Multiple Vitamins-Minerals (One Daily Multivitamin Women) TABS Apply 1 tablet to the mouth or throat daily  Indications: Treatment to Prevent Vitamin Deficiency       Objective     /78 (BP Location: Left arm, Patient Position: Sitting, Cuff Size: Adult)   Pulse 83   Temp 98 5 °F (36 9 °C)   Ht 5' 2" (1 575 m)   Wt 55 3 kg (122 lb)   SpO2 96%   BMI 22 31 kg/m²     Physical Exam  Vitals and nursing note reviewed  Constitutional:       General: She is not in acute distress  HENT:      Head: Normocephalic  Neck:      Thyroid: No thyromegaly  Cardiovascular:      Rate and Rhythm: Normal rate  Rhythm irregular  Heart sounds: Normal heart sounds  Pulmonary:      Effort: Pulmonary effort is normal       Breath sounds: Normal breath sounds  Musculoskeletal:      Right lower leg: Edema present  Left lower leg: Edema present  Lymphadenopathy:      Cervical: No cervical adenopathy  Skin:     General: Skin is warm and dry  Neurological:      Mental Status: She is alert and oriented to person, place, and time         Yfn Dawson,

## 2023-03-14 NOTE — PATIENT INSTRUCTIONS
Take furosemide every other day  Go for blood work beginning of next week  Medicare Preventive Visit Patient Instructions  Thank you for completing your Welcome to Medicare Visit or Medicare Annual Wellness Visit today  Your next wellness visit will be due in one year (3/14/2024)  The screening/preventive services that you may require over the next 5-10 years are detailed below  Some tests may not apply to you based off risk factors and/or age  Screening tests ordered at today's visit but not completed yet may show as past due  Also, please note that scanned in results may not display below  Preventive Screenings:  Service Recommendations Previous Testing/Comments   Colorectal Cancer Screening  * Colonoscopy    * Fecal Occult Blood Test (FOBT)/Fecal Immunochemical Test (FIT)  * Fecal DNA/Cologuard Test  * Flexible Sigmoidoscopy Age: 39-70 years old   Colonoscopy: every 10 years (may be performed more frequently if at higher risk)  OR  FOBT/FIT: every 1 year  OR  Cologuard: every 3 years  OR  Sigmoidoscopy: every 5 years  Screening may be recommended earlier than age 39 if at higher risk for colorectal cancer  Also, an individualized decision between you and your healthcare provider will decide whether screening between the ages of 74-80 would be appropriate  Colonoscopy: 02/06/2023  FOBT/FIT: Not on file  Cologuard: Not on file  Sigmoidoscopy: Not on file    Screening Not Indicated     Breast Cancer Screening Age: 36 years old  Frequency: every 1-2 years  Not required if history of left and right mastectomy Mammogram: 03/13/2014        Cervical Cancer Screening Between the ages of 21-29, pap smear recommended once every 3 years  Between the ages of 33-67, can perform pap smear with HPV co-testing every 5 years     Recommendations may differ for women with a history of total hysterectomy, cervical cancer, or abnormal pap smears in past  Pap Smear: Not on file    Screening Not Indicated   Hepatitis C Screening Once for adults born between [facility]  More frequently in patients at high risk for Hepatitis C Hep C Antibody: Not on file        Diabetes Screening 1-2 times per year if you're at risk for diabetes or have pre-diabetes Fasting glucose: 106 mg/dL (9/6/2022)  A1C: 5 7 % (10/12/2020)  Screening Current   Cholesterol Screening Once every 5 years if you don't have a lipid disorder  May order more often based on risk factors  Lipid panel: 11/13/2021    Screening Not Indicated  History Lipid Disorder     Other Preventive Screenings Covered by Medicare:  Abdominal Aortic Aneurysm (AAA) Screening: covered once if your at risk  You're considered to be at risk if you have a family history of AAA  Lung Cancer Screening: covers low dose CT scan once per year if you meet all of the following conditions: (1) Age 50-69; (2) No signs or symptoms of lung cancer; (3) Current smoker or have quit smoking within the last 15 years; (4) You have a tobacco smoking history of at least 20 pack years (packs per day multiplied by number of years you smoked); (5) You get a written order from a healthcare provider  Glaucoma Screening: covered annually if you're considered high risk: (1) You have diabetes OR (2) Family history of glaucoma OR (3)  aged 48 and older OR (3)  American aged 72 and older  Osteoporosis Screening: covered every 2 years if you meet one of the following conditions: (1) You're estrogen deficient and at risk for osteoporosis based off medical history and other findings; (2) Have a vertebral abnormality; (3) On glucocorticoid therapy for more than 3 months; (4) Have primary hyperparathyroidism; (5) On osteoporosis medications and need to assess response to drug therapy  Last bone density test (DXA Scan): 12/07/2017  HIV Screening: covered annually if you're between the age of 12-76  Also covered annually if you are younger than 13 and older than 72 with risk factors for HIV infection   For pregnant patients, it is covered up to 3 times per pregnancy  Immunizations:  Immunization Recommendations   Influenza Vaccine Annual influenza vaccination during flu season is recommended for all persons aged >= 6 months who do not have contraindications   Pneumococcal Vaccine   * Pneumococcal conjugate vaccine = PCV13 (Prevnar 13), PCV15 (Vaxneuvance), PCV20 (Prevnar 20)  * Pneumococcal polysaccharide vaccine = PPSV23 (Pneumovax) Adults 25-60 years old: 1-3 doses may be recommended based on certain risk factors  Adults 72 years old: 1-2 doses may be recommended based off what pneumonia vaccine you previously received   Hepatitis B Vaccine 3 dose series if at intermediate or high risk (ex: diabetes, end stage renal disease, liver disease)   Tetanus (Td) Vaccine - COST NOT COVERED BY MEDICARE PART B Following completion of primary series, a booster dose should be given every 10 years to maintain immunity against tetanus  Td may also be given as tetanus wound prophylaxis  Tdap Vaccine - COST NOT COVERED BY MEDICARE PART B Recommended at least once for all adults  For pregnant patients, recommended with each pregnancy  Shingles Vaccine (Shingrix) - COST NOT COVERED BY MEDICARE PART B  2 shot series recommended in those aged 48 and above     Health Maintenance Due:  There are no preventive care reminders to display for this patient  Immunizations Due:  There are no preventive care reminders to display for this patient  Advance Directives   What are advance directives? Advance directives are legal documents that state your wishes and plans for medical care  These plans are made ahead of time in case you lose your ability to make decisions for yourself  Advance directives can apply to any medical decision, such as the treatments you want, and if you want to donate organs  What are the types of advance directives? There are many types of advance directives, and each state has rules about how to use them   You may choose a combination of any of the following:  Living will: This is a written record of the treatment you want  You can also choose which treatments you do not want, which to limit, and which to stop at a certain time  This includes surgery, medicine, IV fluid, and tube feedings  Durable power of  for healthcare [facility]): This is a written record that states who you want to make healthcare choices for you when you are unable to make them for yourself  This person, called a proxy, is usually a family member or a friend  You may choose more than 1 proxy  Do not resuscitate (DNR) order:  A DNR order is used in case your heart stops beating or you stop breathing  It is a request not to have certain forms of treatment, such as CPR  A DNR order may be included in other types of advance directives  Medical directive: This covers the care that you want if you are in a coma, near death, or unable to make decisions for yourself  You can list the treatments you want for each condition  Treatment may include pain medicine, surgery, blood transfusions, dialysis, IV or tube feedings, and a ventilator (breathing machine)  Values history: This document has questions about your views, beliefs, and how you feel and think about life  This information can help others choose the care that you would choose  Why are advance directives important? An advance directive helps you control your care  Although spoken wishes may be used, it is better to have your wishes written down  Spoken wishes can be misunderstood, or not followed  Treatments may be given even if you do not want them  An advance directive may make it easier for your family to make difficult choices about your care  Urinary Incontinence   Urinary incontinence (UI)  is when you lose control of your bladder  UI develops because your bladder cannot store or empty urine properly   The 3 most common types of UI are stress incontinence, urge incontinence, or both   Medicines:   May be given to help strengthen your bladder control  Report any side effects of medication to your healthcare provider  Do pelvic muscle exercises often:  Your pelvic muscles help you stop urinating  Squeeze these muscles tight for 5 seconds, then relax for 5 seconds  Gradually work up to squeezing for 10 seconds  Do 3 sets of 15 repetitions a day, or as directed  This will help strengthen your pelvic muscles and improve bladder control  Train your bladder:  Go to the bathroom at set times, such as every 2 hours, even if you do not feel the urge to go  You can also try to hold your urine when you feel the urge to go  For example, hold your urine for 5 minutes when you feel the urge to go  As that becomes easier, hold your urine for 10 minutes  Self-care:   Keep a UI record  Write down how often you leak urine and how much you leak  Make a note of what you were doing when you leaked urine  Drink liquids as directed  You may need to limit the amount of liquid you drink to help control your urine leakage  Do not drink any liquid right before you go to bed  Limit or do not have drinks that contain caffeine or alcohol  Prevent constipation  Eat a variety of high-fiber foods  Good examples are high-fiber cereals, beans, vegetables, and whole-grain breads  Walking is the best way to trigger your intestines to have a bowel movement  Exercise regularly and maintain a healthy weight  Weight loss and exercise will decrease pressure on your bladder and help you control your leakage  Use a catheter as directed  to help empty your bladder  A catheter is a tiny, plastic tube that is put into your bladder to drain your urine  Go to behavior therapy as directed  Behavior therapy may be used to help you learn to control your urge to urinate  © Copyright [address] 2018 Information is for End User's use only and may not be sold, redistributed or otherwise used for commercial purposes  All illustrations and images included in CareNotes® are the copyrighted property of A D A M , Inc  or Adal Santo

## 2023-03-14 NOTE — PROGRESS NOTES
Assessment and Plan:     Problem List Items Addressed This Visit        Endocrine    Hypoparathyroidism, unspecified hypoparathyroidism type ([facility] )       Other    Medicare annual wellness visit, subsequent - Primary    Polymyalgia rheumatica ([facility] )   Other Visit Diagnoses     Symptomatic anemia        GI bleed        Relevant Orders    CBC and differential (Completed)    Pedal edema        Relevant Medications    furosemide (LASIX) 20 mg tablet    Other Relevant Orders    Basic metabolic panel (Completed)    Acute gastric ulcer with hemorrhage        Duodenal ulcer        Urinary incontinence, unspecified type              Urinary Incontinence Plan of Care: counseling topics discussed: use restroom every 2 hours, limiting fluid intake 3-4 hours before bed and taking fluid pills at a time when you can get to bathroom easily  Preventive health issues were discussed with patient, and age appropriate screening tests were ordered as noted in patient's After Visit Summary  Personalized health advice and appropriate referrals for health education or preventive services given if needed, as noted in patient's After Visit Summary       History of Present Illness:     Patient presents for a Medicare Wellness Visit    HPI   Patient Care Team:  Nain Mckeon DO as PCP - General (Family Medicine)  Taya Brownlee DO as PCP - [address] [place] [address] (RTE)  Taya Brownlee DO as PCP - PCP-[facility] (RTE)  MD Leti Cho MD Napolean Shield, MD Markham Client, DO Clau Hamlin MD Tresa Clutter, MD Allene Passy, [address] [facility], MD Phong Salinas MD Renna Many, MD Any Du, XIOMARA (Nutrition)     Review of Systems:     Review of Systems     Problem List:     Patient Active Problem List   Diagnosis   • Trigger middle finger of right hand   • Shortness of breath   • Essential hypertension   • Hyperlipidemia   • Asthma-COPD overlap syndrome (HCC)   • Paroxysmal atrial fibrillation (HCC)   • Iron deficiency anemia   • Headache   • 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HCC)   • Macular degeneration, wet (HCC)   • 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[facility] )   • Hypothyroidism due to Hashimoto's thyroiditis   • Moderate persistent asthma without complication   • Hypothyroidism   • Screening for diabetes mellitus   • Medicare annual wellness visit, subsequent   • Other fatigue   • Need for influenza vaccination   • Uncomplicated asthma   • Encounter for vision screening   • Nonrheumatic mitral valve regurgitation   • Severe persistent asthma with acute exacerbation   • Muscle cramps   • Skin cyst   • Pain of left lower extremity   • [place]-neck deformity of finger   • Polymyalgia rheumatica (HCC)   • Atherosclerosis of aorta (HCC)   • Hypoparathyroidism, unspecified hypoparathyroidism type (HCC)   • Acute pain of right shoulder   • Aneurysm of aortic arch without rupture   • Toxic encephalopathy   • Anemia   • Basal cell carcinoma (BCC)   • Cervical radiculopathy   • Spinal stenosis of cervical region   • Osteoarthritis of cervical spine   • Chronic migraine without aura   • Contrast media allergy   • Gastroesophageal reflux disease with esophagitis   • Stress incontinence in female   • Migraine aura without headache   • Obstructive sleep apnea syndrome   • Osteopenia   • Postoperative hypothyroidism   • Restless legs syndrome   • S/P trigger finger release   • Tricuspid valve insufficiency   • Xerostomia      Past Medical and Surgical History:     Past Medical History:   Diagnosis Date   • A-fib ([facility] )    • Allergy to IVP dye 06/04/2013    pt felt heaviness, palpitations   • 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), wet (HCC)     bilateral   • Aneurysm of aortic root     last assessed - [id number]   • Aortic arch aneurysm    • Aortic root dilatation ([facility])     last assessed - [id number]   • Arthritis    • Ascending aortic aneurysm     last assessed - [id number]   • Asthma    • Basal cell carcinoma     last assessed - 68Atp4246   • 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[facility])    • Disease of thyroid gland    • Facet arthropathy, cervical     last assessed - [id number]   • History of fracture of vertebral column    • Hyperlipidemia    • Hypertension    • 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[address] )    • Hypoxia     last assessed - 11HXV5862   • Junctional rhythm     last assessed - 38Jcz7496   • Lightheadedness     last assessed - 89Mrl4727   • Migraine    • Palpitations     last assessed - 95Xkn9040   • Pleural effusion, bilateral     last assessed - 23Pgc7515   • 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HCC)    • Shortness of breath     last assessed - 27Oex8968   • Thyroid trouble    • Trigger finger     last assessed - 50LGY0381   • Trigger middle finger of right hand     last assessed - 39Kwy1890   • Urinary incontinence     last assessed -  Jul,2013; Resolved - [id number]     Past Surgical History:   Procedure Laterality Date   • ABDOMINAL AORTIC ANEURYSM REPAIR W/ ENDOLUMINAL GRAFT  2015    Ascending aorta and hemiarch replacement with 30 mm Vascutek Gelweave graft;  Managed by David Dunn; last assessed - 19VWA0061   • APPENDECTOMY     • BLADDER SURGERY      Reccurent histoy of bladder surgery   • HOROWITZ PROCEDURE     • CARDIAC CATHETERIZATION  2004    Procedure summary - Luminal irregularities; last assessed - [id number]   •  SECTION     • CORONARY ANEURYSM REPAIR     • CYSTOSCOPY      botox injection   • HYSTERECTOMY     • DC NDSC NJX IMPLT MATRL URT&/BLDR NCK N/A 2017    Procedure: Shameka Fallon; Jesus Griffith BULKING AGENT INJECTION ;  Surgeon: Lucia Devlin MD;  Location: [facility];  Service: Urology   • DC TENDON SHEATH INCISION Right 10/18/2016    Procedure: LONG FINGER TRIGGER RELEASE ;  Surgeon: Kathryn Mazariegos MD;  Location: [facility];  Service: Orthopedics   • THYROIDECTOMY      Total Thyroidectomy   • TONSILLECTOMY AND ADENOIDECTOMY        Family History:     Family History Problem Relation Age of Onset   • Coronary aneurysm Sister    • Coronary artery disease Sister         CABG   • Heart disease Family         cardiac disorder   • Hypertension Family    • Cancer Family    • Thyroid disease Family       Social History:     Social History     Socioeconomic History   • Marital status:      Spouse name: None   • Number of children: None   • Years of education: None   • Highest education level: None   Occupational History   • None   Tobacco Use   • Smoking status: Former     Packs/day: 0 25     Years: 57 00     Pack years: 14 25     Types: Cigarettes     Start date: 36     Quit date: 2014     Years since quittin 2   • Smokeless tobacco: Former   • Tobacco comments:     smoked 17 yrs 1/2 ppd quit and restarted then quit  10 days ago during 2014    Vaping Use   • Vaping Use: Never used   Substance and Sexual Activity   • Alcohol use: Not Currently     Comment: Social drinker   • Drug use: No   • Sexual activity: Not Currently   Other Topics Concern   • None   Social History Narrative   • None     Social Determinants of Health     Financial Resource Strain: Medium Risk   • Difficulty of Paying Living Expenses: Somewhat hard   Food Insecurity: No Food Insecurity   • Worried About Running Out of Food in the Last Year: Never true   • Ran Out of Food in the Last Year: Never true   Transportation Needs: Unmet Transportation Needs   • Lack of Transportation (Medical): Yes   • Lack of Transportation (Non-Medical): Yes   Physical Activity: Not on file   Stress: Not on file   Social Connections: Not on file   Intimate Partner Violence: Not on file   Housing Stability: Low Risk    • Unable to Pay for Housing in the Last Year: No   • Number of Places Lived in the Last Year: 1   • Unstable Housing in the Last Year: No      Medications and Allergies:     Current Outpatient Medications   Medication Sig Dispense Refill   • furosemide (LASIX) 20 mg tablet Take every other day  30 tablet 0   • albuterol (2 5 mg/3 mL) 0 083 % nebulizer solution Take 2 5 mg by nebulization every 6 (six) hours as needed for wheezing or shortness of breath     • albuterol (PROVENTIL HFA,VENTOLIN HFA) 90 mcg/act inhaler Inhale 2 puffs every 6 (six) hours as needed for wheezing 1 Inhaler 0   • amLODIPine-benazepril (LOTREL 5-20) 5-20 MG per capsule Take 1 capsule by mouth daily 90 capsule 3   • aspirin 81 MG tablet Take 162 mg by mouth daily in the early morning       • calcium carbonate (OS-TAE) 600 MG tablet Take 600 mg by mouth daily in the early morning       • Diclofenac Sodium (VOLTAREN) 1 % Apply 2 g topically 4 (four) times a day 700 g 2   • levothyroxine 125 mcg tablet Take 1 tablet (125 mcg total) by mouth daily in the early morning 90 tablet 3   • lovastatin (MEVACOR) 20 mg tablet Take 1 tablet (20 mg total) by mouth in the morning  90 tablet 3   • metoprolol succinate (TOPROL-XL) 25 mg 24 hr tablet Take 1 tablet (25 mg total) by mouth daily 90 tablet 1   • Multiple Vitamins-Minerals (One Daily Multivitamin Women) TABS Apply 1 tablet to the mouth or throat daily  Indications: Treatment to Prevent Vitamin Deficiency     • pantoprazole (PROTONIX) 40 mg tablet Take 1 tablet (40 mg total) by mouth 2 (two) times a day before meals 180 tablet 0     No current facility-administered medications for this visit  Allergies   Allergen Reactions   • Amiodarone Hives   • Omnipaque [Iohexol] Hives and Shortness Of Breath   • Clindamycin Other (See Comments)     When taken causes cdiff immediately   • Other    • Sulfa Antibiotics Hives     itching   • Acetazolamide Hives and Rash     Reaction Date:Unknown   • Iv Dye  [Iodinated Contrast Media] Hypertension and Palpitations     Annotation - [id number]: Verified with patient    • Naprosyn [Naproxen] Itching and Rash     Category:  Allergy;       Immunizations:     Immunization History   Administered Date(s) Administered   • COVID-19 PFIZER VACCINE 0 3 ML IM 01/19/2021, 02/09/2021, 10/12/2021, 04/13/2022, 09/15/2022   • INFLUENZA 11/17/2005, 12/12/2013, 12/23/2014, 11/13/2018   • Influenza Split High Dose Preservative Free IM 09/09/2016, 11/06/2017   • Influenza, high dose seasonal 0 7 mL 11/13/2018, 10/14/2019, 10/05/2020, 11/16/2021, 12/23/2022   • Influenza, seasonal, injectable 01/01/2012, 10/03/2014   • Pneumococcal Conjugate 13-Valent 03/13/2017   • Pneumococcal Polysaccharide PPV23 01/01/2008   • Tuberculin Skin Test-PPD Intradermal 05/07/2015      Health Maintenance: There are no preventive care reminders to display for this patient  There are no preventive care reminders to display for this patient  Medicare Screening Tests and Risk Assessments:     Buffy is here for her Subsequent Wellness visit  Last Medicare Wellness visit information reviewed, patient interviewed and updates made to the record today  Health Risk Assessment:   Patient rates overall health as good  Patient feels that their physical health rating is slightly worse  Patient is dissatisfied with their life  Eyesight was rated as much worse  Hearing was rated as slightly worse  Patient feels that their emotional and mental health rating is much worse  Patients states they are never, rarely angry  Patient states they are often unusually tired/fatigued  Pain experienced in the last 7 days has been some  Patient's pain rating has been 5/10  Patient states that she has experienced weight loss or gain in last 6 months  Fall Risk Screening: In the past year, patient has experienced: no history of falling in past year      Urinary Incontinence Screening:   Patient has leaked urine accidently in the last six months  Home Safety:  Patient does not have trouble with stairs inside or outside of their home  Patient has working smoke alarms and has working carbon monoxide detector  Home safety hazards include: none  Nutrition:   Current diet is Regular       Medications:   Patient is currently taking over-the-counter supplements  OTC medications include: Multi vitamin  And calcium withD3  Patient is able to manage medications  Activities of Daily Living (ADLs)/Instrumental Activities of Daily Living (IADLs):   Walk and transfer into and out of bed and chair?: Yes  Dress and groom yourself?: Yes    Bathe or shower yourself?: Yes    Feed yourself? Yes  Do your laundry/housekeeping?: Yes  Manage your money, pay your bills and track your expenses?: Yes  Make your own meals?: Yes    Do your own shopping?: No    Previous Hospitalizations:   Any hospitalizations or ED visits within the last 12 months?: Yes    How many hospitalizations have you had in the last year?: 1-2    Advance Care Planning:   Living will: No    Durable POA for healthcare: Yes    Advanced directive: Yes    Five wishes given: Yes    End of Life Decisions reviewed with patient: Yes      Cognitive Screening:   Provider or family/friend/caregiver concerned regarding cognition?: No    PREVENTIVE SCREENINGS      Cardiovascular Screening:    General: Screening Not Indicated and History Lipid Disorder      Diabetes Screening:     General: Screening Current      Colorectal Cancer Screening:     General: Screening Not Indicated      Cervical Cancer Screening:    General: Screening Not Indicated      Abdominal Aortic Aneurysm (AAA) Screening:        General: Screening Not Indicated      Lung Cancer Screening:     General: Screening Not Indicated      Hepatitis C Screening:    General: Screening Not Indicated    Screening, Brief Intervention, and Referral to Treatment (SBIRT)    Screening  Typical number of drinks in a day: 0  Typical number of drinks in a week: 0  Interpretation: Low risk drinking behavior      AUDIT-C Screenin) How often did you have a drink containing alcohol in the past year? never  2) How many drinks did you have on a typical day when you were drinking in the past year? 0  3) How often did you have 6 or more drinks on one occasion in the past year? never    AUDIT-C Score: 0  Interpretation: Score 0-2 (female): Negative screen for alcohol misuse    Single Item Drug Screening:  How often have you used an illegal drug (including marijuana) or a prescription medication for non-medical reasons in the past year? never    Single Item Drug Screen Score: 0  Interpretation: Negative screen for possible drug use disorder    Brief Intervention  Alcohol & drug use screenings were reviewed  No concerns regarding substance use disorder identified  No results found       Physical Exam:     /78 (BP Location: Left arm, Patient Position: Sitting, Cuff Size: Adult)   Pulse 83   Temp 98 5 °F (36 9 °C)   Ht 5' 2" (1 575 m)   Wt 55 3 kg (122 lb)   SpO2 96%   BMI 22 31 kg/m²     Physical Exam     Alicia Lane DO

## 2023-03-17 DIAGNOSIS — K25.0 ACUTE GASTRIC ULCER WITH HEMORRHAGE: ICD-10-CM

## 2023-03-17 DIAGNOSIS — D64.9 SYMPTOMATIC ANEMIA: ICD-10-CM

## 2023-03-17 DIAGNOSIS — K26.9 DUODENAL ULCER: ICD-10-CM

## 2023-03-17 DIAGNOSIS — K92.2 GI BLEED: ICD-10-CM

## 2023-03-17 NOTE — TELEPHONE ENCOUNTER
[facility] mail order is requesting a new rx for patient's pantoprazole for 90 days versus 30

## 2023-03-18 RX ORDER — PANTOPRAZOLE SODIUM 40 MG/1
40 TABLET, DELAYED RELEASE ORAL
Qty: 180 TABLET | Refills: 0 | Status: SHIPPED | OUTPATIENT
Start: 2023-03-18 | End: 2023-06-16

## 2023-03-20 ENCOUNTER — APPOINTMENT (OUTPATIENT)
Dept: LAB | Facility: CLINIC | Age: 88
End: 2023-03-20

## 2023-03-20 DIAGNOSIS — R60.0 PEDAL EDEMA: ICD-10-CM

## 2023-03-20 DIAGNOSIS — K92.2 GI BLEED: ICD-10-CM

## 2023-03-20 LAB
ANION GAP SERPL CALCULATED.3IONS-SCNC: 3 MMOL/L (ref 4–13)
BASOPHILS # BLD AUTO: 0.04 THOUSANDS/ÂΜL (ref 0–0.1)
BASOPHILS NFR BLD AUTO: 1 % (ref 0–1)
BUN SERPL-MCNC: 31 MG/DL (ref 5–25)
CALCIUM SERPL-MCNC: 8.7 MG/DL (ref 8.3–10.1)
CHLORIDE SERPL-SCNC: 105 MMOL/L (ref 96–108)
CO2 SERPL-SCNC: 30 MMOL/L (ref 21–32)
CREAT SERPL-MCNC: 0.94 MG/DL (ref 0.6–1.3)
EOSINOPHIL # BLD AUTO: 0.14 THOUSAND/ÂΜL (ref 0–0.61)
EOSINOPHIL NFR BLD AUTO: 2 % (ref 0–6)
ERYTHROCYTE [DISTWIDTH] IN BLOOD BY AUTOMATED COUNT: 14.2 % (ref 11.6–15.1)
GFR SERPL CREATININE-BSD FRML MDRD: 53 ML/MIN/1.73SQ M
GLUCOSE P FAST SERPL-MCNC: 119 MG/DL (ref 65–99)
HCT VFR BLD AUTO: 33.2 % (ref 34.8–46.1)
HGB BLD-MCNC: 10.3 G/DL (ref 11.5–15.4)
IMM GRANULOCYTES # BLD AUTO: 0.02 THOUSAND/UL (ref 0–0.2)
IMM GRANULOCYTES NFR BLD AUTO: 0 % (ref 0–2)
LYMPHOCYTES # BLD AUTO: 2.02 THOUSANDS/ÂΜL (ref 0.6–4.47)
LYMPHOCYTES NFR BLD AUTO: 31 % (ref 14–44)
MCH RBC QN AUTO: 29.4 PG (ref 26.8–34.3)
MCHC RBC AUTO-ENTMCNC: 31 G/DL (ref 31.4–37.4)
MCV RBC AUTO: 95 FL (ref 82–98)
MONOCYTES # BLD AUTO: 0.86 THOUSAND/ÂΜL (ref 0.17–1.22)
MONOCYTES NFR BLD AUTO: 13 % (ref 4–12)
NEUTROPHILS # BLD AUTO: 3.46 THOUSANDS/ÂΜL (ref 1.85–7.62)
NEUTS SEG NFR BLD AUTO: 53 % (ref 43–75)
NRBC BLD AUTO-RTO: 0 /100 WBCS
PLATELET # BLD AUTO: 244 THOUSANDS/UL (ref 149–390)
PMV BLD AUTO: 11.9 FL (ref 8.9–12.7)
POTASSIUM SERPL-SCNC: 4.1 MMOL/L (ref 3.5–5.3)
RBC # BLD AUTO: 3.5 MILLION/UL (ref 3.81–5.12)
SODIUM SERPL-SCNC: 138 MMOL/L (ref 135–147)
WBC # BLD AUTO: 6.54 THOUSAND/UL (ref 4.31–10.16)

## 2023-03-21 PROBLEM — R45.851 SUICIDAL IDEATION: Status: RESOLVED | Noted: 2023-02-05 | Resolved: 2023-03-21

## 2023-03-21 PROBLEM — M25.371 ANKLE INSTABILITY, RIGHT: Status: RESOLVED | Noted: 2019-07-10 | Resolved: 2023-03-21

## 2023-03-21 PROBLEM — N39.3 STRESS INCONTINENCE IN FEMALE: Status: ACTIVE | Noted: 2017-05-05

## 2023-03-21 PROBLEM — H92.03 OTALGIA OF BOTH EARS: Status: RESOLVED | Noted: 2019-04-09 | Resolved: 2023-03-21

## 2023-03-21 PROBLEM — R55 SYNCOPE: Status: RESOLVED | Noted: 2019-10-04 | Resolved: 2023-03-21

## 2023-03-21 PROBLEM — G47.09 OTHER INSOMNIA: Status: RESOLVED | Noted: 2019-10-14 | Resolved: 2023-03-21

## 2023-03-21 PROBLEM — Z98.890 S/P TRIGGER FINGER RELEASE: Status: ACTIVE | Noted: 2023-03-21

## 2023-03-21 PROBLEM — Z91.041 CONTRAST MEDIA ALLERGY: Status: ACTIVE | Noted: 2023-03-21

## 2023-03-21 PROBLEM — M48.02 SPINAL STENOSIS OF CERVICAL REGION: Status: ACTIVE | Noted: 2017-07-31

## 2023-03-21 PROBLEM — M54.12 CERVICAL RADICULOPATHY: Status: ACTIVE | Noted: 2017-03-21

## 2023-03-21 PROBLEM — M47.812 OSTEOARTHRITIS OF CERVICAL SPINE: Status: ACTIVE | Noted: 2023-03-21

## 2023-03-23 PROBLEM — E03.8 HYPOTHYROIDISM DUE TO HASHIMOTO'S THYROIDITIS: Status: RESOLVED | Noted: 2018-05-07 | Resolved: 2023-03-23

## 2023-03-23 PROBLEM — Z23 NEED FOR INFLUENZA VACCINATION: Status: RESOLVED | Noted: 2019-10-14 | Resolved: 2023-03-23

## 2023-03-23 PROBLEM — E06.3 HYPOTHYROIDISM DUE TO HASHIMOTO'S THYROIDITIS: Status: RESOLVED | Noted: 2018-05-07 | Resolved: 2023-03-23

## 2023-03-23 PROBLEM — N18.31 STAGE 3A CHRONIC KIDNEY DISEASE (HCC): Status: ACTIVE | Noted: 2023-03-23

## 2023-03-23 PROBLEM — M35.3 POLYMYALGIA RHEUMATICA (HCC): Status: RESOLVED | Noted: 2021-12-23 | Resolved: 2023-03-23

## 2023-03-28 ENCOUNTER — OFFICE VISIT (OUTPATIENT)
Dept: OBGYN CLINIC | Facility: MEDICAL CENTER | Age: 88
End: 2023-03-28

## 2023-03-28 ENCOUNTER — APPOINTMENT (OUTPATIENT)
Dept: RADIOLOGY | Facility: MEDICAL CENTER | Age: 88
End: 2023-03-28

## 2023-03-28 VITALS
BODY MASS INDEX: 22.45 KG/M2 | SYSTOLIC BLOOD PRESSURE: 118 MMHG | WEIGHT: 122 LBS | DIASTOLIC BLOOD PRESSURE: 82 MMHG | HEIGHT: 62 IN | HEART RATE: 80 BPM

## 2023-03-28 DIAGNOSIS — M79.605 LEFT LEG PAIN: ICD-10-CM

## 2023-03-28 DIAGNOSIS — M54.42 CHRONIC BILATERAL LOW BACK PAIN WITH LEFT-SIDED SCIATICA: ICD-10-CM

## 2023-03-28 DIAGNOSIS — M51.36 LUMBAR DEGENERATIVE DISC DISEASE: ICD-10-CM

## 2023-03-28 DIAGNOSIS — G89.29 CHRONIC BILATERAL LOW BACK PAIN WITH LEFT-SIDED SCIATICA: ICD-10-CM

## 2023-03-28 DIAGNOSIS — M16.12 PRIMARY OSTEOARTHRITIS OF LEFT HIP: ICD-10-CM

## 2023-03-28 DIAGNOSIS — M25.552 PAIN IN LEFT HIP: ICD-10-CM

## 2023-03-28 DIAGNOSIS — M47.816 LUMBAR SPONDYLOSIS: ICD-10-CM

## 2023-03-28 DIAGNOSIS — M79.605 LEFT LEG PAIN: Primary | ICD-10-CM

## 2023-03-28 RX ORDER — METHYLPREDNISOLONE 4 MG/1
TABLET ORAL
Qty: 1 EACH | Refills: 0 | Status: SHIPPED | OUTPATIENT
Start: 2023-03-28 | End: 2023-03-28 | Stop reason: SDUPTHER

## 2023-03-28 RX ORDER — METHYLPREDNISOLONE 4 MG/1
TABLET ORAL
Qty: 1 EACH | Refills: 0 | Status: SHIPPED | OUTPATIENT
Start: 2023-03-28

## 2023-03-28 NOTE — PATIENT INSTRUCTIONS
While taking the oral steroid Medrol Dose Pack, do not take any NSAIDs such as Advil, Motrin, ibuprofen, Motrin, Aleve, naproxen, Celebrex or Meloxicam   You can restart the NSAIDs after you finish the steroids  However you may take Tylenol 500mg every 4-6 hours as needed OR max 1,000mg per dose up to 3 times per day for a total of 3,000mg per day  While taking oral steroids, you may experience mild side effects such as feeling jittery or flushing  Please call if your side effects are significant or you have any questions  Gabapentin (By mouth)   Gabapentin (aracely-a-PEN-tin)  Treats seizures and pain caused by shingles  Brand Name(s): FusePaq Fanatrex, Neurontin   There may be other brand names for this medicine  When This Medicine Should Not Be Used: This medicine is not right for everyone  Do not use it if you had an allergic reaction to gabapentin  How to Use This Medicine:   Capsule, Liquid, Tablet  Take your medicine as directed  Your dose may need to be changed several times to find what works best for you  If you have epilepsy, do not allow more than 12 hours to pass between doses  Capsule: Swallow the capsule whole with plenty of water  Do not open, crush, or chew it  Gralise® tablet: Swallow the tablet whole   Do not crush, break, or chew it  Neurontin® tablet: If you break a tablet into 2 pieces, use the second half as your next dose  Do not use the half-tablet if the whole tablet has been cut or broken after 28 days  Oral liquid: Measure the oral liquid medicine with a marked measuring spoon, oral syringe, or medicine cup  This medicine should come with a Medication Guide  Ask your pharmacist for a copy if you do not have one  Missed dose: Take a dose as soon as you remember  If it is almost time for your next dose, wait until then and take a regular dose  Do not take extra medicine to make up for a missed dose    Store the medicine in a closed container at room temperature, away from heat, moisture, and direct light  Store the Neurontin® oral liquid in the refrigerator  Do not freeze  Drugs and Foods to Avoid:   Ask your doctor or pharmacist before using any other medicine, including over-the-counter medicines, vitamins, and herbal products  Some medicines can affect how gabapentin works  Tell your doctor if you also using hydrocodone or morphine  If you take an antacid, wait at least 2 hours before you take gabapentin  Do not drink alcohol while you are using this medicine  Tell your doctor if you use anything else that makes you sleepy  Some examples are allergy medicine, narcotic pain medicine, and alcohol  Tell your doctor if you are also using lorazepam, oxycodone, or zolpidem  Warnings While Using This Medicine:   Tell your doctor if you are pregnant or breastfeeding, or if you have kidney problems (including patients receiving dialysis) or lung problems  Tell your doctor if you have a history of depression or mental health problems  This medicine may cause the following problems:  Drug reaction with eosinophilia and systemic symptoms (DRESS) or multiorgan hypersensitivity, which may damage the liver, kidney, blood, heart, or muscles  Changes in mood or behavior, including suicidal thoughts or behavior  Respiratory depression (serious breathing problem that can be life-threatening), when used with narcotic pain medicines  Do not stop using this medicine suddenly  Your doctor will need to slowly decrease your dose before you stop it completely  This medicine may make you dizzy or drowsy  Do not drive or do anything else that could be dangerous until you know how this medicine affects you  Tell any doctor or dentist who treats you that you are using this medicine  This medicine may affect certain medical test results  Your doctor will check your progress and the effects of this medicine at regular visits  Keep all appointments  Keep all medicine out of the reach of children  Never share your medicine with anyone  Possible Side Effects While Using This Medicine:   Call your doctor right away if you notice any of these side effects: Allergic reaction: Itching or hives, swelling in your face or hands, swelling or tingling in your mouth or throat, chest tightness, trouble breathing  Behavior problems, aggression, restlessness, trouble concentrating, moodiness (especially in children)  Blistering, peeling, red skin rash  Blue lips, fingernails, or skin, chest pain, fast heartbeat, trouble breathing  Change in how much or how often you urinate, bloody or cloudy urine  Dark urine or pale stools, nausea, vomiting, loss of appetite, stomach pain, yellow skin or eyes  Fever, chills, cough, sore throat, body aches  Problems with coordination, shakiness, unsteadiness, unusual eye movement  Rapid weight gain, swelling in your hands, ankles, or feet  Rash, swollen or tender glands in the neck, armpit, or groin  Unusual moods or behaviors, thoughts of hurting yourself, feeling depressed  If you notice these less serious side effects, talk with your doctor:   Dizziness, drowsiness, sleepiness, tiredness  If you notice other side effects that you think are caused by this medicine, tell your doctor  Call your doctor for medical advice about side effects  You may report side effects to FDA at 5-334-FDA-7594  © Copyright [employer] 2022 Information is for End User's use only and may not be sold, redistributed or otherwise used for commercial purposes  The above information is an  only  It is not intended as medical advice for individual conditions or treatments  Talk to your doctor, nurse or pharmacist before following any medical regimen to see if it is safe and effective for you

## 2023-03-28 NOTE — PROGRESS NOTES
Assessment/Plan:    Diagnoses and all orders for this visit:    Left leg pain  -     XR spine lumbar 2 or 3 views injury; Future  -     XR tibia fibula 2 vw left; Future    Chronic bilateral low back pain with left-sided sciatica  -     methylPREDNISolone 4 MG tablet therapy pack; Use as directed on package  -     XR spine lumbar 2 or 3 views injury; Future  -     XR tibia fibula 2 vw left; Future    Lumbar spondylosis  -     methylPREDNISolone 4 MG tablet therapy pack; Use as directed on package  -     XR spine lumbar 2 or 3 views injury; Future  -     XR tibia fibula 2 vw left; Future    Lumbar degenerative disc disease  -     methylPREDNISolone 4 MG tablet therapy pack; Use as directed on package  -     XR spine lumbar 2 or 3 views injury; Future  -     XR tibia fibula 2 vw left; Future    Pain in left hip    Primary osteoarthritis of left hip    We will try a Medrol Dosepak to help with the patient's pain and inflammation, she is unable to take NSAIDs given a recent history of duodenal ulcer  We did discuss taking gabapentin as I do believe that this would help her pain which seems to be arising from the lumbar spine  We did discuss the possible side effects of drowsiness and fatigue and at this point in time she would like to think about taking that medication  Patient does have severe osteoarthritis of the left hip and some pain and decreased range of motion on exam however I do not feel that this is the cause of the majority of her pain  X-rays were obtained and reviewed compared to previous with no acute changes  Patient would like to hold off on physical therapy MRI and referral to pain management for her symptoms  Patient may benefit from mild pain medicines such as hydrocodone or tramadol defer to PCP    Return if symptoms worsen or fail to improve  Subjective:   Patient ID: Buffy Stevens is a 80 y o  female  Buffy presents for concerns of pain of the left shin    She does note some mild pain of the buttocks and hip area as well as the thigh but her main concern is the shin pain which is much improved at nighttime in bed and the first few hours of the morning however as the day goes by the pain worsens denies any numbness or tingling of the legs  She is concerned about her left leg giving out on her  She does have a history of similar symptoms which did resolve on their own  Previous x-rays of the lumbar spine and hip have been reviewed  She was previously prescribed Gabapentin  She notes recently she had a bleeding ulcer and anemia  Buffy will be traveling in May to see her sister in [address] [place] [address]    The following portions of the patient's chart were reviewed and updated as appropriate: Allergy:    Allergies   Allergen Reactions   • Amiodarone Hives   • Omnipaque [Iohexol] Hives and Shortness Of Breath   • Clindamycin Other (See Comments)     When taken causes cdiff immediately   • Other    • Sulfa Antibiotics Hives     itching   • Acetazolamide Hives and Rash     Reaction Date:Unknown   • Iv Dye  [Iodinated Contrast Media] Hypertension and Palpitations     Annotation - [id number]: Verified with patient    • Naprosyn [Naproxen] Itching and Rash     Category: Allergy;         Medications:    Current Outpatient Medications:   •  albuterol (2 5 mg/3 mL) 0 083 % nebulizer solution, Take 2 5 mg by nebulization every 6 (six) hours as needed for wheezing or shortness of breath, Disp: , Rfl:   •  albuterol (PROVENTIL HFA,VENTOLIN HFA) 90 mcg/act inhaler, Inhale 2 puffs every 6 (six) hours as needed for wheezing, Disp: 1 Inhaler, Rfl: 0  •  amLODIPine-benazepril (LOTREL 5-20) 5-20 MG per capsule, Take 1 capsule by mouth daily, Disp: 90 capsule, Rfl: 3  •  aspirin 81 MG tablet, Take 162 mg by mouth daily in the early morning  , Disp: , Rfl:   •  calcium carbonate (OS-TAE) 600 MG tablet, Take 600 mg by mouth daily in the early morning  , Disp: , Rfl:   •  levothyroxine 125 mcg tablet, Take 1 tablet (125 mcg total) by mouth daily in the early morning, Disp: 90 tablet, Rfl: 3  •  lovastatin (MEVACOR) 20 mg tablet, Take 1 tablet (20 mg total) by mouth in the morning , Disp: 90 tablet, Rfl: 3  •  methylPREDNISolone 4 MG tablet therapy pack, Use as directed on package, Disp: 1 each, Rfl: 0  •  metoprolol succinate (TOPROL-XL) 25 mg 24 hr tablet, Take 1 tablet (25 mg total) by mouth daily, Disp: 90 tablet, Rfl: 1  •  Multiple Vitamins-Minerals (One Daily Multivitamin Women) TABS, Apply 1 tablet to the mouth or throat daily   Indications: Treatment to Prevent Vitamin Deficiency, Disp: , Rfl:   •  pantoprazole (PROTONIX) 40 mg tablet, Take 1 tablet (40 mg total) by mouth 2 (two) times a day before meals, Disp: 180 tablet, Rfl: 0  •  Diclofenac Sodium (VOLTAREN) 1 %, Apply 2 g topically 4 (four) times a day, Disp: 700 g, Rfl: 2  •  furosemide (LASIX) 20 mg tablet, Take every other day , Disp: 30 tablet, Rfl: 0    Patient Active Problem List   Diagnosis   • Trigger middle finger of right hand   • Shortness of breath   • Essential hypertension   • Hyperlipidemia   • Asthma-COPD overlap syndrome (HCC)   • Paroxysmal atrial fibrillation (HCC)   • Headache   • 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HCC)   • Macular degeneration, wet (HCC)   • 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HCC)   • Moderate persistent asthma without complication   • Hypothyroidism   • Screening for diabetes mellitus   • Medicare annual wellness visit, subsequent   • Other fatigue   • Uncomplicated asthma   • Encounter for vision screening   • Nonrheumatic mitral valve regurgitation   • Severe persistent asthma with acute exacerbation   • Muscle cramps   • Skin cyst   • Pain of left lower extremity   • [place]-neck deformity of finger   • Atherosclerosis of aorta (HCC)   • Hypoparathyroidism, unspecified hypoparathyroidism type (HCC)   • Acute pain of right shoulder   • Aneurysm of aortic arch without rupture   • Toxic encephalopathy   • Anemia   • Basal cell "carcinoma (BCC)   • Cervical radiculopathy   • Spinal stenosis of cervical region   • Osteoarthritis of cervical spine   • Chronic migraine without aura   • Contrast media allergy   • Gastroesophageal reflux disease with esophagitis   • Stress incontinence in female   • Migraine aura without headache   • Obstructive sleep apnea syndrome   • Osteopenia   • Postoperative hypothyroidism   • Restless legs syndrome   • S/P trigger finger release   • Tricuspid valve insufficiency   • Xerostomia   • History of polymyalgia rheumatica   • Stage 3a chronic kidney disease (HCC)       Objective:  /82   Pulse 80   Ht 5' 2\" (1 575 m)   Wt 55 3 kg (122 lb)   BMI 22 31 kg/m²     Left Knee Exam     Comments:  Mild b/l LE swelling  Left shin nontender to palpation      Left Hip Exam     Comments:  Limited passive external and internal range of motion  Internal range of motion reproduces left lateral hip and thigh pain  Back Exam     Tests   Straight leg raise left: negative            Physical Exam  Musculoskeletal:      Lumbar back: Negative left straight leg raise test            Neurologic Exam    Procedures    I have personally reviewed pertinent films in PACS and my interpretation is   X-rays lumbar spine show stable degenerative changes compared to previous x-ray in 2021  X-rays left tib-fib no acute fracture or obvious cause of pain              Past Medical History:   Diagnosis Date   • A-fib [facility])    • Allergy to IVP dye 06/04/2013    pt felt heaviness, palpitations   • 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), wet ([facility])     bilateral   • Aneurysm of aortic root     last assessed - [id number]   • Aortic arch aneurysm    • Aortic root dilatation (HCC)     last assessed - [id number]   • Arthritis    • Ascending aortic aneurysm     last assessed - [id number]   • Asthma    • Basal cell carcinoma     last assessed - [id number]   • 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HCC)    • Disease of thyroid gland    • Facet arthropathy, cervical " last assessed - [id number]   • History of fracture of vertebral column    • Hyperlipidemia    • Hypertension    • 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[address] )    • Hypoxia     last assessed -    • Junctional rhythm     last assessed - 2017   • Lightheadedness     last assessed - 2016   • Migraine    • Palpitations     last assessed - 2016   • Pleural effusion, bilateral     last assessed - 2015   • 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HCC)    • Shortness of breath     last assessed - 2017   • Thyroid trouble    • Trigger finger     last assessed - [id number]   • Trigger middle finger of right hand     last assessed - 2016   • Urinary incontinence     last assessed -  Jul,2013; Resolved - [id number]       Past Surgical History:   Procedure Laterality Date   • ABDOMINAL AORTIC ANEURYSM REPAIR W/ ENDOLUMINAL GRAFT  2015    Ascending aorta and hemiarch replacement with 30 mm Vascutek Gelweave graft; Managed by Bennie Beltran; last assessed - [id number]   • APPENDECTOMY     • BLADDER SURGERY      Reccurent histoy of bladder surgery   • HOROWITZ PROCEDURE     • CARDIAC CATHETERIZATION  2004    Procedure summary - Luminal irregularities; last assessed - [id number]   •  SECTION     • CORONARY ANEURYSM REPAIR     • CYSTOSCOPY      botox injection   • HYSTERECTOMY     • IN NDSC NJX IMPLT MATRL URT&/BLDR NCK N/A 2017    Procedure: Bary Kirkpatrick; General [facility] BULKING AGENT INJECTION ;  Surgeon: Eduardo Navarro MD;  Location: [facility];  Service: Urology   • IN TENDON SHEATH INCISION Right 10/18/2016    Procedure: LONG FINGER TRIGGER RELEASE ;  Surgeon: Melissa Jose MD;  Location: [facility];  Service: Orthopedics   • THYROIDECTOMY      Total Thyroidectomy   • TONSILLECTOMY AND ADENOIDECTOMY         Social History     Socioeconomic History   • Marital status:       Spouse name: Not on file   • Number of children: Not on file   • Years of education: Not on file   • Highest education level: Not on file   Occupational History   • Not on file   Tobacco Use   • Smoking status: Former     Packs/day: 0 25     Years: 57 00     Pack years: 14 25     Types: Cigarettes     Start date: 36     Quit date: 2014     Years since quittin 2   • Smokeless tobacco: Former   • Tobacco comments:     smoked 17 yrs 1/2 ppd quit and restarted then quit  10 days ago during 2014    Vaping Use   • Vaping Use: Never used   Substance and Sexual Activity   • Alcohol use: Not Currently     Comment: Social drinker   • Drug use: No   • Sexual activity: Not Currently   Other Topics Concern   • Not on file   Social History Narrative   • Not on file     Social Determinants of Health     Financial Resource Strain: Medium Risk   • Difficulty of Paying Living Expenses: Somewhat hard   Food Insecurity: No Food Insecurity   • Worried About Running Out of Food in the Last Year: Never true   • Ran Out of Food in the Last Year: Never true   Transportation Needs: Unmet Transportation Needs   • Lack of Transportation (Medical):  Yes   • Lack of Transportation (Non-Medical): Yes   Physical Activity: Not on file   Stress: Not on file   Social Connections: Not on file   Intimate Partner Violence: Not on file   Housing Stability: Low Risk    • Unable to Pay for Housing in the Last Year: No   • Number of Places Lived in the Last Year: 1   • Unstable Housing in the Last Year: No       Family History   Problem Relation Age of Onset   • Coronary aneurysm Sister    • Coronary artery disease Sister         CABG   • Heart disease Family         cardiac disorder   • Hypertension Family    • Cancer Family    • Thyroid disease Family

## 2023-04-04 ENCOUNTER — OFFICE VISIT (OUTPATIENT)
Dept: CARDIOLOGY CLINIC | Facility: CLINIC | Age: 88
End: 2023-04-04

## 2023-04-04 VITALS
OXYGEN SATURATION: 97 % | SYSTOLIC BLOOD PRESSURE: 146 MMHG | BODY MASS INDEX: 22.08 KG/M2 | HEART RATE: 69 BPM | WEIGHT: 120 LBS | HEIGHT: 62 IN | DIASTOLIC BLOOD PRESSURE: 78 MMHG

## 2023-04-04 DIAGNOSIS — R60.0 PEDAL EDEMA: ICD-10-CM

## 2023-04-04 DIAGNOSIS — I10 ESSENTIAL HYPERTENSION: ICD-10-CM

## 2023-04-04 DIAGNOSIS — I34.0 NONRHEUMATIC MITRAL VALVE REGURGITATION: ICD-10-CM

## 2023-04-04 DIAGNOSIS — K26.9 DUODENUM ULCER: ICD-10-CM

## 2023-04-04 DIAGNOSIS — I48.0 PAROXYSMAL ATRIAL FIBRILLATION (HCC): ICD-10-CM

## 2023-04-04 DIAGNOSIS — I07.1 TRICUSPID VALVE INSUFFICIENCY, UNSPECIFIED ETIOLOGY: ICD-10-CM

## 2023-04-04 DIAGNOSIS — E78.5 HYPERLIPIDEMIA, UNSPECIFIED HYPERLIPIDEMIA TYPE: Primary | ICD-10-CM

## 2023-04-04 RX ORDER — FUROSEMIDE 40 MG/1
TABLET ORAL
Qty: 30 TABLET | Refills: 1 | Status: SHIPPED | OUTPATIENT
Start: 2023-04-04

## 2023-04-04 RX ORDER — FUROSEMIDE 40 MG/1
TABLET ORAL
Qty: 30 TABLET | Refills: 1 | Status: SHIPPED | OUTPATIENT
Start: 2023-04-04 | End: 2023-04-04 | Stop reason: SDUPTHER

## 2023-04-04 NOTE — PROGRESS NOTES
Follow-up - Cardiology   Buffy Colón 80 y o  female MRN: [id number]        Problems    1  Hyperlipidemia, unspecified hyperlipidemia type        2  Paroxysmal atrial fibrillation (HCC)        3  Nonrheumatic mitral valve regurgitation        4  Tricuspid valve insufficiency, unspecified etiology        5  Essential hypertension        6  Duodenum ulcer        7   Pedal edema  POCT ECG    Basic metabolic panel    CBC    DISCONTINUED: furosemide (LASIX) 40 mg tablet          Impression:    Hypertension  Well-controlled on metoprolol, amlodipine, benazepril    Paroxysmal atrial fibrillation  She has not had any recurrence, and has avoided anticoagulation due to nasopharyngeal cancer and bleeding in the past  She has been on aspirin    History of ascending aortic aneurysm  Status post repair in 2015  Recent CT chest 1/23 suggested 4 7 cm aortic arch/proximal descending aorta, but she will not be a candidate for reoperation at her current age    Hyperlipidemia  No statin considering advanced age    Edema  She appears globally volume overloaded after recent hospitalization for severe upper GI bleed due to duodenal ulceration with only because appearing to be her chronic aspirin use  I suspect her edema/volume overload is likely related to her anemia    Dyspnea on exertion  Likely driven by diastolic dysfunction  She also had a blunted heart rate response to exercise in the past, but metoprolol discontinuation resulted in palpitations, she currently tolerates low-dose metoprolol    Plan:    She has JVD, bilateral pedal edema, definitely appears volume overloaded, likely driven by recent blood loss/anemia which has not fully resolved  Furosemide 40 mg p o  once daily x3 days at least until leg edema resolves, then as needed      HPI:   Buffy Wolfe is a 80y o  year old female  With ascending aortic aneurysm, status post repair, with significant asthma, hypertension, mild hyperlipidemia, prior normal cardiac catheterization in 2015, paroxysmal atrial fibrillation without any recent recurrence, no complaints of palpitations, only taking aspirin, no anticoagulation due to her nasopharyngeal cancer and propensity to bleeding  She has had a number of interactions with the medical system recently, 1 account of right arm pain for which she was given fentanyl with significant altered mental status  Subsequently was hospitalized for severe upper GI bleed, anemia, duodenal ulcer found to be the culprit, recent hemoglobin up to 10 3, but has had significant bilateral pedal edema since hospitalization  She is on amlodipine chronically, but this has not been a side effect for her in the past   She had furosemide x1 given by her cardiology NP a few weeks ago, to no avail  Blood pressure is stable  She was placed on Protonix for duodenal ulcer treatment  Review of Systems   Constitutional: Negative for appetite change, diaphoresis, fatigue and fever  Respiratory: Negative for chest tightness, shortness of breath and wheezing  Cardiovascular: Positive for leg swelling  Negative for chest pain and palpitations  Gastrointestinal: Negative for abdominal pain and blood in stool  Musculoskeletal: Negative for arthralgias and joint swelling  Skin: Negative for rash  Neurological: Negative for dizziness, syncope and light-headedness           Past Medical History:   Diagnosis Date   • A-fib [facility])    • Allergy to IVP dye 06/04/2013    pt felt heaviness, palpitations   • 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), wet (HCC)     bilateral   • Aneurysm of aortic root ([address] )     last assessed - [id number]   • Aortic arch aneurysm [facility])    • Aortic root dilatation (HCC)     last assessed - [id number]   • Arthritis    • Ascending aortic aneurysm (HCC)     last assessed - [id number]   • Asthma    • Basal cell carcinoma     last assessed - [id number]   • 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[facility])    • Disease of thyroid gland    • Facet arthropathy, cervical     last assessed -    • History of fracture of vertebral column    • Hyperlipidemia    • Hypertension    • 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[address] )    • Hypoxia     last assessed -    • Junctional rhythm     last assessed - 2017   • Lightheadedness     last assessed - 2016   • Migraine    • Palpitations     last assessed - 2016   • Pleural effusion, bilateral     last assessed - 2015   • 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HCC)    • Shortness of breath     last assessed - 2017   • Thyroid trouble    • Trigger finger     last assessed -    • Trigger middle finger of right hand     last assessed - 2016   • Urinary incontinence     last assessed -  Jul,2013; Resolved - [id number]     Social History     Substance and Sexual Activity   Alcohol Use Not Currently    Comment: Social drinker     Social History     Substance and Sexual Activity   Drug Use No     Social History     Tobacco Use   Smoking Status Former   • Packs/day: 0 25   • Years: 57 00   • Pack years: 14 25   • Types: Cigarettes   • Start date: 36   • Quit date: 2014   • Years since quittin 2   Smokeless Tobacco Former   Tobacco Comments    smoked 17 yrs 1/2 ppd quit and restarted then quit  10 days ago during 2014        Allergies: Allergies   Allergen Reactions   • Amiodarone Hives   • Omnipaque [Iohexol] Hives and Shortness Of Breath   • Clindamycin Other (See Comments)     When taken causes cdiff immediately   • Other    • Sulfa Antibiotics Hives     itching   • Acetazolamide Hives and Rash     Reaction Date:Unknown   • Iv Dye  [Iodinated Contrast Media] Hypertension and Palpitations     Annotation - : Verified with patient    • Naprosyn [Naproxen] Itching and Rash     Category: Allergy;         Medications:     Current Outpatient Medications:   •  albuterol (2 5 mg/3 mL) 0 083 % nebulizer solution, Take 2 5 mg by nebulization every 6 (six) hours as needed for wheezing or shortness of breath, Disp: , Rfl:   •  albuterol (PROVENTIL HFA,VENTOLIN HFA) 90 mcg/act inhaler, Inhale 2 puffs every 6 (six) hours as needed for wheezing, Disp: 1 Inhaler, Rfl: 0  •  amLODIPine-benazepril (LOTREL 5-20) 5-20 MG per capsule, Take 1 capsule by mouth daily, Disp: 90 capsule, Rfl: 3  •  calcium carbonate (OS-TAE) 600 MG tablet, Take 600 mg by mouth daily in the early morning  , Disp: , Rfl:   •  Diclofenac Sodium (VOLTAREN) 1 %, Apply 2 g topically 4 (four) times a day, Disp: 700 g, Rfl: 2  •  levothyroxine 125 mcg tablet, Take 1 tablet (125 mcg total) by mouth daily in the early morning, Disp: 90 tablet, Rfl: 3  •  lovastatin (MEVACOR) 20 mg tablet, Take 1 tablet (20 mg total) by mouth in the morning , Disp: 90 tablet, Rfl: 3  •  metoprolol succinate (TOPROL-XL) 25 mg 24 hr tablet, Take 1 tablet (25 mg total) by mouth daily, Disp: 90 tablet, Rfl: 1  •  Multiple Vitamins-Minerals (One Daily Multivitamin Women) TABS, Apply 1 tablet to the mouth or throat daily  Indications: Treatment to Prevent Vitamin Deficiency, Disp: , Rfl:   •  pantoprazole (PROTONIX) 40 mg tablet, Take 1 tablet (40 mg total) by mouth 2 (two) times a day before meals, Disp: 180 tablet, Rfl: 0  •  furosemide (LASIX) 40 mg tablet, Take 40mg daily until swelling resolves, then as needed  , Disp: 30 tablet, Rfl: 1  •  methylPREDNISolone 4 MG tablet therapy pack, Use as directed on package (Patient not taking: Reported on 4/4/2023), Disp: 1 each, Rfl: 0      Vitals:    04/04/23 1043   BP: 146/78   Pulse: 69   SpO2: 97%     Weight (last 2 days)     Date/Time Weight    04/04/23 1043 54 4 (120)        Physical Exam  Constitutional:       General: She is not in acute distress  Appearance: She is not diaphoretic  HENT:      Head: Normocephalic and atraumatic  Eyes:      General: No scleral icterus  Conjunctiva/sclera: Conjunctivae normal    Neck:      Vascular: JVD present     Cardiovascular:      Rate and Rhythm: Normal rate and regular rhythm  Heart sounds: Normal heart sounds  No murmur heard  Pulmonary:      Effort: Pulmonary effort is normal  No respiratory distress  Breath sounds: Normal breath sounds  No wheezing, rhonchi or rales  Musculoskeletal:         General: No tenderness  Cervical back: Normal range of motion  Right lower leg: Edema present  Left lower leg: Edema present  Skin:     General: Skin is warm and dry             Laboratory Studies:  Lab Results   Component Value Date    HGBA1C 5 7 (H) 10/12/2020    HGBA1C 5 8 (H) 04/09/2020    HGBA1C 5 9 06/05/2019    HGBA1C 6 2 (H) 02/05/2015     (L) 06/16/2015     06/11/2015     06/07/2015    K 4 1 03/20/2023    K 3 6 02/08/2023    K 3 3 (L) 02/07/2023    K 4 1 06/16/2015    K 4 1 06/11/2015    K 3 9 06/07/2015     03/20/2023     (H) 02/08/2023     (H) 02/07/2023    CL 97 (L) 06/16/2015     06/11/2015     06/07/2015    CO2 30 03/20/2023    CO2 25 02/08/2023    CO2 25 02/07/2023    CO2 30 06/16/2015    CO2 28 06/11/2015    CO2 27 06/07/2015    GLUCOSE 138 06/16/2015    GLUCOSE 180 (H) 06/11/2015    GLUCOSE 97 06/08/2015    CREATININE 0 94 03/20/2023    CREATININE 0 50 (L) 02/08/2023    CREATININE 0 53 (L) 02/07/2023    CREATININE 1 07 06/16/2015    CREATININE 0 93 06/11/2015    CREATININE 0 66 06/07/2015    BUN 31 (H) 03/20/2023    BUN 11 02/08/2023    BUN 12 02/07/2023    BUN 23 06/16/2015    BUN 18 06/11/2015    BUN 13 06/07/2015    MG 2 3 05/18/2021    MG 2 1 02/22/2016    MG 1 9 06/07/2015    MG 1 7 06/06/2015    MG 1 8 04/09/2015    PHOS 3 3 06/07/2015     Lab Results   Component Value Date    WBC 6 54 03/20/2023    WBC 7 65 06/16/2015    RBC 3 50 (L) 03/20/2023    RBC 4 16 06/16/2015    HGB 10 3 (L) 03/20/2023    HGB 9 1 (L) 06/16/2015    HCT 33 2 (L) 03/20/2023    HCT 29 9 (L) 06/16/2015    MCV 95 03/20/2023    MCV 72 (L) 06/16/2015    MCH 29 4 03/20/2023    MCH 21 9 (L) 06/16/2015    RDW 14 2 "03/20/2023    RDW 17 2 (H) 06/16/2015     03/20/2023     06/16/2015     NT-proBNP: No results for input(s): NTBNP in the last 72 hours  Coags:    Lipid Profile:   Lab Results   Component Value Date    CHOL 154 06/08/2015     Lab Results   Component Value Date    HDL 76 11/13/2021     Lab Results   Component Value Date    LDLCALC 86 11/13/2021     Lab Results   Component Value Date    TRIG 53 11/13/2021       Cardiac testing:   EKG reviewed personally:    Results for orders placed or performed in visit on 04/04/23   POCT ECG    Impression    Sinus rhythm         Echocardiogram   12/12/2017-LVEF 55%, normal wall thickness, grade 1 DD, trace MR, trace AI  12/19-EF normal, grade 1 diastolic dysfunction, mild AI    Mariya Mcallister MD    Portions of the record may have been created with voice recognition software   Occasional wrong word or \"sound a like\" substitutions may have occurred due to the inherent limitations of voice recognition software   Read the chart carefully and recognize, using context, where substitutions have occurred    "

## 2023-04-28 ENCOUNTER — APPOINTMENT (OUTPATIENT)
Dept: LAB | Facility: CLINIC | Age: 88
End: 2023-04-28

## 2023-04-28 DIAGNOSIS — R06.02 SHORTNESS OF BREATH: ICD-10-CM

## 2023-04-28 DIAGNOSIS — I10 ESSENTIAL HYPERTENSION: ICD-10-CM

## 2023-04-28 LAB
ANION GAP SERPL CALCULATED.3IONS-SCNC: 4 MMOL/L (ref 4–13)
BUN SERPL-MCNC: 35 MG/DL (ref 5–25)
CALCIUM SERPL-MCNC: 9.4 MG/DL (ref 8.3–10.1)
CHLORIDE SERPL-SCNC: 103 MMOL/L (ref 96–108)
CO2 SERPL-SCNC: 29 MMOL/L (ref 21–32)
CREAT SERPL-MCNC: 0.95 MG/DL (ref 0.6–1.3)
ERYTHROCYTE [DISTWIDTH] IN BLOOD BY AUTOMATED COUNT: 14.3 % (ref 11.6–15.1)
GFR SERPL CREATININE-BSD FRML MDRD: 52 ML/MIN/1.73SQ M
GLUCOSE P FAST SERPL-MCNC: 104 MG/DL (ref 65–99)
HCT VFR BLD AUTO: 37.1 % (ref 34.8–46.1)
HGB BLD-MCNC: 11.7 G/DL (ref 11.5–15.4)
MCH RBC QN AUTO: 28.5 PG (ref 26.8–34.3)
MCHC RBC AUTO-ENTMCNC: 31.5 G/DL (ref 31.4–37.4)
MCV RBC AUTO: 90 FL (ref 82–98)
PLATELET # BLD AUTO: 214 THOUSANDS/UL (ref 149–390)
PMV BLD AUTO: 12 FL (ref 8.9–12.7)
POTASSIUM SERPL-SCNC: 4.1 MMOL/L (ref 3.5–5.3)
RBC # BLD AUTO: 4.11 MILLION/UL (ref 3.81–5.12)
SODIUM SERPL-SCNC: 136 MMOL/L (ref 135–147)
WBC # BLD AUTO: 6.45 THOUSAND/UL (ref 4.31–10.16)

## 2023-06-01 DIAGNOSIS — E78.5 HYPERLIPIDEMIA, UNSPECIFIED HYPERLIPIDEMIA TYPE: ICD-10-CM

## 2023-06-01 RX ORDER — LOVASTATIN 20 MG/1
TABLET ORAL
Qty: 90 TABLET | Refills: 3 | Status: SHIPPED | OUTPATIENT
Start: 2023-06-01

## 2023-06-05 ENCOUNTER — OFFICE VISIT (OUTPATIENT)
Dept: FAMILY MEDICINE CLINIC | Facility: CLINIC | Age: 88
End: 2023-06-05
Payer: COMMERCIAL

## 2023-06-05 VITALS
HEIGHT: 62 IN | TEMPERATURE: 98.4 F | OXYGEN SATURATION: 96 % | HEART RATE: 69 BPM | WEIGHT: 115.4 LBS | BODY MASS INDEX: 21.23 KG/M2 | DIASTOLIC BLOOD PRESSURE: 56 MMHG | SYSTOLIC BLOOD PRESSURE: 118 MMHG

## 2023-06-05 DIAGNOSIS — E03.9 HYPOTHYROIDISM, UNSPECIFIED TYPE: ICD-10-CM

## 2023-06-05 DIAGNOSIS — R20.2 ARM PARESTHESIA, RIGHT: Primary | ICD-10-CM

## 2023-06-05 DIAGNOSIS — M48.02 SPINAL STENOSIS OF CERVICAL REGION: ICD-10-CM

## 2023-06-05 PROCEDURE — 99214 OFFICE O/P EST MOD 30 MIN: CPT | Performed by: FAMILY MEDICINE

## 2023-06-05 NOTE — PROGRESS NOTES
Name: Elsa Beltran      : 1932      MRN: [id number]  Encounter Provider: Tereso Martinez DO  Encounter Date: 2023   Encounter department: [address] [place] [address]   Patient is seen for complaint of right arm - electrical sensations - we discussed that she had been prescribed Gabapentin but she is not taking it  She had gone into the hospital back in January with right arm pain but then other conditions arose and patient felt that her symptoms regarding the right arm were pushed aside  I did discuss that she was prescribed Gabapentin to help with radicular pain, or nerve related pain which is what she is probably experiencing  She has spinal stenosis of the cervical spine  She will start 100 mg at bedtime  She is to call and let me know how it is workig and then we can possibly increase  1  Arm paresthesia, right    2  Spinal stenosis of cervical region    3  Hypothyroidism, unspecified type  -     TSH, 3rd generation with Free T4 reflex; Future; Expected date: 2023       She was also given lab orders to check thyroid  She can wait to have blood work until she has blood work for cardiology  She has an appointment to see me at the end of July  Subjective      Chief Complaint   Patient presents with   • right arm      Pt states the right arm gets electrical shocks from hand to shoulder and sometimes has to pull fingers back to normal if it is in a frozen state  Electrical sensation down arm becoming more frequently and then has intese itching  She just got back from [place]  Review of Systems   Constitutional: Negative for chills and fever  Skin: Negative for rash     Neurological:        As in HPI       Current Outpatient Medications on File Prior to Visit   Medication Sig   • albuterol (2 5 mg/3 mL) 0 083 % nebulizer solution Take 2 5 mg by nebulization every 6 (six) hours as needed for wheezing or shortness of breath   • albuterol (PROVENTIL HFA,VENTOLIN "HFA) 90 mcg/act inhaler Inhale 2 puffs every 6 (six) hours as needed for wheezing   • amLODIPine-benazepril (LOTREL 5-20) 5-20 MG per capsule Take 1 capsule by mouth daily   • calcium carbonate (OS-TAE) 600 MG tablet Take 600 mg by mouth daily in the early morning     • Diclofenac Sodium (VOLTAREN) 1 % Apply 2 g topically 4 (four) times a day   • levothyroxine 125 mcg tablet Take 1 tablet (125 mcg total) by mouth daily in the early morning   • lovastatin (MEVACOR) 20 mg tablet TAKE ONE TABLET BY MOUTH IN THE MORNING   • metoprolol succinate (TOPROL-XL) 25 mg 24 hr tablet Take 1 tablet (25 mg total) by mouth daily   • Multiple Vitamins-Minerals (One Daily Multivitamin Women) TABS Apply 1 tablet to the mouth or throat daily  Indications: Treatment to Prevent Vitamin Deficiency   • pantoprazole (PROTONIX) 40 mg tablet Take 1 tablet (40 mg total) by mouth 2 (two) times a day before meals       Objective     /56 (BP Location: Left arm, Patient Position: Sitting, Cuff Size: Adult)   Pulse 69   Temp 98 4 °F (36 9 °C)   Ht 5' 2\" (1 575 m)   Wt 52 3 kg (115 lb 6 4 oz)   SpO2 96%   BMI 21 11 kg/m²     Physical Exam  Vitals and nursing note reviewed  Constitutional:       General: She is not in acute distress  HENT:      Head: Normocephalic  Musculoskeletal:      Comments: Decreased range of motion of neck   Lymphadenopathy:      Cervical: No cervical adenopathy  Skin:     General: Skin is warm and dry  Findings: No rash  Neurological:      Mental Status: She is alert and oriented to person, place, and time  Sensory: No sensory deficit        Deep Tendon Reflexes: Reflexes abnormal    Psychiatric:         Mood and Affect: Mood normal        Deandre Albino, DO  " Suturegard Intro: Intraoperative tissue expansion was performed, utilizing the SUTUREGARD device, in order to reduce wound tension.

## 2023-07-06 ENCOUNTER — HOSPITAL ENCOUNTER (EMERGENCY)
Facility: HOSPITAL | Age: 88
Discharge: HOME | End: 2023-07-06
Attending: EMERGENCY MEDICINE
Payer: COMMERCIAL

## 2023-07-06 ENCOUNTER — APPOINTMENT (EMERGENCY)
Dept: RADIOLOGY | Facility: HOSPITAL | Age: 88
End: 2023-07-06
Payer: COMMERCIAL

## 2023-07-06 ENCOUNTER — APPOINTMENT (EMERGENCY)
Dept: CT IMAGING | Facility: HOSPITAL | Age: 88
End: 2023-07-06
Payer: COMMERCIAL

## 2023-07-06 VITALS
SYSTOLIC BLOOD PRESSURE: 187 MMHG | WEIGHT: 119.49 LBS | DIASTOLIC BLOOD PRESSURE: 84 MMHG | HEART RATE: 78 BPM | TEMPERATURE: 97.8 F | BODY MASS INDEX: 21.85 KG/M2 | OXYGEN SATURATION: 91 % | RESPIRATION RATE: 16 BRPM

## 2023-07-06 DIAGNOSIS — S42.002A CLOSED FRACTURE OF LEFT CLAVICLE: Primary | ICD-10-CM

## 2023-07-06 PROCEDURE — 72125 CT NECK SPINE W/O DYE: CPT

## 2023-07-06 PROCEDURE — 73030 X-RAY EXAM OF SHOULDER: CPT

## 2023-07-06 PROCEDURE — 70450 CT HEAD/BRAIN W/O DYE: CPT

## 2023-07-06 PROCEDURE — G1004 CDSM NDSC: HCPCS

## 2023-07-06 RX ORDER — HYDROCODONE BITARTRATE AND ACETAMINOPHEN 5; 325 MG/1; MG/1
1 TABLET ORAL EVERY 6 HOURS PRN
Qty: 12 TABLET | Refills: 0 | Status: SHIPPED | OUTPATIENT
Start: 2023-07-06 | End: 2023-07-16

## 2023-07-06 RX ORDER — ACETAMINOPHEN 325 MG/1
975 TABLET ORAL ONCE
Status: COMPLETED | OUTPATIENT
Start: 2023-07-06 | End: 2023-07-06

## 2023-07-06 RX ORDER — OXYCODONE HYDROCHLORIDE 5 MG/1
5 TABLET ORAL ONCE
Status: COMPLETED | OUTPATIENT
Start: 2023-07-06 | End: 2023-07-06

## 2023-07-06 RX ADMIN — ACETAMINOPHEN 325MG 975 MG: 325 TABLET ORAL at 16:28

## 2023-07-06 RX ADMIN — OXYCODONE HYDROCHLORIDE 5 MG: 5 TABLET ORAL at 19:07

## 2023-07-06 NOTE — ED PROVIDER NOTES
History  Chief Complaint   Patient presents with   • Shoulder Pain     Patient tripped over a shopping cart and fell onto left shoulder. Patient c/o left shoulder pain. Abrasion noted to patients left forearm. Patient denies thinners, head strike, or LOC. Buffy is a very pleasant 35-year-old female here for evaluation of left-sided shoulder pain. She states that she fell landing essentially directly on her left shoulder. Describes a mechanical fall that occurred when she stumbled over the wheel of a laundry cart that she was pushing. She states that she remained conscious for the entire event. She is unsure of head strike. She is having some left-sided neck pain, but her most significant symptom is pain in her left shoulder that is worse with range of motion. History provided by:  Patient      Prior to Admission Medications   Prescriptions Last Dose Informant Patient Reported? Taking? Diclofenac Sodium (VOLTAREN) 1 %  Self No No   Sig: Apply 2 g topically 4 (four) times a day   Multiple Vitamins-Minerals (One Daily Multivitamin Women) TABS  Self Yes No   Sig: Apply 1 tablet to the mouth or throat daily.  Indications: Treatment to Prevent Vitamin Deficiency   albuterol (2.5 mg/3 mL) 0.083 % nebulizer solution  Self Yes No   Sig: Take 2.5 mg by nebulization every 6 (six) hours as needed for wheezing or shortness of breath   albuterol (PROVENTIL HFA,VENTOLIN HFA) 90 mcg/act inhaler  Self No No   Sig: Inhale 2 puffs every 6 (six) hours as needed for wheezing   amLODIPine-benazepril (LOTREL 5-20) 5-20 MG per capsule  Self No No   Sig: Take 1 capsule by mouth daily   calcium carbonate (OS-TAE) 600 MG tablet  Self Yes No   Sig: Take 600 mg by mouth daily in the early morning     levothyroxine 125 mcg tablet  Self No No   Sig: Take 1 tablet (125 mcg total) by mouth daily in the early morning   lovastatin (MEVACOR) 20 mg tablet  Self No No   Sig: TAKE ONE TABLET BY MOUTH IN THE MORNING   metoprolol succinate (TOPROL-XL) 25 mg 24 hr tablet  Self No No   Sig: Take 1 tablet (25 mg total) by mouth daily   pantoprazole (PROTONIX) 40 mg tablet  Self No No   Sig: Take 1 tablet (40 mg total) by mouth 2 (two) times a day before meals      Facility-Administered Medications: None       Past Medical History:   Diagnosis Date   • A-fib ([address])    • Allergy to IVP dye 06/04/2013    pt felt heaviness, palpitations   • 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), wet (HCC)     bilateral   • Aneurysm of aortic root ([address])     last assessed - [id number]   • 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HCC)    • Aortic root dilatation (HCC)     last assessed - [id number]   • Arthritis    • Ascending aortic aneurysm (HCC)     last assessed - 07LKE5135   • Asthma    • Basal cell carcinoma     last assessed - 87CMZ1393   • 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HCC)    • Disease of thyroid gland    • Facet arthropathy, cervical     last assessed - 32MME1543   • History of fracture of vertebral column    • Hyperlipidemia    • Hypertension    • 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[address])    • Hypoxia     last assessed - 04WDJ3602   • Junctional rhythm     last assessed - 34Phf7746   • Lightheadedness     last assessed - 42Xyd4575   • Migraine    • Palpitations     last assessed - 36Jxd0820   • Pleural effusion, bilateral     last assessed - 29Nuh4068   • 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[address])    • Shortness of breath     last assessed - 88Ono8684   • Thyroid trouble    • Trigger finger     last assessed - 13FLO4606   • Trigger middle finger of right hand     last assessed - 41Noh4202   • Urinary incontinence     last assessed -  22Jul,2013; Resolved - [id number]       Past Surgical History:   Procedure Laterality Date   • ABDOMINAL AORTIC ANEURYSM REPAIR W/ ENDOLUMINAL GRAFT  06/28/2015    Ascending aorta and hemiarch replacement with 30 mm Vascutek Gelweave graft;  Managed by Jay Jimenez; last assessed - 77YMN8924   • APPENDECTOMY     • BLADDER SURGERY      Reccurent histoy of bladder surgery   • HOROWITZ PROCEDURE     • CARDIAC CATHETERIZATION  2004    Procedure summary - Luminal irregularities; last assessed - [id number]   •  SECTION     • CORONARY ANEURYSM REPAIR     • CYSTOSCOPY      botox injection   • HYSTERECTOMY     • DC NDSC NJX IMPLT MATRL URT&/BLDR NCK N/A 2017    Procedure: CYSTOSCOPY; Ursula Cam BULKING AGENT INJECTION ;  Surgeon: Ryley Linder MD;  Location: [facility];  Service: Urology   • DC TENDON SHEATH INCISION Right 10/18/2016    Procedure: LONG FINGER TRIGGER RELEASE ;  Surgeon: Priyanka Hutchison MD;  Location: [facility];  Service: Orthopedics   • THYROIDECTOMY      Total Thyroidectomy   • TONSILLECTOMY AND ADENOIDECTOMY         Family History   Problem Relation Age of Onset   • Coronary aneurysm Sister    • Coronary artery disease Sister         CABG   • Heart disease Family         cardiac disorder   • Hypertension Family    • Cancer Family    • Thyroid disease Family      I have reviewed and agree with the history as documented. E-Cigarette/Vaping   • E-Cigarette Use Never User      E-Cigarette/Vaping Substances   • Nicotine No    • THC No    • CBD No    • Flavoring No    • Other No    • Unknown No      Social History     Tobacco Use   • Smoking status: Former     Packs/day: 0.25     Years: 57.00     Total pack years: 14.25     Types: Cigarettes     Start date: 36     Quit date: 2014     Years since quittin.5   • Smokeless tobacco: Former   • Tobacco comments:     smoked 17 yrs 1/2 ppd quit and restarted then quit  10 days ago during 2014    Vaping Use   • Vaping Use: Never used   Substance Use Topics   • Alcohol use: Not Currently     Comment: Social drinker   • Drug use: No       Review of Systems   Constitutional: Negative for chills and fever. HENT: Negative for sore throat. Eyes: Negative for visual disturbance. Respiratory: Negative for cough and shortness of breath. Cardiovascular: Negative for chest pain and palpitations. Gastrointestinal: Negative for abdominal pain, nausea and vomiting. Genitourinary: Negative for dysuria and hematuria. Musculoskeletal: Positive for arthralgias and neck pain. Negative for back pain and neck stiffness. Skin: Negative for color change and rash. Neurological: Negative for seizures and syncope. All other systems reviewed and are negative. Physical Exam  Physical Exam  Vitals and nursing note reviewed. Constitutional:       General: She is not in acute distress. Appearance: She is well-developed. HENT:      Head: Normocephalic and atraumatic. Eyes:      Conjunctiva/sclera: Conjunctivae normal.   Cardiovascular:      Rate and Rhythm: Normal rate and regular rhythm. Heart sounds: No murmur heard. Pulmonary:      Effort: Pulmonary effort is normal. No respiratory distress. Breath sounds: Normal breath sounds. Abdominal:      Palpations: Abdomen is soft. Tenderness: There is no abdominal tenderness. Musculoskeletal:         General: Tenderness present. No swelling or deformity. Cervical back: Neck supple. Comments: There is no gross deformity of the left shoulder. Patient does have pain with range of motion, especially with abduction beyond 90 degrees. She has exquisite tenderness over the [facility] joint. Normal range of motion left elbow. Neurovascular exam is normal distally. Skin:     General: Skin is warm and dry. Capillary Refill: Capillary refill takes less than 2 seconds. Neurological:      General: No focal deficit present. Mental Status: She is alert and oriented to person, place, and time.    Psychiatric:         Mood and Affect: Mood normal.         Vital Signs  ED Triage Vitals   Temperature Pulse Respirations Blood Pressure SpO2   07/06/23 1553 07/06/23 1553 07/06/23 1553 07/06/23 1553 07/06/23 1553   97.8 °F (36.6 °C) 78 18 (!) 206/90 95 %      Temp Source Heart Rate Source Patient Position - Orthostatic VS BP Location FiO2 (%)   07/06/23 1553 07/06/23 1553 07/06/23 1553 07/06/23 1553 --   Oral Monitor Lying Right arm       Pain Score       07/06/23 1628       8           Vitals:    07/06/23 1553 07/06/23 1631   BP: (!) 206/90 (!) 189/86   Pulse: 78 78   Patient Position - Orthostatic VS: Lying Lying         Visual Acuity  Visual Acuity    Flowsheet Row Most Recent Value   L Pupil Size (mm) 4   R Pupil Size (mm) 4          ED Medications  Medications   acetaminophen (TYLENOL) tablet 975 mg (975 mg Oral Given 7/6/23 1628)       Diagnostic Studies  Results Reviewed     None                 XR shoulder 2+ views LEFT    (Results Pending)   CT head without contrast    (Results Pending)   CT cervical spine without contrast    (Results Pending)              Procedures  Procedures         ED Course                               SBIRT 22yo+    Flowsheet Row Most Recent Value   Initial Alcohol Screen: US AUDIT-C     1. How often do you have a drink containing alcohol? 0 Filed at: 07/06/2023 1631   2. How many drinks containing alcohol do you have on a typical day you are drinking? 0 Filed at: 07/06/2023 1631   3a. Male UNDER 65: How often do you have five or more drinks on one occasion? 0 Filed at: 07/06/2023 1631   3b. FEMALE Any Age, or MALE 65+: How often do you have 4 or more drinks on one occassion? 0 Filed at: 07/06/2023 1631   Audit-C Score 0 Filed at: 07/06/2023 1631   RAMSES: How many times in the past year have you. .. Used an illegal drug or used a prescription medication for non-medical reasons? Never Filed at: 07/06/2023 1631                    Medical Decision Making  19-year-old female had a mechanical fall earlier today landing on her left shoulder. She is also complaining of some milder left-sided neck pain. CT imaging of the head and neck were negative for intracranial hemorrhage or acute fracture/dislocation. She does appear to have an acute fracture of the distal portion of the left clavicle.   Discussed with patient plan for sling, follow-up with orthopedics. Called patient's daughter who will pick her up from the ED and take her home this evening. Closed fracture of left clavicle: acute illness or injury  Amount and/or Complexity of Data Reviewed  Radiology: ordered and independent interpretation performed. Details: Acute distal left clavicle fracture      Risk  OTC drugs. Prescription drug management. Disposition  Final diagnoses:   None     ED Disposition     None      Follow-up Information    None         Patient's Medications   Discharge Prescriptions    No medications on file       No discharge procedures on file.     PDMP Review       Value Time User    PDMP Reviewed  Yes 1/19/2023 12:26 PM Tuesday Nathaly Diehl, [address] [facility]          ED Provider  Electronically Signed by           Julito Jerome MD  07/06/23 9826

## 2023-07-07 ENCOUNTER — VBI (OUTPATIENT)
Dept: ADMINISTRATIVE | Facility: OTHER | Age: 88
End: 2023-07-07

## 2023-07-07 NOTE — TELEPHONE ENCOUNTER
Buffy Beulah Sims    ED Visit Information     Ed visit date: 7/6/3  Diagnosis Description:   Closed fracture of left clavicle   In Network? Yes [facility] - CLOSED  Discharge status: Home  Discharged with meds ? No  Number of ED visits to date: 3  ED Severity:na     Outreach Information    Outreach successful: No 1  Date letter mailed:7/7/23  Date Finalized:7/7/23    Care Coordination    Follow up appointment with pcp: yes 7/25/23  Transportation issues ? No    Value Base Outreach    07/07/2023 12:57 PM EDT by Lois Dean 07/07/2023 12:57 PM EDT by Lois Guido Byars, June A (Self) [phone] (ILGBPW)[phone] (Mobile)  [phone] (Mobile) Remove  - Not Available    Attempted to contact patient but once call was answered they stated we have wrong number.  Verified again the number on file

## 2023-07-10 ENCOUNTER — APPOINTMENT (OUTPATIENT)
Dept: RADIOLOGY | Facility: OTHER | Age: 88
End: 2023-07-10
Payer: COMMERCIAL

## 2023-07-10 ENCOUNTER — APPOINTMENT (OUTPATIENT)
Dept: LAB | Facility: CLINIC | Age: 88
End: 2023-07-10
Payer: COMMERCIAL

## 2023-07-10 ENCOUNTER — OFFICE VISIT (OUTPATIENT)
Dept: OBGYN CLINIC | Facility: OTHER | Age: 88
End: 2023-07-10
Payer: COMMERCIAL

## 2023-07-10 VITALS
HEART RATE: 68 BPM | WEIGHT: 119 LBS | SYSTOLIC BLOOD PRESSURE: 136 MMHG | HEIGHT: 62 IN | BODY MASS INDEX: 21.9 KG/M2 | DIASTOLIC BLOOD PRESSURE: 68 MMHG

## 2023-07-10 DIAGNOSIS — S42.018A CLOSED NONDISPLACED FRACTURE OF STERNAL END OF LEFT CLAVICLE, INITIAL ENCOUNTER: ICD-10-CM

## 2023-07-10 DIAGNOSIS — E03.9 HYPOTHYROIDISM, UNSPECIFIED TYPE: ICD-10-CM

## 2023-07-10 DIAGNOSIS — S42.035A CLOSED NONDISPLACED FRACTURE OF ACROMIAL END OF LEFT CLAVICLE, INITIAL ENCOUNTER: Primary | ICD-10-CM

## 2023-07-10 DIAGNOSIS — M89.8X1 PAIN OF LEFT CLAVICLE: ICD-10-CM

## 2023-07-10 LAB — TSH SERPL DL<=0.05 MIU/L-ACNC: 0.49 UIU/ML (ref 0.45–4.5)

## 2023-07-10 PROCEDURE — 73000 X-RAY EXAM OF COLLAR BONE: CPT

## 2023-07-10 PROCEDURE — 23500 CLTX CLAVICULAR FX W/O MNPJ: CPT | Performed by: ORTHOPAEDIC SURGERY

## 2023-07-10 PROCEDURE — 84443 ASSAY THYROID STIM HORMONE: CPT

## 2023-07-10 PROCEDURE — 36415 COLL VENOUS BLD VENIPUNCTURE: CPT

## 2023-07-10 PROCEDURE — 99204 OFFICE O/P NEW MOD 45 MIN: CPT | Performed by: ORTHOPAEDIC SURGERY

## 2023-07-10 NOTE — PROGRESS NOTES
Assessment  Diagnoses and all orders for this visit:    Closed nondisplaced fracture of acromial end of left clavicle, initial encounter        Discussion and Plan:    ·  we discussed with the patient that this fracture does not meet the clinical criteria for open reduction internal fixation   · Sling for 4 weeks, new sling provided today  · May remove sling for gentle range of motion elbow, hand, and wrist  · Tylenol, Advil, or Aleve for pain. · Follow up in 6 weeks with clavicle x-rays      Subjective:   Patient ID: June A Vale Moritz is a 80 y.o. female      HPI  The patient presents with a chief complaint of left shoulder pain. The pain began 4 day(s) ago and is associated with an acute injury. Patient reports a fall 7/06/23. The patient describes the pain as aching, dull and sharp in intensity,  intermittent in timing, and localizes the pain to the  left clavicle. The pain is worse with movement and relieved by rest and a sling. The pain is not associated with numbness and tingling. The pain is not associated with constitutional symptoms. The patient is awoken at night by the pain. The patient was seen in the ED and referred here today for evaluation. The following portions of the patient's history were reviewed and updated as appropriate: allergies, current medications, past family history, past medical history, past social history, past surgical history and problem list.      Objective:  /68   Pulse 68   Ht 5' 2" (1.575 m)   Wt 54 kg (119 lb)   BMI 21.77 kg/m²       Left Shoulder Exam     Tenderness   The patient is experiencing tenderness in the clavicle. Other   Erythema: absent  Sensation: normal  Pulse: present     Comments:    Moderate ecchymosis  ROM and strength no tested secondary to fracture  NVI            Physical Exam  Vitals reviewed. Constitutional:       Appearance: She is well-developed. Eyes:      Pupils: Pupils are equal, round, and reactive to light. Pulmonary:      Effort: Pulmonary effort is normal.      Breath sounds: Normal breath sounds. Abdominal:      General: Abdomen is flat. There is no distension. Skin:     General: Skin is warm and dry. Neurological:      Mental Status: She is alert and oriented to person, place, and time. Psychiatric:         Behavior: Behavior normal.         Thought Content: Thought content normal.         Judgment: Judgment normal.       Fracture / Dislocation Treatment    Date/Time: 7/10/2023 2:45 PM    Performed by: Sofie Meade MD  Authorized by: Sofie Meade MD    Patient Location:  [facility]  [place] Protocol:  Consent: Verbal consent obtained. Consent given by: patient  Patient understanding: patient states understanding of the procedure being performed  Patient identity confirmed: verbally with patient      Injury location:  Sternoclavicular  Location details:  Left clavicle  Injury type:  Fracture  Neurovascular status: Neurovascularly intact    Manipulation performed?: No    Immobilization:  Sling  Neurovascular status: Neurovascularly intact          I have personally reviewed pertinent films in PACS and my interpretation is as follows. Left clavicle x-rays demonstrates displaced distal clavicle fracture remain is stable alignment.      Scribe Attestation    I,:  Olga Lucas am acting as a scribe while in the presence of the attending physician.:       I,:  Sofie Meade MD personally performed the services described in this documentation    as scribed in my presence.:

## 2023-07-13 ENCOUNTER — OFFICE VISIT (OUTPATIENT)
Dept: CARDIOLOGY CLINIC | Facility: CLINIC | Age: 88
End: 2023-07-13
Payer: COMMERCIAL

## 2023-07-13 VITALS
WEIGHT: 120.6 LBS | HEART RATE: 67 BPM | DIASTOLIC BLOOD PRESSURE: 64 MMHG | OXYGEN SATURATION: 97 % | HEIGHT: 62 IN | RESPIRATION RATE: 18 BRPM | SYSTOLIC BLOOD PRESSURE: 138 MMHG | BODY MASS INDEX: 22.19 KG/M2

## 2023-07-13 DIAGNOSIS — I48.0 PAROXYSMAL ATRIAL FIBRILLATION (HCC): ICD-10-CM

## 2023-07-13 DIAGNOSIS — I10 ESSENTIAL HYPERTENSION: ICD-10-CM

## 2023-07-13 DIAGNOSIS — I34.0 NONRHEUMATIC MITRAL VALVE REGURGITATION: ICD-10-CM

## 2023-07-13 DIAGNOSIS — E78.5 HYPERLIPIDEMIA, UNSPECIFIED HYPERLIPIDEMIA TYPE: Primary | ICD-10-CM

## 2023-07-13 DIAGNOSIS — I07.1 TRICUSPID VALVE INSUFFICIENCY, UNSPECIFIED ETIOLOGY: ICD-10-CM

## 2023-07-13 PROCEDURE — 99214 OFFICE O/P EST MOD 30 MIN: CPT | Performed by: INTERNAL MEDICINE

## 2023-07-13 RX ORDER — BENAZEPRIL HYDROCHLORIDE 40 MG/1
40 TABLET, FILM COATED ORAL DAILY
Qty: 30 TABLET | Refills: 6 | Status: SHIPPED | OUTPATIENT
Start: 2023-07-13 | End: 2023-07-18 | Stop reason: SDUPTHER

## 2023-07-13 RX ORDER — AMLODIPINE BESYLATE 2.5 MG/1
2.5 TABLET ORAL DAILY
Qty: 30 TABLET | Refills: 6 | Status: SHIPPED | OUTPATIENT
Start: 2023-07-13 | End: 2023-07-18 | Stop reason: SDUPTHER

## 2023-07-13 RX ORDER — GABAPENTIN 100 MG/1
100 CAPSULE ORAL DAILY
COMMUNITY

## 2023-07-13 NOTE — PROGRESS NOTES
Follow-up - Cardiology   June A Weddermann 80 y.o. female MRN: [id number]        Problems    1. Hyperlipidemia, unspecified hyperlipidemia type        2. Paroxysmal atrial fibrillation (HCC)        3. Nonrheumatic mitral valve regurgitation        4. Tricuspid valve insufficiency, unspecified etiology        5. 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    History of ascending aortic aneurysm  Status post repair in 2015  4.7 cm by last chest CT 1/23, primarily related to the arch and descending aorta, but at 89 she is not a candidate for repair    Hyperlipidemia  No statin considering advanced age    Edema  My suspicion that this was caused by her significant anemia in the presence of recent GI bleed which has resolved, hemoglobin up to 11.7, no longer on furosemide, and currently only mild ankle swelling which may be related to chronic amlodipine use    Dyspnea on exertion  Chronic, possibly due to diastolic dysfunction grade 2, dilated left atrium    Plan:    She is still dealing with a little bit of ankle edema  The more concerning leg edema in the presence of her GI bleed and anemia and likely third spacing has improved  She is no longer taking furosemide  She is still on amlodipine 5 mg which may be implicated in the ankle swelling. We will discontinue Lotrel, replace it with amlodipine 2.5 mg for an effective 50% dose reduction, and increase her benazepril to 40 mg.  I will have her follow-up in a few months.       HPI:   Buffy Bundy is a 80y.o. year old female  With ascending aortic aneurysm, status post repair, with significant asthma, hypertension, mild hyperlipidemia, prior normal cardiac catheterization in 2015, paroxysmal atrial fibrillation without any recent recurrence, not on anticoagulation or aspirin due to nasopharyngeal cancer, and more recently a GI bleed that resulted in significant anemia, which likely led to peripheral edema/third spacing, for which she was on furosemide temporarily but has since discontinued. Her hemoglobin is back to 11.7. She still takes amlodipine 5 mg and benazepril 20 mg, metoprolol as well. Possible the amlodipine is the cause of her leg swelling. She recently fell, mechanical fall while trying to open a screen door, had a clavicular fracture, arm currently in a sling, with extensive bruising over the left shoulder. She is also in significant grief currently, with the loss of her granddaughter who recently committed suicide. Review of Systems   Constitutional: Negative for appetite change, diaphoresis, fatigue and fever. Respiratory: Negative for chest tightness, shortness of breath and wheezing. Cardiovascular: Positive for leg swelling. Negative for chest pain and palpitations. Gastrointestinal: Negative for abdominal pain and blood in stool. Musculoskeletal: Positive for arthralgias and joint swelling. Skin: Negative for rash. Neurological: Negative for dizziness, syncope and light-headedness.          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[facility])    • Disease of thyroid gland    • Facet arthropathy, cervical     last assessed -    • History of fracture of vertebral column    • Hyperlipidemia    • Hypertension    • 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[address])    • Hypoxia     last assessed -    • Junctional rhythm     last assessed - 2017   • Lightheadedness     last assessed - 2016   • Migraine    • Palpitations     last assessed - 2016   • Pleural effusion, bilateral     last assessed - 2015   • 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HCC)    • Shortness of breath     last assessed - 2017   • Thyroid trouble    • Trigger finger     last assessed -    • Trigger middle finger of right hand     last assessed - 2016   • Urinary incontinence     last assessed -  ; Resolved - [id number]     Social History     Substance and Sexual Activity   Alcohol Use Not Currently    Comment: Social drinker     Social History     Substance and Sexual Activity   Drug Use No     Social History     Tobacco Use   Smoking Status Former   • Packs/day: 0.25   • Years: 57.00   • Total pack years: 14.25   • Types: Cigarettes   • Start date: 36   • Quit date: 2014   • Years since quittin.5   Smokeless Tobacco Former   Tobacco Comments    smoked 17 yrs /2 ppd quit and restarted then quit  10 days ago during 2014        Allergies: Allergies   Allergen Reactions   • Amiodarone Hives   • Omnipaque [Iohexol] Hives and Shortness Of Breath   • Clindamycin Other (See Comments)     When taken causes cdiff immediately   • Other    • Sulfa Antibiotics Hives     itching   • Acetazolamide Hives and Rash     Reaction Date:Unknown   • Iv Dye  [Iodinated Contrast Media] Hypertension and Palpitations     [facility] - [id number]: Verified with patient    • Naprosyn [Naproxen] Itching and Rash     Category: Allergy;         Medications:     Current Outpatient Medications:   •  albuterol (2.5 mg/3 mL) 0.083 % nebulizer solution, Take 2.5 mg by nebulization every 6 (six) hours as needed for wheezing or shortness of breath, Disp: , Rfl:   •  albuterol (PROVENTIL HFA,VENTOLIN HFA) 90 mcg/act inhaler, Inhale 2 puffs every 6 (six) hours as needed for wheezing, Disp: 25.5 g, Rfl: 3  •  amLODIPine (NORVASC) 2.5 mg tablet, Take 1 tablet (2.5 mg total) by mouth daily, Disp: 30 tablet, Rfl: 6  •  benazepril (LOTENSIN) 40 MG tablet, Take 1 tablet (40 mg total) by mouth daily, Disp: 30 tablet, Rfl: 6  •  calcium carbonate (OS-TAE) 600 MG tablet, Take 600 mg by mouth daily in the early morning  , Disp: , Rfl:   •  gabapentin (NEURONTIN) 100 mg capsule, Take 100 mg by mouth daily At night, Disp: , Rfl:   •  levothyroxine 125 mcg tablet, Take 1 tablet (125 mcg total) by mouth daily in the early morning, Disp: 90 tablet, Rfl: 3  •  lovastatin (MEVACOR) 20 mg tablet, TAKE ONE TABLET BY MOUTH IN THE MORNING, Disp: 90 tablet, Rfl: 3  •  metoprolol succinate (TOPROL-XL) 25 mg 24 hr tablet, Take 1 tablet (25 mg total) by mouth daily, Disp: 90 tablet, Rfl: 3  •  Multiple Vitamins-Minerals (One Daily Multivitamin Women) TABS, Apply 1 tablet to the mouth or throat daily. Indications: Treatment to Prevent Vitamin Deficiency, Disp: , Rfl:   •  pantoprazole (PROTONIX) 40 mg tablet, Take 1 tablet (40 mg total) by mouth 2 (two) times a day before meals, Disp: 180 tablet, Rfl: 0  •  Diclofenac Sodium (VOLTAREN) 1 %, Apply 2 g topically 4 (four) times a day (Patient not taking: Reported on 7/13/2023), Disp: 700 g, Rfl: 2  •  HYDROcodone-acetaminophen (Norco) 5-325 mg per tablet, Take 1 tablet by mouth every 6 (six) hours as needed for pain for up to 10 days Max Daily Amount: 4 tablets (Patient not taking: Reported on 7/13/2023), Disp: 12 tablet, Rfl: 0      Vitals:    07/13/23 0905   BP: 138/64   Pulse: 67   Resp: 18   SpO2: 97%     Weight (last 2 days)     Date/Time Weight    07/13/23 0905 54.7 (120.6)        Physical Exam  Constitutional:       General: She is not in acute distress.      Appearance: She is not diaphoretic. HENT:      Head: Normocephalic and atraumatic. Eyes:      General: No scleral icterus. Conjunctiva/sclera: Conjunctivae normal.   Neck:      Vascular: No JVD. Cardiovascular:      Rate and Rhythm: Normal rate and regular rhythm. Heart sounds: Normal heart sounds. No murmur heard. Pulmonary:      Effort: Pulmonary effort is normal. No respiratory distress. Breath sounds: Normal breath sounds. No wheezing, rhonchi or rales. Musculoskeletal:         General: No tenderness. Cervical back: Normal range of motion. Right lower leg: Normal. No edema. Left lower leg: Normal. No edema. Skin:     General: Skin is warm and dry.            Laboratory Studies:  Lab Results   Component Value Date    HGBA1C 5.7 (H) 10/12/2020    HGBA1C 5.8 (H) 04/09/2020    HGBA1C 5.9 06/05/2019    HGBA1C 6.2 (H) 02/05/2015     (L) 06/16/2015     06/11/2015     06/07/2015    K 4.1 04/28/2023    K 4.0 04/10/2023    K 4.1 03/20/2023    K 4.1 06/16/2015    K 4.1 06/11/2015    K 3.9 06/07/2015     04/28/2023     04/10/2023     03/20/2023    CL 97 (L) 06/16/2015     06/11/2015     06/07/2015    CO2 29 04/28/2023    CO2 33 (H) 04/10/2023    CO2 30 03/20/2023    CO2 30 06/16/2015    CO2 28 06/11/2015    CO2 27 06/07/2015    GLUCOSE 138 06/16/2015    GLUCOSE 180 (H) 06/11/2015    GLUCOSE 97 06/08/2015    CREATININE 0.95 04/28/2023    CREATININE 0.95 04/10/2023    CREATININE 0.94 03/20/2023    CREATININE 1.07 06/16/2015    CREATININE 0.93 06/11/2015    CREATININE 0.66 06/07/2015    BUN 35 (H) 04/28/2023    BUN 39 (H) 04/10/2023    BUN 31 (H) 03/20/2023    BUN 23 06/16/2015    BUN 18 06/11/2015    BUN 13 06/07/2015    MG 2.3 05/18/2021    MG 2.1 02/22/2016    MG 1.9 06/07/2015    MG 1.7 06/06/2015    MG 1.8 04/09/2015    PHOS 3.3 06/07/2015     Lab Results   Component Value Date    WBC 6.45 04/28/2023    WBC 7.65 06/16/2015    RBC 4.11 04/28/2023    RBC 4.16 06/16/2015    HGB 11.7 04/28/2023    HGB 9.1 (L) 06/16/2015    HCT 37.1 04/28/2023    HCT 29.9 (L) 06/16/2015    MCV 90 04/28/2023    MCV 72 (L) 06/16/2015    MCH 28.5 04/28/2023    MCH 21.9 (L) 06/16/2015    RDW 14.3 04/28/2023    RDW 17.2 (H) 06/16/2015     04/28/2023     06/16/2015     NT-proBNP: No results for input(s): "NTBNP" in the last 72 hours. Coags:    Lipid Profile:   Lab Results   Component Value Date    CHOL 154 06/08/2015     Lab Results   Component Value Date    HDL 76 11/13/2021     Lab Results   Component Value Date    LDLCALC 86 11/13/2021     Lab Results   Component Value Date    TRIG 53 11/13/2021       Cardiac testing:   EKG reviewed personally:    No results found for this visit on 07/13/23. Echocardiogram   12/12/2017-LVEF 55%, normal wall thickness, grade 1 DD, trace MR, trace AI  12/19-EF normal, grade 1 diastolic dysfunction, mild AI  2/23-EF normal, grade 2 diastolic dysfunction, moderately dilated left atrium    Angelica Allen MD    Portions of the record may have been created with voice recognition software.  Occasional wrong word or "sound a like" substitutions may have occurred due to the inherent limitations of voice recognition software.  Read the chart carefully and recognize, using context, where substitutions have occurred.

## 2023-07-18 ENCOUNTER — OFFICE VISIT (OUTPATIENT)
Dept: PULMONOLOGY | Facility: CLINIC | Age: 88
End: 2023-07-18
Payer: COMMERCIAL

## 2023-07-18 VITALS
WEIGHT: 120 LBS | HEIGHT: 62 IN | BODY MASS INDEX: 22.08 KG/M2 | HEART RATE: 65 BPM | TEMPERATURE: 97.9 F | SYSTOLIC BLOOD PRESSURE: 132 MMHG | DIASTOLIC BLOOD PRESSURE: 74 MMHG | OXYGEN SATURATION: 96 %

## 2023-07-18 DIAGNOSIS — R05.3 CHRONIC COUGH: ICD-10-CM

## 2023-07-18 DIAGNOSIS — I10 ESSENTIAL HYPERTENSION: ICD-10-CM

## 2023-07-18 DIAGNOSIS — J45.40 MODERATE PERSISTENT ASTHMA WITHOUT COMPLICATION: ICD-10-CM

## 2023-07-18 DIAGNOSIS — R06.09 DOE (DYSPNEA ON EXERTION): Primary | ICD-10-CM

## 2023-07-18 PROCEDURE — 99214 OFFICE O/P EST MOD 30 MIN: CPT | Performed by: INTERNAL MEDICINE

## 2023-07-18 RX ORDER — AMLODIPINE BESYLATE 2.5 MG/1
2.5 TABLET ORAL DAILY
Qty: 90 TABLET | Refills: 3 | Status: SHIPPED | OUTPATIENT
Start: 2023-07-18

## 2023-07-18 RX ORDER — BENAZEPRIL HYDROCHLORIDE 40 MG/1
40 TABLET, FILM COATED ORAL DAILY
Qty: 90 TABLET | Refills: 3 | Status: SHIPPED | OUTPATIENT
Start: 2023-07-18

## 2023-07-18 NOTE — PROGRESS NOTES
Office Progress Note - Pulmonary    June A Weddermann 80 y.o. female MRN: [id number]    Encounter: [id number]      Assessment:  • Bronchial asthma. • Dyspnea on exertion. • Recent fall with left clavicular fracture and rib bruising. • Chronic cough. Plan:   • Albuterol rescue inhaler 2 inhalations 4 times a day as needed. • Regular walks to improve stamina. • Follow-up in 6 months. Discussion:   The patient's asthma is in remission. I have maintained her on the albuterol rescue inhaler 2 inhalations 4 times a day as needed. Her chronic cough is better. It is multifactorial.  Worse with allergies. Her dyspnea on exertion is due to deconditioning. I have encouraged the patient to take regular walks even short walks multiple times a day to improve her stamina. I have encouraged her to take precautions to prevent falls. I will see her in 6 months. Subjective: The patient is here for a follow-up visit. About 2 weeks ago she fell after she was hit by a storm door and fractured her left clavicle. She has her left arm in the sling. She still experiences pain with movement and at night when she sleeps. Her cough is better. She stopped using the Advair. She has occasional need to use the albuterol rescue inhaler. He has dyspnea on exertion. She noticed that the dyspnea on exertion has worsened lately. Review of systems:  A 12 point system review is done and aside from what is stated above the rest of the review of systems is negative. Family history and social history are reviewed. Medications list is reviewed. Vitals: Blood pressure 132/74, pulse 65, temperature 97.9 °F (36.6 °C), temperature source Tympanic, height 5' 2" (1.575 m), weight 54.4 kg (120 lb), SpO2 96 %. ,     Physical Exam  Gen: Awake, alert, oriented x 3, no acute distress  HEENT: Mucous membranes moist, no oral lesions, no thrush  NECK: No accessory muscle use, JVP not elevated  Cardiac: Regular, single S1, single S2, no murmurs, no rubs, no gallops  Lungs: Decreased breath sounds. No wheezing or rhonchi. Abdomen: normoactive bowel sounds, soft nontender, nondistended, no rebound or rigidity, no guarding  Extremities: no cyanosis, no clubbing, no edema  Neuro:  Grossly nonfocal.  Skin:  No rash. X-ray of the left clavicle is reviewed on the PACS system. She has distal clavicular fracture.     Lab Results   Component Value Date    WBC 6.45 04/28/2023    HGB 11.7 04/28/2023    HCT 37.1 04/28/2023    MCV 90 04/28/2023     04/28/2023     Lab Results   Component Value Date    SODIUM 136 04/28/2023    K 4.1 04/28/2023     04/28/2023    CO2 29 04/28/2023    BUN 35 (H) 04/28/2023    CREATININE 0.95 04/28/2023    GLUC 93 02/08/2023    CALCIUM 9.4 04/28/2023

## 2023-07-25 ENCOUNTER — OFFICE VISIT (OUTPATIENT)
Dept: FAMILY MEDICINE CLINIC | Facility: CLINIC | Age: 88
End: 2023-07-25
Payer: COMMERCIAL

## 2023-07-25 VITALS
BODY MASS INDEX: 21.77 KG/M2 | WEIGHT: 119 LBS | TEMPERATURE: 98.9 F | SYSTOLIC BLOOD PRESSURE: 128 MMHG | DIASTOLIC BLOOD PRESSURE: 72 MMHG | RESPIRATION RATE: 16 BRPM | HEART RATE: 73 BPM | OXYGEN SATURATION: 96 %

## 2023-07-25 DIAGNOSIS — E03.9 HYPOTHYROIDISM, UNSPECIFIED TYPE: Primary | ICD-10-CM

## 2023-07-25 PROCEDURE — 99213 OFFICE O/P EST LOW 20 MIN: CPT | Performed by: FAMILY MEDICINE

## 2023-07-25 NOTE — PROGRESS NOTES
Name: Sony Andersen      : 1932      MRN: [id number]  Encounter Provider: Mckay Nolasco DO  Encounter Date: 2023   Encounter department: [address] [place] [address]   Patient is 27-year-old female seen for follow-up of hypothyroidism. She is concerned because she is still having hair loss even though her TSH is in range. I am hesitant to increase the dose  1. Hypothyroidism, unspecified type  -     TSH, 3rd generation with Free T4 reflex; Future; Expected date: 2023           Subjective      Chief Complaint   Patient presents with   • Follow-up     Chronic conditions       Esther is here for follow up of chronic medical conditions. She is still concerned about hair loss. Cardiology changed her BP meds. Paresthesias in hand have diminished on 100 mg gabapentin. Wants to stay on this dose. Left ear feeling full since she had full    Review of Systems   Constitutional: Negative for chills and fever. HENT: Negative for congestion and sore throat. As in HPI   Respiratory: Negative for chest tightness. Cardiovascular: Negative for chest pain and palpitations. Gastrointestinal: Negative for abdominal pain, constipation, diarrhea and nausea. Genitourinary: Negative for difficulty urinating. Musculoskeletal: Positive for arthralgias. Skin: Negative. Neurological: Positive for numbness. Negative for dizziness and headaches. Psychiatric/Behavioral: Negative.         Current Outpatient Medications on File Prior to Visit   Medication Sig   • albuterol (2.5 mg/3 mL) 0.083 % nebulizer solution Take 2.5 mg by nebulization every 6 (six) hours as needed for wheezing or shortness of breath   • albuterol (PROVENTIL HFA,VENTOLIN HFA) 90 mcg/act inhaler Inhale 2 puffs every 6 (six) hours as needed for wheezing   • amLODIPine (NORVASC) 2.5 mg tablet Take 1 tablet (2.5 mg total) by mouth daily   • benazepril (LOTENSIN) 40 MG tablet Take 1 tablet (40 mg total) by mouth daily   • calcium carbonate (OS-TAE) 600 MG tablet Take 600 mg by mouth daily in the early morning     • Diclofenac Sodium (VOLTAREN) 1 % Apply 2 g topically 4 (four) times a day   • gabapentin (NEURONTIN) 100 mg capsule Take 100 mg by mouth daily At night   • levothyroxine 125 mcg tablet Take 1 tablet (125 mcg total) by mouth daily in the early morning   • lovastatin (MEVACOR) 20 mg tablet TAKE ONE TABLET BY MOUTH IN THE MORNING   • metoprolol succinate (TOPROL-XL) 25 mg 24 hr tablet Take 1 tablet (25 mg total) by mouth daily   • Multiple Vitamins-Minerals (One Daily Multivitamin Women) TABS Apply 1 tablet to the mouth or throat daily. Indications: Treatment to Prevent Vitamin Deficiency   • pantoprazole (PROTONIX) 40 mg tablet Take 1 tablet (40 mg total) by mouth 2 (two) times a day before meals       Objective     /72 (BP Location: Right arm, Patient Position: Sitting, Cuff Size: Child)   Pulse 73   Temp 98.9 °F (37.2 °C) (Temporal)   Resp 16   Wt 54 kg (119 lb)   SpO2 96%   BMI 21.77 kg/m²     Physical Exam  Vitals and nursing note reviewed. Constitutional:       General: She is not in acute distress. HENT:      Head: Normocephalic. Right Ear: Tympanic membrane normal.      Left Ear: Tympanic membrane normal.   Neck:      Thyroid: No thyromegaly. Cardiovascular:      Rate and Rhythm: Normal rate and regular rhythm. Heart sounds: Normal heart sounds. Pulmonary:      Effort: Pulmonary effort is normal.      Breath sounds: Normal breath sounds. Musculoskeletal:      Right lower leg: No edema. Left lower leg: No edema. Lymphadenopathy:      Cervical: No cervical adenopathy. Skin:     General: Skin is warm and dry. Neurological:      Mental Status: She is alert and oriented to person, place, and time.        Mila Painting DO

## 2023-08-11 ENCOUNTER — RA CDI HCC (OUTPATIENT)
Dept: OTHER | Facility: HOSPITAL | Age: 88
End: 2023-08-11

## 2023-08-21 ENCOUNTER — OFFICE VISIT (OUTPATIENT)
Dept: OBGYN CLINIC | Facility: OTHER | Age: 88
End: 2023-08-21

## 2023-08-21 ENCOUNTER — APPOINTMENT (OUTPATIENT)
Dept: RADIOLOGY | Facility: OTHER | Age: 88
End: 2023-08-21
Payer: COMMERCIAL

## 2023-08-21 VITALS
BODY MASS INDEX: 21.35 KG/M2 | HEART RATE: 61 BPM | SYSTOLIC BLOOD PRESSURE: 126 MMHG | WEIGHT: 116 LBS | DIASTOLIC BLOOD PRESSURE: 62 MMHG | HEIGHT: 62 IN

## 2023-08-21 DIAGNOSIS — S42.035A CLOSED NONDISPLACED FRACTURE OF ACROMIAL END OF LEFT CLAVICLE, INITIAL ENCOUNTER: ICD-10-CM

## 2023-08-21 DIAGNOSIS — S42.035D CLOSED NONDISPLACED FRACTURE OF ACROMIAL END OF LEFT CLAVICLE WITH ROUTINE HEALING, SUBSEQUENT ENCOUNTER: Primary | ICD-10-CM

## 2023-08-21 PROCEDURE — 73000 X-RAY EXAM OF COLLAR BONE: CPT

## 2023-08-21 PROCEDURE — 99024 POSTOP FOLLOW-UP VISIT: CPT | Performed by: ORTHOPAEDIC SURGERY

## 2023-08-21 NOTE — PROGRESS NOTES
Assessment  Diagnoses and all orders for this visit:    Closed nondisplaced fracture of acromial end of left clavicle with routine healing, subsequent encounter    Discussion and Plan:    · Patient is doing well 7 weeks s/p distal clavicle fracture sustained on 7/6/23. X Rays show early callus/healing formation present. · She was instructed to discontinue use of sling  · May gradually return to normal activities as tolerated with modifications to avoid pain. · Follow up on an as needed basis    Subjective:   Patient ID: Buffy Davalos is a 80 y.o. female    Patient presents today 7 weeks s/p distal clavicle fracture sustained on 7/6/23. She reports that she is doing well and her pain is well controlled. She states that she avoid the activities that exacerbate her pain. She uses the sling for comfort only when she is having a "bad day". Overall, she is happy with her progress so far. No numbness or tingling. No fevers or chills. The following portions of the patient's history were reviewed and updated as appropriate: allergies, current medications, past family history, past medical history, past social history, past surgical history and problem list.    Objective:  /62 (BP Location: Right arm, Patient Position: Sitting, Cuff Size: Adult)   Pulse 61   Ht 5' 2" (1.575 m) Comment: verbal  Wt 52.6 kg (116 lb)   BMI 21.22 kg/m²       Left Shoulder Exam     Range of Motion   The patient has normal left shoulder ROM. Other   Erythema: absent  Sensation: normal  Pulse: present     Comments:  Non tender to palpation about the clavicle. Physical Exam  Constitutional:       Appearance: She is well-developed. Eyes:      Pupils: Pupils are equal, round, and reactive to light. Pulmonary:      Effort: Pulmonary effort is normal.      Breath sounds: Normal breath sounds. Skin:     General: Skin is warm and dry.    Neurological:      Mental Status: She is alert and oriented to person, place, and time. Psychiatric:         Behavior: Behavior normal.         Thought Content: Thought content normal.         Judgment: Judgment normal.           I have personally reviewed pertinent films in PACS and my interpretation is as follows. X Ray Left Clavicle 8/21/23: Stable alignment and position of distal clavicle fracture with early healing/callus formation present.      Scribe Attestation    I,:  Stephan Lai am acting as a scribe while in the presence of the attending physician.:       I,:  Pieter Gerber MD personally performed the services described in this documentation    as scribed in my presence.:

## 2023-08-22 ENCOUNTER — ESTABLISHED COMPREHENSIVE EXAM (OUTPATIENT)
Dept: URBAN - METROPOLITAN AREA CLINIC 6 | Facility: CLINIC | Age: 88
End: 2023-08-22

## 2023-08-22 DIAGNOSIS — H35.3230: ICD-10-CM

## 2023-08-22 DIAGNOSIS — Z96.1: ICD-10-CM

## 2023-08-22 DIAGNOSIS — H04.123: ICD-10-CM

## 2023-08-22 PROCEDURE — 92134 CPTRZ OPH DX IMG PST SGM RTA: CPT

## 2023-08-22 PROCEDURE — 92014 COMPRE OPH EXAM EST PT 1/>: CPT

## 2023-08-22 ASSESSMENT — TONOMETRY
OD_IOP_MMHG: 08
OS_IOP_MMHG: 07

## 2023-08-22 ASSESSMENT — VISUAL ACUITY
OS_CC: 20/40
OD_CC: J2
OS_CC: J2
OD_CC: 20/30-1

## 2023-08-23 DIAGNOSIS — M54.12 CERVICAL RADICULOPATHY: Primary | ICD-10-CM

## 2023-08-23 DIAGNOSIS — M48.02 SPINAL STENOSIS OF CERVICAL REGION: ICD-10-CM

## 2023-08-23 DIAGNOSIS — R20.2 ARM PARESTHESIA, RIGHT: ICD-10-CM

## 2023-08-23 NOTE — TELEPHONE ENCOUNTER
Pt left voicemail stating she was not taking the gabapentin 100 that she didn't need it but now stated she does need it and had asked if you could please fill that for her.

## 2023-08-24 RX ORDER — GABAPENTIN 100 MG/1
100 CAPSULE ORAL
Qty: 90 CAPSULE | Refills: 0 | Status: SHIPPED | OUTPATIENT
Start: 2023-08-24

## 2023-09-25 DIAGNOSIS — M19.041 OSTEOARTHRITIS OF RIGHT MIDDLE FINGER: ICD-10-CM

## 2023-09-28 DIAGNOSIS — E03.9 HYPOTHYROIDISM, UNSPECIFIED TYPE: ICD-10-CM

## 2023-09-28 RX ORDER — LEVOTHYROXINE SODIUM 0.12 MG/1
125 TABLET ORAL
Qty: 90 TABLET | Refills: 3 | Status: SHIPPED | OUTPATIENT
Start: 2023-09-28

## 2023-10-13 ENCOUNTER — ANESTHESIA EVENT (INPATIENT)
Dept: GASTROENTEROLOGY | Facility: HOSPITAL | Age: 88
End: 2023-10-13
Payer: COMMERCIAL

## 2023-10-13 ENCOUNTER — HOSPITAL ENCOUNTER (INPATIENT)
Facility: HOSPITAL | Age: 88
LOS: 1 days | Discharge: HOME | End: 2023-10-14
Attending: EMERGENCY MEDICINE | Admitting: INTERNAL MEDICINE
Payer: COMMERCIAL

## 2023-10-13 ENCOUNTER — ANESTHESIA (INPATIENT)
Dept: GASTROENTEROLOGY | Facility: HOSPITAL | Age: 88
End: 2023-10-13
Payer: COMMERCIAL

## 2023-10-13 ENCOUNTER — APPOINTMENT (EMERGENCY)
Dept: RADIOLOGY | Facility: HOSPITAL | Age: 88
End: 2023-10-13
Payer: COMMERCIAL

## 2023-10-13 ENCOUNTER — APPOINTMENT (INPATIENT)
Dept: GASTROENTEROLOGY | Facility: HOSPITAL | Age: 88
End: 2023-10-13
Payer: COMMERCIAL

## 2023-10-13 ENCOUNTER — APPOINTMENT (EMERGENCY)
Dept: CT IMAGING | Facility: HOSPITAL | Age: 88
End: 2023-10-13
Payer: COMMERCIAL

## 2023-10-13 DIAGNOSIS — M48.54XA: ICD-10-CM

## 2023-10-13 DIAGNOSIS — R20.2 ARM PARESTHESIA, RIGHT: ICD-10-CM

## 2023-10-13 DIAGNOSIS — M54.9 INTRACTABLE BACK PAIN: Primary | ICD-10-CM

## 2023-10-13 DIAGNOSIS — K22.89 ESOPHAGEAL PAIN: ICD-10-CM

## 2023-10-13 DIAGNOSIS — K26.9 DUODENUM ULCER: ICD-10-CM

## 2023-10-13 DIAGNOSIS — M35.3 POLYMYALGIA RHEUMATICA (HCC): ICD-10-CM

## 2023-10-13 DIAGNOSIS — R07.9 CHEST PAIN: ICD-10-CM

## 2023-10-13 DIAGNOSIS — S22.080A COMPRESSION FRACTURE OF T12 VERTEBRA, INITIAL ENCOUNTER (HCC): ICD-10-CM

## 2023-10-13 DIAGNOSIS — M54.12 CERVICAL RADICULOPATHY: ICD-10-CM

## 2023-10-13 DIAGNOSIS — M48.02 SPINAL STENOSIS OF CERVICAL REGION: ICD-10-CM

## 2023-10-13 PROBLEM — K59.09 OTHER CONSTIPATION: Status: ACTIVE | Noted: 2023-10-13

## 2023-10-13 PROBLEM — R07.89 OTHER CHEST PAIN: Status: ACTIVE | Noted: 2023-10-13

## 2023-10-13 LAB
2HR DELTA HS TROPONIN: 2 NG/L
4HR DELTA HS TROPONIN: 5 NG/L
ALBUMIN SERPL BCP-MCNC: 4.2 G/DL (ref 3.5–5)
ALP SERPL-CCNC: 51 U/L (ref 34–104)
ALT SERPL W P-5'-P-CCNC: 18 U/L (ref 7–52)
ANION GAP SERPL CALCULATED.3IONS-SCNC: 8 MMOL/L
APTT PPP: 33 SECONDS (ref 23–37)
AST SERPL W P-5'-P-CCNC: 23 U/L (ref 13–39)
ATRIAL RATE: 74 BPM
ATRIAL RATE: 75 BPM
ATRIAL RATE: 88 BPM
BASOPHILS # BLD AUTO: 0.06 THOUSANDS/ÂΜL (ref 0–0.1)
BASOPHILS NFR BLD AUTO: 1 % (ref 0–1)
BILIRUB SERPL-MCNC: 0.37 MG/DL (ref 0.2–1)
BUN SERPL-MCNC: 27 MG/DL (ref 5–25)
CALCIUM SERPL-MCNC: 8.8 MG/DL (ref 8.4–10.2)
CARDIAC TROPONIN I PNL SERPL HS: 10 NG/L
CARDIAC TROPONIN I PNL SERPL HS: 5 NG/L
CARDIAC TROPONIN I PNL SERPL HS: 7 NG/L
CHLORIDE SERPL-SCNC: 100 MMOL/L (ref 96–108)
CO2 SERPL-SCNC: 30 MMOL/L (ref 21–32)
CREAT SERPL-MCNC: 0.82 MG/DL (ref 0.6–1.3)
EOSINOPHIL # BLD AUTO: 0.33 THOUSAND/ÂΜL (ref 0–0.61)
EOSINOPHIL NFR BLD AUTO: 4 % (ref 0–6)
ERYTHROCYTE [DISTWIDTH] IN BLOOD BY AUTOMATED COUNT: 13.3 % (ref 11.6–15.1)
GFR SERPL CREATININE-BSD FRML MDRD: 62 ML/MIN/1.73SQ M
GLUCOSE SERPL-MCNC: 142 MG/DL (ref 65–140)
HCT VFR BLD AUTO: 39 % (ref 34.8–46.1)
HGB BLD-MCNC: 12.3 G/DL (ref 11.5–15.4)
IMM GRANULOCYTES # BLD AUTO: 0.01 THOUSAND/UL (ref 0–0.2)
IMM GRANULOCYTES NFR BLD AUTO: 0 % (ref 0–2)
INR PPP: 0.97 (ref 0.84–1.19)
LIPASE SERPL-CCNC: 32 U/L (ref 11–82)
LYMPHOCYTES # BLD AUTO: 3.28 THOUSANDS/ÂΜL (ref 0.6–4.47)
LYMPHOCYTES NFR BLD AUTO: 37 % (ref 14–44)
MCH RBC QN AUTO: 29.8 PG (ref 26.8–34.3)
MCHC RBC AUTO-ENTMCNC: 31.5 G/DL (ref 31.4–37.4)
MCV RBC AUTO: 94 FL (ref 82–98)
MONOCYTES # BLD AUTO: 0.94 THOUSAND/ÂΜL (ref 0.17–1.22)
MONOCYTES NFR BLD AUTO: 11 % (ref 4–12)
NEUTROPHILS # BLD AUTO: 4.21 THOUSANDS/ÂΜL (ref 1.85–7.62)
NEUTS SEG NFR BLD AUTO: 47 % (ref 43–75)
NRBC BLD AUTO-RTO: 0 /100 WBCS
P AXIS: 100 DEGREES
P AXIS: 55 DEGREES
P AXIS: 72 DEGREES
PLATELET # BLD AUTO: 199 THOUSANDS/UL (ref 149–390)
PMV BLD AUTO: 11.6 FL (ref 8.9–12.7)
POTASSIUM SERPL-SCNC: 4.2 MMOL/L (ref 3.5–5.3)
PR INTERVAL: 166 MS
PR INTERVAL: 182 MS
PR INTERVAL: 202 MS
PROT SERPL-MCNC: 7.1 G/DL (ref 6.4–8.4)
PROTHROMBIN TIME: 12.9 SECONDS (ref 11.6–14.5)
QRS AXIS: 103 DEGREES
QRS AXIS: 111 DEGREES
QRS AXIS: 75 DEGREES
QRSD INTERVAL: 74 MS
QRSD INTERVAL: 84 MS
QRSD INTERVAL: 84 MS
QT INTERVAL: 394 MS
QT INTERVAL: 396 MS
QT INTERVAL: 398 MS
QTC INTERVAL: 439 MS
QTC INTERVAL: 444 MS
QTC INTERVAL: 476 MS
RBC # BLD AUTO: 4.13 MILLION/UL (ref 3.81–5.12)
SODIUM SERPL-SCNC: 138 MMOL/L (ref 135–147)
T WAVE AXIS: 50 DEGREES
T WAVE AXIS: 61 DEGREES
T WAVE AXIS: 81 DEGREES
VENTRICULAR RATE: 74 BPM
VENTRICULAR RATE: 75 BPM
VENTRICULAR RATE: 88 BPM
WBC # BLD AUTO: 8.83 THOUSAND/UL (ref 4.31–10.16)

## 2023-10-13 PROCEDURE — 93010 ELECTROCARDIOGRAM REPORT: CPT | Performed by: INTERNAL MEDICINE

## 2023-10-13 PROCEDURE — 85610 PROTHROMBIN TIME: CPT

## 2023-10-13 PROCEDURE — C1726 CATH, BAL DIL, NON-VASCULAR: HCPCS

## 2023-10-13 PROCEDURE — 0D738ZZ DILATION OF LOWER ESOPHAGUS, VIA NATURAL OR ARTIFICIAL OPENING ENDOSCOPIC: ICD-10-PCS | Performed by: STUDENT IN AN ORGANIZED HEALTH CARE EDUCATION/TRAINING PROGRAM

## 2023-10-13 PROCEDURE — 36415 COLL VENOUS BLD VENIPUNCTURE: CPT | Performed by: EMERGENCY MEDICINE

## 2023-10-13 PROCEDURE — 99222 1ST HOSP IP/OBS MODERATE 55: CPT | Performed by: STUDENT IN AN ORGANIZED HEALTH CARE EDUCATION/TRAINING PROGRAM

## 2023-10-13 PROCEDURE — 85025 COMPLETE CBC W/AUTO DIFF WBC: CPT | Performed by: EMERGENCY MEDICINE

## 2023-10-13 PROCEDURE — 97166 OT EVAL MOD COMPLEX 45 MIN: CPT

## 2023-10-13 PROCEDURE — 99285 EMERGENCY DEPT VISIT HI MDM: CPT | Performed by: EMERGENCY MEDICINE

## 2023-10-13 PROCEDURE — 93005 ELECTROCARDIOGRAM TRACING: CPT

## 2023-10-13 PROCEDURE — 96374 THER/PROPH/DIAG INJ IV PUSH: CPT

## 2023-10-13 PROCEDURE — 99285 EMERGENCY DEPT VISIT HI MDM: CPT

## 2023-10-13 PROCEDURE — C9113 INJ PANTOPRAZOLE SODIUM, VIA: HCPCS | Performed by: INTERNAL MEDICINE

## 2023-10-13 PROCEDURE — 74174 CTA ABD&PLVS W/CONTRAST: CPT

## 2023-10-13 PROCEDURE — 71045 X-RAY EXAM CHEST 1 VIEW: CPT

## 2023-10-13 PROCEDURE — 85730 THROMBOPLASTIN TIME PARTIAL: CPT

## 2023-10-13 PROCEDURE — 43249 ESOPH EGD DILATION <30 MM: CPT | Performed by: STUDENT IN AN ORGANIZED HEALTH CARE EDUCATION/TRAINING PROGRAM

## 2023-10-13 PROCEDURE — 99223 1ST HOSP IP/OBS HIGH 75: CPT | Performed by: INTERNAL MEDICINE

## 2023-10-13 PROCEDURE — 97162 PT EVAL MOD COMPLEX 30 MIN: CPT

## 2023-10-13 PROCEDURE — 71275 CT ANGIOGRAPHY CHEST: CPT

## 2023-10-13 PROCEDURE — 96375 TX/PRO/DX INJ NEW DRUG ADDON: CPT

## 2023-10-13 PROCEDURE — 80053 COMPREHEN METABOLIC PANEL: CPT | Performed by: EMERGENCY MEDICINE

## 2023-10-13 PROCEDURE — 84484 ASSAY OF TROPONIN QUANT: CPT | Performed by: EMERGENCY MEDICINE

## 2023-10-13 PROCEDURE — 83690 ASSAY OF LIPASE: CPT | Performed by: INTERNAL MEDICINE

## 2023-10-13 RX ORDER — PROPOFOL 10 MG/ML
INJECTION, EMULSION INTRAVENOUS AS NEEDED
Status: DISCONTINUED | OUTPATIENT
Start: 2023-10-13 | End: 2023-10-13

## 2023-10-13 RX ORDER — SIMETHICONE 80 MG
80 TABLET,CHEWABLE ORAL 4 TIMES DAILY PRN
Status: DISCONTINUED | OUTPATIENT
Start: 2023-10-13 | End: 2023-10-14 | Stop reason: HOSPADM

## 2023-10-13 RX ORDER — SODIUM CHLORIDE, SODIUM GLUCONATE, SODIUM ACETATE, POTASSIUM CHLORIDE, MAGNESIUM CHLORIDE, SODIUM PHOSPHATE, DIBASIC, AND POTASSIUM PHOSPHATE .53; .5; .37; .037; .03; .012; .00082 G/100ML; G/100ML; G/100ML; G/100ML; G/100ML; G/100ML; G/100ML
75 INJECTION, SOLUTION INTRAVENOUS CONTINUOUS
Status: DISCONTINUED | OUTPATIENT
Start: 2023-10-13 | End: 2023-10-13

## 2023-10-13 RX ORDER — LISINOPRIL 20 MG/1
40 TABLET ORAL
Status: DISCONTINUED | OUTPATIENT
Start: 2023-10-13 | End: 2023-10-14 | Stop reason: HOSPADM

## 2023-10-13 RX ORDER — SODIUM CHLORIDE 9 MG/ML
125 INJECTION, SOLUTION INTRAVENOUS CONTINUOUS
Status: DISCONTINUED | OUTPATIENT
Start: 2023-10-13 | End: 2023-10-13

## 2023-10-13 RX ORDER — ALBUTEROL SULFATE 90 UG/1
2 AEROSOL, METERED RESPIRATORY (INHALATION) EVERY 4 HOURS PRN
Status: DISCONTINUED | OUTPATIENT
Start: 2023-10-13 | End: 2023-10-14 | Stop reason: HOSPADM

## 2023-10-13 RX ORDER — FENTANYL CITRATE 50 UG/ML
25 INJECTION, SOLUTION INTRAMUSCULAR; INTRAVENOUS ONCE
Status: COMPLETED | OUTPATIENT
Start: 2023-10-13 | End: 2023-10-13

## 2023-10-13 RX ORDER — HYDROMORPHONE HCL IN WATER/PF 6 MG/30 ML
0.2 PATIENT CONTROLLED ANALGESIA SYRINGE INTRAVENOUS EVERY 4 HOURS PRN
Status: DISCONTINUED | OUTPATIENT
Start: 2023-10-13 | End: 2023-10-14 | Stop reason: HOSPADM

## 2023-10-13 RX ORDER — ACETAMINOPHEN 325 MG/1
650 TABLET ORAL ONCE
Status: COMPLETED | OUTPATIENT
Start: 2023-10-13 | End: 2023-10-13

## 2023-10-13 RX ORDER — LIDOCAINE HYDROCHLORIDE 20 MG/ML
INJECTION, SOLUTION EPIDURAL; INFILTRATION; INTRACAUDAL; PERINEURAL AS NEEDED
Status: DISCONTINUED | OUTPATIENT
Start: 2023-10-13 | End: 2023-10-13

## 2023-10-13 RX ORDER — ENOXAPARIN SODIUM 100 MG/ML
40 INJECTION SUBCUTANEOUS DAILY
Status: DISCONTINUED | OUTPATIENT
Start: 2023-10-13 | End: 2023-10-14 | Stop reason: HOSPADM

## 2023-10-13 RX ORDER — LIDOCAINE 50 MG/G
1 PATCH TOPICAL ONCE
Status: COMPLETED | OUTPATIENT
Start: 2023-10-13 | End: 2023-10-13

## 2023-10-13 RX ORDER — ONDANSETRON 2 MG/ML
4 INJECTION INTRAMUSCULAR; INTRAVENOUS EVERY 6 HOURS PRN
Status: DISCONTINUED | OUTPATIENT
Start: 2023-10-13 | End: 2023-10-14 | Stop reason: HOSPADM

## 2023-10-13 RX ORDER — ACETAMINOPHEN 325 MG/1
650 TABLET ORAL EVERY 6 HOURS PRN
Status: DISCONTINUED | OUTPATIENT
Start: 2023-10-13 | End: 2023-10-14

## 2023-10-13 RX ORDER — ACETAMINOPHEN 160 MG/5ML
650 SUSPENSION ORAL EVERY 4 HOURS PRN
Status: DISCONTINUED | OUTPATIENT
Start: 2023-10-13 | End: 2023-10-13

## 2023-10-13 RX ORDER — DIPHENHYDRAMINE HYDROCHLORIDE 50 MG/ML
50 INJECTION INTRAMUSCULAR; INTRAVENOUS ONCE
Status: COMPLETED | OUTPATIENT
Start: 2023-10-13 | End: 2023-10-13

## 2023-10-13 RX ORDER — BISACODYL 5 MG/1
10 TABLET, DELAYED RELEASE ORAL DAILY
Status: DISCONTINUED | OUTPATIENT
Start: 2023-10-13 | End: 2023-10-13

## 2023-10-13 RX ORDER — KETOROLAC TROMETHAMINE 30 MG/ML
15 INJECTION, SOLUTION INTRAMUSCULAR; INTRAVENOUS ONCE
Status: COMPLETED | OUTPATIENT
Start: 2023-10-13 | End: 2023-10-13

## 2023-10-13 RX ORDER — POLYETHYLENE GLYCOL 3350 17 G/17G
17 POWDER, FOR SOLUTION ORAL DAILY
Status: DISCONTINUED | OUTPATIENT
Start: 2023-10-13 | End: 2023-10-14 | Stop reason: HOSPADM

## 2023-10-13 RX ORDER — LISINOPRIL 20 MG/1
40 TABLET ORAL DAILY
Status: DISCONTINUED | OUTPATIENT
Start: 2023-10-13 | End: 2023-10-13

## 2023-10-13 RX ORDER — METOPROLOL SUCCINATE 25 MG/1
25 TABLET, EXTENDED RELEASE ORAL DAILY
Status: DISCONTINUED | OUTPATIENT
Start: 2023-10-13 | End: 2023-10-14 | Stop reason: HOSPADM

## 2023-10-13 RX ORDER — ONDANSETRON 2 MG/ML
1 INJECTION INTRAMUSCULAR; INTRAVENOUS ONCE
Status: COMPLETED | OUTPATIENT
Start: 2023-10-13 | End: 2023-10-13

## 2023-10-13 RX ORDER — AMLODIPINE BESYLATE 2.5 MG/1
2.5 TABLET ORAL DAILY
Status: DISCONTINUED | OUTPATIENT
Start: 2023-10-13 | End: 2023-10-14 | Stop reason: HOSPADM

## 2023-10-13 RX ORDER — PANTOPRAZOLE SODIUM 40 MG/1
40 TABLET, DELAYED RELEASE ORAL
Status: DISCONTINUED | OUTPATIENT
Start: 2023-10-13 | End: 2023-10-13

## 2023-10-13 RX ORDER — PROPOFOL 10 MG/ML
INJECTION, EMULSION INTRAVENOUS CONTINUOUS PRN
Status: DISCONTINUED | OUTPATIENT
Start: 2023-10-13 | End: 2023-10-13

## 2023-10-13 RX ORDER — PANTOPRAZOLE SODIUM 40 MG/10ML
40 INJECTION, POWDER, LYOPHILIZED, FOR SOLUTION INTRAVENOUS EVERY 12 HOURS SCHEDULED
Status: DISCONTINUED | OUTPATIENT
Start: 2023-10-13 | End: 2023-10-14

## 2023-10-13 RX ORDER — MAGNESIUM HYDROXIDE/ALUMINUM HYDROXICE/SIMETHICONE 120; 1200; 1200 MG/30ML; MG/30ML; MG/30ML
30 SUSPENSION ORAL EVERY 6 HOURS PRN
Status: DISCONTINUED | OUTPATIENT
Start: 2023-10-13 | End: 2023-10-14 | Stop reason: HOSPADM

## 2023-10-13 RX ORDER — OXYCODONE HYDROCHLORIDE 5 MG/1
5 TABLET ORAL EVERY 6 HOURS PRN
Status: DISCONTINUED | OUTPATIENT
Start: 2023-10-13 | End: 2023-10-14 | Stop reason: HOSPADM

## 2023-10-13 RX ADMIN — FENTANYL CITRATE 25 MCG: 50 INJECTION INTRAMUSCULAR; INTRAVENOUS at 01:44

## 2023-10-13 RX ADMIN — PROPOFOL 30 MG: 10 INJECTION, EMULSION INTRAVENOUS at 16:01

## 2023-10-13 RX ADMIN — LIDOCAINE HYDROCHLORIDE 60 MG: 20 INJECTION, SOLUTION EPIDURAL; INFILTRATION; INTRACAUDAL at 15:51

## 2023-10-13 RX ADMIN — ACETAMINOPHEN 325MG 650 MG: 325 TABLET ORAL at 22:09

## 2023-10-13 RX ADMIN — LISINOPRIL 40 MG: 20 TABLET ORAL at 17:12

## 2023-10-13 RX ADMIN — ACETAMINOPHEN 650 MG: 650 SUSPENSION ORAL at 17:10

## 2023-10-13 RX ADMIN — Medication 1 TABLET: at 08:47

## 2023-10-13 RX ADMIN — PANTOPRAZOLE SODIUM 40 MG: 40 INJECTION, POWDER, FOR SOLUTION INTRAVENOUS at 22:08

## 2023-10-13 RX ADMIN — PROPOFOL 120 MCG/KG/MIN: 10 INJECTION, EMULSION INTRAVENOUS at 15:52

## 2023-10-13 RX ADMIN — METOPROLOL SUCCINATE 25 MG: 25 TABLET, EXTENDED RELEASE ORAL at 08:47

## 2023-10-13 RX ADMIN — LEVOTHYROXINE SODIUM 125 MCG: 25 TABLET ORAL at 07:00

## 2023-10-13 RX ADMIN — DIPHENHYDRAMINE HYDROCHLORIDE 50 MG: 50 INJECTION, SOLUTION INTRAMUSCULAR; INTRAVENOUS at 02:09

## 2023-10-13 RX ADMIN — SODIUM CHLORIDE, SODIUM GLUCONATE, SODIUM ACETATE, POTASSIUM CHLORIDE, MAGNESIUM CHLORIDE, SODIUM PHOSPHATE, DIBASIC, AND POTASSIUM PHOSPHATE 75 ML/HR: .53; .5; .37; .037; .03; .012; .00082 INJECTION, SOLUTION INTRAVENOUS at 09:12

## 2023-10-13 RX ADMIN — KETOROLAC TROMETHAMINE 15 MG: 30 INJECTION, SOLUTION INTRAMUSCULAR; INTRAVENOUS at 04:13

## 2023-10-13 RX ADMIN — IOHEXOL 65 ML: 350 INJECTION, SOLUTION INTRAVENOUS at 02:24

## 2023-10-13 RX ADMIN — PANTOPRAZOLE SODIUM 40 MG: 40 TABLET, DELAYED RELEASE ORAL at 07:00

## 2023-10-13 RX ADMIN — PROPOFOL 100 MG: 10 INJECTION, EMULSION INTRAVENOUS at 15:53

## 2023-10-13 RX ADMIN — ACETAMINOPHEN 325MG 650 MG: 325 TABLET ORAL at 04:59

## 2023-10-13 RX ADMIN — AMLODIPINE BESYLATE 2.5 MG: 2.5 TABLET ORAL at 08:47

## 2023-10-13 RX ADMIN — ALBUTEROL SULFATE 2 PUFF: 90 AEROSOL, METERED RESPIRATORY (INHALATION) at 21:55

## 2023-10-13 RX ADMIN — LIDOCAINE 1 PATCH: 700 PATCH TOPICAL at 05:00

## 2023-10-13 NOTE — PLAN OF CARE
Problem: GASTROINTESTINAL - ADULT  Goal: Maintains adequate nutritional intake  Description: INTERVENTIONS:  - Monitor percentage of each meal consumed  - Identify factors contributing to decreased intake, treat as appropriate  - Assist with meals as needed  - Monitor I&O, weight, and lab values if indicated  - Obtain nutrition services referral as needed  Outcome: Progressing     Problem: Knowledge Deficit  Goal: Patient/family/caregiver demonstrates understanding of disease process, treatment plan, medications, and discharge instructions  Description: Complete learning assessment and assess knowledge base.   Interventions:  - Provide teaching at level of understanding  - Provide teaching via preferred learning methods  Outcome: Progressing     Problem: PAIN - ADULT  Goal: Verbalizes/displays adequate comfort level or baseline comfort level  Description: Interventions:  - Encourage patient to monitor pain and request assistance  - Assess pain using appropriate pain scale  - Administer analgesics based on type and severity of pain and evaluate response  - Implement non-pharmacological measures as appropriate and evaluate response  - Consider cultural and social influences on pain and pain management  - Notify physician/advanced practitioner if interventions unsuccessful or patient reports new pain  Outcome: Progressing     Problem: SAFETY ADULT  Goal: Patient will remain free of falls  Description: INTERVENTIONS:  - Educate patient/family on patient safety including physical limitations  - Instruct patient to call for assistance with activity   - Consult OT/PT to assist with strengthening/mobility   - Keep Call bell within reach  - Keep bed low and locked with side rails adjusted as appropriate  - Keep care items and personal belongings within reach  - Initiate and maintain comfort rounds  - Make Fall Risk Sign visible to staff  - Offer Toileting every 2 Hours, in advance of need  - Initiate/Maintain bed alarm  - Obtain necessary fall risk management equipment: walker  - Apply yellow socks and bracelet for high fall risk patients  - Consider moving patient to room near nurses station  Outcome: Progressing

## 2023-10-13 NOTE — OCCUPATIONAL THERAPY NOTE
Occupational Therapy Evaluation     Patient Name: Buffy Ortiz  Today's Date: 10/13/2023  Problem List  Principal Problem:    Esophageal pain  Active Problems:    Essential hypertension    Paroxysmal atrial fibrillation (HCC)    Uncomplicated asthma    Atherosclerosis of aorta (HCC)    Postoperative hypothyroidism    Other constipation    Past Medical History  Past Medical History:   Diagnosis Date    A-fib ([address])     Allergy to IVP dye 06/04/2013    pt felt heaviness, palpitations    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), wet ([address])     bilateral    Aneurysm of aortic root ([address])     last assessed - [id number]    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HCC)     Aortic root dilatation (HCC)     last assessed - [id number]    Arthritis     Ascending aortic aneurysm ([address])     last assessed - [id number]    Asthma     Basal cell carcinoma     last assessed - 14Vau8445    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[address])     Disease of thyroid gland     Facet arthropathy, cervical     last assessed - [id number]    History of fracture of vertebral column     Hyperlipidemia     Hypertension     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[address])     Hypoxia     last assessed - [id number]    Junctional rhythm     last assessed - 71Uyj6365    Lightheadedness     last assessed - 18Eyd3053    Migraine     Palpitations     last assessed - 15Ids9153    Pleural effusion, bilateral     last assessed - 40Bjo7556    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HCC)     Shortness of breath     last assessed - 28Rlj8367    Thyroid trouble     Trigger finger     last assessed - 84QZE3691    Trigger middle finger of right hand     last assessed - 07Fsm0150    Urinary incontinence     last assessed -  22Jul,2013; Resolved - [id number]     Past Surgical History  Past Surgical History:   Procedure Laterality Date    ABDOMINAL AORTIC ANEURYSM REPAIR W/ ENDOLUMINAL GRAFT  06/28/2015    Ascending aorta and hemiarch replacement with 30 mm Vascutek Gelweave graft;  Managed by Christiano Staple; last assessed - [id number] APPENDECTOMY      BLADDER SURGERY      Reccurent histoy of bladder surgery    HOROWITZ PROCEDURE      CARDIAC CATHETERIZATION  2004    Procedure summary - Luminal irregularities; last assessed - [id number]     SECTION      CORONARY ANEURYSM REPAIR      CYSTOSCOPY      botox injection    HYSTERECTOMY      [place] NDSC NJX IMPLT MATRL URT&/BLDR NCK N/A 2017    Procedure: CYSTOSCOPY; [place] BULKING AGENT INJECTION ;  Surgeon: Michael Rubin MD;  Location: [facility];  Service: Urology    [place] TENDON SHEATH INCISION Right 10/18/2016    Procedure: LONG FINGER TRIGGER RELEASE ;  Surgeon: Rola Rivers MD;  Location: [facility];  Service: Orthopedics    THYROIDECTOMY      Total Thyroidectomy    TONSILLECTOMY AND ADENOIDECTOMY           10/13/23 0924   OT Last Visit   OT Visit Date 10/13/23   Note Type   Note type Evaluation   Pain Assessment   Pain Assessment Tool 0-10   Pain Score No Pain   Restrictions/Precautions   Other Precautions Chair Alarm; Bed Alarm;Aspiration; Fall Risk;Multiple lines   Home Living   Type of Home Apartment   Home Layout One level   Bathroom Shower/Tub Tub/shower unit  (cut out tub)   Bathroom Toilet Raised   Bathroom Equipment Grab bars in shower; [address]; Other (Comment)  (walking stick)   Prior Function   Level of [place] Independent with ADLs; Independent with functional mobility; Independent with IADLS   Lives With Alone   IADLs Independent with driving; Independent with meal prep; Independent with medication management   Falls in the last 6 months 1 to 4   General   Additional Pertinent History Comorbidities affecting pt’s functional performance include a significant PMH of: HTN, a-fib, uncomplicated asthma, atherosclerosis of aorta, hypothyroidism, SOB, HLD, asthma-COPD overlap syndrome, headache, malignant neoplasm of hard palate, macular degeneration, salivary gland CA, DM, nonrheumatic mitral valve regurgitation, skin cyst, LE extremity, swan-neck deformity of finger, R shoulder pain, toxic encephalopathy, anemia, basal cell carcinoma, cervical radiculopathy, spinal stenosis of cervical region, ALISSA, RLS, hx of polymalgia rheumatic, CKD, closed nondisplaced fx of sternal end of L clavicle. Family/Caregiver Present No   Subjective   Subjective "I'm ok"   ADL   Where Assessed Edge of bed   Eating Assistance 7  Independent   Grooming Assistance 6  Modified Independent   UB Bathing Assistance 6  Modified Independent   LB Bathing Assistance 5  Supervision/Setup   UB Dressing Assistance 6  Modified independent   LB Dressing Assistance 5  Supervision/Setup   Toileting Assistance  5  Supervision/Setup   Bed Mobility   Supine to Sit 5  Supervision   Additional items Increased time required   Transfers   Sit to Stand 5  Supervision   Additional items Increased time required   Stand to Sit 5  Supervision   Additional items Increased time required   Functional Mobility   Functional Mobility 5  Supervision   Additional Comments close S   Balance   Static Sitting Good   Dynamic Sitting Fair +   Static Standing Fair   Dynamic Standing Fair -   Ambulatory Fair -   Activity Tolerance   Activity Tolerance Patient tolerated treatment well   Medical Staff Made Aware Beth MUNIZ   Nurse Made Aware Tessa WADSWORTH   RUE Assessment   RUE Assessment WFL   LUE Assessment   LUE Assessment WFL   Hand Function   Gross Motor Coordination Functional   Fine Motor Coordination Functional   Sensation   Light Touch No apparent deficits   Vision-Basic Assessment   Current Vision Wears glasses all the time   Vision - Complex Assessment   Ocular Range of Motion Intact   Acuity Able to read clock/calendar on wall without difficulty; Able to read employee name badge without difficulty   Perception   Inattention/Neglect Appears intact   Cognition   Overall Cognitive Status [facility]   Arousal/Participation Alert; Responsive; Cooperative   Attention Within functional limits   Orientation Level Oriented to person;Oriented to place;Oriented to time   Memory Within functional limits   Following Commands Follows one step commands without difficulty   Comments Improved mentation from prior admission noted. Assessment   Assessment Patient is a 80y.o. year old female seen for OT eval s/p admit to [facility] on 10/13/2023 with esophageal pain. Patient with active OT orders and activity orders for up and oob AT. Personal factors affecting pt at time of IE include: difficulty performing ADLs and IADLs, difficulty with functional mobility/transfers. Prior to admission, was (I) with ADLs and was (I) with IADLs. Upon evaluation, pt functioning at/close to baseline w/ ADLs and functional mobility. Utilizes pill box at home for 2 week increments. 1 fall due to screen door breaking. Supportive neighbor to assist PRN. Given pt's high functional status, will D/C OT orders at this time and maintain on restorative.    Plan   OT Frequency Eval only   Discharge Recommendation   OT Discharge Recommendation No rehabilitation needs   AM-PAC Daily Activity Inpatient   Lower Body Dressing 4   Bathing 4   Toileting 4   Upper Body Dressing 4   Grooming 4   Eating 4   Daily Activity Raw Score 24   Daily Activity Standardized Score (Calc for Raw Score >=11) 57.54   AM-PAC Applied Cognition Inpatient   Following a Speech/Presentation 4   Understanding Ordinary Conversation 4   Taking Medications 3   Remembering Where Things Are Placed or Put Away 3   Remembering List of 4-5 Errands 3   Taking Care of Complicated Tasks 3   Applied Cognition Raw Score 20   Applied Cognition Standardized Score 41.76     Phong Pozo

## 2023-10-13 NOTE — ASSESSMENT & PLAN NOTE
CT AP:  Moderate amount of stool in the colon, may suggest a component of constipation; no evidence of bowel obstruction  Add bowel regimen

## 2023-10-13 NOTE — ASSESSMENT & PLAN NOTE
Rate control: Toprol   Per cardiology: She had significant duodenal ulcer/GI bleed and was taken off aspirin.  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

## 2023-10-13 NOTE — ASSESSMENT & PLAN NOTE
Presenting complaint chest pain and back pain. Pain began after eating cookie last night  Low suspicion for ACS  JOHAN Score: 1  EKG: no acute ischemic changes.  Troponin: 5-->7, cycle troponins and EKG x3  EF 65% G2DD  Monitor on telemetry

## 2023-10-13 NOTE — ED PROVIDER NOTES
History  Chief Complaint   Patient presents with    Chest Pain     Pt states eating cookies and began with a sharp stabbing pain that radiates to back. Pt states this has happened before when she eats something hard but the pain is worse tonight. 40-year-old female with a reported past medical history of A-fib, aortic aneurysm s/p repair, HLD, HTN presents ED with family with concern for sudden onset of chest pain. Patient reports around 6 PM tonight she was eating a cookie when she began with severe center chest pain with radiation to her back. States over the last few days she has been having some discomfort when eating harder foods such as toast however the discomfort was short-lived. Reports tonight the pain came on abruptly and has been persistent and much more severe than it was previously. Described as a sharp sensation in the center of her chest with radiation to the back. Denies associated shortness of breath, palpitations or syncope. No radiation into extremities, neck or jaw. No associate abdominal pain, nausea or vomiting. Was able to drink water after the pain started without issue. Reports she called EMS who gave her 324 mg of aspirin and nitroglycerin without any significant relief. Also states she took Tylenol before calling EMS and again denies any significant relief. Reports prior to today she is otherwise been well. No other specific complaints today. Specifically denies fevers, chills, cough, sore throat, congestion, urinary complaints, constipation, diarrhea, neck pain, headache, confusion, weakness, rash and any other complaint. Prior to Admission Medications   Prescriptions Last Dose Informant Patient Reported? Taking?    Diclofenac Sodium (VOLTAREN) 1 %   No No   Sig: Apply 2 g topically 4 (four) times a day   Patient taking differently: Apply 2 g topically 4 (four) times a day as needed   Multiple Vitamins-Minerals (One Daily Multivitamin Women) TABS  Self Yes No Sig: Apply 1 tablet to the mouth or throat daily.  Indications: Treatment to Prevent Vitamin Deficiency   albuterol (2.5 mg/3 mL) 0.083 % nebulizer solution  Self Yes No   Sig: Take 2.5 mg by nebulization every 6 (six) hours as needed for wheezing or shortness of breath   albuterol (PROVENTIL HFA,VENTOLIN HFA) 90 mcg/act inhaler  Self No No   Sig: Inhale 2 puffs every 6 (six) hours as needed for wheezing   amLODIPine (NORVASC) 2.5 mg tablet  Self No No   Sig: Take 1 tablet (2.5 mg total) by mouth daily   benazepril (LOTENSIN) 40 MG tablet  Self No No   Sig: Take 1 tablet (40 mg total) by mouth daily   calcium carbonate (OS-TAE) 600 MG tablet  Self Yes No   Sig: Take 600 mg by mouth daily in the early morning     gabapentin (NEURONTIN) 100 mg capsule   No No   Sig: Take 1 capsule (100 mg total) by mouth daily at bedtime At night   levothyroxine 125 mcg tablet   No No   Sig: TAKE ONE TABLET BY MOUTH DAILY IN THE EARLY MORNING   lovastatin (MEVACOR) 20 mg tablet  Self No No   Sig: TAKE ONE TABLET BY MOUTH IN THE MORNING   metoprolol succinate (TOPROL-XL) 25 mg 24 hr tablet  Self No No   Sig: Take 1 tablet (25 mg total) by mouth daily   pantoprazole (PROTONIX) 40 mg tablet  Self No No   Sig: Take 1 tablet (40 mg total) by mouth 2 (two) times a day before meals      Facility-Administered Medications: None       Past Medical History:   Diagnosis Date    A-fib ([address])     Allergy to IVP dye 06/04/2013    pt felt heaviness, palpitations    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), wet (HCC)     bilateral    Aneurysm of aortic root ([address])     last assessed - [id number]    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HCC)     Aortic root dilatation (HCC)     last assessed - [id number]    Arthritis     Ascending aortic aneurysm ([address])     last assessed - [id number]    Asthma     Basal cell carcinoma     last assessed - 19Qey5755    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HCC)     Disease of thyroid gland     Facet arthropathy, cervical     last assessed - [id number] History of fracture of vertebral column     Hyperlipidemia     Hypertension     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[address])     Hypoxia     last assessed - 94HSM8362    Junctional rhythm     last assessed - 02Ccx8190    Lightheadedness     last assessed - 92Sxt0227    Migraine     Palpitations     last assessed - 98Yif3854    Pleural effusion, bilateral     last assessed - 87Vlo2488    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HCC)     Shortness of breath     last assessed - 58Ohb4870    Thyroid trouble     Trigger finger     last assessed - 72IRX3660    Trigger middle finger of right hand     last assessed - 27Nnw4839    Urinary incontinence     last assessed -  Jul,2013; Resolved - [id number]       Past Surgical History:   Procedure Laterality Date    ABDOMINAL AORTIC ANEURYSM REPAIR W/ ENDOLUMINAL GRAFT  2015    Ascending aorta and hemiarch replacement with 30 mm Vascutek Gelweave graft;  Managed by Bridgette Fuller; last assessed - [id number]    APPENDECTOMY      BLADDER SURGERY      Reccurent histoy of bladder surgery    HOROWITZ PROCEDURE      CARDIAC CATHETERIZATION  2004    Procedure summary - Luminal irregularities; last assessed - [id number]     SECTION      CORONARY ANEURYSM REPAIR      CYSTOSCOPY      botox injection    HYSTERECTOMY      AL NDSC NJX IMPLT MATRL URT&/BLDR NCK N/A 2017    Procedure: Manuelita Fought; [place] BULKING AGENT INJECTION ;  Surgeon: Wendy Lemus MD;  Location: [facility];  Service: Urology    AL TENDON SHEATH INCISION Right 10/18/2016    Procedure: LONG FINGER TRIGGER RELEASE ;  Surgeon: Fran Anderson MD;  Location: [facility];  Service: Orthopedics    THYROIDECTOMY      Total Thyroidectomy    TONSILLECTOMY AND ADENOIDECTOMY         Family History   Problem Relation Age of Onset    Coronary aneurysm Sister     Coronary artery disease Sister         CABG    Heart disease Family         cardiac disorder    Hypertension Family     Cancer Family     Thyroid disease Family      I have reviewed and agree with the history as documented. E-Cigarette/Vaping    E-Cigarette Use Never User      E-Cigarette/Vaping Substances    Nicotine No     THC No     CBD No     Flavoring No     Other No     Unknown No      Social History     Tobacco Use    Smoking status: Former     Packs/day: 0.25     Years: 57.00     Total pack years: 14.25     Types: Cigarettes     Start date: 36     Quit date: 2014     Years since quittin.8    Smokeless tobacco: Former    Tobacco comments:     smoked 17 yrs 1/2 ppd quit and restarted then quit  10 days ago during 2014    Vaping Use    Vaping Use: Never used   Substance Use Topics    Alcohol use: Not Currently     Comment: Social drinker    Drug use: No       Review of Systems   Cardiovascular:  Positive for chest pain. All other systems reviewed and are negative. Physical Exam  Physical Exam  Vitals and nursing note reviewed. Constitutional:       General: She is not in acute distress. Appearance: She is well-developed. Comments: Patient lying in the stretcher awake, alert and interactive. She is writhing around in the stretcher stating she is in severe pain and requesting more pain medications. HENT:      Head: Normocephalic and atraumatic. Eyes:      Conjunctiva/sclera: Conjunctivae normal.   Neck:      Vascular: No JVD. Cardiovascular:      Rate and Rhythm: Normal rate and regular rhythm. Heart sounds: No murmur heard. Pulmonary:      Effort: Pulmonary effort is normal. No respiratory distress. Breath sounds: Normal breath sounds. Chest:      Comments: No TTP, crepitus, swelling, mass or any other concerning findings over the chest.  Abdominal:      Palpations: Abdomen is soft. Tenderness: There is no abdominal tenderness. Comments: Soft, nondistended and nontender. No pulsatile mass appreciated. Musculoskeletal:         General: No swelling. Cervical back: Neck supple.       Right lower leg: No tenderness. No edema. Left lower leg: No tenderness. No edema. Lymphadenopathy:      Cervical: No cervical adenopathy. Skin:     General: Skin is warm and dry. Capillary Refill: Capillary refill takes less than 2 seconds. Neurological:      General: No focal deficit present. Mental Status: She is alert and oriented to person, place, and time. GCS: GCS eye subscore is 4. GCS verbal subscore is 5. GCS motor subscore is 6.    Psychiatric:         Mood and Affect: Mood normal.         Vital Signs  ED Triage Vitals [10/13/23 0041]   Temperature Pulse Respirations Blood Pressure SpO2   98.1 °F (36.7 °C) 71 18 169/79 95 %      Temp Source Heart Rate Source Patient Position - Orthostatic VS BP Location FiO2 (%)   Oral Monitor Lying Right arm --      Pain Score       10 - Worst Possible Pain           Vitals:    10/13/23 1439 10/13/23 1611 10/13/23 1630 10/13/23 1711   BP: (!) 175/77 (!) 96/44 131/62 (!) 174/69   Pulse: 75 63 67 67   Patient Position - Orthostatic VS:    Lying         Visual Acuity      ED Medications  Medications   albuterol (PROVENTIL HFA,VENTOLIN HFA) inhaler 2 puff (has no administration in time range)   amLODIPine (NORVASC) tablet 2.5 mg (2.5 mg Oral Given 10/13/23 0847)   Diclofenac Sodium (VOLTAREN) 1 % topical gel 2 g (has no administration in time range)   levothyroxine tablet 125 mcg (125 mcg Oral Given 10/13/23 0700)   metoprolol succinate (TOPROL-XL) 24 hr tablet 25 mg (25 mg Oral Given 10/13/23 0847)   multivitamin-minerals (CENTRUM) tablet 1 tablet (1 tablet Oral Given 10/13/23 0847)   acetaminophen (TYLENOL) oral suspension 650 mg (650 mg Oral Given 10/13/23 1710)   ondansetron (ZOFRAN) injection 4 mg (has no administration in time range)   aluminum-magnesium hydroxide-simethicone (MAALOX) oral suspension 30 mL (has no administration in time range)   simethicone (MYLICON) chewable tablet 80 mg (has no administration in time range)   enoxaparin (LOVENOX) subcutaneous injection 40 mg (40 mg Subcutaneous Not Given 10/13/23 0847)   oxyCODONE (ROXICODONE) split tablet 2.5 mg (has no administration in time range)   oxyCODONE (ROXICODONE) IR tablet 5 mg (has no administration in time range)   HYDROmorphone HCl (DILAUDID) injection 0.2 mg (has no administration in time range)   polyethylene glycol (MIRALAX) packet 17 g (17 g Oral Not Given 10/13/23 0854)   lisinopril (ZESTRIL) tablet 40 mg (40 mg Oral Given 10/13/23 1712)   pantoprazole (PROTONIX) injection 40 mg (40 mg Intravenous Not Given 10/13/23 0920)   ondansetron (FOR EMS ONLY) (ZOFRAN) 4 mg/2 mL injection 4 mg (0 mg Does not apply Given to EMS 10/13/23 0038)   fentanyl citrate (PF) 100 MCG/2ML 25 mcg (25 mcg Intravenous Given 10/13/23 0144)   diphenhydrAMINE (BENADRYL) injection 50 mg (50 mg Intravenous Given 10/13/23 0209)   iohexol (OMNIPAQUE) 350 MG/ML injection (MULTI-DOSE) 65 mL (65 mL Intravenous Given 10/13/23 0224)   ketorolac (TORADOL) injection 15 mg (15 mg Intravenous Given 10/13/23 0413)   lidocaine (LIDODERM) 5 % patch 1 patch (1 patch Topical Patch Removed 10/13/23 1718)   acetaminophen (TYLENOL) tablet 650 mg (650 mg Oral Given 10/13/23 0459)       Diagnostic Studies  Results Reviewed       Procedure Component Value Units Date/Time    Protime-INR [[id number]]  (Normal) Collected: 10/13/23 0147    Lab Status: Final result Specimen: Blood from Arm, Left Updated: 10/13/23 0640     Protime 12.9 seconds      INR 0.97    APTT [[id number]]  (Normal) Collected: 10/13/23 0147    Lab Status: Final result Specimen: Blood from Arm, Left Updated: 10/13/23 0640     PTT 33 seconds     HS Troponin I 4hr [[id number]]  (Normal) Collected: 10/13/23 0503    Lab Status: Final result Specimen: Blood from Arm, Left Updated: 10/13/23 0616     hs TnI 4hr 10 ng/L      Delta 4hr hsTnI 5 ng/L     HS Troponin I 2hr [[id number]]  (Normal) Collected: 10/13/23 030    Lab Status: Final result Specimen: Blood from Arm, Left Updated: 10/13/23 7639 hs TnI 2hr 7 ng/L      Delta 2hr hsTnI 2 ng/L     HS Troponin 0hr (reflex protocol) [[id number]]  (Normal) Collected: 10/13/23 0103    Lab Status: Final result Specimen: Blood from Arm, Left Updated: 10/13/23 0132     hs TnI 0hr 5 ng/L     Comprehensive metabolic panel [[id number]]  (Abnormal) Collected: 10/13/23 0103    Lab Status: Final result Specimen: Blood from Arm, Left Updated: 10/13/23 0124     Sodium 138 mmol/L      Potassium 4.2 mmol/L      Chloride 100 mmol/L      CO2 30 mmol/L      ANION GAP 8 mmol/L      BUN 27 mg/dL      Creatinine 0.82 mg/dL      Glucose 142 mg/dL      Calcium 8.8 mg/dL      AST 23 U/L      ALT 18 U/L      Alkaline Phosphatase 51 U/L      Total Protein 7.1 g/dL      Albumin 4.2 g/dL      Total Bilirubin 0.37 mg/dL      eGFR 62 ml/min/1.73sq m     Narrative:      [facility] (CKD):     Stage 1 with normal or high GFR (GFR > 90 mL/min/1.73 square meters)    Stage 2 Mild CKD (GFR = 60-89 mL/min/1.73 square meters)    Stage 3A Moderate CKD (GFR = 45-59 mL/min/1.73 square meters)    Stage 3B Moderate CKD (GFR = 30-44 mL/min/1.73 square meters)    Stage 4 Severe CKD (GFR = 15-29 mL/min/1.73 square meters)    Stage 5 End Stage CKD (GFR <15 mL/min/1.73 square meters)  Note: GFR calculation is accurate only with a steady state creatinine    CBC and differential [[id number]] Collected: 10/13/23 0103    Lab Status: Final result Specimen: Blood from Arm, Left Updated: 10/13/23 0110     WBC 8.83 Thousand/uL      RBC 4.13 Million/uL      Hemoglobin 12.3 g/dL      Hematocrit 39.0 %      MCV 94 fL      MCH 29.8 pg      MCHC 31.5 g/dL      RDW 13.3 %      MPV 11.6 fL      Platelets 210 Thousands/uL      nRBC 0 /100 WBCs      Neutrophils Relative 47 %      Immat GRANS % 0 %      Lymphocytes Relative 37 %      Monocytes Relative 11 %      Eosinophils Relative 4 %      Basophils Relative 1 %      Neutrophils Absolute 4.21 Thousands/µL Immature Grans Absolute 0.01 Thousand/uL      Lymphocytes Absolute 3.28 Thousands/µL      Monocytes Absolute 0.94 Thousand/µL      Eosinophils Absolute 0.33 Thousand/µL      Basophils Absolute 0.06 Thousands/µL                    CTA dissection protocol chest abdomen pelvis w wo contrast   Final Result by Benny Kehr, DO (10/13 3867)      Fusiform aneurysmal dilatation of the aorta, measures approximately 4.8 cm in maximal dimensions. Mild to moderate aortic atherosclerosis. No aortic dissection identified. Mild scattered pulmonary edematous changes with biapical pleural/parenchymal scarring. No acute pulmonary process is seen. Mild cardiomegaly. Moderate coronary atherosclerosis. Prominence of the central pulmonary arteries may suggest a component of pulmonary arterial hypertension. Right-sided nephrolithiasis, no hydronephrosis. Moderate amount of stool in the colon, may suggest a component of constipation; no evidence of bowel obstruction. Compression fracture of T12 redemonstrated. Mild compression fractures of T2, T3, T4, and T5, age indeterminate. Other findings as above. Workstation performed: [id number]         XR chest 1 view portable   Final Result by Janett Hudson MD (10/13 3722)      Similar appearance of an enlarged aortic arch. Please refer to same-day CTA chest for further details. Enlarged central pulmonary arteries, which may reflect pulmonary hypertension. No acute cardiopulmonary disease.       Workstation performed: [id number]                    Procedures  ECG 12 Lead Documentation Only    Date/Time: 10/13/2023 3:25 AM    Performed by: Sarah Montana PA-C  Authorized by: Sarah Montana PA-C    Indications / Diagnosis:  CP  ECG reviewed by me, the ED Provider: yes    Patient location:  ED  Rate:     ECG rate:  74    ECG rate assessment: normal    Rhythm:     Rhythm: sinus rhythm    Ectopy:     Ectopy: none    QRS:     QRS axis:  Right    QRS intervals:  Normal  Conduction:     Conduction: normal    ST segments:     ST segments:  Normal  T waves:     T waves: flattening      Flattening:  AVL  ECG 12 Lead Documentation Only    Date/Time: 10/13/2023 4:33 AM    Performed by: Scooter Oliveira PA-C  Authorized by: Clyde Christianson PA-C    Indications / Diagnosis:  2 hour  ECG reviewed by me, the ED Provider: yes    Patient location:  ED  Rate:     ECG rate:  74    ECG rate assessment: normal    Rhythm:     Rhythm: sinus rhythm    Ectopy:     Ectopy: none    QRS:     QRS axis:  Right    QRS intervals:  Normal  Conduction:     Conduction: normal    ST segments:     ST segments:  Normal  T waves:     T waves: normal             ED Course  ED Course as of 10/13/23 1755   Fri Oct 13, 2023   0355 CTA IMPRESSION:     Fusiform aneurysmal dilatation of the aorta, measures approximately 4.8 cm in maximal dimensions. Mild to moderate aortic atherosclerosis. No aortic dissection identified. Mild scattered pulmonary edematous changes with biapical pleural/parenchymal scarring. No acute pulmonary process is seen. Mild cardiomegaly. Moderate coronary atherosclerosis. Prominence of the central pulmonary arteries may suggest a component of pulmonary arterial hypertension. Right-sided nephrolithiasis, no hydronephrosis. Moderate amount of stool in the colon, may suggest a component of constipation; no evidence of bowel obstruction. Compression fracture of T12 redemonstrated. Mild compression fractures of T2, T3, T4, and T5, age indeterminate. Other findings as above. 8329 Bedside reevaluation patient resting comfortably in stretcher no acute distress. Reports her chest pain has improved but is still there slightly. Complaining more of her back pain. On back reevaluation patient with no tenderness over the C/T/L-spine.   States she is having pain around the mid back area however no reproducible pain on exam.  No step-offs, deformities or overlying skin changes. B/L LE are NVI. We will trial a dose of Toradol as well as a dose of acetaminophen this patient still complains of pain. If patient's pain persist may require admission. Patient reports she will consider and think about it. Will reevaluate. 0438 Delta 2hr hsTnI: 2  EKG appeared nonischemic.   0441 Glucose, Random(!): 142   0441 Comprehensive metabolic panel(!)  Gross unremarkable   0441 hs TnI 0hr: 5   0441 CBC and differential  Gross unremarkable   0518 Unfortunate this time patient still in pain. Reports minor improvement with medications but is concerned if she goes home tonight and wont be able to control her pain. We will reach out to [facility] for admission. 8108 TT sent to [facility] for admission   0531 [facility] accepts pt for re-evaluation and further management. Pt stable at admission. SBIRT 20yo+      Flowsheet Row Most Recent Value   Initial Alcohol Screen: US AUDIT-C     1. How often do you have a drink containing alcohol? 0 Filed at: 10/13/2023 0241   2. How many drinks containing alcohol do you have on a typical day you are drinking? 0 Filed at: 10/13/2023 0241   3b. FEMALE Any Age, or MALE 65+: How often do you have 4 or more drinks on one occassion? 0 Filed at: 10/13/2023 0241   Audit-C Score 0 Filed at: 10/13/2023 9751   RAMSES: How many times in the past year have you. .. Used an illegal drug or used a prescription medication for non-medical reasons?  Never Filed at: 10/13/2023 0241          JOHAN Risk Score      Flowsheet Row Most Recent Value   Age >= 72 1 Filed at: 10/13/2023 0533   Known CAD (stenosis >= 50%) 0 Filed at: 10/13/2023 0533   Recent (<=24 hrs) Service Angina 0 Filed at: 10/13/2023 0533   ST Deviation >= 0.5 mm 0 Filed at: 10/13/2023 0533   3+ CAD Risk Factors (FHx, HTN, HLP, DM, Smoker) 0 Filed at: 10/13/2023 0533   Aspirin Use Past 7 Days 0 Filed at: 10/13/2023 0533   Elevated Cardiac Markers 0 Filed at: 10/13/2023 0533   JOHAN Risk Score (Calculated) 1 Filed at: 10/13/2023 0533                    Medical Decision Making  DDX including but not limited to: Esophageal spasm, aortic dissection, ACS, MI, PTX, pleurisy, pericarditis, myocarditis, GI etiology. Doubt PE, pneumonia or food bolus. In light of patient's presenting complaints we will check a CBC for signs of anemia, CMP for liver enzymes, electrolytes and renal function. EKG and troponin to ensure no signs of ACS/MI/arrhythmia as possible cause of patient's complaints. We will obtain a CTA dissection study in light of patient with reports of sudden onset severe chest pain with radiation to her back with a history of thoracic aortic aneurysm. PT/INR and APTT in light of concern for dissection. We will treat symptomatically with 25 mcg fentanyl as patient without any significant relief of her pain with Tylenol, nitroglycerin and aspirin PTA. Unfortunately patient does have a documented allergy to contrast dye. Discussed with patient at bedside who reports it was 2014 when she was told she had an allergy. Patient reports she remembers feeling flushed and feeling she had palpitations at that time. But states "it is not like it was anaphylaxis."  Has not had contrast dye since. In light of concern for possible aortic dissection discussed the need for IV contrast with the patient and her family at bedside. Agreeable to urgent prep of 50 mg Benadryl immediately prior to receiving contrast dye. Discussed risks and benefits of doing so, with shared decision-making decided to go forward with the dissection study. Will review after above-stated plan. Amount and/or Complexity of Data Reviewed  Labs: ordered. Decision-making details documented in ED Course. Radiology: ordered. Risk  OTC drugs. Prescription drug management. Decision regarding hospitalization.              Disposition  Final diagnoses:   Intractable back pain   Chest pain   Nontraumatic compression fracture of T2 vertebra, initial encounter ([address])   Non-traumatic compression fracture of T3 thoracic vertebra, initial encounter ([address])   Non-traumatic compression fracture of T4 thoracic vertebra, initial encounter ([address])   Non-traumatic compression fracture of T5 thoracic vertebra, initial encounter ([address])   Compression fracture of T12 vertebra, initial encounter ([address])     Time reflects when diagnosis was documented in both MDM as applicable and the Disposition within this note       Time User Action Codes Description Comment    10/13/2023  5:32 AM Paramus Clipper Add [M54.9] Intractable back pain     10/13/2023  5:32 AM Paramus Clipper Add [R07.9] Chest pain     10/13/2023  5:35 AM Rodolfo Christianson Add [M48.54XA] Nontraumatic compression fracture of T2 vertebra, initial encounter ([address])     10/13/2023  5:35 AM Rodolfo Christianson Add [Q90.87PB] Non-traumatic compression fracture of T3 thoracic vertebra, initial encounter ([address])     10/13/2023  5:36 AM Rodolfo Christianson Add [W63.98IL] Non-traumatic compression fracture of T4 thoracic vertebra, initial encounter ([address])     10/13/2023  5:37 AM Rodolfo Christianson Add [Q01.06FN] Non-traumatic compression fracture of T5 thoracic vertebra, initial encounter ([address])     10/13/2023  5:37 AM Rodolfo Christianson Add [X75.145D] Compression fracture of T12 vertebra, initial encounter ([address])     10/13/2023  6:13 AM Hermina Point Add [K26.9] Duodenum ulcer     10/13/2023  6:14 AM Hermina Point Add [K22.89] Esophageal pain           ED Disposition       ED Disposition   Admit    Condition   Stable    Date/Time   Fri Oct 13, 2023 0532    Comment   Case was discussed with BROCK and the patient's admission status was agreed to be Admission Status: observation status to the service of Dr. Rai Garrett.                Follow-up Information    None         Current Discharge Medication List        CONTINUE these medications which have NOT CHANGED    Details   albuterol (2.5 mg/3 mL) 0.083 % nebulizer solution Take 2.5 mg by nebulization every 6 (six) hours as needed for wheezing or shortness of breath      albuterol (PROVENTIL HFA,VENTOLIN HFA) 90 mcg/act inhaler Inhale 2 puffs every 6 (six) hours as needed for wheezing  Qty: 25.5 g, Refills: 3    Comments: Substitution to a formulary equivalent within the same pharmaceutical class is authorized. Associated Diagnoses: Mild intermittent asthma without complication      amLODIPine (NORVASC) 2.5 mg tablet Take 1 tablet (2.5 mg total) by mouth daily  Qty: 90 tablet, Refills: 3    Associated Diagnoses: Essential hypertension      benazepril (LOTENSIN) 40 MG tablet Take 1 tablet (40 mg total) by mouth daily  Qty: 90 tablet, Refills: 3    Associated Diagnoses: Essential hypertension      calcium carbonate (OS-TAE) 600 MG tablet Take 600 mg by mouth daily in the early morning        Diclofenac Sodium (VOLTAREN) 1 % Apply 2 g topically 4 (four) times a day  Qty: 700 g, Refills: 0    Associated Diagnoses: Osteoarthritis of right middle finger      gabapentin (NEURONTIN) 100 mg capsule Take 1 capsule (100 mg total) by mouth daily at bedtime At night  Qty: 90 capsule, Refills: 0    Associated Diagnoses: Cervical radiculopathy; Arm paresthesia, right; Spinal stenosis of cervical region      levothyroxine 125 mcg tablet TAKE ONE TABLET BY MOUTH DAILY IN THE EARLY MORNING  Qty: 90 tablet, Refills: 3    Associated Diagnoses: Hypothyroidism, unspecified type      lovastatin (MEVACOR) 20 mg tablet TAKE ONE TABLET BY MOUTH IN THE MORNING  Qty: 90 tablet, Refills: 3    Associated Diagnoses: Hyperlipidemia, unspecified hyperlipidemia type      metoprolol succinate (TOPROL-XL) 25 mg 24 hr tablet Take 1 tablet (25 mg total) by mouth daily  Qty: 90 tablet, Refills: 3    Associated Diagnoses: Essential hypertension; Paroxysmal atrial fibrillation (HCC)      Multiple Vitamins-Minerals (One Daily Multivitamin Women) TABS Apply 1 tablet to the mouth or throat daily. Indications: Treatment to Prevent Vitamin Deficiency      pantoprazole (PROTONIX) 40 mg tablet Take 1 tablet (40 mg total) by mouth 2 (two) times a day before meals  Qty: 180 tablet, Refills: 0    Associated Diagnoses: Symptomatic anemia; GI bleed; Acute gastric ulcer with hemorrhage; Duodenal ulcer             No discharge procedures on file.     PDMP Review         Value Time User    PDMP Reviewed  Yes 1/19/2023 12:26 PM Tuesday Mary Messina, [address] [facility]            ED Provider  Electronically Signed by             Christo Huffman PA-C  10/13/23 6967

## 2023-10-13 NOTE — PHYSICAL THERAPY NOTE
PHYSICAL THERAPY EVAL          Patient Name: Buffy Walker  XLAMV'O Date: 10/13/2023       10/13/23 0934   PT Last Visit   PT Visit Date 10/13/23   Note Type   Note type Evaluation   Pain Assessment   Pain Assessment Tool 0-10   Pain Score No Pain   Restrictions/Precautions   Other Precautions Chair Alarm; Bed Alarm;Aspiration; Fall Risk;Multiple lines   Home Living   Type of Home Apartment   Home Layout One level   Bathroom Shower/Tub Tub/shower unit  (cut out tub)   Bathroom Toilet Raised   Bathroom Equipment Grab bars in shower; [address]; Other (Comment)  (walking stick)   Prior Function   Level of [place] Independent with ADLs; Independent with functional mobility; Independent with IADLS   Lives With Alone   IADLs Independent with driving; Independent with meal prep; Independent with medication management   Falls in the last 6 months 1 to 4   Comments indep at baseline without an AD. reports local dtrs however she only sees them if she calls for help   General   Additional Pertinent History pt admitted 10/13/23 for esophageal pain. up and oob orders. PMHx significant for afib, COPD, CA, osteopenia, CKD   Cognition   Overall Cognitive Status WFL   Arousal/Participation Cooperative   Orientation Level Oriented to person;Oriented to place;Oriented to time   Memory Within functional limits   Following Commands Follows one step commands without difficulty   Bed Mobility   Supine to Sit 5  Supervision   Additional items Increased time required   Transfers   Sit to Stand 5  Supervision   Additional items Increased time required   Stand to Sit 5  Supervision   Additional items Increased time required   Additional Comments decreased safety awareness stand>sit   Ambulation/Elevation   Gait pattern Decreased foot clearance; Short stride; Excessively slow  (inc lateral sway)   Gait Assistance 5  Supervision   Additional items Assist x 1   Assistive Device None   Distance 150'   Balance   Static Standing Fair   Dynamic Standing Fair -   Ambulatory Fair -   Endurance Deficit   Endurance Deficit No   Activity Tolerance   Activity Tolerance Patient tolerated treatment well   Medical Staff Made Aware [facility]   Nurse Made Aware Tessa WADSWORTH   Assessment   Prognosis Good   Assessment Buffy Wilkinson is a 80 y.o. female admitted to [address] [place] [address] on 10/13/2023 for Esophageal pain. PT was consulted and pt was seen on 10/13/2023 for mobility assessment and d/c planning. Pt presents w low fall risk, aspiration precautions, multiple lines. At baseline is indep for ADLs, IADLs and ambulation. Pt is currently functioning at a supervision assistance  level for bed mobility, transfers and ambulation. Pt demonstrated no significant deficits of cognition, strength, balance, or endurance. Currently ambulating limited community distances without difficulty. Appears to be functioning at baseline however given advanced age, fall risk and risk for deconditioning during hospital stay would benefit from continued mobilization via nsg/ restorative. At this time PT recommendations for d/c are home. Barriers to Discharge None   Plan   PT Frequency   (d/c PT: maintain on restorative)   Discharge Recommendation   PT Discharge Recommendation No rehabilitation needs   AM-PAC Basic Mobility Inpatient   Turning in Flat Bed Without Bedrails 4   Lying on Back to Sitting on Edge of Flat Bed Without Bedrails 3   Moving Bed to Chair 3   Standing Up From Chair Using Arms 3   Walk in Room 3   Climb 3-5 Stairs With Railing 3   Basic Mobility Inpatient Raw Score 19   Basic Mobility Standardized Score 42.48   Highest Level Of Mobility   JH-HLM Goal 6: Walk 10 steps or more   JH-HLM Achieved 7: Walk 25 feet or more   End of Consult   Patient Position at End of Consult Bedside chair;Bed/Chair alarm activated; All needs within reach     History: co - morbidities including age, current experience including fall risk, multiple lines  Exam: impairments in systems including multiple body structures involved; neuromuscular (balance, gait, transfers), cognition; am-pac  Clinical: stable/unpredictable  Complexity: moderate

## 2023-10-13 NOTE — ED NOTES
Pt's Spo2 stating at 88%. Pt place on 2L nasal canula. Provider made aware.      Floyd Hess RN  10/13/23 9702

## 2023-10-13 NOTE — H&P
[facility]  H&P  Name: Buffy Duff 80 y.o. female I MRN: [id number]  Unit/Bed#: ED-05 I Date of Admission: 10/13/2023   Date of Service: 10/13/2023 I Hospital Day: 0      Assessment/Plan   * Esophageal pain  Assessment & Plan  Reports simultaneous esophageal and back pain associated with meals  Pain became severe last night after eating a cookie  S/p EGD 2/2023 showing erythematous mucosa with erosion in the stomach and antrum, esophagus appeared normal; hiatal hernia; duodenal ulcer  Will ask SLP to evaluate  NPO for now  Continue PPI  Aspiration precautions   GI consult     Other constipation  Assessment & Plan  CT AP: Moderate amount of stool in the colon, may suggest a component of constipation; no evidence of bowel obstruction  Add bowel regimen    Postoperative hypothyroidism  Assessment & Plan  Levothyroxine 125 mcg    Atherosclerosis of aorta (HCC)  Assessment & Plan  Known history of aortic atherosclerosis  CT showing Fusiform aneurysmal dilatation of the aorta, measures approximately 4.8 cm in maximal dimensions. Mild to moderate aortic atherosclerosis. No aortic dissection identified. Per cardiology not a candidate for repair due to age    Uncomplicated asthma  Assessment & Plan  No acute asthma exacerbation. Maintained on as needed albuterol    Paroxysmal atrial fibrillation (HCC)  Assessment & Plan  Rate control: Toprol   Per cardiology: She had significant duodenal ulcer/GI bleed and was taken off aspirin. 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 Justification for Hospital Stay: esophageal pain when eating    Total Time for Visit, including Counseling / Coordination of Care: 45 minutes. Greater than 50% of this total time spent on direct patient counseling and coordination of care. Chief Complaint:   "pain"    History of Present Illness:    Buffy Dacosta is a 80 y.o. female who presents with c/o esophageal pain and back pain. Reports last night around 11p  she ate a cookie then developed severe pain in her esophagus and back with 3 episodes of emesis. States for the last few days she has esophageal discomfort when eating solid food. She has no difficulty swallowing the food although it feels like it goes down and gets stuck in her esophagus. Tolerates soft food without difficulty. Otherwise she reports good appetite. ROS +for unintentional 10 pound weight loss over the last 3 to 4 months. Review of Systems:    Review of Systems   Constitutional:  Positive for unexpected weight change. 10 pound weight loss/3 or 4 months   HENT:  Positive for trouble swallowing. Respiratory: Negative. Chronic exertional dyspnea   Cardiovascular: Negative. Gastrointestinal:  Positive for constipation and vomiting. Genitourinary: Negative. Musculoskeletal: Negative. Skin: Negative. Neurological: Negative.         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[address])     bilateral    Aneurysm of aortic root ([address])     last assessed - [id number]    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HCC)     Aortic root dilatation ([address])     last assessed - [id number]    Arthritis     Ascending aortic aneurysm ([address])     last assessed - 39BQS5512    Asthma     Basal cell carcinoma     last assessed - 56Xzi3295    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HCC)     Disease of thyroid gland     Facet arthropathy, cervical     last assessed - [id number]    History of fracture of vertebral column     Hyperlipidemia     Hypertension     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[address])     Hypoxia     last assessed - 36EKE5197    Junctional rhythm     last assessed - 31Kxn0485    Lightheadedness     last assessed - 21Sdv3394    Migraine     Palpitations     last assessed - 12Eyi1715    Pleural effusion, bilateral     last assessed - 45Jec5524    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HCC)     Shortness of breath     last assessed - 19Jkl9302    Thyroid trouble     Trigger finger     last assessed - 18HTF7699    Trigger middle finger of right hand     last assessed - 87Mfw1530    Urinary incontinence     last assessed -  Jul,2013; Resolved - [id number]       Past Surgical History:   Procedure Laterality Date    ABDOMINAL AORTIC ANEURYSM REPAIR W/ ENDOLUMINAL GRAFT  2015    Ascending aorta and hemiarch replacement with 30 mm Vascutek Gelweave graft; Managed by Sugar Acosta; last assessed - [id number]    APPENDECTOMY      BLADDER SURGERY      Reccurent histoy of bladder surgery    HOROWITZ PROCEDURE      CARDIAC CATHETERIZATION  2004    Procedure summary - Luminal irregularities; last assessed - [id number]     SECTION      CORONARY ANEURYSM REPAIR      CYSTOSCOPY      botox injection    HYSTERECTOMY      KS NDSC NJX IMPLT MATRL URT&/BLDR NCK N/A 2017    Procedure: Anisha Delay; Willie Guerrierff BULKING AGENT INJECTION ;  Surgeon: Ana Maria Tineo MD;  Location: [facility];  Service: Urology    KS TENDON SHEATH INCISION Right 10/18/2016    Procedure: LONG FINGER TRIGGER RELEASE ;  Surgeon: Chance Paredes MD;  Location: [facility];  Service: Orthopedics    THYROIDECTOMY      Total Thyroidectomy    TONSILLECTOMY AND ADENOIDECTOMY         Meds/Allergies:    Prior to Admission medications    Medication Sig Start Date End Date Taking?  Authorizing Provider   albuterol (2.5 mg/3 mL) 0.083 % nebulizer solution Take 2.5 mg by nebulization every 6 (six) hours as needed for wheezing or shortness of breath    Historical Provider, MD   albuterol (PROVENTIL HFA,VENTOLIN HFA) 90 mcg/act inhaler Inhale 2 puffs every 6 (six) hours as needed for wheezing 4/18/23   SARAHI Floyd   amLODIPine (NORVASC) 2.5 mg tablet Take 1 tablet (2.5 mg total) by mouth daily 7/18/23   Sedrick Blanco MD   benazepril (LOTENSIN) 40 MG tablet Take 1 tablet (40 mg total) by mouth daily 7/18/23   Sedrick Blanco MD   calcium carbonate (OS-TAE) 600 MG tablet Take 600 mg by mouth daily in the early morning      Historical Provider, MD   Diclofenac Sodium (VOLTAREN) 1 % Apply 2 g topically 4 (four) times a day 9/25/23   Braden Dias MD   gabapentin (NEURONTIN) 100 mg capsule Take 1 capsule (100 mg total) by mouth daily at bedtime At night 8/24/23 Terry Half, DO   levothyroxine 125 mcg tablet TAKE ONE TABLET BY MOUTH DAILY IN THE EARLY MORNING 9/28/23   Elie Half, DO   lovastatin (MEVACOR) 20 mg tablet TAKE ONE TABLET BY MOUTH IN THE MORNING 6/1/23   Elie Half, DO   metoprolol succinate (TOPROL-XL) 25 mg 24 hr tablet Take 1 tablet (25 mg total) by mouth daily 4/13/23   Sedrick Blanco MD   Multiple Vitamins-Minerals (One Daily Multivitamin Women) TABS Apply 1 tablet to the mouth or throat daily. Indications: Treatment to Prevent Vitamin Deficiency 1/30/23   Elie Half, DO   pantoprazole (PROTONIX) 40 mg tablet Take 1 tablet (40 mg total) by mouth 2 (two) times a day before meals 3/18/23 7/18/23  Elie Half, DO     I have reviewed home medications with patient personally. Allergies:    Allergies   Allergen Reactions    Amiodarone Hives    Omnipaque [Iohexol] Hives and Shortness Of Breath    Clindamycin Other (See Comments)     When taken causes cdiff immediately    Other     Sulfa Antibiotics Hives     itching    Acetazolamide Hives and Rash     Reaction Date:Unknown    Iv Dye  [Iodinated Contrast Media] Hypertension and Palpitations     Annotation - [id number]: Verified with patient     Naprosyn [Naproxen] Itching and Rash     Category: Allergy;        Social History:     Marital Status:    Occupation: retired  Patient Pre-hospital Living Situation: resides at home alone  Patient Pre-hospital Level of Mobility: ambulatory  Patient Pre-hospital Diet Restrictions:   Substance Use History:   Social History     Substance and Sexual Activity   Alcohol Use Not Currently    Comment: Social drinker     Social History     Tobacco Use   Smoking Status Former    Packs/day: 0.25    Years: 57.00    Total pack years: 14.25    Types: Cigarettes    Start date: 36    Quit date: 2014    Years since quittin.8   Smokeless Tobacco Former   Tobacco Comments    smoked 17 yrs 1/2 ppd quit and restarted then quit  10 days ago during 2014      Social History     Substance and Sexual Activity   Drug Use No       Family History:    Family History   Problem Relation Age of Onset    Coronary aneurysm Sister     Coronary artery disease Sister         CABG    Heart disease Family         cardiac disorder    Hypertension Family     Cancer Family     Thyroid disease Family        Physical Exam:     Vitals:   Blood Pressure: 159/75 (10/13/23 0500)  Pulse: 85 (10/13/23 0500)  Temperature: 98.1 °F (36.7 °C) (10/13/23 0041)  Temp Source: Oral (10/13/23 004)  Respirations: 20 (10/13/23 0500)  SpO2: 96 % (10/13/23 0500)    Physical Exam  Constitutional:       General: She is not in acute distress. Appearance: Normal appearance. She is not ill-appearing, toxic-appearing or diaphoretic. HENT:      Head: Atraumatic. Mouth/Throat:      Mouth: Mucous membranes are dry. Eyes:      Conjunctiva/sclera: Conjunctivae normal.   Cardiovascular:      Rate and Rhythm: Normal rate and regular rhythm. Pulmonary:      Effort: Pulmonary effort is normal.      Breath sounds: Normal breath sounds. Abdominal:      General: Bowel sounds are normal. There is no distension. Palpations: Abdomen is soft. Tenderness:  There is no abdominal tenderness. There is no guarding. Musculoskeletal:         General: No tenderness. Right lower leg: No edema. Left lower leg: No edema. Skin:     General: Skin is warm. Neurological:      Mental Status: She is alert and oriented to person, place, and time. Psychiatric:         Mood and Affect: Mood normal.         Behavior: Behavior normal.         Thought Content: Thought content normal.         Judgment: Judgment normal.      Comments: pleasant       Additional Data:     Lab Results: I have personally reviewed pertinent reports. Results from last 7 days   Lab Units 10/13/23  0103   WBC Thousand/uL 8.83   HEMOGLOBIN g/dL 12.3   HEMATOCRIT % 39.0   PLATELETS Thousands/uL 199   NEUTROS PCT % 47   LYMPHS PCT % 37   MONOS PCT % 11   EOS PCT % 4     Results from last 7 days   Lab Units 10/13/23  0103   SODIUM mmol/L 138   POTASSIUM mmol/L 4.2   CHLORIDE mmol/L 100   CO2 mmol/L 30   BUN mg/dL 27*   CREATININE mg/dL 0.82   ANION GAP mmol/L 8   CALCIUM mg/dL 8.8   ALBUMIN g/dL 4.2   TOTAL BILIRUBIN mg/dL 0.37   ALK PHOS U/L 51   ALT U/L 18   AST U/L 23   GLUCOSE RANDOM mg/dL 142*                       Imaging: I have personally reviewed pertinent reports. CTA dissection protocol chest abdomen pelvis w wo contrast   Final Result by Prudence Romeo DO (10/13 2879)      Fusiform aneurysmal dilatation of the aorta, measures approximately 4.8 cm in maximal dimensions. Mild to moderate aortic atherosclerosis. No aortic dissection identified. Mild scattered pulmonary edematous changes with biapical pleural/parenchymal scarring. No acute pulmonary process is seen. Mild cardiomegaly. Moderate coronary atherosclerosis. Prominence of the central pulmonary arteries may suggest a component of pulmonary arterial hypertension. Right-sided nephrolithiasis, no hydronephrosis.       Moderate amount of stool in the colon, may suggest a component of constipation; no evidence of bowel obstruction. Compression fracture of T12 redemonstrated. Mild compression fractures of T2, T3, T4, and T5, age indeterminate. Other findings as above. Workstation performed: [id number]         XR chest 1 view portable    (Results Pending)       EKG, Pathology, and Other Studies Reviewed on Admission:   EKG: nsr 75 ct echo    Allscripts / Epic Records Reviewed: Yes     ** Please Note: This note has been constructed using a voice recognition system.  **

## 2023-10-13 NOTE — ANESTHESIA PREPROCEDURE EVALUATION
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)   (+) Atherosclerosis of aorta (HCC)   (+) Chronic migraine without aura   (+) Essential hypertension   (+) Hyperlipidemia   (+) Migraine aura without headache   (+) Nonrheumatic mitral valve regurgitation   (+) Pain of left clavicle   (+) Paroxysmal atrial fibrillation (HCC)      ENDO   (+) Hypoparathyroidism, unspecified hypoparathyroidism type (HCC)   (+) Postoperative hypothyroidism      GI/HEPATIC   (+) Duodenum ulcer   (+) Gastroesophageal reflux disease with esophagitis      /RENAL   (+) Stage 3a chronic kidney disease (HCC)      HEMATOLOGY   (+) Anemia      MUSCULOSKELETAL   (+) Osteoarthritis of cervical spine      NEURO/PSYCH   (+) Chronic migraine without aura   (+) Headache   (+) Migraine aura without headache      PULMONARY   (+) Asthma-COPD overlap syndrome   (+) Moderate persistent asthma without complication   (+) Obstructive sleep apnea syndrome   (+) Severe persistent asthma with acute exacerbation   (+) Shortness of breath   (+) Uncomplicated asthma        Physical Exam    Airway    Mallampati score: II  TM Distance: >3 FB  Neck ROM: full     Dental   Comment: Multiple missing teeth, No notable dental hx lower dentures    Cardiovascular  Rhythm: regular, Rate: normal, Cardiovascular exam normal    Pulmonary  Pulmonary exam normal Breath sounds clear to auscultation    Other Findings        Anesthesia Plan  ASA Score- 3     Anesthesia Type- general with ASA Monitors. Additional Monitors:     Airway Plan:     Comment:    ·  Left Ventricle: Left ventricular cavity size is normal. Wall thickness is normal. The left ventricular ejection fraction is 65% by visual estimation. Systolic function is normal. Wall motion is normal. Diastolic function is moderately abnormal, consistent with grade II (pseudonormal) relaxation.   ·  Right Ventricle: Right ventricular cavity size is normal. Systolic function is normal.  ·  Left Atrium: The atrium is moderately dilated. ·  Aortic Valve: There is mild regurgitation. There is aortic valve sclerosis. ·  Mitral Valve: There is mild annular calcification. There is mild regurgitation. ·  Tricuspid Valve: There is mild regurgitation. Pulmonary artery systolic pressures are estimated at 37 mmHg. ·  Aorta: The aortic root is ectatic at 3.8 cm. ·  Compared to report from November 22, 2019, there is now grade 2 diastolic dysfunction with aortic root ectasia and aortic valve sclerosis. .       Plan Factors-    Chart reviewed. EKG reviewed. Imaging results reviewed. Existing labs reviewed. Patient summary reviewed. Induction-     Postoperative Plan-     Informed Consent- Anesthetic plan and risks discussed with patient.

## 2023-10-13 NOTE — CONSULTS
Consult Service: Gastroenterology      PATIENT INFORMATION      June A [address] [facility] 80 y.o. female MRN: [id number]  Unit/Bed#: E2 -01 Encounter: [id number]  PCP: Bridgette Perez DO  Date of Admission:  10/13/2023  Date of Consultation: 10/13/23  Requesting Physician: Sangeetha Goodwin DO       ASSESSMENTS & PLAN   Buffy Pizano is a 80 y.o. old female with PMH of HTN, COPD, asthma, paroxysmal atrial fibrillation not on AC, hx of salivary gland cancer, hypothyroidism, duodenal ulcer [facility]) on 2/2023 EGD presenting with dysphagia and back pain, with GI consulted for dysphagia. Dysphagia  Sx began 1 week ago with dysphagia to solids only, now with worsening pains and several episodes of emesis yesterday  Ddx includes esophageal stricture, ring, web, pseudoachalasia/malignancy given reported weight loss  Given severe pain and regurgitation yesterday, reasonable to pursue upper endoscopy today to assess sx  NPO    Hepatic Cysts  Noted on imaging  LFTs normal  Outpt f/u    REASON FOR CONSULTATION      Dysphagia    HISTORY OF PRESENT ILLNESS      Buffy iPzano is a 80 y.o. female with PMH of HTN, COPD, asthma, paroxysmal atrial fibrillation not on AC, hx of salivary gland cancer, hypothyroidism, duodenal ulcer [facility]) on 2/2023 EGD presenting with dysphagia and back pain, with GI consulted for dysphagia. Admitted 2/2023 for R arm pain and noted to have anemia, at the time was on [facility] for a fib. Underwent EGD and colonoscopy showing type 1 hiatal hernia 3 cm, single [facility] III duodenal ulcer. Colonoscopy with large amount of solid stool in rectum and procedure aborted. Biopsies from EGD showed chronic mild inactive gastritis, no H. pylori infection. No longer on AC or aspirin. Presenting now with esophageal pain and back pain, onset 11 pm last night, associated w 3 episodes of emesis.  Has had discomfort w solid food for several days, 10 lb unintentional weight loss over 3-4 months, and feeling like solid food gets stuck in lower esophagus. Labs w BUN elevation to 27, normal Cr. BUN chronically elevated since prior admisison. Liver chemistries, CBC normal. Troponins negative. CTA dissection protocol chest abdomen and pelvis obtained, showed fusiformy aneurysmal dilation of aorta to 4.8, no dissection, scattered emphysematous changes, simple hepatic cysts, moderate amount of stool in colon. On interview, pt states sx began last week with dysphagia and pain in mid chest with swallowing solid foods such as toast. Has had no difficulty with softer foods such as eggs, and no difficulty with liquids. REVIEW OF SYSTEMS     A thorough 12-point review of systems has been conducted. Pertinent positives and negatives are mentioned in the history of present illness.        PAST MEDICAL & SURGICAL HISTORY      Past Medical History:   Diagnosis Date    A-fib [facility])     Allergy to IVP dye 06/04/2013    pt felt heaviness, palpitations    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), wet ([address])     bilateral    Aneurysm of aortic root ([address])     last assessed - [id number]    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HCC)     Aortic root dilatation ([address])     last assessed - [id number]    Arthritis     Ascending aortic aneurysm ([address])     last assessed - [id number]    Asthma     Basal cell carcinoma     last assessed - [id number]    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[address])     Disease of thyroid gland     Facet arthropathy, cervical     last assessed - [id number]    History of fracture of vertebral column     Hyperlipidemia     Hypertension     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[address])     Hypoxia     last assessed - [id number]    Junctional rhythm     last assessed - 88Vtc9309    Lightheadedness     last assessed - 48Bzp2857    Migraine     Palpitations     last assessed - 57Xkk2470    Pleural effusion, bilateral     last assessed - 62Mop3711    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HCC)     Shortness of breath     last assessed - 81Cjs1420    Thyroid trouble     Trigger finger last assessed - 02HCA8047    Trigger middle finger of right hand     last assessed - 2016    Urinary incontinence     last assessed -  ; Resolved - [id number]       Past Surgical History:   Procedure Laterality Date    ABDOMINAL AORTIC ANEURYSM REPAIR W/ ENDOLUMINAL GRAFT  2015    Ascending aorta and hemiarch replacement with 30 mm Vascutek Gelweave graft; Managed by Fabby Paul; last assessed - [id number]    APPENDECTOMY      BLADDER SURGERY      Reccurent histoy of bladder surgery    HOROWITZ PROCEDURE      CARDIAC CATHETERIZATION  2004    Procedure summary - Luminal irregularities; last assessed - [id number]     SECTION      CORONARY ANEURYSM REPAIR      CYSTOSCOPY      botox injection    HYSTERECTOMY      TX NDSC NJX IMPLT MATRL URT&/BLDR NCK N/A 2017    Procedure: Alison Mortimer; Adelaida Morse BULKING AGENT INJECTION ;  Surgeon: Lucio Hernandez MD;  Location: [facility];  Service: Urology    TX TENDON SHEATH INCISION Right 10/18/2016    Procedure: LONG FINGER TRIGGER RELEASE ;  Surgeon: Rena Carrillo MD;  Location: [facility];  Service: Orthopedics    THYROIDECTOMY      Total Thyroidectomy    TONSILLECTOMY AND ADENOIDECTOMY           MEDICATIONS & ALLERGIES       Medications:   Prior to Admission medications    Medication Sig Start Date End Date Taking?  Authorizing Provider   albuterol (2.5 mg/3 mL) 0.083 % nebulizer solution Take 2.5 mg by nebulization every 6 (six) hours as needed for wheezing or shortness of breath    Historical Provider, MD   albuterol (PROVENTIL HFA,VENTOLIN HFA) 90 mcg/act inhaler Inhale 2 puffs every 6 (six) hours as needed for wheezing 23   SARAHI Mi   amLODIPine (NORVASC) 2.5 mg tablet Take 1 tablet (2.5 mg total) by mouth daily 23   Bertha Miller MD   benazepril (LOTENSIN) 40 MG tablet Take 1 tablet (40 mg total) by mouth daily 23   Bertha Miller MD   calcium carbonate (OS-TAE) 600 MG tablet Take 600 mg by mouth daily in the early morning      Historical Provider, MD   Diclofenac Sodium (VOLTAREN) 1 % Apply 2 g topically 4 (four) times a day  Patient taking differently: Apply 2 g topically 4 (four) times a day as needed 23   Lizbeth Lewis MD   gabapentin (NEURONTIN) 100 mg capsule Take 1 capsule (100 mg total) by mouth daily at bedtime At night 23   Lizbeth Lion DO   levothyroxine 125 mcg tablet TAKE ONE TABLET BY MOUTH DAILY IN THE EARLY MORNING 23   Lizbeth Lion,    lovastatin (MEVACOR) 20 mg tablet TAKE ONE TABLET BY MOUTH IN THE MORNING 23   Lizbeth Lion, DO   metoprolol succinate (TOPROL-XL) 25 mg 24 hr tablet Take 1 tablet (25 mg total) by mouth daily 23   Rachell Mckay MD   Multiple Vitamins-Minerals (One Daily Multivitamin Women) TABS Apply 1 tablet to the mouth or throat daily. Indications: Treatment to Prevent Vitamin Deficiency 23   Lizbeth Lion DO   pantoprazole (PROTONIX) 40 mg tablet Take 1 tablet (40 mg total) by mouth 2 (two) times a day before meals 3/18/23 7/18/23  Lizbeth Lion DO       Allergies: Allergies   Allergen Reactions    Amiodarone Hives    Omnipaque [Iohexol] Hives and Shortness Of Breath    Clindamycin Other (See Comments)     When taken causes cdiff immediately    Other     Sulfa Antibiotics Hives     itching    Acetazolamide Hives and Rash     Reaction Date:Unknown    Iv Dye  [Iodinated Contrast Media] Hypertension and Palpitations     [facility] ([place], [place]) - [id number]: Verified with patient     Naprosyn [Naproxen] Itching and Rash     Category: Allergy;          SOCIAL HISTORY      Marital Status:      Substance Use History:   Social History     Substance and Sexual Activity   Alcohol Use Not Currently    Comment: Social drinker     Social History     Tobacco Use   Smoking Status Former    Packs/day: 0.25    Years: 57.00    Total pack years: 14.25    Types: Cigarettes    Start date: 36    Quit date: 2014    Years since quittin.8   Smokeless Tobacco Former   Tobacco Comments    smoked 17 yrs 1/2 ppd quit and restarted then quit  10 days ago during Christmas 2014      Social History     Substance and Sexual Activity   Drug Use No         FAMILY HISTORY      Non-Contributory      PHYSICAL EXAM     Vitals:   Blood Pressure: 159/75 (10/13/23 0500)  Pulse: 85 (10/13/23 0500)  Temperature: 98.1 °F (36.7 °C) (10/13/23 0041)  Temp Source: Oral (10/13/23 0041)  Respirations: 20 (10/13/23 0500)  Weight - Scale: 54.7 kg (120 lb 9.5 oz) (10/13/23 0722)  SpO2: 96 % (10/13/23 0500)    Physical Exam:   GENERAL: NAD  HEENT:  NC/AT, No Scleral Icterus  CARDIAC:  Regular Rate  PULMONARY:  Non-Labored Breathing, No Respiratory Distress  ABDOMEN:  Soft, No Rebound/Guarding/Rigidity, No Organomegaly, No Tenderness  EXTREMITIES:  No Edema, Cyanosis, or Clubbing  NEUROLOGIC:  Alert  SKIN:  No Rashes or Erythema    ADDITIONAL DATA     Lab Results:       Lab Units 10/13/23  0103 04/28/23  1458 04/10/23  0956 03/20/23  0742 02/08/23  0449 02/07/23  0428 02/06/23  0355 02/05/23  0909   SODIUM mmol/L 138 136 136 138 140 141 139 137   POTASSIUM mmol/L 4.2 4.1 4.0 4.1 3.6 3.3* 3.7 4.0   CHLORIDE mmol/L 100 103 102 105 109* 110* 107 104   CO2 mmol/L 30 29 33* 30 25 25 23 26   BUN mg/dL 27* 35* 39* 31* 11 12 36* 53*   CREATININE mg/dL 0.82 0.95 0.95 0.94 0.50* 0.53* 0.66 0.73   GLUCOSE RANDOM mg/dL 142*  --   --   --  93 87 103 136   CALCIUM mg/dL 8.8 9.4 9.0 8.7 7.4* 7.1* 7.6* 8.3*            Lab Units 10/13/23  0103 02/05/23  0909 01/14/23  0421 01/06/23  1249 09/06/22  0837   TOTAL PROTEIN g/dL 7.1 5.8* 7.2 6.9 7.3   ALBUMIN g/dL 4.2 3.7 3.9 3.8 3.8   TOTAL BILIRUBIN mg/dL 0.37 0.35 0.52 0.60 0.58   AST U/L 23 12* 27 17 19   ALT U/L 18 11 27 23 25   ALK PHOS U/L 51 33* 63 54 60           Lab Units 10/13/23  0103 04/28/23  0954 04/10/23  0833 03/20/23  0742 02/21/23  0649   WBC Thousand/uL 8.83 6.45 6.59 6.54 7.90   HEMOGLOBIN g/dL 12.3 11.7 10.9* 10.3* 8.8*   HEMATOCRIT % 39.0 37.1 34.9 33.2* 28.4*   PLATELETS Thousands/uL 199 214 226 244 310   MCV fL 94 90 91 95 94       Lab Results   Component Value Date    IRON 44 (L) 02/05/2023    TIBC 237 (L) 02/05/2023    FERRITIN 35 02/05/2023       Lab Results   Component Value Date    INR 0.97 10/13/2023    INR 0.93 10/20/2020    INR 1.00 04/24/2017    PROTIME 12.9 10/13/2023    PROTIME 12.6 10/20/2020    PROTIME 13.0 04/24/2017         Microbiology Results:        Imaging:  Procedure: CTA dissection protocol chest abdomen pelvis w wo contrast    Result Date: 10/13/2023  Narrative: CTA - CHEST, ABDOMEN AND PELVIS - WITHOUT AND WITH IV CONTRAST INDICATION:   severe CP that radiated to back. Hx of aortic aneurysm s/p repair. . COMPARISON: CT abdomen pelvis dated 2/5/2023 TECHNIQUE: CT examination of the chest, abdomen and pelvis was performed both prior to and after the administration of intravenous contrast.  The noncontrast portion of this examination was performed utilizing low radiation dose technique. Thin section  angiographic arterial phase post contrast technique was used in order to evaluate for aortic dissection. 3D reformatted images and volume rendering were performed on an independent workstation. Additionally, axial, sagittal, and coronal 2D reformatted  images were created from the source data and submitted for interpretation. Radiation dose length product (DLP) for this visit:  431.17 mGy-cm . This examination, like all CT scans performed in the [facility], was performed utilizing techniques to minimize radiation dose exposure, including the use of iterative  reconstruction and automated exposure control. IV Contrast:  65 mL of iohexol (OMNIPAQUE) Enteric Contrast:  Enteric contrast was not administered. FINDINGS: AORTA: Fusiform aneurysmal dilatation of the aorta, measures approximately 4.8 cm in maximal dimensions. Mild to moderate aortic atherosclerosis. No aortic dissection identified.  CHEST LUNGS/PLEURA: Mild scattered pulmonary emphysematous changes. Biapical pleural/parenchymal scarring in the bilateral apices. Atelectasis/scarring noted dependently in the bilateral lower lung fields. Visualized lungs otherwise appear grossly clear. HEART/PULMONARY ARTERIAL TREE: The heart appears mildly enlarged. Prominence of the central pulmonary arteries which may suggest a component of pulmonary arterial hypertension. No pulmonary embolism is seen. Moderate coronary atherosclerosis. MEDIASTINUM AND ALL: Postsurgical changes in the anterior mediastinum CHEST WALL AND LOWER NECK:   Median sternotomy changes. Status post thyroidectomy. ABDOMEN LIVER/BILIARY TREE:  There are one or more hepatic simple cyst(s) present. No CT evidence of suspicious solid hepatic mass. Normal hepatic contours. No biliary dilatation. GALLBLADDER:  No calcified gallstones. No pericholecystic inflammatory change. SPLEEN:  Unremarkable. PANCREAS:  Unremarkable. ADRENAL GLANDS:  Unremarkable. KIDNEYS/URETERS: Subcentimeter nonobstructing stone in the lower pole of the right kidney. Several scattered subcentimeter low-density lesions in the bilateral kidneys, too small to definitively characterize. No hydronephrosis. STOMACH AND BOWEL: Moderate amount of stool in the colon. No evidence of bowel obstruction. Otherwise grossly unremarkable. APPENDIX: Not well seen ABDOMINOPELVIC CAVITY:  No ascites or free intraperitoneal air. No lymphadenopathy. PELVIS REPRODUCTIVE ORGANS: Uterus not well seen, suspect prior hysterectomy. URINARY BLADDER:  Unremarkable. ABDOMINAL WALL/INGUINAL REGIONS: Body wall edema. OSSEOUS STRUCTURES: Compression fracture of T12 redemonstrated. Mild compression fractures of T2, T3, T4, and T5, age indeterminate. Generalized osteopenia. Degenerative changes of the bilateral hips. No acute fracture or destructive osseous lesion seen otherwise.      Impression: Fusiform aneurysmal dilatation of the aorta, measures approximately 4.8 cm in maximal dimensions. Mild to moderate aortic atherosclerosis. No aortic dissection identified. Mild scattered pulmonary edematous changes with biapical pleural/parenchymal scarring. No acute pulmonary process is seen. Mild cardiomegaly. Moderate coronary atherosclerosis. Prominence of the central pulmonary arteries may suggest a component of pulmonary arterial hypertension. Right-sided nephrolithiasis, no hydronephrosis. Moderate amount of stool in the colon, may suggest a component of constipation; no evidence of bowel obstruction. Compression fracture of T12 redemonstrated. Mild compression fractures of T2, T3, T4, and T5, age indeterminate. Other findings as above. Workstation performed: [id number]     Narrative/Impressions - 3 day look back     EKG, Pathology, and Other Studies Reviewed on Admission:   EKG: Reviewed    Counseling / Coordination of Care Time: 30 total mins spent n consult. Greater than 50% of total time spent on patient counseling and coordination of care. ............................................................................................................................................... Jaz Walters M.D.  PGY-5 Gastroenterology Fellow  [facility]  Available on [email or web address]  Recommendations not final until attending attestation

## 2023-10-13 NOTE — QUICK NOTE
EGD with no obstructing lesion (see EGD report), empirically dilated. Recommend speech eval prior to discharge and outpt f/u. GI to sign off, please call us back if additional questions or concerns.     Dinorah Reece MD  PGY-5 Gastroenterology Fellow

## 2023-10-13 NOTE — ASSESSMENT & PLAN NOTE
Reports simultaneous esophageal and back pain associated with meals  Pain became severe last night after eating a cookie  S/p EGD 2/2023 showing erythematous mucosa with erosion in the stomach and antrum, esophagus appeared normal; hiatal hernia; duodenal ulcer  Will ask SLP to evaluate  NPO for now  Continue PPI  Aspiration precautions   GI consult

## 2023-10-13 NOTE — ASSESSMENT & PLAN NOTE
Known history of aortic atherosclerosis  CT showing Fusiform aneurysmal dilatation of the aorta, measures approximately 4.8 cm in maximal dimensions. Mild to moderate aortic atherosclerosis. No aortic dissection identified.    Per cardiology not a candidate for repair due to age

## 2023-10-14 VITALS
RESPIRATION RATE: 19 BRPM | OXYGEN SATURATION: 95 % | BODY MASS INDEX: 22.06 KG/M2 | HEART RATE: 64 BPM | DIASTOLIC BLOOD PRESSURE: 60 MMHG | SYSTOLIC BLOOD PRESSURE: 129 MMHG | TEMPERATURE: 98.7 F | WEIGHT: 120.59 LBS

## 2023-10-14 PROBLEM — R07.81 RIB PAIN: Status: ACTIVE | Noted: 2023-10-14

## 2023-10-14 LAB
ANION GAP SERPL CALCULATED.3IONS-SCNC: 6 MMOL/L
BASOPHILS # BLD AUTO: 0.05 THOUSANDS/ÂΜL (ref 0–0.1)
BASOPHILS NFR BLD AUTO: 1 % (ref 0–1)
BUN SERPL-MCNC: 16 MG/DL (ref 5–25)
CALCIUM SERPL-MCNC: 8.8 MG/DL (ref 8.4–10.2)
CHLORIDE SERPL-SCNC: 99 MMOL/L (ref 96–108)
CO2 SERPL-SCNC: 32 MMOL/L (ref 21–32)
CREAT SERPL-MCNC: 0.71 MG/DL (ref 0.6–1.3)
CRP SERPL QL: 42.1 MG/L
EOSINOPHIL # BLD AUTO: 0.21 THOUSAND/ÂΜL (ref 0–0.61)
EOSINOPHIL NFR BLD AUTO: 3 % (ref 0–6)
ERYTHROCYTE [DISTWIDTH] IN BLOOD BY AUTOMATED COUNT: 13.4 % (ref 11.6–15.1)
ERYTHROCYTE [SEDIMENTATION RATE] IN BLOOD: 25 MM/HOUR (ref 0–29)
GFR SERPL CREATININE-BSD FRML MDRD: 74 ML/MIN/1.73SQ M
GLUCOSE SERPL-MCNC: 102 MG/DL (ref 65–140)
HCT VFR BLD AUTO: 39.1 % (ref 34.8–46.1)
HGB BLD-MCNC: 12.4 G/DL (ref 11.5–15.4)
IMM GRANULOCYTES # BLD AUTO: 0.04 THOUSAND/UL (ref 0–0.2)
IMM GRANULOCYTES NFR BLD AUTO: 1 % (ref 0–2)
LYMPHOCYTES # BLD AUTO: 1.94 THOUSANDS/ÂΜL (ref 0.6–4.47)
LYMPHOCYTES NFR BLD AUTO: 24 % (ref 14–44)
MCH RBC QN AUTO: 29.8 PG (ref 26.8–34.3)
MCHC RBC AUTO-ENTMCNC: 31.7 G/DL (ref 31.4–37.4)
MCV RBC AUTO: 94 FL (ref 82–98)
MONOCYTES # BLD AUTO: 1.21 THOUSAND/ÂΜL (ref 0.17–1.22)
MONOCYTES NFR BLD AUTO: 15 % (ref 4–12)
NEUTROPHILS # BLD AUTO: 4.57 THOUSANDS/ÂΜL (ref 1.85–7.62)
NEUTS SEG NFR BLD AUTO: 56 % (ref 43–75)
NRBC BLD AUTO-RTO: 0 /100 WBCS
PLATELET # BLD AUTO: 193 THOUSANDS/UL (ref 149–390)
PMV BLD AUTO: 11.2 FL (ref 8.9–12.7)
POTASSIUM SERPL-SCNC: 3.8 MMOL/L (ref 3.5–5.3)
RBC # BLD AUTO: 4.16 MILLION/UL (ref 3.81–5.12)
SODIUM SERPL-SCNC: 137 MMOL/L (ref 135–147)
WBC # BLD AUTO: 8.02 THOUSAND/UL (ref 4.31–10.16)

## 2023-10-14 PROCEDURE — 99239 HOSP IP/OBS DSCHRG MGMT >30: CPT | Performed by: INTERNAL MEDICINE

## 2023-10-14 PROCEDURE — C9113 INJ PANTOPRAZOLE SODIUM, VIA: HCPCS | Performed by: INTERNAL MEDICINE

## 2023-10-14 PROCEDURE — 85025 COMPLETE CBC W/AUTO DIFF WBC: CPT | Performed by: NURSE PRACTITIONER

## 2023-10-14 PROCEDURE — 80048 BASIC METABOLIC PNL TOTAL CA: CPT | Performed by: NURSE PRACTITIONER

## 2023-10-14 PROCEDURE — 85652 RBC SED RATE AUTOMATED: CPT | Performed by: INTERNAL MEDICINE

## 2023-10-14 PROCEDURE — 86140 C-REACTIVE PROTEIN: CPT | Performed by: INTERNAL MEDICINE

## 2023-10-14 RX ORDER — DIPHENHYDRAMINE HYDROCHLORIDE AND LIDOCAINE HYDROCHLORIDE AND ALUMINUM HYDROXIDE AND MAGNESIUM HYDRO
10 KIT 3 TIMES DAILY
Status: DISCONTINUED | OUTPATIENT
Start: 2023-10-14 | End: 2023-10-14

## 2023-10-14 RX ORDER — ACETAMINOPHEN 325 MG/1
975 TABLET ORAL EVERY 8 HOURS
Status: DISCONTINUED | OUTPATIENT
Start: 2023-10-14 | End: 2023-10-14 | Stop reason: HOSPADM

## 2023-10-14 RX ORDER — GABAPENTIN 100 MG/1
100 CAPSULE ORAL 3 TIMES DAILY
Status: DISCONTINUED | OUTPATIENT
Start: 2023-10-14 | End: 2023-10-14 | Stop reason: HOSPADM

## 2023-10-14 RX ORDER — PREDNISONE 20 MG/1
40 TABLET ORAL ONCE
Status: COMPLETED | OUTPATIENT
Start: 2023-10-14 | End: 2023-10-14

## 2023-10-14 RX ORDER — PANTOPRAZOLE SODIUM 40 MG/1
40 TABLET, DELAYED RELEASE ORAL
Status: DISCONTINUED | OUTPATIENT
Start: 2023-10-14 | End: 2023-10-14 | Stop reason: HOSPADM

## 2023-10-14 RX ORDER — GABAPENTIN 100 MG/1
100 CAPSULE ORAL 3 TIMES DAILY
Qty: 30 CAPSULE | Refills: 0 | Status: SHIPPED | OUTPATIENT
Start: 2023-10-14

## 2023-10-14 RX ORDER — METHYLPREDNISOLONE 4 MG/1
TABLET ORAL
Qty: 1 EACH | Refills: 0 | Status: SHIPPED | OUTPATIENT
Start: 2023-10-14 | End: 2023-10-26 | Stop reason: ALTCHOICE

## 2023-10-14 RX ADMIN — LISINOPRIL 40 MG: 20 TABLET ORAL at 14:27

## 2023-10-14 RX ADMIN — ACETAMINOPHEN 325MG 975 MG: 325 TABLET ORAL at 10:42

## 2023-10-14 RX ADMIN — GABAPENTIN 100 MG: 100 CAPSULE ORAL at 10:42

## 2023-10-14 RX ADMIN — LEVOTHYROXINE SODIUM 125 MCG: 25 TABLET ORAL at 05:02

## 2023-10-14 RX ADMIN — PREDNISONE 40 MG: 20 TABLET ORAL at 14:55

## 2023-10-14 RX ADMIN — ACETAMINOPHEN 325MG 650 MG: 325 TABLET ORAL at 05:57

## 2023-10-14 RX ADMIN — PANTOPRAZOLE SODIUM 40 MG: 40 INJECTION, POWDER, FOR SOLUTION INTRAVENOUS at 08:32

## 2023-10-14 RX ADMIN — PANTOPRAZOLE SODIUM 40 MG: 40 TABLET, DELAYED RELEASE ORAL at 15:02

## 2023-10-14 RX ADMIN — GABAPENTIN 100 MG: 100 CAPSULE ORAL at 15:02

## 2023-10-14 RX ADMIN — METOPROLOL SUCCINATE 25 MG: 25 TABLET, EXTENDED RELEASE ORAL at 08:34

## 2023-10-14 RX ADMIN — AMLODIPINE BESYLATE 2.5 MG: 2.5 TABLET ORAL at 08:34

## 2023-10-14 RX ADMIN — Medication 1 TABLET: at 08:34

## 2023-10-14 NOTE — ASSESSMENT & PLAN NOTE
Patient reports pleuritic pain with deep inspiration, pain to palpation of rib cage and sternum  Inflammatory markers elevated  Symptoms are consistent with costochondritis  Patient had improvement in pain with Tylenol, gabapentin  Given inflammatory markers and rheumatologic history we will start short course of steroids  Continue PPI twice daily  Outpatient follow-up with rheumatology

## 2023-10-14 NOTE — UTILIZATION REVIEW
Initial Clinical Review    Pt initially admitted as Observation on 10/13 @ 0541. Changed to Inpatient on 1013 @ 1139. Pt requiring continued stay d/t ongoing workup of esophageal pain. Admission: Date/Time/Statement:   Admission Orders (From admission, onward)       Ordered        10/13/23 1139  Inpatient Admission  Once            10/13/23 0541  Place in Observation  Once                          Orders Placed This Encounter   Procedures    Inpatient Admission     Standing Status:   Standing     Number of Occurrences:   1     Order Specific Question:   Level of Care     Answer:   Med Surg [16]     Order Specific Question:   Estimated length of stay     Answer:   More than 2 Midnights     Order Specific Question:   Certification     Answer:   I certify that inpatient services are medically necessary for this patient for a duration of greater than two midnights. See H&P and MD Progress Notes for additional information about the patient's course of treatment. ED Arrival Information       Expected   -    Arrival   10/13/2023 00:33    Acuity   Urgent              Means of arrival   Ambulance    Escorted by   [facility]    Service   Hospitalist    Admission type   Emergency              Arrival complaint   -             Chief Complaint   Patient presents with    Chest Pain     Pt states eating cookies and began with a sharp stabbing pain that radiates to back. Pt states this has happened before when she eats something hard but the pain is worse tonight. Initial Presentation: 80 y.o. female who presented by EMS to [facility] ED. Inpatient admission for evaluation and treatment of esophageal and back pain associated w/ meals. PMHx: afib, ascending aortic aneurysm, asthma, cancer, HLD, HTN, hypoparathyroidism, migraine. Presented w/ esophageal and back pain, emesis. Tolerates soft food without difficulty, notes a feeling of food getting stuck in her esophagus.  Notes 10 lb unintentional weight loss over 3-4 months. On exam, dry mucous membranes. Constipation noted on imaging. Plan: NPO, PPI, aspiration precautions, bowel regimen, continue PTA meds, Trend labs, replete electrolytes as needed; supportive care, speech eval. GI consulted. GI: Pt w/ dysphagia and hepatic cysts. Dysphagia for solids only. Check upper endoscopy. NPO. IV PPI BID.    10/13 EGD  Indication: Esophageal pain   Anesthesia: General  ASA Score: 3  Findings: The esophagus appeared normal.  2 cm type I hiatal hernia  Empirically dilated the lower esophagus to 20 mm via TTS balloon dilator. No resistance felt. No post-dilation mucosal tear visualized. The stomach and duodenum appeared normal.      Date: 10/14/23   Day 2: Pt tolerating diet w/ minimal symptoms. Reported ongoing discomfort associated w/ movement. Recommended to resume gabapentin and complete short course of steroids. Reports feeling improved. Exam: pain w/ palpation of ribs and sternum. Discharge to home/self-care.     ED Triage Vitals [10/13/23 0041]   Temperature Pulse Respirations Blood Pressure SpO2   98.1 °F (36.7 °C) 71 18 169/79 95 %      Temp Source Heart Rate Source Patient Position - Orthostatic VS BP Location FiO2 (%)   Oral Monitor Lying Right arm --      Pain Score       10 - Worst Possible Pain          Wt Readings from Last 1 Encounters:   10/13/23 54.7 kg (120 lb 9.5 oz)     Additional Vital Signs:   Date/Time Temp Pulse Resp BP MAP (mmHg) SpO2 Calculated FIO2 (%) - Nasal Cannula Nasal Cannula O2 Flow Rate (L/min) O2 Device   10/14/23 0658 98.7 °F (37.1 °C) 76 19 127/71 91 95 % -- -- None (Room air)   10/13/23 2200 98 °F (36.7 °C) 80 20 150/75 98 94 % -- -- None (Room air)   10/13/23 1815 -- -- -- 145/61 88 -- -- -- --   10/13/23 1711 97.2 °F (36.2 °C) Abnormal  67 16 174/69 Abnormal  96 91 % -- -- None (Room air)   10/13/23 1630 -- 67 20 131/62 -- 92 % -- -- None (Room air)   10/13/23 1611 -- 63 16 96/44 Abnormal  -- 92 % -- -- None (Room air)   10/13/23 1439 99.3 °F (37.4 °C) 75 16 175/77 Abnormal  -- 95 % -- -- None (Room air)   10/13/23 0500 -- 85 20 159/75 108 96 % -- -- None (Room air)   10/13/23 0230 -- 80 18 171/79 Abnormal  114 92 % 28 2 L/min Nasal cannula     Pertinent Labs/Diagnostic Test Results:   10/13 - EKG  Normal sinus rhythm with sinus arrhythmia  Poor R wave progression    10/13 - EKG 2  Sinus rhythm with marked sinus arrhythmia  Rightward axis  Possible Anteroseptal infarct (cited on or before 13-OCT-2023)    13/10 - EKG 3  Normal sinus rhythm with sinus arrhythmia  Right axis deviation  Anteroseptal infarct (cited on or before 13-OCT-2023)  Nonspecific ST and T wave abnormality      CTA dissection protocol chest abdomen pelvis w wo contrast   Final Result by Kimani Molina DO (10/13 0927)      Fusiform aneurysmal dilatation of the aorta, measures approximately 4.8 cm in maximal dimensions. Mild to moderate aortic atherosclerosis. No aortic dissection identified. Mild scattered pulmonary edematous changes with biapical pleural/parenchymal scarring. No acute pulmonary process is seen. Mild cardiomegaly. Moderate coronary atherosclerosis. Prominence of the central pulmonary arteries may suggest a component of pulmonary arterial hypertension. Right-sided nephrolithiasis, no hydronephrosis. Moderate amount of stool in the colon, may suggest a component of constipation; no evidence of bowel obstruction. Compression fracture of T12 redemonstrated. Mild compression fractures of T2, T3, T4, and T5, age indeterminate. Other findings as above. Workstation performed: [id number]         XR chest 1 view portable   Final Result by Angélica Singleton MD (10/13 8168)      Similar appearance of an enlarged aortic arch. Please refer to same-day CTA chest for further details. Enlarged central pulmonary arteries, which may reflect pulmonary hypertension. No acute cardiopulmonary disease.       Workstation performed: [id number]               Results from last 7 days   Lab Units 10/14/23  0450 10/13/23  0103   WBC Thousand/uL 8.02 8.83   HEMOGLOBIN g/dL 12.4 12.3   HEMATOCRIT % 39.1 39.0   PLATELETS Thousands/uL 193 199   NEUTROS ABS Thousands/µL 4.57 4.21         Results from last 7 days   Lab Units 10/14/23  0450 10/13/23  0103   SODIUM mmol/L 137 138   POTASSIUM mmol/L 3.8 4.2   CHLORIDE mmol/L 99 100   CO2 mmol/L 32 30   ANION GAP mmol/L 6 8   BUN mg/dL 16 27*   CREATININE mg/dL 0.71 0.82   EGFR ml/min/1.73sq m 74 62   CALCIUM mg/dL 8.8 8.8     Results from last 7 days   Lab Units 10/13/23  0103   AST U/L 23   ALT U/L 18   ALK PHOS U/L 51   TOTAL PROTEIN g/dL 7.1   ALBUMIN g/dL 4.2   TOTAL BILIRUBIN mg/dL 0.37         Results from last 7 days   Lab Units 10/14/23  0450 10/13/23  0103   GLUCOSE RANDOM mg/dL 102 142*     Results from last 7 days   Lab Units 10/13/23  0503 10/13/23  0303 10/13/23  0103   HS TNI 0HR ng/L  --   --  5   HS TNI 2HR ng/L  --  7  --    HSTNI D2 ng/L  --  2  --    HS TNI 4HR ng/L 10  --   --    HSTNI D4 ng/L 5  --   --          Results from last 7 days   Lab Units 10/13/23  0147   PROTIME seconds 12.9   INR  0.97   PTT seconds 33     Results from last 7 days   Lab Units 10/13/23  1717   LIPASE u/L 32         ED Treatment:   Medication Administration from 10/13/2023 0033 to 10/13/2023 0625         Date/Time Order Dose Route Action     10/13/2023 0038 EDT ondansetron (FOR EMS ONLY) (ZOFRAN) 4 mg/2 mL injection 4 mg 0 mg Does not apply Given to EMS     10/13/2023 0144 EDT fentanyl citrate (PF) 100 MCG/2ML 25 mcg 25 mcg Intravenous Given     10/13/2023 0209 EDT diphenhydrAMINE (BENADRYL) injection 50 mg 50 mg Intravenous Given     10/13/2023 0224 EDT iohexol (OMNIPAQUE) 350 MG/ML injection (MULTI-DOSE) 65 mL 65 mL Intravenous Given     10/13/2023 0413 EDT ketorolac (TORADOL) injection 15 mg 15 mg Intravenous Given     10/13/2023 0500 EDT lidocaine (LIDODERM) 5 % patch 1 patch 1 patch Topical Medication Applied     10/13/2023 0459 EDT acetaminophen (TYLENOL) tablet 650 mg 650 mg Oral Given          Past Medical History:   Diagnosis Date    A-fib ([address])     Allergy to IVP dye 06/04/2013    pt felt heaviness, palpitations    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), wet ([address])     bilateral    Aneurysm of aortic root ([address])     last assessed - [id number]    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HCC)     Aortic root dilatation ([address])     last assessed - 39PLP2130    Arthritis     Ascending aortic aneurysm ([address])     last assessed - [id number]    Asthma     Basal cell carcinoma     last assessed - 45Tvg9884    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[address])     Disease of thyroid gland     Facet arthropathy, cervical     last assessed - [id number]    History of fracture of vertebral column     Hyperlipidemia     Hypertension     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[address])     Hypoxia     last assessed - 83QWA6278    Junctional rhythm     last assessed - 92Ftp6662    Lightheadedness     last assessed - 86Hem4838    Migraine     Palpitations     last assessed - 60Wlo8538    Pleural effusion, bilateral     last assessed - 39Vlw2690    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HCC)     Shortness of breath     last assessed - 40Amu4730    Thyroid trouble     Trigger finger     last assessed - 91HOH9984    Trigger middle finger of right hand     last assessed - 02Hin1435    Urinary incontinence     last assessed -  22Jul,2013;  Resolved - 29KRV0808     Present on Admission:   Paroxysmal atrial fibrillation (HCC)   Other constipation   Postoperative hypothyroidism   Atherosclerosis of aorta (HCC)   Essential hypertension   Esophageal pain   Uncomplicated asthma      Admitting Diagnosis: Chest pain [R07.9]  Duodenum ulcer [K26.9]  Esophageal pain [K22.89]  Intractable back pain [M54.9]  Compression fracture of T12 vertebra, initial encounter ([address]) [S22.080A]  Non-traumatic compression fracture of T4 thoracic vertebra, initial encounter ([facility]) [M48.54XA]  Non-traumatic compression fracture of T3 thoracic vertebra, initial encounter [facility]) [M48.54XA]  Non-traumatic compression fracture of T5 thoracic vertebra, initial encounter ([facility]) [M48.54XA]  Nontraumatic compression fracture of T2 vertebra, initial encounter [facility]) [F08.54FH]  Age/Sex: 80 y.o. female  Admission Orders:  NPO. -- Regular Diet. Aspiration precautions. Up & OOB as tolerated.     Scheduled Medications:  acetaminophen, 975 mg, Oral, Q8H  amLODIPine, 2.5 mg, Oral, Daily  enoxaparin, 40 mg, Subcutaneous, Daily  gabapentin, 100 mg, Oral, TID  levothyroxine, 125 mcg, Oral, Early Morning  lisinopril, 40 mg, Oral, After Lunch  metoprolol succinate, 25 mg, Oral, Daily  multivitamin-minerals, 1 tablet, Oral, Daily  pantoprazole, 40 mg, Intravenous, Q12H RIZWANA  polyethylene glycol, 17 g, Oral, Daily    Continuous IV Infusions:   multi-electrolyte (PLASMALYTE-A/ISOLYTE-S PH 7.4) IV solution  Rate: 75 mL/hr Dose: 75 mL/hr  Freq: Continuous Route: IV  Last Dose: Stopped (10/13/23 1708)  Start: 10/13/23 0900 End: 10/13/23 1718     PRN Meds:  albuterol, 2 puff, Inhalation, Q4H PRN; 10/13 x1  aluminum-magnesium hydroxide-simethicone, 30 mL, Oral, Q6H PRN  Diclofenac Sodium, 2 g, Topical, 4x Daily PRN  HYDROmorphone, 0.2 mg, Intravenous, Q4H PRN  ondansetron, 4 mg, Intravenous, Q6H PRN  oxyCODONE, 5 mg, Oral, Q6H PRN  oxyCODONE, 2.5 mg, Oral, 4x Daily PRN  simethicone, 80 mg, Oral, 4x Daily PRN    pantoprazole (PROTONIX) EC tablet 40 mg  Dose: 40 mg  Freq: Daily (early morning) Route: PO  Start: 10/13/23 0630 End: 10/13/23 0856   acetaminophen (TYLENOL) oral suspension 650 mg  Dose: 650 mg  Freq: Every 4 hours PRN  x1 dose Route: PO  PRN Reasons: mild pain,headaches,fever  Indications of Use: FEVER,HEADACHE,MILD PAIN  Start: 10/13/23 0614 End: 10/13/23 2144   acetaminophen (TYLENOL) tablet 650 mg  Dose: 650 mg  Freq: Every 6 hours PRN  x2 doses Route: PO  PRN Reason: mild pain  Start: 10/13/23 2144 End: 10/14/23 1000         IP CONSULT TO GASTROENTEROLOGY    Network Utilization Review Department  ATTENTION: Please call with any questions or concerns to [phone] and carefully listen to the prompts so that you are directed to the right person. All voicemails are confidential.   For Discharge needs, contact Care Management DC Support Team at [phone] opt. [phone]  Send all requests for admission clinical reviews, approved or denied determinations and any other requests to dedicated fax number below belonging to the campus where the patient is receiving treatment.  List of dedicated fax numbers for the Facilities:  [place] DENIALS (Administrative/Medical Necessity) [phone]   DISCHARGE SUPPORT TEAM (NETWORK) [address] [facility] (Maternity/NICU/Pediatrics) [phone]   [address] 1521 [facility] 1000 [facility] [phone]   [address] 5220 [facility] [address] [address] [facility] [address] [phone]

## 2023-10-14 NOTE — DISCHARGE SUMMARY
[address] [facility]  Discharge- June A Karyle Bran 6/4/1932, 80 y.o. female MRN: [id number]  Unit/Bed#: E2 -01 Encounter: [id number]  Primary Care Provider: Elie Nolan DO   Date and time admitted to hospital: 10/13/2023 12:33 AM    * Esophageal pain  Assessment & Plan  With dysphagia and food sticking  S/p EGD 2/2023 showing erythematous mucosa with erosion in the stomach and antrum, esophagus appeared normal; hiatal hernia; duodenal ulcer  Patient underwent EGD which was unremarkable and underwent empiric dilation  Tolerated full diet on day of discharge  Continue PPI twice daily    Rib pain  Assessment & Plan  Patient reports pleuritic pain with deep inspiration, pain to palpation of rib cage and sternum  Inflammatory markers elevated  Symptoms are consistent with costochondritis  Patient had improvement in pain with Tylenol, gabapentin  Given inflammatory markers and rheumatologic history we will start short course of steroids  Continue PPI twice daily  Outpatient follow-up with rheumatology    Other constipation  Assessment & Plan  CT AP: Moderate amount of stool in the colon, may suggest a component of constipation; no evidence of bowel obstruction  Maintained on bowel regimen while inpatient  Discussed monitoring of her bowel movements as an outpatient    Postoperative hypothyroidism  Assessment & Plan  Levothyroxine 125 mcg    Atherosclerosis of aorta (HCC)  Assessment & Plan  Known history of aortic atherosclerosis  CT showing Fusiform aneurysmal dilatation of the aorta, measures approximately 4.8 cm in maximal dimensions. Mild to moderate aortic atherosclerosis. No aortic dissection identified. Per cardiology not a candidate for repair due to age    Uncomplicated asthma  Assessment & Plan  No acute asthma exacerbation.   Maintained on as needed albuterol    Paroxysmal atrial fibrillation (HCC)  Assessment & Plan  Rate control: Toprol   Per cardiology: She had significant duodenal ulcer/GI bleed and was taken off aspirin. 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    Essential hypertension  Assessment & Plan  Amlodipine, metoprolol and benazepril      Discharging Physician / Practitioner: Delia Mitchell DO  PCP: Jose F Short DO  Admission Date:   Admission Orders (From admission, onward)       Ordered        10/13/23 1139  Inpatient Admission  Once            10/13/23 0541  Place in Observation  Once                          Discharge Date: 10/14/23    Medical Problems       Resolved Problems  Date Reviewed: 10/13/2023   None         Consultations During Hospital Stay:   Christian Lemons TO GASTROENTEROLOGY    Procedures Performed: EGD    Significant Findings / Test Results:     EGD    Result Date: 10/14/2023  Impression: The esophagus appeared normal. 2 cm type I hiatal hernia Empirically dilated the lower esophagus to 20 mm via TTS balloon dilator. No resistance felt. No post-dilation mucosal tear visualized. The stomach and duodenum appeared normal. RECOMMENDATION:  Can follow up in the GI office as an outpatient   Mariam Haro MD     XR chest 1 view portable    Result Date: 10/13/2023  Impression: Similar appearance of an enlarged aortic arch. Please refer to same-day CTA chest for further details. Enlarged central pulmonary arteries, which may reflect pulmonary hypertension. No acute cardiopulmonary disease. Workstation performed: [id number]     CTA dissection protocol chest abdomen pelvis w wo contrast    Result Date: 10/13/2023  Impression: Fusiform aneurysmal dilatation of the aorta, measures approximately 4.8 cm in maximal dimensions. Mild to moderate aortic atherosclerosis. No aortic dissection identified. Mild scattered pulmonary edematous changes with biapical pleural/parenchymal scarring. No acute pulmonary process is seen. Mild cardiomegaly. Moderate coronary atherosclerosis.  Prominence of the central pulmonary arteries may suggest a component of pulmonary arterial hypertension. Right-sided nephrolithiasis, no hydronephrosis. Moderate amount of stool in the colon, may suggest a component of constipation; no evidence of bowel obstruction. Compression fracture of T12 redemonstrated. Mild compression fractures of T2, T3, T4, and T5, age indeterminate. Other findings as above. Workstation performed: [id number]       Incidental Findings: None    Test Results Pending at Discharge (will require follow up): None     Outpatient Tests Requested: None    Reason for Admission: Chest pain/Back Pain/Dysphagia     Hospital Course:     Buffy Velasquez is a 80 y.o. female with past medical history of COPD/asthma, paroxysmal atrial fibrillation hypothyroidism, hypertension, polymyalgia rheumatica, history of duodenal ulcer who presents to the hospital with dysphagia, nausea vomiting, pain radiating to back. Given patient's history of recent duodenal ulcer she underwent EGD which was unremarkable. GI empirically dilated and return to medical floor. She was tolerating diet with minimal symptoms. She still reported significant chest and back discomfort associated with movement and with deep breath. Rib pain associated with inflammatory markers concern for costochondritis. Patient was recommended to resume her gabapentin for pain control and complete a short course of steroids. Outpatient follow-up with rheumatology. Please see above list of diagnoses and related plan for additional information. Condition at Discharge: stable     Discharge Day Visit / Exam:     Subjective:  Patient seen and evaluated at bedside. She feels improved.   She is happy to have a plan of care    Vitals: Blood Pressure: 129/60 (10/14/23 1425)  Pulse: 64 (10/14/23 1425)  Temperature: 98.7 °F (37.1 °C) (10/14/23 0658)  Temp Source: Temporal (10/14/23 0658)  Respirations: 19 (10/14/23 0658)  Weight - Scale: 54.7 kg (120 lb 9.5 oz) (10/13/23 0722)  SpO2: 95 % (10/14/23 Brady  Exam:   Physical Exam  Vitals reviewed. Constitutional:       General: She is not in acute distress. Appearance: She is well-developed. She is not ill-appearing, toxic-appearing or diaphoretic. HENT:      Head: Normocephalic and atraumatic. Mouth/Throat:      Mouth: Mucous membranes are moist.   Eyes:      General: No scleral icterus. Extraocular Movements: Extraocular movements intact. Cardiovascular:      Rate and Rhythm: Normal rate and regular rhythm. Heart sounds: Normal heart sounds. Pulmonary:      Effort: Pulmonary effort is normal. No respiratory distress. Breath sounds: Normal breath sounds. No wheezing or rales. Abdominal:      General: There is no distension. Palpations: Abdomen is soft. Tenderness: There is no abdominal tenderness. There is no guarding or rebound. Musculoskeletal:         General: No swelling or deformity. Comments: Pain with palpation of ribs and sternum   Skin:     General: Skin is warm and dry. Neurological:      General: No focal deficit present. Mental Status: She is alert. Mental status is at baseline. Psychiatric:         Mood and Affect: Mood normal.         Behavior: Behavior normal.         Thought Content: Thought content normal.         Judgment: Judgment normal.           Discharge instructions/Information to patient and family:   See after visit summary for information provided to patient and family. Provisions for Follow-Up Care:  See after visit summary for information related to follow-up care and any pertinent home health orders. Disposition:     Home     Discharge Statement:  I spent 60 minutes discharging the patient. This time was spent on the day of discharge. I had direct contact with the patient on the day of discharge.  Greater than 50% of the total time was spent examining patient, answering all patient questions, arranging and discussing plan of care with patient as well as directly providing post-discharge instructions. Additional time then spent on discharge activities. Discharge Medications:  See after visit summary for reconciled discharge medications provided to patient and family.       ** Please Note: This note has been constructed using a voice recognition system **

## 2023-10-14 NOTE — RESTORATIVE TECHNICIAN NOTE
Restorative Technician Note      Patient Name: Iftikhar Meléndez [facility] Visit Date: 10/14/23  Note Type: Mobility  Patient Position Upon Consult: Bedside chair  Activity Performed: Ambulated  Patient Position at End of Consult: Bedside chair;  All needs within reach

## 2023-10-14 NOTE — ASSESSMENT & PLAN NOTE
With dysphagia and food sticking  S/p EGD 2/2023 showing erythematous mucosa with erosion in the stomach and antrum, esophagus appeared normal; hiatal hernia; duodenal ulcer  Patient underwent EGD which was unremarkable and underwent empiric dilation  Tolerated full diet on day of discharge  Continue PPI twice daily

## 2023-10-14 NOTE — ASSESSMENT & PLAN NOTE
CT AP:  Moderate amount of stool in the colon, may suggest a component of constipation; no evidence of bowel obstruction  Maintained on bowel regimen while inpatient  Discussed monitoring of her bowel movements as an outpatient

## 2023-10-14 NOTE — PLAN OF CARE
Problem: Potential for Falls  Goal: Patient will remain free of falls  Description: INTERVENTIONS:  - Educate patient/family on patient safety including physical limitations  - Instruct patient to call for assistance with activity   - Consult OT/PT to assist with strengthening/mobility   - Keep Call bell within reach  - Keep bed low and locked with side rails adjusted as appropriate  - Keep care items and personal belongings within reach  - Initiate and maintain comfort rounds  - Make Fall Risk Sign visible to staff  - Offer Toileting every  Hours, in advance of need  - Initiate/Maintain alarm  - Obtain necessary fall risk management equipment:   - Apply yellow socks and bracelet for high fall risk patients  - Consider moving patient to room near nurses station  Outcome: Progressing     Problem: MOBILITY - ADULT  Goal: Maintain or return to baseline ADL function  Description: INTERVENTIONS:  -  Assess patient's ability to carry out ADLs; assess patient's baseline for ADL function and identify physical deficits which impact ability to perform ADLs (bathing, care of mouth/teeth, toileting, grooming, dressing, etc.)  - Assess/evaluate cause of self-care deficits   - Assess range of motion  - Assess patient's mobility; develop plan if impaired  - Assess patient's need for assistive devices and provide as appropriate  - Encourage maximum independence but intervene and supervise when necessary  - Involve family in performance of ADLs  - Assess for home care needs following discharge   - Consider OT consult to assist with ADL evaluation and planning for discharge  - Provide patient education as appropriate  Outcome: Progressing  Goal: Maintains/Returns to pre admission functional level  Description: INTERVENTIONS:  - Perform BMAT or MOVE assessment daily.   - Set and communicate daily mobility goal to care team and patient/family/caregiver.    - Collaborate with rehabilitation services on mobility goals if consulted  - Perform Range of Motion times a day. - Reposition patient every  hours.   - Dangle patient  times a day  - Stand patient  times a day  - Ambulate patient  times a day  - Out of bed to chair  times a day   - Out of bed for meals  times a day  - Out of bed for toileting  - Record patient progress and toleration of activity level   Outcome: Progressing     Problem: PAIN - ADULT  Goal: Verbalizes/displays adequate comfort level or baseline comfort level  Description: Interventions:  - Encourage patient to monitor pain and request assistance  - Assess pain using appropriate pain scale  - Administer analgesics based on type and severity of pain and evaluate response  - Implement non-pharmacological measures as appropriate and evaluate response  - Consider cultural and social influences on pain and pain management  - Notify physician/advanced practitioner if interventions unsuccessful or patient reports new pain  Outcome: Progressing     Problem: SAFETY ADULT  Goal: Patient will remain free of falls  Description: INTERVENTIONS:  - Educate patient/family on patient safety including physical limitations  - Instruct patient to call for assistance with activity   - Consult OT/PT to assist with strengthening/mobility   - Keep Call bell within reach  - Keep bed low and locked with side rails adjusted as appropriate  - Keep care items and personal belongings within reach  - Initiate and maintain comfort rounds  - Make Fall Risk Sign visible to staff  - Offer Toileting every  Hours, in advance of need  - Initiate/Maintain alarm  - Obtain necessary fall risk management equipment:   - Apply yellow socks and bracelet for high fall risk patients  - Consider moving patient to room near nurses station  Outcome: Progressing     Problem: DISCHARGE PLANNING  Goal: Discharge to home or other facility with appropriate resources  Description: INTERVENTIONS:  - Identify barriers to discharge w/patient and caregiver  - Arrange for needed discharge resources and transportation as appropriate  - Identify discharge learning needs (meds, wound care, etc.)  - Arrange for interpretive services to assist at discharge as needed  - Refer to Case Management Department for coordinating discharge planning if the patient needs post-hospital services based on physician/advanced practitioner order or complex needs related to functional status, cognitive ability, or social support system  Outcome: Progressing     Problem: Knowledge Deficit  Goal: Patient/family/caregiver demonstrates understanding of disease process, treatment plan, medications, and discharge instructions  Description: Complete learning assessment and assess knowledge base. Interventions:  - Provide teaching at level of understanding  - Provide teaching via preferred learning methods  Outcome: Progressing     Problem: GASTROINTESTINAL - ADULT  Goal: Maintains adequate nutritional intake  Description: INTERVENTIONS:  - Monitor percentage of each meal consumed  - Identify factors contributing to decreased intake, treat as appropriate  - Assist with meals as needed  - Monitor I&O, weight, and lab values if indicated  - Obtain nutrition services referral as needed  Outcome: Progressing     Problem: Nutrition/Hydration-ADULT  Goal: Nutrient/Hydration intake appropriate for improving, restoring or maintaining nutritional needs  Description: Monitor and assess patient's nutrition/hydration status for malnutrition. Collaborate with interdisciplinary team and initiate plan and interventions as ordered. Monitor patient's weight and dietary intake as ordered or per policy. Utilize nutrition screening tool and intervene as necessary. Determine patient's food preferences and provide high-protein, high-caloric foods as appropriate.      INTERVENTIONS:  - Monitor oral intake, urinary output, labs, and treatment plans  - Assess nutrition and hydration status and recommend course of action  - Evaluate amount of meals eaten  - Assist patient with eating if necessary   - Allow adequate time for meals  - Recommend/ encourage appropriate diets, oral nutritional supplements, and vitamin/mineral supplements  - Order, calculate, and assess calorie counts as needed  - Recommend, monitor, and adjust tube feedings and TPN/PPN based on assessed needs  - Assess need for intravenous fluids  - Provide specific nutrition/hydration education as appropriate  - Include patient/family/caregiver in decisions related to nutrition  Outcome: Progressing

## 2023-10-14 NOTE — PROGRESS NOTES
The pantoprazole has been converted to Oral per [facility] IV-to-PO Auto-Conversion Protocol for Adults as approved by the Pharmacy and Therapeutics Committee. The patient met all eligible criteria:  3 Age = 25years old   2) Received at least one dose of the IV form   3) Receiving at least one other scheduled oral/enteral medication   4) Tolerating an oral/enteral diet   and did not have any exclusions:   1) Critical care patient   2) Active GI bleed (IF assessing H2RAs or PPIs)   3) Continuous tube feeding (IF assessing cipro, doxycycline, levofloxacin, minocycline, rifampin, or voriconazole)   4) Receiving PO vancomycin (IF assessing metronidazole)   5) Persistent nausea and/or vomiting   6) Ileus or gastrointestinal obstruction   7) Julia/nasogastric tube set for continuous suction   8) Specific order not to automatically convert to PO (in the order's comments or if discussed in the most recent Infectious Disease or primary team's progress notes).      Hai Villegas, PharmD, [address] [facility] Clinical Pharmacist  [phone] or via [facility] [place] [facility] according to [facility]

## 2023-10-14 NOTE — DISCHARGE INSTR - AVS FIRST PAGE
Dear Analisa Villa,     It was our pleasure to care for you here at [facility]. It is our hope that we were always able to exceed the expected standards for your care during your stay. You were hospitalized due to chest pain radiating to back, rib pain, dysphagia. You underwent an EGD with dilation. Your inflammatory markers were elevated and with your history of polymyalgia rheumatica there is likely an inflammatory component to your symptoms. You were cared for on the 2nd floor under the service of Delia Mitchell DO with the [facility] Internal [facility] Hospitalist Group who covers for your primary care physician (PCP), Jose F Short DO, while you were hospitalized. If you have any questions or concerns related to this hospitalization, you may contact us at [phone]. For follow up as well as medication refills, we recommend that you follow up with your primary care physician. A registered nurse will reach out to you by phone within a few days after your discharge to answer any additional questions that you may have after going home. However, at this time we provide for you here, the most important instructions / recommendations at discharge:     Notable Medication Adjustments -   Resume taking gabapentin. Can take 100 mg 3 times a day over the next week while you are still having pain. Once that improves would reduce dose to just 100 mg every night.   Start medrol dose pack for treatment of inflammation   You can take extra strength tylenol as well 500 mg q 6 hours or 1000 mg q 8 hours PRN  Continue protonix 40 mg BID  Testing Required after Discharge -   None  Important Follow Up Information -   Please see your primary doctor within the next week   I placed a referral to rheumatology   Please review this entire after visit summary as additional general instructions including medication list, appointments, activity, diet, any pertinent wound care, and other additional recommendations from your care team that may be provided for you.       Sincerely,     Ronni Mitchell, DO

## 2023-10-15 NOTE — UTILIZATION REVIEW
NOTIFICATION OF INPATIENT ADMISSION   AUTHORIZATION REQUEST   SERVICING FACILITY:   [address] [place] [address]  Tax ID: [id number]  NPI: [id number] ATTENDING PROVIDER:  Attending Name and NPI#: Eunice Martinez [[id number]]  Address: [address]  Phone: [phone]     ADMISSION INFORMATION:  Place of Service: [address] [place] [address]  Place of Service Code: 21  Inpatient Admission Date/Time: 10/13/23 11:40 AM  Discharge Date/Time: 10/14/2023  3:28 PM  Admitting Diagnosis Code/Description:  Chest pain [R07.9]  Duodenum ulcer [K26.9]  Esophageal pain [K22.89]  Intractable back pain [M54.9]  Compression fracture of T12 vertebra, initial encounter ([address]) [S22.080A]  Non-traumatic compression fracture of T4 thoracic vertebra, initial encounter [facility]) [M48.54XA]  Non-traumatic compression fracture of T3 thoracic vertebra, initial encounter ([address]) [M48.54XA]  Non-traumatic compression fracture of T5 thoracic vertebra, initial encounter ([address]) [M48.54XA]  Nontraumatic compression fracture of T2 vertebra, initial encounter [facility]) [W01.68RC]     UTILIZATION REVIEW CONTACT:  Rod Rosales Utilization   Network Utilization Review Department  Phone: [phone]  Fax [phone]  Email: [email or web address]  Contact for approvals/pending authorizations, clinical reviews, and discharge. PHYSICIAN ADVISORY SERVICES:  Medical Necessity Denial & Hfzh-qv-Paty Review  Phone: [phone]  Fax: [phone]  Email: [email or web address]     DISCHARGE SUPPORT TEAM:  For Patients Discharge Needs & Updates  Phone: [phone] opt. 2 Fax: [phone]  Email: [email or web address]

## 2023-10-16 ENCOUNTER — TELEPHONE (OUTPATIENT)
Age: 88
End: 2023-10-16

## 2023-10-16 DIAGNOSIS — Z71.89 COMPLEX CARE COORDINATION: Primary | ICD-10-CM

## 2023-10-16 DIAGNOSIS — K25.0 ACUTE GASTRIC ULCER WITH HEMORRHAGE: ICD-10-CM

## 2023-10-16 DIAGNOSIS — K92.2 GI BLEED: ICD-10-CM

## 2023-10-16 DIAGNOSIS — D64.9 SYMPTOMATIC ANEMIA: ICD-10-CM

## 2023-10-16 DIAGNOSIS — K26.9 DUODENAL ULCER: ICD-10-CM

## 2023-10-16 RX ORDER — PANTOPRAZOLE SODIUM 40 MG/1
40 TABLET, DELAYED RELEASE ORAL
Qty: 180 TABLET | Refills: 1 | Status: SHIPPED | OUTPATIENT
Start: 2023-10-16 | End: 2024-04-13

## 2023-10-16 NOTE — UTILIZATION REVIEW
NOTIFICATION OF ADMISSION DISCHARGE   This is a Notification of Discharge from [address] [facility]. Please be advised that this patient has been discharge from our facility. Below you will find the admission and discharge date and time including the patient’s disposition. UTILIZATION REVIEW CONTACT:  Cyndi Marshall  Utilization   Network Utilization Review Department  Phone: [phone] x carefully listen to the prompts. All voicemails are confidential.  Email: [email or web address]     ADMISSION INFORMATION  PRESENTATION DATE: 10/13/2023 12:33 AM  OBERVATION ADMISSION DATE:   INPATIENT ADMISSION DATE: 10/13/23 11:40 AM   DISCHARGE DATE: 10/14/2023  3:28 PM   DISPOSITION:Home/Self Care    Network Utilization Review Department  ATTENTION: Please call with any questions or concerns to [phone] and carefully listen to the prompts so that you are directed to the right person. All voicemails are confidential.   For Discharge needs, contact Care Management DC Support Team at [phone] opt. 2  Send all requests for admission clinical reviews, approved or denied determinations and any other requests to dedicated fax number below belonging to the campus where the patient is receiving treatment.  List of dedicated fax numbers for the Facilities:  [place] DENIALS (Administrative/Medical Necessity) [phone]   DISCHARGE SUPPORT TEAM (Network) [phone] [address] [facility] (Maternity/NICU/Pediatrics) [phone]   [address] [facility] [address] [place] [address] Street [address] [place] [address] [phone]   [facility] [address] [facility] [phone]   [address] [place] [address]  [address]  [phone]

## 2023-10-17 ENCOUNTER — PATIENT OUTREACH (OUTPATIENT)
Dept: FAMILY MEDICINE CLINIC | Facility: CLINIC | Age: 88
End: 2023-10-17

## 2023-10-17 ENCOUNTER — TRANSITIONAL CARE MANAGEMENT (OUTPATIENT)
Dept: FAMILY MEDICINE CLINIC | Facility: CLINIC | Age: 88
End: 2023-10-17

## 2023-10-17 NOTE — PROGRESS NOTES
Outpatient Care Management Note:    New HRR referral received. Patient was admitted from 10/13-10/14 with esophageal pain. She had an EGD with dilation and was then tolerating a full diet. She was to start on steroid dose pack for costochondritis and is to follow  up with her PCP and rheumatology. Care manager called Buffy, introduced myself and the care management program. Buffy states that she is still having a lot of pain in her rib area. She has not received her steroid dose pack, gabapentin or her pantoprazole. It was sent to her mail order pharmacy. They are supposed to be overnighting them to her today. Buffy will call CARLOS if she does not receive them as she does not want to wait another day. Buffy states that she is very careful with her swallowing. She is eating mostly soups and soft foods. We discussed eating smaller more frequent meals and trying ensure. Buffy did note that she has lost weight due to her ongoing swallowing issues. Med review completed. Buffy states that she is managing her ADLs and IADLs. She still has a license but does not drive much. We discussed Susana Lobo. Buffy is registered with [facility], but will not use them because she missed too many appts. We discussed [employer]. Buffy is interested in them. CARLOS gave her the contact information to call for information. I recommended she discuss it with her daughter. Buffy has 7 children. One daughter lives locally and [place] son in law is very helpful. Buffy gets most of her food from the food pantry. Her son in law will also  grocery items for her. Initial assessment started. CARLOS gave Buffy my contact information. She knows that my phone goes through my computer and I do not get messages after hours or on weekends. She is aware to call her PCP office directly with any immediate questions. Voice mail message received from Buffy. She has not received her medications. CARLOS called Buffy.  She states that she is working with Ena Ramos. They are trying to see if they can send the prescriptions to [facility] local pharmacy. She will call me back if she needs further assistance.

## 2023-10-18 ENCOUNTER — PATIENT OUTREACH (OUTPATIENT)
Dept: FAMILY MEDICINE CLINIC | Facility: CLINIC | Age: 88
End: 2023-10-18

## 2023-10-18 NOTE — PROGRESS NOTES
Outpatient Care Management Note:    Voice mail message received from June stating that she did get her meds last night around 7pm. The only one she is still waiting on is the pantoprazole. She was appreciative of my calls.

## 2023-10-25 ENCOUNTER — PATIENT OUTREACH (OUTPATIENT)
Dept: FAMILY MEDICINE CLINIC | Facility: CLINIC | Age: 88
End: 2023-10-25

## 2023-10-25 NOTE — PROGRESS NOTES
Outpatient Care Management Note:    Voice mail message left for June, with my contact information, requesting a call back. On Chart Review, it appears patient missed her PCP appt today.

## 2023-10-26 ENCOUNTER — OFFICE VISIT (OUTPATIENT)
Dept: FAMILY MEDICINE CLINIC | Facility: CLINIC | Age: 88
End: 2023-10-26
Payer: COMMERCIAL

## 2023-10-26 ENCOUNTER — RA CDI HCC (OUTPATIENT)
Dept: OTHER | Facility: HOSPITAL | Age: 88
End: 2023-10-26

## 2023-10-26 VITALS
HEART RATE: 66 BPM | TEMPERATURE: 98 F | DIASTOLIC BLOOD PRESSURE: 62 MMHG | HEIGHT: 62 IN | WEIGHT: 111.2 LBS | SYSTOLIC BLOOD PRESSURE: 132 MMHG | OXYGEN SATURATION: 95 % | BODY MASS INDEX: 20.46 KG/M2

## 2023-10-26 DIAGNOSIS — E89.0 POSTOPERATIVE HYPOTHYROIDISM: ICD-10-CM

## 2023-10-26 DIAGNOSIS — K44.9 SLIDING HIATAL HERNIA: Primary | ICD-10-CM

## 2023-10-26 DIAGNOSIS — Z87.39 HISTORY OF POLYMYALGIA RHEUMATICA: ICD-10-CM

## 2023-10-26 DIAGNOSIS — I10 ESSENTIAL HYPERTENSION: ICD-10-CM

## 2023-10-26 PROBLEM — M25.511 ACUTE PAIN OF RIGHT SHOULDER: Status: RESOLVED | Noted: 2023-01-14 | Resolved: 2023-10-26

## 2023-10-26 PROBLEM — K59.09 OTHER CONSTIPATION: Status: RESOLVED | Noted: 2023-10-13 | Resolved: 2023-10-26

## 2023-10-26 PROCEDURE — 99495 TRANSJ CARE MGMT MOD F2F 14D: CPT | Performed by: FAMILY MEDICINE

## 2023-10-26 NOTE — PROGRESS NOTES
Assessment & Plan   Patient is 49-year-old female seen for transitional care visit. She was discharged from the hospital on 10/14. Patient went to the emergency room on 10/13 with complaint of sharp chest pain radiating to her back after eating. I reviewed the hospital record. Discussed EGD -sliding hiatal hernia was seen and dilatation was done. Patient does have a history of ulcer, but no ulcer was seen on this exam.  She also was with severe rib pain. Her CRP was elevated and she was referred to rheumatology. She had been given a short prednisone taper. Her appointment with rheumatology is in December and she is on a wait list.  She was treated for polymyalgia rheumatica in the past.  She also has an appointment to see her cardiologist in December regarding A-fib. Her blood pressure is stable today. She will keep her appointment to see me next month and have blood work to check her thyroid prior to this visit. She also asked to have a blood count done because of her history of ulcer. No ulcer was seen on EGD on this admission. 1. Sliding hiatal hernia    2. Essential hypertension  -     TSH, 3rd generation with Free T4 reflex; Future; Expected date: 10/26/2023  -     CBC and differential; Future; Expected date: 10/26/2023    3. Postoperative hypothyroidism  -     TSH, 3rd generation with Free T4 reflex; Future; Expected date: 10/26/2023    4. History of polymyalgia rheumatica  -     C-reactive protein; Future; Expected date: 10/26/2023       Subjective     Transitional Care Management Review:   Buffy Stveens is a 80 y.o. female here for TCM follow up.      During the TCM phone call patient stated:  TCM Call     Date and time call was made  10/17/2023  7:39 AM    Hospital care reviewed  Records reviewed    Patient was hospitialized at  [facility]    Date of Admission  10/13/23    Date of discharge  10/14/23    Diagnosis  Esophageal pain    Disposition  Home    Were the patients medications reviewed and updated  No    Current Symptoms  Fatigue    Fatigue severity  Moderate      TCM Call     Post hospital issues  Reduced activity    Should patient be enrolled in anticoag monitoring? No    Scheduled for follow up? Patient refused    Did you obtain your prescribed medications  Yes    Do you need help managing your prescriptions or medications  No    Is transportation to your appointment needed  Yes    Specify why  Patient doesn't drive    I have advised the patient to call PCP with any new or worsening symptoms  Suyapa [place]tracy, [address]  Family    Are you recieving any outpatient services  No    Are you recieving home care services  Yes    Types of home care services  Nurse visit    Are you using any community resources  No    Current waiver services  No    Have you fallen in the last 12 months  No    Interperter language line needed  No    Counseling  Patient        Patient is seen for follow-up to hospitalization from 10/13-10/14. Complains of a kind of weakness, hard to stand up. She still does have significant rib pain. She has finished the prednisone prescribed in the hospital.  CT of her chest did reveal age-indeterminate thoracic compression fractures. Her CRP was elevated. Review of Systems   HENT:  Positive for trouble swallowing. Respiratory:  Positive for choking. Musculoskeletal:  Positive for arthralgias, back pain and gait problem. Objective     /62 (BP Location: Left arm, Patient Position: Sitting, Cuff Size: Adult)   Pulse 66   Temp 98 °F (36.7 °C) (Tympanic)   Ht 5' 2" (1.575 m)   Wt 50.4 kg (111 lb 3.2 oz)   SpO2 95%   BMI 20.34 kg/m²      Physical Exam  Vitals and nursing note reviewed. Constitutional:       General: She is not in acute distress. Appearance: She is well-developed. HENT:      Head: Normocephalic. Eyes:      General: No scleral icterus. Neck:      Thyroid: No thyromegaly. Cardiovascular:      Rate and Rhythm: Normal rate and regular rhythm. Heart sounds: Normal heart sounds. No murmur heard. Pulmonary:      Effort: Pulmonary effort is normal.      Breath sounds: Normal breath sounds. Comments: Patient appears to have pain with deep breathing  Musculoskeletal:      Right lower leg: No edema. Left lower leg: No edema. Lymphadenopathy:      Cervical: No cervical adenopathy. Skin:     General: Skin is warm and dry. Neurological:      Mental Status: She is alert and oriented to person, place, and time.    Psychiatric:         Mood and Affect: Mood normal.       Medications have been reviewed by provider in current encounter    Anjelica Murrell DO

## 2023-10-26 NOTE — PATIENT INSTRUCTIONS
Get blood work before next visit.   Get the Covid vaccine and RSV vaccine at the pharmacy two weeks apart

## 2023-10-26 NOTE — PROGRESS NOTES
[address] [facility] coding opportunities       Chart reviewed, no opportunity found: [address]        Patients Insurance     Medicare Insurance: [employer]

## 2023-11-06 ENCOUNTER — PATIENT OUTREACH (OUTPATIENT)
Dept: FAMILY MEDICINE CLINIC | Facility: CLINIC | Age: 88
End: 2023-11-06

## 2023-11-06 NOTE — LETTER
Date: 11/06/23    Dear Arya Arenas,   My name is Nya Tenorio. I am a registered nurse care manager working with   [address]  [facility] [address]  [address] [facility] [address] [phone]. I have not been able to reach you and would like to set a time that I can talk with you over the phone. My work is to help patients that have complex medical conditions get the care they need. This includes patients who may have been in the hospital or emergency room. Please call me with any questions you may have. I look forward to speaking with you.   Sincerely,  Rochester Oh  [phone]  Outpatient Care Manager  Copy:  (primary care physician name and address)

## 2023-11-06 NOTE — TELEPHONE ENCOUNTER
Doesn't need services Quality 226: Preventive Care And Screening: Tobacco Use: Screening And Cessation Intervention: Patient screened for tobacco use and is an ex/non-smoker Detail Level: Detailed

## 2023-11-06 NOTE — PROGRESS NOTES
Outpatient Care Management Note:    Voice mail message for Buffy, with my contact information, requesting a call back.  (2nd attempt). Unable to reach letter sent via my chart. Call received from Buffy. She states that she still has good days and bad days. She is using the gabapentin but notes that it makes her sleepy. She is only taking it twice a day instead of 3 times per day. She takes it in the morning and bedtime. She does feel that it helps her sleep. In the middle of the day, she is using a heating pad. Buffy is aware that gabapentin should not be suddenly stopped and needs to be weaned. She does not think she will have enough to cover her until she sees rheumatology in December. CM instructed her to call Dr Gold Fong when she is running low. Buffy agreed to this plan. Buffy states that her appetite is fair. She thinks she eats better when she has company for a meal. She states that her swallowing is difficult so she watches what she eats and is certain to chew food carefully. Buffy needs PCP blood work before her 11/28 appt. Buffy has my contact information and will call with any questions.

## 2023-11-08 DIAGNOSIS — I10 ESSENTIAL HYPERTENSION: ICD-10-CM

## 2023-11-08 RX ORDER — AMLODIPINE BESYLATE 2.5 MG/1
2.5 TABLET ORAL DAILY
Qty: 90 TABLET | Refills: 0 | Status: SHIPPED | OUTPATIENT
Start: 2023-11-08

## 2023-11-08 RX ORDER — BENAZEPRIL HYDROCHLORIDE 40 MG/1
40 TABLET, FILM COATED ORAL DAILY
Qty: 90 TABLET | Refills: 0 | Status: SHIPPED | OUTPATIENT
Start: 2023-11-08

## 2023-11-16 ENCOUNTER — RA CDI HCC (OUTPATIENT)
Dept: OTHER | Facility: HOSPITAL | Age: 88
End: 2023-11-16

## 2023-11-16 NOTE — PROGRESS NOTES
[address] coding opportunities       Chart reviewed, no opportunity found: [address] Review     Patients Insurance     Medicare Insurance: [employer]

## 2023-11-20 ENCOUNTER — APPOINTMENT (OUTPATIENT)
Dept: LAB | Facility: CLINIC | Age: 88
End: 2023-11-20
Payer: COMMERCIAL

## 2023-11-20 DIAGNOSIS — Z87.39 HISTORY OF POLYMYALGIA RHEUMATICA: ICD-10-CM

## 2023-11-20 DIAGNOSIS — I10 ESSENTIAL HYPERTENSION: ICD-10-CM

## 2023-11-20 DIAGNOSIS — E89.0 POSTOPERATIVE HYPOTHYROIDISM: ICD-10-CM

## 2023-11-20 LAB
BASOPHILS # BLD AUTO: 0.06 THOUSANDS/ÂΜL (ref 0–0.1)
BASOPHILS NFR BLD AUTO: 1 % (ref 0–1)
CRP SERPL QL: 4.8 MG/L
EOSINOPHIL # BLD AUTO: 0.16 THOUSAND/ÂΜL (ref 0–0.61)
EOSINOPHIL NFR BLD AUTO: 3 % (ref 0–6)
ERYTHROCYTE [DISTWIDTH] IN BLOOD BY AUTOMATED COUNT: 13.7 % (ref 11.6–15.1)
HCT VFR BLD AUTO: 36.6 % (ref 34.8–46.1)
HGB BLD-MCNC: 11.6 G/DL (ref 11.5–15.4)
IMM GRANULOCYTES # BLD AUTO: 0.01 THOUSAND/UL (ref 0–0.2)
IMM GRANULOCYTES NFR BLD AUTO: 0 % (ref 0–2)
LYMPHOCYTES # BLD AUTO: 1.55 THOUSANDS/ÂΜL (ref 0.6–4.47)
LYMPHOCYTES NFR BLD AUTO: 25 % (ref 14–44)
MCH RBC QN AUTO: 30.3 PG (ref 26.8–34.3)
MCHC RBC AUTO-ENTMCNC: 31.7 G/DL (ref 31.4–37.4)
MCV RBC AUTO: 96 FL (ref 82–98)
MONOCYTES # BLD AUTO: 0.76 THOUSAND/ÂΜL (ref 0.17–1.22)
MONOCYTES NFR BLD AUTO: 12 % (ref 4–12)
NEUTROPHILS # BLD AUTO: 3.72 THOUSANDS/ÂΜL (ref 1.85–7.62)
NEUTS SEG NFR BLD AUTO: 59 % (ref 43–75)
NRBC BLD AUTO-RTO: 0 /100 WBCS
PLATELET # BLD AUTO: 237 THOUSANDS/UL (ref 149–390)
PMV BLD AUTO: 11.7 FL (ref 8.9–12.7)
RBC # BLD AUTO: 3.83 MILLION/UL (ref 3.81–5.12)
T4 FREE SERPL-MCNC: 1.56 NG/DL (ref 0.61–1.12)
TSH SERPL DL<=0.05 MIU/L-ACNC: 0.13 UIU/ML (ref 0.45–4.5)
WBC # BLD AUTO: 6.26 THOUSAND/UL (ref 4.31–10.16)

## 2023-11-20 PROCEDURE — 86140 C-REACTIVE PROTEIN: CPT

## 2023-11-20 PROCEDURE — 84443 ASSAY THYROID STIM HORMONE: CPT

## 2023-11-20 PROCEDURE — 85025 COMPLETE CBC W/AUTO DIFF WBC: CPT

## 2023-11-20 PROCEDURE — 36415 COLL VENOUS BLD VENIPUNCTURE: CPT

## 2023-11-20 PROCEDURE — 84439 ASSAY OF FREE THYROXINE: CPT

## 2023-11-24 ENCOUNTER — PATIENT OUTREACH (OUTPATIENT)
Dept: FAMILY MEDICINE CLINIC | Facility: CLINIC | Age: 88
End: 2023-11-24

## 2023-11-24 NOTE — PROGRESS NOTES
Outpatient Care Management Note:    Care manager called Buffy. She states that she is doing ok. She is having increased pain. She noted that she weaned herself off and stopped her gabapentin. Now she feels she may need it. She is not sleeping as well either. She is scheduled to see Dr Roman Nicholson on 11/28 and will discuss it with her. She states that she is able to drive to her appt and has no issues with getting there. Buffy states her appetite is ok and states that she eats "deliberately". She denies any difficulty swallowing, nausea or vomiting. Buffy has macular degeneration. She does wear glasses and uses a magnifier. She is connected with [facility]. CM will follow up one more time after her upcoming PCP appt.   If she is doing well and has no needs, I will close the referral.

## 2023-11-27 ENCOUNTER — RA CDI HCC (OUTPATIENT)
Dept: OTHER | Facility: HOSPITAL | Age: 88
End: 2023-11-27

## 2023-11-28 ENCOUNTER — OFFICE VISIT (OUTPATIENT)
Dept: FAMILY MEDICINE CLINIC | Facility: CLINIC | Age: 88
End: 2023-11-28
Payer: COMMERCIAL

## 2023-11-28 VITALS
TEMPERATURE: 98.1 F | RESPIRATION RATE: 20 BRPM | SYSTOLIC BLOOD PRESSURE: 156 MMHG | WEIGHT: 117 LBS | BODY MASS INDEX: 21.4 KG/M2 | HEART RATE: 64 BPM | OXYGEN SATURATION: 96 % | DIASTOLIC BLOOD PRESSURE: 78 MMHG

## 2023-11-28 DIAGNOSIS — I48.0 PAROXYSMAL ATRIAL FIBRILLATION (HCC): ICD-10-CM

## 2023-11-28 DIAGNOSIS — I10 ESSENTIAL HYPERTENSION: ICD-10-CM

## 2023-11-28 DIAGNOSIS — E03.9 HYPOTHYROIDISM, UNSPECIFIED TYPE: ICD-10-CM

## 2023-11-28 DIAGNOSIS — E89.0 POSTOPERATIVE HYPOTHYROIDISM: Primary | ICD-10-CM

## 2023-11-28 PROCEDURE — 99214 OFFICE O/P EST MOD 30 MIN: CPT | Performed by: FAMILY MEDICINE

## 2023-11-28 RX ORDER — LEVOTHYROXINE SODIUM 112 UG/1
112 TABLET ORAL
Qty: 90 TABLET | Refills: 1 | Status: SHIPPED | OUTPATIENT
Start: 2023-11-28

## 2023-11-28 NOTE — PROGRESS NOTES
Name: Buffy Colón      : 1932      MRN: [id number]  Encounter Provider: Stephanie Pruett DO  Encounter Date: 2023   Encounter department: [facility]    Assessment & Plan   Patient is 89-year-old female seen for follow-up to chronic medical conditions.  1. Postoperative hypothyroidism  Assessment & Plan:  TSH low and T4 elevated.    Orders:  -     TSH, 3rd generation with Free T4 reflex; Future; Expected date: 2024    2. Hypothyroidism, unspecified type  -     levothyroxine 112 mcg tablet; Take 1 tablet (112 mcg total) by mouth daily in the early morning    3. Essential hypertension    4. Paroxysmal atrial fibrillation (HCC)    She has gained some weight back since last visit. BMI now 21.4     Medicare wellness due in March  Subjective      Chief Complaint   Patient presents with   • Follow-up       Patient is here for follow up of chronic medical conditions.  Also wants to discuss Gabapentin.  Still with pain in the ribs and back. Sees rheumatology .      Review of Systems   Constitutional:  Negative for chills and fever.   HENT:  Negative for congestion and sore throat.    Respiratory:  Negative for chest tightness.    Cardiovascular:  Negative for chest pain and palpitations.   Gastrointestinal:  Negative for abdominal pain, constipation, diarrhea and nausea.   Genitourinary:  Negative for difficulty urinating.   Musculoskeletal:  Positive for arthralgias and myalgias.   Skin: Negative.    Neurological:  Negative for dizziness and headaches.   Psychiatric/Behavioral: Negative.         Current Outpatient Medications on File Prior to Visit   Medication Sig   • albuterol (2.5 mg/3 mL) 0.083 % nebulizer solution Take 2.5 mg by nebulization every 6 (six) hours as needed for wheezing or shortness of breath   • albuterol (PROVENTIL HFA,VENTOLIN HFA) 90 mcg/act inhaler Inhale 2 puffs every 6 (six) hours as needed for wheezing   • amLODIPine (NORVASC) 2.5 mg tablet Take 1  tablet (2.5 mg total) by mouth daily   • benazepril (LOTENSIN) 40 MG tablet Take 1 tablet (40 mg total) by mouth daily   • calcium carbonate (OS-TAE) 600 MG tablet Take 600 mg by mouth daily in the early morning     • Diclofenac Sodium (VOLTAREN) 1 % Apply 2 g topically 4 (four) times a day (Patient taking differently: Apply 2 g topically 4 (four) times a day as needed)   • lovastatin (MEVACOR) 20 mg tablet TAKE ONE TABLET BY MOUTH IN THE MORNING   • metoprolol succinate (TOPROL-XL) 25 mg 24 hr tablet Take 1 tablet (25 mg total) by mouth daily   • Multiple Vitamins-Minerals (One Daily Multivitamin Women) TABS Apply 1 tablet to the mouth or throat daily. Indications: Treatment to Prevent Vitamin Deficiency   • pantoprazole (PROTONIX) 40 mg tablet Take 1 tablet (40 mg total) by mouth 2 (two) times a day before meals       Objective     /78 (BP Location: Right arm, Patient Position: Sitting, Cuff Size: Child)   Pulse 64   Temp 98.1 °F (36.7 °C) (Temporal)   Resp 20   Wt 53.1 kg (117 lb)   SpO2 96%   BMI 21.40 kg/m²     Physical Exam  Vitals and nursing note reviewed.   Constitutional:       General: She is not in acute distress.  Neck:      Thyroid: No thyromegaly.   Cardiovascular:      Rate and Rhythm: Normal rate and regular rhythm.      Heart sounds: Normal heart sounds.   Pulmonary:      Effort: Pulmonary effort is normal.      Breath sounds: Normal breath sounds.   Musculoskeletal:      Right lower leg: No edema.      Left lower leg: No edema.   Lymphadenopathy:      Cervical: No cervical adenopathy.   Skin:     General: Skin is warm and dry.   Neurological:      Mental Status: She is alert and oriented to person, place, and time.   Psychiatric:         Mood and Affect: Mood normal.       Stephanie Pruett DO

## 2023-11-28 NOTE — PROGRESS NOTES
[address] [facility] coding opportunities       Chart reviewed, no opportunity found: CHART REVIEWED, NO OPPORTUNITY FOUND     GR  malnutrition on BPA     Patients Insurance     Medicare Insurance: [employer]

## 2023-11-28 NOTE — LETTER
To Whom it May Concern:     Buffy Colón ( birthdate 6/4/1932) is under my professional care. Buffy was seen in my office on 11/28/2023. She needs to use incontinence products.     If you have any questions or concerns, please don't hesitate to call.           Sincerely,            Stephanie Pruett, DO

## 2023-12-06 ENCOUNTER — OFFICE VISIT (OUTPATIENT)
Dept: GASTROENTEROLOGY | Facility: MEDICAL CENTER | Age: 88
End: 2023-12-06
Payer: COMMERCIAL

## 2023-12-06 VITALS
BODY MASS INDEX: 21.53 KG/M2 | TEMPERATURE: 97.6 F | OXYGEN SATURATION: 97 % | WEIGHT: 117 LBS | SYSTOLIC BLOOD PRESSURE: 126 MMHG | HEART RATE: 50 BPM | HEIGHT: 62 IN | DIASTOLIC BLOOD PRESSURE: 66 MMHG

## 2023-12-06 DIAGNOSIS — R13.19 OTHER DYSPHAGIA: ICD-10-CM

## 2023-12-06 DIAGNOSIS — Z87.898 HISTORY OF ULCER DISEASE: Primary | ICD-10-CM

## 2023-12-06 PROCEDURE — 99214 OFFICE O/P EST MOD 30 MIN: CPT | Performed by: PHYSICIAN ASSISTANT

## 2023-12-06 NOTE — PROGRESS NOTES
[facility] - Outpatient Follow-up Note  Buffy Colón 80 y.o. female MRN: [id number]  Encounter: [id number]      Assessment and Plan    1. Dysphagia  2. Epigastric pain  The patient had a hospitalization in October with dysphagia to solids and chest pain radiating to her back prompting EGD which revealed a 2cm hiatal hernia otherwise normal. Empiric dilation was done to 20mm. She states she does have to take small bites and chew well but if she does this she has no trouble swallowing.   -Continue to take small bites and chew well, sit upright while eating, drink fluids while eating   -Monitor for worsening dysphagia    3. Anemia  4. PUD  The patient was hospitalized 1/2023 and found to have acute anemia for which she underwent EGD and colonoscopy 2/2023 revealing a 3cm hiatal hernia, gastritis, and a duodenal bulb ulcer. Colonoscopy was aborted due to large amount of stool in the rectum. Repeat EGD 10/13/23 revealed a healed duodenal ulcer. She has no overt bleeding and her HGB is normal at 11.6  -Monitor for anemia or over bleeding    Follow up 4 months, if doing well can follow up as needed    ______________________________________________________________________    History of Present Illness  Buffy Jean is a 80 y.o. female HTN, COPD, asthma, Afib not on AC, salivary gland cancer, hypothyroidism, and duodenal bulb ulcer (2/2023) here for follow up evaluation of dysphagia. The patient had a hospitalization in October with dysphagia to solids and chest pain radiating to her back prompting EGD which revealed a 2cm hiatal hernia otherwise normal. Empiric dilation was done to 20mm. She states she does have to take small bites and chew well but if she does this she has no trouble swallowing. Review of Systems   Constitutional:  Negative for activity change, appetite change, chills, fatigue, fever and unexpected weight change.        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[address])     Disease of thyroid gland     Facet arthropathy, cervical     last assessed - 41PTA3811    History of fracture of vertebral column     Hyperlipidemia     Hypertension     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[address])     Hypoxia     last assessed - 17ILT5432    Junctional rhythm     last assessed - 78Inq2729    Lightheadedness     last assessed - 74Xwx8519    Migraine     Palpitations     last assessed - 80Gxr9550    Pleural effusion, bilateral     last assessed - 49Umk0388    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HCC)     Shortness of breath     last assessed - 84Cbm0089    Thyroid trouble     Trigger finger     last assessed - 77AIT6120    Trigger middle finger of right hand     last assessed - 77Jpf4206    Urinary incontinence     last assessed -  Jul,2013; Resolved - [id number]       Past Social history  Past Surgical History:   Procedure Laterality Date    ABDOMINAL AORTIC ANEURYSM REPAIR W/ ENDOLUMINAL GRAFT  2015    Ascending aorta and hemiarch replacement with 30 mm Vascutek Gelweave graft;  Managed by Berkley Huddleston; last assessed - [id number]    APPENDECTOMY      BLADDER SURGERY      Reccurent histoy of bladder surgery    [facility] PROCEDURE      CARDIAC CATHETERIZATION  2004    Procedure summary - Luminal irregularities; last assessed - [id number]     SECTION      CORONARY ANEURYSM REPAIR      CYSTOSCOPY      botox injection    HYSTERECTOMY      NE Aleksey Byrd IMPLT MATRL URT&/BLDR NCK N/A 2017    Procedure: Harvey Hammond; DURASPHERE-PERIURETHRAL BULKING AGENT INJECTION ;  Surgeon: Chantel Hammonds MD;  Location: [facility];  Service: Urology    UT TENDON SHEATH INCISION Right 10/18/2016    Procedure: LONG FINGER TRIGGER RELEASE ;  Surgeon: Avni Garcia MD;  Location: [facility];  Service: Orthopedics    THYROIDECTOMY      Total Thyroidectomy    TONSILLECTOMY AND ADENOIDECTOMY       Social History     Socioeconomic History    Marital status:       Spouse name: Not on file    Number of children: Not on file    Years of education: Not on file    Highest education level: Not on file   Occupational History    Not on file   Tobacco Use    Smoking status: Former     Packs/day: 0.25     Years: 57.00     Total pack years: 14.25     Types: Cigarettes     Start date: 36     Quit date: 2014     Years since quittin.9    Smokeless tobacco: Former    Tobacco comments:     smoked 17 yrs 1/2 ppd quit and restarted then quit  10 days ago during 2014    Vaping Use    Vaping Use: Never used   Substance and Sexual Activity    Alcohol use: Not Currently     Comment: Social drinker    Drug use: No    Sexual activity: Not Currently   Other Topics Concern    Not on file   Social History Narrative    Not on file     Social Determinants of Health     Financial Resource Strain: Medium Risk (3/8/2023)    Overall Financial Resource Strain (CARDIA)     Difficulty of Paying Living Expenses: Somewhat hard   Food Insecurity: No Food Insecurity (2023)    Hunger Vital Sign     Worried About Running Out of Food in the Last Year: Never true     Ran Out of Food in the Last Year: Never true   Transportation Needs: Unmet Transportation Needs (3/8/2023)    PRAPARE - Transportation     Lack of Transportation (Medical): Yes     Lack of Transportation (Non-Medical): Yes   Physical Activity: Not on file   Stress: Not on file   Social Connections: Not on file   Intimate Partner Violence: Not on file   Housing Stability: Low Risk  (2023) Housing Stability Vital Sign     Unable to Pay for Housing in the Last Year: No     Number of Places Lived in the Last Year: 1     Unstable Housing in the Last Year: No     Social History     Substance and Sexual Activity   Alcohol Use Not Currently    Comment: Social drinker     Social History     Substance and Sexual Activity   Drug Use No     Social History     Tobacco Use   Smoking Status Former    Packs/day: 0.25    Years: 57.00    Total pack years: 14.25    Types: Cigarettes    Start date: 36    Quit date: 2014    Years since quittin.9   Smokeless Tobacco Former   Tobacco Comments    smoked 17 yrs  ppd quit and restarted then quit  10 days ago during 2014        Past Family History  Family History   Problem Relation Age of Onset    Coronary aneurysm Sister     Coronary artery disease Sister         CABG    Heart disease Family         cardiac disorder    Hypertension Family     Cancer Family     Thyroid disease Family        Current Medications  Current Outpatient Medications   Medication Sig Dispense Refill    albuterol (2.5 mg/3 mL) 0.083 % nebulizer solution Take 2.5 mg by nebulization every 6 (six) hours as needed for wheezing or shortness of breath      albuterol (PROVENTIL HFA,VENTOLIN HFA) 90 mcg/act inhaler Inhale 2 puffs every 6 (six) hours as needed for wheezing 25.5 g 3    amLODIPine (NORVASC) 2.5 mg tablet Take 1 tablet (2.5 mg total) by mouth daily 90 tablet 0    benazepril (LOTENSIN) 40 MG tablet Take 1 tablet (40 mg total) by mouth daily 90 tablet 0    calcium carbonate (OS-TAE) 600 MG tablet Take 600 mg by mouth daily in the early morning        Diclofenac Sodium (VOLTAREN) 1 % Apply 2 g topically 4 (four) times a day (Patient taking differently: Apply 2 g topically 4 (four) times a day as needed) 700 g 0    levothyroxine 112 mcg tablet Take 1 tablet (112 mcg total) by mouth daily in the early morning 90 tablet 1    lovastatin (MEVACOR) 20 mg tablet TAKE ONE TABLET BY MOUTH IN THE MORNING 90 tablet 3    metoprolol succinate (TOPROL-XL) 25 mg 24 hr tablet Take 1 tablet (25 mg total) by mouth daily 90 tablet 3    Multiple Vitamins-Minerals (One Daily Multivitamin Women) TABS Apply 1 tablet to the mouth or throat daily. Indications: Treatment to Prevent Vitamin Deficiency      pantoprazole (PROTONIX) 40 mg tablet Take 1 tablet (40 mg total) by mouth 2 (two) times a day before meals 180 tablet 1     No current facility-administered medications for this visit. Allergies  Allergies   Allergen Reactions    Amiodarone Hives    Omnipaque [Iohexol] Hives and Shortness Of Breath    Clindamycin Other (See Comments)     When taken causes cdiff immediately    Other     Sulfa Antibiotics Hives     itching    Acetazolamide Hives and Rash     Reaction Date:Unknown    Iv Dye  [Iodinated Contrast Media] Hypertension and Palpitations     [facility] - [id number]: Verified with patient     Naprosyn [Naproxen] Itching and Rash     Category: Allergy; The following portions of the patient's history were reviewed and updated as appropriate: allergies, current medications, past medical history, past social history, past surgical history and problem list.      Vitals  Vitals:    12/06/23 0909   BP: 126/66   BP Location: Left arm   Pulse: (!) 50   Temp: 97.6 °F (36.4 °C)   SpO2: 97%   Weight: 53.1 kg (117 lb)   Height: 5' 2" (1.575 m)         Physical Exam  Constitutional   General appearance: Patient is seated and in no acute distress, frail appearing   Head and Face   Head and face: Normal.    Eyes   Conjunctiva and lids: No erythema, swelling or discharge. Anicteric. Ears, Nose, Mouth, and Throat   Hearing: Normal.    Neck: Supple, trachea midline. Pulmonary   Respiratory effort: No increased work of breathing or signs of respiratory distress.     Cardiovascular   Examination of extremities for edema and/or varicosities: Normal.    Musculoskeletal   Gait and station: Normal   Skin   Skin and subcutaneous tissue: Warm, dry, and intact. No visible jaundice, lesions or rashes. Psychiatric   Judgment and insight: Normal  Recent and remote memory:  Normal  Mood and affect: Normal       Results  No visits with results within 1 Day(s) from this visit. Latest known visit with results is:   Appointment on 11/20/2023   Component Date Value    TSH 3RD GENERATON 11/20/2023 0.134 (L)     WBC 11/20/2023 6.26     RBC 11/20/2023 3.83     Hemoglobin 11/20/2023 11.6     Hematocrit 11/20/2023 36.6     MCV 11/20/2023 96     MCH 11/20/2023 30.3     MCHC 11/20/2023 31.7     RDW 11/20/2023 13.7     MPV 11/20/2023 11.7     Platelets 90/44/1219 237     nRBC 11/20/2023 0     Neutrophils Relative 11/20/2023 59     Immat GRANS % 11/20/2023 0     Lymphocytes Relative 11/20/2023 25     Monocytes Relative 11/20/2023 12     Eosinophils Relative 11/20/2023 3     Basophils Relative 11/20/2023 1     Neutrophils Absolute 11/20/2023 3.72     Immature Grans Absolute 11/20/2023 0.01     Lymphocytes Absolute 11/20/2023 1.55     Monocytes Absolute 11/20/2023 0.76     Eosinophils Absolute 11/20/2023 0.16     Basophils Absolute 11/20/2023 0.06     CRP 11/20/2023 4.8 (H)     Free T4 11/20/2023 1.56 (H)        Radiology Results  No results found. Orders  No orders of the defined types were placed in this encounter.

## 2023-12-08 ENCOUNTER — PATIENT OUTREACH (OUTPATIENT)
Dept: FAMILY MEDICINE CLINIC | Facility: CLINIC | Age: 88
End: 2023-12-08

## 2023-12-08 NOTE — PROGRESS NOTES
Outpatient Care Management Note:    Care manager called Buffy. She states that she is doing well. She saw both Dr Berta Cedillo and BETTY. Dr Berta Cedillo did adjust her levothyroxine to 112 mcg daily and Buffy states that she is taking it as ordered. BETTY encouraged her to continue to chew all food carefully and completely. Buffy denies any current needs. Cm will close the referral. Buffy will keep my contact information and will call with any questions.

## 2023-12-13 ENCOUNTER — TELEPHONE (OUTPATIENT)
Dept: PULMONOLOGY | Facility: CLINIC | Age: 88
End: 2023-12-13

## 2023-12-13 NOTE — TELEPHONE ENCOUNTER
Left message for patient to call back to set up follow up with Dr Malina Carrion in the [facility]

## 2023-12-19 NOTE — PROGRESS NOTES
Rheumatology Outpatient Consult Note  12/20/2023       Jeremiah Collier DO  [address] [place] [address]    Reason for referral: costochondritis, PMR    Assessment: 89 y.o. female referred for the above.    Her main issue is her osteoporosis which is probably driving at least some of her back pain given the area of her pain and the areas of fracture on recent CT.    Given likely overtreatment with oral BP in the past and lateral L thigh pain want to eval for AFF    She doesn't have PMR    Encounter Diagnosis     ICD-10-CM    1. Age-related osteoporosis with current pathological fracture, vertebra(e), initial encounter for fracture ([facility])  M80.08XA Ambulatory Referral to Rheumatology     acetaminophen (TYLENOL) 650 mg CR tablet     Vitamin D 25 hydroxy     XR femur 2 vw left     XR femur 2 vw right     XR tibia fibula 2 vw left     DXA bone density spine hip and pelvis     Ambulatory Referral to Physical Therapy     Comprehensive metabolic panel      2. Intractable back pain  M54.9 Ambulatory Referral to Rheumatology     Ambulatory Referral to Physical Therapy          Plan:  -As above  -Reed underwood, discussed r:b including ONJ risk, patient amenable    Jed Tuttle DO, CCD    Jed Tuttle DO, MELCHOR  [facility]     History: June A Katarzyna is a(n) 89 y.o. female who is referred for the above. PMH apparent history PMR, osteoporosis with multiple thoracic compression fractures previously on alendronate for unknown period of time, but more than 10 years and possibly up to 20 per her report.    Her main concern is spinal pain that has made it difficult for her to do things she is normally able to do without issue. She is very independent and active. Sometimes the back pain spreads across her ribs particularly on the L side. She does also have some pain on the lateral aspect of her L thigh. She also has L shin pain with weightbearing    She tells me that nobody ever told her about the  thoracic compression fractures that were seen on the recent CT.    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), wet (HCC)     bilateral    Aneurysm of aortic root (HCC)     last assessed - 2017    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HCC)     Aortic root dilatation (HCC)     last assessed - 2017    Arthritis     Ascending aortic aneurysm (HCC)     last assessed - 2017    Asthma     Basal cell carcinoma     last assessed - 2016    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HCC)     Disease of thyroid gland     Facet arthropathy, cervical     last assessed - 2017    History of fracture of vertebral column     Hyperlipidemia     Hypertension     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HCC)     Hypoxia     last assessed - 2015    Junctional rhythm     last assessed - 2017    Lightheadedness     last assessed - 2016    Migraine     Palpitations     last assessed - 2016    Pleural effusion, bilateral     last assessed - 2015    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HCC)     Shortness of breath     last assessed - 2017    Thyroid trouble     Trigger finger     last assessed - 2016    Trigger middle finger of right hand     last assessed - 2016    Urinary incontinence     last assessed -  ; Resolved - 2017       Past Surgical History:   Procedure Laterality Date    ABDOMINAL AORTIC ANEURYSM REPAIR W/ ENDOLUMINAL GRAFT  2015    Ascending aorta and hemiarch replacement with 30 mm Vascutek Gelweave graft; Managed by Scott Archuleta; last assessed - 2016    APPENDECTOMY      BLADDER SURGERY      Reccurent histoy of bladder surgery    HOROWITZ PROCEDURE      CARDIAC CATHETERIZATION  2004    Procedure summary - Luminal irregularities; last assessed - 2015     SECTION      CORONARY ANEURYSM REPAIR      CYSTOSCOPY      botox injection    HYSTERECTOMY      DE NDSC NJX IMPLT  MATRL URT&/BLDR NCK N/A 5/19/2017    Procedure: CYSTOSCOPY; DURASPHERE-PERIURETHRAL BULKING AGENT INJECTION ;  Surgeon: Rayo Moulton MD;  Location: [facility];  Service: Urology    IN TENDON SHEATH INCISION Right 10/18/2016    Procedure: LONG FINGER TRIGGER RELEASE ;  Surgeon: Khari Najera MD;  Location: [facility];  Service: Orthopedics    THYROIDECTOMY      Total Thyroidectomy    TONSILLECTOMY AND ADENOIDECTOMY         Outpatient Medications Marked as Taking for the 12/20/23 encounter (Office Visit) with Jed Tuttle DO   Medication    acetaminophen (TYLENOL) 650 mg CR tablet    albuterol (2.5 mg/3 mL) 0.083 % nebulizer solution    albuterol (PROVENTIL HFA,VENTOLIN HFA) 90 mcg/act inhaler    amLODIPine (NORVASC) 2.5 mg tablet    benazepril (LOTENSIN) 40 MG tablet    calcium carbonate (OS-TAE) 600 MG tablet    Diclofenac Sodium (VOLTAREN) 1 %    levothyroxine 112 mcg tablet    lovastatin (MEVACOR) 20 mg tablet    metoprolol succinate (TOPROL-XL) 25 mg 24 hr tablet    Multiple Vitamins-Minerals (One Daily Multivitamin Women) TABS    pantoprazole (PROTONIX) 40 mg tablet       Allergies as of 12/20/2023 - Reviewed 12/20/2023   Allergen Reaction Noted    Amiodarone Hives 03/19/2015    Omnipaque [iohexol] Hives and Shortness Of Breath 04/06/2016    Clindamycin Other (See Comments) 05/08/2017    Other  11/30/2010    Sulfa antibiotics Hives 08/26/2015    Acetazolamide Hives and Rash 02/10/2020    Iv dye  [iodinated contrast media] Hypertension and Palpitations 06/10/2014    Naprosyn [naproxen] Itching and Rash 07/11/2012       Family History   Problem Relation Age of Onset    Coronary aneurysm Sister     Coronary artery disease Sister         CABG    Heart disease Family         cardiac disorder    Hypertension Family     Cancer Family     Thyroid disease Family        Social History:  Social History     Tobacco Use    Smoking status: Former     Current packs/day: 0.00     Average packs/day: 0.3 packs/day for  "58.0 years (14.5 ttl pk-yrs)     Types: Cigarettes     Start date:      Quit date: 2014     Years since quittin.9    Smokeless tobacco: Former    Tobacco comments:     smoked 17 yrs 1/2 ppd quit and restarted then quit  10 days ago during 2014    Vaping Use    Vaping status: Never Used   Substance Use Topics    Alcohol use: Not Currently     Comment: Social drinker    Drug use: No       Review of Systems: Pertinent findings documented in HPI    ___________________________________    Physical Exam:    /68   Ht 5' 2\" (1.575 m)   Wt 52.8 kg (116 lb 6.4 oz)   BMI 21.29 kg/m²     General: Well appearing, in no distress.   Eyes: EOMI  HENT: No oral ulcers. MMM.   Extremities: Warm, well perfused, no edema.   Neuro: Alert and oriented.  Skin: No rashes.  Musculoskeletal exam: no tenderness to palpation of the spine. Exaggerated thoracic kyphosis  ____________________________    Lab Result Review: relevant labs reviewed   Latest Reference Range & Units 10/14/23 04:50 23 11:03   Sed Rate 0 - 29 mm/hour 25    C-REACTIVE PROTEIN <3.0 mg/L 42.1 (H) 4.8 (H)   (H): Data is abnormally high    Imaging Result Review: relevant images reviewed    I have independently reviewed the following images.  Some dextroscoliosis in addition to vertebral fractures mentioned in report, no obvious AFF on limited view of bilateral femurs    DXA: none recent  "

## 2023-12-20 ENCOUNTER — TELEPHONE (OUTPATIENT)
Dept: RHEUMATOLOGY | Facility: CLINIC | Age: 88
End: 2023-12-20

## 2023-12-20 ENCOUNTER — APPOINTMENT (OUTPATIENT)
Dept: LAB | Facility: CLINIC | Age: 88
End: 2023-12-20
Payer: COMMERCIAL

## 2023-12-20 ENCOUNTER — HOSPITAL ENCOUNTER (OUTPATIENT)
Dept: RADIOLOGY | Facility: HOSPITAL | Age: 88
Discharge: HOME | End: 2023-12-20
Attending: STUDENT IN AN ORGANIZED HEALTH CARE EDUCATION/TRAINING PROGRAM
Payer: COMMERCIAL

## 2023-12-20 ENCOUNTER — OFFICE VISIT (OUTPATIENT)
Dept: RHEUMATOLOGY | Facility: CLINIC | Age: 88
End: 2023-12-20
Payer: COMMERCIAL

## 2023-12-20 VITALS
WEIGHT: 116.4 LBS | BODY MASS INDEX: 21.42 KG/M2 | SYSTOLIC BLOOD PRESSURE: 120 MMHG | DIASTOLIC BLOOD PRESSURE: 68 MMHG | HEIGHT: 62 IN

## 2023-12-20 DIAGNOSIS — M80.08XA AGE-RELATED OSTEOPOROSIS WITH CURRENT PATHOLOGICAL FRACTURE, VERTEBRA(E), INITIAL ENCOUNTER FOR FRACTURE (HCC): Primary | ICD-10-CM

## 2023-12-20 DIAGNOSIS — M80.00XA AGE-RELATED OSTEOPOROSIS WITH CURRENT PATHOLOGICAL FRACTURE, INITIAL ENCOUNTER: ICD-10-CM

## 2023-12-20 DIAGNOSIS — M54.9 INTRACTABLE BACK PAIN: ICD-10-CM

## 2023-12-20 DIAGNOSIS — M80.08XA AGE-RELATED OSTEOPOROSIS WITH CURRENT PATHOLOGICAL FRACTURE, VERTEBRA(E), INITIAL ENCOUNTER FOR FRACTURE (HCC): ICD-10-CM

## 2023-12-20 LAB
25(OH)D3 SERPL-MCNC: 55.6 NG/ML (ref 30–100)
ALBUMIN SERPL BCP-MCNC: 4.5 G/DL (ref 3.5–5)
ALP SERPL-CCNC: 63 U/L (ref 34–104)
ALT SERPL W P-5'-P-CCNC: 16 U/L (ref 7–52)
ANION GAP SERPL CALCULATED.3IONS-SCNC: 8 MMOL/L
AST SERPL W P-5'-P-CCNC: 24 U/L (ref 13–39)
BILIRUB SERPL-MCNC: 0.65 MG/DL (ref 0.2–1)
BUN SERPL-MCNC: 24 MG/DL (ref 5–25)
CALCIUM SERPL-MCNC: 9 MG/DL (ref 8.4–10.2)
CHLORIDE SERPL-SCNC: 96 MMOL/L (ref 96–108)
CO2 SERPL-SCNC: 30 MMOL/L (ref 21–32)
CREAT SERPL-MCNC: 0.81 MG/DL (ref 0.6–1.3)
GFR SERPL CREATININE-BSD FRML MDRD: 63 ML/MIN/1.73SQ M
GLUCOSE SERPL-MCNC: 113 MG/DL (ref 65–140)
POTASSIUM SERPL-SCNC: 4.1 MMOL/L (ref 3.5–5.3)
PROT SERPL-MCNC: 7.3 G/DL (ref 6.4–8.4)
SODIUM SERPL-SCNC: 134 MMOL/L (ref 135–147)

## 2023-12-20 PROCEDURE — 73590 X-RAY EXAM OF LOWER LEG: CPT

## 2023-12-20 PROCEDURE — 73552 X-RAY EXAM OF FEMUR 2/>: CPT

## 2023-12-20 PROCEDURE — 36415 COLL VENOUS BLD VENIPUNCTURE: CPT

## 2023-12-20 PROCEDURE — 82306 VITAMIN D 25 HYDROXY: CPT

## 2023-12-20 PROCEDURE — 99205 OFFICE O/P NEW HI 60 MIN: CPT | Performed by: STUDENT IN AN ORGANIZED HEALTH CARE EDUCATION/TRAINING PROGRAM

## 2023-12-20 PROCEDURE — 80053 COMPREHEN METABOLIC PANEL: CPT

## 2023-12-20 RX ORDER — SENNOSIDES 8.6 MG
650 CAPSULE ORAL EVERY 8 HOURS PRN
COMMUNITY

## 2023-12-20 NOTE — TELEPHONE ENCOUNTER
Rheumatology Pre-Certification Request     Medication/Disease State Information:   Diagnosis: Age-related osteoporosis with current pathological fracture, vertebra(e), initial encounter for fracture (HCC) [M80.08XA]   Medication: Prolia subcutaneous 60 mg once every 6 months  Failed or Intolerant to or Contraindicated: bisphosphonates contraindicated due to overtreatment with bisphosphonates for over 10 years  Site of medication administration (if applicable): KAREN Tuttle DO, CCD  NPI: [id number]  Lic: [id number]

## 2023-12-20 NOTE — PATIENT INSTRUCTIONS
I think that your back pain is mainly due to your fractures from osteoporosis.    I will send in authorization for Prolia

## 2023-12-21 ENCOUNTER — TELEPHONE (OUTPATIENT)
Age: 88
End: 2023-12-21

## 2023-12-21 NOTE — TELEPHONE ENCOUNTER
Patient prior authorization paperwork was sent to her insurance for Prolia -Request # [id number] approval

## 2023-12-21 NOTE — TELEPHONE ENCOUNTER
Caller: Patient     Doctor: Marry     Reason for call: Asked about scheduling DEXA, call was transferred and number was given to patient

## 2023-12-21 NOTE — TELEPHONE ENCOUNTER
Caller: Rosy Denson     Doctor: Marry    Reason for call: Needs information re: Prolia prior auth. They need to know if the medication will be provided thru specialty pharmacy or it's [facility] and [facility]. Please advise ASAP    Call back#: [phone]

## 2023-12-21 NOTE — TELEPHONE ENCOUNTER
Caller: Gene @ [facility]     Doctor: Marry     Reason for call: Gene needs  NPI for a prior auth . NPI she has is [id number] which is for  .    Please Advise       Call back#: [phone]

## 2023-12-22 NOTE — TELEPHONE ENCOUNTER
Caller: Destini from [facility]    Doctor: Dr. Tuttle    Reason for call: Provided NPI and Lic # provided by Dr. Tuttle to Destini from [employer] [facility].      Call back#: [phone]

## 2023-12-28 ENCOUNTER — HOSPITAL ENCOUNTER (OUTPATIENT)
Dept: BONE DENSITY | Facility: MEDICAL CENTER | Age: 88
Discharge: HOME | End: 2023-12-28
Payer: COMMERCIAL

## 2023-12-28 DIAGNOSIS — M80.08XA AGE-RELATED OSTEOPOROSIS WITH CURRENT PATHOLOGICAL FRACTURE, VERTEBRA(E), INITIAL ENCOUNTER FOR FRACTURE (HCC): ICD-10-CM

## 2023-12-28 PROCEDURE — 77080 DXA BONE DENSITY AXIAL: CPT

## 2024-01-05 ENCOUNTER — OFFICE VISIT (OUTPATIENT)
Dept: PULMONOLOGY | Facility: CLINIC | Age: 89
End: 2024-01-05
Payer: COMMERCIAL

## 2024-01-05 VITALS
TEMPERATURE: 97.6 F | SYSTOLIC BLOOD PRESSURE: 118 MMHG | OXYGEN SATURATION: 98 % | DIASTOLIC BLOOD PRESSURE: 72 MMHG | HEART RATE: 63 BPM | BODY MASS INDEX: 21.71 KG/M2 | WEIGHT: 118 LBS | HEIGHT: 62 IN

## 2024-01-05 DIAGNOSIS — J45.20 MILD INTERMITTENT ASTHMA WITHOUT COMPLICATION: ICD-10-CM

## 2024-01-05 DIAGNOSIS — R06.09 DOE (DYSPNEA ON EXERTION): Primary | ICD-10-CM

## 2024-01-05 DIAGNOSIS — R05.3 CHRONIC COUGH: ICD-10-CM

## 2024-01-05 PROCEDURE — 99213 OFFICE O/P EST LOW 20 MIN: CPT | Performed by: INTERNAL MEDICINE

## 2024-01-05 NOTE — PROGRESS NOTES
"Office Progress Note - Pulmonary    Buffy ALEXANDRA [facility] 89 y.o. female MRN: [id number]    Encounter: [id number]      Assessment:  Bronchial asthma.  Dyspnea on exertion.  Chronic cough.    Plan:   Albuterol rescue inhaler as needed.  Regular walks to improve stamina.  Follow-up in 6 months.    Discussion:   Buffy's asthma is well controlled.  She needs to use her albuterol rescue inhaler only occasionally.  Her cough is mild and its multifactorial.  It is not bothering her for the most part.  I have encouraged her to take regular walks to improve stamina.  She already has received the influenza vaccine for the season.  I will see her in 6 months.        Subjective:   Patient is here for a follow-up visit.  She has occasional cough.  Sometimes at night when she is asleep she coughs.  Whenever she is able to bring up phlegm it is white.  She denies any wheezing or chest tightness.  She rarely needs to use her rescue inhaler.    Review of systems:  A 12 point system review is done and aside from what is stated above the rest of the review of systems is negative.      Family history and social history are reviewed.    Medications list is reviewed.      Vitals: Blood pressure 118/72, pulse 63, temperature 97.6 °F (36.4 °C), temperature source Tympanic, height 5' 2\" (1.575 m), weight 53.5 kg (118 lb), SpO2 98%.,     Physical Exam  Gen: Awake, alert, oriented x 3, no acute distress  HEENT: Mucous membranes moist, no oral lesions, no thrush  NECK: No accessory muscle use, JVP not elevated  Cardiac: Regular, single S1, single S2, no murmurs, no rubs, no gallops  Lungs: Clear breath sounds.  No wheezing or rhonchi.  Abdomen: normoactive bowel sounds, soft nontender, nondistended, no rebound or rigidity, no guarding  Extremities: no cyanosis, no clubbing, no edema  Neuro:  Grossly nonfocal.  Skin:  No rash.    Lab Results   Component Value Date    WBC 6.26 11/20/2023    HGB 11.6 11/20/2023    HCT 36.6 11/20/2023    MCV 96 " 11/20/2023     11/20/2023     Lab Results   Component Value Date    SODIUM 134 (L) 12/20/2023    K 4.1 12/20/2023    CL 96 12/20/2023    CO2 30 12/20/2023    BUN 24 12/20/2023    CREATININE 0.81 12/20/2023    GLUC 113 12/20/2023    CALCIUM 9.0 12/20/2023

## 2024-01-11 ENCOUNTER — APPOINTMENT (OUTPATIENT)
Dept: LAB | Facility: CLINIC | Age: 89
End: 2024-01-11
Payer: COMMERCIAL

## 2024-01-11 DIAGNOSIS — E89.0 POSTOPERATIVE HYPOTHYROIDISM: ICD-10-CM

## 2024-01-11 LAB — TSH SERPL DL<=0.05 MIU/L-ACNC: 0.87 UIU/ML (ref 0.45–4.5)

## 2024-01-11 PROCEDURE — 36415 COLL VENOUS BLD VENIPUNCTURE: CPT

## 2024-01-11 PROCEDURE — 84443 ASSAY THYROID STIM HORMONE: CPT

## 2024-01-12 DIAGNOSIS — D64.9 SYMPTOMATIC ANEMIA: ICD-10-CM

## 2024-01-12 DIAGNOSIS — K92.2 GI BLEED: ICD-10-CM

## 2024-01-12 DIAGNOSIS — K26.9 DUODENAL ULCER: ICD-10-CM

## 2024-01-12 DIAGNOSIS — K25.0 ACUTE GASTRIC ULCER WITH HEMORRHAGE: ICD-10-CM

## 2024-01-12 RX ORDER — PANTOPRAZOLE SODIUM 40 MG/1
40 TABLET, DELAYED RELEASE ORAL
Qty: 180 TABLET | Refills: 1 | Status: SHIPPED | OUTPATIENT
Start: 2024-01-12 | End: 2024-07-10

## 2024-01-20 DIAGNOSIS — I48.0 PAROXYSMAL ATRIAL FIBRILLATION (HCC): ICD-10-CM

## 2024-01-20 DIAGNOSIS — I10 ESSENTIAL HYPERTENSION: ICD-10-CM

## 2024-01-22 RX ORDER — METOPROLOL SUCCINATE 25 MG/1
25 TABLET, EXTENDED RELEASE ORAL DAILY
Qty: 90 TABLET | Refills: 0 | Status: SHIPPED | OUTPATIENT
Start: 2024-01-22

## 2024-01-25 ENCOUNTER — TELEPHONE (OUTPATIENT)
Dept: RHEUMATOLOGY | Facility: CLINIC | Age: 89
End: 2024-01-25

## 2024-01-25 NOTE — TELEPHONE ENCOUNTER
Patient is willing to do prolia injection but has some questions about the side effects and potential interactions with medications she is taking prior to scheduling. She is asking for a call from the doctor to go over.

## 2024-01-31 NOTE — TELEPHONE ENCOUNTER
Called, answered questions, Buffy expressed understanding and all questions were answered to her satisfaction.    She would like to move ahead with the Prolia    Please call her to change her scheduled follow up with me in June to some time between late March to mid April and schedule her Prolia inj for the same day so that she doesn't have to make multiple trips

## 2024-02-01 DIAGNOSIS — I10 ESSENTIAL HYPERTENSION: ICD-10-CM

## 2024-02-01 RX ORDER — AMLODIPINE BESYLATE 2.5 MG/1
2.5 TABLET ORAL DAILY
Qty: 90 TABLET | Refills: 0 | Status: SHIPPED | OUTPATIENT
Start: 2024-02-01

## 2024-02-02 NOTE — TELEPHONE ENCOUNTER
Called patient to schedule Prolia appointment, got disconnected. Patient called back and scheduled appointment 4/11/24

## 2024-02-11 DIAGNOSIS — I10 ESSENTIAL HYPERTENSION: ICD-10-CM

## 2024-02-12 RX ORDER — BENAZEPRIL HYDROCHLORIDE 40 MG/1
40 TABLET ORAL DAILY
Qty: 90 TABLET | Refills: 0 | Status: SHIPPED | OUTPATIENT
Start: 2024-02-12

## 2024-02-14 DIAGNOSIS — E03.9 HYPOTHYROIDISM, UNSPECIFIED TYPE: ICD-10-CM

## 2024-02-14 DIAGNOSIS — E78.5 HYPERLIPIDEMIA, UNSPECIFIED HYPERLIPIDEMIA TYPE: ICD-10-CM

## 2024-02-14 RX ORDER — LEVOTHYROXINE SODIUM 112 UG/1
112 TABLET ORAL
Qty: 90 TABLET | Refills: 1 | Status: SHIPPED | OUTPATIENT
Start: 2024-02-14

## 2024-02-14 RX ORDER — LOVASTATIN 20 MG/1
20 TABLET ORAL EVERY MORNING
Qty: 90 TABLET | Refills: 0 | Status: SHIPPED | OUTPATIENT
Start: 2024-02-14 | End: 2024-08-12

## 2024-02-20 ENCOUNTER — PROBLEM (OUTPATIENT)
Dept: URBAN - METROPOLITAN AREA CLINIC 6 | Facility: CLINIC | Age: 89
End: 2024-02-20

## 2024-02-20 DIAGNOSIS — H35.3230: ICD-10-CM

## 2024-02-20 DIAGNOSIS — H04.123: ICD-10-CM

## 2024-02-20 DIAGNOSIS — Z96.1: ICD-10-CM

## 2024-02-20 DIAGNOSIS — H16.213: ICD-10-CM

## 2024-02-20 LAB — BLOOD WORKUP: NORMAL

## 2024-02-20 PROCEDURE — 92014 COMPRE OPH EXAM EST PT 1/>: CPT

## 2024-02-20 ASSESSMENT — TONOMETRY
OS_IOP_MMHG: 6
OD_IOP_MMHG: 7

## 2024-02-20 ASSESSMENT — VISUAL ACUITY
OD_CC: 20/40
OS_CC: 20/70

## 2024-02-21 PROBLEM — Z13.1 SCREENING FOR DIABETES MELLITUS: Status: RESOLVED | Noted: 2019-04-09 | Resolved: 2024-02-21

## 2024-02-21 PROBLEM — Z00.00 MEDICARE ANNUAL WELLNESS VISIT, SUBSEQUENT: Status: RESOLVED | Noted: 2019-07-10 | Resolved: 2024-02-21

## 2024-02-22 ENCOUNTER — APPOINTMENT (OUTPATIENT)
Dept: LAB | Facility: CLINIC | Age: 89
End: 2024-02-22
Payer: COMMERCIAL

## 2024-02-22 DIAGNOSIS — G70.00 MYASTHENIA (HCC): ICD-10-CM

## 2024-02-22 PROCEDURE — 86041 ACETYLCHOLN RCPTR BNDNG ANTB: CPT

## 2024-02-22 PROCEDURE — 86042 ACETYLCHOLN RCPTR BLCKG ANTB: CPT

## 2024-02-22 PROCEDURE — 86043 ACETYLCHOLN RCPTR MODLG ANTB: CPT

## 2024-02-22 PROCEDURE — 36415 COLL VENOUS BLD VENIPUNCTURE: CPT

## 2024-02-26 LAB — ACHR BIND AB SER-SCNC: <0.03 NMOL/L (ref 0–0.24)

## 2024-02-27 LAB — ACHR MOD AB/ACHR TOTAL SFR SER: 8 % (ref 0–45)

## 2024-02-29 LAB — ACHR BLOCK AB/ACHR TOTAL SFR SER: 14 % (ref 0–25)

## 2024-03-13 NOTE — PROGRESS NOTES
Cardiology  Follow Up Office Visit Note        89 y.o.   female   MRN: [id number]  [facility] [place]  [address]  [address]  [place] [address] [phone]    PCP: Stephanie Pruett DO  Cardiologist : Dr Sandoval        Summary of Recommendations  Low-sodium diet  Fasting lipid profile, HgA1c ( pt request)  Follow up will be scheduled with Dr Sandoval in a short interval, re: BP/labs  Colon Ca screenin2023, up-to-date      Impression/plan  Chronic CASTELLANO possibly due to grade 2 diastolic dysfunction  PAF, no recent episodes by symptoms.( When in a fib, she was symptomatic)  Off aspirin given history of duodenal ulcer/GI bleed  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 BP  174/80 by me.  Per the MA: 156/80 . On amlodipine 2.5 mg daily, benazepril 40 mg daily  -pt is resistant to changing any meds today.  Reports it has varied greatly over time and has many med changes.   Has had 2 nomral BP redings since December at other visits.  Did take am meds   For now will monitor. Low Na diet, avoid NSAIDS ( as she has been).  Return to see her cardiologist  Hyperlipidemia. On lovastatin 20 mg daily mg/d.  2023 calculated LDL 86.  Due for repeat   Latest Reference Range & Units 20 10:30 10/12/20 07:12 21 08:34   Cholesterol 50 - 200 mg/dL 191 175 173   Triglycerides <=150 mg/dL 67 102 53   HDL >=40 mg/dL 79 66 76   LDL Calculated 0 - 100 mg/dL 99 89 86   CKD 3A.  Baseline creatinine 0.9  Asthma/COPD overlap  Former tobacco quitting in  after 14.5 pack years  Hypothyroidism  Salivary gland cancer  History of nasopharyngeal  cancer  Osteoporosis, vertebral fx- per rheumatology  Cardiac testing  TTE 2/7/2023.  EF 65%.  Wall motion normal.  Grade 2 DD.  RV normal size and function.  Moderate LAE.  Mild AI.  Mild MR.  Mild TR.  Estimated PA P 37 mmHg.  Aortic root ectatic at 3.8 cm.+ Aortic valve sclerosis                HPI:   Buffy Colón is a 89-year-old female with PAF, mitral regurgitation, essential hypertension, hyperlipidemia, an asc aortic aneurysm S/P repair ( 2015) and HTN.  In the past she was on aspirin but taken off due to a duodenal ulcer/GI bleed.  Further, she has not been on chronic anticoagulation due to nasopharyngeal cancer.   She follows with Dr. Sandoval, and was last seen in the office July 2023.  It was noted she had lower extremity edema.  This was suspected to be related to amlodipine.  She also had mild chronic CASTELLANO possibly due to grade 2 diastolic dysfunction.  She was maintained off furosemide.  Previously on Lotrel this was discontinued, replacing it with a lower dose amlodipine 2.5 mg daily to decrease lower extremity edema.  She was advised to follow-up in a few months      3/14/2024  Routine cardiology follow-up.  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    ROS: She is frustrated over the last year, as she has had several fractures of the spine, she also had costochondritis.  She will be starting Prolia soon for her osteopenia.  She has had no further lower extremity edema.  Her persistent CASTELLANO remains, no worse than prior.  She denies chest pain.  Rare palpitations.  In the past when she had A-fib she was very symptomatic.  She has not had nothing similar of recent.  She does not consume NSAIDs.  She does not utilize decongestants.  She does not check her home blood pressures.  Blood pressure in the office today elevated 156/88  Recheck by me 174/80  Exam unremarkable, no lower  extremity edema  I reiterated the importance of a salt restricted diet and provided some additional education material to make sure she is adhering to a salt restricted diet as she assures me she has been  She is hesitant today and adjusting any of her antihypertensives.  She tells me they have been changed several times in the past.  I recommend repeat lipid profile.  She requests I also order a hemoglobin A1c as it was elevated in the past, which I provided for her today.  I recommend she return to see her cardiologist in short interval.               Assessment  Diagnoses and all orders for this visit:    Shortness of breath  -     POCT ECG    Paroxysmal atrial fibrillation (HCC)    Atherosclerosis of aorta (HCC)    Stage 3a chronic kidney disease (HCC)    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)    Essential hypertension    Nonrheumatic mitral valve regurgitation    Pre-diabetes  -     Hemoglobin A1C; Future    Pure hypercholesterolemia  -     Lipid panel; Future    Osteoarthritis of right middle finger  -     Diclofenac Sodium (VOLTAREN) 1 %; Apply 2 g topically 4 (four) times a day as needed (pain)        Past Medical History:   Diagnosis Date    A-fib (HCC)     Allergy to IVP dye 06/04/2013    pt felt heaviness, palpitations    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), wet (HCC)     bilateral    Aneurysm of aortic root (HCC)     last assessed - [id number]    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HCC)     Aortic root dilatation (HCC)     last assessed - 06Dec2017    Arthritis     Ascending aortic aneurysm (HCC)     last assessed - 06Dec2017    Asthma     Basal cell carcinoma     last assessed - 09Sep2016    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HCC)     Disease of thyroid gland     Facet arthropathy, cervical     last assessed - 08Jun2017    History of fracture of vertebral column     Hyperlipidemia     Hypertension     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HCC)     Hypoxia     last assessed - 16Mar2015    Junctional rhythm     last assessed - 04Apr2017     Lightheadedness     last assessed - 28Jun2016    Migraine     Palpitations     last assessed - 28Jun2016    Pleural effusion, bilateral     last assessed - 20Apr2015    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HCC)     Shortness of breath     last assessed - [id number]    Thyroid trouble     Trigger finger     last assessed - [id number]    Trigger middle finger of right hand     last assessed - [id number]    Urinary incontinence     last assessed -  22Jul,2013; Resolved - 15Jan2017       Review of Systems   Constitutional: Negative for chills.   Eyes:         Poor visual acuity   Cardiovascular:  Positive for dyspnea on exertion. Negative for chest pain, claudication, cyanosis, irregular heartbeat, leg swelling, near-syncope, orthopnea, palpitations, paroxysmal nocturnal dyspnea and syncope.   Respiratory:  Negative for cough and shortness of breath.    Gastrointestinal:  Negative for heartburn and nausea.   Neurological:  Negative for dizziness, focal weakness, headaches, light-headedness and weakness.   All other systems reviewed and are negative.      Allergies   Allergen Reactions    Amiodarone Hives    Omnipaque [Iohexol] Hives and Shortness Of Breath    Clindamycin Other (See Comments)     When taken causes cdiff immediately    Other     Sulfa Antibiotics Hives     itching    Acetazolamide Hives and Rash     Reaction Date:Unknown    Iv Dye  [Iodinated Contrast Media] Hypertension and Palpitations     Annotation - 19Mar2015: Verified with patient     Naprosyn [Naproxen] Itching and Rash     Category: Allergy;      .    Current Outpatient Medications:     acetaminophen (TYLENOL) 650 mg CR tablet, Take 650 mg by mouth every 8 (eight) hours as needed for mild pain, Disp: , Rfl:     albuterol (2.5 mg/3 mL) 0.083 % nebulizer solution, Take 2.5 mg by nebulization every 6 (six) hours as needed for wheezing or shortness of breath, Disp: , Rfl:     albuterol (PROVENTIL HFA,VENTOLIN HFA) 90 mcg/act inhaler, Inhale 2 puffs every 6 (six) hours  as needed for wheezing, Disp: 25.5 g, Rfl: 3    amLODIPine (NORVASC) 2.5 mg tablet, Take 1 tablet (2.5 mg total) by mouth daily, Disp: 90 tablet, Rfl: 0    benazepril (LOTENSIN) 40 MG tablet, Take 1 tablet (40 mg total) by mouth daily, Disp: 90 tablet, Rfl: 0    calcium carbonate (OS-TAE) 600 MG tablet, Take 600 mg by mouth daily in the early morning  , Disp: , Rfl:     Diclofenac Sodium (VOLTAREN) 1 %, Apply 2 g topically 4 (four) times a day as needed (pain), Disp: , Rfl:     levothyroxine 112 mcg tablet, Take 1 tablet (112 mcg total) by mouth daily in the early morning, Disp: 90 tablet, Rfl: 1    lovastatin (MEVACOR) 20 mg tablet, Take 1 tablet (20 mg total) by mouth every morning, Disp: 90 tablet, Rfl: 0    metoprolol succinate (TOPROL-XL) 25 mg 24 hr tablet, Take 1 tablet (25 mg total) by mouth daily, Disp: 90 tablet, Rfl: 0    Multiple Vitamins-Minerals (One Daily Multivitamin Women) TABS, Apply 1 tablet to the mouth or throat daily. Indications: Treatment to Prevent Vitamin Deficiency, Disp: , Rfl:     pantoprazole (PROTONIX) 40 mg tablet, Take 1 tablet (40 mg total) by mouth 2 (two) times a day before meals, Disp: 180 tablet, Rfl: 1    Social History     Socioeconomic History    Marital status:      Spouse name: Not on file    Number of children: Not on file    Years of education: Not on file    Highest education level: Not on file   Occupational History    Not on file   Tobacco Use    Smoking status: Former     Current packs/day: 0.00     Average packs/day: 0.3 packs/day for 58.0 years (14.5 ttl pk-yrs)     Types: Cigarettes     Start date:      Quit date: 2014     Years since quittin.2    Smokeless tobacco: Former    Tobacco comments:     smoked 17 yrs 1/2 ppd quit and restarted then quit  10 days ago during 2014    Vaping Use    Vaping status: Never Used   Substance and Sexual Activity    Alcohol use: Not Currently     Comment: Social drinker    Drug use: No    Sexual  activity: Not Currently   Other Topics Concern    Not on file   Social History Narrative    Not on file     Social Determinants of Health     Financial Resource Strain: Medium Risk (3/8/2023)    Overall Financial Resource Strain (CARDIA)     Difficulty of Paying Living Expenses: Somewhat hard   Food Insecurity: No Food Insecurity (2/8/2023)    Hunger Vital Sign     Worried About Running Out of Food in the Last Year: Never true     Ran Out of Food in the Last Year: Never true   Transportation Needs: Unmet Transportation Needs (3/8/2023)    PRAPARE - Transportation     Lack of Transportation (Medical): Yes     Lack of Transportation (Non-Medical): Yes   Physical Activity: Not on file   Stress: Not on file   Social Connections: Not on file   Intimate Partner Violence: Not on file   Housing Stability: Low Risk  (2/8/2023)    Housing Stability Vital Sign     Unable to Pay for Housing in the Last Year: No     Number of Places Lived in the Last Year: 1     Unstable Housing in the Last Year: No       Family History   Problem Relation Age of Onset    Coronary aneurysm Sister     Coronary artery disease Sister         CABG    Heart disease Family         cardiac disorder    Hypertension Family     Cancer Family     Thyroid disease Family        Physical Exam  Vitals and nursing note reviewed.   Constitutional:       General: She is not in acute distress.     Appearance: She is not diaphoretic.      Comments: BMI 21   HENT:      Head: Normocephalic and atraumatic.   Eyes:      Conjunctiva/sclera: Conjunctivae normal.   Cardiovascular:      Rate and Rhythm: Normal rate and regular rhythm.      Pulses: Intact distal pulses.      Heart sounds: Normal heart sounds.   Pulmonary:      Effort: Pulmonary effort is normal.      Breath sounds: Normal breath sounds.   Abdominal:      General: Bowel sounds are normal.      Palpations: Abdomen is soft.   Musculoskeletal:         General: Normal range of motion.      Cervical back: Normal  "range of motion and neck supple.   Skin:     General: Skin is warm and dry.   Neurological:      Mental Status: She is alert and oriented to person, place, and time.         Vitals: Blood pressure 156/80, pulse 79, height 5' 2\" (1.575 m), weight 53.8 kg (118 lb 9.6 oz), SpO2 96%.   Wt Readings from Last 3 Encounters:   24 53.8 kg (118 lb 9.6 oz)   24 53.5 kg (118 lb)   23 52.8 kg (116 lb 6.4 oz)         Labs & Results:  Lab Results   Component Value Date    WBC 6.26 2023    HGB 11.6 2023    HCT 36.6 2023    MCV 96 2023     2023     BNP   Date Value Ref Range Status   2015 412 (H) 0 - 100 pg/mL Final     Comment:     The above 1 analytes were performed by [address] [id number]     2015 501 (H) 0 - 100 pg/mL Final     Comment:     The above 1 analytes were performed by [address] [address]     2015 382 (H) 0 - 99 pg/mL Final     Comment:     The above 1 [place] [address]       No components found for: \"CHEM\"    Results for orders placed during the hospital encounter of 19    Echo complete with contrast if indicated    Narrative  [address] [id number] [phone]    Transthoracic Echocardiogram  2D, M-mode, Doppler, and Color Doppler    Study date:  2019    Patient: REDD WALHS  MR number: [id number]  Account number: [id number]  : 1932  Age: 87 years  Gender: Female  Status: Outpatient  Location: [facility]  Height: 62 in  Weight: 132 lb  BP: 112/ 68 mmHg    Indications: Syncope    Diagnoses: R55. - Syncope and collapse    Sonographer:  SINCERE Lowery, RDCS  Primary Physician:  Jet Rees DO  Referring Physician:  Steve Sandoval MD  Group:  [facility]  Interpreting Physician:  Pietro Rao, " MD    SUMMARY    LEFT VENTRICLE:  Size was normal.  Systolic function was normal. Ejection fraction was estimated to be 55 %.  There were no regional wall motion abnormalities.  Wall thickness was at the upper limits of normal.    MITRAL VALVE:  There was trace regurgitation.    AORTIC VALVE:  There was mild regurgitation.    TRICUSPID VALVE:  There was trace regurgitation.    HISTORY: PRIOR HISTORY: Syncope; Hypertension; Hypothyroid; Asthma; Shortness of breath; PAF; Hyperlipidemia; Salivary gland cancer; Malignant neoplasm of hard palate; Aortic root dilated; Aortic arch aneurysm; Ascending aorta aneurysm;  Former smoker    PROCEDURE: The study was performed in the [facility]. This was a routine study. The transthoracic approach was used. The study included complete 2D imaging, M-mode, complete spectral Doppler, and color Doppler. The  heart rate was 76 bpm, at the start of the study. Images were obtained from the parasternal, apical, subcostal, and suprasternal notch acoustic windows. Image quality was adequate.    LEFT VENTRICLE: Size was normal. Systolic function was normal. Ejection fraction was estimated to be 55 %. There were no regional wall motion abnormalities. Wall thickness was at the upper limits of normal.    VENTRICULAR SEPTUM: There was sigmoid septal appearance.    RIGHT VENTRICLE: The size was normal. Systolic function was normal. Wall thickness was normal.    LEFT ATRIUM: Size was normal.    RIGHT ATRIUM: Size was normal.    MITRAL VALVE: Valve structure was normal. There was normal leaflet separation. DOPPLER: The transmitral velocity was within the normal range. There was no evidence for stenosis. There was trace regurgitation.    AORTIC VALVE: The valve was trileaflet. Leaflets exhibited normal thickness and normal cuspal separation. DOPPLER: Transaortic velocity was within the normal range. There was no evidence for stenosis. There was mild regurgitation.    TRICUSPID  VALVE: The valve structure was normal. There was normal leaflet separation. DOPPLER: The transtricuspid velocity was within the normal range. There was no evidence for stenosis. There was trace regurgitation.    PULMONIC VALVE: Leaflets exhibited normal thickness, no calcification, and normal cuspal separation. DOPPLER: The transpulmonic velocity was within the normal range. There was no regurgitation.    PERICARDIUM: There was no pericardial effusion. The pericardium was normal in appearance.    AORTA: The root exhibited normal size.    SYSTEM MEASUREMENT TABLES    2D  %FS: 27.43 %  AV Diam: 3.51 cm  EDV(Teich): 71.64 ml  EF(Cube): 61.79 %  EF(Teich): 53.84 %  ESV(Cube): 25.19 ml  ESV(Teich): 33.07 ml  IVSd: 1.26 cm  LA Area: 13.72 cm2  LA Diam: 3.68 cm  LVEDV MOD A4C: 63.51 ml  LVEF MOD A4C: 59.38 %  LVESV MOD A4C: 25.8 ml  LVIDd: 4.04 cm  LVIDs: 2.93 cm  LVLd A4C: 6.09 cm  LVLs A4C: 5.11 cm  LVPWd: 1.07 cm  RA Area: 15 cm2  RV Diam: 3.16 cm  SV MOD A4C: 37.71 ml  SV(Cube): 40.72 ml  SV(Teich): 38.57 ml    CW  AR Dec Bradford: 1.26 m/s2  AR Dec Time: 2061.79 ms  AR PHT: 597.92 ms  AR Vmax: 2.6 m/s  AR maxP.95 mmHg  TR MaxP.9 mmHg  TR Vmax: 2.77 m/s    MM  TAPSE: 1.82 cm    PW  E': 0.05 m/s  E/E': 16.22  MV A Bipin: 0.58 m/s  MV Dec Bradford: 4.53 m/s2  MV DecT: 192.44 ms  MV E Biipn: 0.87 m/s  MV E/A Ratio: 1.5    [employer] Accredited Echocardiography Laboratory    Prepared and electronically signed by    Pietro Rao MD  Signed 2019 16:02:12    No results found for this or any previous visit.        This note was completed in part utilizing [employer] direct voice recognition software.   Grammatical errors, random word insertion, spelling mistakes, and incomplete sentences may be an occasional consequence of the system secondary to software limitations, ambient noise and hardware issues. At the time of dictation, efforts were made to edit, clarify and /or correct errors.  Please  read the chart carefully and recognize, using context, where substitutions have occurred.  If you have any questions or concerns about the context, text or information contained within the body of this dictation, please contact myself, the provider, for further clarification

## 2024-03-14 ENCOUNTER — RA CDI HCC (OUTPATIENT)
Dept: OTHER | Facility: HOSPITAL | Age: 89
End: 2024-03-14

## 2024-03-14 ENCOUNTER — OFFICE VISIT (OUTPATIENT)
Dept: CARDIOLOGY CLINIC | Facility: CLINIC | Age: 89
End: 2024-03-14
Payer: COMMERCIAL

## 2024-03-14 VITALS
BODY MASS INDEX: 21.83 KG/M2 | HEIGHT: 62 IN | DIASTOLIC BLOOD PRESSURE: 80 MMHG | SYSTOLIC BLOOD PRESSURE: 156 MMHG | WEIGHT: 118.6 LBS | OXYGEN SATURATION: 96 % | HEART RATE: 79 BPM

## 2024-03-14 DIAGNOSIS — N18.31 STAGE 3A CHRONIC KIDNEY DISEASE (HCC): ICD-10-CM

## 2024-03-14 DIAGNOSIS — I10 ESSENTIAL HYPERTENSION: ICD-10-CM

## 2024-03-14 DIAGNOSIS — M19.041 OSTEOARTHRITIS OF RIGHT MIDDLE FINGER: ICD-10-CM

## 2024-03-14 DIAGNOSIS — R73.03 PRE-DIABETES: ICD-10-CM

## 2024-03-14 DIAGNOSIS — I48.0 PAROXYSMAL ATRIAL FIBRILLATION (HCC): ICD-10-CM

## 2024-03-14 DIAGNOSIS — I70.0 ATHEROSCLEROSIS OF AORTA (HCC): ICD-10-CM

## 2024-03-14 DIAGNOSIS — I71.22 ANEURYSM OF AORTIC ARCH WITHOUT RUPTURE (HCC): ICD-10-CM

## 2024-03-14 DIAGNOSIS — E78.00 PURE HYPERCHOLESTEROLEMIA: ICD-10-CM

## 2024-03-14 DIAGNOSIS — I34.0 NONRHEUMATIC MITRAL VALVE REGURGITATION: ICD-10-CM

## 2024-03-14 DIAGNOSIS — R06.02 SHORTNESS OF BREATH: Primary | ICD-10-CM

## 2024-03-14 PROCEDURE — 99214 OFFICE O/P EST MOD 30 MIN: CPT | Performed by: NURSE PRACTITIONER

## 2024-03-14 PROCEDURE — 93000 ELECTROCARDIOGRAM COMPLETE: CPT | Performed by: NURSE PRACTITIONER

## 2024-03-14 NOTE — PATIENT INSTRUCTIONS
Low-Sodium Diet   AMBULATORY CARE:   A low-sodium diet  limits foods that are high in sodium (salt). You will need to follow a low-sodium diet if you have high blood pressure, kidney disease, or heart failure. You may also need to follow this diet if you have a condition that is causing your body to retain (hold) extra fluid. You may need to limit the amount of sodium you eat in a day to 1,500 to 2,000 mg. Ask your healthcare provider how much sodium you can have each day.  How to use food labels to choose foods that are low in sodium:  Read food labels to find the amount of sodium they contain. The amount of sodium is listed in milligrams (mg). The % Daily Value (DV) column tells you how much of your daily needs are met by 1 serving of the food for each nutrient listed. Choose foods that have less than 5% of the DV of sodium. These foods are considered low in sodium. Foods that have 20% or more of the DV of sodium are considered high in sodium. Some food labels may also list any of the following terms that tell you about the sodium content in the food:  Sodium-free:  Less than 5 mg in each serving    Very low sodium:  35 mg of sodium or less in each serving    Low sodium:  140 mg of sodium or less in each serving    Reduced sodium:  At least 25% less sodium in each serving than the regular type    Light in sodium:  50% less sodium in each serving    Unsalted or no added salt:  No extra salt is added during processing (the food may still contain sodium)       Foods to avoid:  Salty foods are high in sodium. You should avoid the following:  Processed foods:      Mixes for cornbread, biscuits, cake, and pudding     Instant foods, such as potatoes, cereals, noodles, and rice     Packaged foods, such as bread stuffing, rice and pasta mixes, snack dip mixes, and macaroni and cheese     Canned foods, such as canned vegetables, soups, broths, sauces, and vegetable or tomato juice    Snack foods, such as salted chips,  popcorn, pretzels, pork rinds, salted crackers, and salted nuts    Frozen foods, such as dinners, entrees, vegetables with sauces, and breaded meats    Sauerkraut, pickled vegetables, and other foods prepared in brine    Meats and cheeses:      Smoked or cured meat, such as corned beef, payne, ham, hot dogs, and sausage    Canned meats or spreads, such as potted meats, sardines, anchovies, and imitation seafood    Deli or lunch meats, such as bologna, ham, turkey, and roast beef    Processed cheese, such as American cheese and cheese spreads    Condiments, sauces, and seasonings:      Salt (¼ teaspoon of salt contains 575 mg of sodium)    Seasonings made with salt, such as garlic salt, celery salt, onion salt, and seasoned salt    Regular soy sauce, barbecue sauce, teriyaki sauce, steak sauce, Worcestershire sauce, and most flavored vinegars    Canned gravy and mixes     Regular condiments, such as mustard, ketchup, and salad dressings    Pickles and olives    Meat tenderizers and monosodium glutamate (MSG)    Foods to include:  Read the food label to find the exact amount of sodium in each serving.  Bread and cereal:  Try to choose breads with less than 80 mg of sodium per serving.     Bread, roll, tabby, tortilla, or unsalted crackers.    Ready-to-eat cereals with less than 5% DV of sodium (examples include shredded wheat and puffed rice)    Pasta    Vegetables and fruits:      Unsalted fresh, frozen, or canned vegetables    Fresh, frozen, or canned fruits    Fruit juice    Dairy:  One serving has about 150 mg of sodium.    Milk, all types    Yogurt    Hard cheese, such as cheddar, Swiss, Karthaus olga, or mozzarella    Meat and other protein foods:  Some raw meats may have added sodium.     Plain meats, fish, and poultry     Eggs    Other foods:      Homemade pudding    Unsalted nuts, popcorn, or pretzels    Unsalted butter or margarine    Ways to get less sodium:  If you are used to the flavor of salt, it will  take time to get used to low-sodium foods. Your healthcare provider or dietitian can help you create a plan for lowering sodium. The plan will be specific to your needs and your family's needs. You may focus on 1 or 2 changes each week, such as the following:  Add spices and herbs to foods instead of salt during cooking.  Use salt-free seasonings to add flavor to foods. Examples include onion powder, garlic powder, basil, knapp powder, paprika, and parsley. Try lemon or lime juice or vinegar to give foods a tart flavor. Use hot peppers, pepper, or cayenne pepper to add a spicy flavor.    Do not keep a salt shaker at your kitchen table.  This may help keep you from adding salt to food at the table. A teaspoon of salt has 2,300 mg of sodium. It may take time to get used to enjoying the natural flavor of food instead of adding salt. Talk to your healthcare provider before you use salt substitutes. Some salt substitutes have a high amount of a chemical called potassium chloride. This form of potassium needs to be avoided if you have kidney disease.    Choose low-sodium foods at restaurants.  Meals from restaurants are often high in sodium. Some restaurants have nutrition information on the menu that tells you the amount of sodium in their foods. If possible, ask for your food to be prepared with less, or no salt.    Shop for unsalted or low-sodium foods and snacks at the grocery store.  Examples include unsalted or low-sodium broths, soups, and canned vegetables. Choose fresh or frozen vegetables instead. Choose unsalted nuts or seeds or fresh fruits or vegetables as snacks. Read food labels and choose salt-free, very low-sodium, or low-sodium foods. You can also rinse canned vegetables under running water to remove extra sodium before you cook them.    © Copyright [employer] 2023 Information is for End User's use only and may not be sold, redistributed or otherwise used for commercial purposes.  The above information is  an  only. It is not intended as medical advice for individual conditions or treatments. Talk to your doctor, nurse or pharmacist before following any medical regimen to see if it is safe and effective for you.  DASH Eating Plan   WHAT YOU NEED TO KNOW:   The DASH (Dietary Approaches to Stop Hypertension) Eating Plan is designed to help prevent or lower high blood pressure. It can also help to lower LDL (bad) cholesterol and decrease your risk for heart disease. The plan is low in sodium, sugar, unhealthy fats, and total fat. It is high in potassium, calcium, magnesium, and fiber. These nutrients are added when you eat more fruits, vegetables, and whole grains. With the DASH eating plan, you need to eat a certain number of servings from each food group. This will help you get enough of certain nutrients and limit others. The amount of servings you should eat depends on how many calories you need. Your dietitian can help you create meal plans with the right number of servings for each food group.       DISCHARGE INSTRUCTIONS:   What you need to know about sodium:  Your dietitian will tell you how much sodium is safe for you to have each day. People with high blood pressure should have no more than 1,500 to 2,300 mg of sodium in a day. A teaspoon (tsp) of salt has 2,300 mg of sodium. This may seem like a difficult goal, but small changes to the foods you eat can make a big difference. Your healthcare provider or dietitian can help you create a meal plan that follows your sodium limit.  Read food labels.  Food labels can help you choose foods that are low in sodium. The amount of sodium is listed in milligrams (mg). The % Daily Value (DV) column tells you how much of your daily needs are met by 1 serving of the food for each nutrient listed. Choose foods that have less than 5% of the DV of sodium. These foods are considered low in sodium. Foods that have 20% or more of the DV of sodium are considered high  in sodium. Avoid foods that have more than 300 mg of sodium in each serving. Choose foods that say low-sodium, reduced-sodium, or no salt added on the food label.         Limit added salt.  Do not salt food at the table if you add salt when you cook. Use herbs and spices, such as onions, garlic, and salt-free seasonings to add flavor. Try lemon or lime juice or vinegar to add a tart flavor. Use hot peppers or a small amount of hot pepper sauce to add a spicy flavor. Limit foods high in added salt, such as the following:    Seasonings made with salt, such as garlic salt, celery salt, onion salt, seasoned salt, meat tenderizers, and monosodium glutamate (MSG)    Miso soup and canned or dried soup mixes    Regular soy sauce, barbecue sauce, teriyaki sauce, steak sauce, Worcestershire sauce, and most flavored vinegars    Snack foods, such as salted chips, popcorn, pretzels, pork rinds, salted crackers, and salted nuts    Frozen foods, such as dinners, entrees, vegetables with sauces, and breaded meats    Ask about salt substitutes.  Ask your healthcare provider if you may use salt substitutes. Some salt substitutes have ingredients that can be harmful if you have certain health conditions.    Choose foods carefully at restaurants.  Meals from restaurants, especially fast food restaurants, are often high in sodium. Some restaurants have nutrition information that tells you the amount of sodium in their foods. Ask to have your food prepared with less, or no salt.    What you need to know about fats:  Healthy fats include unsaturated fats and omega-3 fatty acids. Unhealthy fats include saturated fats and trans fats.  Include healthy fats, such as the following:      Cooking oils, such as soybean, canola, olive, or sunflower    Fatty fish, such as salmon, tuna, mackerel, or sardines    Flaxseed oil or ground flaxseed    ½ cup of cooked beans, such as black beans, kidney beans, or avila beans    1½ ounces of low-sodium nuts,  such as almonds or walnuts    Low-sugar, low-sodium peanut butter    Seeds such as osei seeds or sunflower seeds       Limit or do not have unhealthy fats, such as the following:      Foods that contain fat from animals, such as fatty meats, whole milk, butter, and cream    Shortening, stick margarine, palm oil, and coconut oil    Full-fat or creamy salad dressing    Creamy soup    Crackers, chips, and baked goods made with margarine or shortening    Foods that are fried in unhealthy fats    Gravy and sauces, such as Dmitry or cheese sauces    What you need to know about carbohydrates (carbs):  All carbs break down into sugar. Complex carbs contain more fiber than simple carbs. This means complex carbs go into the bloodstream more slowly and cause less of a blood sugar spike. Try to include more complex carbs and fewer simple carbs.  Include complex carbs, such as the followin slice of whole-grain bread    1 ounce of dry cereal that does not contain added sugar    ½ cup of cooked oatmeal    2 ounces of cooked whole-grain pasta    ½ cup of cooked brown rice    Limit or do not have simple carbs, such as the following:      Baked goods, such as doughnuts, pastries, and cookies    Mixes for cornbread and biscuits    White rice and pasta mixes, such as boxed macaroni and cheese    Instant and cold cereals that contain sugar    Jelly, jam, and ice cream that contain sugar    Condiments such as ketchup    Drinks high in sugar, such as soft drinks, lemonade, and fruit juice    What you need to know about vegetables and fruits:  Vegetables and fruits can be fresh, frozen, or canned. If possible, try to choose low-sodium canned options.  Include a variety of vegetables and fruits, such as the followin medium apple, pear, or peach (about ½ cup chopped)    ½ small banana    ½ cup berries, such as blueberries, strawberries, or blackberries    1 cup of raw leafy greens, such as lettuce, spinach, kale, or  dayron greens    ½ cup of frozen or canned (no added salt) vegetables, such as green beans    ½ cup of fresh, frozen, or canned fruit (canned in light syrup or fruit juice)    ½ cup of vegetable or fruit juice    Limit or do not have vegetables and fruits made in the following ways:      Frozen fruit such as cherries that have added sugar    Fruit in cream or butter sauce    Canned vegetables that are high in sodium    Sauerkraut, pickled vegetables, and other foods prepared in brine    Fried vegetables or vegetables in butter or high-fat sauces    What you need to know about protein foods:   Include lean or low-fat protein foods, such as the following:      Poultry (chicken, turkey) with no skin    Fish (especially fatty fish, such as salmon, fresh tuna, or mackerel)    Lean beef and pork (loin, round, extra lean hamburger)    Egg whites and egg substitutes    1 cup of nonfat (skim) or 1% milk    1½ ounces of fat-free or low-fat cheese    6 ounces of nonfat or low-fat yogurt    Limit or do not have high-fat protein foods, such as the following:      Smoked or cured meat, such as corned beef, payne, ham, hot dogs, and sausage    Canned beans and canned meats or spreads, such as potted meats, sardines, anchovies, and imitation seafood    Deli or lunch meats, such as bologna, ham, turkey, and roast beef    High-fat meat (T-bone steak, regular hamburger, and ribs)    Whole eggs and egg yolks    Whole milk, 2% milk, and cream    Regular cheese and processed cheese    Other guidelines to follow:   Maintain a healthy weight.  Your risk for heart disease is higher if you have extra weight. Ask your healthcare provider what a healthy weight is for you. Your provider may suggest that you lose weight. You can lose weight by eating fewer calories and foods that have added sugars and fat. The DASH meal plan can help you do this. Decrease calories by eating smaller portions at each meal and fewer snacks. Ask your provider for  more information about how to lose weight.    Exercise regularly.  Regular exercise can help you reach or maintain a healthy weight. Regular exercise can also help decrease your blood pressure and improve your cholesterol levels. Get 30 minutes or more of moderate exercise each day of the week. To lose weight, get at least 60 minutes of exercise. Talk to your healthcare provider about the best exercise program for you.         Limit alcohol.  Women should limit alcohol to 1 drink a day. Men should limit alcohol to 2 drinks a day. A drink of alcohol is 12 ounces of beer, 5 ounces of wine, or 1½ ounces of liquor.    For more information:   [facility]  [address] [address]  [place] [address]  Phone: [phone]  Web Address: http://www.nhlbi.nih.gov/health/infoctr/index.htm    © Copyright [employer] 2023 Information is for End User's use only and may not be sold, redistributed or otherwise used for commercial purposes.  The above information is an  only. It is not intended as medical advice for individual conditions or treatments. Talk to your doctor, nurse or pharmacist before following any medical regimen to see if it is safe and effective for you.

## 2024-03-14 NOTE — LETTER
2024     Stephanie Pruett DO  [address]  [address]  [place] [address]    Patient: Buffy Colón   YOB: 1932   Date of Visit: 3/14/2024       Dear Dr. Pruett:    Thank you for referring Buffy Colón to me for evaluation. Below are my notes for this consultation.    If you have questions, please do not hesitate to call me. I look forward to following your patient along with you.         Sincerely,        SARAHI Barnett        CC: MD Gely Lopez CRNP  3/14/2024 11:08 AM  Incomplete  Cardiology  Follow Up Office Visit Note     Buffy Colón   89 y.o.   female   MRN: [id number]  [facility] [place]  [address]  [address]  [place] [address]  [phone]    PCP: Stephanie Pruett DO  Cardiologist : Dr Sandoval        Summary of Recommendations  Low-sodium diet  Fasting lipid profile, HgA1c ( pt request)  Follow up will be scheduled with Dr Sandoval in a short interval, re: BP/labs  Colon Ca screenin2023, up-to-date      Impression/plan  Chronic CASTELLANO possibly due to grade 2 diastolic dysfunction  PAF, no recent episodes by symptoms.( When in a fib, she was symptomatic)  Off aspirin given history of duodenal ulcer/GI bleed  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 BP  174/80 by me.  Per the MA: 156/80 . On amlodipine 2.5 mg daily, benazepril 40 mg daily  -pt is resistant to changing any meds today.  Reports it has varied greatly over time and has many med changes.   Has had 2 nomral BP redings since December at other visits.  Did take am meds   For now will monitor. Low Na diet, avoid NSAIDS ( as she has  been).  Return to see her cardiologist  Hyperlipidemia. On lovastatin 20 mg daily mg/d.  11/21/2023 calculated LDL 86.  Due for repeat   Latest Reference Range & Units 04/09/20 10:30 10/12/20 07:12 11/13/21 08:34   Cholesterol 50 - 200 mg/dL 191 175 173   Triglycerides <=150 mg/dL 67 102 53   HDL >=40 mg/dL 79 66 76   LDL Calculated 0 - 100 mg/dL 99 89 86   CKD 3A.  Baseline creatinine 0.9  Asthma/COPD overlap  Former tobacco quitting in 2014 after 14.5 pack years  Hypothyroidism  Salivary gland cancer  History of nasopharyngeal cancer  Osteoporosis, vertebral fx- per rheumatology  Cardiac testing  TTE 2/7/2023.  EF 65%.  Wall motion normal.  Grade 2 DD.  RV normal size and function.  Moderate LAE.  Mild AI.  Mild MR.  Mild TR.  Estimated PA P 37 mmHg.  Aortic root ectatic at 3.8 cm.+ Aortic valve sclerosis                HPI:   Buffy Colón is a 89-year-old female with PAF, mitral regurgitation, essential hypertension, hyperlipidemia, an asc aortic aneurysm S/P repair ( 2015) and HTN.  In the past she was on aspirin but taken off due to a duodenal ulcer/GI bleed.  Further, she has not been on chronic anticoagulation due to nasopharyngeal cancer.   She follows with Dr. Sandoval, and was last seen in the office July 2023.  It was noted she had lower extremity edema.  This was suspected to be related to amlodipine.  She also had mild chronic CASTELLANO possibly due to grade 2 diastolic dysfunction.  She was maintained off furosemide.  Previously on Lotrel this was discontinued, replacing it with a lower dose amlodipine 2.5 mg daily to decrease lower extremity edema.  She was advised to follow-up in a few months      3/14/2024  Routine cardiology follow-up.  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    ROS: She is frustrated over the last year, as she has had several fractures of the spine, she  also had costochondritis.  She will be starting Prolia soon for her osteopenia.  She has had no further lower extremity edema.  Her persistent CASTELLANO remains, no worse than prior.  She denies chest pain.  Rare palpitations.  In the past when she had A-fib she was very symptomatic.  She has not had nothing similar of recent.  She does not consume NSAIDs.  She does not utilize decongestants.  She does not check her home blood pressures.  Blood pressure in the office today elevated 156/88  Recheck by me 174/80  Exam unremarkable, no lower extremity edema  I reiterated the importance of a salt restricted diet and provided some additional education material to make sure she is adhering to a salt restricted diet as she assures me she has been  She is hesitant today and adjusting any of her antihypertensives.  She tells me they have been changed several times in the past.  I recommend repeat lipid profile.  She requests I also order a hemoglobin A1c as it was elevated in the past, which I provided for her today.  I recommend she return to see her cardiologist in short interval.               Assessment  Diagnoses and all orders for this visit:    Shortness of breath  -     POCT ECG    Paroxysmal atrial fibrillation (HCC)    Atherosclerosis of aorta (HCC)    Stage 3a chronic kidney disease (HCC)    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)    Essential hypertension    Nonrheumatic mitral valve regurgitation    Pre-diabetes  -     Hemoglobin A1C; Future    Pure hypercholesterolemia  -     Lipid panel; Future    Osteoarthritis of right middle finger  -     Diclofenac Sodium (VOLTAREN) 1 %; Apply 2 g topically 4 (four) times a day as needed (pain)        Past Medical History:   Diagnosis Date   • A-fib (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)     bilateral   • Aneurysm of aortic root ([facility])     last assessed - [id number]   • Aortic arch  aneurysm (HCC)    • Aortic root dilatation (HCC)     last assessed - 06Dec2017   • Arthritis    • Ascending aortic aneurysm (HCC)     last assessed - 06Dec2017   • Asthma    • Basal cell carcinoma     last assessed - 09Sep2016   • 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HCC)    • Disease of thyroid gland    • Facet arthropathy, cervical     last assessed - 08Jun2017   • History of fracture of vertebral column    • Hyperlipidemia    • Hypertension    • 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HCC)    • Hypoxia     last assessed - 16Mar2015   • Junctional rhythm     last assessed - 04Apr2017   • Lightheadedness     last assessed - 28Jun2016   • Migraine    • Palpitations     last assessed - 28Jun2016   • Pleural effusion, bilateral     last assessed - 20Apr2015   • 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HCC)    • Shortness of breath     last assessed - 04Apr2017   • Thyroid trouble    • Trigger finger     last assessed - 09Sep2016   • Trigger middle finger of right hand     last assessed - 14Oct2016   • Urinary incontinence     last assessed -  22Jul,2013; Resolved - 15Jan2017       Review of Systems   Constitutional: Negative for chills.   Eyes:         Poor visual acuity   Cardiovascular:  Positive for dyspnea on exertion. Negative for chest pain, claudication, cyanosis, irregular heartbeat, leg swelling, near-syncope, orthopnea, palpitations, paroxysmal nocturnal dyspnea and syncope.   Respiratory:  Negative for cough and shortness of breath.    Gastrointestinal:  Negative for heartburn and nausea.   Neurological:  Negative for dizziness, focal weakness, headaches, light-headedness and weakness.   All other systems reviewed and are negative.      Allergies   Allergen Reactions   • Amiodarone Hives   • Omnipaque [Iohexol] Hives and Shortness Of Breath   • Clindamycin Other (See Comments)     When taken causes cdiff immediately   • Other    • Sulfa Antibiotics Hives     itching   • Acetazolamide Hives and Rash     Reaction Date:Unknown   • Iv Dye  [Iodinated Contrast  Media] Hypertension and Palpitations     Annotation - 19Mar2015: Verified with patient    • Naprosyn [Naproxen] Itching and Rash     Category: Allergy;      .    Current Outpatient Medications:   •  acetaminophen (TYLENOL) 650 mg CR tablet, Take 650 mg by mouth every 8 (eight) hours as needed for mild pain, Disp: , Rfl:   •  albuterol (2.5 mg/3 mL) 0.083 % nebulizer solution, Take 2.5 mg by nebulization every 6 (six) hours as needed for wheezing or shortness of breath, Disp: , Rfl:   •  albuterol (PROVENTIL HFA,VENTOLIN HFA) 90 mcg/act inhaler, Inhale 2 puffs every 6 (six) hours as needed for wheezing, Disp: 25.5 g, Rfl: 3  •  amLODIPine (NORVASC) 2.5 mg tablet, Take 1 tablet (2.5 mg total) by mouth daily, Disp: 90 tablet, Rfl: 0  •  benazepril (LOTENSIN) 40 MG tablet, Take 1 tablet (40 mg total) by mouth daily, Disp: 90 tablet, Rfl: 0  •  calcium carbonate (OS-TAE) 600 MG tablet, Take 600 mg by mouth daily in the early morning  , Disp: , Rfl:   •  Diclofenac Sodium (VOLTAREN) 1 %, Apply 2 g topically 4 (four) times a day as needed (pain), Disp: , Rfl:   •  levothyroxine 112 mcg tablet, Take 1 tablet (112 mcg total) by mouth daily in the early morning, Disp: 90 tablet, Rfl: 1  •  lovastatin (MEVACOR) 20 mg tablet, Take 1 tablet (20 mg total) by mouth every morning, Disp: 90 tablet, Rfl: 0  •  metoprolol succinate (TOPROL-XL) 25 mg 24 hr tablet, Take 1 tablet (25 mg total) by mouth daily, Disp: 90 tablet, Rfl: 0  •  Multiple Vitamins-Minerals (One Daily Multivitamin Women) TABS, Apply 1 tablet to the mouth or throat daily. Indications: Treatment to Prevent Vitamin Deficiency, Disp: , Rfl:   •  pantoprazole (PROTONIX) 40 mg tablet, Take 1 tablet (40 mg total) by mouth 2 (two) times a day before meals, Disp: 180 tablet, Rfl: 1    Social History     Socioeconomic History   • Marital status:      Spouse name: Not on file   • Number of children: Not on file   • Years of education: Not on file   • Highest education  level: Not on file   Occupational History   • Not on file   Tobacco Use   • Smoking status: Former     Current packs/day: 0.00     Average packs/day: 0.3 packs/day for 58.0 years (14.5 ttl pk-yrs)     Types: Cigarettes     Start date:      Quit date: 2014     Years since quittin.2   • Smokeless tobacco: Former   • Tobacco comments:     smoked 17 yrs 1/2 ppd quit and restarted then quit  10 days ago during 2014    Vaping Use   • Vaping status: Never Used   Substance and Sexual Activity   • Alcohol use: Not Currently     Comment: Social drinker   • Drug use: No   • Sexual activity: Not Currently   Other Topics Concern   • Not on file   Social History Narrative   • Not on file     Social Determinants of Health     Financial Resource Strain: Medium Risk (3/8/2023)    Overall Financial Resource Strain (CARDIA)    • Difficulty of Paying Living Expenses: Somewhat hard   Food Insecurity: No Food Insecurity (2023)    Hunger Vital Sign    • Worried About Running Out of Food in the Last Year: Never true    • Ran Out of Food in the Last Year: Never true   Transportation Needs: Unmet Transportation Needs (3/8/2023)    PRAPARE - Transportation    • Lack of Transportation (Medical): Yes    • Lack of Transportation (Non-Medical): Yes   Physical Activity: Not on file   Stress: Not on file   Social Connections: Not on file   Intimate Partner Violence: Not on file   Housing Stability: Low Risk  (2023)    Housing Stability Vital Sign    • Unable to Pay for Housing in the Last Year: No    • Number of Places Lived in the Last Year: 1    • Unstable Housing in the Last Year: No       Family History   Problem Relation Age of Onset   • Coronary aneurysm Sister    • Coronary artery disease Sister         CABG   • Heart disease Family         cardiac disorder   • Hypertension Family    • Cancer Family    • Thyroid disease Family        Physical Exam  Vitals and nursing note reviewed.   Constitutional:        "General: She is not in acute distress.     Appearance: She is not diaphoretic.      Comments: BMI 21   HENT:      Head: Normocephalic and atraumatic.   Eyes:      Conjunctiva/sclera: Conjunctivae normal.   Cardiovascular:      Rate and Rhythm: Normal rate and regular rhythm.      Pulses: Intact distal pulses.      Heart sounds: Normal heart sounds.   Pulmonary:      Effort: Pulmonary effort is normal.      Breath sounds: Normal breath sounds.   Abdominal:      General: Bowel sounds are normal.      Palpations: Abdomen is soft.   Musculoskeletal:         General: Normal range of motion.      Cervical back: Normal range of motion and neck supple.   Skin:     General: Skin is warm and dry.   Neurological:      Mental Status: She is alert and oriented to person, place, and time.         Vitals: Blood pressure 156/80, pulse 79, height 5' 2\" (1.575 m), weight 53.8 kg (118 lb 9.6 oz), SpO2 96%.   Wt Readings from Last 3 Encounters:   03/14/24 53.8 kg (118 lb 9.6 oz)   01/05/24 53.5 kg (118 lb)   12/20/23 52.8 kg (116 lb 6.4 oz)         Labs & Results:  Lab Results   Component Value Date    WBC 6.26 11/20/2023    HGB 11.6 11/20/2023    HCT 36.6 11/20/2023    MCV 96 11/20/2023     11/20/2023     BNP   Date Value Ref Range Status   04/09/2015 412 (H) 0 - 100 pg/mL Final     Comment:     The above 1 analytes [address] [place] [address]     03/03/2015 501 (H) 0 - 100 pg/mL Final     Comment:     The above 1 analytes [address] performed [address] [address]     02/19/2015 382 (H) 0 - 99 pg/mL Final     Comment:     The above 1 analytes were performed by [address],[place] [address]       No components found for: \"CHEM\"    Results for orders placed during the hospital encounter of 11/22/19    Echo complete with contrast if indicated    Narrative  [address] [place]  [place] [id number] [phone]    Transthoracic " Echocardiogram  2D, M-mode, Doppler, and Color Doppler    Study date:  2019    Patient: REDD WALSH  MR number: [id number]  Account number: [id number]  : 1932  Age: 87 years  Gender: Female  Status: Outpatient  Location: [facility]  Height: 62 in  Weight: 132 lb  BP: 112/ 68 mmHg    Indications: Syncope    Diagnoses: R55. - Syncope and collapse    Sonographer:  SINCERE Lowery, RDCS  Primary Physician:  Jet Rees DO  Referring Physician:  Steve Sandoval MD  Group:  [facility]  Interpreting Physician:  Pietro Rao MD    SUMMARY    LEFT VENTRICLE:  Size was normal.  Systolic function was normal. Ejection fraction was estimated to be 55 %.  There were no regional wall motion abnormalities.  Wall thickness was at the upper limits of normal.    MITRAL VALVE:  There was trace regurgitation.    AORTIC VALVE:  There was mild regurgitation.    TRICUSPID VALVE:  There was trace regurgitation.    HISTORY: PRIOR HISTORY: Syncope; Hypertension; Hypothyroid; Asthma; Shortness of breath; PAF; Hyperlipidemia; Salivary gland cancer; Malignant neoplasm of hard palate; Aortic root dilated; Aortic arch aneurysm; Ascending aorta aneurysm;  Former smoker    PROCEDURE: The study was performed in the [facility]. This was a routine study. The transthoracic approach was used. The study included complete 2D imaging, M-mode, complete spectral Doppler, and color Doppler. The  heart rate was 76 bpm, at the start of the study. Images were obtained from the parasternal, apical, subcostal, and suprasternal notch acoustic windows. Image quality was adequate.    LEFT VENTRICLE: Size was normal. Systolic function was normal. Ejection fraction was estimated to be 55 %. There were no regional wall motion abnormalities. Wall thickness was at the upper limits of normal.    VENTRICULAR SEPTUM: There was sigmoid septal appearance.    RIGHT  VENTRICLE: The size was normal. Systolic function was normal. Wall thickness was normal.    LEFT ATRIUM: Size was normal.    RIGHT ATRIUM: Size was normal.    MITRAL VALVE: Valve structure was normal. There was normal leaflet separation. DOPPLER: The transmitral velocity was within the normal range. There was no evidence for stenosis. There was trace regurgitation.    AORTIC VALVE: The valve was trileaflet. Leaflets exhibited normal thickness and normal cuspal separation. DOPPLER: Transaortic velocity was within the normal range. There was no evidence for stenosis. There was mild regurgitation.    TRICUSPID VALVE: The valve structure was normal. There was normal leaflet separation. DOPPLER: The transtricuspid velocity was within the normal range. There was no evidence for stenosis. There was trace regurgitation.    PULMONIC VALVE: Leaflets exhibited normal thickness, no calcification, and normal cuspal separation. DOPPLER: The transpulmonic velocity was within the normal range. There was no regurgitation.    PERICARDIUM: There was no pericardial effusion. The pericardium was normal in appearance.    AORTA: The root exhibited normal size.    SYSTEM MEASUREMENT TABLES    2D  %FS: 27.43 %  AV Diam: 3.51 cm  EDV(Teich): 71.64 ml  EF(Cube): 61.79 %  EF(Teich): 53.84 %  ESV(Cube): 25.19 ml  ESV(Teich): 33.07 ml  IVSd: 1.26 cm  LA Area: 13.72 cm2  LA Diam: 3.68 cm  LVEDV MOD A4C: 63.51 ml  LVEF MOD A4C: 59.38 %  LVESV MOD A4C: 25.8 ml  LVIDd: 4.04 cm  LVIDs: 2.93 cm  LVLd A4C: 6.09 cm  LVLs A4C: 5.11 cm  LVPWd: 1.07 cm  RA Area: 15 cm2  RV Diam: 3.16 cm  SV MOD A4C: 37.71 ml  SV(Cube): 40.72 ml  SV(Teich): 38.57 ml    CW  AR Dec Alger: 1.26 m/s2  AR Dec Time: 2061.79 ms  AR PHT: 597.92 ms  AR Vmax: 2.6 m/s  AR maxP.95 mmHg  TR MaxP.9 mmHg  TR Vmax: 2.77 m/s    MM  TAPSE: 1.82 cm    PW  E': 0.05 m/s  E/E': 16.22  MV A Bipin: 0.58 m/s  MV Dec Alger: 4.53 m/s2  MV DecT: 192.44 ms  MV E Bipin: 0.87 m/s  MV E/A Ratio:  1.5    [facility] Accredited Echocardiography Laboratory    Prepared and electronically signed by    Pietro Rao MD  Signed 2019 16:02:12    No results found for this or any previous visit.        This note was completed in part utilizing [employer] direct voice recognition software.   Grammatical errors, random word insertion, spelling mistakes, and incomplete sentences may be an occasional consequence of the system secondary to software limitations, ambient noise and hardware issues. At the time of dictation, efforts were made to edit, clarify and /or correct errors.  Please read the chart carefully and recognize, using context, where substitutions have occurred.  If you have any questions or concerns about the context, text or information contained within the body of this dictation, please contact myself, the provider, for further clarification        SARAHI Barnett  3/14/2024 11:08 AM  Sign when Signing Visit  Cardiology  Follow Up Office Visit Note        89 y.o.   female   MRN: [id number]  [facility] [place]  [address] [facility] [address]  [address]  [facility] [id number] [phone]    PCP: Stephanie Pruett DO  Cardiologist : Dr Sandoval        Summary of Recommendations  Low-sodium diet  Fasting lipid profile, HgA1c ( pt request)  Follow up will be scheduled with Dr Sandoval in a short interval, re: BP/labs  Colon Ca screenin2023, up-to-date      Impression/plan  Chronic CASTELLANO possibly due to grade 2 diastolic dysfunction  PAF, no recent episodes by symptoms.( When in a fib, she was symptomatic)  Off aspirin given history of duodenal ulcer/GI bleed  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 BP  174/80 by me.  Per the MA: 156/80 . On amlodipine 2.5 mg daily, benazepril 40 mg daily  -pt is resistant to changing any meds today.  Reports it has varied greatly over time and has many med changes.   Has had 2 nomral BP redings since December at other visits.  Did take am meds   For now will monitor. Low Na diet, avoid NSAIDS ( as she has been).  Return to see her cardiologist  Hyperlipidemia. On lovastatin 20 mg daily mg/d.  11/21/2023 calculated LDL 86.  Due for repeat   Latest Reference Range & Units 04/09/20 10:30 10/12/20 07:12 11/13/21 08:34   Cholesterol 50 - 200 mg/dL 191 175 173   Triglycerides <=150 mg/dL 67 102 53   HDL >=40 mg/dL 79 66 76   LDL Calculated 0 - 100 mg/dL 99 89 86   CKD 3A.  Baseline creatinine 0.9  Asthma/COPD overlap  Former tobacco quitting in 2014 after 14.5 pack years  Hypothyroidism  Salivary gland cancer  History of nasopharyngeal cancer  Osteoporosis, vertebral fx- per rheumatology  Cardiac testing  TTE 2/7/2023.  EF 65%.  Wall motion normal.  Grade 2 DD.  RV normal size and function.  Moderate LAE.  Mild AI.  Mild MR.  Mild TR.  Estimated PA P 37 mmHg.  Aortic root ectatic at 3.8 cm.+ Aortic valve sclerosis                HPI:   June Katarzyna is a 89-year-old female with PAF, mitral regurgitation, essential hypertension, hyperlipidemia, an asc aortic aneurysm S/P repair ( 2015) and HTN.  In the past she was on aspirin but taken off due to a duodenal ulcer/GI bleed.  Further, she has not been on chronic anticoagulation due to nasopharyngeal cancer.   She follows with Dr. Sandoval, and was last seen in the office July 2023.  It was noted she had lower extremity edema.  This was suspected to be related to amlodipine.  She also had mild chronic CASTELLANO possibly due to grade 2 diastolic dysfunction.  She was maintained off furosemide.  Previously on Lotrel this was discontinued, replacing it with a lower dose  amlodipine 2.5 mg daily to decrease lower extremity edema.  She was advised to follow-up in a few months      3/14/2024  Routine cardiology follow-up.  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    ROS: She is frustrated over the last year, as she has had several fractures of the spine, she also had costochondritis.  She will be starting Prolia soon for her osteopenia.  She has had no further lower extremity edema.  Her persistent CASTELLANO remains, no worse than prior.  She denies chest pain.  Rare palpitations.  In the past when she had A-fib she was very symptomatic.  She has not had nothing similar of recent.  She does not consume NSAIDs.  She does not utilize decongestants.  She does not check her home blood pressures.  Blood pressure in the office today elevated 156/88  Recheck by me 174/80  Exam unremarkable, no lower extremity edema  I reiterated the importance of a salt restricted diet and provided some additional education material to make sure she is adhering to a salt restricted diet as she assures me she has been  She is hesitant today and adjusting any of her antihypertensives.  She tells me they have been changed several times in the past.  I recommend repeat lipid profile.  She requests I also order a hemoglobin A1c as it was elevated in the past, which I provided for her today.  I recommend she return to see her cardiologist in short interval.               Assessment  Diagnoses and all orders for this visit:    Shortness of breath  -     POCT ECG    Paroxysmal atrial fibrillation (HCC)    Atherosclerosis of aorta (HCC)    Stage 3a chronic kidney disease (HCC)    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)    Essential hypertension    Nonrheumatic mitral valve regurgitation    Pre-diabetes  -     Hemoglobin A1C; Future    Pure hypercholesterolemia  -     Lipid panel;  Future    Osteoarthritis of right middle finger  -     Diclofenac Sodium (VOLTAREN) 1 %; Apply 2 g topically 4 (four) times a day as needed (pain)        Past Medical History:   Diagnosis Date   • A-fib (HCC)    • Allergy to IVP dye 06/04/2013    pt felt heaviness, palpitations   • 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), wet (HCC)     bilateral   • Aneurysm of aortic root (HCC)     last assessed - [id number]   • 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[facility])    • Aortic root dilatation (HCC)     last assessed - [id number]   • Arthritis    • Ascending aortic aneurysm (HCC)     last assessed - [id number]   • Asthma    • Basal cell carcinoma     last assessed - [id number]   • 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[facility])    • Disease of thyroid gland    • Facet arthropathy, cervical     last assessed - 08Jun2017   • History of fracture of vertebral column    • Hyperlipidemia    • Hypertension    • 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HCC)    • Hypoxia     last assessed - 16Mar2015   • Junctional rhythm     last assessed - [id number]   • Lightheadedness     last assessed - [id number]   • Migraine    • Palpitations     last assessed - 28Jun2016   • Pleural effusion, bilateral     last assessed - 20Apr2015   • 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HCC)    • Shortness of breath     last assessed - [id number]   • Thyroid trouble    • Trigger finger     last assessed - 09Sep2016   • Trigger middle finger of right hand     last assessed - 14Oct2016   • Urinary incontinence     last assessed -  22Jul,2013; Resolved - 15Jan2017       Review of Systems   Constitutional: Negative for chills.   Cardiovascular:  Positive for dyspnea on exertion. Negative for chest pain, claudication, cyanosis, irregular heartbeat, leg swelling, near-syncope, orthopnea, palpitations, paroxysmal nocturnal dyspnea and syncope.   Respiratory:  Negative for cough and shortness of breath.    Gastrointestinal:  Negative for heartburn and nausea.   Neurological:  Negative for dizziness, focal weakness, headaches, light-headedness  and weakness.   All other systems reviewed and are negative.      Allergies   Allergen Reactions   • Amiodarone Hives   • Omnipaque [Iohexol] Hives and Shortness Of Breath   • Clindamycin Other (See Comments)     When taken causes cdiff immediately   • Other    • Sulfa Antibiotics Hives     itching   • Acetazolamide Hives and Rash     Reaction Date:Unknown   • Iv Dye  [Iodinated Contrast Media] Hypertension and Palpitations     Annotation - 19Mar2015: Verified with patient    • Naprosyn [Naproxen] Itching and Rash     Category: Allergy;      .    Current Outpatient Medications:   •  acetaminophen (TYLENOL) 650 mg CR tablet, Take 650 mg by mouth every 8 (eight) hours as needed for mild pain, Disp: , Rfl:   •  albuterol (2.5 mg/3 mL) 0.083 % nebulizer solution, Take 2.5 mg by nebulization every 6 (six) hours as needed for wheezing or shortness of breath, Disp: , Rfl:   •  albuterol (PROVENTIL HFA,VENTOLIN HFA) 90 mcg/act inhaler, Inhale 2 puffs every 6 (six) hours as needed for wheezing, Disp: 25.5 g, Rfl: 3  •  amLODIPine (NORVASC) 2.5 mg tablet, Take 1 tablet (2.5 mg total) by mouth daily, Disp: 90 tablet, Rfl: 0  •  benazepril (LOTENSIN) 40 MG tablet, Take 1 tablet (40 mg total) by mouth daily, Disp: 90 tablet, Rfl: 0  •  calcium carbonate (OS-TAE) 600 MG tablet, Take 600 mg by mouth daily in the early morning  , Disp: , Rfl:   •  Diclofenac Sodium (VOLTAREN) 1 %, Apply 2 g topically 4 (four) times a day as needed (pain), Disp: , Rfl:   •  levothyroxine 112 mcg tablet, Take 1 tablet (112 mcg total) by mouth daily in the early morning, Disp: 90 tablet, Rfl: 1  •  lovastatin (MEVACOR) 20 mg tablet, Take 1 tablet (20 mg total) by mouth every morning, Disp: 90 tablet, Rfl: 0  •  metoprolol succinate (TOPROL-XL) 25 mg 24 hr tablet, Take 1 tablet (25 mg total) by mouth daily, Disp: 90 tablet, Rfl: 0  •  Multiple Vitamins-Minerals (One Daily Multivitamin Women) TABS, Apply 1 tablet to the mouth or throat daily.  Indications: Treatment to Prevent Vitamin Deficiency, Disp: , Rfl:   •  pantoprazole (PROTONIX) 40 mg tablet, Take 1 tablet (40 mg total) by mouth 2 (two) times a day before meals, Disp: 180 tablet, Rfl: 1    Social History     Socioeconomic History   • Marital status:      Spouse name: Not on file   • Number of children: Not on file   • Years of education: Not on file   • Highest education level: Not on file   Occupational History   • Not on file   Tobacco Use   • Smoking status: Former     Current packs/day: 0.00     Average packs/day: 0.3 packs/day for 58.0 years (14.5 ttl pk-yrs)     Types: Cigarettes     Start date:      Quit date: 2014     Years since quittin.2   • Smokeless tobacco: Former   • Tobacco comments:     smoked 17 yrs  ppd quit and restarted then quit  10 days ago during 2014    Vaping Use   • Vaping status: Never Used   Substance and Sexual Activity   • Alcohol use: Not Currently     Comment: Social drinker   • Drug use: No   • Sexual activity: Not Currently   Other Topics Concern   • Not on file   Social History Narrative   • Not on file     Social Determinants of Health     Financial Resource Strain: Medium Risk (3/8/2023)    Overall Financial Resource Strain (CARDIA)    • Difficulty of Paying Living Expenses: Somewhat hard   Food Insecurity: No Food Insecurity (2023)    Hunger Vital Sign    • Worried About Running Out of Food in the Last Year: Never true    • Ran Out of Food in the Last Year: Never true   Transportation Needs: Unmet Transportation Needs (3/8/2023)    PRAPARE - Transportation    • Lack of Transportation (Medical): Yes    • Lack of Transportation (Non-Medical): Yes   Physical Activity: Not on file   Stress: Not on file   Social Connections: Not on file   Intimate Partner Violence: Not on file   Housing Stability: Low Risk  (2023)    Housing Stability Vital Sign    • Unable to Pay for Housing in the Last Year: No    • Number of Places  "Lived in the Last Year: 1    • Unstable Housing in the Last Year: No       Family History   Problem Relation Age of Onset   • Coronary aneurysm Sister    • Coronary artery disease Sister         CABG   • Heart disease Family         cardiac disorder   • Hypertension Family    • Cancer Family    • Thyroid disease Family        Physical Exam  Vitals and nursing note reviewed.   Constitutional:       General: She is not in acute distress.     Appearance: She is not diaphoretic.      Comments: BMI 21   HENT:      Head: Normocephalic and atraumatic.   Eyes:      Conjunctiva/sclera: Conjunctivae normal.   Cardiovascular:      Rate and Rhythm: Normal rate and regular rhythm.      Pulses: Intact distal pulses.      Heart sounds: Normal heart sounds.   Pulmonary:      Effort: Pulmonary effort is normal.      Breath sounds: Normal breath sounds.   Abdominal:      General: Bowel sounds are normal.      Palpations: Abdomen is soft.   Musculoskeletal:         General: Normal range of motion.      Cervical back: Normal range of motion and neck supple.   Skin:     General: Skin is warm and dry.   Neurological:      Mental Status: She is alert and oriented to person, place, and time.         Vitals: Blood pressure 156/80, pulse 79, height 5' 2\" (1.575 m), weight 53.8 kg (118 lb 9.6 oz), SpO2 96%.   Wt Readings from Last 3 Encounters:   03/14/24 53.8 kg (118 lb 9.6 oz)   01/05/24 53.5 kg (118 lb)   12/20/23 52.8 kg (116 lb 6.4 oz)         Labs & Results:  Lab Results   Component Value Date    WBC 6.26 11/20/2023    HGB 11.6 11/20/2023    HCT 36.6 11/20/2023    MCV 96 11/20/2023     11/20/2023     BNP   Date Value Ref Range Status   04/09/2015 412 (H) 0 - 100 pg/mL Final     Comment:     The above 1 analytes [place] [id number]     03/03/2015 501 (H) 0 - 100 pg/mL Final     Comment:     The above 1 analytes [place] [id number]   " "  2015 382 (H) 0 - 99 pg/mL Final     Comment:     The above 1 analytes were performed by [address],[place] [address]       No components found for: \"CHEM\"    Results for orders placed during the hospital encounter of 19    Echo complete with contrast if indicated    [facility]  [address]  [place] [id number] [phone]    Transthoracic Echocardiogram  2D, M-mode, Doppler, and Color Doppler    Study date:  2019    Patient: REDD WALSH  MR number: [id number]  Account number: [id number]  : 1932  Age: 87 years  Gender: Female  Status: Outpatient  Location: [facility]  Height: 62 in  Weight: 132 lb  BP: 112/ 68 mmHg    Indications: Syncope    Diagnoses: R55. - Syncope and collapse    Sonographer:  SINCERE Lowery, RDCS  Primary Physician:  Jet Rees DO  Referring Physician:  Steve Sandoval MD  Group:  [facility]  Interpreting Physician:  Pietro Rao MD    SUMMARY    LEFT VENTRICLE:  Size was normal.  Systolic function was normal. Ejection fraction was estimated to be 55 %.  There were no regional wall motion abnormalities.  Wall thickness was at the upper limits of normal.    MITRAL VALVE:  There was trace regurgitation.    AORTIC VALVE:  There was mild regurgitation.    TRICUSPID VALVE:  There was trace regurgitation.    HISTORY: PRIOR HISTORY: Syncope; Hypertension; Hypothyroid; Asthma; Shortness of breath; PAF; Hyperlipidemia; Salivary gland cancer; Malignant neoplasm of hard palate; Aortic root dilated; Aortic arch aneurysm; Ascending aorta aneurysm;  Former smoker    PROCEDURE: The study was performed in the [facility]. This was a routine study. The transthoracic approach was used. The study included complete 2D imaging, M-mode, complete spectral Doppler, and color Doppler. The  heart rate was 76 bpm, at the start of the study. Images were " obtained from the parasternal, apical, subcostal, and suprasternal notch acoustic windows. Image quality was adequate.    LEFT VENTRICLE: Size was normal. Systolic function was normal. Ejection fraction was estimated to be 55 %. There were no regional wall motion abnormalities. Wall thickness was at the upper limits of normal.    VENTRICULAR SEPTUM: There was sigmoid septal appearance.    RIGHT VENTRICLE: The size was normal. Systolic function was normal. Wall thickness was normal.    LEFT ATRIUM: Size was normal.    RIGHT ATRIUM: Size was normal.    MITRAL VALVE: Valve structure was normal. There was normal leaflet separation. DOPPLER: The transmitral velocity was within the normal range. There was no evidence for stenosis. There was trace regurgitation.    AORTIC VALVE: The valve was trileaflet. Leaflets exhibited normal thickness and normal cuspal separation. DOPPLER: Transaortic velocity was within the normal range. There was no evidence for stenosis. There was mild regurgitation.    TRICUSPID VALVE: The valve structure was normal. There was normal leaflet separation. DOPPLER: The transtricuspid velocity was within the normal range. There was no evidence for stenosis. There was trace regurgitation.    PULMONIC VALVE: Leaflets exhibited normal thickness, no calcification, and normal cuspal separation. DOPPLER: The transpulmonic velocity was within the normal range. There was no regurgitation.    PERICARDIUM: There was no pericardial effusion. The pericardium was normal in appearance.    AORTA: The root exhibited normal size.    SYSTEM MEASUREMENT TABLES    2D  %FS: 27.43 %  AV Diam: 3.51 cm  EDV(Teich): 71.64 ml  EF(Cube): 61.79 %  EF(Teich): 53.84 %  ESV(Cube): 25.19 ml  ESV(Teich): 33.07 ml  IVSd: 1.26 cm  LA Area: 13.72 cm2  LA Diam: 3.68 cm  LVEDV MOD A4C: 63.51 ml  LVEF MOD A4C: 59.38 %  LVESV MOD A4C: 25.8 ml  LVIDd: 4.04 cm  LVIDs: 2.93 cm  LVLd A4C: 6.09 cm  LVLs A4C: 5.11 cm  LVPWd: 1.07 cm  RA Area: 15  cm2  RV Diam: 3.16 cm  SV MOD A4C: 37.71 ml  SV(Cube): 40.72 ml  SV(Teich): 38.57 ml    CW  AR Dec Jack: 1.26 m/s2  AR Dec Time: 2061.79 ms  AR PHT: 597.92 ms  AR Vmax: 2.6 m/s  AR maxP.95 mmHg  TR MaxP.9 mmHg  TR Vmax: 2.77 m/s    MM  TAPSE: 1.82 cm    PW  E': 0.05 m/s  E/E': 16.22  MV A Bipin: 0.58 m/s  MV Dec Jack: 4.53 m/s2  MV DecT: 192.44 ms  MV E Bipin: 0.87 m/s  MV E/A Ratio: 1.5    [employer] Commission Accredited Echocardiography Laboratory    Prepared and electronically signed by    Pietro Rao MD  Signed 2019 16:02:12    No results found for this or any previous visit.        This note was completed in part utilizing [employer] direct voice recognition software.   Grammatical errors, random word insertion, spelling mistakes, and incomplete sentences may be an occasional consequence of the system secondary to software limitations, ambient noise and hardware issues. At the time of dictation, efforts were made to edit, clarify and /or correct errors.  Please read the chart carefully and recognize, using context, where substitutions have occurred.  If you have any questions or concerns about the context, text or information contained within the body of this dictation, please contact myself, the provider, for further clarification

## 2024-03-22 ENCOUNTER — RA CDI HCC (OUTPATIENT)
Dept: OTHER | Facility: HOSPITAL | Age: 89
End: 2024-03-22

## 2024-03-23 NOTE — PROGRESS NOTES
HCC coding opportunities     I12.9, H35.3231     Chart Reviewed number of suggestions sent to Provider: 2   GR    Patients Insurance     Medicare Insurance: [employer] Advantage

## 2024-03-26 PROBLEM — I12.9 HYPERTENSIVE KIDNEY DISEASE WITH CKD (CHRONIC KIDNEY DISEASE), STAGE 1-4 OR UNSPECIFIED CHRONIC KIDNEY DISEASE: Status: ACTIVE | Noted: 2024-03-26

## 2024-03-26 PROBLEM — H35.3231 EXUDATIVE AGE-RELATED MACULAR DEGENERATION, BILATERAL, WITH ACTIVE CHOROIDAL NEOVASCULARIZATION (HCC): Status: ACTIVE | Noted: 2024-03-26

## 2024-03-27 ENCOUNTER — APPOINTMENT (OUTPATIENT)
Dept: LAB | Facility: CLINIC | Age: 89
End: 2024-03-27
Payer: COMMERCIAL

## 2024-03-27 DIAGNOSIS — E78.00 PURE HYPERCHOLESTEROLEMIA: ICD-10-CM

## 2024-03-27 DIAGNOSIS — R73.03 PRE-DIABETES: ICD-10-CM

## 2024-03-27 LAB
CHOLEST SERPL-MCNC: 193 MG/DL
EST. AVERAGE GLUCOSE BLD GHB EST-MCNC: 131 MG/DL
HBA1C MFR BLD: 6.2 %
HDLC SERPL-MCNC: 69 MG/DL
LDLC SERPL CALC-MCNC: 106 MG/DL (ref 0–100)
NONHDLC SERPL-MCNC: 124 MG/DL
TRIGL SERPL-MCNC: 90 MG/DL

## 2024-03-27 PROCEDURE — 80061 LIPID PANEL: CPT

## 2024-03-27 PROCEDURE — 36415 COLL VENOUS BLD VENIPUNCTURE: CPT

## 2024-03-27 PROCEDURE — 83036 HEMOGLOBIN GLYCOSYLATED A1C: CPT

## 2024-03-28 ENCOUNTER — OFFICE VISIT (OUTPATIENT)
Dept: FAMILY MEDICINE CLINIC | Facility: CLINIC | Age: 89
End: 2024-03-28
Payer: COMMERCIAL

## 2024-03-28 VITALS
OXYGEN SATURATION: 97 % | BODY MASS INDEX: 21.75 KG/M2 | HEART RATE: 74 BPM | TEMPERATURE: 97.7 F | SYSTOLIC BLOOD PRESSURE: 128 MMHG | WEIGHT: 118.2 LBS | HEIGHT: 62 IN | DIASTOLIC BLOOD PRESSURE: 88 MMHG

## 2024-03-28 DIAGNOSIS — Z00.00 MEDICARE ANNUAL WELLNESS VISIT, SUBSEQUENT: ICD-10-CM

## 2024-03-28 DIAGNOSIS — M25.562 CHRONIC PAIN OF LEFT KNEE: ICD-10-CM

## 2024-03-28 DIAGNOSIS — G89.29 CHRONIC PAIN OF LEFT KNEE: ICD-10-CM

## 2024-03-28 DIAGNOSIS — J44.89 ASTHMA-COPD OVERLAP SYNDROME: ICD-10-CM

## 2024-03-28 DIAGNOSIS — E20.9 HYPOPARATHYROIDISM, UNSPECIFIED HYPOPARATHYROIDISM TYPE (HCC): ICD-10-CM

## 2024-03-28 DIAGNOSIS — E03.9 HYPOTHYROIDISM, UNSPECIFIED TYPE: ICD-10-CM

## 2024-03-28 DIAGNOSIS — C05.0 MALIGNANT NEOPLASM OF HARD PALATE (HCC): Primary | ICD-10-CM

## 2024-03-28 DIAGNOSIS — M35.3 POLYMYALGIA RHEUMATICA (HCC): ICD-10-CM

## 2024-03-28 DIAGNOSIS — H35.3231 EXUDATIVE AGE-RELATED MACULAR DEGENERATION, BILATERAL, WITH ACTIVE CHOROIDAL NEOVASCULARIZATION (HCC): ICD-10-CM

## 2024-03-28 PROBLEM — J45.51 SEVERE PERSISTENT ASTHMA WITH ACUTE EXACERBATION: Status: RESOLVED | Noted: 2020-10-20 | Resolved: 2024-03-28

## 2024-03-28 PROCEDURE — 99213 OFFICE O/P EST LOW 20 MIN: CPT | Performed by: FAMILY MEDICINE

## 2024-03-28 PROCEDURE — G0439 PPPS, SUBSEQ VISIT: HCPCS | Performed by: FAMILY MEDICINE

## 2024-03-28 NOTE — PATIENT INSTRUCTIONS
Medicare Preventive Visit Patient Instructions  Thank you for completing your Welcome to Medicare Visit or Medicare Annual Wellness Visit today. Your next wellness visit will be due in one year (3/29/2025).  The screening/preventive services that you may require over the next 5-10 years are detailed below. Some tests may not apply to you based off risk factors and/or age. Screening tests ordered at today's visit but not completed yet may show as past due. Also, please note that scanned in results may not display below.  Preventive Screenings:  Service Recommendations Previous Testing/Comments   Colorectal Cancer Screening  * Colonoscopy    * Fecal Occult Blood Test (FOBT)/Fecal Immunochemical Test (FIT)  * Fecal DNA/Cologuard Test  * Flexible Sigmoidoscopy Age: 45-75 years old   Colonoscopy: every 10 years (may be performed more frequently if at higher risk)  OR  FOBT/FIT: every 1 year  OR  Cologuard: every 3 years  OR  Sigmoidoscopy: every 5 years  Screening may be recommended earlier than age 45 if at higher risk for colorectal cancer. Also, an individualized decision between you and your healthcare provider will decide whether screening between the ages of 76-85 would be appropriate. Colonoscopy: 02/06/2023  FOBT/FIT: Not on file  Cologuard: Not on file  Sigmoidoscopy: Not on file    Screening Not Indicated     Breast Cancer Screening Age: 40+ years old  Frequency: every 1-2 years  Not required if history of left and right mastectomy Mammogram: 03/13/2014        Cervical Cancer Screening Between the ages of 21-29, pap smear recommended once every 3 years.   Between the ages of 30-65, can perform pap smear with HPV co-testing every 5 years.   Recommendations may differ for women with a history of total hysterectomy, cervical cancer, or abnormal pap smears in past. Pap Smear: Not on file    Screening Not Indicated   Hepatitis C Screening Once for adults born between 1945 and 1965  More frequently in patients at  high risk for Hepatitis C Hep C Antibody: Not on file        Diabetes Screening 1-2 times per year if you're at risk for diabetes or have pre-diabetes Fasting glucose: 104 mg/dL (4/28/2023)  A1C: 6.2 % (3/27/2024)  Screening Current   Cholesterol Screening Once every 5 years if you don't have a lipid disorder. May order more often based on risk factors. Lipid panel: 03/27/2024    Screening Not Indicated  History Lipid Disorder     Other Preventive Screenings Covered by Medicare:  Abdominal Aortic Aneurysm (AAA) Screening: covered once if your at risk. You're considered to be at risk if you have a family history of AAA.  Lung Cancer Screening: covers low dose CT scan once per year if you meet all of the following conditions: (1) Age 55-77; (2) No signs or symptoms of lung cancer; (3) Current smoker or have quit smoking within the last 15 years; (4) You have a tobacco smoking history of at least 20 pack years (packs per day multiplied by number of years you smoked); (5) You get a written order from a healthcare provider.  Glaucoma Screening: covered annually if you're considered high risk: (1) You have diabetes OR (2) Family history of glaucoma OR (3)  aged 50 and older OR (4)  American aged 65 and older  Osteoporosis Screening: covered every 2 years if you meet one of the following conditions: (1) You're estrogen deficient and at risk for osteoporosis based off medical history and other findings; (2) Have a vertebral abnormality; (3) On glucocorticoid therapy for more than 3 months; (4) Have primary hyperparathyroidism; (5) On osteoporosis medications and need to assess response to drug therapy.   Last bone density test (DXA Scan): 12/30/2023.  HIV Screening: covered annually if you're between the age of 15-65. Also covered annually if you are younger than 15 and older than 65 with risk factors for HIV infection. For pregnant patients, it is covered up to 3 times per  pregnancy.    Immunizations:  Immunization Recommendations   Influenza Vaccine Annual influenza vaccination during flu season is recommended for all persons aged >= 6 months who do not have contraindications   Pneumococcal Vaccine   * Pneumococcal conjugate vaccine = PCV13 (Prevnar 13), PCV15 (Vaxneuvance), PCV20 (Prevnar 20)  * Pneumococcal polysaccharide vaccine = PPSV23 (Pneumovax) Adults 19-63 yo with certain risk factors or if 65+ yo  If never received any pneumonia vaccine: recommend Prevnar 20 (PCV20)  Give PCV20 if previously received 1 dose of PCV13 or PPSV23   Hepatitis B Vaccine 3 dose series if at intermediate or high risk (ex: diabetes, end stage renal disease, liver disease)   Respiratory syncytial virus (RSV) Vaccine - COVERED BY MEDICARE PART D  * RSVPreF3 (Arexvy) CDC recommends that adults 60 years of age and older may receive a single dose of RSV vaccine using shared clinical decision-making (SCDM)   Tetanus (Td) Vaccine - COST NOT COVERED BY MEDICARE PART B Following completion of primary series, a booster dose should be given every 10 years to maintain immunity against tetanus. Td may also be given as tetanus wound prophylaxis.   Tdap Vaccine - COST NOT COVERED BY MEDICARE PART B Recommended at least once for all adults. For pregnant patients, recommended with each pregnancy.   Shingles Vaccine (Shingrix) - COST NOT COVERED BY MEDICARE PART B  2 shot series recommended in those 19 years and older who have or will have weakened immune systems or those 50 years and older     Health Maintenance Due:  There are no preventive care reminders to display for this patient.  Immunizations Due:      Topic Date Due   • COVID-19 Vaccine (7 - 2023-24 season) 01/03/2024     Advance Directives   What are advance directives?  Advance directives are legal documents that state your wishes and plans for medical care. These plans are made ahead of time in case you lose your ability to make decisions for yourself.  Advance directives can apply to any medical decision, such as the treatments you want, and if you want to donate organs.   What are the types of advance directives?  There are many types of advance directives, and each state has rules about how to use them. You may choose a combination of any of the following:  Living will:  This is a written record of the treatment you want. You can also choose which treatments you do not want, which to limit, and which to stop at a certain time. This includes surgery, medicine, IV fluid, and tube feedings.   Durable power of  for healthcare (DPAHC):  This is a written record that states who you want to make healthcare choices for you when you are unable to make them for yourself. This person, called a proxy, is usually a family member or a friend. You may choose more than 1 proxy.  Do not resuscitate (DNR) order:  A DNR order is used in case your heart stops beating or you stop breathing. It is a request not to have certain forms of treatment, such as CPR. A DNR order may be included in other types of advance directives.  Medical directive:  This covers the care that you want if you are in a coma, near death, or unable to make decisions for yourself. You can list the treatments you want for each condition. Treatment may include pain medicine, surgery, blood transfusions, dialysis, IV or tube feedings, and a ventilator (breathing machine).  Values history:  This document has questions about your views, beliefs, and how you feel and think about life. This information can help others choose the care that you would choose.  Why are advance directives important?  An advance directive helps you control your care. Although spoken wishes may be used, it is better to have your wishes written down. Spoken wishes can be misunderstood, or not followed. Treatments may be given even if you do not want them. An advance directive may make it easier for your family to make difficult  choices about your care.   Fall Prevention    Fall prevention  includes ways to make your home and other areas safer. It also includes ways you can move more carefully to prevent a fall. Health conditions that cause changes in your blood pressure, vision, or muscle strength and coordination may increase your risk for falls. Medicines may also increase your risk for falls if they make you dizzy, weak, or sleepy.   Fall prevention tips:   Stand or sit up slowly.    Use assistive devices as directed.    Wear shoes that fit well and have soles that .    Wear a personal alarm.    Stay active.    Manage your medical conditions.    Home Safety Tips:  Add items to prevent falls in the bathroom.    Keep paths clear.    Install bright lights in your home.    Keep items you use often on shelves within reach.    Paint or place reflective tape on the edges of your stairs.    Urinary Incontinence   Urinary incontinence (UI)  is when you lose control of your bladder. UI develops because your bladder cannot store or empty urine properly. The 3 most common types of UI are stress incontinence, urge incontinence, or both.  Medicines:   May be given to help strengthen your bladder control. Report any side effects of medication to your healthcare provider.  Do pelvic muscle exercises often:  Your pelvic muscles help you stop urinating. Squeeze these muscles tight for 5 seconds, then relax for 5 seconds. Gradually work up to squeezing for 10 seconds. Do 3 sets of 15 repetitions a day, or as directed. This will help strengthen your pelvic muscles and improve bladder control.  Train your bladder:  Go to the bathroom at set times, such as every 2 hours, even if you do not feel the urge to go. You can also try to hold your urine when you feel the urge to go. For example, hold your urine for 5 minutes when you feel the urge to go. As that becomes easier, hold your urine for 10 minutes.   Self-care:   Keep a UI record.  Write down how  often you leak urine and how much you leak. Make a note of what you were doing when you leaked urine.  Drink liquids as directed. You may need to limit the amount of liquid you drink to help control your urine leakage. Do not drink any liquid right before you go to bed. Limit or do not have drinks that contain caffeine or alcohol.   Prevent constipation.  Eat a variety of high-fiber foods. Good examples are high-fiber cereals, beans, vegetables, and whole-grain breads. Walking is the best way to trigger your intestines to have a bowel movement.  Exercise regularly and maintain a healthy weight.  Weight loss and exercise will decrease pressure on your bladder and help you control your leakage.   Use a catheter as directed  to help empty your bladder. A catheter is a tiny, plastic tube that is put into your bladder to drain your urine.   Go to behavior therapy as directed.  Behavior therapy may be used to help you learn to control your urge to urinate.     © Copyright [employer] 2018 Information is for End User's use only and may not be sold, redistributed or otherwise used for commercial purposes. All illustrations and images included in CareNotes® are the copyrighted property of [employer] Inc[employer] or [employer]

## 2024-03-28 NOTE — PROGRESS NOTES
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 They screened negative with a PHQ-2 score of 2.    Falls Plan of Care: balance, strength, and gait training instructions were provided.     Urinary Incontinence Plan of Care: counseling topics discussed: use restroom every 2 hours and limiting fluid intake 3-4 hours before bed.       Preventive health issues were discussed with patient, and age appropriate screening tests were ordered as noted in patient's After Visit Summary.  Personalized health advice and appropriate referrals for health education or preventive services given if needed, as noted in patient's After Visit Summary.  Check up for thyroid in October.   History of Present Illness:     Patient presents for a Medicare Wellness Visit    Patient is here for follow up of chronic medical conditions and Medicare wellness.  Transportation is a big issue for her.  She is going to starting Prolia injections.  She does get short of breath some days - has to pace herself.  Had diarrhea last week - she thinks she C. Diff. No more loose stools.  Is using a lidocaine cream 5%.       Patient Care Team:  Stephanie Pruett DO as PCP - General (Family Medicine)  Jet Rees DO as PCP - PCP-[facility] ([facility])  Stephanie Pruett DO as  PCP - PCPPriscilla ([facility])  MD Brendon Saenz, MD Steve Sandoval, MD Sujit Arellano, MD Rober Lopez, DO  MD Rahul Candelario, MD Jet Rees, DO  Scott Archuleta, MD Faizan Payton, MD Gilberto Whitten, MD Rayo Moulton, MD Irene Smith, XIOMARA (Nutrition)     Review of Systems:     Review of Systems   Constitutional:  Positive for fatigue. Negative for chills and fever.   HENT:  Negative for congestion and sore throat.    Respiratory:  Positive for shortness of breath. Negative for chest tightness.    Cardiovascular:  Negative for chest pain and palpitations.   Gastrointestinal:  Negative for abdominal pain, constipation, diarrhea and nausea.   Genitourinary:  Negative for difficulty urinating.   Musculoskeletal:  Positive for arthralgias (left knee) and back pain.   Skin: Negative.    Neurological:  Negative for dizziness and headaches.   Psychiatric/Behavioral: Negative.          Problem List:     Patient Active Problem List   Diagnosis   • Trigger middle finger of right hand   • Shortness of breath   • Essential hypertension   • Asthma-COPD overlap syndrome   • Paroxysmal atrial fibrillation (HCC)   • 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HCC)   • 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HCC)   • Moderate persistent asthma without complication   • Other fatigue   • Uncomplicated asthma   • Encounter for vision screening   • Nonrheumatic mitral valve regurgitation   • Muscle cramps   • Skin cyst   • Pain of left lower extremity   • [place]-neck deformity of finger   • Atherosclerosis of aorta (HCC)   • Hypoparathyroidism, unspecified hypoparathyroidism type (HCC)   • 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)   • Toxic encephalopathy   • Anemia   • Basal cell carcinoma (BCC)   • Cervical radiculopathy   • Spinal stenosis of cervical region   • Osteoarthritis of cervical spine   • Chronic migraine without aura   • Contrast media allergy   • Gastroesophageal reflux disease with  esophagitis   • Stress incontinence in female   • Migraine aura without headache   • Obstructive sleep apnea syndrome   • Osteopenia   • Postoperative hypothyroidism   • Restless legs syndrome   • S/P trigger finger release   • Tricuspid valve insufficiency   • Xerostomia   • History of polymyalgia rheumatica   • Stage 3a chronic kidney disease ([facility])   • Duodenum ulcer   • Closed nondisplaced fracture of sternal end of left clavicle   • Pain of left clavicle   • Nondisplaced fracture of lateral end of left clavicle with routine healing   • Esophageal pain   • Rib pain   • Sliding hiatal hernia   • Exudative age-related macular degeneration, bilateral, with active choroidal neovascularization ([facility])   • Hypertensive kidney disease with CKD (chronic kidney disease), stage 1-4 or unspecified chronic kidney disease      Past Medical and Surgical History:     Past Medical History:   Diagnosis Date   • A-fib ([facility])    • Allergy to IVP dye 06/04/2013    pt felt heaviness, palpitations   • 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), wet ([facility])     bilateral   • Aneurysm of aortic root ([facility])     last assessed - 06Dec2017   • 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[facility])    • Aortic root dilatation ([facility])     last assessed - 06Dec2017   • Arthritis    • Ascending aortic aneurysm ([facility])     last assessed - 06Dec2017   • Asthma    • Basal cell carcinoma     last assessed - 09Sep2016   • 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[facility])    • Disease of thyroid gland    • Facet arthropathy, cervical     last assessed - 08Jun2017   • History of fracture of vertebral column    • Hyperlipidemia    • Hypertension    • 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[facility])    • Hypoxia     last assessed - 16Mar2015   • Junctional rhythm     last assessed - 04Apr2017   • Lightheadedness     last assessed - 28Jun2016   • Migraine    • Palpitations     last assessed - 28Jun2016   • Pleural effusion, bilateral     last assessed - 20Apr2015   • 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[facility])    • Shortness of breath     last assessed -  2017   • Thyroid trouble    • Trigger finger     last assessed - 2016   • Trigger middle finger of right hand     last assessed - 2016   • Urinary incontinence     last assessed -  ; Resolved - 2017     Past Surgical History:   Procedure Laterality Date   • ABDOMINAL AORTIC ANEURYSM REPAIR W/ ENDOLUMINAL GRAFT  2015    Ascending aorta and hemiarch replacement with 30 mm Vascutek Gelweave graft; Managed by Scott Archuleta; last assessed - 2016   • APPENDECTOMY     • BLADDER SURGERY      Reccurent histoy of bladder surgery   • HOROWITZ PROCEDURE     • CARDIAC CATHETERIZATION  2004    Procedure summary - Luminal irregularities; last assessed - 2015   •  SECTION     • CORONARY ANEURYSM REPAIR     • CYSTOSCOPY      botox injection   • HYSTERECTOMY     • IA NDSC NJX IMPLT MATRL URT&/BLDR NCK N/A 2017    Procedure: CYSTOSCOPY; DURASPHERE-PERIURETHRAL BULKING AGENT INJECTION ;  Surgeon: Rayo Moulton MD;  Location: [facility];  Service: Urology   • IA TENDON SHEATH INCISION Right 10/18/2016    Procedure: LONG FINGER TRIGGER RELEASE ;  Surgeon: Khari Najera MD;  Location: [facility];  Service: Orthopedics   • THYROIDECTOMY      Total Thyroidectomy   • TONSILLECTOMY AND ADENOIDECTOMY        Family History:     Family History   Problem Relation Age of Onset   • Coronary aneurysm Sister    • Coronary artery disease Sister         CABG   • Heart disease Family         cardiac disorder   • Hypertension Family    • Cancer Family    • Thyroid disease Family       Social History:     Social History     Socioeconomic History   • Marital status:      Spouse name: None   • Number of children: None   • Years of education: None   • Highest education level: None   Occupational History   • None   Tobacco Use   • Smoking status: Former     Current packs/day: 0.00     Average packs/day: 0.3 packs/day for 58.0 years (14.5 ttl pk-yrs)     Types: Cigarettes     Start date:       Quit date: 2014     Years since quittin.4   • Smokeless tobacco: Former   • Tobacco comments:     smoked 17 yrs 1/2 ppd quit and restarted then quit  10 days ago during 2014    Vaping Use   • Vaping status: Never Used   Substance and Sexual Activity   • Alcohol use: Not Currently     Comment: Social drinker   • Drug use: No   • Sexual activity: Not Currently   Other Topics Concern   • None   Social History Narrative   • None     Social Determinants of Health     Financial Resource Strain: Medium Risk (3/8/2023)    Overall Financial Resource Strain (CARDIA)    • Difficulty of Paying Living Expenses: Somewhat hard   Food Insecurity: No Food Insecurity (3/26/2024)    Hunger Vital Sign    • Worried About Running Out of Food in the Last Year: Never true    • Ran Out of Food in the Last Year: Never true   Transportation Needs: Unmet Transportation Needs (3/28/2024)    PRAPARE - Transportation    • Lack of Transportation (Medical): Yes    • Lack of Transportation (Non-Medical): Yes   Physical Activity: Not on file   Stress: Not on file   Social Connections: Not on file   Intimate Partner Violence: Not on file   Housing Stability: Low Risk  (3/26/2024)    Housing Stability Vital Sign    • Unable to Pay for Housing in the Last Year: No    • Number of Places Lived in the Last Year: 1    • Unstable Housing in the Last Year: No      Medications and Allergies:     Current Outpatient Medications   Medication Sig Dispense Refill   • acetaminophen (TYLENOL) 650 mg CR tablet Take 650 mg by mouth every 8 (eight) hours as needed for mild pain     • albuterol (2.5 mg/3 mL) 0.083 % nebulizer solution Take 2.5 mg by nebulization every 6 (six) hours as needed for wheezing or shortness of breath     • albuterol (PROVENTIL HFA,VENTOLIN HFA) 90 mcg/act inhaler Inhale 2 puffs every 6 (six) hours as needed for wheezing 25.5 g 3   • Diclofenac Sodium (VOLTAREN) 1 % Apply 2 g topically 4 (four) times a day as needed  (pain)     • Multiple Vitamins-Minerals (One Daily Multivitamin Women) TABS Apply 1 tablet to the mouth or throat daily. Indications: Treatment to Prevent Vitamin Deficiency     • amLODIPine (NORVASC) 2.5 mg tablet Take 1 tablet (2.5 mg total) by mouth daily 90 tablet 3   • benazepril (LOTENSIN) 40 MG tablet Take 1 tablet (40 mg total) by mouth daily 90 tablet 3   • Calcium Carb-Cholecalciferol (Calcium+D3) 600-20 MG-MCG TABS Take 1 tablet by mouth in the morning 90 tablet 3   • levothyroxine 112 mcg tablet Take 1 tablet (112 mcg total) by mouth daily in the early morning 90 tablet 0   • lovastatin (MEVACOR) 20 mg tablet Take 1 tablet (20 mg total) by mouth every morning 90 tablet 3   • metoprolol succinate (TOPROL-XL) 25 mg 24 hr tablet Take 1 tablet (25 mg total) by mouth daily 90 tablet 3   • pantoprazole (PROTONIX) 40 mg tablet Take 1 tablet (40 mg total) by mouth 2 (two) times a day before meals 180 tablet 1     No current facility-administered medications for this visit.     Allergies   Allergen Reactions   • Amiodarone Hives   • Omnipaque [Iohexol] Hives and Shortness Of Breath   • Clindamycin Other (See Comments)     When taken causes cdiff immediately   • Other    • Sulfa Antibiotics Hives     itching   • Acetazolamide Hives and Rash     Reaction Date:Unknown   • Iv Dye  [Iodinated Contrast Media] Hypertension and Palpitations     Annotation - 19Mar2015: Verified with patient    • Naprosyn [Naproxen] Itching and Rash     Category: Allergy;       Immunizations:     Immunization History   Administered Date(s) Administered   • COVID-19 PFIZER VACCINE 0.3 ML IM 01/19/2021, 02/09/2021, 10/12/2021, 04/13/2022, 09/15/2022   • COVID-19 Pfizer mRNA vacc PF link-sucrose 12 yr and older (Comirnaty) 11/08/2023   • INFLUENZA 11/17/2005, 12/12/2013, 12/23/2014, 11/13/2018, 10/16/2023   • Influenza Split High Dose Preservative Free IM 09/09/2016, 11/06/2017   • Influenza, high dose seasonal 0.7 mL 11/13/2018, 10/14/2019,  10/05/2020, 11/16/2021, 12/23/2022, 11/07/2023   • Influenza, seasonal, injectable 01/01/2012, 10/03/2014   • Pneumococcal Conjugate 13-Valent 03/13/2017   • Pneumococcal Polysaccharide PPV23 01/01/2008   • Respiratory Syncytial Virus Vaccine (Recombinant) 11/22/2023   • Tuberculin Skin Test-PPD Intradermal 05/07/2015      Health Maintenance:     There are no preventive care reminders to display for this patient.  There are no preventive care reminders to display for this patient.     Medicare Screening Tests and Risk Assessments:     Buffy is here for her Subsequent Wellness visit. Last Medicare Wellness visit information reviewed, patient interviewed and updates made to the record today.      Health Risk Assessment:   Patient rates overall health as good. Patient feels that their physical health rating is much worse. Patient is dissatisfied with their life. Eyesight was rated as slightly worse. Hearing was rated as same. Patient feels that their emotional and mental health rating is same. Patients states they are never, rarely angry. Patient states they are always unusually tired/fatigued. Pain experienced in the last 7 days has been some. Patient's pain rating has been 5/10. Patient states that she has experienced no weight loss or gain in last 6 months. Esther is lonely. Her back pain keeps her from being active and being with people.    Depression Screening:   PHQ-2 Score: 2      Fall Risk Screening:   In the past year, patient has experienced: history of falling in past year    Number of falls: 1  Injured during fall?: Yes    Feels unsteady when standing or walking?: No    Worried about falling?: No      Urinary Incontinence Screening:   Patient has leaked urine accidently in the last six months. I had 7 kids and I'm 89 years old.    Home Safety:  Patient has trouble with stairs inside or outside of their home. Patient has working smoke alarms and has working carbon monoxide detector. Home safety hazards  include: none.     Nutrition:   Current diet is Regular.     Medications:   Patient is currently taking over-the-counter supplements. OTC medications include: see medication list. Patient is able to manage medications.     Activities of Daily Living (ADLs)/Instrumental Activities of Daily Living (IADLs):   Walk and transfer into and out of bed and chair?: Yes  Dress and groom yourself?: Yes    Bathe or shower yourself?: Yes    Feed yourself? Yes  Do your laundry/housekeeping?: Yes  Manage your money, pay your bills and track your expenses?: Yes  Make your own meals?: Yes    Do your own shopping?: Yes    Previous Hospitalizations:   Any hospitalizations or ED visits within the last 12 months?: Yes    How many hospitalizations have you had in the last year?: 3-4    Advance Care Planning:   Living will: Yes    Durable POA for healthcare: Yes    Advanced directive: Yes    End of Life Decisions reviewed with patient: Yes      Cognitive Screening:   Provider or family/friend/caregiver concerned regarding cognition?: No    PREVENTIVE SCREENINGS      Cardiovascular Screening:    General: Screening Not Indicated and History Lipid Disorder      Diabetes Screening:     General: Screening Current      Colorectal Cancer Screening:     General: Screening Not Indicated      Breast Cancer Screening:     General: Patient Declines      Cervical Cancer Screening:    General: Screening Not Indicated      Osteoporosis Screening:    General: Screening Not Indicated and History Osteoporosis      Abdominal Aortic Aneurysm (AAA) Screening:        General: Screening Not Indicated      Lung Cancer Screening:     General: Screening Not Indicated      Hepatitis C Screening:    General: Screening Not Indicated    Screening, Brief Intervention, and Referral to Treatment (SBIRT)    Screening  Typical number of drinks in a day: 0  Typical number of drinks in a week: 0  Interpretation: Low risk drinking behavior.    Single Item Drug Screening:  How  "often have you used an illegal drug (including marijuana) or a prescription medication for non-medical reasons in the past year? never    Single Item Drug Screen Score: 0  Interpretation: Negative screen for possible drug use disorder    Brief Intervention  Alcohol & drug use screenings were reviewed. No concerns regarding substance use disorder identified.     No results found.     Physical Exam:     /88 (BP Location: Left arm, Patient Position: Sitting, Cuff Size: Standard)   Pulse 74   Temp 97.7 °F (36.5 °C) (Temporal)   Ht 5' 2\" (1.575 m)   Wt 53.6 kg (118 lb 3.2 oz)   SpO2 97%   BMI 21.62 kg/m²     Physical Exam  Vitals and nursing note reviewed.   Constitutional:       General: She is not in acute distress.     Appearance: She is well-developed.   HENT:      Head: Normocephalic.      Right Ear: Tympanic membrane normal.      Left Ear: Tympanic membrane normal.      Mouth/Throat:      Pharynx: No posterior oropharyngeal erythema.   Eyes:      General: No scleral icterus.  Neck:      Thyroid: No thyromegaly.      Vascular: No carotid bruit.   Cardiovascular:      Rate and Rhythm: Normal rate and regular rhythm.      Heart sounds: Normal heart sounds. No murmur heard.  Pulmonary:      Effort: Pulmonary effort is normal.      Breath sounds: Normal breath sounds.   Musculoskeletal:      Right lower leg: No edema.      Left lower leg: No edema.   Lymphadenopathy:      Cervical: No cervical adenopathy.   Skin:     General: Skin is warm and dry.   Neurological:      Mental Status: She is alert and oriented to person, place, and time.   Psychiatric:         Mood and Affect: Mood normal.          Stephanie Pruett, DO  "

## 2024-04-11 ENCOUNTER — OFFICE VISIT (OUTPATIENT)
Dept: RHEUMATOLOGY | Facility: CLINIC | Age: 89
End: 2024-04-11
Payer: COMMERCIAL

## 2024-04-11 VITALS
TEMPERATURE: 97 F | DIASTOLIC BLOOD PRESSURE: 80 MMHG | OXYGEN SATURATION: 97 % | WEIGHT: 118.2 LBS | BODY MASS INDEX: 21.75 KG/M2 | SYSTOLIC BLOOD PRESSURE: 126 MMHG | HEART RATE: 58 BPM | HEIGHT: 62 IN

## 2024-04-11 DIAGNOSIS — M81.0 AGE-RELATED OSTEOPOROSIS WITHOUT CURRENT PATHOLOGICAL FRACTURE: Primary | ICD-10-CM

## 2024-04-11 PROCEDURE — 96372 THER/PROPH/DIAG INJ SC/IM: CPT | Performed by: STUDENT IN AN ORGANIZED HEALTH CARE EDUCATION/TRAINING PROGRAM

## 2024-04-11 PROCEDURE — 99215 OFFICE O/P EST HI 40 MIN: CPT | Performed by: STUDENT IN AN ORGANIZED HEALTH CARE EDUCATION/TRAINING PROGRAM

## 2024-04-11 RX ORDER — GINGER ROOT/GINGER ROOT EXT 262.5 MG
1 CAPSULE ORAL DAILY
Qty: 90 TABLET | Refills: 3 | Status: SHIPPED | OUTPATIENT
Start: 2024-04-11

## 2024-04-11 NOTE — PATIENT INSTRUCTIONS
Please get blood work (GFR, creatinine, calcium, magnesium, phosphorus, LFTs) one week after every Prolia shot

## 2024-04-11 NOTE — PROGRESS NOTES
"Assessment/Plan:    Buffy Colón came into the [facility]ology Office today 04/11/24 to receive Prolia injection.      Verbal consent obtained.  Consent given by: patient    patient states patient has been medically healthy with no underlining concerns/complications.      Buffy Colón presents with No symptoms today.       All insturctions were reviewed with the patient.    If the patient should have any questions/concerns, advised patient to contacted [facility].       Subjective:     History provided by: patient    Patient ID: Buffy Colón is a 89 y.o. female      Objective:    Vitals:    04/11/24 1145   BP: 126/80   Pulse: 58   Temp: (!) 97 °F (36.1 °C)   SpO2: 97%   Weight: 53.6 kg (118 lb 3.2 oz)   Height: 5' 2\" (1.575 m)       Patient tolerated the injection well without any complications.  Injection site/s Left Arm.  Medication was provided by Rheumatogy    Patient signed consent form no   Patient signed ABN form no (If no patient is not a medicare patient).   Patient waited 15 minutes after injection yes (This only applies to patient's receiving first time injection).       Last Visit: 1/25/2024  Next visit:Visit date not found     "

## 2024-04-11 NOTE — PROGRESS NOTES
Rheumatology Follow-up Visit  2024     CC: routine follow up, Prolia    Permanent History: First saw me Dec 2023 in referral for PMR. She did not have any symptoms of PMR. She did have history multiple thoracic compression fractures previously on alendronate for possibly up to 20 years; reportedly nobody had ever told her about her compression fractures seen on imaging.    Assessment: 89 y.o. female here for/with the above.    Tolerated first Prolia inj well    No acute concerns today    Patient's rheumatologic disease(s) threaten long-term function if not appropriately treated.    Encounter Diagnosis     ICD-10-CM    1. Age-related osteoporosis without current pathological fracture  M81.0 Glom Filt Rate, Estimated     Creatinine, serum     Calcium     Hepatic function panel     Magnesium     Phosphorus     denosumab (PROLIA) subcutaneous injection 60 mg     Calcium Carb-Cholecalciferol (Calcium+D3) 600-20 MG-MCG TABS          Plan:  -As above, labs 1 week after each Prolia inj  -RTC 1 year or sooner PRN    Jed Tuttle DO, MELCHOR Tuttle DO, [facility]      Interval History: Here for first Prolia shot    This past week her back pain has been better than usual    No acute concerns    Outpatient Medications Marked as Taking for the 24 encounter (Office Visit) with Jed Tuttle DO   Medication    Calcium Carb-Cholecalciferol (Calcium+D3) 600-20 MG-MCG TABS     Current Facility-Administered Medications for the 24 encounter (Office Visit) with Jed Tuttle DO   Medication    denosumab (PROLIA) subcutaneous injection 60 mg       Social History     Tobacco Use    Smoking status: Former     Current packs/day: 0.00     Average packs/day: 0.3 packs/day for 58.0 years (14.5 ttl pk-yrs)     Types: Cigarettes     Start date:      Quit date: 2014     Years since quittin.3    Smokeless tobacco: Former    Tobacco comments:     smoked 17 yrs 1/2 ppd quit  "and restarted then quit  10 days ago during Christmas 2014    Vaping Use    Vaping status: Never Used   Substance Use Topics    Alcohol use: Not Currently     Comment: Social drinker    Drug use: No       Review of Systems:  Pertinent findings documented in HPI  ___________________________________    Physical Exam:    /80   Pulse 58   Temp (!) 97 °F (36.1 °C)   Ht 5' 2\" (1.575 m)   Wt 53.6 kg (118 lb 3.2 oz)   SpO2 97%   BMI 21.62 kg/m²     General: Well appearing, in no distress.   Eyes: Sclera non-icteric. EOMI  HENT: No oral ulcers. MMM.   Skin: No rashes.  MSK exam: tenderness to palpation T-spine  ____________________________    Lab Results: relevant labs reviewed    Imaging Results: relevant images reviewed  I have independently reviewed the following images.  Bilateral femur x-rays: no obvious evidence of AFF  "

## 2024-04-12 ENCOUNTER — HOSPITAL ENCOUNTER (OUTPATIENT)
Dept: CT IMAGING | Facility: HOSPITAL | Age: 89
Discharge: HOME | End: 2024-04-12
Payer: COMMERCIAL

## 2024-04-12 DIAGNOSIS — C05.0 MALIGNANT NEOPLASM OF HARD PALATE (HCC): ICD-10-CM

## 2024-04-12 PROCEDURE — 70490 CT SOFT TISSUE NECK W/O DYE: CPT

## 2024-04-15 DIAGNOSIS — K92.2 GI BLEED: ICD-10-CM

## 2024-04-15 DIAGNOSIS — K26.9 DUODENAL ULCER: ICD-10-CM

## 2024-04-15 DIAGNOSIS — D64.9 SYMPTOMATIC ANEMIA: ICD-10-CM

## 2024-04-15 DIAGNOSIS — K25.0 ACUTE GASTRIC ULCER WITH HEMORRHAGE: ICD-10-CM

## 2024-04-16 RX ORDER — PANTOPRAZOLE SODIUM 40 MG/1
40 TABLET, DELAYED RELEASE ORAL
Qty: 180 TABLET | Refills: 1 | Status: SHIPPED | OUTPATIENT
Start: 2024-04-16 | End: 2024-10-13

## 2024-04-17 ENCOUNTER — LAB (OUTPATIENT)
Dept: LAB | Facility: CLINIC | Age: 89
End: 2024-04-17
Payer: COMMERCIAL

## 2024-04-17 DIAGNOSIS — M81.0 AGE-RELATED OSTEOPOROSIS WITHOUT CURRENT PATHOLOGICAL FRACTURE: ICD-10-CM

## 2024-04-17 LAB
ALBUMIN SERPL BCP-MCNC: 4.3 G/DL (ref 3.5–5)
ALP SERPL-CCNC: 60 U/L (ref 34–104)
ALT SERPL W P-5'-P-CCNC: 13 U/L (ref 7–52)
AST SERPL W P-5'-P-CCNC: 23 U/L (ref 13–39)
BILIRUB DIRECT SERPL-MCNC: 0.12 MG/DL (ref 0–0.2)
BILIRUB SERPL-MCNC: 0.56 MG/DL (ref 0.2–1)
CALCIUM SERPL-MCNC: 8.8 MG/DL (ref 8.4–10.2)
CREAT SERPL-MCNC: 0.78 MG/DL (ref 0.6–1.3)
GFR SERPL CREATININE-BSD FRML MDRD: 66 ML/MIN/1.73SQ M
MAGNESIUM SERPL-MCNC: 2.1 MG/DL (ref 1.9–2.7)
PHOSPHATE SERPL-MCNC: 4.1 MG/DL (ref 2.3–4.1)
PROT SERPL-MCNC: 6.8 G/DL (ref 6.4–8.4)

## 2024-04-17 PROCEDURE — 36415 COLL VENOUS BLD VENIPUNCTURE: CPT

## 2024-04-17 PROCEDURE — 82565 ASSAY OF CREATININE: CPT

## 2024-04-17 PROCEDURE — 80076 HEPATIC FUNCTION PANEL: CPT

## 2024-04-17 PROCEDURE — 84100 ASSAY OF PHOSPHORUS: CPT

## 2024-04-17 PROCEDURE — 83735 ASSAY OF MAGNESIUM: CPT

## 2024-04-29 ENCOUNTER — OFFICE VISIT (OUTPATIENT)
Dept: CARDIOLOGY CLINIC | Facility: CLINIC | Age: 89
End: 2024-04-29
Payer: COMMERCIAL

## 2024-04-29 VITALS
SYSTOLIC BLOOD PRESSURE: 158 MMHG | OXYGEN SATURATION: 96 % | HEART RATE: 60 BPM | HEIGHT: 62 IN | BODY MASS INDEX: 21.7 KG/M2 | WEIGHT: 117.9 LBS | DIASTOLIC BLOOD PRESSURE: 72 MMHG

## 2024-04-29 DIAGNOSIS — I34.0 NONRHEUMATIC MITRAL VALVE REGURGITATION: Primary | ICD-10-CM

## 2024-04-29 DIAGNOSIS — I07.1 TRICUSPID VALVE INSUFFICIENCY, UNSPECIFIED ETIOLOGY: ICD-10-CM

## 2024-04-29 DIAGNOSIS — I10 ESSENTIAL HYPERTENSION: ICD-10-CM

## 2024-04-29 DIAGNOSIS — E78.5 HYPERLIPIDEMIA, UNSPECIFIED HYPERLIPIDEMIA TYPE: ICD-10-CM

## 2024-04-29 DIAGNOSIS — N18.31 STAGE 3A CHRONIC KIDNEY DISEASE (HCC): ICD-10-CM

## 2024-04-29 DIAGNOSIS — I48.0 PAROXYSMAL ATRIAL FIBRILLATION (HCC): ICD-10-CM

## 2024-04-29 PROCEDURE — 1160F RVW MEDS BY RX/DR IN RCRD: CPT | Performed by: INTERNAL MEDICINE

## 2024-04-29 PROCEDURE — 99214 OFFICE O/P EST MOD 30 MIN: CPT | Performed by: INTERNAL MEDICINE

## 2024-04-29 PROCEDURE — 1159F MED LIST DOCD IN RCRD: CPT | Performed by: INTERNAL MEDICINE

## 2024-04-29 RX ORDER — LOVASTATIN 20 MG/1
20 TABLET ORAL EVERY MORNING
Qty: 90 TABLET | Refills: 3 | Status: SHIPPED | OUTPATIENT
Start: 2024-04-29 | End: 2025-04-24

## 2024-04-29 RX ORDER — AMLODIPINE BESYLATE 2.5 MG/1
2.5 TABLET ORAL DAILY
Qty: 90 TABLET | Refills: 3 | Status: SHIPPED | OUTPATIENT
Start: 2024-04-29

## 2024-04-29 RX ORDER — METOPROLOL SUCCINATE 25 MG/1
25 TABLET, EXTENDED RELEASE ORAL DAILY
Qty: 90 TABLET | Refills: 3 | Status: SHIPPED | OUTPATIENT
Start: 2024-04-29

## 2024-04-29 RX ORDER — BENAZEPRIL HYDROCHLORIDE 40 MG/1
40 TABLET ORAL DAILY
Qty: 90 TABLET | Refills: 3 | Status: SHIPPED | OUTPATIENT
Start: 2024-04-29

## 2024-04-29 NOTE — PROGRESS NOTES
Follow-up - Cardiology   Buffy Colón 89 y.o. female MRN: [id number]        Problems    1. Nonrheumatic mitral valve regurgitation        2. Paroxysmal atrial fibrillation (HCC)  metoprolol succinate (TOPROL-XL) 25 mg 24 hr tablet      3. Tricuspid valve insufficiency, unspecified etiology        4. Essential hypertension  amLODIPine (NORVASC) 2.5 mg tablet    benazepril (LOTENSIN) 40 MG tablet    metoprolol succinate (TOPROL-XL) 25 mg 24 hr tablet      5. Stage 3a chronic kidney disease (HCC)        6. Hyperlipidemia, unspecified hyperlipidemia type  lovastatin (MEVACOR) 20 mg tablet          Impression:    Hypertension  Other than isolated episodic blood pressures, her blood pressures appear well-controlled to me, no changes will be made today    Paroxysmal atrial fibrillation  No recent recurrence  In the context of prior duodenal/GI bleed, no anticoagulation    History of ascending aortic aneurysm  Status post repair in 2015  4.7 cm by last chest CT, but at 89 years of age, soon-to-be 89, she is not a candidate for reoperative repair    Hyperlipidemia  On lovastatin, cholesterol well-controlled    Edema  Related to anemia in 2023 after a GI bleed and 5 mg of amlodipine  We did lower the dose of amlodipine at that time  Edema is resolved    Dyspnea on exertion  Chronic, possibly due to diastolic dysfunction grade 2, dilated left atrium    Plan:    Blood pressure is acceptable, get a year of blood pressures in her epic chart, she is only had 3 recorded at 150 systolic.  Everything else is almost routinely 118-132 systolic.  I am happy with her current hypertension control and we will not make any changes today, and have provided appropriate refills.      HPI:   Buffy Colón is a 89 y.o. 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  Her amlodipine was also decreased to 2.5 mg.  She has intermittent rare episodes of uncontrolled hypertension, up to 150 systolic, however the majority of her blood pressures are normotensive.  Medications are well-tolerated.  She has been plagued by spinal fractures, arthralgias.          Review of Systems   Constitutional:  Negative for appetite change, diaphoresis, fatigue and fever.   Respiratory:  Negative for chest tightness, shortness of breath and wheezing.    Cardiovascular:  Negative for chest pain, palpitations and leg swelling.   Gastrointestinal:  Negative for abdominal pain and blood in stool.   Musculoskeletal:  Positive for arthralgias and back pain. Negative for joint swelling.   Skin:  Negative for rash.   Neurological:  Negative for dizziness, syncope and light-headedness.         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[facility])    • Aortic root dilatation ([facility])     last assessed - 06Dec2017   • Arthritis    • Ascending aortic aneurysm ([facility])     last assessed - 06Dec2017   • Asthma    • Basal cell carcinoma     last assessed - 09Sep2016   • 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[facility])    • Disease of thyroid gland    • Facet arthropathy, cervical     last assessed - 08Jun2017   • History of fracture of vertebral column    • Hyperlipidemia    • Hypertension    • 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[facility])    • Hypoxia     last assessed - 16Mar2015   • Junctional rhythm     last assessed - 04Apr2017   • Lightheadedness     last assessed - 28Jun2016   • Migraine    • Palpitations     last  assessed - 2016   • Pleural effusion, bilateral     last assessed - 2015   • 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HCC)    • Shortness of breath     last assessed - 2017   • Thyroid trouble    • Trigger finger     last assessed - 2016   • Trigger middle finger of right hand     last assessed - 2016   • Urinary incontinence     last assessed -  Jul,; Resolved - 2017     Social History     Substance and Sexual Activity   Alcohol Use Not Currently    Comment: Social drinker     Social History     Substance and Sexual Activity   Drug Use No     Social History     Tobacco Use   Smoking Status Former   • Current packs/day: 0.00   • Average packs/day: 0.3 packs/day for 58.0 years (14.5 ttl pk-yrs)   • Types: Cigarettes   • Start date:    • Quit date: 2014   • Years since quittin.3   Smokeless Tobacco Former   Tobacco Comments    smoked 17 yrs 1/2 ppd quit and restarted then quit  10 days ago during 2014        Allergies:  Allergies   Allergen Reactions   • Amiodarone Hives   • Omnipaque [Iohexol] Hives and Shortness Of Breath   • Clindamycin Other (See Comments)     When taken causes cdiff immediately   • Other    • Sulfa Antibiotics Hives     itching   • Acetazolamide Hives and Rash     Reaction Date:Unknown   • Iv Dye  [Iodinated Contrast Media] Hypertension and Palpitations     Annotation - 2015: Verified with patient    • Naprosyn [Naproxen] Itching and Rash     Category: Allergy;        Medications:     Current Outpatient Medications:   •  acetaminophen (TYLENOL) 650 mg CR tablet, Take 650 mg by mouth every 8 (eight) hours as needed for mild pain, Disp: , Rfl:   •  albuterol (2.5 mg/3 mL) 0.083 % nebulizer solution, Take 2.5 mg by nebulization every 6 (six) hours as needed for wheezing or shortness of breath, Disp: , Rfl:   •  albuterol (PROVENTIL HFA,VENTOLIN HFA) 90 mcg/act inhaler, Inhale 2 puffs every 6 (six) hours as needed for wheezing, Disp: 25.5 g, Rfl: 3  •   amLODIPine (NORVASC) 2.5 mg tablet, Take 1 tablet (2.5 mg total) by mouth daily, Disp: 90 tablet, Rfl: 3  •  benazepril (LOTENSIN) 40 MG tablet, Take 1 tablet (40 mg total) by mouth daily, Disp: 90 tablet, Rfl: 3  •  Calcium Carb-Cholecalciferol (Calcium+D3) 600-20 MG-MCG TABS, Take 1 tablet by mouth in the morning, Disp: 90 tablet, Rfl: 3  •  Diclofenac Sodium (VOLTAREN) 1 %, Apply 2 g topically 4 (four) times a day as needed (pain), Disp: , Rfl:   •  levothyroxine 112 mcg tablet, Take 1 tablet (112 mcg total) by mouth daily in the early morning, Disp: 90 tablet, Rfl: 1  •  lovastatin (MEVACOR) 20 mg tablet, Take 1 tablet (20 mg total) by mouth every morning, Disp: 90 tablet, Rfl: 3  •  metoprolol succinate (TOPROL-XL) 25 mg 24 hr tablet, Take 1 tablet (25 mg total) by mouth daily, Disp: 90 tablet, Rfl: 3  •  Multiple Vitamins-Minerals (One Daily Multivitamin Women) TABS, Apply 1 tablet to the mouth or throat daily. Indications: Treatment to Prevent Vitamin Deficiency, Disp: , Rfl:   •  pantoprazole (PROTONIX) 40 mg tablet, Take 1 tablet (40 mg total) by mouth 2 (two) times a day before meals, Disp: 180 tablet, Rfl: 1      Vitals:    04/29/24 1318   BP: 158/72   Pulse: 60   SpO2: 96%     Weight (last 2 days)     Date/Time Weight    04/29/24 1318 53.5 (117.9)        Physical Exam  Constitutional:       General: She is not in acute distress.     Appearance: Normal appearance. She is not diaphoretic.   HENT:      Head: Normocephalic and atraumatic.   Eyes:      General: No scleral icterus.     Conjunctiva/sclera: Conjunctivae normal.   Neck:      Vascular: No JVD.   Cardiovascular:      Rate and Rhythm: Normal rate and regular rhythm.      Heart sounds: Normal heart sounds. No murmur heard.  Pulmonary:      Effort: Pulmonary effort is normal. No respiratory distress.      Breath sounds: Normal breath sounds. No wheezing, rhonchi or rales.   Musculoskeletal:         General: No tenderness.      Right lower leg: Normal.  "No edema.      Left lower leg: Normal. No edema.   Skin:     General: Skin is warm and dry.   Neurological:      Mental Status: She is alert. Mental status is at baseline.           Laboratory Studies:  Lab Results   Component Value Date    HGBA1C 6.2 (H) 03/27/2024    HGBA1C 5.7 (H) 10/12/2020    HGBA1C 5.8 (H) 04/09/2020    HGBA1C 6.2 (H) 02/05/2015     (L) 06/16/2015     06/11/2015     06/07/2015    K 4.1 12/20/2023    K 3.8 10/14/2023    K 4.2 10/13/2023    K 4.1 06/16/2015    K 4.1 06/11/2015    K 3.9 06/07/2015    CL 96 12/20/2023    CL 99 10/14/2023     10/13/2023    CL 97 (L) 06/16/2015     06/11/2015     06/07/2015    CO2 30 12/20/2023    CO2 32 10/14/2023    CO2 30 10/13/2023    CO2 30 06/16/2015    CO2 28 06/11/2015    CO2 27 06/07/2015    GLUCOSE 138 06/16/2015    GLUCOSE 180 (H) 06/11/2015    GLUCOSE 97 06/08/2015    CREATININE 0.78 04/17/2024    CREATININE 0.81 12/20/2023    CREATININE 0.71 10/14/2023    CREATININE 1.07 06/16/2015    CREATININE 0.93 06/11/2015    CREATININE 0.66 06/07/2015    BUN 24 12/20/2023    BUN 16 10/14/2023    BUN 27 (H) 10/13/2023    BUN 23 06/16/2015    BUN 18 06/11/2015    BUN 13 06/07/2015    MG 2.1 04/17/2024    MG 2.3 05/18/2021    MG 2.1 02/22/2016    MG 1.9 06/07/2015    MG 1.7 06/06/2015    MG 1.8 04/09/2015    PHOS 4.1 04/17/2024    PHOS 3.3 06/07/2015     Lab Results   Component Value Date    WBC 6.26 11/20/2023    WBC 7.65 06/16/2015    RBC 3.83 11/20/2023    RBC 4.16 06/16/2015    HGB 11.6 11/20/2023    HGB 9.1 (L) 06/16/2015    HCT 36.6 11/20/2023    HCT 29.9 (L) 06/16/2015    MCV 96 11/20/2023    MCV 72 (L) 06/16/2015    MCH 30.3 11/20/2023    MCH 21.9 (L) 06/16/2015    RDW 13.7 11/20/2023    RDW 17.2 (H) 06/16/2015     11/20/2023     06/16/2015     NT-proBNP: No results for input(s): \"NTBNP\" in the last 72 hours.   Coags:    Lipid Profile:   Lab Results   Component Value Date    CHOL 154 06/08/2015     Lab Results " "  Component Value Date    HDL 69 03/27/2024     Lab Results   Component Value Date    LDLCALC 106 (H) 03/27/2024     Lab Results   Component Value Date    TRIG 90 03/27/2024       Cardiac testing:   EKG reviewed personally:    No results found for this visit on 04/29/24.      Echocardiogram   12/12/2017-LVEF 55%, normal wall thickness, grade 1 DD, trace MR, trace AI  12/19-EF normal, grade 1 diastolic dysfunction, mild AI  2/23-EF normal, grade 2 diastolic dysfunction, moderately dilated left atrium    Steve Sandoval MD    Portions of the record may have been created with voice recognition software.  Occasional wrong word or \"sound a like\" substitutions may have occurred due to the inherent limitations of voice recognition software.  Read the chart carefully and recognize, using context, where substitutions have occurred.  "

## 2024-05-08 ENCOUNTER — OFFICE VISIT (OUTPATIENT)
Dept: FAMILY MEDICINE CLINIC | Facility: CLINIC | Age: 89
End: 2024-05-08
Payer: COMMERCIAL

## 2024-05-08 VITALS
OXYGEN SATURATION: 96 % | TEMPERATURE: 98 F | DIASTOLIC BLOOD PRESSURE: 72 MMHG | SYSTOLIC BLOOD PRESSURE: 142 MMHG | HEART RATE: 80 BPM | HEIGHT: 62 IN | WEIGHT: 119.6 LBS | BODY MASS INDEX: 22.01 KG/M2

## 2024-05-08 DIAGNOSIS — M24.80 JOINT LOCKING: ICD-10-CM

## 2024-05-08 DIAGNOSIS — R20.2 ARM PARESTHESIA, RIGHT: Primary | ICD-10-CM

## 2024-05-08 PROCEDURE — G2211 COMPLEX E/M VISIT ADD ON: HCPCS | Performed by: FAMILY MEDICINE

## 2024-05-08 PROCEDURE — 99213 OFFICE O/P EST LOW 20 MIN: CPT | Performed by: FAMILY MEDICINE

## 2024-05-08 NOTE — PROGRESS NOTES
Name: Buffy Colón      : 1932      MRN: [id number]  Encounter Provider: Stephanie Pruett DO  Encounter Date: 2024   Encounter department: [facility]    Assessment & Plan   Patient is a 89-year-old female with complaint of right hand locking up and abnormal sensation in the right.  Patient has days where her right hand locks up frequently and she has no control over this.  All of a sudden the joints in her hand will lock up and she must manually move them.  It has caused her to spill coffee etc. she has to be referred back to the hand surgeon she saw a couple years ago.  1. Arm paresthesia, right  -     Ambulatory Referral to Orthopedic Surgery; Future    2. Joint locking  -     Ambulatory Referral to Orthopedic Surgery; Future         Subjective     Chief Complaint   Patient presents with   • Arm Pain     Right sided hand and arm pain       Patient has had more frequent episodes of her hand freezing - when it happens, she has no control. Feels like an electric shock going up to her right ear.  No pain, but her fingers lock up. She has to open up the fingers with her other hand. On one occasion, she was holding a cp of coffee when it happened and she lost her  on the cup and coffee spilled all over.      Review of Systems   Musculoskeletal:  Positive for arthralgias.        As in HPI   Neurological:         Abnormal sensation       Current Outpatient Medications on File Prior to Visit   Medication Sig   • acetaminophen (TYLENOL) 650 mg CR tablet Take 650 mg by mouth every 8 (eight) hours as needed for mild pain   • albuterol (2.5 mg/3 mL) 0.083 % nebulizer solution Take 2.5 mg by nebulization every 6 (six) hours as needed for wheezing or shortness of breath   • albuterol (PROVENTIL HFA,VENTOLIN HFA) 90 mcg/act inhaler Inhale 2 puffs every 6 (six) hours as needed for wheezing   • amLODIPine (NORVASC) 2.5 mg tablet Take 1 tablet (2.5 mg total) by mouth daily   • benazepril  "(LOTENSIN) 40 MG tablet Take 1 tablet (40 mg total) by mouth daily   • Calcium Carb-Cholecalciferol (Calcium+D3) 600-20 MG-MCG TABS Take 1 tablet by mouth in the morning   • Diclofenac Sodium (VOLTAREN) 1 % Apply 2 g topically 4 (four) times a day as needed (pain)   • levothyroxine 112 mcg tablet Take 1 tablet (112 mcg total) by mouth daily in the early morning   • lovastatin (MEVACOR) 20 mg tablet Take 1 tablet (20 mg total) by mouth every morning   • metoprolol succinate (TOPROL-XL) 25 mg 24 hr tablet Take 1 tablet (25 mg total) by mouth daily   • Multiple Vitamins-Minerals (One Daily Multivitamin Women) TABS Apply 1 tablet to the mouth or throat daily. Indications: Treatment to Prevent Vitamin Deficiency   • pantoprazole (PROTONIX) 40 mg tablet Take 1 tablet (40 mg total) by mouth 2 (two) times a day before meals       Objective     /72 (BP Location: Left arm)   Pulse 80   Temp 98 °F (36.7 °C) (Temporal)   Ht 5' 2\" (1.575 m)   Wt 54.3 kg (119 lb 9.6 oz)   SpO2 96%   BMI 21.88 kg/m²     Physical Exam  Vitals and nursing note reviewed.   Constitutional:       General: She is not in acute distress.  Musculoskeletal:         General: Deformity (arthritis changes) present.   Skin:     General: Skin is warm and dry.   Neurological:      Mental Status: She is alert and oriented to person, place, and time.      Comments:  strength ok at visit. Fingers did not lock up this day.   Psychiatric:         Mood and Affect: Mood normal.       Stephanie Pruett, DO    "

## 2024-05-14 DIAGNOSIS — E03.9 HYPOTHYROIDISM, UNSPECIFIED TYPE: ICD-10-CM

## 2024-05-14 RX ORDER — LEVOTHYROXINE SODIUM 112 UG/1
112 TABLET ORAL
Qty: 90 TABLET | Refills: 0 | Status: SHIPPED | OUTPATIENT
Start: 2024-05-14

## 2024-05-24 ENCOUNTER — NURSE TRIAGE (OUTPATIENT)
Dept: OTHER | Facility: OTHER | Age: 89
End: 2024-05-24

## 2024-05-24 ENCOUNTER — APPOINTMENT (OUTPATIENT)
Dept: LAB | Facility: CLINIC | Age: 89
End: 2024-05-24
Payer: COMMERCIAL

## 2024-05-24 ENCOUNTER — OFFICE VISIT (OUTPATIENT)
Dept: FAMILY MEDICINE CLINIC | Facility: CLINIC | Age: 89
End: 2024-05-24
Payer: COMMERCIAL

## 2024-05-24 VITALS
WEIGHT: 119 LBS | DIASTOLIC BLOOD PRESSURE: 78 MMHG | BODY MASS INDEX: 21.9 KG/M2 | HEIGHT: 62 IN | OXYGEN SATURATION: 97 % | TEMPERATURE: 97.3 F | HEART RATE: 60 BPM | SYSTOLIC BLOOD PRESSURE: 138 MMHG

## 2024-05-24 DIAGNOSIS — R42 DIZZINESS: Primary | ICD-10-CM

## 2024-05-24 DIAGNOSIS — R42 DIZZINESS: ICD-10-CM

## 2024-05-24 LAB
ALBUMIN SERPL BCP-MCNC: 4.5 G/DL (ref 3.5–5)
ALP SERPL-CCNC: 80 U/L (ref 34–104)
ALT SERPL W P-5'-P-CCNC: 23 U/L (ref 7–52)
ANION GAP SERPL CALCULATED.3IONS-SCNC: 10 MMOL/L (ref 4–13)
AST SERPL W P-5'-P-CCNC: 31 U/L (ref 13–39)
BASOPHILS # BLD AUTO: 0.03 THOUSANDS/ÂΜL (ref 0–0.1)
BASOPHILS NFR BLD AUTO: 1 % (ref 0–1)
BILIRUB SERPL-MCNC: 0.71 MG/DL (ref 0.2–1)
BUN SERPL-MCNC: 18 MG/DL (ref 5–25)
CALCIUM SERPL-MCNC: 8.8 MG/DL (ref 8.4–10.2)
CHLORIDE SERPL-SCNC: 99 MMOL/L (ref 96–108)
CO2 SERPL-SCNC: 29 MMOL/L (ref 21–32)
CREAT SERPL-MCNC: 0.73 MG/DL (ref 0.6–1.3)
EOSINOPHIL # BLD AUTO: 0.04 THOUSAND/ÂΜL (ref 0–0.61)
EOSINOPHIL NFR BLD AUTO: 1 % (ref 0–6)
ERYTHROCYTE [DISTWIDTH] IN BLOOD BY AUTOMATED COUNT: 14.1 % (ref 11.6–15.1)
GFR SERPL CREATININE-BSD FRML MDRD: 72 ML/MIN/1.73SQ M
GLUCOSE P FAST SERPL-MCNC: 111 MG/DL (ref 65–99)
HCT VFR BLD AUTO: 39.6 % (ref 34.8–46.1)
HGB BLD-MCNC: 12.7 G/DL (ref 11.5–15.4)
IMM GRANULOCYTES # BLD AUTO: 0.01 THOUSAND/UL (ref 0–0.2)
IMM GRANULOCYTES NFR BLD AUTO: 0 % (ref 0–2)
LYMPHOCYTES # BLD AUTO: 1.47 THOUSANDS/ÂΜL (ref 0.6–4.47)
LYMPHOCYTES NFR BLD AUTO: 27 % (ref 14–44)
MCH RBC QN AUTO: 29.8 PG (ref 26.8–34.3)
MCHC RBC AUTO-ENTMCNC: 32.1 G/DL (ref 31.4–37.4)
MCV RBC AUTO: 93 FL (ref 82–98)
MONOCYTES # BLD AUTO: 0.67 THOUSAND/ÂΜL (ref 0.17–1.22)
MONOCYTES NFR BLD AUTO: 12 % (ref 4–12)
NEUTROPHILS # BLD AUTO: 3.17 THOUSANDS/ÂΜL (ref 1.85–7.62)
NEUTS SEG NFR BLD AUTO: 59 % (ref 43–75)
NRBC BLD AUTO-RTO: 0 /100 WBCS
PLATELET # BLD AUTO: 271 THOUSANDS/UL (ref 149–390)
PMV BLD AUTO: 11.4 FL (ref 8.9–12.7)
POTASSIUM SERPL-SCNC: 4.5 MMOL/L (ref 3.5–5.3)
PROT SERPL-MCNC: 7.2 G/DL (ref 6.4–8.4)
RBC # BLD AUTO: 4.26 MILLION/UL (ref 3.81–5.12)
SODIUM SERPL-SCNC: 138 MMOL/L (ref 135–147)
WBC # BLD AUTO: 5.39 THOUSAND/UL (ref 4.31–10.16)

## 2024-05-24 PROCEDURE — 80053 COMPREHEN METABOLIC PANEL: CPT

## 2024-05-24 PROCEDURE — G2211 COMPLEX E/M VISIT ADD ON: HCPCS | Performed by: NURSE PRACTITIONER

## 2024-05-24 PROCEDURE — 99214 OFFICE O/P EST MOD 30 MIN: CPT | Performed by: NURSE PRACTITIONER

## 2024-05-24 PROCEDURE — 36415 COLL VENOUS BLD VENIPUNCTURE: CPT

## 2024-05-24 PROCEDURE — 85025 COMPLETE CBC W/AUTO DIFF WBC: CPT

## 2024-05-24 RX ORDER — MECLIZINE HCL 12.5 MG/1
12.5 TABLET ORAL 3 TIMES DAILY PRN
Qty: 30 TABLET | Refills: 0 | Status: SHIPPED | OUTPATIENT
Start: 2024-05-24

## 2024-05-24 NOTE — TELEPHONE ENCOUNTER
"Reason for Disposition  • [1] MODERATE dizziness (e.g., vertigo; feels very unsteady, interferes with normal activities) AND [2] has NOT been evaluated by physician for this    Answer Assessment - Initial Assessment Questions  1. DESCRIPTION: \"Describe your dizziness.\"      Room is spinning.  2. VERTIGO: \"Do you feel like either you or the room is spinning or tilting?\"       Yes  3. LIGHTHEADED: \"Do you feel lightheaded?\" (e.g., somewhat faint, woozy, weak upon standing)      No  4. SEVERITY: \"How bad is it?\"  \"Can you walk?\"    - MILD: Feels unsteady but walking normally.    - MODERATE: Feels very unsteady when walking, but not falling; interferes with normal activities (e.g., school, work) .    - SEVERE: Unable to walk without falling, or requires assistance to walk without falling.      Mild  5. ONSET:  \"When did the dizziness begin?\"      Yesterday morning  6. AGGRAVATING FACTORS: \"Does anything make it worse?\" (e.g., standing, change in head position)      Moves her head to quickly.  7. CAUSE: \"What do you think is causing the dizziness?\"      Vertigo  8. RECURRENT SYMPTOM: \"Have you had dizziness before?\" If Yes, ask: \"When was the last time?\" \"What happened that time?\"      Yes 10 years ago was the last time.  9. OTHER SYMPTOMS: \"Do you have any other symptoms?\" (e.g., headache, weakness, numbness, vomiting, earache)      nausea  10. PREGNANCY: \"Is there any chance you are pregnant?\" \"When was your last menstrual period?\"        N/A    Protocols used: Dizziness - Vertigo-ADULT-AH    "

## 2024-05-24 NOTE — TELEPHONE ENCOUNTER
"Regarding: extremely dizzy  ----- Message from Daphnie ALEXANDRA sent at 5/24/2024  7:17 AM EDT -----  \"I have acute dizziness, I can't walk or stand.\"    "

## 2024-05-24 NOTE — TELEPHONE ENCOUNTER
Called patient to get more information as to what her symptoms are.  She said when she woke up yesterday morning at 6, she rolled over and felt like she was going to roll off the bed, she felt nauseous and dizzy.  She is having difficulty walking only because she is feeling so dizzy and needs to use the wall to guide her.  She denies headache, but states her eyes and face hurt.  She is feeling better today than she did yesterday.  She has had an episode of vertigo in the past and was given medication that helped her.  Her daughter is coming to pick her up and bring her to the appointment today.  She was advised not to drive.

## 2024-05-30 ENCOUNTER — OFFICE VISIT (OUTPATIENT)
Dept: GASTROENTEROLOGY | Facility: CLINIC | Age: 89
End: 2024-05-30
Payer: COMMERCIAL

## 2024-05-30 VITALS
HEIGHT: 62 IN | DIASTOLIC BLOOD PRESSURE: 69 MMHG | OXYGEN SATURATION: 97 % | BODY MASS INDEX: 21.68 KG/M2 | WEIGHT: 117.8 LBS | HEART RATE: 67 BPM | SYSTOLIC BLOOD PRESSURE: 159 MMHG

## 2024-05-30 DIAGNOSIS — K26.9 DUODENAL ULCER: ICD-10-CM

## 2024-05-30 DIAGNOSIS — R10.13 EPIGASTRIC PAIN: ICD-10-CM

## 2024-05-30 DIAGNOSIS — R13.19 OTHER DYSPHAGIA: Primary | ICD-10-CM

## 2024-05-30 DIAGNOSIS — R14.0 ABDOMINAL BLOATING: ICD-10-CM

## 2024-05-30 DIAGNOSIS — D64.9 ANEMIA, UNSPECIFIED TYPE: ICD-10-CM

## 2024-05-30 PROCEDURE — 99214 OFFICE O/P EST MOD 30 MIN: CPT | Performed by: PHYSICIAN ASSISTANT

## 2024-05-30 NOTE — PROGRESS NOTES
[facility] - Outpatient Follow-up Note  Buffy Colón 89 y.o. female MRN: [id number]  Encounter: [id number]    Assessment and Plan    1. Dysphagia  2. Epigastric pain  The patient had a hospitalization in October with dysphagia to solids and chest pain radiating to her back prompting EGD 10/13/23 which revealed a 2cm hiatal hernia otherwise normal. Empiric dilation was done to 20mm. She states she does have to take small bites and chew well but if she does this she has no trouble swallowing.   -Continue to take small bites and chew well, sit upright while eating, drink fluids while eating   -Monitor for worsening dysphagia     3. Anemia  4. PUD  The patient was hospitalized 1/2023 and found to have acute anemia for which she underwent EGD and colonoscopy 2/2023 revealing a 3cm hiatal hernia, gastritis, and a duodenal bulb ulcer. Colonoscopy was aborted due to large amount of stool in the rectum. Repeat EGD 10/13/23 revealed a healed duodenal ulcer. She has no overt bleeding and her HGB is normal at 11.6  -Monitor for anemia or over bleeding    5. Abdominal bloating  The patient states that she has bothersome abdominal bloating every time she eats no matter what she eats.  Previous stomach biopsy negative for H. pylori.  No prior celiac testing.  -I recommend the patient start simethicone as needed, if she has persistent abdominal bloating despite this can consider further evaluation or workup    Follow up 3-4 months    ______________________________________________________________________    History of Present Illness  Buffy Colón is a 89 y.o. female with HTN, COPD, asthma, Afib not on AC, salivary gland cancer, hypothyroidism, and duodenal bulb ulcer (2/2023) here for follow up evaluation of dysphagia. The patient had a hospitalization in October with dysphagia to solids and chest pain radiating to her back prompting EGD which revealed a 2cm hiatal hernia otherwise normal.  Empiric dilation was done to 20mm. She states she does have to take small bites and chew well but if she does this she has no trouble swallowing. Her only complaint today is abdominal bloating.  The patient states that every time she eats she gets to very distended stomach.  It does seem to matter what she eats.      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), wet (HCC)     bilateral    Anemia N/A    Aneurysm of aortic root (HCC)     last assessed - 67Taf2015    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HCC)     Aortic root dilatation (HCC)     last assessed - [id number]    Arthritis     Ascending aortic aneurysm (HCC)     last assessed - 04Bvb1158    Asthma     Basal cell carcinoma     last assessed - 54Nnw6250    Cancer ([facility]) 2015    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[facility])     Coronary artery disease     Diabetes mellitus (HCC)     Disease of thyroid gland     Facet arthropathy, cervical     last assessed - 08Jun2017    Headache(784.0)     All my life    History of fracture of vertebral column     Hyperlipidemia     Hypertension     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HCC)     Hypoxia     last assessed - 16Mar2015    Junctional rhythm     last assessed - 21Kng1020    Lightheadedness     last assessed - 80Ifi8068    Migraine     Palpitations     last assessed - 67Itn2980    Pleural effusion, bilateral     last assessed - 89Nvn9818    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    Dont know    Shortness of breath     last assessed - 02Cns3359    Stroke (HCC)     Thyroid trouble     Trigger finger     last assessed - 22Hzz0884    Trigger middle finger of right hand     last assessed - 10Sdo9847    Urinary incontinence     last assessed -  22Jul,2013; Resolved - 15Jan2017    Visual impairment Feb 2015    Wet macular degeneration both eyes       Past Social history  Past Surgical  History:   Procedure Laterality Date    ABDOMINAL AORTIC ANEURYSM REPAIR W/ ENDOLUMINAL GRAFT  2015    Ascending aorta and hemiarch replacement with 30 mm Vascutek Gelweave graft; Managed by Scott Archuleta; last assessed - 2016    APPENDECTOMY      BLADDER SURGERY      Reccurent histoy of bladder surgery    HOROWITZ PROCEDURE      CARDIAC CATHETERIZATION  2004    Procedure summary - Luminal irregularities; last assessed - 2015     SECTION      CORONARY ANEURYSM REPAIR      CYSTOSCOPY      botox injection    HYSTERECTOMY      TX NDSC NJX IMPLT MATRL URT&/BLDR NCK N/A 2017    Procedure: CYSTOSCOPY; DURASPHERE-PERIURETHRAL BULKING AGENT INJECTION ;  Surgeon: Rayo Moulton MD;  Location: [facility];  Service: Urology    TX TENDON SHEATH INCISION Right 10/18/2016    Procedure: LONG FINGER TRIGGER RELEASE ;  Surgeon: Khari Najera MD;  Location: [facility];  Service: Orthopedics    THYROIDECTOMY      Total Thyroidectomy    TONSILLECTOMY AND ADENOIDECTOMY       Social History     Socioeconomic History    Marital status:      Spouse name: Not on file    Number of children: Not on file    Years of education: Not on file    Highest education level: Not on file   Occupational History    Not on file   Tobacco Use    Smoking status: Former     Current packs/day: 0.00     Average packs/day: 0.3 packs/day for 58.0 years (14.5 ttl pk-yrs)     Types: Cigarettes     Start date: 1957     Quit date: 2014     Years since quittin.4    Smokeless tobacco: Never    Tobacco comments:     smoked 17 yrs 1/2 ppd quit and restarted then quit  10 days ago during 2014    Vaping Use    Vaping status: Never Used   Substance and Sexual Activity    Alcohol use: Not Currently     Comment: Social drinker    Drug use: No    Sexual activity: Not Currently   Other Topics Concern    Not on file   Social History Narrative    Not on file     Social Determinants of Health     Financial Resource  Strain: Medium Risk (3/8/2023)    Overall Financial Resource Strain (CARDIA)     Difficulty of Paying Living Expenses: Somewhat hard   Food Insecurity: No Food Insecurity (3/26/2024)    Hunger Vital Sign     Worried About Running Out of Food in the Last Year: Never true     Ran Out of Food in the Last Year: Never true   Transportation Needs: Unmet Transportation Needs (3/28/2024)    PRAPARE - Transportation     Lack of Transportation (Medical): Yes     Lack of Transportation (Non-Medical): Yes   Physical Activity: Not on file   Stress: Not on file   Social Connections: Not on file   Intimate Partner Violence: Not on file   Housing Stability: Low Risk  (3/26/2024)    Housing Stability Vital Sign     Unable to Pay for Housing in the Last Year: No     Number of Places Lived in the Last Year: 1     Unstable Housing in the Last Year: No     Social History     Substance and Sexual Activity   Alcohol Use Not Currently    Comment: Social drinker     Social History     Substance and Sexual Activity   Drug Use No     Social History     Tobacco Use   Smoking Status Former    Current packs/day: 0.00    Average packs/day: 0.3 packs/day for 58.0 years (14.5 ttl pk-yrs)    Types: Cigarettes    Start date: 1957    Quit date: 2014    Years since quittin.4   Smokeless Tobacco Never   Tobacco Comments    smoked 17 yrs  ppd quit and restarted then quit  10 days ago during 2014        Past Family History  Family History   Problem Relation Age of Onset    Coronary aneurysm Sister     Coronary artery disease Sister         CABG    Cancer Sister     Heart disease Family         cardiac disorder    Hypertension Family     Cancer Family     Thyroid disease Family        Current Medications  Current Outpatient Medications   Medication Sig Dispense Refill    acetaminophen (TYLENOL) 650 mg CR tablet Take 650 mg by mouth every 8 (eight) hours as needed for mild pain      albuterol (2.5 mg/3 mL) 0.083 % nebulizer  solution Take 2.5 mg by nebulization every 6 (six) hours as needed for wheezing or shortness of breath      albuterol (PROVENTIL HFA,VENTOLIN HFA) 90 mcg/act inhaler Inhale 2 puffs every 6 (six) hours as needed for wheezing 25.5 g 3    amLODIPine (NORVASC) 2.5 mg tablet Take 1 tablet (2.5 mg total) by mouth daily 90 tablet 3    benazepril (LOTENSIN) 40 MG tablet Take 1 tablet (40 mg total) by mouth daily 90 tablet 3    Calcium Carb-Cholecalciferol (Calcium+D3) 600-20 MG-MCG TABS Take 1 tablet by mouth in the morning 90 tablet 3    Diclofenac Sodium (VOLTAREN) 1 % Apply 2 g topically 4 (four) times a day as needed (pain)      levothyroxine 112 mcg tablet Take 1 tablet (112 mcg total) by mouth daily in the early morning 90 tablet 0    lovastatin (MEVACOR) 20 mg tablet Take 1 tablet (20 mg total) by mouth every morning 90 tablet 3    meclizine (ANTIVERT) 12.5 MG tablet Take 1 tablet (12.5 mg total) by mouth 3 (three) times a day as needed for dizziness 30 tablet 0    metoprolol succinate (TOPROL-XL) 25 mg 24 hr tablet Take 1 tablet (25 mg total) by mouth daily 90 tablet 3    Multiple Vitamins-Minerals (One Daily Multivitamin Women) TABS Apply 1 tablet to the mouth or throat daily. Indications: Treatment to Prevent Vitamin Deficiency      pantoprazole (PROTONIX) 40 mg tablet Take 1 tablet (40 mg total) by mouth 2 (two) times a day before meals 180 tablet 1     No current facility-administered medications for this visit.       Allergies  Allergies   Allergen Reactions    Amiodarone Hives    Omnipaque [Iohexol] Hives and Shortness Of Breath    Clindamycin Other (See Comments)     When taken causes cdiff immediately    Other     Sulfa Antibiotics Hives     itching    Acetazolamide Hives and Rash     Reaction Date:Unknown    Iv Dye  [Iodinated Contrast Media] Hypertension and Palpitations     Annotation - 19Mar2015: Verified with patient     Naprosyn [Naproxen] Itching and Rash     Category: Allergy;          The following  "portions of the patient's history were reviewed and updated as appropriate: allergies, current medications, past medical history, past social history, past surgical history and problem list.      Vitals  Vitals:    05/30/24 1024   BP: 159/69   BP Location: Right arm   Patient Position: Sitting   Cuff Size: Adult   Pulse: 67   SpO2: 97%   Weight: 53.4 kg (117 lb 12.8 oz)   Height: 5' 2\" (1.575 m)         Physical Exam  Constitutional   General appearance: Patient is seated and in no acute distress, well appearing and well nourished.   Head and Face   Head and face: Normal.    Eyes   Conjunctiva and lids: No erythema, swelling or discharge.  Anicteric.  Ears, Nose, Mouth, and Throat   Hearing: Normal.    Neck: Supple, trachea midline.  Pulmonary   Respiratory effort: No increased work of breathing or signs of respiratory distress.    Cardiovascular   Examination of extremities for edema and/or varicosities: Normal.    Musculoskeletal   Gait and station: Normal   Skin   Skin and subcutaneous tissue: Warm, dry, and intact. No visible jaundice, lesions or rashes.  Psychiatric   Judgment and insight: Normal  Recent and remote memory:  Normal  Mood and affect: Normal      Results  No visits with results within 1 Day(s) from this visit.   Latest known visit with results is:   Appointment on 05/24/2024   Component Date Value    WBC 05/24/2024 5.39     RBC 05/24/2024 4.26     Hemoglobin 05/24/2024 12.7     Hematocrit 05/24/2024 39.6     MCV 05/24/2024 93     MCH 05/24/2024 29.8     MCHC 05/24/2024 32.1     RDW 05/24/2024 14.1     MPV 05/24/2024 11.4     Platelets 05/24/2024 271     nRBC 05/24/2024 0     Segmented % 05/24/2024 59     Immature Grans % 05/24/2024 0     Lymphocytes % 05/24/2024 27     Monocytes % 05/24/2024 12     Eosinophils Relative 05/24/2024 1     Basophils Relative 05/24/2024 1     Absolute Neutrophils 05/24/2024 3.17     Absolute Immature Grans 05/24/2024 0.01     Absolute Lymphocytes 05/24/2024 1.47     " Absolute Monocytes 05/24/2024 0.67     Eosinophils Absolute 05/24/2024 0.04     Basophils Absolute 05/24/2024 0.03     Sodium 05/24/2024 138     Potassium 05/24/2024 4.5     Chloride 05/24/2024 99     CO2 05/24/2024 29     ANION GAP 05/24/2024 10     BUN 05/24/2024 18     Creatinine 05/24/2024 0.73     Glucose, Fasting 05/24/2024 111 (H)     Calcium 05/24/2024 8.8     AST 05/24/2024 31     ALT 05/24/2024 23     Alkaline Phosphatase 05/24/2024 80     Total Protein 05/24/2024 7.2     Albumin 05/24/2024 4.5     Total Bilirubin 05/24/2024 0.71     eGFR 05/24/2024 72        Radiology Results  No results found.    Orders  No orders of the defined types were placed in this encounter.

## 2024-06-04 PROBLEM — R42 DIZZINESS: Status: ACTIVE | Noted: 2024-06-04

## 2024-06-04 NOTE — ASSESSMENT & PLAN NOTE
Given patients age, presentation today  and significant PMH, I advise Buffy present to ER for further, more comprehensive evaluation  Discussed although could be vertigo, I have concern for neurological  etiology  Despite education, pt has chosen to not go today  She would prefer to trial Meclizine and will monitor her symptoms  She states she will present to ER with any change or worsening of her symptoms  She is agreeable to lab work today before leaving the building  Will at least check CBC and CMP  Significant risks reviewed today  Up to and including risk of death if she is experiencing the early signs of stroke  Daughter is present today and respects her mothers wishes to not go to ER  Left office via wheelchair for safety,  in daughters care

## 2024-06-04 NOTE — PROGRESS NOTES
[facility]    ASSESSMENT AND PLAN     1. Dizziness  Assessment & Plan:  Given patients age, presentation today  and significant PMH, I advise Buffy present to ER for further, more comprehensive evaluation  Discussed although could be vertigo, I have concern for neurological  etiology  Despite education, pt has chosen to not go today  She would prefer to trial Meclizine and will monitor her symptoms  She states she will present to ER with any change or worsening of her symptoms  She is agreeable to lab work today before leaving the building  Will at least check CBC and CMP  Significant risks reviewed today  Up to and including risk of death if she is experiencing the early signs of stroke  Daughter is present today and respects her mothers wishes to not go to ER  Left office via wheelchair for safety,  in daughters care  Orders:  -     CBC and differential; Future  -     Comprehensive metabolic panel; Future  -     meclizine (ANTIVERT) 12.5 MG tablet; Take 1 tablet (12.5 mg total) by mouth 3 (three) times a day as needed for dizziness         SUBJECTIVE       Patient ID: Buffy A Katarzyna is a 89 y.o. female.    Chief Complaint   Patient presents with    Dizziness     Vertigo started yesterday morning at 6 am. Face pain and nausea.       HISTORY OF PRESENT ILLNESS    Acute visit  Presents with daughter today - c/o dizziness/vertigo. Started yesterday am upon wakening.  Frequent episodes initially, but have been slowly lessening. Both room and she was spinning. Felt off balance. Reports associated nausea. Decreased appetite. No vomiting. She has history of vertigo in the past. Has done vestibular therapy with good relief at that time. This was years ago. This is first episode in years. Reports mild intermittent headache. Which has resolved. No extremity numbness or tingling. No vision changes. She feels generally tired. No other symptoms. She denies any recent falls or injury          The following portions  "of the patient's history were reviewed and updated as appropriate: allergies, current medications, past family history, past medical history, past social history, past surgical history, and problem list.    REVIEW OF SYSTEMS  Review of Systems   Constitutional:  Positive for appetite change and fatigue. Negative for activity change and fever.   HENT: Negative.     Respiratory: Negative.     Cardiovascular: Negative.    Gastrointestinal:  Positive for nausea. Negative for abdominal pain and vomiting.   Neurological:  Positive for dizziness, weakness and headaches. Negative for syncope.   Psychiatric/Behavioral: Negative.         OBJECTIVE      VITAL SIGNS  /78 (BP Location: Left arm, Patient Position: Sitting, Cuff Size: Standard)   Pulse 60   Temp (!) 97.3 °F (36.3 °C)   Ht 5' 2\" (1.575 m)   Wt 54 kg (119 lb)   SpO2 97%   BMI 21.77 kg/m²     CURRENT MEDICATIONS    Current Outpatient Medications:     albuterol (2.5 mg/3 mL) 0.083 % nebulizer solution, Take 2.5 mg by nebulization every 6 (six) hours as needed for wheezing or shortness of breath, Disp: , Rfl:     albuterol (PROVENTIL HFA,VENTOLIN HFA) 90 mcg/act inhaler, Inhale 2 puffs every 6 (six) hours as needed for wheezing, Disp: 25.5 g, Rfl: 3    amLODIPine (NORVASC) 2.5 mg tablet, Take 1 tablet (2.5 mg total) by mouth daily, Disp: 90 tablet, Rfl: 3    benazepril (LOTENSIN) 40 MG tablet, Take 1 tablet (40 mg total) by mouth daily, Disp: 90 tablet, Rfl: 3    Calcium Carb-Cholecalciferol (Calcium+D3) 600-20 MG-MCG TABS, Take 1 tablet by mouth in the morning, Disp: 90 tablet, Rfl: 3    Diclofenac Sodium (VOLTAREN) 1 %, Apply 2 g topically 4 (four) times a day as needed (pain), Disp: , Rfl:     levothyroxine 112 mcg tablet, Take 1 tablet (112 mcg total) by mouth daily in the early morning, Disp: 90 tablet, Rfl: 0    lovastatin (MEVACOR) 20 mg tablet, Take 1 tablet (20 mg total) by mouth every morning, Disp: 90 tablet, Rfl: 3    meclizine (ANTIVERT) 12.5 " MG tablet, Take 1 tablet (12.5 mg total) by mouth 3 (three) times a day as needed for dizziness, Disp: 30 tablet, Rfl: 0    metoprolol succinate (TOPROL-XL) 25 mg 24 hr tablet, Take 1 tablet (25 mg total) by mouth daily, Disp: 90 tablet, Rfl: 3    Multiple Vitamins-Minerals (One Daily Multivitamin Women) TABS, Apply 1 tablet to the mouth or throat daily. Indications: Treatment to Prevent Vitamin Deficiency, Disp: , Rfl:     pantoprazole (PROTONIX) 40 mg tablet, Take 1 tablet (40 mg total) by mouth 2 (two) times a day before meals, Disp: 180 tablet, Rfl: 1    acetaminophen (TYLENOL) 650 mg CR tablet, Take 650 mg by mouth every 8 (eight) hours as needed for mild pain, Disp: , Rfl:       PHYSICAL EXAMINATION   Physical Exam  Vitals reviewed.   Constitutional:       General: She is not in acute distress.     Appearance: Normal appearance. She is not ill-appearing.   HENT:      Right Ear: Tympanic membrane and ear canal normal.      Left Ear: Tympanic membrane and ear canal normal.      Nose: Nose normal.   Eyes:      Extraocular Movements:      Right eye: No nystagmus.      Left eye: No nystagmus.      Conjunctiva/sclera: Conjunctivae normal.      Pupils: Pupils are equal, round, and reactive to light.   Neck:      Vascular: No carotid bruit.   Cardiovascular:      Rate and Rhythm: Normal rate and regular rhythm.   Pulmonary:      Effort: Pulmonary effort is normal. No respiratory distress.      Breath sounds: Normal breath sounds and air entry.   Lymphadenopathy:      Cervical: No cervical adenopathy.   Skin:     General: Skin is warm and dry.   Neurological:      Mental Status: She is alert and oriented to person, place, and time.      Comments: Had 2 episodes that patient stared forward with out responding to  me. Lasted seconds only. Pt with no  awareness  of the episodes.  Otherwise pt reports and daughter confirms is at baseline   Psychiatric:         Attention and Perception: Attention normal.         Mood and  Affect: Mood normal.

## 2024-06-06 ENCOUNTER — OFFICE VISIT (OUTPATIENT)
Dept: OBGYN CLINIC | Facility: HOSPITAL | Age: 89
End: 2024-06-06
Payer: COMMERCIAL

## 2024-06-06 VITALS — HEIGHT: 62 IN | WEIGHT: 114 LBS | BODY MASS INDEX: 20.98 KG/M2

## 2024-06-06 DIAGNOSIS — R20.2 ARM PARESTHESIA, RIGHT: ICD-10-CM

## 2024-06-06 DIAGNOSIS — M24.80 JOINT LOCKING: ICD-10-CM

## 2024-06-06 PROCEDURE — 99214 OFFICE O/P EST MOD 30 MIN: CPT | Performed by: SURGERY

## 2024-06-06 NOTE — PROGRESS NOTES
ASSESSMENT/PLAN:      89 y.o. female with cervical radiculopathy, improving (new issue) was treating for right long finger arthritis     The etiology of cervical radiculopathy was discussed. Treatment options were discussed in the form of a follow up visit with Spine and PiDr. Venessa dwyer. She may benefit from another cervical spine injection. She declined a referral to Dr. Clifford at this time. She may resume activities to her tolerance, no restrictions. I will see her back in the office on an as needed basis.       The patient verbalized understanding of exam findings and treatment plan. We engaged in the shared decision-making process and treatment options were discussed at length with the patient. Surgical and conservative management discussed today along with risks and benefits.    Diagnoses and all orders for this visit:    Arm paresthesia, right  -     Ambulatory Referral to Orthopedic Surgery    Joint locking  -     Ambulatory Referral to Orthopedic Surgery      Follow Up:  Return if symptoms worsen or fail to improve.      To Do Next Visit:  Re-evaluation of current issue    ____________________________________________________________________________________________________________________________________________      CHIEF COMPLAINT:  Chief Complaint   Patient presents with    Right Arm - Pain     Whole arm has pain. Hand freezes. Whole are has numbness and tingling. Comes and goes but when it happens it stays for a day or 2.        SUBJECTIVE:  Buffy Colón is a 89 y.o. year old RHD female who presents to the office for evaluation of her right arm. She notes in October of 2023 she began to experience numbness and tingling to the dorsal aspect of her right arm along with a burning pain. She did present to the ED when she noticed her symptoms. She notes over the last 6 weeks this has not occurred and she did almost cancel her appointment today. She does have a history of cervical spine issues and did  undergo treatment with Dr. Clifford in the past. She did undergo 1 cervical spine injection many years ago. She also notes her right hand cramps, but this has been ongoing for years and is not painful or significantly bothersome at this time. She is alos now treating with Rheumatology.         I have personally reviewed all the relevant PMH, PSH, SH, FH, Medications and allergies.     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[facility])     Aortic root dilatation (HCC)     last assessed - [id number]    Arthritis     Ascending aortic aneurysm (HCC)     last assessed - [id number]    Asthma     Basal cell carcinoma     last assessed - 09Sep2016    Cancer (Formerly [facility]) 2015    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[facility])     Coronary artery disease     Diabetes mellitus (HCC)     Disease of thyroid gland     Facet arthropathy, cervical     last assessed - 08Jun2017    Headache(784.0)     All my life    History of fracture of vertebral column     Hyperlipidemia     Hypertension     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HCC)     Hypoxia     last assessed - 16Mar2015    Junctional rhythm     last assessed - [id number]    Lightheadedness     last assessed - 28Jun2016    Migraine     Palpitations     last assessed - 28Jun2016    Pleural effusion, bilateral     last assessed - 03Pgc6022    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    Dont know    Shortness of breath     last assessed - 60Imz2001    Stroke ([facility])     Thyroid trouble     Trigger finger     last assessed - 09Sep2016    Trigger middle finger of right hand     last assessed - 06Ait3461    Urinary incontinence     last assessed -  22Jul,2013; Resolved - 15Jan2017    Visual impairment Feb 2015    Wet macular degeneration both eyes        PAST SURGICAL HISTORY:  Past Surgical History:   Procedure Laterality Date    ABDOMINAL AORTIC ANEURYSM REPAIR W/ ENDOLUMINAL GRAFT  2015    Ascending aorta and hemiarch replacement with 30 mm Vascutek Gelweave graft; Managed by Scott Archuleta; last assessed - 2016    APPENDECTOMY      BLADDER SURGERY      Reccurent histoy of bladder surgery    HOROWITZ PROCEDURE      CARDIAC CATHETERIZATION  2004    Procedure summary - Luminal irregularities; last assessed - 2015     SECTION      CORONARY ANEURYSM REPAIR      CYSTOSCOPY      botox injection    HYSTERECTOMY      [place] NDSC NJX IMPLT MATRL URT&/BLDR NCK N/A 2017    Procedure: CYSTOSCOPY; DURASPHERE-PERIURETHRAL BULKING AGENT INJECTION ;  Surgeon: Rayo Moulton MD;  [facility];  Service: Urology    NJ TENDON SHEATH INCISION Right 10/18/2016    Procedure: LONG FINGER TRIGGER RELEASE ;  Surgeon: Khari Najera MD;  Location: [facility];  Service: Orthopedics    THYROIDECTOMY      Total Thyroidectomy    TONSILLECTOMY AND ADENOIDECTOMY         FAMILY HISTORY:  Family History   Problem Relation Age of Onset    Coronary aneurysm Sister     Coronary artery disease Sister         CABG    Cancer Sister     Heart disease Family         cardiac disorder    Hypertension Family     Cancer Family     Thyroid disease Family        SOCIAL HISTORY:  Social History     Tobacco Use    Smoking status: Former     Current packs/day: 0.00     Average packs/day: 0.3 packs/day for 58.0 years (14.5 ttl pk-yrs)     Types: Cigarettes     Start date: 1957     Quit date: 2014     Years since quittin.4    Smokeless tobacco: Never    Tobacco comments:     smoked 17 yrs 1/2 ppd quit and restarted then quit  10 days ago during 2014    Vaping Use    Vaping status: Never Used   Substance Use Topics    Alcohol use: Not Currently     Comment: Social drinker    Drug use: No       MEDICATIONS:    Current Outpatient Medications:      acetaminophen (TYLENOL) 650 mg CR tablet, Take 650 mg by mouth every 8 (eight) hours as needed for mild pain, Disp: , Rfl:     albuterol (2.5 mg/3 mL) 0.083 % nebulizer solution, Take 2.5 mg by nebulization every 6 (six) hours as needed for wheezing or shortness of breath, Disp: , Rfl:     albuterol (PROVENTIL HFA,VENTOLIN HFA) 90 mcg/act inhaler, Inhale 2 puffs every 6 (six) hours as needed for wheezing, Disp: 25.5 g, Rfl: 3    amLODIPine (NORVASC) 2.5 mg tablet, Take 1 tablet (2.5 mg total) by mouth daily, Disp: 90 tablet, Rfl: 3    benazepril (LOTENSIN) 40 MG tablet, Take 1 tablet (40 mg total) by mouth daily, Disp: 90 tablet, Rfl: 3    Calcium Carb-Cholecalciferol (Calcium+D3) 600-20 MG-MCG TABS, Take 1 tablet by mouth in the morning, Disp: 90 tablet, Rfl: 3    Diclofenac Sodium (VOLTAREN) 1 %, Apply 2 g topically 4 (four) times a day as needed (pain), Disp: , Rfl:     levothyroxine 112 mcg tablet, Take 1 tablet (112 mcg total) by mouth daily in the early morning, Disp: 90 tablet, Rfl: 0    lovastatin (MEVACOR) 20 mg tablet, Take 1 tablet (20 mg total) by mouth every morning, Disp: 90 tablet, Rfl: 3    meclizine (ANTIVERT) 12.5 MG tablet, Take 1 tablet (12.5 mg total) by mouth 3 (three) times a day as needed for dizziness, Disp: 30 tablet, Rfl: 0    metoprolol succinate (TOPROL-XL) 25 mg 24 hr tablet, Take 1 tablet (25 mg total) by mouth daily, Disp: 90 tablet, Rfl: 3    Multiple Vitamins-Minerals (One Daily Multivitamin Women) TABS, Apply 1 tablet to the mouth or throat daily. Indications: Treatment to Prevent Vitamin Deficiency, Disp: , Rfl:     pantoprazole (PROTONIX) 40 mg tablet, Take 1 tablet (40 mg total) by mouth 2 (two) times a day before meals, Disp: 180 tablet, Rfl: 1    ALLERGIES:  Allergies   Allergen Reactions    Amiodarone Hives    Omnipaque [Iohexol] Hives and Shortness Of Breath    Clindamycin Other (See Comments)     When taken causes cdiff immediately    Other     Sulfa Antibiotics Hives      itching    Acetazolamide Hives and Rash     Reaction Date:Unknown    Iv Dye  [Iodinated Contrast Media] Hypertension and Palpitations     Annotation - 19Mar2015: Verified with patient     Naprosyn [Naproxen] Itching and Rash     Category: Allergy;        REVIEW OF SYSTEMS:  Review of Systems   Constitutional:  Negative for chills, fever and unexpected weight change.   HENT:  Negative for hearing loss, nosebleeds and sore throat.    Eyes:  Negative for pain, redness and visual disturbance.   Respiratory:  Negative for cough, shortness of breath and wheezing.    Cardiovascular:  Negative for chest pain, palpitations and leg swelling.   Gastrointestinal:  Negative for abdominal pain, nausea and vomiting.   Endocrine: Negative for polydipsia and polyuria.   Genitourinary:  Negative for difficulty urinating and hematuria.   Musculoskeletal:  Negative for arthralgias, joint swelling and myalgias.   Skin:  Negative for rash and wound.   Neurological:  Negative for dizziness, numbness and headaches.   Psychiatric/Behavioral:  Negative for decreased concentration, dysphoric mood and suicidal ideas. The patient is not nervous/anxious.        VITALS:  Vitals:         _____________________________________________________  PHYSICAL EXAMINATION:  General: well developed and well nourished, alert, oriented times 3, and appears comfortable  Psychiatric: Normal  HEENT: Normocephalic, Atraumatic Trachea Midline, No torticollis  Pulmonary: No audible wheezing or respiratory distress   Cardiovascular: No pitting edema, 2+ radial pulse   Abdominal/GI: abdomen non tender, non distended   Skin: No masses, erythema, lacerations, fluctation, ulcerations  Neurovascular: Sensation Intact to the Median, Ulnar, Radial Nerve, Motor Intact to the Median, Ulnar, Radial Nerve, and Pulses Intact  Musculoskeletal: Normal, except as noted in detailed exam and in HPI.      MUSCULOSKELETAL EXAMINATION:    Right upper extremity:     No erythema,  ecchymosis or edema  Shoulder ROM WNL's   Full elbow ROM   No bony or soft tissue tenderness    ___________________________________________________    STUDIES REVIEWED:  No new imaging to review     LABS REVIEWED:    HgA1c:   Lab Results   Component Value Date    HGBA1C 6.2 (H) 03/27/2024     BMP:   Lab Results   Component Value Date    GLUCOSE 138 06/16/2015    CALCIUM 8.8 05/24/2024     (L) 06/16/2015    K 4.5 05/24/2024    CO2 29 05/24/2024    CL 99 05/24/2024    BUN 18 05/24/2024    CREATININE 0.73 05/24/2024             PROCEDURES PERFORMED:  Procedures  No Procedures performed today    _____________________________________________________      Scribe Attestation      I,:  Tonya Amin am acting as a scribe while in the presence of the attending physician.:       I,:  Nabeel Nolen MD personally performed the services described in this documentation    as scribed in my presence.:

## 2024-07-18 ENCOUNTER — OFFICE VISIT (OUTPATIENT)
Dept: FAMILY MEDICINE CLINIC | Facility: CLINIC | Age: 89
End: 2024-07-18
Payer: COMMERCIAL

## 2024-07-18 ENCOUNTER — APPOINTMENT (OUTPATIENT)
Dept: CT IMAGING | Facility: HOSPITAL | Age: 89
End: 2024-07-18
Payer: COMMERCIAL

## 2024-07-18 ENCOUNTER — APPOINTMENT (EMERGENCY)
Dept: CT IMAGING | Facility: HOSPITAL | Age: 89
End: 2024-07-18
Payer: COMMERCIAL

## 2024-07-18 ENCOUNTER — HOSPITAL ENCOUNTER (OUTPATIENT)
Facility: HOSPITAL | Age: 89
Setting detail: OBSERVATION
Discharge: HOME | End: 2024-07-19
Attending: EMERGENCY MEDICINE | Admitting: INTERNAL MEDICINE
Payer: COMMERCIAL

## 2024-07-18 VITALS
BODY MASS INDEX: 21.86 KG/M2 | HEIGHT: 61 IN | SYSTOLIC BLOOD PRESSURE: 170 MMHG | HEART RATE: 66 BPM | TEMPERATURE: 97.5 F | DIASTOLIC BLOOD PRESSURE: 80 MMHG | WEIGHT: 115.8 LBS | OXYGEN SATURATION: 95 %

## 2024-07-18 DIAGNOSIS — I10 ELEVATED BLOOD PRESSURE READING WITH DIAGNOSIS OF HYPERTENSION: ICD-10-CM

## 2024-07-18 DIAGNOSIS — I66.21 OCCLUSION AND STENOSIS OF RIGHT POSTERIOR CEREBRAL ARTERY: ICD-10-CM

## 2024-07-18 DIAGNOSIS — R42 VERTIGO: ICD-10-CM

## 2024-07-18 DIAGNOSIS — R42 DIZZINESS: Primary | ICD-10-CM

## 2024-07-18 DIAGNOSIS — C05.0 MALIGNANT NEOPLASM OF HARD PALATE (HCC): ICD-10-CM

## 2024-07-18 DIAGNOSIS — I48.0 PAROXYSMAL ATRIAL FIBRILLATION (HCC): ICD-10-CM

## 2024-07-18 DIAGNOSIS — R51.9 ACUTE INTRACTABLE HEADACHE, UNSPECIFIED HEADACHE TYPE: ICD-10-CM

## 2024-07-18 PROBLEM — C11.9 NASOPHARYNGEAL CANCER (HCC): Status: ACTIVE | Noted: 2024-07-18

## 2024-07-18 LAB
2HR DELTA HS TROPONIN: 0 NG/L
4HR DELTA HS TROPONIN: 5 NG/L
ALBUMIN SERPL BCG-MCNC: 4.7 G/DL (ref 3.5–5)
ALP SERPL-CCNC: 49 U/L (ref 34–104)
ALT SERPL W P-5'-P-CCNC: 16 U/L (ref 7–52)
ANION GAP SERPL CALCULATED.3IONS-SCNC: 6 MMOL/L (ref 4–13)
AST SERPL W P-5'-P-CCNC: 26 U/L (ref 13–39)
ATRIAL RATE: 70 BPM
ATRIAL RATE: 78 BPM
ATRIAL RATE: 97 BPM
BASOPHILS # BLD AUTO: 0.04 THOUSANDS/ÂΜL (ref 0–0.1)
BASOPHILS NFR BLD AUTO: 1 % (ref 0–1)
BILIRUB SERPL-MCNC: 0.66 MG/DL (ref 0.2–1)
BILIRUB UR QL STRIP: NEGATIVE
BUN SERPL-MCNC: 33 MG/DL (ref 5–25)
CALCIUM SERPL-MCNC: 9.8 MG/DL (ref 8.4–10.2)
CARDIAC TROPONIN I PNL SERPL HS: 12 NG/L
CARDIAC TROPONIN I PNL SERPL HS: 7 NG/L
CARDIAC TROPONIN I PNL SERPL HS: 7 NG/L
CHLORIDE SERPL-SCNC: 98 MMOL/L (ref 96–108)
CLARITY UR: CLEAR
CO2 SERPL-SCNC: 31 MMOL/L (ref 21–32)
COLOR UR: YELLOW
CREAT SERPL-MCNC: 0.88 MG/DL (ref 0.6–1.3)
EOSINOPHIL # BLD AUTO: 0.09 THOUSAND/ÂΜL (ref 0–0.61)
EOSINOPHIL NFR BLD AUTO: 1 % (ref 0–6)
ERYTHROCYTE [DISTWIDTH] IN BLOOD BY AUTOMATED COUNT: 14 % (ref 11.6–15.1)
GFR SERPL CREATININE-BSD FRML MDRD: 57 ML/MIN/1.73SQ M
GLUCOSE SERPL-MCNC: 100 MG/DL (ref 65–140)
GLUCOSE UR STRIP-MCNC: NEGATIVE MG/DL
HCT VFR BLD AUTO: 40 % (ref 34.8–46.1)
HGB BLD-MCNC: 13.3 G/DL (ref 11.5–15.4)
HGB UR QL STRIP.AUTO: NEGATIVE
IMM GRANULOCYTES # BLD AUTO: 0.02 THOUSAND/UL (ref 0–0.2)
IMM GRANULOCYTES NFR BLD AUTO: 0 % (ref 0–2)
KETONES UR STRIP-MCNC: NEGATIVE MG/DL
LEUKOCYTE ESTERASE UR QL STRIP: NEGATIVE
LYMPHOCYTES # BLD AUTO: 2.48 THOUSANDS/ÂΜL (ref 0.6–4.47)
LYMPHOCYTES NFR BLD AUTO: 29 % (ref 14–44)
MCH RBC QN AUTO: 30.5 PG (ref 26.8–34.3)
MCHC RBC AUTO-ENTMCNC: 33.3 G/DL (ref 31.4–37.4)
MCV RBC AUTO: 92 FL (ref 82–98)
MONOCYTES # BLD AUTO: 0.83 THOUSAND/ÂΜL (ref 0.17–1.22)
MONOCYTES NFR BLD AUTO: 10 % (ref 4–12)
NEUTROPHILS # BLD AUTO: 5.01 THOUSANDS/ÂΜL (ref 1.85–7.62)
NEUTS SEG NFR BLD AUTO: 59 % (ref 43–75)
NITRITE UR QL STRIP: NEGATIVE
NRBC BLD AUTO-RTO: 0 /100 WBCS
P AXIS: 75 DEGREES
P AXIS: 90 DEGREES
PH UR STRIP.AUTO: 6 [PH] (ref 4.5–8)
PLATELET # BLD AUTO: 208 THOUSANDS/UL (ref 149–390)
PMV BLD AUTO: 12.2 FL (ref 8.9–12.7)
POTASSIUM SERPL-SCNC: 4.2 MMOL/L (ref 3.5–5.3)
PR INTERVAL: 180 MS
PR INTERVAL: 198 MS
PROT SERPL-MCNC: 7.6 G/DL (ref 6.4–8.4)
PROT UR STRIP-MCNC: NEGATIVE MG/DL
QRS AXIS: 103 DEGREES
QRS AXIS: 144 DEGREES
QRS AXIS: 97 DEGREES
QRSD INTERVAL: 72 MS
QRSD INTERVAL: 78 MS
QRSD INTERVAL: 78 MS
QT INTERVAL: 362 MS
QT INTERVAL: 396 MS
QT INTERVAL: 410 MS
QTC INTERVAL: 422 MS
QTC INTERVAL: 427 MS
QTC INTERVAL: 467 MS
RBC # BLD AUTO: 4.36 MILLION/UL (ref 3.81–5.12)
SODIUM SERPL-SCNC: 135 MMOL/L (ref 135–147)
SP GR UR STRIP.AUTO: 1.01 (ref 1–1.03)
T WAVE AXIS: 45 DEGREES
T WAVE AXIS: 69 DEGREES
T WAVE AXIS: 74 DEGREES
UROBILINOGEN UR QL STRIP.AUTO: 0.2 E.U./DL
VENTRICULAR RATE: 70 BPM
VENTRICULAR RATE: 78 BPM
VENTRICULAR RATE: 82 BPM
WBC # BLD AUTO: 8.47 THOUSAND/UL (ref 4.31–10.16)

## 2024-07-18 PROCEDURE — 96361 HYDRATE IV INFUSION ADD-ON: CPT

## 2024-07-18 PROCEDURE — 72125 CT NECK SPINE W/O DYE: CPT

## 2024-07-18 PROCEDURE — 81003 URINALYSIS AUTO W/O SCOPE: CPT

## 2024-07-18 PROCEDURE — G2211 COMPLEX E/M VISIT ADD ON: HCPCS | Performed by: FAMILY MEDICINE

## 2024-07-18 PROCEDURE — 96360 HYDRATION IV INFUSION INIT: CPT

## 2024-07-18 PROCEDURE — 83036 HEMOGLOBIN GLYCOSYLATED A1C: CPT | Performed by: INTERNAL MEDICINE

## 2024-07-18 PROCEDURE — 93010 ELECTROCARDIOGRAM REPORT: CPT | Performed by: INTERNAL MEDICINE

## 2024-07-18 PROCEDURE — 80061 LIPID PANEL: CPT | Performed by: INTERNAL MEDICINE

## 2024-07-18 PROCEDURE — 84484 ASSAY OF TROPONIN QUANT: CPT | Performed by: EMERGENCY MEDICINE

## 2024-07-18 PROCEDURE — 70498 CT ANGIOGRAPHY NECK: CPT

## 2024-07-18 PROCEDURE — 85025 COMPLETE CBC W/AUTO DIFF WBC: CPT | Performed by: EMERGENCY MEDICINE

## 2024-07-18 PROCEDURE — 99223 1ST HOSP IP/OBS HIGH 75: CPT | Performed by: INTERNAL MEDICINE

## 2024-07-18 PROCEDURE — 99285 EMERGENCY DEPT VISIT HI MDM: CPT

## 2024-07-18 PROCEDURE — 70496 CT ANGIOGRAPHY HEAD: CPT

## 2024-07-18 PROCEDURE — 36415 COLL VENOUS BLD VENIPUNCTURE: CPT | Performed by: EMERGENCY MEDICINE

## 2024-07-18 PROCEDURE — 93010 ELECTROCARDIOGRAM REPORT: CPT

## 2024-07-18 PROCEDURE — 99205 OFFICE O/P NEW HI 60 MIN: CPT | Performed by: PSYCHIATRY & NEUROLOGY

## 2024-07-18 PROCEDURE — 99215 OFFICE O/P EST HI 40 MIN: CPT | Performed by: FAMILY MEDICINE

## 2024-07-18 PROCEDURE — 93005 ELECTROCARDIOGRAM TRACING: CPT

## 2024-07-18 PROCEDURE — 80053 COMPREHEN METABOLIC PANEL: CPT | Performed by: EMERGENCY MEDICINE

## 2024-07-18 RX ORDER — ACETAMINOPHEN 325 MG/1
650 TABLET ORAL EVERY 4 HOURS PRN
Status: DISCONTINUED | OUTPATIENT
Start: 2024-07-18 | End: 2024-07-19 | Stop reason: HOSPADM

## 2024-07-18 RX ORDER — LEVOTHYROXINE SODIUM 112 UG/1
112 TABLET ORAL
Status: DISCONTINUED | OUTPATIENT
Start: 2024-07-19 | End: 2024-07-19 | Stop reason: HOSPADM

## 2024-07-18 RX ORDER — AMLODIPINE BESYLATE 5 MG/1
5 TABLET ORAL ONCE
Status: DISCONTINUED | OUTPATIENT
Start: 2024-07-18 | End: 2024-07-18

## 2024-07-18 RX ORDER — METOPROLOL SUCCINATE 25 MG/1
25 TABLET, EXTENDED RELEASE ORAL DAILY
Status: DISCONTINUED | OUTPATIENT
Start: 2024-07-19 | End: 2024-07-19 | Stop reason: HOSPADM

## 2024-07-18 RX ORDER — HEPARIN SODIUM 5000 [USP'U]/ML
5000 INJECTION, SOLUTION INTRAVENOUS; SUBCUTANEOUS EVERY 8 HOURS SCHEDULED
Status: DISCONTINUED | OUTPATIENT
Start: 2024-07-18 | End: 2024-07-19 | Stop reason: HOSPADM

## 2024-07-18 RX ORDER — ALBUTEROL SULFATE 2.5 MG/3ML
2.5 SOLUTION RESPIRATORY (INHALATION) EVERY 6 HOURS PRN
Status: DISCONTINUED | OUTPATIENT
Start: 2024-07-18 | End: 2024-07-19 | Stop reason: HOSPADM

## 2024-07-18 RX ORDER — PANTOPRAZOLE SODIUM 40 MG/1
40 TABLET, DELAYED RELEASE ORAL
Status: DISCONTINUED | OUTPATIENT
Start: 2024-07-18 | End: 2024-07-19 | Stop reason: HOSPADM

## 2024-07-18 RX ORDER — ASPIRIN 81 MG/1
81 TABLET, CHEWABLE ORAL DAILY
Status: DISCONTINUED | OUTPATIENT
Start: 2024-07-19 | End: 2024-07-19

## 2024-07-18 RX ORDER — ONDANSETRON 2 MG/ML
4 INJECTION INTRAMUSCULAR; INTRAVENOUS EVERY 6 HOURS PRN
Status: DISCONTINUED | OUTPATIENT
Start: 2024-07-18 | End: 2024-07-19 | Stop reason: HOSPADM

## 2024-07-18 RX ORDER — ATORVASTATIN CALCIUM 40 MG/1
40 TABLET, FILM COATED ORAL EVERY EVENING
Status: DISCONTINUED | OUTPATIENT
Start: 2024-07-18 | End: 2024-07-19 | Stop reason: HOSPADM

## 2024-07-18 RX ADMIN — PANTOPRAZOLE SODIUM 40 MG: 40 TABLET, DELAYED RELEASE ORAL at 17:45

## 2024-07-18 RX ADMIN — SODIUM CHLORIDE 1000 ML: 0.9 INJECTION, SOLUTION INTRAVENOUS at 11:56

## 2024-07-18 RX ADMIN — ATORVASTATIN CALCIUM 40 MG: 40 TABLET, FILM COATED ORAL at 17:45

## 2024-07-18 RX ADMIN — HEPARIN SODIUM 5000 UNITS: 5000 INJECTION INTRAVENOUS; SUBCUTANEOUS at 17:45

## 2024-07-18 RX ADMIN — ACETAMINOPHEN 650 MG: 325 TABLET, FILM COATED ORAL at 17:50

## 2024-07-18 RX ADMIN — IOHEXOL 85 ML: 350 INJECTION, SOLUTION INTRAVENOUS at 12:44

## 2024-07-18 NOTE — ASSESSMENT & PLAN NOTE
Continue outpatient follow-up  On anticoagulation secondary to this disease process  Mild increase in size of the destructive mass involving the posterior aspect of the hard palate on the right, extending to involve the posterior aspect of the floor of the right maxillary sinus.   Once cleared from stroke aspect will need to discuss if patient desires further workup and treatment

## 2024-07-18 NOTE — H&P
[facility]  H&P  Name: Buffy Colón 89 y.o. female I MRN: [id number]  Unit/Bed#: ED-41 I Date of Admission: 7/18/2024   Date of Service: 7/18/2024 I Hospital Day: 0    Assessment and Plan  * Vertigo  Assessment & Plan  Patient is being admitted for evaluation of vertigo, given concern for ataxia as well as disequilibrium  Differential diagnosis include vestibular migraine, vertebral basilar artery territory infarct or stroke, vestibular neuritis given current symptoms of cough and congestion prior to her symptom, less likely vestibular labyrinthitis given lack of hearing loss  Head CT abnormal with  High-grade stenosis involving the right posterior cerebral artery P2 segment. -Possible concern for cerebellar stroke  Permissive hypertension  Consultation placed neurology for comanagement  Continue neurologic checks         Nasopharyngeal cancer (HCC)  Assessment & Plan  Continue outpatient follow-up  On anticoagulation secondary to this disease process  Mild increase in size of the destructive mass involving the posterior aspect of the hard palate on the right, extending to involve the posterior aspect of the floor of the right maxillary sinus.   Once cleared from stroke aspect will need to discuss if patient desires further workup and treatment    Paroxysmal atrial fibrillation (HCC)  Assessment & Plan  Ventricle rate controlled  Not on anticoagulation prior to admission due to known cancer    Asthma-COPD overlap syndrome  Assessment & Plan  No evidence of exacerbation        Code Status: Full code    VTE Prophylaxis: Heparin  / sequential compression device     Discussion with family: Patient updating family    Anticipated Length of Stay:  Patient will be admitted on an Observation basis with an anticipated length of stay of less than 2 midnights.   Justification for Hospital Stay: Vertigo     Total Time for Visit, including Counseling / Coordination of Care: 76 minutes.  Greater  than 50% of this total time spent on direct patient counseling and coordination of care.    Chief Complaint:     Chief Complaint   Patient presents with    Dizziness     Dizziness and fatigue ongoing for weeks.        History of Present Illness:    Buffy Colón is a 89 y.o. female who presents with persistent vertigo, the patient has a past medical history of atrial fibrillation not on anticoagulation, hard palate tumor with unclear oncologic plan, history of arthritis, history of ascending aortic aneurysm, hypertension and hyperlipidemia.  The patient states that over the past 2 weeks she has had persistent vertigo despite taking Dramamine.  She denies any nausea or vomiting, no difficulty speaking or swallowing.  In the emergency room head CT was performed which was abnormal, there was no large vessel occlusion but small vessel disease.,  Possible posterior circulation involvement.    Denies any other acute complaints, no difficulty swallowing or speaking..    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), wet (HCC)     bilateral    Anemia N/A    Aneurysm of aortic root (HCC)     last assessed - [id number]    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HCC)     Aortic root dilatation (HCC)     last assessed - 52Qar4252    Arthritis     Ascending aortic aneurysm (HCC)     last assessed - 10Ibl4709    Asthma     Basal cell carcinoma     last assessed - [id number]    Cancer (HCC) 2015    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HCC)     Coronary artery disease     Diabetes mellitus (HCC)     Disease of thyroid gland     Facet arthropathy, cervical     last assessed - 08Jun2017    Headache(784.0)     All my life     History of fracture of vertebral column     Hyperlipidemia     Hypertension     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HCC)     Hypoxia     last assessed - 2015    Junctional rhythm     last assessed - 2017    Lightheadedness     last assessed - 2016    Migraine     Palpitations     last assessed - 2016    Pleural effusion, bilateral     last assessed - 2015    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    Dont know    Shortness of breath     last assessed - 2017    Stroke (HCC)     Thyroid trouble     Trigger finger     last assessed - 2016    Trigger middle finger of right hand     last assessed - 2016    Urinary incontinence     last assessed -  ; Resolved - 2017    Visual impairment 2015    Wet macular degeneration both eyes       Past Surgical History:   Procedure Laterality Date    ABDOMINAL AORTIC ANEURYSM REPAIR W/ ENDOLUMINAL GRAFT  2015    Ascending aorta and hemiarch replacement with 30 mm Vascutek Gelweave graft; Managed by Scott Archuleta; last assessed - 2016    APPENDECTOMY      BLADDER SURGERY      Reccurent histoy of bladder surgery    OHROWITZ PROCEDURE      CARDIAC CATHETERIZATION  2004    Procedure summary - Luminal irregularities; last assessed - 2015     SECTION      CORONARY ANEURYSM REPAIR      CYSTOSCOPY      botox injection    HYSTERECTOMY      [place] NDSC NJX IMPLT MATRL URT&/BLDR NCK N/A 2017    Procedure: CYSTOSCOPY; DURASPHERE-PERIURETHRAL BULKING AGENT INJECTION ;  Surgeon: Rayo Moulton MD;  [facility];  Service: Urology    IN TENDON SHEATH INCISION Right 10/18/2016    Procedure: LONG FINGER TRIGGER RELEASE ;  Surgeon: Khari Najera MD;  [facility];  Service: Orthopedics    THYROIDECTOMY      Total Thyroidectomy    TONSILLECTOMY AND ADENOIDECTOMY         Meds/Allergies:    Prior to Admission medications    Medication Sig Start Date End Date Taking? Authorizing Provider   albuterol (2.5 mg/3  mL) 0.083 % nebulizer solution Take 2.5 mg by nebulization every 6 (six) hours as needed for wheezing or shortness of breath   Yes Historical Provider, MD   albuterol (PROVENTIL HFA,VENTOLIN HFA) 90 mcg/act inhaler Inhale 2 puffs every 6 (six) hours as needed for wheezing 4/18/23  Yes SARAHI Roque   amLODIPine (NORVASC) 2.5 mg tablet Take 1 tablet (2.5 mg total) by mouth daily 4/29/24  Yes Steve Sandoval MD   benazepril (LOTENSIN) 40 MG tablet Take 1 tablet (40 mg total) by mouth daily 4/29/24  Yes Steve Sandoval MD   Calcium Carb-Cholecalciferol (Calcium+D3) 600-20 MG-MCG TABS Take 1 tablet by mouth in the morning 4/11/24  Yes Jed Tuttle DO   Diclofenac Sodium (VOLTAREN) 1 % Apply 2 g topically 4 (four) times a day as needed (pain) 3/14/24  Yes SARAHI Barnett   levothyroxine 112 mcg tablet Take 1 tablet (112 mcg total) by mouth daily in the early morning 5/14/24  Yes Stephanie Pruett DO   lovastatin (MEVACOR) 20 mg tablet Take 1 tablet (20 mg total) by mouth every morning 4/29/24 4/24/25 Yes Steve Sandoval MD   meclizine (ANTIVERT) 12.5 MG tablet Take 1 tablet (12.5 mg total) by mouth 3 (three) times a day as needed for dizziness 5/24/24  Yes SARAHI Rowe   metoprolol succinate (TOPROL-XL) 25 mg 24 hr tablet Take 1 tablet (25 mg total) by mouth daily 4/29/24  Yes Steve Sandoval MD   Multiple Vitamins-Minerals (One Daily Multivitamin Women) TABS Apply 1 tablet to the mouth or throat daily. Indications: Treatment to Prevent Vitamin Deficiency 1/30/23  Yes Stephanie Pruett DO   pantoprazole (PROTONIX) 40 mg tablet Take 1 tablet (40 mg total) by mouth 2 (two) times a day before meals 4/16/24 10/13/24 Yes Stephanie Pruett DO   acetaminophen (TYLENOL) 650 mg CR tablet Take 650 mg by mouth every 8 (eight) hours as needed for mild pain    Historical Provider, MD   denosumab (PROLIA) 60 mg/mL Inject 60 mg under the skin once    Historical Provider, MD FLEMING have reviewed home medications with patient  personally.    Allergies:   Allergies   Allergen Reactions    Amiodarone Hives    Omnipaque [Iohexol] Hives and Shortness Of Breath    Clindamycin Other (See Comments)     When taken causes cdiff immediately    Other     Sulfa Antibiotics Hives     itching    Acetazolamide Hives and Rash     Reaction Date:Unknown    Iv Dye  [Iodinated Contrast Media] Hypertension and Palpitations     2015: Verified with patient     Naprosyn [Naproxen] Itching and Rash     Category: Allergy;        Social History:     Marital Status:    Occupation: Retired    Substance Use History:   Social History     Substance and Sexual Activity   Alcohol Use Not Currently    Comment: Social drinker     Social History     Tobacco Use   Smoking Status Former    Current packs/day: 0.00    Average packs/day: 0.3 packs/day for 58.0 years (14.5 ttl pk-yrs)    Types: Cigarettes    Start date: 1957    Quit date: 2014    Years since quittin.5   Smokeless Tobacco Never   Tobacco Comments    smoked 17 yrs  ppd quit and restarted then quit  10 days ago during 2014      Social History     Substance and Sexual Activity   Drug Use No       Family History:    non-contributory    Physical Exam:     Vitals:   Blood Pressure: 133/68 (24 1600)  Pulse: 68 (24 1600)  Temperature: 97.6 °F (36.4 °C) (24 1048)  Temp Source: Oral (24 1048)  Respirations: 21 (24 1600)  SpO2: 95 % (24 1430)      General: ill appearing, no acute distress  HEENT: atraumatic, PERRLA, moist mucosa, normal pharynx, normal tonsils and adenoids, normal tongue, no fluid in sinuses  Neck: Trachea midline, no carotid bruit, no masses  Respiratory: normal chest wall expansion, CTA B, no r/r/w, no rubs  Cardiovascular: RRR, no m/r/g, Normal S1 and S2  Abdomen: Soft, non-tender, non-distended, normal bowel sounds in all quadrants, no hepatosplenomegaly, no tympany  Rectal: deferred  Musculoskeletal: normal ROM in  upper and lower extremities  Integumentary: warm, dry, and pink, with no rash, purpura, or petechia  Heme/Lymph: no lymphadenopathy, no bruises  Neurological: Cranial Nerves II-XII grossly intact  Psychiatric: cooperative with normal mood, affect, and cognition    Additional Data:     Lab Results: Laboratory studies reviewed for today    Results from last 7 days   Lab Units 07/18/24  1146   WBC Thousand/uL 8.47   HEMOGLOBIN g/dL 13.3   HEMATOCRIT % 40.0   PLATELETS Thousands/uL 208   SEGS PCT % 59   LYMPHO PCT % 29   MONO PCT % 10   EOS PCT % 1     Results from last 7 days   Lab Units 07/18/24  1146   SODIUM mmol/L 135   POTASSIUM mmol/L 4.2   CHLORIDE mmol/L 98   CO2 mmol/L 31   BUN mg/dL 33*   CREATININE mg/dL 0.88   ANION GAP mmol/L 6   CALCIUM mg/dL 9.8   ALBUMIN g/dL 4.7   TOTAL BILIRUBIN mg/dL 0.66   ALK PHOS U/L 49   ALT U/L 16   AST U/L 26   GLUCOSE RANDOM mg/dL 100                     Results from last 7 days   Lab Units 07/18/24  1343 07/18/24  1146   HS TNI 0HR ng/L  --  7   HS TNI 2HR ng/L 7  --        Imaging: I have personally reviewed pertinent reports.      CTA head and neck with and without contrast   Final Result by Omar Banks MD (07/18 1407)      CT Brain:  No acute intracranial abnormality.      CT Angiography: High-grade stenosis involving the right posterior cerebral artery P2 segment. No additional large vessel occlusion, high-grade stenosis, or intracranial aneurysm is identified on CT angiogram of the head.      No hemodynamically significant stenosis or dissection identified on CT angiogram of the neck.      Mild increase in size of the destructive mass involving the posterior aspect of the hard palate on the right, extending to involve the posterior aspect of the floor of the right maxillary sinus.      Stable thoracic aortic aneurysm involving the visualized aortic arch.      The above findings were communicated by Dr. Banks to Dr. Jon on 7/18/2024 at 2:05 p.m.      Workstation  performed: [id number]         MRI inpatient order    (Results Pending)   CT spine cervical wo contrast    (Results Pending)       EKG, Pathology, and Other Studies Reviewed on Admission:   Head CT reviewed    Allscripts / Epic Records Reviewed: Yes    ** Please Note: This note was completed in part utilizing [employer] Dragon [employer] One Software.  Grammatical errors, random word insertions, spelling mistakes, and incomplete sentences may be an occasional consequence of this system secondary to software limitations, ambient noise, and hardware issues.  If you have any questions or concerns about the content, text, or information contained within the body of this dictation, please contact the provider for clarification.**

## 2024-07-18 NOTE — PLAN OF CARE
Problem: Potential for Falls  Goal: Patient will remain free of falls  Description: INTERVENTIONS:  - Educate patient/family on patient safety including physical limitations  - Instruct patient to call for assistance with activity   - Consult OT/PT to assist with strengthening/mobility   - Keep Call bell within reach  - Keep bed low and locked with side rails adjusted as appropriate  - Keep care items and personal belongings within reach  - Initiate and maintain comfort rounds  - Make Fall Risk Sign visible to staff  - Offer Toileting every 2 Hours, in advance of need  - Initiate/Maintain bed alarm  - Obtain necessary fall risk management equipment: alarm  - Apply yellow socks and bracelet for high fall risk patients  - Consider moving patient to room near nurses station  Outcome: Progressing     Problem: PAIN - ADULT  Goal: Verbalizes/displays adequate comfort level or baseline comfort level  Description: Interventions:  - Encourage patient to monitor pain and request assistance  - Assess pain using appropriate pain scale  - Administer analgesics based on type and severity of pain and evaluate response  - Implement non-pharmacological measures as appropriate and evaluate response  - Consider cultural and social influences on pain and pain management  - Notify physician/advanced practitioner if interventions unsuccessful or patient reports new pain  Outcome: Progressing     Problem: INFECTION - ADULT  Goal: Absence or prevention of progression during hospitalization  Description: INTERVENTIONS:  - Assess and monitor for signs and symptoms of infection  - Monitor lab/diagnostic results  - Monitor all insertion sites, i.e. indwelling lines, tubes, and drains  - Monitor endotracheal if appropriate and nasal secretions for changes in amount and color  - Bronson appropriate cooling/warming therapies per order  - Administer medications as ordered  - Instruct and encourage patient and family to use good hand hygiene  technique  - Identify and instruct in appropriate isolation precautions for identified infection/condition  Outcome: Progressing  Goal: Absence of fever/infection during neutropenic period  Description: INTERVENTIONS:  - Monitor WBC    Outcome: Progressing     Problem: SAFETY ADULT  Goal: Patient will remain free of falls  Description: INTERVENTIONS:  - Educate patient/family on patient safety including physical limitations  - Instruct patient to call for assistance with activity   - Consult OT/PT to assist with strengthening/mobility   - Keep Call bell within reach  - Keep bed low and locked with side rails adjusted as appropriate  - Keep care items and personal belongings within reach  - Initiate and maintain comfort rounds  - Make Fall Risk Sign visible to staff  - Consider moving patient to room near nurses station  Outcome: Progressing  Goal: Maintain or return to baseline ADL function  Description: INTERVENTIONS:  -  Assess patient's ability to carry out ADLs; assess patient's baseline for ADL function and identify physical deficits which impact ability to perform ADLs (bathing, care of mouth/teeth, toileting, grooming, dressing, etc.)  - Assess/evaluate cause of self-care deficits   - Assess range of motion  - Assess patient's mobility; develop plan if impaired  - Assess patient's need for assistive devices and provide as appropriate  - Encourage maximum independence but intervene and supervise when necessary  - Involve family in performance of ADLs  - Assess for home care needs following discharge   - Consider OT consult to assist with ADL evaluation and planning for discharge  - Provide patient education as appropriate  Outcome: Progressing  Goal: Maintains/Returns to pre admission functional level  Description: INTERVENTIONS:  - Perform AM-PAC 6 Click Basic Mobility/ Daily Activity assessment daily.  - Set and communicate daily mobility goal to care team and patient/family/caregiver.   - Collaborate with  rehabilitation services on mobility goals if consulted  -- Out of bed for toileting  - Record patient progress and toleration of activity level   Outcome: Progressing     Problem: DISCHARGE PLANNING  Goal: Discharge to home or other facility with appropriate resources  Description: INTERVENTIONS:  - Identify barriers to discharge w/patient and caregiver  - Arrange for needed discharge resources and transportation as appropriate  - Identify discharge learning needs (meds, wound care, etc.)  - Arrange for interpretive services to assist at discharge as needed  - Refer to Case Management Department for coordinating discharge planning if the patient needs post-hospital services based on physician/advanced practitioner order or complex needs related to functional status, cognitive ability, or social support system  Outcome: Progressing     Problem: Knowledge Deficit  Goal: Patient/family/caregiver demonstrates understanding of disease process, treatment plan, medications, and discharge instructions  Description: Complete learning assessment and assess knowledge base.  Interventions:  - Provide teaching at level of understanding  - Provide teaching via preferred learning methods  Outcome: Progressing     Problem: Neurological Deficit  Goal: Neurological status is stable or improving  Description: Interventions:  - Monitor and assess patient's level of consciousness, motor function, sensory function, and level of assistance needed for ADLs.   - Monitor and report changes from baseline. Collaborate with interdisciplinary team to initiate plan and implement interventions as ordered.   - Provide and maintain a safe environment.  - Consider seizure precautions.  - Consider fall precautions.  - Consider aspiration precautions.  - Consider bleeding precautions.  Outcome: Progressing     Problem: Activity Intolerance/Impaired Mobility  Goal: Mobility/activity is maintained at optimum level for patient  Description:  Interventions:  - Assess and monitor patient  barriers to mobility and need for assistive/adaptive devices.  - Assess patient's emotional response to limitations.  - Collaborate with interdisciplinary team and initiate plans and interventions as ordered.  - Encourage independent activity per ability.  - Maintain proper body alignment.  - Perform active/passive rom as tolerated/ordered.  - Plan activities to conserve energy.  - Turn patient as appropriate  Outcome: Progressing     Problem: Communication Impairment  Goal: Ability to express needs and understand communication  Description: Assess patient's communication skills and ability to understand information.  Patient will demonstrate use of effective communication techniques, alternative methods of communication and understanding even if not able to speak.     - Encourage communication and provide alternate methods of communication as needed.  - Collaborate with case management/ for discharge needs.  - Include patient/family/caregiver in decisions related to communication.  Outcome: Progressing     Problem: Potential for Aspiration  Goal: Non-ventilated patient's risk of aspiration is minimized  Description: Assess and monitor vital signs, respiratory status, and labs (WBC).  Monitor for signs of aspiration (tachypnea, cough, rales, wheezing, cyanosis, fever).    - Assess and monitor patient's ability to swallow.  - Place patient up in chair to eat if possible.  - HOB up at 90 degrees to eat if unable to get patient up into chair.  - Supervise patient during oral intake.   - Instruct patient/ family to take small bites.  - Instruct patient/ family to take small single sips when taking liquids.  - Follow patient-specific strategies generated by speech pathologist.  Outcome: Progressing  Goal: Ventilated patient's risk of aspiration is minimized  Description: Assess and monitor vital signs, respiratory status, airway cuff pressure, and labs (WBC).   Monitor for signs of aspiration (tachypnea, cough, rales, wheezing, cyanosis, fever).    - Elevate head of bed 30 degrees if patient has tube feeding.  - Monitor tube feeding.  Outcome: Progressing     Problem: Nutrition  Goal: Nutrition/Hydration status is improving  Description: Monitor and assess patient's nutrition/hydration status for malnutrition (ex- brittle hair, bruises, dry skin, pale skin and conjunctiva, muscle wasting, smooth red tongue, and disorientation). Collaborate with interdisciplinary team and initiate plan and interventions as ordered.  Monitor patient's weight and dietary intake as ordered or per policy. Utilize nutrition screening tool and intervene per policy. Determine patient's food preferences and provide high-protein, high-caloric foods as appropriate.     - Assist patient with eating.  - Allow adequate time for meals.  - Encourage patient to take dietary supplement as ordered.  - Collaborate with clinical nutritionist.  - Include patient/family/caregiver in decisions related to nutrition.  Outcome: Progressing

## 2024-07-18 NOTE — ASSESSMENT & PLAN NOTE
Patient is being admitted for evaluation of vertigo, given concern for ataxia as well as disequilibrium  Differential diagnosis include vestibular migraine, vertebral basilar artery territory infarct or stroke, vestibular neuritis given current symptoms of cough and congestion prior to her symptom, less likely vestibular labyrinthitis given lack of hearing loss  Head CT abnormal with  High-grade stenosis involving the right posterior cerebral artery P2 segment. -Possible concern for cerebellar stroke  Permissive hypertension  Consultation placed neurology for comanagement  Continue neurologic checks

## 2024-07-18 NOTE — CONSULTS
Consultation - Neurology   Buffy Colón 89 y.o. female MRN: [id number]  Unit/Bed#: E4 -01 Encounter: [id number]      Assessment & Plan     * Episodic lightheadedness  Assessment & Plan  Mrs. Colón has presented to [facility] for relation of intermittent dizziness.  Patient describes she had bouts of vertigo lasting 1 day in May 2024 where she had made wonderful recovery with meclizine.  Patient states that she started experiencing other instances of episodic lightheadedness on moving her head to the side or bending over.  Patient did not describe any facial asymmetry, no double vision, no vision loss, no focal sensory or motor dysfunction.  Patient reported she feels like she is ready to pass out due to both concern for near syncopal events rather than vertigo.    At this point consideration is posterior fossa stroke, vertebrobasilar insufficiency, orthostatic hypotension, cardiac arrhythmia, PPPD with a remote history of vertigo or multisensory dysfunction of elderly;     Work up completed:  CT Brain:  No acute intracranial abnormality.     CT Angiography: High-grade stenosis involving the right posterior cerebral artery P2 segment. No additional large vessel occlusion, high-grade stenosis, or intracranial aneurysm is identified on CT angiogram of the head.     No hemodynamically significant stenosis or dissection identified on CT angiogram of the neck.     Mild increase in size of the destructive mass involving the posterior aspect of the hard palate on the right, extending to involve the posterior aspect of the floor of the right maxillary sinus.     Stable thoracic aortic aneurysm involving the visualized aortic arch.    PLAN:    Admit to Stroke pathway:   Recommend MRI brain  CT cervical spine due to severe neck pain and r/o cervical dizziness due to arthritic changes;  Consider Echo  Orthostatic vital signes   Recommend the following Labs  Lipid Panel  Hemoglobin  A1c  TSH  UA  B12/folate  Recommend loading aspirin 325 mg and continue aspirin 81 mg;  Recommend continue atorvastatin 40 mg daily  Permissive HTN, allow for SBP up to 220 x 24 hours or Goal MAP> 100 from onset of stroke like symptoms, then goal normotension.  Goal normotension sooner if MRI brain completed and does not reveal acute infarct.  Euglycemic, normothermic goal  Continue telemetry.  PT/OT/ST- evaluation for Epley maneuver; OT/vision therapy with known macular degeneration;  Stroke education  Frequent neuro checks. Continue to monitor and notify neurology with any changes.  STAT CT head for any acute change in neuro exam   for discharge planning - patient lives alone and assistive living settings might be considered   Medical management and supportive care per primary team. Correction of any metabolic or infectious disturbances.             Buffy Colón will need follow up in in 6 weeks with general attending or advance practitioner. She will not require outpatient neurological testing.    History of Present Illness     Reason for Consult / Principal Problem: episodic lightheadedness     HPI: Buffy Colón is a 89 y.o. right handed female who presents with Vertigo for 2 weeks.  Patient has known history of nonrheumatic mitral valve regurgitation, paroxysmal A-fib, tricuspid valve insufficiency, essential hypertension, stage III CKD, hyperlipidemia, malignant neoplasm of the hard palate, age-related osteoporosis, hypoparathyroidism, asthma/COPD, chronic pain of left knee, hypothyroidism, polymyalgia rheumatica, exudative age-related macular degeneration, bilateral with active choroidal neovascularization.      Chart review was consistent with evaluation by primary care team earlier this morning considering patient was seen primary team for follow-up on vertigo.  Patient describes her vertigo started on May 5, 2024.  Patient has never been evaluated by ENT team or physical therapy with  no imaging available for my review.  Symptoms were described patient is ready to pass out and limiting her activity as she is afraid of falling and become unconscious.    Patient states with she has lightheadedness comes without loss of consciousness.  Patient can move her head and she may feel worse patient may feel lightheaded when she is standing in her bed, no room spinning sensation described other than in May 2024.  Patient did not report any focal weakness in lower extremity was signs of disequilibrium.  Patient has severe pain in her neck with remote history of degenerative disc disease and polymyalgia rheumatica.  Patient lives alone and she has 7 kids with no support from the children been reported.  CT angiogram head was consistent with high-grade stenosis involving right posterior cerebral artery at P2 segment, no hemodynamically significant stenosis or dissection identified on CT angiogram of the neck.  Patient does have destructive mass involving the posterior aspect of the hard palate on the right  Patient has stable thoracic aortic aneurysm involving the visualized aortic arch.          Inpatient consult to Neurology  Consult performed by: Ashanti Lanier MD  Consult ordered by: Shabbir Avilez DO          Review of Systems    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), wet (HCC)     bilateral    Anemia N/A    Aneurysm of aortic root (HCC)     last assessed - [id number]    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HCC)     Aortic root dilatation (HCC)     last assessed - [id number]    Arthritis     Ascending aortic aneurysm (HCC)     last assessed - [id number]    Asthma     Basal cell carcinoma     last assessed - [id number]    Cancer (HCC) 2015    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HCC)     Coronary artery disease     Diabetes mellitus (HCC)     Disease of thyroid  gland     Facet arthropathy, cervical     last assessed - 2017    Headache(784.0)     All my life    History of fracture of vertebral column     Hyperlipidemia     Hypertension     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HCC)     Hypoxia     last assessed - 2015    Junctional rhythm     last assessed - 2017    Lightheadedness     last assessed - 2016    Migraine     Palpitations     last assessed - 2016    Pleural effusion, bilateral     last assessed - 2015    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    Dont know    Shortness of breath     last assessed - 2017    Stroke (HCC)     Thyroid trouble     Trigger finger     last assessed - 2016    Trigger middle finger of right hand     last assessed - 2016    Urinary incontinence     last assessed -  ; Resolved - 2017    Visual impairment 2015    Wet macular degeneration both eyes     Past Surgical History:   Procedure Laterality Date    ABDOMINAL AORTIC ANEURYSM REPAIR W/ ENDOLUMINAL GRAFT  2015    Ascending aorta and hemiarch replacement with 30 mm Vascutek Gelweave graft; Managed by Scott Archuleta; last assessed - 2016    APPENDECTOMY      BLADDER SURGERY      Reccurent histoy of bladder surgery    HOROWITZ PROCEDURE      CARDIAC CATHETERIZATION  2004    Procedure summary - Luminal irregularities; last assessed - 2015     SECTION      CORONARY ANEURYSM REPAIR      CYSTOSCOPY      botox injection    HYSTERECTOMY      NH NDSC NJX IMPLT MATRL URT&/BLDR NCK N/A 2017    Procedure: CYSTOSCOPY; DURASPHERE-PERIURETHRAL BULKING AGENT INJECTION ;  Surgeon: Rayo Moulton MD;  Location: [facility];  Service: Urology    NH TENDON SHEATH INCISION Right 10/18/2016    Procedure: LONG FINGER TRIGGER RELEASE ;  Surgeon: Khari Najera MD;  Location: [facility];  Service: Orthopedics    THYROIDECTOMY      Total Thyroidectomy    TONSILLECTOMY AND ADENOIDECTOMY       Social History   Social History  "    Substance and Sexual Activity   Alcohol Use Not Currently    Comment: Social drinker     Social History     Substance and Sexual Activity   Drug Use No     E-Cigarette/Vaping    E-Cigarette Use Never User      E-Cigarette/Vaping Substances    Nicotine No     THC No     CBD No     Flavoring No     Other No     Unknown No      Social History     Tobacco Use   Smoking Status Former    Current packs/day: 0.00    Average packs/day: 0.3 packs/day for 58.0 years (14.5 ttl pk-yrs)    Types: Cigarettes    Start date: 1957    Quit date: 2014    Years since quittin.5   Smokeless Tobacco Never   Tobacco Comments    smoked 17 yrs  ppd quit and restarted then quit  10 days ago during 2014      Family History: non-contributory    Review of previous medical records was  completed.     Meds/Allergies   all current active meds have been reviewed    Allergies   Allergen Reactions    Amiodarone Hives    Omnipaque [Iohexol] Hives and Shortness Of Breath    Clindamycin Other (See Comments)     When taken causes cdiff immediately    Other     Sulfa Antibiotics Hives     itching    Acetazolamide Hives and Rash     Reaction Date:Unknown    Iv Dye  [Iodinated Contrast Media] Hypertension and Palpitations      - 2015: Verified with patient     Naprosyn [Naproxen] Itching and Rash     Category: Allergy;        Objective   Vitals:Blood pressure 105/58, pulse 70, temperature 98.9 °F (37.2 °C), temperature source Temporal, resp. rate 17, height 5' 1\" (1.549 m), weight 52.2 kg (115 lb 1.9 oz), SpO2 94%.,Body mass index is 21.75 kg/m².    Intake/Output Summary (Last 24 hours) at 2024 2304  Last data filed at 2024 1829  Gross per 24 hour   Intake 1240 ml   Output --   Net 1240 ml       Invasive Devices:   Invasive Devices       Peripheral Intravenous Line  Duration             Peripheral IV 24 Right Forearm <1 day                    Physical Exam  Neurologic Exam  CONSTITUTIONAL: NAD, " pleasant. NECK: supple, no lymphadenopathy, no thyromegaly, no JVD. CARDIOVASCULAR: RRR, normal S1S2, no murmurs, no rubs. RESP: clear to auscultation bilaterally, no wheezes/rhonchi/rales. ABDOMEN: soft, non tender, non distended. SKIN: no rash or skin lesions. EXTREMITIES: no edema, pulses 2+bilaterally. PSYCH: appropriate mood and affect  NEUROLOGIC COMPREHENSIVE EXAM: Patient is oriented to person, place and time, NAD; appropriate affect. CN II, III, IV, V, VI, VII,VIII,IX,X,XI-XII intact with EOMI, PERRLA, OKN intact, VF grossly intact, fundi poorly visualized secondary to pupillary constriction; symmetric face noted. Motor: 5/5 UE/LE bilateral symmetric; 4/5 shoulder abduction b/l; Sensory: intact to light touch and pinprick bilaterally; normal vibration sensation feet bilaterally; Coordination within normal limits on FTN and RAMANA testing; DTR: 2/4 through, no Babinski, no clonus. Tandem gait is abnormal. Romberg: absent.  Lab Results: I have personally reviewed pertinent reports.  , CBC:   Results from last 7 days   Lab Units 07/18/24  1146   WBC Thousand/uL 8.47   RBC Million/uL 4.36   HEMOGLOBIN g/dL 13.3   HEMATOCRIT % 40.0   MCV fL 92   PLATELETS Thousands/uL 208   , BMP/CMP:   Results from last 7 days   Lab Units 07/18/24  1146   SODIUM mmol/L 135   POTASSIUM mmol/L 4.2   CHLORIDE mmol/L 98   CO2 mmol/L 31   BUN mg/dL 33*   CREATININE mg/dL 0.88   CALCIUM mg/dL 9.8   AST U/L 26   ALT U/L 16   ALK PHOS U/L 49   EGFR ml/min/1.73sq m 57   , Vitamin B12:   , HgBA1C:   , TSH:   , Coagulation:   , Lipid Profile:   , Urinalysis:   Results from last 7 days   Lab Units 07/18/24  1201   COLOR UA  Yellow   CLARITY UA  Clear   SPEC GRAV UA  1.010   PH UA  6.0   LEUKOCYTES UA  Negative   NITRITE UA  Negative   GLUCOSE UA mg/dl Negative   KETONES UA mg/dl Negative   BILIRUBIN UA  Negative   BLOOD UA  Negative     Imaging Studies: I have personally reviewed pertinent reports.    EKG, Pathology, and Other Studies: I have  personally reviewed pertinent reports.    VTE Prophylaxis: Heparin    Code Status: Level 3 - DNAR and DNI  Advance Directive and Living Will: Yes    Power of :    POLST:      Counseling / Coordination of Care  Total time spent today 40 minutes. Greater than 50% of total time was spent with the patient and / or family counseling and / or coordination of care. A description of the counseling / coordination of care: Establishing report with the patient, placing orders for further evaluation and engaging case management and patient care.

## 2024-07-18 NOTE — PROGRESS NOTES
"Assessment/Plan:     1. Dizziness  Assessment & Plan:  Advised ER for further evaluation which she now agrees to; deferred EMS and son would transport her to [place]; started a few weeks ago but concern is progressively worsening; concern for intracranial abnormality given severity of sx and gait difficulties  Orders:  -     Transfer to other facility  2. Acute intractable headache, unspecified headache type  -     Transfer to other facility  3. Elevated blood pressure reading with diagnosis of hypertension  -     Transfer to other facility  4. 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HCC)  -     Transfer to other facility  5. Paroxysmal atrial fibrillation (HCC)  -     Transfer to other facility        Subjective:      Patient ID: June A Katarzyna is a 89 y.o. female.    Patient is here with follow up on vertigo. She states her vertigo has not gone away. She states it started on 5/5/24. She states movement of head currently gives her issues. She does complain of headache. Seemed to have one for several months. Never seems to go away. If she bends down, she can't walk to bathroom. Headache is always right side.     Has had BP has been 150-170/70-80s at home. States pulse has been fine. Told by cardiology she has a labile BP.        \"COMPARISON: 9/18/2019     TECHNIQUE:  CT examination of the brain was performed.  Multiplanar 2D reformatted images were created from the source data.     Radiation dose length product (DLP) for this visit:  881 mGy-cm .  This examination, like all CT scans performed in the [facility], was performed utilizing techniques to minimize radiation dose exposure, including the use of iterative   reconstruction and automated exposure control.     IMAGE QUALITY:  Diagnostic.     FINDINGS:     PARENCHYMA: Decreased attenuation is noted in periventricular and subcortical white matter demonstrating an appearance that is statistically most likely to represent mild microangiopathic " "change.     No CT signs of acute infarction.  No intracranial mass, mass effect or midline shift.  No acute parenchymal hemorrhage.     VENTRICLES AND EXTRA-AXIAL SPACES:  Normal for the patient's age.     VISUALIZED ORBITS: Normal visualized orbits.     PARANASAL SINUSES: Again seen is a right maxillary mass with bony erosion. This measures 1.8 x 2.6 cm though has been present since 2016 where it measured 1.4 x 1.1 cm. Mild mucosal thickening of the visualized paranasal sinuses.     CALVARIUM AND EXTRACRANIAL SOFT TISSUES:  Normal.     IMPRESSION:     1.  No acute intracranial abnormality.  Chronic microangiopathic changes.     2.  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    Review of Systems   Musculoskeletal:  Positive for gait problem.   Neurological:  Positive for dizziness and headaches.         Objective:      /80 (BP Location: Left arm, Patient Position: Sitting, Cuff Size: Adult)   Pulse 66   Temp 97.5 °F (36.4 °C)   Ht 5' 1\" (1.549 m)   Wt 52.5 kg (115 lb 12.8 oz)   SpO2 95%   BMI 21.88 kg/m²          Physical Exam  Vitals reviewed.   Constitutional:       General: She is not in acute distress.     Appearance: Normal appearance. She is not ill-appearing, toxic-appearing or diaphoretic.   HENT:      Head: Normocephalic and atraumatic.   Eyes:      General: No scleral icterus.        Right eye: No discharge.         Left eye: No discharge.      Conjunctiva/sclera: Conjunctivae normal.   Cardiovascular:      Rate and Rhythm: Normal rate and regular rhythm.      Pulses: Normal pulses.      Heart sounds: Normal heart sounds. No murmur heard.     No gallop.   Pulmonary:      Effort: Pulmonary effort is " normal. No respiratory distress.      Breath sounds: Normal breath sounds. No stridor. No wheezing, rhonchi or rales.   Musculoskeletal:      Right lower leg: No edema.      Left lower leg: No edema.   Neurological:      General: No focal deficit present.      Mental Status: She is alert and oriented to person, place, and time.      Cranial Nerves: No cranial nerve deficit.      Coordination: Coordination abnormal.      Gait: Gait abnormal.      Comments: Pt off balance with walking; walking into walls; transported by wheelchair for safety   Psychiatric:         Mood and Affect: Mood normal.         Behavior: Behavior normal.         Thought Content: Thought content normal.         Judgment: Judgment normal.

## 2024-07-18 NOTE — ED PROVIDER NOTES
History  Chief Complaint   Patient presents with   • Dizziness     Dizziness and fatigue ongoing for weeks.      89-year-old female with history of hypertension, paroxysmal atrial fibrillation, asthma, mass of right palate presents to the ED with ongoing symptoms of vertigo for the last 2 weeks.  Patient states that she had vertigo in the past but only intermittently and lasting a short period of time.  It is usually relieved with medication.  She states about 2 weeks ago the vertigo started up again and has been rather persistent over the last 2 weeks.  She states position change definitely makes it worse specifically when she bends forward.  When she lies down and stays still the vertigo resolves.  She states she is afraid to walk but is able to ambulate when the vertigo occurs.  She states she is only had occasional nausea associated with the vertigo.  She does complain of right ear fullness.  She is also had a right sided headache.  No visual or speech deficits.  No focal extremity weakness or numbness.  No fever, chills, chest pain or shortness of breath.  Patient states she did not want to take the medication for the vertigo because it makes her very sleepy and she likes to be able to function during the day.  She is also been concerned because her blood pressure has been elevated despite taking her medications over the last 2 weeks.  She went to her family doctor today and was referred to the ED for further evaluation.  Regarding her right palate mass, the patient states that she is opted out of any treatment for this secondary to her age.      History provided by:  Patient   used: No    Dizziness  Quality:  Vertigo  Onset quality:  Gradual  Duration:  2 weeks  Timing:  Intermittent  Progression:  Worsening  Chronicity:  Recurrent  Context: bending over and head movement    Context: not with bowel movement, not with ear pain, not with eye movement, not with inactivity, not with loss of  consciousness, not with medication, not with physical activity, not when standing up and not when urinating    Relieved by:  Being still  Worsened by:  Turning head (Bending over)  Associated symptoms: headaches and nausea    Associated symptoms: no blood in stool, no chest pain, no diarrhea, no hearing loss, no palpitations, no shortness of breath, no syncope, no tinnitus, no vision changes, no vomiting and no weakness    Risk factors: heart disease, hx of vertigo and multiple medications    Risk factors: no anemia, no hx of stroke and no new medications        Prior to Admission Medications   Prescriptions Last Dose Informant Patient Reported? Taking?   Calcium Carb-Cholecalciferol (Calcium+D3) 600-20 MG-MCG TABS  Self No No   Sig: Take 1 tablet by mouth in the morning   Diclofenac Sodium (VOLTAREN) 1 %  Self No No   Sig: Apply 2 g topically 4 (four) times a day as needed (pain)   Multiple Vitamins-Minerals (One Daily Multivitamin Women) TABS  Self Yes No   Sig: Apply 1 tablet to the mouth or throat daily. Indications: Treatment to Prevent Vitamin Deficiency   acetaminophen (TYLENOL) 650 mg CR tablet  Self Yes No   Sig: Take 650 mg by mouth every 8 (eight) hours as needed for mild pain   albuterol (2.5 mg/3 mL) 0.083 % nebulizer solution  Self Yes No   Sig: Take 2.5 mg by nebulization every 6 (six) hours as needed for wheezing or shortness of breath   albuterol (PROVENTIL HFA,VENTOLIN HFA) 90 mcg/act inhaler  Self No No   Sig: Inhale 2 puffs every 6 (six) hours as needed for wheezing   amLODIPine (NORVASC) 2.5 mg tablet  Self No No   Sig: Take 1 tablet (2.5 mg total) by mouth daily   benazepril (LOTENSIN) 40 MG tablet  Self No No   Sig: Take 1 tablet (40 mg total) by mouth daily   levothyroxine 112 mcg tablet  Self No No   Sig: Take 1 tablet (112 mcg total) by mouth daily in the early morning   lovastatin (MEVACOR) 20 mg tablet  Self No No   Sig: Take 1 tablet (20 mg total) by mouth every morning   meclizine  (ANTIVERT) 12.5 MG tablet  Self No No   Sig: Take 1 tablet (12.5 mg total) by mouth 3 (three) times a day as needed for dizziness   Patient not taking: Reported on 7/18/2024   metoprolol succinate (TOPROL-XL) 25 mg 24 hr tablet  Self No No   Sig: Take 1 tablet (25 mg total) by mouth daily   pantoprazole (PROTONIX) 40 mg tablet  Self No No   Sig: Take 1 tablet (40 mg total) by mouth 2 (two) times a day before meals      Facility-Administered Medications: None       Past Medical History:   Diagnosis Date   • A-fib ([facility])    • Allergic 1990   • Allergy to IVP dye 06/04/2013    pt felt heaviness, palpitations   • 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), 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[facility])     bilateral   • Anemia N/A   • Aneurysm of aortic root ([facility])     last assessed - 06Dec2017   • 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[facility])    • Aortic root dilatation ([facility])     last assessed - 06Dec2017   • Arthritis    • Ascending aortic aneurysm ([facility])     last assessed - 06Dec2017   • Asthma    • Basal cell carcinoma     last assessed - 09Sep2016   • Cancer ([facility]) 2015   • 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[facility])    • Coronary artery disease    • Diabetes mellitus ([facility])    • Disease of thyroid gland    • Facet arthropathy, cervical     last assessed - 08Jun2017   • Headache(784.0)     All my life   • History of fracture of vertebral column    • Hyperlipidemia    • Hypertension    • 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[facility])    • Hypoxia     last assessed - 16Mar2015   • Junctional rhythm     last assessed - 04Apr2017   • Lightheadedness     last assessed - 28Jun2016   • Migraine    • Palpitations     last assessed - 28Jun2016   • Pleural effusion, bilateral     last assessed - 20Apr2015   • 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    Dont know   • Shortness of breath     last assessed - 04Apr2017   • Stroke ([facility])    • Thyroid trouble    • Trigger finger     last assessed - 09Sep2016   • Trigger middle finger of right hand     last assessed - 14Oct2016   • Urinary incontinence     last assessed -   Jul,; Resolved - 2017   • Visual impairment 2015    Wet macular degeneration both eyes       Past Surgical History:   Procedure Laterality Date   • ABDOMINAL AORTIC ANEURYSM REPAIR W/ ENDOLUMINAL GRAFT  2015    Ascending aorta and hemiarch replacement with 30 mm Vascutek Gelweave graft; Managed by Scott Archuleta; last assessed - 2016   • APPENDECTOMY     • BLADDER SURGERY      Reccurent histoy of bladder surgery   • HOROWITZ PROCEDURE     • CARDIAC CATHETERIZATION  2004    Procedure summary - Luminal irregularities; last assessed - 2015   •  SECTION     • CORONARY ANEURYSM REPAIR     • CYSTOSCOPY      botox injection   • HYSTERECTOMY     • DC NDSC NJX IMPLT MATRL URT&/BLDR NCK N/A 2017    Procedure: CYSTOSCOPY; DURASPHERE-PERIURETHRAL BULKING AGENT INJECTION ;  Surgeon: Rayo Moulton MD;  Location: [facility];  Service: Urology   • DC TENDON SHEATH INCISION Right 10/18/2016    Procedure: LONG FINGER TRIGGER RELEASE ;  Surgeon: Khari Najera MD;  Location: [facility];  Service: Orthopedics   • THYROIDECTOMY      Total Thyroidectomy   • TONSILLECTOMY AND ADENOIDECTOMY         Family History   Problem Relation Age of Onset   • Coronary aneurysm Sister    • Coronary artery disease Sister         CABG   • Cancer Sister    • Heart disease Family         cardiac disorder   • Hypertension Family    • Cancer Family    • Thyroid disease Family      I have reviewed and agree with the history as documented.    E-Cigarette/Vaping   • E-Cigarette Use Never User      E-Cigarette/Vaping Substances   • Nicotine No    • THC No    • CBD No    • Flavoring No    • Other No    • Unknown No      Social History     Tobacco Use   • Smoking status: Former     Current packs/day: 0.00     Average packs/day: 0.3 packs/day for 58.0 years (14.5 ttl pk-yrs)     Types: Cigarettes     Start date: 1957     Quit date: 2014     Years since quittin.5   • Smokeless tobacco: Never   •  Tobacco comments:     smoked 17 yrs 1/2 ppd quit and restarted then quit  10 days ago during Christmas 2014    Vaping Use   • Vaping status: Never Used   Substance Use Topics   • Alcohol use: Not Currently     Comment: Social drinker   • Drug use: No       Review of Systems   Constitutional: Negative.  Negative for chills, diaphoresis, fatigue and fever.   HENT: Negative.  Negative for congestion, ear discharge, ear pain, facial swelling, hearing loss, rhinorrhea, sinus pressure, sore throat and tinnitus.    Eyes: Negative.  Negative for discharge, redness and itching.   Respiratory: Negative.  Negative for apnea, cough, chest tightness, shortness of breath and wheezing.    Cardiovascular:  Negative for chest pain, palpitations, leg swelling and syncope.   Gastrointestinal:  Positive for nausea. Negative for abdominal pain, blood in stool, diarrhea and vomiting.   Endocrine: Negative.    Genitourinary: Negative.  Negative for flank pain, frequency and urgency.   Musculoskeletal: Negative.  Negative for back pain.   Skin: Negative.    Allergic/Immunologic: Negative.    Neurological:  Positive for dizziness and headaches. Negative for tremors, seizures, syncope, facial asymmetry, speech difficulty, weakness, light-headedness and numbness.   Hematological: Negative.    All other systems reviewed and are negative.      Physical Exam  Physical Exam  Vitals and nursing note reviewed.   Constitutional:       General: She is awake. She is not in acute distress.     Appearance: Normal appearance. She is well-developed and normal weight. She is not ill-appearing, toxic-appearing or diaphoretic.   HENT:      Head: Normocephalic and atraumatic.      Right Ear: External ear normal.      Left Ear: External ear normal.      Ears:      Comments: TMs unable to be visualized secondary to cerumen     Nose: Nose normal.      Mouth/Throat:      Mouth: Oropharynx is clear and moist. Mucous membranes are moist.      Pharynx: No  oropharyngeal exudate.      Comments: Mass right palate  Eyes:      General: Lids are normal. No visual field deficit or scleral icterus.        Right eye: No discharge.         Left eye: No discharge.      Extraocular Movements: Extraocular movements intact and EOM normal.      Right eye: Normal extraocular motion and no nystagmus.      Left eye: Normal extraocular motion and no nystagmus.      Conjunctiva/sclera: Conjunctivae normal.      Pupils: Pupils are equal, round, and reactive to light.   Neck:      Thyroid: No thyromegaly.      Vascular: No JVD.      Trachea: No tracheal deviation.   Cardiovascular:      Rate and Rhythm: Normal rate and regular rhythm.      Heart sounds: Normal heart sounds. No murmur heard.     No friction rub. No gallop.   Pulmonary:      Effort: Pulmonary effort is normal. No respiratory distress.      Breath sounds: Normal breath sounds. No stridor. No wheezing, rhonchi or rales.      Comments: No truncal ataxia when sitting up  Chest:      Chest wall: No tenderness.   Abdominal:      General: Bowel sounds are normal. There is no distension.      Palpations: Abdomen is soft. There is no mass.      Tenderness: There is no abdominal tenderness.      Hernia: No hernia is present.   Musculoskeletal:         General: No tenderness, deformity or edema. Normal range of motion.      Cervical back: Normal range of motion and neck supple.      Right lower leg: No edema.      Left lower leg: No edema.   Lymphadenopathy:      Cervical: No cervical adenopathy.   Skin:     General: Skin is warm and dry.      Coloration: Skin is not jaundiced or pale.      Findings: No bruising, erythema, lesion or rash.   Neurological:      General: No focal deficit present.      Mental Status: She is alert and oriented to person, place, and time.      Cranial Nerves: No cranial nerve deficit, dysarthria or facial asymmetry.      Motor: No weakness or abnormal muscle tone.      Coordination: Coordination normal.  Finger-Nose-Finger Test and Heel to Minaya Test normal.      Gait: Gait normal.      Deep Tendon Reflexes: Reflexes are normal and symmetric. Reflexes normal.   Psychiatric:         Mood and Affect: Mood and affect and mood normal.         Behavior: Behavior is cooperative.         Vital Signs  ED Triage Vitals [07/18/24 1048]   Temperature Pulse Respirations Blood Pressure SpO2   97.6 °F (36.4 °C) 76 18 (!) 198/84 93 %      Temp Source Heart Rate Source Patient Position - Orthostatic VS BP Location FiO2 (%)   Oral -- Sitting Left arm --      Pain Score       No Pain           Vitals:    07/18/24 1048 07/18/24 1100 07/18/24 1200 07/18/24 1356   BP: (!) 198/84 (!) 198/84 (!) 200/84 154/78   Pulse: 76 70 74 87   Patient Position - Orthostatic VS: Sitting Lying  Sitting         Visual Acuity      ED Medications  Medications   amLODIPine (NORVASC) tablet 5 mg (0 mg Oral Hold 7/18/24 1402)   sodium chloride 0.9 % bolus 1,000 mL (1,000 mL Intravenous New Bag 7/18/24 1156)   iohexol (OMNIPAQUE) 350 MG/ML injection (SINGLE-DOSE) 85 mL (85 mL Intravenous Given 7/18/24 1244)       Diagnostic Studies  Results Reviewed       Procedure Component Value Units Date/Time    HS Troponin I 2hr [[id number]]  (Normal) Collected: 07/18/24 1343    Lab Status: Final result Specimen: Blood from Arm, Right Updated: 07/18/24 1415     hs TnI 2hr 7 ng/L      Delta 2hr hsTnI 0 ng/L     HS Troponin 0hr (reflex protocol) [[id number]]  (Normal) Collected: 07/18/24 1146    Lab Status: Final result Specimen: Blood from Arm, Right Updated: 07/18/24 1216     hs TnI 0hr 7 ng/L     HS Troponin I 4hr [[id number]]     Lab Status: No result Specimen: Blood     Comprehensive metabolic panel [[id number]]  (Abnormal) Collected: 07/18/24 1146    Lab Status: Final result Specimen: Blood from Arm, Right Updated: 07/18/24 1208     Sodium 135 mmol/L      Potassium 4.2 mmol/L      Chloride 98 mmol/L      CO2 31 mmol/L      ANION GAP 6 mmol/L      BUN 33 mg/dL       Creatinine 0.88 mg/dL      Glucose 100 mg/dL      Calcium 9.8 mg/dL      AST 26 U/L      ALT 16 U/L      Alkaline Phosphatase 49 U/L      Total Protein 7.6 g/dL      Albumin 4.7 g/dL      Total Bilirubin 0.66 mg/dL      eGFR 57 ml/min/1.73sq m     Narrative:      National Kid[employer] Disease Foundation guidelines for Chronic Kidney Disease (CKD):   •  Stage 1 with normal or high GFR (GFR > 90 mL/min/1.73 square meters)  •  Stage 2 Mild CKD (GFR = 60-89 mL/min/1.73 square meters)  •  Stage 3A Moderate CKD (GFR = 45-59 mL/min/1.73 square meters)  •  Stage 3B Moderate CKD (GFR = 30-44 mL/min/1.73 square meters)  •  Stage 4 Severe CKD (GFR = 15-29 mL/min/1.73 square meters)  •  Stage 5 End Stage CKD (GFR <15 mL/min/1.73 square meters)  Note: GFR calculation is accurate only with a steady state creatinine    Urine Macroscopic, POC [[id number]] Collected: 07/18/24 1201    Lab Status: Final result Specimen: Urine Updated: 07/18/24 1202     Color, UA Yellow     Clarity, UA Clear     pH, UA 6.0     Leukocytes, UA Negative     Nitrite, UA Negative     Protein, UA Negative mg/dl      Glucose, UA Negative mg/dl      Ketones, UA Negative mg/dl      Urobilinogen, UA 0.2 E.U./dl      Bilirubin, UA Negative     Occult Blood, UA Negative     Specific Gravity, UA 1.010    Narrative:      CLINITEK RESULT    CBC and differential [[id number]] Collected: 07/18/24 1146    Lab Status: Final result Specimen: Blood from Arm, Right Updated: 07/18/24 1153     WBC 8.47 Thousand/uL      RBC 4.36 Million/uL      Hemoglobin 13.3 g/dL      Hematocrit 40.0 %      MCV 92 fL      MCH 30.5 pg      MCHC 33.3 g/dL      RDW 14.0 %      MPV 12.2 fL      Platelets 208 Thousands/uL      nRBC 0 /100 WBCs      Segmented % 59 %      Immature Grans % 0 %      Lymphocytes % 29 %      Monocytes % 10 %      Eosinophils Relative 1 %      Basophils Relative 1 %      Absolute Neutrophils 5.01 Thousands/µL      Absolute Immature Grans 0.02 Thousand/uL      Absolute  Lymphocytes 2.48 Thousands/µL      Absolute Monocytes 0.83 Thousand/µL      Eosinophils Absolute 0.09 Thousand/µL      Basophils Absolute 0.04 Thousands/µL                    CTA head and neck with and without contrast   Final Result by Omar Banks MD (07/18 1407)      CT Brain:  No acute intracranial abnormality.      CT Angiography: High-grade stenosis involving the right posterior cerebral artery P2 segment. No additional large vessel occlusion, high-grade stenosis, or intracranial aneurysm is identified on CT angiogram of the head.      No hemodynamically significant stenosis or dissection identified on CT angiogram of the neck.      Mild increase in size of the destructive mass involving the posterior aspect of the hard palate on the right, extending to involve the posterior aspect of the floor of the right maxillary sinus.      Stable thoracic aortic aneurysm involving the visualized aortic arch.      The above findings were communicated by Dr. Banks to Dr. Jon on 7/18/2024 at 2:05 p.m.      Workstation performed: [id number]                    Procedures  ECG 12 Lead Documentation Only    Date/Time: 7/18/2024 11:33 AM    Performed by: Apryl Jon DO  Authorized by: Apryl Jon DO    Indications / Diagnosis:  Dizzy  ECG reviewed by me, the ED Provider: yes    Patient location:  ED  Interpretation:     Interpretation: abnormal    Rate:     ECG rate:  82    ECG rate assessment: normal    Rhythm:     Rhythm: atrial fibrillation    Ectopy:     Ectopy: none    Conduction:     Conduction: normal    ST segments:     ST segments:  Normal  T waves:     T waves: normal             ED Course  ED Course as of 07/18/24 1852   Thu Jul 18, 2024   1328 Leukocytes, UA: Negative   1328 Nitrite, UA: Negative   1328 Blood, UA: Negative   1328 hs TnI 0hr: 7   1328 Sodium: 135   1328 Potassium: 4.2   1328 GLUCOSE: 100   1328 Calcium: 9.8   1419 hs TnI 2hr: 7   1419 Delta 2hr hsTnI: 0   1420 CTA head and  neck:CT Brain:  No acute intracranial abnormality.     CT Angiography: High-grade stenosis involving the right posterior cerebral artery P2 segment. No additional large vessel occlusion, high-grade stenosis, or intracranial aneurysm is identified on CT angiogram of the head.     No hemodynamically significant stenosis or dissection identified on CT angiogram of the neck.     Mild increase in size of the destructive mass involving the posterior aspect of the hard palate on the right, extending to involve the posterior aspect of the floor of the right maxillary sinus.     Stable thoracic aortic aneurysm involving the visualized aortic arch.     The above findings were communicated by Dr. Banks to Dr. Jon on 7/18/2024 at 2:05 p.m.                                      SBIRT 22yo+      Flowsheet Row Most Recent Value   Initial Alcohol Screen: US AUDIT-C     1. How often do you have a drink containing alcohol? 0 Filed at: 07/18/2024 1056   2. How many drinks containing alcohol do you have on a typical day you are drinking?  0 Filed at: 07/18/2024 1056   3b. FEMALE Any Age, or MALE 65+: How often do you have 4 or more drinks on one occassion? 0 Filed at: 07/18/2024 1056   Audit-C Score 0 Filed at: 07/18/2024 1056   RAMSES: How many times in the past year have you...    Used an illegal drug or used a prescription medication for non-medical reasons? Never Filed at: 07/18/2024 1056                      Medical Decision Making  89-year-old female presents to the ED with ongoing symptoms of vertigo over the last 2 weeks.  She has had vertigo in the past but only intermittently.  She states this time it is lasting much longer.  The vertigo seems clearly positional as the patient states when she bends forward or turns her head is when she notices everything spinning.  When she lays down she feels much better.  She has had occasional nausea with the vertigo.  She also complains of some mild headaches.  She is able to ambulate  although sometimes she feels afraid when she does get dizzy.  She has not taken any medication because the medication she takes for the vertigo can sometimes make her drowsy even though it works.  Patient is also concerned over her blood pressure.  She states its never been elevated to this extent for this long period of time.  She went to her family doctor today and was referred here.  On exam she looks very well in no distress.  She has no nystagmus on exam.  Her neuroexam including her cerebellar signs are normal.  Suspect that this is all peripheral vertigo but given her age will do basic labs to rule out any electrolyte abnormality as well as CTA head and neck to rule out any significant stenosis.  Doubt CVA at this time.  Her symptoms may be also secondary to the cerumen as well as possibly the right-sided soft tissue mass in her palate    Amount and/or Complexity of Data Reviewed  Labs: ordered. Decision-making details documented in ED Course.  Radiology: ordered.  ECG/medicine tests: ordered and independent interpretation performed.     Details: Atrial fibrillation rate 82.  No ectopy.  Normal conduction.  Normal ST-T waves.  No ischemia.  No STEMI.    Risk  Prescription drug management.                 Disposition  Final diagnoses:   None     ED Disposition       None          Follow-up Information    None         Patient's Medications   Discharge Prescriptions    No medications on file       No discharge procedures on file.    PDMP Review         Value Time User    PDMP Reviewed  Yes 1/19/2023 12:26 PM Tuesday SARAHI Leslie            ED Provider  Electronically Signed by             Apryl Jon DO  07/18/24 4816

## 2024-07-18 NOTE — CONSULTS
Consultation - ENT   Buffy Colón 89 y.o. female MRN: [id number]  Unit/Bed#: E4 -01 Encounter: [id number]      Assessment & Plan     Assessment:  Adenocarcinoma of the hard palate  Vertigo    Plan:  Patient has had a diagnosis of adenocarcinoma of the hard palate since 2015 -diagnosed in [place] at a cancer center.  She has elected not to have any surgical treatment.  She was aware of the extent of surgery as well as the need for an obturator even at that time at this point in time given her age of 89 the surgery would be quite extensive and would require removal of the hard palate, portion of the soft palate, and likely a maxillectomy with placement of an obturator.  This is quite a significant surgery and she would like to avoid this.  She may have to alter her diet in the future to allow for adequate nutrition.  At this point it is not an issue.  No acute treatment needed at this time.    If the etiology for vertigo was ruled out in terms of stroke follow-up as an outpatient for vestibular neuritis can be completed.  I would recommend outpatient vestibular therapy as she has had imbalance and dizziness for quite some time.        History of Present Illness   Physician Requesting Consult: Shabbir Avilez,   Reason for Consult / Principal Problem: Adenocarcinoma of the hard palate  HPI: Buffy Colón is a 89 y.o. year old female who presents with a known history of adenocarcinoma of the hard palate since 2015  In the past she has seen Dr. De Paz from the ENT department and surgery was discussed but the patient did not want to proceed with any surgical intervention.  She opted to just live with the tumor as she felt it was stable in size.  During her recent admission she had repeat imaging which demonstrated that there is been some increase in the size of the tumor.  She was admitted earlier today for vertigo.  Differential diagnosis at this point include vestibular migraine, vertebral  basilar artery infarct or stroke and the possibility of vestibular neuritis.  She was found to have high-grade stenosis of the right posterior cerebral artery P2 segment with concern for a cerebellar stroke.  Inpatient consult to ENT  Consult performed by: Rayo White DO  Consult ordered by: Shabbir Avilez DO          Review of Systems   Constitutional:  Negative for activity change, appetite change, fatigue, fever and unexpected weight change.   HENT:  Negative for congestion, ear discharge, ear pain, hearing loss, nosebleeds, postnasal drip, rhinorrhea, sinus pressure, sinus pain, sneezing, sore throat, tinnitus, trouble swallowing and voice change.    Eyes: Negative.  Negative for photophobia, pain, itching and visual disturbance.   Respiratory: Negative.  Negative for cough, chest tightness and shortness of breath.    Cardiovascular: Negative.  Negative for chest pain.   Gastrointestinal: Negative.  Negative for abdominal pain.   Endocrine: Negative.    Musculoskeletal: Negative.  Negative for gait problem, neck pain and neck stiffness.   Skin: Negative.    Allergic/Immunologic: Negative.  Negative for environmental allergies.   Neurological:  Positive for dizziness. Negative for speech difficulty, light-headedness and headaches.   Hematological: Negative.  Negative for adenopathy.   Psychiatric/Behavioral: Negative.  Negative for sleep disturbance. The patient is not nervous/anxious.        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), wet (HCC)     bilateral    Anemia N/A    Aneurysm of aortic root (HCC)     last assessed - [id number]    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HCC)     Aortic root dilatation (HCC)     last assessed - [id number]    Arthritis     Ascending aortic aneurysm (HCC)     last assessed - [id number]    Asthma     Basal cell carcinoma      last assessed - 2016    Cancer (HCC)     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HCC)     Coronary artery disease     Diabetes mellitus (HCC)     Disease of thyroid gland     Facet arthropathy, cervical     last assessed - 2017    Headache(784.0)     All my life    History of fracture of vertebral column     Hyperlipidemia     Hypertension     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HCC)     Hypoxia     last assessed - 2015    Junctional rhythm     last assessed - 2017    Lightheadedness     last assessed - 2016    Migraine     Palpitations     last assessed - 2016    Pleural effusion, bilateral     last assessed - 2015    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    Dont know    Shortness of breath     last assessed - 2017    Stroke (HCC)     Thyroid trouble     Trigger finger     last assessed - 2016    Trigger middle finger of right hand     last assessed - 2016    Urinary incontinence     last assessed -  ; Resolved - 2017    Visual impairment 2015    Wet macular degeneration both eyes     Past Surgical History:   Procedure Laterality Date    ABDOMINAL AORTIC ANEURYSM REPAIR W/ ENDOLUMINAL GRAFT  2015    Ascending aorta and hemiarch replacement with 30 mm Vascutek Gelweave graft; Managed by Scott Archuleta; last assessed - 2016    APPENDECTOMY      BLADDER SURGERY      Reccurent histoy of bladder surgery    HOROWITZ PROCEDURE      CARDIAC CATHETERIZATION  2004    Procedure summary - Luminal irregularities; last assessed - 2015     SECTION      CORONARY ANEURYSM REPAIR      CYSTOSCOPY      botox injection    HYSTERECTOMY      SC NDSC NJX IMPLT MATRL URT&/BLDR NCK N/A 2017    Procedure: CYSTOSCOPY; DURASPHERE-PERIURETHRAL BULKING AGENT INJECTION ;  Surgeon: Rayo Moulton MD;  Location: [facility];  Service: Urology    SC TENDON SHEATH INCISION Right 10/18/2016    Procedure: LONG FINGER TRIGGER RELEASE ;  Surgeon: Khari Najera MD;   Location: [facility];  Service: Orthopedics    THYROIDECTOMY      Total Thyroidectomy    TONSILLECTOMY AND ADENOIDECTOMY       Social History   Social History     Substance and Sexual Activity   Alcohol Use Not Currently    Comment: Social drinker     Social History     Substance and Sexual Activity   Drug Use No     E-Cigarette/Vaping    E-Cigarette Use Never User      E-Cigarette/Vaping Substances    Nicotine No     THC No     CBD No     Flavoring No     Other No     Unknown No      Social History     Tobacco Use   Smoking Status Former    Current packs/day: 0.00    Average packs/day: 0.3 packs/day for 58.0 years (14.5 ttl pk-yrs)    Types: Cigarettes    Start date: 1957    Quit date: 2014    Years since quittin.5   Smokeless Tobacco Never   Tobacco Comments    smoked 17 yrs  ppd quit and restarted then quit  10 days ago during 2014      Family History:   Family History   Problem Relation Age of Onset    Coronary aneurysm Sister     Coronary artery disease Sister         CABG    Cancer Sister     Heart disease Family         cardiac disorder    Hypertension Family     Cancer Family     Thyroid disease Family        Meds/Allergies   current meds:   Current Facility-Administered Medications   Medication Dose Route Frequency    acetaminophen (TYLENOL) tablet 650 mg  650 mg Oral Q4H PRN    albuterol inhalation solution 2.5 mg  2.5 mg Nebulization Q6H PRN    [START ON 2024] aspirin chewable tablet 81 mg  81 mg Oral Daily    atorvastatin (LIPITOR) tablet 40 mg  40 mg Oral QPM    heparin (porcine) subcutaneous injection 5,000 Units  5,000 Units Subcutaneous Q8H [facility]    [START ON 2024] levothyroxine tablet 112 mcg  112 mcg Oral Early Morning    [START ON 2024] metoprolol succinate (TOPROL-XL) 24 hr tablet 25 mg  25 mg Oral Daily    ondansetron (ZOFRAN) injection 4 mg  4 mg Intravenous Q6H PRN    pantoprazole (PROTONIX) EC tablet 40 mg  40 mg Oral BID AC    and PTA meds:   Prior to  Admission Medications   Prescriptions Last Dose Informant Patient Reported? Taking?   Calcium Carb-Cholecalciferol (Calcium+D3) 600-20 MG-MCG TABS 7/17/2024 Self No Yes   Sig: Take 1 tablet by mouth in the morning   Diclofenac Sodium (VOLTAREN) 1 % 7/17/2024 Self No Yes   Sig: Apply 2 g topically 4 (four) times a day as needed (pain)   Multiple Vitamins-Minerals (One Daily Multivitamin Women) TABS 7/18/2024 Self Yes Yes   Sig: Apply 1 tablet to the mouth or throat daily. Indications: Treatment to Prevent Vitamin Deficiency   acetaminophen (TYLENOL) 650 mg CR tablet  Self Yes No   Sig: Take 650 mg by mouth every 8 (eight) hours as needed for mild pain   albuterol (2.5 mg/3 mL) 0.083 % nebulizer solution  Self Yes Yes   Sig: Take 2.5 mg by nebulization every 6 (six) hours as needed for wheezing or shortness of breath   albuterol (PROVENTIL HFA,VENTOLIN HFA) 90 mcg/act inhaler  Self No Yes   Sig: Inhale 2 puffs every 6 (six) hours as needed for wheezing   amLODIPine (NORVASC) 2.5 mg tablet 7/18/2024 Self No Yes   Sig: Take 1 tablet (2.5 mg total) by mouth daily   benazepril (LOTENSIN) 40 MG tablet 7/18/2024 Self No Yes   Sig: Take 1 tablet (40 mg total) by mouth daily   denosumab (PROLIA) 60 mg/mL More than a month  Yes No   Sig: Inject 60 mg under the skin once   levothyroxine 112 mcg tablet 7/18/2024 Self No Yes   Sig: Take 1 tablet (112 mcg total) by mouth daily in the early morning   lovastatin (MEVACOR) 20 mg tablet 7/17/2024 Self No Yes   Sig: Take 1 tablet (20 mg total) by mouth every morning   meclizine (ANTIVERT) 12.5 MG tablet 7/17/2024 Self No Yes   Sig: Take 1 tablet (12.5 mg total) by mouth 3 (three) times a day as needed for dizziness   metoprolol succinate (TOPROL-XL) 25 mg 24 hr tablet 7/18/2024 Self No Yes   Sig: Take 1 tablet (25 mg total) by mouth daily   pantoprazole (PROTONIX) 40 mg tablet 7/18/2024 Self No Yes   Sig: Take 1 tablet (40 mg total) by mouth 2 (two) times a day before meals       Facility-Administered Medications: None       Allergies   Allergen Reactions    Amiodarone Hives    Omnipaque [Iohexol] Hives and Shortness Of Breath    Clindamycin Other (See Comments)     When taken causes cdiff immediately    Other     Sulfa Antibiotics Hives     itching    Acetazolamide Hives and Rash     Reaction Date:Unknown    Iv Dye  [Iodinated Contrast Media] Hypertension and Palpitations     Annotation - 19Mar2015: Verified with patient     Naprosyn [Naproxen] Itching and Rash     Category: Allergy;        Objective       Intake/Output Summary (Last 24 hours) at 7/18/2024 1837  Last data filed at 7/18/2024 1829  Gross per 24 hour   Intake 1240 ml   Output --   Net 1240 ml       Invasive Devices       Peripheral Intravenous Line  Duration             Peripheral IV 07/18/24 Right Forearm <1 day                    Physical Exam    PHYSICAL  EXAMINATION    CONSTITUTION:    appears appropriate for age.    No evidence of any acute distress.    Communicates normally.    Voice quality is clear.    Alert and oriented.    HEAD/FACE:    atraumatic, normocephalic on inspection.    No scars present.    Salivary glands are normal in texture and size without any asymmetry.    Facial nerve function is symmetric and normal.    EYES:    Extraocular muscles intact in both eyes, normal gaze bilaterally and no evidence of nystagmus.    Pupils equal, round, and accommodate to light bilaterally.    EARS:    External ears normal.    External canals are clear and dry.    Tympanic membranes intact with normal mobility, no effusion, no retraction, no perforation.    Post auricular area is normal    NOSE:    External nose without deformity.    Internal mucosa pink and moist.    Septum midline.    Nasal turbinates normal in color and size.    ORAL CAVITY:    lips normal and healthy in appearance.    Caps in the anterior teeth.  Missing most molars  Gums healthy, pink and moist.    Tongue appears pink and moist with no lesions.   "  Floor of mouth pink, moist, and smooth.    Submandibular ducts patent with clear saliva.    Parotid ducts patent with clear saliva.    Oral mucosa pink and moist.    Hard palate with a visible spongy mass in the right hard palate, alveolar ridge, and into the gingivobuccal sulcus right side.    OROPHARYNX:    soft palate pink and moist without any lesions.    Uvula midline without any lesions.    Tonsils grade 1 bilaterally.    Posterior pharynx pink and moist without any lesions    NECK:    supple and symmetric.    No masses noted.    Trachea midline.    Thyroid surgically absent with a well-healed scar.    LYMPH:    No palpable adenopathy in left or right neck    SKIN:    No rashes. No lesions noted.      Lab Results: CBC:   Lab Results   Component Value Date    WBC 8.47 07/18/2024    HGB 13.3 07/18/2024    HCT 40.0 07/18/2024    MCV 92 07/18/2024     07/18/2024    RBC 4.36 07/18/2024    MCH 30.5 07/18/2024    MCHC 33.3 07/18/2024    RDW 14.0 07/18/2024    MPV 12.2 07/18/2024    NRBC 0 07/18/2024   , CMP:   Lab Results   Component Value Date    SODIUM 135 07/18/2024    K 4.2 07/18/2024    CL 98 07/18/2024    CO2 31 07/18/2024    BUN 33 (H) 07/18/2024    CREATININE 0.88 07/18/2024    CALCIUM 9.8 07/18/2024    AST 26 07/18/2024    ALT 16 07/18/2024    ALKPHOS 49 07/18/2024    EGFR 57 07/18/2024   , Coags: No results found for: \"PT\", \"PTT\", \"INR\"  Imaging Studies: I have personally reviewed pertinent reports.    CTA head and neck with and without contrast    Result Date: 7/18/2024  Narrative: CTA NECK AND BRAIN WITH AND WITHOUT CONTRAST INDICATION: Persistent vertigo COMPARISON:   Neck CT from 4/12/2024, prior head CT from 7/6/2023, CT angiogram of the chest from 10/13/2023 TECHNIQUE:  Routine CT imaging of the Brain without contrast.Post contrast imaging was performed after administration of iodinated contrast through the neck and brain. Post contrast axial 0.625 mm images timed to opacify the arterial " system.  3D rendering was performed on an independent workstation.   MIP reconstructions performed. Coronal and sagittal reconstructions were performed of the non contrast portion of the brain. Radiation dose length product (DLP) for this visit:  1296 mGy-cm .  This examination, like all CT scans performed in the [facility], was performed utilizing techniques to minimize radiation dose exposure, including the use of iterative reconstruction and automated exposure control. IV Contrast:  85 mL of iohexol (OMNIPAQUE) IMAGE QUALITY:   Diagnostic FINDINGS: NONCONTRAST BRAIN PARENCHYMA:No intracranial mass, mass effect or midline shift. No CT signs of acute infarction.  No acute parenchymal hemorrhage. VENTRICLES AND EXTRA-AXIAL SPACES:Normal for the patient's age. VISUALIZED ORBITS: Normal. PARANASAL SINUSES: Normal. CTA NECK ARCH AND GREAT VESSELS: In the visualized thoracic aorta, there is a fusiform aneurysm of the aortic arch measuring approximately 4.7 cm in maximal transverse dimensions grossly unchanged when compared to prior CT angiogram of the chest. There is mixed atherosclerotic plaque involving the visualized aortic arch. 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 VERTEBRAL ARTERIES: Patent extracranial segments. RIGHT CAROTID: The proximal right common carotid artery is tortuous, but is without significant stenosis. There is mild mixed atherosclerotic plaque at the right carotid bifurcation and carotid bulb, but there is 0% stenosis of the right cervical internal carotid artery.   No dissection. LEFT CAROTID: There is mild atherosclerotic plaque involving the proximal and distal left common carotid artery, without stenosis. There is mixed atherosclerotic plaque involving the left carotid bulb/carotid bifurcation, without stenosis of the left cervical internal carotid artery. No dissection is identified. NASCET criteria was used to determine  the degree of internal carotid artery diameter stenosis. CTA BRAIN: INTERNAL CAROTID ARTERIES: There is mild mixed atherosclerotic plaque involving the cavernous right internal carotid artery without stenosis. The left internal carotid artery is without stenosis. ANTERIOR CEREBRAL ARTERY CIRCULATION:  No stenosis or occlusion. 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 MIDDLE CEREBRAL ARTERY CIRCULATION:  No stenosis or occlusion. DISTAL VERTEBRAL ARTERIES:  No stenosis or occlusion. BASILAR ARTERY:  No stenosis or occlusion. POSTERIOR CEREBRAL ARTERIES: There is a high-grade stenosis involving the right posterior cerebral artery P2 segment. The left posterior cerebral artery appears widely patent. No occlusion is identified. VENOUS STRUCTURES:  Normal. NON VASCULAR ANATOMY BONY STRUCTURES: There is multilevel cervical and upper thoracic spondylosis. SOFT TISSUES OF THE NECK: Again demonstrated is an expansile, destructive lesion involving the posterior aspect of the hard palate to the right of midline, extending to involve the floor of the right maxillary sinus, measuring at least 3.5 cm x 2.8 cm transversely seen on series 4 image 138. This appears mildly increased in size when compared to the prior examination. THORACIC INLET: There is pleural-parenchymal scarring involving the lung apices.     Impression: CT Brain:  No acute intracranial abnormality. CT Angiography: High-grade stenosis involving the right posterior cerebral artery P2 segment. No additional large vessel occlusion, high-grade stenosis, or intracranial aneurysm is identified on CT angiogram of the head. No hemodynamically significant stenosis or dissection identified on CT angiogram of the neck. Mild increase in size of the destructive mass involving the posterior aspect of the hard palate on the right, extending to involve the posterior aspect of the floor of the right maxillary sinus. Stable thoracic aortic  aneurysm involving the visualized aortic arch. The above findings were communicated by Dr. Banks to Dr. Jon on 7/18/2024 at 2:05 p.m. Workstation performed: [id number]     EKG, Pathology, and Other Studies: I have personally reviewed pertinent films in PACS    Code Status: Level 3 - DNAR and DNI  Advance Directive and Living Will: Yes    Power of :    POLST:      None

## 2024-07-19 ENCOUNTER — APPOINTMENT (OUTPATIENT)
Dept: MRI IMAGING | Facility: HOSPITAL | Age: 89
End: 2024-07-19
Payer: COMMERCIAL

## 2024-07-19 ENCOUNTER — APPOINTMENT (OUTPATIENT)
Dept: NON INVASIVE DIAGNOSTICS | Facility: HOSPITAL | Age: 89
End: 2024-07-19
Payer: COMMERCIAL

## 2024-07-19 VITALS
HEART RATE: 75 BPM | BODY MASS INDEX: 21.71 KG/M2 | DIASTOLIC BLOOD PRESSURE: 98 MMHG | SYSTOLIC BLOOD PRESSURE: 190 MMHG | OXYGEN SATURATION: 96 % | RESPIRATION RATE: 16 BRPM | HEIGHT: 61 IN | TEMPERATURE: 97.9 F | WEIGHT: 115 LBS

## 2024-07-19 PROBLEM — M54.81 OCCIPITAL NEURALGIA: Status: ACTIVE | Noted: 2024-07-19

## 2024-07-19 PROBLEM — I95.1 ORTHOSTATIC HYPOTENSION: Status: ACTIVE | Noted: 2024-07-18

## 2024-07-19 LAB
ANION GAP SERPL CALCULATED.3IONS-SCNC: 6 MMOL/L (ref 4–13)
AORTIC ROOT: 3.7 CM
APICAL FOUR CHAMBER EJECTION FRACTION: 64 %
ASCENDING AORTA: 3.3 CM
BASOPHILS # BLD AUTO: 0.04 THOUSANDS/ÂΜL (ref 0–0.1)
BASOPHILS NFR BLD AUTO: 1 % (ref 0–1)
BSA FOR ECHO PROCEDURE: 1.49 M2
BUN SERPL-MCNC: 23 MG/DL (ref 5–25)
CALCIUM SERPL-MCNC: 8.9 MG/DL (ref 8.4–10.2)
CHLORIDE SERPL-SCNC: 105 MMOL/L (ref 96–108)
CHOLEST SERPL-MCNC: 193 MG/DL
CO2 SERPL-SCNC: 31 MMOL/L (ref 21–32)
CREAT SERPL-MCNC: 0.72 MG/DL (ref 0.6–1.3)
E WAVE DECELERATION TIME: 210 MS
E/A RATIO: 2.26
EOSINOPHIL # BLD AUTO: 0.12 THOUSAND/ÂΜL (ref 0–0.61)
EOSINOPHIL NFR BLD AUTO: 2 % (ref 0–6)
ERYTHROCYTE [DISTWIDTH] IN BLOOD BY AUTOMATED COUNT: 14.1 % (ref 11.6–15.1)
EST. AVERAGE GLUCOSE BLD GHB EST-MCNC: 117 MG/DL
FRACTIONAL SHORTENING: 28 (ref 28–44)
GFR SERPL CREATININE-BSD FRML MDRD: 72 ML/MIN/1.73SQ M
GLUCOSE SERPL-MCNC: 91 MG/DL (ref 65–140)
HBA1C MFR BLD: 5.7 %
HCT VFR BLD AUTO: 38 % (ref 34.8–46.1)
HDLC SERPL-MCNC: 71 MG/DL
HGB BLD-MCNC: 12.2 G/DL (ref 11.5–15.4)
IMM GRANULOCYTES # BLD AUTO: 0.01 THOUSAND/UL (ref 0–0.2)
IMM GRANULOCYTES NFR BLD AUTO: 0 % (ref 0–2)
INTERVENTRICULAR SEPTUM IN DIASTOLE (PARASTERNAL SHORT AXIS VIEW): 1 CM
INTERVENTRICULAR SEPTUM: 1.5 CM (ref 0.6–1.1)
LAAS-AP2: 13.2 CM2
LAAS-AP4: 16.4 CM2
LDLC SERPL CALC-MCNC: 105 MG/DL (ref 0–100)
LEFT ATRIUM SIZE: 3.4 CM
LEFT ATRIUM VOLUME (MOD BIPLANE): 37 ML
LEFT ATRIUM VOLUME INDEX (MOD BIPLANE): 24.8 ML/M2
LEFT INTERNAL DIMENSION IN SYSTOLE: 2.9 CM (ref 2.1–4)
LEFT VENTRICULAR INTERNAL DIMENSION IN DIASTOLE: 4 CM (ref 3.5–6)
LEFT VENTRICULAR POSTERIOR WALL IN END DIASTOLE: 1 CM
LEFT VENTRICULAR STROKE VOLUME: 39 ML
LVSV (TEICH): 39 ML
LYMPHOCYTES # BLD AUTO: 2.08 THOUSANDS/ÂΜL (ref 0.6–4.47)
LYMPHOCYTES NFR BLD AUTO: 36 % (ref 14–44)
MCH RBC QN AUTO: 30.1 PG (ref 26.8–34.3)
MCHC RBC AUTO-ENTMCNC: 32.1 G/DL (ref 31.4–37.4)
MCV RBC AUTO: 94 FL (ref 82–98)
MONOCYTES # BLD AUTO: 0.67 THOUSAND/ÂΜL (ref 0.17–1.22)
MONOCYTES NFR BLD AUTO: 12 % (ref 4–12)
MV E'TISSUE VEL-LAT: 11 CM/S
MV E'TISSUE VEL-SEP: 8 CM/S
MV PEAK A VEL: 0.43 M/S
MV PEAK E VEL: 97 CM/S
MV STENOSIS PRESSURE HALF TIME: 61 MS
MV VALVE AREA P 1/2 METHOD: 3.61
NEUTROPHILS # BLD AUTO: 2.9 THOUSANDS/ÂΜL (ref 1.85–7.62)
NEUTS SEG NFR BLD AUTO: 49 % (ref 43–75)
NRBC BLD AUTO-RTO: 0 /100 WBCS
PLATELET # BLD AUTO: 194 THOUSANDS/UL (ref 149–390)
PMV BLD AUTO: 12.2 FL (ref 8.9–12.7)
POTASSIUM SERPL-SCNC: 4 MMOL/L (ref 3.5–5.3)
RA PRESSURE ESTIMATED: 10 MMHG
RBC # BLD AUTO: 4.05 MILLION/UL (ref 3.81–5.12)
RIGHT ATRIUM AREA SYSTOLE A4C: 14.4 CM2
RIGHT VENTRICLE ID DIMENSION: 3.4 CM
RV PSP: 38 MMHG
SINOTUBULAR JUNCTION: 3.5 CM
SL CV LEFT ATRIUM LENGTH A2C: 4.6 CM
SL CV LV EF: 60
SL CV PED ECHO LEFT VENTRICLE DIASTOLIC VOLUME (MOD BIPLANE) 2D: 70 ML
SL CV PED ECHO LEFT VENTRICLE SYSTOLIC VOLUME (MOD BIPLANE) 2D: 31 ML
SL CV SINUS OF VALSALVA 2D: 3.7 CM
SODIUM SERPL-SCNC: 142 MMOL/L (ref 135–147)
STJ: 3.5 CM
TR MAX PG: 28 MMHG
TR PEAK VELOCITY: 2.7 M/S
TRICUSPID ANNULAR PLANE SYSTOLIC EXCURSION: 1.9 CM
TRICUSPID VALVE PEAK REGURGITATION VELOCITY: 2.67 M/S
TRIGL SERPL-MCNC: 87 MG/DL
WBC # BLD AUTO: 5.82 THOUSAND/UL (ref 4.31–10.16)

## 2024-07-19 PROCEDURE — 99232 SBSQ HOSP IP/OBS MODERATE 35: CPT | Performed by: PSYCHIATRY & NEUROLOGY

## 2024-07-19 PROCEDURE — 93306 TTE W/DOPPLER COMPLETE: CPT | Performed by: STUDENT IN AN ORGANIZED HEALTH CARE EDUCATION/TRAINING PROGRAM

## 2024-07-19 PROCEDURE — 70551 MRI BRAIN STEM W/O DYE: CPT

## 2024-07-19 PROCEDURE — 92610 EVALUATE SWALLOWING FUNCTION: CPT

## 2024-07-19 PROCEDURE — 85025 COMPLETE CBC W/AUTO DIFF WBC: CPT | Performed by: INTERNAL MEDICINE

## 2024-07-19 PROCEDURE — 80048 BASIC METABOLIC PNL TOTAL CA: CPT | Performed by: INTERNAL MEDICINE

## 2024-07-19 PROCEDURE — 93306 TTE W/DOPPLER COMPLETE: CPT

## 2024-07-19 PROCEDURE — 97162 PT EVAL MOD COMPLEX 30 MIN: CPT

## 2024-07-19 PROCEDURE — 97166 OT EVAL MOD COMPLEX 45 MIN: CPT

## 2024-07-19 PROCEDURE — 99239 HOSP IP/OBS DSCHRG MGMT >30: CPT | Performed by: FAMILY MEDICINE

## 2024-07-19 RX ORDER — SODIUM CHLORIDE 9 MG/ML
100 INJECTION, SOLUTION INTRAVENOUS CONTINUOUS
Status: DISCONTINUED | OUTPATIENT
Start: 2024-07-19 | End: 2024-07-19 | Stop reason: HOSPADM

## 2024-07-19 RX ADMIN — SODIUM CHLORIDE 100 ML/HR: 0.9 INJECTION, SOLUTION INTRAVENOUS at 11:33

## 2024-07-19 RX ADMIN — PANTOPRAZOLE SODIUM 40 MG: 40 TABLET, DELAYED RELEASE ORAL at 06:01

## 2024-07-19 RX ADMIN — METOPROLOL SUCCINATE 25 MG: 25 TABLET, EXTENDED RELEASE ORAL at 08:01

## 2024-07-19 RX ADMIN — LEVOTHYROXINE SODIUM 112 MCG: 112 TABLET ORAL at 06:01

## 2024-07-19 RX ADMIN — ASPIRIN 81 MG CHEWABLE TABLET 81 MG: 81 TABLET CHEWABLE at 08:01

## 2024-07-19 RX ADMIN — HEPARIN SODIUM 5000 UNITS: 5000 INJECTION INTRAVENOUS; SUBCUTANEOUS at 14:01

## 2024-07-19 RX ADMIN — HEPARIN SODIUM 5000 UNITS: 5000 INJECTION INTRAVENOUS; SUBCUTANEOUS at 06:01

## 2024-07-19 NOTE — PHYSICAL THERAPY NOTE
PT EVALUATION    Pt. Name: Buffy Colón  Pt. Age: 89 y.o.  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), wet (HCC)     bilateral    Anemia N/A    Aneurysm of aortic root (HCC)     last assessed - 68Fmk7123    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HCC)     Aortic root dilatation (HCC)     last assessed - 80Wen5087    Arthritis     Ascending aortic aneurysm (HCC)     last assessed - 89Jpc9082    Asthma     Basal cell carcinoma     last assessed - 72Lzy3690    Cancer (HCC) 2015    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HCC)     Coronary artery disease     Diabetes mellitus (HCC)     Disease of thyroid gland     Facet arthropathy, cervical     last assessed - 08Jun2017    Headache(784.0)     All my life    History of fracture of vertebral column     Hyperlipidemia     Hypertension     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HCC)     Hypoxia     last assessed - 16Mar2015    Junctional rhythm     last assessed - 07Jgp9608    Lightheadedness     last assessed - 51Jxy4681    Migraine     Palpitations     last assessed - 28Jun2016    Pleural effusion, bilateral     last assessed - 25Pro6904    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    Dont know    Shortness of breath     last assessed - 35Eyb4504    Stroke (HCC)     Thyroid trouble     Trigger finger     last assessed - 48Non0625    Trigger middle finger of right hand     last assessed - 47Bke0714    Urinary incontinence     last assessed -  22Jul,2013; Resolved - 15Jan2017    Visual impairment Feb 2015    Wet macular degeneration both eyes       Past Surgical History:   Procedure Laterality Date    ABDOMINAL AORTIC ANEURYSM REPAIR W/ ENDOLUMINAL GRAFT  06/28/2015     Ascending aorta and hemiarch replacement with 30 mm Vascutek Gelweave graft; Managed by Scott Archuleta; last assessed - 2016    APPENDECTOMY      BLADDER SURGERY      Reccurent histoy of bladder surgery    HOROWITZ PROCEDURE      CARDIAC CATHETERIZATION  2004    Procedure summary - Luminal irregularities; last assessed - 2015     SECTION      CORONARY ANEURYSM REPAIR      CYSTOSCOPY      botox injection    HYSTERECTOMY      MS NDSC NJX IMPLT MATRL URT&/BLDR NCK N/A 2017    Procedure: CYSTOSCOPY; DURASPHERE-PERIURETHRAL BULKING AGENT INJECTION ;  Surgeon: Rayo Moulton MD;  Location: [facility];  Service: Urology    MS TENDON SHEATH INCISION Right 10/18/2016    Procedure: LONG FINGER TRIGGER RELEASE ;  Surgeon: Khari Najera MD;  Location: [facility];  Service: Orthopedics    THYROIDECTOMY      Total Thyroidectomy    TONSILLECTOMY AND ADENOIDECTOMY         Imaging Studies:  MRI brain wo contrast   Final Result by Efrain Randall MD ( 1003)      1. No acute infarct, mass effect or midline shift.      2. Partially evaluated expansile, destructive lesion involving the posterior aspect of the hard palate to the right of midline and extending to the floor of the right maxillary sinus, better evaluated on prior imaging.         Workstation performed: [id number]         CT spine cervical wo contrast   Final Result by Melo Clemons MD ( 1019)      No cervical spine fracture or traumatic malalignment.                  Workstation performed: [id number]         CTA head and neck with and without contrast   Final Result by Omar Banks MD ( 1407)      CT Brain:  No acute intracranial abnormality.      CT Angiography: High-grade stenosis involving the right posterior cerebral artery P2 segment. No additional large vessel occlusion, high-grade stenosis, or intracranial aneurysm is identified on CT angiogram of the head.      No hemodynamically significant stenosis or dissection  "identified on CT angiogram of the neck.      Mild increase in size of the destructive mass involving the posterior aspect of the hard palate on the right, extending to involve the posterior aspect of the floor of the right maxillary sinus.      Stable thoracic aortic aneurysm involving the visualized aortic arch.      The above findings were communicated by Dr. Banks to Dr. Jon on 7/18/2024 at 2:05 p.m.      Workstation performed: [id number]               07/19/24 0911   PT Last Visit   PT Visit Date 07/19/24   Note Type   Note type Evaluation   Pain Assessment   Pain Assessment Tool 0-10   Pain Score 5   Pain Location/Orientation Location: Head   Hospital Pain Intervention(s) Repositioned;Ambulation/increased activity;Emotional support;Rest   Restrictions/Precautions   Weight Bearing Precautions Per Order No   Other Precautions Fall Risk;Pain   Home Living   Type of Home Apartment   Home Layout One level  (0 ASHLEY)   Bathroom Shower/Tub Tub/shower unit  (cut-out tub)   Bathroom Toilet Standard   Bathroom Equipment Grab bars in shower;Shower chair   Home Equipment Walker;Cane   Prior Function   Level of [place] Independent with ADLs;Independent with functional mobility;Independent with IADLS  (w/o AD)   Lives With Alone   Receives Help From Family   Falls in the last 6 months 0   Vocational Retired   Comments (+)  for local, familiar distances   General   Additional Pertinent History h/o nasopharyngeal CA, CAD, CVA, AAA repair 6/28/15, COPD   Family/Caregiver Present No   Cognition   Overall Cognitive Status WFL   Arousal/Participation Alert   Orientation Level Oriented to person;Oriented to place;Oriented to time  (initially stated year as \"1924\" but corrected to \"2024\")   Memory Within functional limits   Following Commands Follows one step commands without difficulty   Comments pleasant and cooperative   Subjective   Subjective Pt agreeable to PT/OT evals   RUE Assessment   RUE Assessment   (refer to " OT)   LUE Assessment   LUE Assessment   (refer to OT)   RLE Assessment   RLE Assessment WFL  (4/5 grossly)   LLE Assessment   LLE Assessment WFL  (4/5 grossly)   Coordination   Sensation WFL   Bed Mobility   Supine to Sit Unable to assess   Sit to Supine Unable to assess   Additional Comments pt greeted EOB and left in transport chair w/ transport to go down to MRI   Transfers   Sit to Stand 5  Supervision   Additional items Bedrails;Increased time required  (no AD)   Stand to Sit 5  Supervision   Additional items Armrests;Increased time required;Verbal cues  (no AD)   Additional Comments cues for technique and safety   Ambulation/Elevation   Gait pattern Wide MERRITT;Decreased foot clearance;Short stride;Step through pattern   Gait Assistance 5  Supervision   Additional items Verbal cues   Assistive Device None   Distance ~150'x1   Ambulation/Elevation Additional Comments grossly steady gait; no LOB; pt reports close to baseline gait   Balance   Static Sitting Good   Dynamic Sitting Fair +   Static Standing Fair  (no AD)   Dynamic Standing Fair -  (no AD)   Ambulatory Fair -  (no AD)   Endurance Deficit   Endurance Deficit No   Activity Tolerance   Activity Tolerance Patient tolerated treatment well   Medical Staff Made Aware WAGNER Morrison   Nurse Made Aware yes, Eunice   Assessment   Prognosis Good   Problem List Impaired balance;Decreased mobility;Pain   Assessment Pt is a 89 y.o. female presented with persistent vertigo. Pt admitted for vertigo and paroxysmal A-fib. Imaging revealed high grade stenosis involving R PCA. PT consulted for mobility assessment and safe D/C planning with orders of Up and OOB. Please see above flowsheet for PLOF and home set-up. PTA, pt was I with ADLs/functional mobility/iADLs w/o AD. Upon exam, pt is S for transfers and amb. No gross LOB noted during ambulation, pt states close to baseline gait. The patient's AM-PAC Basic Mobility Inpatient Short Form Raw Score is 18. A Raw  score of greater than 16 suggests the patient may benefit from discharge to home. Please also refer to the recommendation of the Physical Therapist for safe discharge planning. PT recommends Level III (minimum resource intensity) upon D/C. Pt not currently demonstrating any significant mobility  needs at this time, and is functioning close to baseline levels. IPPT will be D/Yrn at this time, however, will maintain on restorative for daily amb to prevent a decline in function. If pt should demonstrate a significant decline in functional mobility prior to D/C, PT needs may be re-evaluated. Co-eval was necessary to complete this eval for the pts best interest given the pt's medical acuity and complexity.   Goals   Patient Goals to go home   PT Treatment Day 0   Plan   Treatment/Interventions Spoke to nursing;OT  (PT eval only)   PT Frequency Other (Comment)  (D/C PT. Restorative for daily amb)   Discharge Recommendation   Rehab Resource Intensity Level, PT III (Minimum Resource Intensity)  (HHPT)   AM-PAC Basic Mobility Inpatient   Turning in Flat Bed Without Bedrails 3   Lying on Back to Sitting on Edge of Flat Bed Without Bedrails 3   Moving Bed to Chair 3   Standing Up From Chair Using Arms 3   Walk in Room 3   Climb 3-5 Stairs With Railing 3   Basic Mobility Inpatient Raw Score 18   Basic Mobility Standardized Score 41.05   [facility] Highest Level Of Mobility   -HLM Goal 6: Walk 10 steps or more   -HLM Achieved 7: Walk 25 feet or more   End of Consult   Patient Position at End of Consult Other (comment)  (w/ transport for MRI)   End of Consult Comments Pt in stable condition. Pt left w/ transport for MRI   Hx/personal factors: co-morbidities, home alone, advanced age, and fall risk  Examination: dec mobility, risk for falls, assessed body system, balance, endurance, amb, D/C disposition & fall risk  Clinical: unpredictable (ongoing medical status and risk for falls)  Complexity: moderate    [place]  Cirilo, SPT

## 2024-07-19 NOTE — CASE MANAGEMENT
Case Management Assessment & Discharge Planning Note    Patient name Buffy A [facility]  Location [facility] 4 /E4 -* MRN [id number]  : 1932 Date 2024       Current Admission Date: 2024  Current Admission Diagnosis:Orthostatic hypotension   Patient Active Problem List    Diagnosis Date Noted Date Diagnosed    Occipital neuralgia 2024     Orthostatic hypotension 2024     Nasopharyngeal cancer (HCC) 2024     Dizziness 2024     Hypertensive kidney disease with CKD (chronic kidney disease), stage 1-4 or unspecified chronic kidney disease 2024     Sliding hiatal hernia 10/26/2023     Rib pain 10/14/2023     Esophageal pain 10/13/2023     Nondisplaced fracture of lateral end of left clavicle with routine healing 2023     Closed nondisplaced fracture of sternal end of left clavicle 07/10/2023     Pain of left clavicle 07/10/2023     Duodenum ulcer 2023     Stage 3a chronic kidney disease (HCC) 2023     Osteoarthritis of cervical spine 2023     Contrast media allergy 2023     S/P trigger finger release 2023     Anemia 2023     Toxic encephalopathy 2023     Aneurysm of aortic arch without rupture ([facility]) 2023     Atherosclerosis of aorta ([facility]) 2021     Hypoparathyroidism, unspecified hypoparathyroidism type ([facility]) 2021     Pain of left lower extremity 2021     [place]-neck deformity of finger 2021     Muscle cramps 2021     Skin cyst 2021     Nonrheumatic mitral valve regurgitation 10/06/2020     Uncomplicated asthma 04/15/2020     Encounter for vision screening 04/15/2020     Other fatigue 07/10/2019     Moderate persistent asthma without complication 2019     Spinal stenosis of cervical region 2017     Stress incontinence in female 2017     Cervical radiculopathy 2017     Shortness of breath 2016     Essential hypertension 2016     Asthma-COPD  overlap syndrome 12/30/2016     Paroxysmal atrial fibrillation (HCC) 12/30/2016     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HCC) 12/30/2016     Exudative age-related macular degeneration, bilateral, with active choroidal neovascularization (HCC) 12/30/2016     Trigger middle finger of right hand 10/18/2016     Basal cell carcinoma (BCC) 09/09/2016     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     Xerostomia 02/12/2016     Chronic migraine without aura 08/06/2015     Migraine aura without headache 08/06/2015     Restless legs syndrome 07/07/2015     Tricuspid valve insufficiency 04/14/2014     Obstructive sleep apnea syndrome 02/18/2014     History of polymyalgia rheumatica 02/18/2014     Gastroesophageal reflux disease with esophagitis 10/22/2013     Osteopenia 06/20/2012     Postoperative hypothyroidism 11/11/2010       LOS (days): 0  Geometric Mean LOS (GMLOS) (days):   Days to GMLOS:     OBJECTIVE:              Current admission status: Observation  Referral Reason: Stroke    Preferred Pharmacy:   [email or web address] - [place] - [address]  [address] [facility]  [place] [address]  Phone: [phone] Fax: [phone]    [id number] - [place] - [address]   [address] [place] [address]  Phone: [phone] Fax: [phone]    Primary Care Provider: Stephanie Pruett DO    Primary Insurance: [facility] REP  Secondary Insurance:     ASSESSMENT:  Active Health Care Proxies       Elie Cardona Health Care Agent - Daughter   Primary Phone: [phone] (Mobile)                 Advance Directives  Does patient have a Health Care POA?: Yes  Does patient have Advance Directives?: Yes  Advance Directives: Living will, Power of  for health care  Primary Contact: Daughter Elie [phone]         Readmission Root Cause  30 Day Readmission: No    Patient Information  Admitted from:: Home  Mental Status: Alert  During Assessment patient was accompanied by: Not  accompanied during assessment  Assessment information provided by:: Patient  Primary Caregiver: Self  Support Systems: Children  County of Residence: [place]  What city do you live in?: [place]  Home entry access options. Select all that apply.: No steps to enter home  Type of Current Residence: Apartment  Floor Level: 1  Upon entering residence, is there a bedroom on the main floor (no further steps)?: Yes  Upon entering residence, is there a bathroom on the main floor (no further steps)?: Yes  Living Arrangements: Lives Alone  Is patient a ?: No    Activities of Daily Living Prior to Admission  Functional Status: Independent  Completes ADLs independently?: Yes  Ambulates independently?: Yes  Does patient use assisted devices?: Yes  Assisted Devices (DME) used: Walker  Does patient currently own DME?: Yes  What DME does the patient currently own?: Walker, Straight Cane  Does patient have a history of Outpatient Therapy (PT/OT)?: Yes  Does the patient have a history of Short-Term Rehab?: No  Does patient have a history of HHC?: Yes ([facility])  Does patient currently have HHC?: No         Patient Information Continued  Income Source: Pension/custodial  Does patient have prescription coverage?: Yes  Does patient receive dialysis treatments?: No  Does patient have a history of substance abuse?: No  Does patient have a history of Mental Health Diagnosis?: No         Means of Transportation  Means of Transport to Appts:: Family transport      Social Determinants of Health (SDOH)      Flowsheet Row Most Recent Value   Housing Stability    In the last 12 months, was there a time when you were not able to pay the mortgage or rent on time? N   In the past 12 months, how many times have you moved where you were living? 0   At any time in the past 12 months, were you homeless or living in a shelter (including now)? N   Transportation Needs    In the past 12 months, has lack of transportation kept you from medical  appointments or from getting medications? no   In the past 12 months, has lack of transportation kept you from meetings, work, or from getting things needed for daily living? No   Food Insecurity    Within the past 12 months, you worried that your food would run out before you got the money to buy more. Never true   Within the past 12 months, the food you bought just didn't last and you didn't have money to get more. Never true   Utilities    In the past 12 months has the electric, gas, oil, or water company threatened to shut off services in your home? No            DISCHARGE DETAILS:    Discharge planning discussed with:: Patient  Freedom of Choice: Yes  Comments - Freedom of Choice: [facility]  CM contacted family/caregiver?: Yes  Were Treatment Team discharge recommendations reviewed with patient/caregiver?: Yes  Did patient/caregiver verbalize understanding of patient care needs?: Yes  Were patient/caregiver advised of the risks associated with not following Treatment Team discharge recommendations?: Yes    Contacts  Patient Contacts: Elie Cardona (Daughter) [phone] (M) & Shiloh Luna (Daughter) [phone] (M)  Relationship to Patient:: Family  Contact Method: Phone  Phone Number: Elie Cardona (Daughter) [phone] (M) & Shiloh Luna (Daughter) [phone] (M)  Reason/Outcome: Emergency Contact, Discharge Planning, Continuity of Care    Requested Home Health Care         Is the patient interested in HHC at discharge?: Yes  Home Health Discipline requested:: Physical Therapy, Occupational Therapy  Home Health Agency Name:: [facility]  HHA External Referral Reason (only applicable if external HHA name selected): Scheduling access issues  Home Health Follow-Up Provider:: PCP  Home Health Services Needed:: Gait/ADL Training, Evaluate Functional Status and Safety, Strengthening/Theraputic Exercises to Improve Function  Homebound Criteria Met:: Uses an Assist Device (i.e. cane, walker, etc),  Requires the Assistance of Another Person for Safe Ambulation or to Leave the Home  Supporting Clincal Findings:: Limited Endurance, Fatigues Easliy in Short Distances    DME Referral Provided  Referral made for DME?: No    Other Referral/Resources/Interventions Provided:  Interventions: Outpatient , [facility]  Referral Comments: In box message sent requesting OP CM    Would you like to participate in our [facility] service program?  : No - Declined    Treatment Team Recommendation: Home with Home Health Care  Discharge Destination Plan:: Home with Home Health Care  Transport at Discharge : Family                       CM met with patient at bedside to introduce self and role with DC planning.  Patient resides alone in a first floor apartment with no steps.  Patient was independent PTA.  Patient has a RW and SPC to utilize if needed.  Patient interested in [facility] for PT/OT.  Patient agreeable to blanket referral being placed.  Patient is also interested in OP CM to assist with community resources/ transportation resources. Patient reports her son in law will transport her home at time of DC.       CM placed blanket referral via [facility] to [facility].  CM reviewed choice list with patient, patient would like [facility].  CM reserved them via [employer].    CM sent inbasket message requesting that an OP CM follow up with patient post DC.  CM will continue to follow for DC coorination.

## 2024-07-19 NOTE — SPEECH THERAPY NOTE
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), wet (HCC)     bilateral    Anemia N/A    Aneurysm of aortic root (HCC)     last assessed - [id number]    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HCC)     Aortic root dilatation (HCC)     last assessed - [id number]    Arthritis     Ascending aortic aneurysm (HCC)     last assessed - 77Flx6290    Asthma     Basal cell carcinoma     last assessed - 44Xxw7108    Cancer (HCC) 2015    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HCC)     Coronary artery disease     Diabetes mellitus (HCC)     Disease of thyroid gland     Facet arthropathy, cervical     last assessed - 08Jun2017    Headache(784.0)     All my life    History of fracture of vertebral column     Hyperlipidemia     Hypertension     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HCC)     Hypoxia     last assessed - 16Mar2015    Junctional rhythm     last assessed - 54Cak8379    Lightheadedness     last assessed - 49Fmc4856    Migraine     Palpitations     last assessed - 60Jrs3374    Pleural effusion, bilateral     last assessed - 88Btr0401    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    Dont know    Shortness of breath     last assessed - 24Qxg5793    Stroke (HCC)     Thyroid trouble     Trigger finger     last assessed - 81Skw2159    Trigger middle finger of right hand     last assessed - 31Zgj4091    Urinary incontinence     last assessed -  22Jul,2013; Resolved - 15Jan2017    Visual impairment Feb 2015    Wet macular degeneration both eyes      Past Surgical History  Past Surgical History:   Procedure Laterality Date    ABDOMINAL AORTIC ANEURYSM REPAIR W/ ENDOLUMINAL GRAFT  2015    Ascending aorta and hemiarch replacement with 30 mm Vascutek Gelweave graft; Managed by Scott Archuleta; last assessed - 2016    APPENDECTOMY      BLADDER SURGERY      Reccurent histoy of bladder surgery    HOROWITZ PROCEDURE      CARDIAC CATHETERIZATION  2004    Procedure summary - Luminal irregularities; last assessed - 2015     SECTION      CORONARY ANEURYSM REPAIR      CYSTOSCOPY      botox injection    HYSTERECTOMY      IL NDSC NJX IMPLT MATRL URT&/BLDR NCK N/A 2017    Procedure: CYSTOSCOPY; DURASPHERE-PERIURETHRAL BULKING AGENT INJECTION ;  Surgeon: Rayo Moulton MD;  Location: [facility];  Service: Urology    IL TENDON SHEATH INCISION Right 10/18/2016    Procedure: LONG FINGER TRIGGER RELEASE ;  Surgeon: Khari Najera MD;  Location: [facility];  Service: Orthopedics    THYROIDECTOMY      Total Thyroidectomy    TONSILLECTOMY AND ADENOIDECTOMY      Bedside Swallow Evaluation:    Summary:  Pt presented w/ oral and pharyngeal stage of swallow WNL. Positioned upright and alert. She fed herself sitting at edge of bed. Benefits from assistance w/ tray set up. She fed herself salad and thin liquids via straw with single/successive sips. Mastication, bolus control, formation, and transfer were WNL. Swallows appeared prompt. No overt s/s of aspiration. Pt denied difficulties with chewing or swallowing po or medications. Stated she just takes her time. She reported her appetite has improved since admission. Also denied use of dietary supplements. ST reviewed safe swallow strategies pt understood.     Recommendations:  Diet:Regular   Liquid:Thin  Meds:As tolerated   Supervision: assist with set up   Positioning:Upright  Strategies: slow rate, alternate liquids with solids, and swallow prior to additional po   Pt to take PO/Meds only when fully  alert and upright.   Oral care  Reflux precautions  Eval only, No f/u tx indicated.     Consider consult w/:  Rehab  Nutrition    H&P/Admit info/ pertinent provider notes: (PMH noted above)  Buffy Colón is a 89 y.o. female who presents with persistent vertigo, the patient has a past medical history of atrial fibrillation not on anticoagulation, hard palate tumor with unclear oncologic plan, history of arthritis, history of ascending aortic aneurysm, hypertension and hyperlipidemia.  The patient states that over the past 2 weeks she has had persistent vertigo despite taking Dramamine.  She denies any nausea or vomiting, no difficulty speaking or swallowing.  In the emergency room head CT was performed which was abnormal, there was no large vessel occlusion but small vessel disease.,  Possible posterior circulation involvement.     Special Studies:  MRI BRAIN WITHOUT CONTRAST   IMPRESSION:  1. No acute infarct, mass effect or midline shift.  2. Partially evaluated expansile, destructive lesion involving the posterior aspect of the hard palate to the right of midline and extending to the floor of the right maxillary sinus, better evaluated on prior imaging.  CTA NECK AND BRAIN WITH AND WITHOUT CONTRAST 07/18/2IMPRESSION:  CT Brain:  No acute intracranial abnormality.  CT Angiography: High-grade stenosis involving the right posterior cerebral artery P2 segment. No additional large vessel occlusion, high-grade stenosis, or intracranial aneurysm is identified on CT angiogram of the head.  No hemodynamically significant stenosis or dissection identified on CT angiogram of the neck.  Mild increase in size of the destructive mass involving the posterior aspect of the hard palate on the right, extending to involve the posterior aspect of the floor of the right maxillary sinus.  Stable thoracic aortic aneurysm involving the visualized aortic arch.024      Procalcitonin:    WBC: 5.82                07/19/2024     Code  "Status DNR/DNI    Previous MBS:  Video Swallow Study completed at [facility] 07/12/2016  Oral Stage:   Bolus retrieval, mastication, manipulation, and transfer of all materials were WNL.    Pharyngeal Stage:   Swallow initiation was timely w/ good airway closure and epiglottic inversion. No pharyngeal retention, laryngeal penetration or aspiration was observed  Esophageal Stage:   briefly assessed, noted delayed clearance of food boluses from cervical-thoracic esophagus, improved clearance w/ liquids. Also noted brief pill stasis in same area. All materials emptied into stomach w/o difficulty.   Assessment Summary:   Oral and pharyngeal stages of swallowing appear WNL.   Esophageal dysphagia noted w/ delayed clearance from proximal esophagus.   Diagnosis/Prognosis:  Recommendations:   cont regular diet w/ thin liquids  Alternate food and liquids as needed.  meds as tolerated  F/u w/ GI     Patient's goal: \"I was plum pissed off\"    Did the pt report pain? no  If yes, was nursing notified/was it addressed?n/a    Reason for consult:  R/o aspiration  Determine safest and least restrictive diet  Stroke protocol  h/o dysphagia   H/o Head and neck CA    Precautions:  Fall    Food Allergies: No known    Current Diet: Regular and thin    Premorbid diet: Regular and thin   O2 requirement: Room air    Social/Prior living Lives alone    Voice/Speech: WNL   Follows commands: Basic    Cognitive status: Alert     Oral [facility] exam:  Dentition:Adequate   Lips (VII):WNL  Tongue (XII):midline   Mandible (V):WNL  Face/oral sensation (V):WNL  Velum (X):Hard palate with a visible spongy mass in the right hard palate, alveolar ridge, and into the gingivobuccal sulcus right side       Items administered:  Salad and thin liquids via straw with single/successive sips    Oral stage:  Lip closure:WNL  Mastication:WNL  Bolus formation:WNL  Bolus control:WNL  Transfer:WNL    Pharyngeal stage:  Swallow promptness:prompt  Laryngeal " rise:adequate   No overt s/s aspiration    Esophageal stage:  No s/s reported  H/o GERD  H/o hiatal hernia  H/o stricture w/ dilitation  H/o EGD 10/13/2023  IMPRESSION:  The esophagus appeared normal.  2 cm type I hiatal hernia  Empirically dilated the lower esophagus to 20 mm via TTS balloon dilator. No resistance felt. No post-dilation mucosal tear visualized.  The stomach and duodenum appeared normal  RECOMMENDATION:    Can follow up in the GI office as an outpatient

## 2024-07-19 NOTE — PLAN OF CARE
Problem: OCCUPATIONAL THERAPY ADULT  Goal: Performs self-care activities at highest level of function for planned discharge setting.  See evaluation for individualized goals.  Description: Treatment Interventions: ADL retraining, Functional transfer training, Endurance training, Cognitive reorientation, Patient/family training, Equipment evaluation/education, Neuromuscular reeducation, Compensatory technique education, Continued evaluation, Energy conservation, Activityengagement          See flowsheet documentation for full assessment, interventions and recommendations.   Outcome: Progressing  Note: Limitation: Decreased ADL status, Decreased UE ROM, Decreased Safe judgement during ADL, Decreased UE strength, Decreased endurance, Decreased self-care trans, Decreased high-level ADLs (dec balance, coordination)  Prognosis: Good  Assessment: Patient is a 89 y.o. year old female seen for OT eval s/p admit to [facility] on 7/18/2024 with episodic lightheadness.CLOF include: eating/grooming: Joaquin, UB ADLs: supervision , LB ADLs: supervision , toileting: supervision , bed mobility: DNT, functional transfers: supervision , functional mobility: supervision , standing tolerance: 5 minutes. Personal factors affecting pt at IE include: limited home support, difficulty performing ADLs, difficulty performing IADLs, difficulty performing transfers/mobility, fall risk , functional decline , access to transportation , advanced age, and multiple admissions . Pt presents to OT below baseline due to the following occupational performance deficits decreased UE ROM, decreased strength , decreased balance, decreased activity tolerance, limited functional reach, decreased safety awareness, increased pain, impaired coordination, and decreased postural control. Pt would benefit from continued Skilled OT tx while in SLA to address deficits as defined above and maximize level of functional independence w/ ADLs/IADLs and functional mobility. The  following occupational performance areas to address include: bathing/shower, toilet hygiene, dressing, medication management, socialization, health maintenance, functional mobility, community mobility, clothing management, cleaning, meal prep, money management, household maintenance, job performance/volunteering, and social participation. Based on OT evaluation, assessment(s), performance deficits listed, and current level of function, pt is identified as moderate complexity evaluation. From an OT standpoint, at this time discharge recommendation is level Level III (minimum resource intensity). OT will continue to follow up w/ pt 2-3x/wk to address the goals listed below to  w/in 10-14 days.     Rehab Resource Intensity Level, OT: III (Minimum Resource Intensity)

## 2024-07-19 NOTE — PLAN OF CARE
Problem: Potential for Falls  Goal: Patient will remain free of falls  Description: INTERVENTIONS:  - Educate patient/family on patient safety including physical limitations  - Instruct patient to call for assistance with activity   - Consult OT/PT to assist with strengthening/mobility   - Keep Call bell within reach  - Keep bed low and locked with side rails adjusted as appropriate  - Keep care items and personal belongings within reach  - Initiate and maintain comfort rounds  - Make Fall Risk Sign visible to staff  - Offer Toileting every 2 Hours, in advance of need  - Initiate/Maintain bed alarm  - Obtain necessary fall risk management equipment:   - Apply yellow socks and bracelet for high fall risk patients  - Consider moving patient to room near nurses station  Outcome: Progressing     Problem: PAIN - ADULT  Goal: Verbalizes/displays adequate comfort level or baseline comfort level  Description: Interventions:  - Encourage patient to monitor pain and request assistance  - Assess pain using appropriate pain scale  - Administer analgesics based on type and severity of pain and evaluate response  - Implement non-pharmacological measures as appropriate and evaluate response  - Consider cultural and social influences on pain and pain management  - Notify physician/advanced practitioner if interventions unsuccessful or patient reports new pain  Outcome: Progressing     Problem: INFECTION - ADULT  Goal: Absence or prevention of progression during hospitalization  Description: INTERVENTIONS:  - Assess and monitor for signs and symptoms of infection  - Monitor lab/diagnostic results  - Monitor all insertion sites, i.e. indwelling lines, tubes, and drains  - Monitor endotracheal if appropriate and nasal secretions for changes in amount and color  - [place] appropriate cooling/warming therapies per order  - Administer medications as ordered  - Instruct and encourage patient and family to use good hand hygiene  technique  - Identify and instruct in appropriate isolation precautions for identified infection/condition  Outcome: Progressing  Goal: Absence of fever/infection during neutropenic period  Description: INTERVENTIONS:  - Monitor WBC    Outcome: Progressing     Problem: SAFETY ADULT  Goal: Patient will remain free of falls  Description: INTERVENTIONS:  - Educate patient/family on patient safety including physical limitations  - Instruct patient to call for assistance with activity   - Consult OT/PT to assist with strengthening/mobility   - Keep Call bell within reach  - Keep bed low and locked with side rails adjusted as appropriate  - Keep care items and personal belongings within reach  - Initiate and maintain comfort rounds  - Make Fall Risk Sign visible to staff  - Offer Toileting every 2 Hours, in advance of need  - Initiate/Maintain bed alarm  - Obtain necessary fall risk management equipment:   - Apply yellow socks and bracelet for high fall risk patients  - Consider moving patient to room near nurses station  Outcome: Progressing  Goal: Maintain or return to baseline ADL function  Description: INTERVENTIONS:  -  Assess patient's ability to carry out ADLs; assess patient's baseline for ADL function and identify physical deficits which impact ability to perform ADLs (bathing, care of mouth/teeth, toileting, grooming, dressing, etc.)  - Assess/evaluate cause of self-care deficits   - Assess range of motion  - Assess patient's mobility; develop plan if impaired  - Assess patient's need for assistive devices and provide as appropriate  - Encourage maximum independence but intervene and supervise when necessary  - Involve family in performance of ADLs  - Assess for home care needs following discharge   - Consider OT consult to assist with ADL evaluation and planning for discharge  - Provide patient education as appropriate  Outcome: Progressing  Goal: Maintains/Returns to pre admission functional  level  Description: INTERVENTIONS:  - Perform AM-PAC 6 Click Basic Mobility/ Daily Activity assessment daily.  - Set and communicate daily mobility goal to care team and patient/family/caregiver.   - Collaborate with rehabilitation services on mobility goals if consulted  - Perform Range of Motion 3 times a day.  - Reposition patient every 2 hours.  - Dangle patient 3 times a day  - Stand patient 3 times a day  - Ambulate patient 3 times a day  - Out of bed to chair 3 times a day   - Out of bed for meals 3 times a day  - Out of bed for toileting  - Record patient progress and toleration of activity level   Outcome: Progressing     Problem: DISCHARGE PLANNING  Goal: Discharge to home or other facility with appropriate resources  Description: INTERVENTIONS:  - Identify barriers to discharge w/patient and caregiver  - Arrange for needed discharge resources and transportation as appropriate  - Identify discharge learning needs (meds, wound care, etc.)  - Arrange for interpretive services to assist at discharge as needed  - Refer to Case Management Department for coordinating discharge planning if the patient needs post-hospital services based on physician/advanced practitioner order or complex needs related to functional status, cognitive ability, or social support system  Outcome: Progressing     Problem: Knowledge Deficit  Goal: Patient/family/caregiver demonstrates understanding of disease process, treatment plan, medications, and discharge instructions  Description: Complete learning assessment and assess knowledge base.  Interventions:  - Provide teaching at level of understanding  - Provide teaching via preferred learning methods  Outcome: Progressing     Problem: Neurological Deficit  Goal: Neurological status is stable or improving  Description: Interventions:  - Monitor and assess patient's level of consciousness, motor function, sensory function, and level of assistance needed for ADLs.   - Monitor and report  changes from baseline. Collaborate with interdisciplinary team to initiate plan and implement interventions as ordered.   - Provide and maintain a safe environment.  - Consider seizure precautions.  - Consider fall precautions.  - Consider aspiration precautions.  - Consider bleeding precautions.  Outcome: Progressing     Problem: Activity Intolerance/Impaired Mobility  Goal: Mobility/activity is maintained at optimum level for patient  Description: Interventions:  - Assess and monitor patient  barriers to mobility and need for assistive/adaptive devices.  - Assess patient's emotional response to limitations.  - Collaborate with interdisciplinary team and initiate plans and interventions as ordered.  - Encourage independent activity per ability.  - Maintain proper body alignment.  - Perform active/passive rom as tolerated/ordered.  - Plan activities to conserve energy.  - Turn patient as appropriate  Outcome: Progressing     Problem: Communication Impairment  Goal: Ability to express needs and understand communication  Description: Assess patient's communication skills and ability to understand information.  Patient will demonstrate use of effective communication techniques, alternative methods of communication and understanding even if not able to speak.     - Encourage communication and provide alternate methods of communication as needed.  - Collaborate with case management/ for discharge needs.  - Include patient/family/caregiver in decisions related to communication.  Outcome: Progressing     Problem: Potential for Aspiration  Goal: Non-ventilated patient's risk of aspiration is minimized  Description: Assess and monitor vital signs, respiratory status, and labs (WBC).  Monitor for signs of aspiration (tachypnea, cough, rales, wheezing, cyanosis, fever).    - Assess and monitor patient's ability to swallow.  - Place patient up in chair to eat if possible.  - HOB up at 90 degrees to eat if unable  to get patient up into chair.  - Supervise patient during oral intake.   - Instruct patient/ family to take small bites.  - Instruct patient/ family to take small single sips when taking liquids.  - Follow patient-specific strategies generated by speech pathologist.  Outcome: Progressing  Goal: Ventilated patient's risk of aspiration is minimized  Description: Assess and monitor vital signs, respiratory status, airway cuff pressure, and labs (WBC).  Monitor for signs of aspiration (tachypnea, cough, rales, wheezing, cyanosis, fever).    - Elevate head of bed 30 degrees if patient has tube feeding.  - Monitor tube feeding.  Outcome: Progressing     Problem: Nutrition  Goal: Nutrition/Hydration status is improving  Description: Monitor and assess patient's nutrition/hydration status for malnutrition (ex- brittle hair, bruises, dry skin, pale skin and conjunctiva, muscle wasting, smooth red tongue, and disorientation). Collaborate with interdisciplinary team and initiate plan and interventions as ordered.  Monitor patient's weight and dietary intake as ordered or per policy. Utilize nutrition screening tool and intervene per policy. Determine patient's food preferences and provide high-protein, high-caloric foods as appropriate.     - Assist patient with eating.  - Allow adequate time for meals.  - Encourage patient to take dietary supplement as ordered.  - Collaborate with clinical nutritionist.  - Include patient/family/caregiver in decisions related to nutrition.  Outcome: Progressing

## 2024-07-19 NOTE — OCCUPATIONAL THERAPY NOTE
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), wet (HCC)     bilateral    Anemia N/A    Aneurysm of aortic root (HCC)     last assessed - [id number]    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HCC)     Aortic root dilatation (HCC)     last assessed - 22Fmf5468    Arthritis     Ascending aortic aneurysm (HCC)     last assessed - 89Rty7840    Asthma     Basal cell carcinoma     last assessed - 37Owr7351    Cancer (HCC) 2015    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HCC)     Coronary artery disease     Diabetes mellitus (HCC)     Disease of thyroid gland     Facet arthropathy, cervical     last assessed - 35Qqa8068    Headache(784.0)     All my life    History of fracture of vertebral column     Hyperlipidemia     Hypertension     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HCC)     Hypoxia     last assessed - 73Wyx5014    Junctional rhythm     last assessed - 10Jkf7460    Lightheadedness     last assessed - 13Ahj6735    Migraine     Palpitations     last assessed - 35Rkx7260    Pleural effusion, bilateral     last assessed - 13Lqj8496    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    Dont know    Shortness of breath     last assessed - 88Acq1526    Stroke (HCC)     Thyroid trouble     Trigger finger     last assessed - 74Frx4196    Trigger middle finger of right hand     last assessed - 06Upv5240    Urinary incontinence     last assessed -  22Jul,2013; Resolved - 15Jan2017    Visual impairment Feb 2015    Wet macular degeneration both eyes     Past Surgical History  Past  Surgical History:   Procedure Laterality Date    ABDOMINAL AORTIC ANEURYSM REPAIR W/ ENDOLUMINAL GRAFT  2015    Ascending aorta and hemiarch replacement with 30 mm Vascutek Gelweave graft; Managed by Scott Archuleta; last assessed - 2016    APPENDECTOMY      BLADDER SURGERY      Reccurent histoy of bladder surgery    HOROWITZ PROCEDURE      CARDIAC CATHETERIZATION  2004    Procedure summary - Luminal irregularities; last assessed - 2015     SECTION      CORONARY ANEURYSM REPAIR      CYSTOSCOPY      botox injection    HYSTERECTOMY      OK NDSC NJX IMPLT MATRL URT&/BLDR NCK N/A 2017    Procedure: CYSTOSCOPY; DURASPHERE-PERIURETHRAL BULKING AGENT INJECTION ;  Surgeon: Rayo Moulton MD;  Location: [facility];  Service: Urology    OK TENDON SHEATH INCISION Right 10/18/2016    Procedure: LONG FINGER TRIGGER RELEASE ;  Surgeon: Khari Najera MD;  Location: [facility];  Service: Orthopedics    THYROIDECTOMY      Total Thyroidectomy    TONSILLECTOMY AND ADENOIDECTOMY           24 0901   OT Last Visit   OT Visit Date 24   Note Type   Note type Evaluation   Pain Assessment   Pain Assessment Tool 0-10   Pain Score 5   Pain Location/Orientation Location: Head   Hospital Pain Intervention(s) Repositioned;Ambulation/increased activity;Emotional support   Restrictions/Precautions   Other Precautions Fall Risk;Pain   Home Living   Type of Home Apartment   Home Layout One level  (0 ASHLEY)   Bathroom Shower/Tub Tub/shower unit  (Cut out tub)   Bathroom Toilet Standard   Bathroom Equipment Shower chair;Grab bars in shower  (pt reports using SC PRN)   Home Equipment Walker;Other (Comment)  (hurrycane)   Additional Comments No AD at baseline   Prior Function   Level of [place] Independent with ADLs;Independent with functional mobility;Independent with IADLS   Lives With (S)  Alone   Receives Help From Family   IADLs Independent with driving;Independent with meal prep;Independent with  "medication management   Falls in the last 6 months 0   Vocational Retired   Comments (+) alone. (+) support. (+)  (only to familiar places)   Lifestyle   Autonomy PTA pt was living in an apartment w/ tub/shower unit with a cut out in the tub w/ SC/ GB, raised toilet. AD: JACOBO, chichi.  PLOF was (I) with ADLs and was (I) with IADLs.   Reciprocal Relationships son   Service to Others mother   Intrinsic Gratification will continue to assess   General   Additional Pertinent History Coexisting medical conditions affecting pt's functional performance at time of assessment include: asthma-COPD, HTN, CKD3. Patient with active OT orders and activity orders for Up and OOB as tolerated to assess ADLs/IADLs/functional mobility and assist w/ D/C planning.   Family/Caregiver Present No   Subjective   Subjective pt agreeable to OT/PT co eval. \"I am scared of falling\"   ADL   Where Assessed Edge of bed   Eating Assistance 6  Modified independent   Grooming Assistance 6  Modified Independent   UB Bathing Assistance 5  Supervision/Setup   LB Bathing Assistance 5  Supervision/Setup   UB Dressing Assistance 5  Supervision/Setup   LB Dressing Assistance 5  Supervision/Setup   Toileting Assistance  5  Supervision/Setup   Transfers   Sit to Stand 5  Supervision   Additional items Bedrails;Increased time required;Assist x 1;Verbal cues   Stand to Sit 5  Supervision   Additional items Increased time required;Verbal cues;Armrests;Assist x 1   Functional Mobility   Functional Mobility 5  Supervision   Additional Comments pt completed functional mobility for an unlimited household distance without AD   Balance   Static Sitting Good   Dynamic Sitting Fair +   Static Standing Fair   Dynamic Standing Fair -   Ambulatory Fair -   Activity Tolerance   Activity Tolerance Patient tolerated treatment well   Medical Staff Made Aware PT Manan   Nurse Made Aware ERMELINDA JOHNSON Assessment   RUE Assessment WFL  (shoulder mmt:3+/5, elbow 4-/5)   LUE " "Assessment   LUE Assessment WFL  (shoulder mmt:3+/5, elbow 4-/5)   Hand Function   Gross Motor Coordination Functional   Fine Motor Coordination Functional   Sensation   Light Touch No apparent deficits   Proprioception   Proprioception No apparent deficits   Vision-Basic Assessment   Current Vision Wears glasses all the time   Vision - Complex Assessment   Ocular Range of Motion Intact   Perception   Inattention/Neglect Appears intact   Cognition   Overall Cognitive Status WFL   Arousal/Participation Alert;Responsive;Cooperative   Attention Attends with cues to redirect   Orientation Level Oriented X4  (intially reported year \"1924\" then self corrected.)   Memory Decreased recall of precautions   Following Commands Follows one step commands without difficulty   Comments In Jan 2023, pt scored 21/30 on MOCA, completed f/u during that time w/ ACLS and pt scored 4.2, indicating 24/7 S required to maintain safety. Pt appears A&Ox4, maintains appropriate conversation. Will f/u w/ reassessment to assist w safe d/c planning.   Assessment   Limitation Decreased ADL status;Decreased UE ROM;Decreased Safe judgement during ADL;Decreased UE strength;Decreased endurance;Decreased self-care trans;Decreased high-level ADLs  (dec balance, coordination)   Prognosis Good   Assessment Patient is a 89 y.o. year old female seen for OT eval s/p admit to [facility] on 7/18/2024 with episodic lightheadness.CLOF include: eating/grooming: Joaquin, UB ADLs: supervision , LB ADLs: supervision , toileting: supervision , bed mobility: DNT, functional transfers: supervision , functional mobility: supervision , standing tolerance: 5 minutes. Personal factors affecting pt at IE include: limited home support, difficulty performing ADLs, difficulty performing IADLs, difficulty performing transfers/mobility, fall risk , functional decline , access to transportation , advanced age, and multiple admissions . Pt presents to OT below baseline due to the following " occupational performance deficits decreased UE ROM, decreased strength , decreased balance, decreased activity tolerance, limited functional reach, decreased safety awareness, increased pain, impaired coordination, and decreased postural control. Pt would benefit from continued Skilled OT tx while in [facility] to address deficits as defined above and maximize level of functional independence w/ ADLs/IADLs and functional mobility. The following occupational performance areas to address include: bathing/shower, toilet hygiene, dressing, medication management, socialization, health maintenance, functional mobility, community mobility, clothing management, cleaning, meal prep, money management, household maintenance, job performance/volunteering, and social participation. Based on OT evaluation, assessment(s), performance deficits listed, and current level of function, pt is identified as moderate complexity evaluation. From an OT standpoint, at this time discharge recommendation is level Level III (minimum resource intensity). OT will continue to follow up w/ pt 2-3x/wk to address the goals listed below to  w/in 10-14 days.   Goals   Patient Goals to return home   LTG Time Frame 10-14   Plan   Treatment Interventions ADL retraining;Functional transfer training;Endurance training;Cognitive reorientation;Patient/family training;Equipment evaluation/education;Neuromuscular reeducation;Compensatory technique education;Continued evaluation;Energy conservation;Activityengagement   Goal Expiration Date 24   OT Treatment Day 0   OT Frequency 2-3x/wk   Discharge Recommendation   Rehab Resource Intensity Level, OT III (Minimum Resource Intensity)   Additional Comments  Pt benefited from co eval w/ PT due to secondary complex medical condition of pt, possible A of 2 required to achieve and maintain transitional movements, requiring the need of skilled therapeutic intervention of 2 therapists to achieve delivery of services.    AM-PAC Daily Activity Inpatient   Lower Body Dressing 3   Bathing 3   Toileting 3   Upper Body Dressing 3   Grooming 3   Eating 3   Daily Activity Raw Score 18   Daily Activity Standardized Score (Calc for Raw Score >=11) 38.66       ADL Goals  Pt will complete UB tasks with mod (I) assistance w/ DME PRN  Pt will complete LB tasks with mod (I) assistance w/ DME PRN  Pt will demonstrate mod (I) assistance w/ toileting w/ DME PRN    IADL Goals  Pt will demonstrate mod (I) with medication management.  Pt will demonstrate IADL performance simulation w/ mod (I) to show carryover once discharged.     Mobility Goals  Pt will demonstrate good bed mobility w/ Mod (I) to participate in frequent repositioning to shift weight improving skin integrity.  Pt will increase activity tolerance to 15 minutes to increase endurance in ADLs.   Pt will increase functional mobility with mod (I) for a community distance distance in order to increase endurance to complete ADLs/IADLs.   Pt will increase UE strength by 1 mmt grade to be able to complete ADLs and transfers.  Pt will complete functional transfers at mod (I) level w/ DME PRN with good safety awareness.   Pt will demonstrate carryover of AD safety during activities to facilitate independence w/ functional mobility for activity/community participation.   Pt will demonstrate compensatory techniques for energy conservation to help with endurance during completion of ADLs.   Pt will demonstrate Improved balance by 1 grade to reduce risk for falls while completing ADLs.     Cognition goals  Pt will follow 1 step commands without redirection 100% of the time.  Pt will participate in a formal cognitive assessment w/ minimal cuing and redirection 100% of the time.      Leisure Goals   Pt will identify 2 activities in hospital/home/community setting which will promote physical/ emotional or cognitive wellbeing    Sonya Boyd

## 2024-07-19 NOTE — PLAN OF CARE
Problem: PAIN - ADULT  Goal: Verbalizes/displays adequate comfort level or baseline comfort level  Description: Interventions:  - Encourage patient to monitor pain and request assistance  - Assess pain using appropriate pain scale  - Administer analgesics based on type and severity of pain and evaluate response  - Implement non-pharmacological measures as appropriate and evaluate response  - Consider cultural and social influences on pain and pain management  - Notify physician/advanced practitioner if interventions unsuccessful or patient reports new pain  Outcome: Progressing     Problem: INFECTION - ADULT  Goal: Absence or prevention of progression during hospitalization  Description: INTERVENTIONS:  - Assess and monitor for signs and symptoms of infection  - Monitor lab/diagnostic results  - Monitor all insertion sites, i.e. indwelling lines, tubes, and drains  - Monitor endotracheal if appropriate and nasal secretions for changes in amount and color  - [place] appropriate cooling/warming therapies per order  - Administer medications as ordered  - Instruct and encourage patient and family to use good hand hygiene technique  - Identify and instruct in appropriate isolation precautions for identified infection/condition  Outcome: Progressing  Goal: Absence of fever/infection during neutropenic period  Description: INTERVENTIONS:  - Monitor WBC    Outcome: Progressing     Problem: SAFETY ADULT  Goal: Maintain or return to baseline ADL function  Description: INTERVENTIONS:  - Educate patient/family on patient safety including physical limitations  - Instruct patient to call for assistance with activity   - Consult OT/PT to assist with strengthening/mobility   - Keep Call bell within reach  - Keep bed low and locked with side rails adjusted as appropriate  - Keep care items and personal belongings within reach  - Initiate and maintain comfort rounds  - Make Fall Risk Sign visible to staff  - Apply yellow socks  and bracelet for high fall risk patients  - Consider moving patient to room near nurses station  Outcome: Progressing  Goal: Maintains/Returns to pre admission functional level  Description: INTERVENTIONS:  - Perform AM-PAC 6 Click Basic Mobility/ Daily Activity assessment daily.  - Set and communicate daily mobility goal to care team and patient/family/caregiver.   - Collaborate with rehabilitation services on mobility goals if consulted  - Out of bed for toileting  - Record patient progress and toleration of activity level   Outcome: Progressing     Problem: DISCHARGE PLANNING  Goal: Discharge to home or other facility with appropriate resources  Description: INTERVENTIONS:  - Identify barriers to discharge w/patient and caregiver  - Arrange for needed discharge resources and transportation as appropriate  - Identify discharge learning needs (meds, wound care, etc.)  - Arrange for interpretive services to assist at discharge as needed  - Refer to Case Management Department for coordinating discharge planning if the patient needs post-hospital services based on physician/advanced practitioner order or complex needs related to functional status, cognitive ability, or social support system  Outcome: Progressing     Problem: Knowledge Deficit  Goal: Patient/family/caregiver demonstrates understanding of disease process, treatment plan, medications, and discharge instructions  Description: Complete learning assessment and assess knowledge base.  Interventions:  - Provide teaching at level of understanding  - Provide teaching via preferred learning methods  Outcome: Progressing     Problem: Neurological Deficit  Goal: Neurological status is stable or improving  Description: Interventions:  - Monitor and assess patient's level of consciousness, motor function, sensory function, and level of assistance needed for ADLs.   - Monitor and report changes from baseline. Collaborate with interdisciplinary team to initiate plan  and implement interventions as ordered.   - Provide and maintain a safe environment.  - Consider seizure precautions.  - Consider fall precautions.  - Consider aspiration precautions.  - Consider bleeding precautions.  Outcome: Progressing     Problem: Activity Intolerance/Impaired Mobility  Goal: Mobility/activity is maintained at optimum level for patient  Description: Interventions:  - Assess and monitor patient  barriers to mobility and need for assistive/adaptive devices.  - Assess patient's emotional response to limitations.  - Collaborate with interdisciplinary team and initiate plans and interventions as ordered.  - Encourage independent activity per ability.  - Maintain proper body alignment.  - Perform active/passive rom as tolerated/ordered.  - Plan activities to conserve energy.  - Turn patient as appropriate  Outcome: Progressing     Problem: Communication Impairment  Goal: Ability to express needs and understand communication  Description: Assess patient's communication skills and ability to understand information.  Patient will demonstrate use of effective communication techniques, alternative methods of communication and understanding even if not able to speak.     - Encourage communication and provide alternate methods of communication as needed.  - Collaborate with case management/ for discharge needs.  - Include patient/family/caregiver in decisions related to communication.  Outcome: Progressing     Problem: Potential for Aspiration  Goal: Non-ventilated patient's risk of aspiration is minimized  Description: Assess and monitor vital signs, respiratory status, and labs (WBC).  Monitor for signs of aspiration (tachypnea, cough, rales, wheezing, cyanosis, fever).    - Assess and monitor patient's ability to swallow.  - Place patient up in chair to eat if possible.  - HOB up at 90 degrees to eat if unable to get patient up into chair.  - Supervise patient during oral intake.   -  Instruct patient/ family to take small bites.  - Instruct patient/ family to take small single sips when taking liquids.  - Follow patient-specific strategies generated by speech pathologist.  Outcome: Progressing     Problem: Nutrition  Goal: Nutrition/Hydration status is improving  Description: Monitor and assess patient's nutrition/hydration status for malnutrition (ex- brittle hair, bruises, dry skin, pale skin and conjunctiva, muscle wasting, smooth red tongue, and disorientation). Collaborate with interdisciplinary team and initiate plan and interventions as ordered.  Monitor patient's weight and dietary intake as ordered or per policy. Utilize nutrition screening tool and intervene per policy. Determine patient's food preferences and provide high-protein, high-caloric foods as appropriate.     - Assist patient with eating.  - Allow adequate time for meals.  - Encourage patient to take dietary supplement as ordered.  - Collaborate with clinical nutritionist.  - Include patient/family/caregiver in decisions related to nutrition.  Outcome: Progressing     Problem: Potential for Falls  Goal: Patient will remain free of falls  Description: INTERVENTIONS:  - Educate patient/family on patient safety including physical limitations  - Instruct patient to call for assistance with activity   - Consult OT/PT to assist with strengthening/mobility   - Keep Call bell within reach  - Keep bed low and locked with side rails adjusted as appropriate  - Keep care items and personal belongings within reach  - Initiate and maintain comfort rounds  - Make Fall Risk Sign visible to staff  - Initiate/Maintain bed and chair alarm  - Apply yellow socks and bracelet for high fall risk patients  - Consider moving patient to room near nurses station  Outcome: Completed     Problem: SAFETY ADULT  Goal: Patient will remain free of falls  Description: INTERVENTIONS:  - Educate patient/family on patient safety including physical  limitations  - Instruct patient to call for assistance with activity   - Consult OT/PT to assist with strengthening/mobility   - Keep Call bell within reach  - Keep bed low and locked with side rails adjusted as appropriate  - Keep care items and personal belongings within reach  - Initiate and maintain comfort rounds  - Make Fall Risk Sign visible to staff  - Initiate/Maintain bed and chair alarm  - Apply yellow socks and bracelet for high fall risk patients  - Consider moving patient to room near nurses station  Outcome: Completed     Problem: Potential for Aspiration  Goal: Ventilated patient's risk of aspiration is minimized  Description: Assess and monitor vital signs, respiratory status, airway cuff pressure, and labs (WBC).  Monitor for signs of aspiration (tachypnea, cough, rales, wheezing, cyanosis, fever).    - Elevate head of bed 30 degrees if patient has tube feeding.  - Monitor tube feeding.  Outcome: Completed

## 2024-07-19 NOTE — ASSESSMENT & PLAN NOTE
"89 year old female presented for evaluation of intermittent lightheadedness/near syncope. Symptoms were worse when moving head side to side or bending over. No associated focal neurologic deficits. She did previously have episodes of vertigo in May 2024, which improved with Meclizine.     MRI brain negative for stroke. Questionable evidence of CAA present on MRI.     Work up completed:  CT Brain:  No acute intracranial abnormality.  CT Angiography: High-grade stenosis involving the right posterior cerebral artery P2 segment. No additional large vessel occlusion, high-grade stenosis, or intracranial aneurysm is identified on CT angiogram of the head. No hemodynamically significant stenosis or dissection identified on CT angiogram of the neck. Mild increase in size of the destructive mass involving the posterior aspect of the hard palate on the right, extending to involve the posterior aspect of the floor of the right maxillary sinus. Stable thoracic aortic aneurysm involving the visualized aortic arch.  MRI Brain: negative for stroke. \"Partially evaluated expansile, destructive lesion involving the posterior aspect of the hard palate to the right of midline and extending to the floor of the right maxillary sinus, better evaluated on prior imaging.\"  CT C spine- No significant degenerative changes. Prior compression fractures.       Ultimately, patient was found to have positive orthostatic blood pressures.   Lying- 154/73  Sitting- 148/73  Standing- 109/69  Suspect this to be the cause of patient's lightheadedness.     There is no evidence of acute posterior circulation stroke and there is no evidence of severe arthritic changes to suggest cervical cause of dizziness.     PLAN:  - Follow up echocardiogram  - Will defer management of orthostatic hypotension to primary team/PCP   - No additional inpatient neurologic workup necessary at this time.   - Consider Zio patch/loop recorder to rule out additional " cardiac source of lightheadedness  - Recommend continued PT  - Recommend case management discussion- patient previously expressed interest in transitioning to an assisted living facility.

## 2024-07-19 NOTE — UTILIZATION REVIEW
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     UTILIZATION REVIEW CONTACT:  Tessa Burgess, Utilization   Network Utilization Review Department  Phone: [phone]  Fax: [phone]  Email: [email or web address]  Contact for approvals/pending authorizations, clinical reviews, and discharge.     PHYSICIAN ADVISORY SERVICES:  Medical Necessity Denial & Asgs-nj-Dtmy Review  Phone: [phone]  Fax: [phone]  Email: [email or web address]     DISCHARGE SUPPORT TEAM:  For Patients Discharge Needs & Updates  Phone: [phone] opt. 2 Fax: [phone]  Email: [email or web address]

## 2024-07-19 NOTE — PROGRESS NOTES
Patient:  REDD WALSH    MRN:  [id number]    Aidin Request ID:  [id number]    Level of care reserved:  Home Health Agency    Partner Reserved:  [facility] [place] [address] [phone]    Clinical needs requested:    Geography searched:  [phone]    Start of Service:    Request sent:  10:36am EDT on 7/19/2024 by Guerline Weber    Partner reserved:  12:35pm EDT on 7/19/2024 by Guerline Weber    Choice list shared:  11:32am EDT on 7/19/2024 by Guerline Weber

## 2024-07-19 NOTE — ASSESSMENT & PLAN NOTE
- Evidence of tenderness to palpation at base of skull  - Can trial gabapentin 100 mg if needed - discussed side effects, though patient reports her topical creams are working at this time.   - Patient declining outpatient injections

## 2024-07-19 NOTE — PROGRESS NOTES
"Progress Note - Neurology   June A Weddermann 89 y.o. female [id number]  Unit/Bed#: East 4 /E4 -*    Assessment/Plan:    * Orthostatic hypotension  Assessment & Plan  89 year old female presented for evaluation of intermittent lightheadedness/near syncope. Symptoms were worse when moving head side to side or bending over. No associated focal neurologic deficits. She did previously have episodes of vertigo in May 2024, which improved with Meclizine.     MRI brain negative for stroke. Questionable evidence of CAA present on MRI.     Work up completed:  CT Brain:  No acute intracranial abnormality.  CT Angiography: High-grade stenosis involving the right posterior cerebral artery P2 segment. No additional large vessel occlusion, high-grade stenosis, or intracranial aneurysm is identified on CT angiogram of the head. No hemodynamically significant stenosis or dissection identified on CT angiogram of the neck. Mild increase in size of the destructive mass involving the posterior aspect of the hard palate on the right, extending to involve the posterior aspect of the floor of the right maxillary sinus. Stable thoracic aortic aneurysm involving the visualized aortic arch.  MRI Brain: negative for stroke. \"Partially evaluated expansile, destructive lesion involving the posterior aspect of the hard palate to the right of midline and extending to the floor of the right maxillary sinus, better evaluated on prior imaging.\"  CT C spine- No significant degenerative changes. Prior compression fractures.       Ultimately, patient was found to have positive orthostatic blood pressures.   Lying- 154/73  Sitting- 148/73  Standing- 109/69  Suspect this to be the cause of patient's lightheadedness.     There is no evidence of acute posterior circulation stroke and there is no evidence of severe arthritic changes to suggest cervical cause of dizziness.     PLAN:  - Follow up echocardiogram  - Will defer management of " orthostatic hypotension to primary team/PCP   - No additional inpatient neurologic workup necessary at this time.   - Consider Zio patch/loop recorder to rule out additional cardiac source of lightheadedness  - Recommend continued PT  - Recommend case management discussion- patient previously expressed interest in transitioning to an assisted living facility.    Occipital neuralgia  Assessment & Plan  - Evidence of tenderness to palpation at base of skull  - Can trial gabapentin 100 mg if needed - discussed side effects, though patient reports her topical creams are working at this time.   - Patient declining outpatient injections        June A Weddermann can follow up with outpatient general neurology in 4-6 weeks.         Subjective:   Reports continued pain and pressure his AM in neck, but no significant episodes of lightheadedness today. The pain is point tender. She reports that her topical creams are beneficial and she declines injections/other medications at this time.       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), wet (HCC)     bilateral    Anemia N/A    Aneurysm of aortic root (HCC)     last assessed - [id number]    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HCC)     Aortic root dilatation (HCC)     last assessed - 19Gpy1156    Arthritis     Ascending aortic aneurysm (HCC)     last assessed - [id number]    Asthma     Basal cell carcinoma     last assessed - 20Rpz8260    Cancer (HCC) 2015    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HCC)     Coronary artery disease     Diabetes mellitus (HCC)     Disease of thyroid gland     Facet arthropathy, cervical     last assessed - 08Jun2017    Headache(784.0)     All my life    History of fracture of vertebral column     Hyperlipidemia     Hypertension     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HCC)     Hypoxia     last assessed - 16Mar2015    Junctional rhythm     last assessed -  2017    Lightheadedness     last assessed - 2016    Migraine     Palpitations     last assessed - 2016    Pleural effusion, bilateral     last assessed - 2015    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    Dont know    Shortness of breath     last assessed - 2017    Stroke (HCC)     Thyroid trouble     Trigger finger     last assessed - 2016    Trigger middle finger of right hand     last assessed - 2016    Urinary incontinence     last assessed -  ; Resolved - 2017    Visual impairment 2015    Wet macular degeneration both eyes     Past Surgical History:   Procedure Laterality Date    ABDOMINAL AORTIC ANEURYSM REPAIR W/ ENDOLUMINAL GRAFT  2015    Ascending aorta and hemiarch replacement with 30 mm Vascutek Gelweave graft; Managed by Scott Archuleta; last assessed - 2016    APPENDECTOMY      BLADDER SURGERY      Reccurent histoy of bladder surgery    HOROWITZ PROCEDURE      CARDIAC CATHETERIZATION  2004    Procedure summary - Luminal irregularities; last assessed - 2015     SECTION      CORONARY ANEURYSM REPAIR      CYSTOSCOPY      botox injection    HYSTERECTOMY      MD NDSC NJX IMPLT MATRL URT&/BLDR NCK N/A 2017    Procedure: CYSTOSCOPY; DURASPHERE-PERIURETHRAL BULKING AGENT INJECTION ;  Surgeon: Rayo Moulton MD;  Location: [facility];  Service: Urology    MD TENDON SHEATH INCISION Right 10/18/2016    Procedure: LONG FINGER TRIGGER RELEASE ;  Surgeon: Khari Najera MD;  Location: [facility];  Service: Orthopedics    THYROIDECTOMY      Total Thyroidectomy    TONSILLECTOMY AND ADENOIDECTOMY       Family History   Problem Relation Age of Onset    Coronary aneurysm Sister     Coronary artery disease Sister         CABG    Cancer Sister     Heart disease Family         cardiac disorder    Hypertension Family     Cancer Family     Thyroid disease Family      Social History     Socioeconomic History    Marital status:       Spouse name: None    Number of children: None    Years of education: None    Highest education level: None   Occupational History    None   Tobacco Use    Smoking status: Former     Current packs/day: 0.00     Average packs/day: 0.3 packs/day for 58.0 years (14.5 ttl pk-yrs)     Types: Cigarettes     Start date: 1957     Quit date: 2014     Years since quittin.5    Smokeless tobacco: Never    Tobacco comments:     smoked 17 yrs 12 ppd quit and restarted then quit  10 days ago during 2014    Vaping Use    Vaping status: Never Used   Substance and Sexual Activity    Alcohol use: Not Currently     Comment: Social drinker    Drug use: No    Sexual activity: Not Currently   Other Topics Concern    None   Social History Narrative    None     Social Determinants of Health     Financial Resource Strain: Medium Risk (3/8/2023)    Overall Financial Resource Strain (CARDIA)     Difficulty of Paying Living Expenses: Somewhat hard   Food Insecurity: No Food Insecurity (2024)    Hunger Vital Sign     Worried About Running Out of Food in the Last Year: Never true     Ran Out of Food in the Last Year: Never true   Transportation Needs: Unmet Transportation Needs (2024)    PRAPARE - Transportation     Lack of Transportation (Medical): Yes     Lack of Transportation (Non-Medical): Yes   Physical Activity: Not on file   Stress: Not on file   Social Connections: Unknown (2024)    Received from [employer]    Social Viewglass     How often do you feel lonely or isolated from those around you? (Adult - for ages 18 years and over): Not on file   Intimate Partner Violence: Not on file   Housing Stability: Low Risk  (2024)    Housing Stability Vital Sign     Unable to Pay for Housing in the Last Year: No     Number of Times Moved in the Last Year: 1     Homeless in the Last Year: No         Medications:  All current active meds have been reviewed and current meds:  Scheduled Meds:  Current  "Facility-Administered Medications   Medication Dose Route Frequency Provider Last Rate    acetaminophen  650 mg Oral Q4H PRN Shabbir Avilez, DO      albuterol  2.5 mg Nebulization Q6H PRN Shabbir Avilez, DO      atorvastatin  40 mg Oral QPM Shabbir Avilez DO      heparin (porcine)  5,000 Units Subcutaneous Q8H [facility] Shabbir Avilez, DO      levothyroxine  112 mcg Oral Early Morning Shabbir Avilez, DO      metoprolol succinate  25 mg Oral Daily Shabbir Avilez DO      ondansetron  4 mg Intravenous Q6H PRN Shabbir Avilez, DO      pantoprazole  40 mg Oral BID AC Shabbir Avilez, DO      sodium chloride  100 mL/hr Intravenous Continuous Bryanna Dumont  mL/hr (07/19/24 1133)     Continuous Infusions:sodium chloride, 100 mL/hr, Last Rate: 100 mL/hr (07/19/24 1133)      PRN Meds:.  acetaminophen    albuterol    ondansetron       ROS:   Review of Systems   Musculoskeletal:  Positive for neck pain.   Neurological:  Positive for light-headedness.             Vitals:   /69   Pulse 69   Temp 98 °F (36.7 °C) (Temporal)   Resp 16   Ht 5' 1\" (1.549 m)   Wt 52.2 kg (115 lb)   SpO2 96%   BMI 21.73 kg/m²     Physical Exam:   Physical Exam  Vitals and nursing note reviewed.   Constitutional:       General: She is not in acute distress.     Appearance: She is not ill-appearing, toxic-appearing or diaphoretic.   HENT:      Head: Normocephalic and atraumatic.      Nose: Nose normal.      Mouth/Throat:      Mouth: Mucous membranes are moist.      Pharynx: Oropharynx is clear.   Eyes:      General: No scleral icterus.        Right eye: No discharge.         Left eye: No discharge.      Extraocular Movements: Extraocular movements intact and EOM normal.   Cardiovascular:      Rate and Rhythm: Normal rate.   Pulmonary:      Effort: Pulmonary effort is normal. No respiratory distress.   Neurological:      Mental Status: She is alert.      Comments: Detailed below       Neurologic Exam     Mental Status " "  Awake, alert, following commands without difficulty. No speech changes.      Cranial Nerves     CN III, IV, VI   Extraocular motions are normal.   Conjugate gaze: present    CN VII   Facial expression full, symmetric.     CN VIII   Hearing: intact (Grossly intact to voice)    Motor Exam Moving all extremities spontaneously.      Gait, Coordination, and Reflexes     Gait  Gait: (Deferred for safety)    Tremor   Resting tremor: absent          Labs: I have personally reviewed pertinent reports.   Recent Results (from the past 24 hour(s))   ECG 12 lead    Collection Time: 07/18/24  1:39 PM   Result Value Ref Range    Ventricular Rate 78 BPM    Atrial Rate 78 BPM    AL Interval 198 ms    QRSD Interval 78 ms    QT Interval 410 ms    QTC Interval 467 ms    P Axis 75 degrees    QRS Axis 144 degrees    T Wave [place] 45 degrees   HS Troponin I 2hr    Collection Time: 07/18/24  1:43 PM   Result Value Ref Range    hs TnI 2hr 7 \"Refer to ACS Flowchart\"- see link ng/L    Delta 2hr hsTnI 0 <20 ng/L   HS Troponin I 4hr    Collection Time: 07/18/24  4:03 PM   Result Value Ref Range    hs TnI 4hr 12 \"Refer to ACS Flowchart\"- see link ng/L    Delta 4hr hsTnI 5 <20 ng/L   ECG 12 lead    Collection Time: 07/18/24  4:05 PM   Result Value Ref Range    Ventricular Rate 70 BPM    Atrial Rate 70 BPM    AL Interval 180 ms    QRSD Interval 72 ms    QT Interval 396 ms    QTC Interval 427 ms    P Axis 90 degrees    QRS Axis 97 degrees    T Wave Axis 74 degrees   Basic metabolic panel    Collection Time: 07/19/24  5:02 AM   Result Value Ref Range    Sodium 142 135 - 147 mmol/L    Potassium 4.0 3.5 - 5.3 mmol/L    Chloride 105 96 - 108 mmol/L    CO2 31 21 - 32 mmol/L    ANION GAP 6 4 - 13 mmol/L    BUN 23 5 - 25 mg/dL    Creatinine 0.72 0.60 - 1.30 mg/dL    Glucose 91 65 - 140 mg/dL    Calcium 8.9 8.4 - 10.2 mg/dL    eGFR 72 ml/min/1.73sq m   CBC and differential    Collection Time: 07/19/24  5:02 AM   Result Value Ref Range    WBC 5.82 4.31 - " 10.16 Thousand/uL    RBC 4.05 3.81 - 5.12 Million/uL    Hemoglobin 12.2 11.5 - 15.4 g/dL    Hematocrit 38.0 34.8 - 46.1 %    MCV 94 82 - 98 fL    MCH 30.1 26.8 - 34.3 pg    MCHC 32.1 31.4 - 37.4 g/dL    RDW 14.1 11.6 - 15.1 %    MPV 12.2 8.9 - 12.7 fL    Platelets 194 149 - 390 Thousands/uL    nRBC 0 /100 WBCs    Segmented % 49 43 - 75 %    Immature Grans % 0 0 - 2 %    Lymphocytes % 36 14 - 44 %    Monocytes % 12 4 - 12 %    Eosinophils Relative 2 0 - 6 %    Basophils Relative 1 0 - 1 %    Absolute Neutrophils 2.90 1.85 - 7.62 Thousands/µL    Absolute Immature Grans 0.01 0.00 - 0.20 Thousand/uL    Absolute Lymphocytes 2.08 0.60 - 4.47 Thousands/µL    Absolute Monocytes 0.67 0.17 - 1.22 Thousand/µL    Eosinophils Absolute 0.12 0.00 - 0.61 Thousand/µL    Basophils Absolute 0.04 0.00 - 0.10 Thousands/µL   Echo complete w/ contrast if indicated    Collection Time: 07/19/24 11:59 AM   Result Value Ref Range    BSA 1.49 m2       Imaging: I have personally reviewed pertinent imaging in PACS, including MRI, CT C spine,  and I have personally reviewed PACS reports.       History and physical examination obtained by attending neurologist. AP in room as witness to history and physical examination and acted solely as a scribe for attending neurologist for this encounter. All medical decision making per attending.     EKG, Pathology, and Other Studies: I have personally reviewed pertinent reports.       VTE Prophylaxis: Heparin

## 2024-07-20 NOTE — ASSESSMENT & PLAN NOTE
Buffy presented with symptoms of vertigo and tested positive for orthostatic hypotension.  She received at least 1L of normal saline and Jose Ramon stockings and felt better. Also, her orthostatic BP changes were resolved.   She was advised to wear jose ramon stockings at home.  She also was advised to have physical therapy at home.

## 2024-07-20 NOTE — ASSESSMENT & PLAN NOTE
Patient is being admitted for evaluation of vertigo, given concern for ataxia as well as disequilibrium  MRI of the brain is negative for stroke  Differential diagnosis include vestibular migraine, vertebral basilar artery territory infarct or stroke, vestibular neuritis given current symptoms of cough and congestion prior to her symptom, less likely vestibular labyrinthitis given lack of hearing loss  Continue supportive measures  Appreciate neurology consult

## 2024-07-20 NOTE — DISCHARGE SUMMARY
[facility] [place]  Discharge- June A [facility] 6/4/1932, 89 y.o. female MRN: [id number]  Unit/Bed#: E4 -01 Encounter: [id number]  Primary Care Provider: Stephanie Pruett DO   Date and time admitted to hospital: 7/18/2024 10:42 AM    Dizziness  Assessment & Plan  Buffy presented with symptoms of vertigo and tested positive for orthostatic hypotension.  She received at least 1L of normal saline and Jose Ramon stockings and felt better. Also, her orthostatic BP changes were resolved.   She was advised to wear jose ramon stockings at home.  She also was advised to have physical therapy at home.      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HCC)  Assessment & Plan  ENT assessed the patient and no acute treatment is indicated at this time.      Paroxysmal atrial fibrillation (HCC)  Assessment & Plan  Ventricle rate controlled  Not on anticoagulation prior to admission due to known cancer    Asthma-COPD overlap syndrome  Assessment & Plan  No evidence of exacerbation    HTN (hypertension)  Assessment & Plan  Continue home medication regimen    * Orthostatic hypotension  Assessment & Plan  Patient is being admitted for evaluation of vertigo, given concern for ataxia as well as disequilibrium  MRI of the brain is negative for stroke  Differential diagnosis include vestibular migraine, vertebral basilar artery territory infarct or stroke, vestibular neuritis given current symptoms of cough and congestion prior to her symptom, less likely vestibular labyrinthitis given lack of hearing loss  Continue supportive measures  Appreciate neurology consult               Medical Problems       Resolved Problems  Date Reviewed: 7/20/2024   None       Discharging Physician / Practitioner: Bryanna Dumont MD  PCP: Stephanie Pruett DO  Admission Date:   Admission Orders (From admission, onward)       Ordered        07/18/24 1430  INPATIENT ADMISSION  Once,   Status:  Canceled            07/18/24 1430  Place in Observation  Once                           Discharge Date: 07/19/24    Consultations During Hospital Stay:  ENT  Neurology    Procedures Performed:   None    Significant Findings / Test Results:   None    Incidental Findings:   CTA brain/neck:   No acute intracranial abnormality.     CT Angiography: High-grade stenosis involving the right posterior cerebral artery P2 segment. No additional large vessel occlusion, high-grade stenosis, or intracranial aneurysm is identified on CT angiogram of the head.     No hemodynamically significant stenosis or dissection identified on CT angiogram of the neck.     Mild increase in size of the destructive mass involving the posterior aspect of the hard palate on the right, extending to involve the posterior aspect of the floor of the right maxillary sinus.     Stable thoracic aortic aneurysm involving the visualized aortic arch.       Test Results Pending at Discharge (will require follow up):   None     Outpatient Tests Requested:  None    Complications:  None    Reason for Admission: Dizziness    Hospital Course:   Buffy Colón is a 89 y.o. female patient who originally presented to the hospital on 7/18/2024 due to    persistent vertigo, the patient has a past medical history of atrial fibrillation not on anticoagulation, hard palate tumor with unclear oncologic plan, history of arthritis, history of ascending aortic aneurysm, hypertension and hyperlipidemia.  The patient states that over the past 2 weeks she has had persistent vertigo despite taking Dramamine.  She denies any nausea or vomiting, no difficulty speaking or swallowing.  In the emergency room head CT was performed which was abnormal, there was no large vessel occlusion but small vessel disease.  Patient had further neurological work-up, including MRI of the brain. It was negative for stroke. Neurology has also assessed the patient. She is now symptoms free. She was found to have positive orthostatic vitals, but they resolved after hydration  "and with Jose Ramon stockings.  She would benefit from vestibular training after discharge.  No changes in medications were made.           Please see above list of diagnoses and related plan for additional information.     Condition at Discharge: stable    Discharge Day Visit / Exam:   Subjective:  June was seen earlier today. She is not dizzy anymore and wants to go home.   Vitals: Blood Pressure: (!) 190/98 (07/19/24 1452)  Pulse: 75 (07/19/24 1452)  Temperature: 97.9 °F (36.6 °C) (07/19/24 1452)  Temp Source: Temporal (07/19/24 0753)  Respirations: 16 (07/19/24 1054)  Height: 5' 1\" (154.9 cm) (07/19/24 1130)  Weight - Scale: 52.2 kg (115 lb) (07/19/24 1130)  SpO2: 96 % (07/19/24 1054)  Exam:   Physical Exam  Vitals and nursing note reviewed.   Constitutional:       General: She is not in acute distress.     Appearance: She is well-developed.   HENT:      Head: Normocephalic and atraumatic.   Cardiovascular:      Rate and Rhythm: Normal rate and regular rhythm.      Heart sounds: No murmur heard.  Pulmonary:      Effort: Pulmonary effort is normal. No respiratory distress.      Breath sounds: Normal breath sounds.   Abdominal:      Palpations: Abdomen is soft.      Tenderness: There is no abdominal tenderness.   Musculoskeletal:         General: No swelling.   Skin:     General: Skin is warm and dry.   Neurological:      Mental Status: She is alert.   Psychiatric:         Mood and Affect: Mood normal.          Discussion with Family: Updated  (brother-in-law) at bedside.    Discharge instructions/Information to patient and family:   See after visit summary for information provided to patient and family.      Provisions for Follow-Up Care:  See after visit summary for information related to follow-up care and any pertinent home health orders.      Mobility at time of Discharge:   Basic Mobility Inpatient Raw Score: 18  JH-HLM Goal: 6: Walk 10 steps or more  JH-HLM Achieved: 7: Walk 25 feet or more  HLM Goal " achieved. Continue to encourage appropriate mobility.     Disposition:   Home with VNA Services (Reminder: Complete face to face encounter)    Planned Readmission: No     Discharge Statement:  I spent 40 minutes discharging the patient. This time was spent on the day of discharge. I had direct contact with the patient on the day of discharge. Greater than 50% of the total time was spent examining patient, answering all patient questions, arranging and discussing plan of care with patient as well as directly providing post-discharge instructions.  Additional time then spent on discharge activities.    Discharge Medications:  See after visit summary for reconciled discharge medications provided to patient and/or family.      **Please Note: This note may have been constructed using a voice recognition system**

## 2024-07-21 NOTE — ASSESSMENT & PLAN NOTE
Advised ER for further evaluation which she now agrees to; deferred EMS and son would transport her to [place]; started a few weeks ago but concern is progressively worsening; concern for intracranial abnormality given severity of sx and gait difficulties

## 2024-07-22 ENCOUNTER — TELEPHONE (OUTPATIENT)
Age: 89
End: 2024-07-22

## 2024-07-22 ENCOUNTER — TRANSITIONAL CARE MANAGEMENT (OUTPATIENT)
Dept: FAMILY MEDICINE CLINIC | Facility: CLINIC | Age: 89
End: 2024-07-22

## 2024-07-22 DIAGNOSIS — Z78.9 NEEDS ASSISTANCE WITH COMMUNITY RESOURCES: Primary | ICD-10-CM

## 2024-07-22 NOTE — TELEPHONE ENCOUNTER
Patient calling to schedule s/p hospital TCM visit. Marlon transferred her to [facility] for further assistance scheduling. She thanks us for our help!

## 2024-07-22 NOTE — TELEPHONE ENCOUNTER
Pt recvd a vm but wasn't sure what it was about, I check her chart for notes but I couldn't find anything about a message being left. Pt read vm again and concluded it was confirming her upcoming appt.

## 2024-07-23 ENCOUNTER — PATIENT OUTREACH (OUTPATIENT)
Dept: CASE MANAGEMENT | Facility: OTHER | Age: 89
End: 2024-07-23

## 2024-07-23 DIAGNOSIS — D64.9 SYMPTOMATIC ANEMIA: ICD-10-CM

## 2024-07-23 DIAGNOSIS — I10 ESSENTIAL HYPERTENSION: ICD-10-CM

## 2024-07-23 DIAGNOSIS — K25.0 ACUTE GASTRIC ULCER WITH HEMORRHAGE: ICD-10-CM

## 2024-07-23 DIAGNOSIS — I48.0 PAROXYSMAL ATRIAL FIBRILLATION (HCC): ICD-10-CM

## 2024-07-23 DIAGNOSIS — K26.9 DUODENAL ULCER: ICD-10-CM

## 2024-07-23 DIAGNOSIS — K92.2 GI BLEED: ICD-10-CM

## 2024-07-23 RX ORDER — AMLODIPINE BESYLATE 2.5 MG/1
2.5 TABLET ORAL DAILY
Qty: 90 TABLET | Refills: 0 | OUTPATIENT
Start: 2024-07-23

## 2024-07-23 RX ORDER — METOPROLOL SUCCINATE 25 MG/1
25 TABLET, EXTENDED RELEASE ORAL DAILY
Qty: 100 TABLET | Refills: 1 | Status: SHIPPED | OUTPATIENT
Start: 2024-07-23

## 2024-07-23 RX ORDER — PANTOPRAZOLE SODIUM 40 MG/1
40 TABLET, DELAYED RELEASE ORAL
Qty: 200 TABLET | Refills: 1 | Status: SHIPPED | OUTPATIENT
Start: 2024-07-23 | End: 2025-01-19

## 2024-07-23 NOTE — PROGRESS NOTES
Received request to contact patient/daughter to provide assistance with community resources as well as transportation.     Call to patient no answer. Call to daughter Elie and left message.    Will await return call and be available to provide support.

## 2024-07-25 NOTE — UTILIZATION REVIEW
Initial Clinical Review    Admission: Date/Time/Statement:     Admission Orders (From admission, onward)       Ordered        07/18/24 1430  Place in Observation  Once                          Orders Placed This Encounter   Procedures    Place in Observation     Standing Status:   Standing     Number of Occurrences:   1     Order Specific Question:   Level of Care     Answer:   Med Surg [16]     Order Specific Question:   Bed Type     Answer:   Donna [4]     ED Arrival Information       Expected   7/18/2024 09:38    Arrival   7/18/2024 10:34    Acuity   Urgent              Means of arrival   -    Escorted by   -    Service   Hospitalist    Admission type   Emergency              Arrival complaint   dizziness             Chief Complaint   Patient presents with    Dizziness     Dizziness and fatigue ongoing for weeks.        Initial Presentation: 89 y.o. female who presented to the ED from home. Admitted as Observation for evaluation and treatment of vertigo.      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[facility]), Aortic root dilatation ([facility]), Arthritis, Ascending aortic aneurysm ([facility]), Asthma, Basal cell carcinoma, Cancer (HCC) (2015), 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HCC), Coronary artery disease, Diabetes mellitus (HCC), Disease of thyroid gland, Facet arthropathy, cervical, Headache(784.0), History of fracture of vertebral column, Hyperlipidemia, Hypertension, 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HCC), Hypoxia, Junctional rhythm, Lightheadedness, Migraine, Palpitations, Pleural effusion, bilateral, 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    The patient states that over the past 2 weeks she has had  persistent vertigo despite taking Dramamine.  She denies any nausea or vomiting, no difficulty speaking or swallowing.  In the emergency room CTA  was performed which revealed with high-grade stenosis involving the right posterior cerebral artery P2 segment.  AOx3 on exam. Mass R palate.     Plan: Neuro checks, permissive hypertension, consult Neurology.      7/18 Neurology consult:  Episodic lightheadedness.  Plan: Admit to Stroke Pathway.  Recommend MRI brain. CT cervical spine due to severe neck pain and r/o cervical dizziness due to arthritic changes; Consider Echo. Orthostatic vital signs. Recommend the following Labs. Lipid Panel, Hemoglobin A1c, TSH, UA, B12/folate. Recommend loading aspirin 325 mg and continue aspirin 81 mg, continue atorvastatin 40 mg daily. Permissive HTN, allow for SBP up to 220 x 24 hours or Goal MAP> 100 from onset of stroke like symptoms, then goal normotension.  Goal normotension sooner if MRI brain completed and does not reveal acute infarct.  Euglycemic, normothermic goal. Continue telemetry. PT/OT/ST- evaluation for Epley maneuver; OT/vision therapy with known macular degeneration; Stroke education. Frequent neuro checks. Continue to monitor and notify neurology with any changes. STAT CT head for any acute change in neuro exam.  for discharge planning - patient lives alone and assistive living settings might be considered. Exam: Patient is oriented to person, place and time.  ENT consult:  Patient has had a diagnosis of adenocarcinoma of the hard palate since 2015 -diagnosed in [place] at a cancer center.  She has elected not to have any surgical treatment.  She was aware of the extent of surgery as well as the need for an obturator even at that time at this point in time given her age of 89 the surgery would be quite extensive and would require removal of the hard palate, portion of the soft palate, and likely a maxillectomy with placement of an obturator.  This  is quite a significant surgery and she would like to avoid this.  She may have to alter her diet in the future to allow for adequate nutrition.  At this point it is not an issue.  No acute treatment needed at this time. If the etiology for vertigo was ruled out in terms of stroke follow-up as an outpatient for vestibular neuritis can be completed.  I would recommend outpatient vestibular therapy as she has had imbalance and dizziness for quite some time. Physical Therapy discharge recommendation is Level III, Minimum Resource Intensity.      7/19 Neurology:  Patient had completed neurological workup with CT angiogram as well as brain MRI were reviewed did not find any ischemic or hemorrhagic changes, at the same time patient does have right posterior artery P2 segment stenosis. 2 small micro-hemorrhages noted, likely due to CAA. Patient was offered CT scan of the cervical region without high-grade stenosis advised but patient has several  compression fractures in her upper thoracic region vertebra.  Today's evaluation was consistent with significant orthostatic hypotension-patient was aware of her clinical diagnosis.  Patient was advised to have close follow-up with cardiology as she has established care with her cardiology team in outpatient setting-patient would benefit from 2D echo/Zio patch and loop recorder if prior diagnostic modalities would not explain patient episodic lightheadedness and near syncopal events. No further neurological workup advised.   Case Management:  Patient interested in [facility] for PT/OT.  Patient agreeable to blanket referral being placed. Patient is also interested in OP CM to assist with community resources/ transportation resources.  CM placed blanket referral via [employer] to [facility].  CM reviewed choice list with patient, patient would like [facility].  CM reserved them via [employer].    7/19 Discharge Summary:  89 y.o. female patient who originally presented to the hospital on 7/18/2024 due  to  persistent vertigo,  In the emergency room head CT was performed which was abnormal, there was no large vessel occlusion but small vessel disease. Patient had further neurological work-up, including MRI of the brain. It was negative for stroke. Neurology has also assessed the patient. She is now symptom free. She was found to have positive orthostatic vitals, but they resolved after hydration and with Jose Ramon stockings.  She would benefit from vestibular training after discharge. No changes in medications were made.       7/19 1613 Discharged to home/self care.         ED Triage Vitals [07/18/24 1048]   Temperature Pulse Respirations Blood Pressure SpO2 Pain Score   97.6 °F (36.4 °C) 76 18 (!) 198/84 93 % No Pain     Weight (last 2 days) before discharge       Date/Time Weight    07/19/24 1130 52.2 (115)    07/18/24 1730 52.2 (115.12)            Vital Signs (last 3 days) before discharge       Date/Time Temp Pulse Resp BP MAP (mmHg) SpO2 O2 Device Patient Position - Orthostatic VS Don Coma Scale Score Pain    07/19/24 1452 97.9 °F (36.6 °C) 75 -- 190/98 -- -- -- Standing - Orthostatic VS -- --    07/19/24 1451 -- 75 -- 195/97 -- -- -- Sitting - Orthostatic VS -- --    07/19/24 1450 -- 72 -- 186/92 -- -- -- Lying - Orthostatic VS -- --    07/19/24 1130 -- 69 -- 109/69 -- -- -- -- -- --    07/19/24 1054 -- 69 16 109/69 78 96 % None (Room air) Standing - Orthostatic VS -- --    07/19/24 1052 -- 58 16 148/73 104 96 % None (Room air) Sitting - Orthostatic VS -- --    07/19/24 1050 -- 56 16 154/73 106 96 % None (Room air) Lying - Orthostatic VS -- --    07/19/24 0911 -- -- -- -- -- -- -- -- -- 5 07/19/24 0901 -- -- -- -- -- -- -- -- -- 5 07/19/24 0800 -- -- -- -- -- -- -- -- 15 No Pain    07/19/24 0753 98 °F (36.7 °C) 77 16 174/89 115 94 % None (Room air) Lying -- --    07/19/24 0430 97.9 °F (36.6 °C) 58 16 159/78 103 96 % None (Room air) Lying 15 --    07/19/24 0230 -- 63 16 121/68 85 94 % None (Room air) Lying  15 --    07/19/24 0030 98 °F (36.7 °C) 75 16 118/70 77 93 % None (Room air) Lying 15 --    07/18/24 2230 98.9 °F (37.2 °C) 70 17 105/58 67 94 % None (Room air) Lying 15 --    07/18/24 2030 98.2 °F (36.8 °C) 71 17 125/67 82 92 % None (Room air) Lying 15 --    07/18/24 1930 98.9 °F (37.2 °C) 84 18 121/65 75 95 % None (Room air) Sitting 15 No Pain    07/18/24 1830 98 °F (36.7 °C) 75 20 162/75 -- 96 % None (Room air) Lying -- --    07/18/24 1730 97.8 °F (36.6 °C) 77 20 176/90 -- 96 % None (Room air) Lying 15 5    07/18/24 1600 -- 68 21 133/68 94 -- -- -- -- --    07/18/24 1500 -- 74 22 145/67 96 -- -- -- -- --    07/18/24 1430 -- 76 24 140/65 -- 95 % -- -- -- --    07/18/24 1356 -- 87 19 154/78 -- 97 % -- Sitting -- --    07/18/24 1200 -- 74 20 200/84 121 97 % -- -- -- --    07/18/24 1100 -- 70 18 198/84 121 95 % None (Room air) Lying -- --    07/18/24 1056 -- -- -- -- -- -- None (Room air) -- 15 --    07/18/24 1048 97.6 °F (36.4 °C) 76 18 198/84 -- 93 % None (Room air) Sitting -- No Pain              Pertinent Labs/Diagnostic Test Results:       Radiology:    MRI brain wo contrast   Final Interpretation by Efrain Randall MD (07/19 1003)      1. No acute infarct, mass effect or midline shift.      2. Partially evaluated expansile, destructive lesion involving the posterior aspect of the hard palate to the right of midline and extending to the floor of the right maxillary sinus, better evaluated on prior imaging.         Workstation performed: [id number]         CT spine cervical wo contrast   Final Interpretation by Melo Clemons MD (07/19 101)      No cervical spine fracture or traumatic malalignment.                  Workstation performed: [id number]         CTA head and neck with and without contrast   Final Interpretation by Omar Banks MD (07/18 5717)      CT Brain:  No acute intracranial abnormality.      CT Angiography: High-grade stenosis involving the right posterior cerebral artery P2 segment. No  additional large vessel occlusion, high-grade stenosis, or intracranial aneurysm is identified on CT angiogram of the head.      No hemodynamically significant stenosis or dissection identified on CT angiogram of the neck.      Mild increase in size of the destructive mass involving the posterior aspect of the hard palate on the right, extending to involve the posterior aspect of the floor of the right maxillary sinus.      Stable thoracic aortic aneurysm involving the visualized aortic arch.      The above findings were communicated by Dr. Banks to Dr. Jon on 7/18/2024 at 2:05 p.m.      Workstation performed: [id number]           Cardiology:    Echo complete w/ contrast if indicated   Final Result by Ced Floyd MD (07/19 1333)        Left Ventricle: Left ventricular cavity size is normal. Wall thickness    is normal. The left ventricular ejection fraction is 60%. Systolic    function is normal. Wall motion is normal. Diastolic function is severely    abnormal, consistent with ungraded restrictive relaxation.     Right Ventricle: Right ventricular cavity size is normal. Systolic    function is normal.     Left Atrium: The atrium is mildly dilated.     Aortic Valve: There is mild regurgitation.     Mitral Valve: There is mild annular calcification. There is mild    regurgitation.     Tricuspid Valve: There is mild regurgitation. The right ventricular    systolic pressure is mildly elevated. The estimated right ventricular    systolic pressure is 38.00 mmHg.     Aorta: The aortic root is mildly dilated. The ascending aorta is normal    in size. The aortic root is 3.70 cm. The ascending aorta is 3.3 cm.           7/18 EKG #1:    Atrial fibrillation  Rightward axis  Low voltage QRS  Cannot rule out Anterior infarct (cited on or before 13-OCT-2023)  Nonspecific ST and T wave abnormality  Abnormal ECG  When compared with ECG of 13-OCT-2023 05:40,  Atrial fibrillation has replaced Sinus rhythm    7/18 EKG  #2:    Normal sinus rhythm  Right axis deviation  Low voltage QRS  Cannot rule out Anterior infarct (cited on or before 13-OCT-2023)  Abnormal ECG      7/18 EKG #3:    Normal sinus rhythm  Rightward axis  Low voltage QRS  Cannot rule out Anterior infarct (cited on or before 13-OCT-2023)  Abnormal ECG  When compared with ECG of 18-JUL-2024 13:39,  QRS axis Shifted left  Nonspecific T wave abnormality no longer evident in Inferior leads        Results from last 7 days   Lab Units 07/19/24  0502 07/18/24  1146   WBC Thousand/uL 5.82 8.47   HEMOGLOBIN g/dL 12.2 13.3   HEMATOCRIT % 38.0 40.0   PLATELETS Thousands/uL 194 208   TOTAL NEUT ABS Thousands/µL 2.90 5.01         Results from last 7 days   Lab Units 07/19/24  0502 07/18/24  1146   SODIUM mmol/L 142 135   POTASSIUM mmol/L 4.0 4.2   CHLORIDE mmol/L 105 98   CO2 mmol/L 31 31   ANION GAP mmol/L 6 6   BUN mg/dL 23 33*   CREATININE mg/dL 0.72 0.88   EGFR ml/min/1.73sq m 72 57   CALCIUM mg/dL 8.9 9.8     Results from last 7 days   Lab Units 07/18/24  1146   AST U/L 26   ALT U/L 16   ALK PHOS U/L 49   TOTAL PROTEIN g/dL 7.6   ALBUMIN g/dL 4.7   TOTAL BILIRUBIN mg/dL 0.66         Results from last 7 days   Lab Units 07/19/24  0502 07/18/24  1146   GLUCOSE RANDOM mg/dL 91 100         Results from last 7 days   Lab Units 07/18/24  1146   HEMOGLOBIN A1C % 5.7*   EAG mg/dl 117      Latest Reference Range & Units 07/18/24 11:46   Cholesterol See Comment mg/dL 193   Triglycerides See Comment mg/dL 87   HDL >=50 mg/dL 71   LDL Calculated 0 - 100 mg/dL 105 (H)   (H): Data is abnormally high        Results from last 7 days   Lab Units 07/18/24  1603 07/18/24  1343 07/18/24  1146   HS TNI 0HR ng/L  --   --  7   HS TNI 2HR ng/L  --  7  --    HSTNI D2 ng/L  --  0  --    HS TNI 4HR ng/L 12  --   --    HSTNI D4 ng/L 5  --   --              Results from last 7 days   Lab Units 07/18/24  1201   CLARITY UA  Clear   COLOR UA  Yellow   SPEC GRAV UA  1.010   PH UA  6.0   GLUCOSE UA mg/dl  Negative   KETONES UA mg/dl Negative   BLOOD UA  Negative   PROTEIN UA mg/dl Negative   NITRITE UA  Negative   BILIRUBIN UA  Negative   UROBILINOGEN UA E.U./dl 0.2   LEUKOCYTES UA  Negative                     ED Treatment-Medication Administration from 07/18/2024 0938 to 07/18/2024 1713         Date/Time Order Dose Route Action     07/18/2024 1156 sodium chloride 0.9 % bolus 1,000 mL 1,000 mL Intravenous New Bag     07/18/2024 1244 iohexol (OMNIPAQUE) 350 MG/ML injection (SINGLE-DOSE) 85 mL 85 mL Intravenous Given            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), wet (HCC)     bilateral    Anemia N/A    Aneurysm of aortic root (HCC)     last assessed - 72Hax9062    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HCC)     Aortic root dilatation (HCC)     last assessed - 67Rgm8460    Arthritis     Ascending aortic aneurysm (HCC)     last assessed - 63Gcz1185    Asthma     Basal cell carcinoma     last assessed - 09Rwl6523    Cancer (HCC) 2015    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HCC)     Coronary artery disease     Diabetes mellitus (HCC)     Disease of thyroid gland     Facet arthropathy, cervical     last assessed - 08Jun2017    Headache(784.0)     All my life    History of fracture of vertebral column     Hyperlipidemia     Hypertension     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HCC)     Hypoxia     last assessed - 16Mar2015    Junctional rhythm     last assessed - 36Fle2050    Lightheadedness     last assessed - 66Gin2802    Migraine     Palpitations     last assessed - 95Pbv5054    Pleural effusion, bilateral     last assessed - 85Axa9678    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    Dont know    Shortness of breath     last assessed - 92Bfj4162    Stroke (HCC)     Thyroid trouble     Trigger finger     last assessed - 76Mjp2540    Trigger middle finger of right hand     last assessed - 32Gme6560    Urinary incontinence      last assessed -  22Jul,2013; Resolved - 80Ouq1148    Visual impairment Feb 2015    Wet macular degeneration both eyes     Present on Admission:   Asthma-COPD overlap syndrome   Orthostatic hypotension   Paroxysmal atrial fibrillation (HCC)   Cervical radiculopathy   Spinal stenosis of cervical region   Dizziness   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HCC)   HTN (hypertension)      Admitting Diagnosis: 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HCC) [C05.0]  Dizziness [R42]  Vertigo [R42]  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 female      Admission Orders: Baseline NIH stroke scale on admission, reassess Q24H x 2 D, Nursing dysphagia assessment prior to staring diet, neuro checks, MRI brain, ECHO, SCD, PT and Speech eval.  Consult Neurology, ENT.         Scheduled Medications:    Current Facility-Administered Medications   Medication Dose Route Frequency    acetaminophen  650 mg Oral Q4H PRN    albuterol  2.5 mg Nebulization Q6H PRN    atorvastatin  40 mg Oral QPM    heparin (porcine)  5,000 Units Subcutaneous Q8H RIZWANA    levothyroxine  112 mcg Oral Early Morning    metoprolol succinate  25 mg Oral Daily    ondansetron  4 mg Intravenous Q6H PRN    pantoprazole  40 mg Oral BID AC           sodium chloride 0.9 % infusion  Rate: 100 mL/hr Dose: 100 mL/hr  Freq: Continuous Route: IV  Last Dose: Stopped (07/19/24 1504)  Start: 07/19/24 1130                   Network Utilization Review Department  ATTENTION: Please call with any questions or concerns to [phone] and carefully listen to the prompts so that you are directed to the right person. All voicemails are confidential.   For Discharge needs, contact Care Management DC Support Team at [phone] opt. 2  Send all requests for admission clinical reviews, approved or denied determinations and any other requests to dedicated fax number below belonging to the campus where the patient is receiving treatment. List of dedicated fax  numbers for the Facilities:  FACILITY NAME UR FAX NUMBER   ADMISSION DENIALS (Administrative/Medical Necessity) [phone]   DISCHARGE SUPPORT TEAM (NETWORK) [phone]   PARENT CHILD HEALTH (Maternity/NICU/Pediatrics) [phone]   [facility] [phone]   [facility] [phone]   [facility] [phone]   [facility] [phone]   [facility] [phone]   [facility] [phone]   [facility] [phone]   [facility] [phone]   [facility] [phone]   [facility] [phone]   [facility] [phone]   [facility] [phone]

## 2024-07-25 NOTE — UTILIZATION REVIEW
NOTIFICATION OF ADMISSION DISCHARGE   This is a Notification of Discharge from [facility]. Please be advised that this patient has been discharge from our facility. Below you will find the admission and discharge date and time including the patient’s disposition.   UTILIZATION REVIEW CONTACT:  Tessa Burgess MA  Utilization   Network Utilization Review Department  Phone: [phone] x carefully listen to the prompts. All voicemails are confidential.  Email: [email or web address]     ADMISSION INFORMATION  PRESENTATION DATE: 7/18/2024 10:42 AM  OBERVATION ADMISSION DATE: 07/18/2024 1430  INPATIENT ADMISSION DATE: N/A N/A   DISCHARGE DATE: 7/19/2024  4:13 PM   DISPOSITION:Home/Self Care    Network Utilization Review Department  ATTENTION: Please call with any questions or concerns to [phone] and carefully listen to the prompts so that you are directed to the right person. All voicemails are confidential.   For Discharge needs, contact Care Management DC Support Team at [phone] opt. 2  Send all requests for admission clinical reviews, approved or denied determinations and any other requests to dedicated fax number below belonging to the campus where the patient is receiving treatment. List of dedicated fax numbers for the Facilities:  FACILITY NAME UR FAX NUMBER   ADMISSION DENIALS (Administrative/Medical Necessity) [phone]   DISCHARGE SUPPORT TEAM ([facility]) [phone]   PARENT CHILD HEALTH (Maternity/NICU/Pediatrics) [phone]   [facility] [phone]   [facility] [phone]   [facility] [phone]   [facility] [phone]   [facility] [phone]   [facility] [phone]   [facility] [phone]   [facility]  [phone]   [facility] [phone]   [facility] [phone]   [facility] [phone]   [facility] [phone]

## 2024-07-26 ENCOUNTER — PATIENT OUTREACH (OUTPATIENT)
Dept: CASE MANAGEMENT | Facility: OTHER | Age: 89
End: 2024-07-26

## 2024-07-26 NOTE — PROGRESS NOTES
JOSIAH GONZALEZ received a referral from patient's PCP regarding patient's need for assistance with transportation. JOSIAH GONZALEZ team has made multiple attempts to contact,however, attempts to reach patient/patient's daughter have been unsuccessful at this time. UTR letter sent. Referral will be closed, however, JOSIAH GONZALEZ will be available if there is a return call.

## 2024-07-26 NOTE — LETTER
[address] [facility] [id number] [phone]    Re: Unable to reach    7/26/2024       Dear Buffy,    I hope this letter finds you well. I have been trying to reach out to you in hopes of being able to connect you with community resources that can provide the support and assistance you may require. If you would like assistance connecting with appropriate community resources and discussing your concerns, please feel free to give me a call at [phone]. I look forward to hearing from you soon. Thank you.     Sincerely,         JEFRY Rodriguez,LSW

## 2024-07-30 ENCOUNTER — OFFICE VISIT (OUTPATIENT)
Dept: FAMILY MEDICINE CLINIC | Facility: CLINIC | Age: 89
End: 2024-07-30
Payer: COMMERCIAL

## 2024-07-30 VITALS
DIASTOLIC BLOOD PRESSURE: 64 MMHG | BODY MASS INDEX: 21.92 KG/M2 | OXYGEN SATURATION: 95 % | WEIGHT: 116 LBS | SYSTOLIC BLOOD PRESSURE: 150 MMHG | TEMPERATURE: 98.6 F | HEART RATE: 70 BPM

## 2024-07-30 DIAGNOSIS — I95.1 ORTHOSTATIC HYPOTENSION: ICD-10-CM

## 2024-07-30 DIAGNOSIS — R42 DIZZINESS: Primary | ICD-10-CM

## 2024-07-30 DIAGNOSIS — M54.81 OCCIPITAL NEURALGIA OF RIGHT SIDE: ICD-10-CM

## 2024-07-30 DIAGNOSIS — K25.0 ACUTE GASTRIC ULCER WITH HEMORRHAGE: ICD-10-CM

## 2024-07-30 PROCEDURE — 1111F DSCHRG MED/CURRENT MED MERGE: CPT | Performed by: FAMILY MEDICINE

## 2024-07-30 PROCEDURE — 99495 TRANSJ CARE MGMT MOD F2F 14D: CPT | Performed by: FAMILY MEDICINE

## 2024-07-30 RX ORDER — MECLIZINE HCL 12.5 MG/1
12.5 TABLET ORAL 3 TIMES DAILY
Qty: 60 TABLET | Refills: 0 | Status: SHIPPED | OUTPATIENT
Start: 2024-07-30

## 2024-07-30 NOTE — PROGRESS NOTES
Assessment/Plan:   1. Dizziness/Orthostatic hypotension  Reviewed patient's hospital record in depth with her.  At this time, patient appears clinically stable today.  She denies any symptoms of vertigo.  Her initial testing from the ED appeared to show high-grade right posterior cerebral artery stenosis.  Her orthostatic testing was also positive.  Reviewed her neurology evaluation.  Her repeat MRI did not appear to show any signs of acute stroke.  At this time, neurology stated that there was no further workup needed.  Patient was advised to continue with her current treatment of pravastatin as well as her metoprolol.  Her aspirin was discontinued over the past few months secondary to the presence of gastric ulcers.  Her blood pressure was mildly elevated today.  At this time, we will help her schedule a appointment with cardiology sooner than November.  She was given a refill on her meclizine.  If any symptoms should worsen, she advised to call or follow-up.  - meclizine (ANTIVERT) 12.5 MG tablet; Take 1 tablet (12.5 mg total) by mouth 3 (three) times a day  Dispense: 60 tablet; Refill: 0    2. Occipital neuralgia of right side  Patient right-sided scalp pain appears likely secondary to occipital neuralgia per neurology.  At this time, patient has been taking Tylenol for symptom relief.  She states that this has been stable.  She does not wish to pursue any treatment with gabapentin at this time.    3. Acute gastric ulcer with hemorrhage  Patient continues to follow with gastroenterology.  Continue with her current treatment of pantoprazole.  Follow-up if any symptoms persist.               Diagnoses and all orders for this visit:    Dizziness  -     meclizine (ANTIVERT) 12.5 MG tablet; Take 1 tablet (12.5 mg total) by mouth 3 (three) times a day    Occipital neuralgia of right side    Acute gastric ulcer with hemorrhage    Orthostatic hypotension          Subjective:       Chief Complaint   Patient presents with     Transition of Care Management      Patient ID: Buffy Colón is a 89 y.o. female presents today for a hospital follow-up.  She was admitted on July 18 and subsidy discharged on July 19.  She was admitted secondary to worsening vertigo.  She was seen and evaluated in the office.  She was subsequently sent to the ED for further evaluation.  Her blood pressures have been elevated.  She was seen and evaluated in the ED.  She was subsequently admitted.  She did have an extensive evaluation.  She was initially found to have CTA with high-grade stenosis in her right posterior cerebral artery.  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  She did have further testing with an MRI of her brain which did not appear to show any signs of acute stroke.  She was seen and evaluated by multiple specialist including neurology as well as ENT.  At that time, she states that neurology stated that her symptoms are likely multifactorial.    TCM Call     Date and time call was made  7/22/2024  4:47 PM    Hospital care reviewed  Records reviewed    Patient was hospitialized at  [facility]    Date of Admission  07/18/24    Date of discharge  07/19/24    Diagnosis  Orthostatic hypotension    Disposition  Home    Were the patients medications reviewed and updated  No    Current Symptoms  Fatigue    Fatigue severity  Moderate      TCM Call     Post hospital issues  None    Should patient be enrolled in anticoag monitoring?  No    Scheduled for follow up?  Yes    Did you obtain your prescribed medications  Yes    Do you need help managing your prescriptions or medications  No    Is transportation to your appointment needed  Yes    Specify why  Patient doesn't drive    I have advised the patient to call PCP with any new or worsening symptoms    Fide Haider    Living Arrangements  Alone    Support System  Family    Are you recieving any outpatient services  No    Are  you recieving home care services  Yes    Types of home care services  Nurse visit    Are you using any community resources  No    Current waiver services  No    Have you fallen in the last 12 months  No    Interperter language line needed  No    Counseling  Patient              Review of Systems   Constitutional:  Negative for activity change, chills, fatigue and fever.   HENT:  Negative for congestion, ear pain, sinus pressure and sore throat.    Eyes:  Negative for redness, itching and visual disturbance.   Respiratory:  Negative for cough and shortness of breath.    Cardiovascular:  Negative for chest pain and palpitations.   Gastrointestinal:  Negative for abdominal pain, diarrhea and nausea.   Endocrine: Negative for cold intolerance and heat intolerance.   Genitourinary:  Negative for dysuria, flank pain and frequency.   Musculoskeletal:  Negative for arthralgias, back pain, gait problem and myalgias.   Skin:  Negative for color change.   Allergic/Immunologic: Negative for environmental allergies.   Neurological:  Negative for dizziness, numbness and headaches.   Psychiatric/Behavioral:  Negative for behavioral problems and sleep disturbance.        The following portions of the patient's history were reviewed and updated as appropriate : past family history, past medical history, past social history and past surgical history.    Current Outpatient Medications:     acetaminophen (TYLENOL) 650 mg CR tablet, Take 650 mg by mouth every 8 (eight) hours as needed for mild pain, Disp: , Rfl:     albuterol (2.5 mg/3 mL) 0.083 % nebulizer solution, Take 2.5 mg by nebulization every 6 (six) hours as needed for wheezing or shortness of breath, Disp: , Rfl:     albuterol (PROVENTIL HFA,VENTOLIN HFA) 90 mcg/act inhaler, Inhale 2 puffs every 6 (six) hours as needed for wheezing, Disp: 25.5 g, Rfl: 3    amLODIPine (NORVASC) 2.5 mg tablet, Take 1 tablet (2.5 mg total) by mouth daily, Disp: 90 tablet, Rfl: 3    benazepril  (LOTENSIN) 40 MG tablet, Take 1 tablet (40 mg total) by mouth daily, Disp: 90 tablet, Rfl: 3    Calcium Carb-Cholecalciferol (Calcium+D3) 600-20 MG-MCG TABS, Take 1 tablet by mouth in the morning, Disp: 90 tablet, Rfl: 3    denosumab (PROLIA) 60 mg/mL, Inject 60 mg under the skin once, Disp: , Rfl:     Diclofenac Sodium (VOLTAREN) 1 %, Apply 2 g topically 4 (four) times a day as needed (pain), Disp: , Rfl:     levothyroxine 112 mcg tablet, Take 1 tablet (112 mcg total) by mouth daily in the early morning, Disp: 90 tablet, Rfl: 0    lovastatin (MEVACOR) 20 mg tablet, Take 1 tablet (20 mg total) by mouth every morning, Disp: 90 tablet, Rfl: 3    meclizine (ANTIVERT) 12.5 MG tablet, Take 1 tablet (12.5 mg total) by mouth 3 (three) times a day, Disp: 60 tablet, Rfl: 0    metoprolol succinate (TOPROL-XL) 25 mg 24 hr tablet, Take 1 tablet (25 mg total) by mouth daily, Disp: 100 tablet, Rfl: 1    Multiple Vitamins-Minerals (One Daily Multivitamin Women) TABS, Apply 1 tablet to the mouth or throat daily. Indications: Treatment to Prevent Vitamin Deficiency, Disp: , Rfl:     pantoprazole (PROTONIX) 40 mg tablet, Take 1 tablet (40 mg total) by mouth 2 (two) times a day before meals, Disp: 200 tablet, Rfl: 1         Objective:         Vitals:    07/30/24 1132   BP: 150/64   BP Location: Left arm   Patient Position: Sitting   Cuff Size: Standard   Pulse: 70   Temp: 98.6 °F (37 °C)   TempSrc: Temporal   SpO2: 95%   Weight: 52.6 kg (116 lb)     Physical Exam  Vitals reviewed.   Constitutional:       General: She is not in acute distress.     Appearance: Normal appearance. She is well-developed. She is not ill-appearing.   HENT:      Head: Normocephalic and atraumatic.      Right Ear: Hearing and external ear normal.      Left Ear: Hearing and external ear normal.      Nose: Nose normal. No nasal deformity or septal deviation.      Mouth/Throat:      Mouth: Mucous membranes are moist.      Pharynx: Oropharynx is clear.   Eyes:       General: Lids are normal.      Extraocular Movements: Extraocular movements intact.      Conjunctiva/sclera: Conjunctivae normal.      Pupils: Pupils are equal, round, and reactive to light.   Neck:      Thyroid: No thyromegaly.      Trachea: Trachea normal.   Cardiovascular:      Rate and Rhythm: Normal rate.   Pulmonary:      Effort: Pulmonary effort is normal. No respiratory distress.   Abdominal:      General: There is no distension.      Tenderness: There is no guarding.   Musculoskeletal:         General: Normal range of motion.      Cervical back: Normal range of motion and neck supple. No edema or erythema.   Skin:     General: Skin is warm and dry.   Neurological:      Mental Status: She is alert.      Cranial Nerves: No cranial nerve deficit.      Sensory: No sensory deficit.   Psychiatric:         Speech: Speech normal.         Behavior: Behavior normal. Behavior is cooperative.         Thought Content: Thought content normal.         Judgment: Judgment normal.

## 2024-08-03 ENCOUNTER — OFFICE VISIT (OUTPATIENT)
Dept: URGENT CARE | Facility: CLINIC | Age: 89
End: 2024-08-03
Payer: COMMERCIAL

## 2024-08-03 ENCOUNTER — APPOINTMENT (OUTPATIENT)
Dept: RADIOLOGY | Facility: CLINIC | Age: 89
End: 2024-08-03
Payer: COMMERCIAL

## 2024-08-03 VITALS
RESPIRATION RATE: 18 BRPM | TEMPERATURE: 97.5 F | SYSTOLIC BLOOD PRESSURE: 128 MMHG | DIASTOLIC BLOOD PRESSURE: 80 MMHG | HEART RATE: 73 BPM | OXYGEN SATURATION: 95 %

## 2024-08-03 DIAGNOSIS — J20.9 ACUTE BRONCHITIS, UNSPECIFIED ORGANISM: Primary | ICD-10-CM

## 2024-08-03 DIAGNOSIS — R05.1 ACUTE COUGH: ICD-10-CM

## 2024-08-03 DIAGNOSIS — J02.9 SORE THROAT: ICD-10-CM

## 2024-08-03 LAB — S PYO AG THROAT QL: NEGATIVE

## 2024-08-03 PROCEDURE — 71046 X-RAY EXAM CHEST 2 VIEWS: CPT

## 2024-08-03 PROCEDURE — 87070 CULTURE OTHR SPECIMN AEROBIC: CPT | Performed by: PHYSICIAN ASSISTANT

## 2024-08-03 PROCEDURE — 87880 STREP A ASSAY W/OPTIC: CPT | Performed by: PHYSICIAN ASSISTANT

## 2024-08-03 PROCEDURE — S9088 SERVICES PROVIDED IN URGENT: HCPCS | Performed by: PHYSICIAN ASSISTANT

## 2024-08-03 PROCEDURE — 99213 OFFICE O/P EST LOW 20 MIN: CPT | Performed by: PHYSICIAN ASSISTANT

## 2024-08-03 RX ORDER — AZITHROMYCIN 250 MG/1
TABLET, FILM COATED ORAL
Qty: 6 TABLET | Refills: 0 | Status: SHIPPED | OUTPATIENT
Start: 2024-08-03 | End: 2024-08-07

## 2024-08-03 NOTE — PROGRESS NOTES
[facility]      NAME: Buffy Colón is a 89 y.o. female  : 1932    MRN: [id number]  DATE: August 3, 2024  TIME: 1:35 PM    Assessment and Plan   Acute bronchitis, unspecified organism [J20.9]  1. Acute bronchitis, unspecified organism  azithromycin (ZITHROMAX) 250 mg tablet      2. Sore throat  POCT rapid ANTIGEN strepA    Throat culture    Throat culture      3. Acute cough  XR chest pa & lateral          Patient Instructions   Rapid strep test completed and negative. Will send for culture. Please check mychart for results. I have prescribed an antibiotic for the infection.  Please take the antibiotic as prescribed and finish the entire prescription.  I recommend that the patient takes an over the counter probiotic or eats yogurt with live cultures in it (activia) to keep good bacteria in the gut and help prevent diarrhea.  Wash hands frequently to prevent the spread of infection.  Can use over the counter cough and cold medications to help with symptoms. If not improving over the next 3-5 days, follow up with PCP.    Risks and benefits discussed. Patient understands and agrees with the plan.      If tests have been performed at [facility], our office will contact you with results if changes need to be made to the care plan discussed with you at the visit.  You can review your full results on [facility] MyChart.     Follow up with PCP in 3-5 days.      If any of the following occur, please report to your nearest ED for evaluation or call 911.   Difficultly breathing or shortness of breath  Chest pain  Acutely worsening symptoms.   To present to the ER if symptoms worsen.  Chief Complaint     Chief Complaint   Patient presents with    Sore Throat     Tues night started with sore throat     Cough     Cough also since Tues night.     Diarrhea     Diarrhea starting today         History of Present Illness   Buffy Colón presents to the clinic c/o    - at home covid test    Sore Throat   This is a  new problem. The current episode started in the past 7 days (7/30). The problem has been gradually worsening. There has been no fever. The pain is moderate. Associated symptoms include congestion, coughing and diarrhea. Pertinent negatives include no abdominal pain, ear pain, shortness of breath or vomiting. Treatments tried: dayquil/nyquil. The treatment provided mild relief.       Review of Systems   Review of Systems   Constitutional:  Positive for appetite change. Negative for chills and fever.   HENT:  Positive for congestion and sore throat. Negative for ear pain.    Eyes:  Negative for pain and visual disturbance.   Respiratory:  Positive for cough. Negative for shortness of breath.    Cardiovascular:  Negative for chest pain and palpitations.   Gastrointestinal:  Positive for diarrhea. Negative for abdominal pain and vomiting.   Genitourinary:  Negative for dysuria and hematuria.   Musculoskeletal:  Negative for arthralgias and back pain.   Skin:  Negative for color change and rash.   Neurological:  Negative for seizures and syncope.   All other systems reviewed and are negative.        Current Medications     Long-Term Medications   Medication Sig Dispense Refill    amLODIPine (NORVASC) 2.5 mg tablet Take 1 tablet (2.5 mg total) by mouth daily 90 tablet 3    benazepril (LOTENSIN) 40 MG tablet Take 1 tablet (40 mg total) by mouth daily 90 tablet 3    Diclofenac Sodium (VOLTAREN) 1 % Apply 2 g topically 4 (four) times a day as needed (pain)      levothyroxine 112 mcg tablet Take 1 tablet (112 mcg total) by mouth daily in the early morning 90 tablet 0    lovastatin (MEVACOR) 20 mg tablet Take 1 tablet (20 mg total) by mouth every morning 90 tablet 3    meclizine (ANTIVERT) 12.5 MG tablet Take 1 tablet (12.5 mg total) by mouth 3 (three) times a day 60 tablet 0    metoprolol succinate (TOPROL-XL) 25 mg 24 hr tablet Take 1 tablet (25 mg total) by mouth daily 100 tablet 1    pantoprazole (PROTONIX) 40 mg tablet  Take 1 tablet (40 mg total) by mouth 2 (two) times a day before meals 200 tablet 1       Current Allergies     Allergies as of 08/03/2024 - Reviewed 08/03/2024   Allergen Reaction Noted    Amiodarone Hives 03/19/2015    Omnipaque [iohexol] Hives and Shortness Of Breath 04/06/2016    Clindamycin Other (See Comments) 05/08/2017    Other  11/30/2010    Sulfa antibiotics Hives 08/26/2015    Acetazolamide Hives and Rash 02/10/2020    Iv dye  [iodinated contrast media] Hypertension and Palpitations 06/10/2014    Naprosyn [naproxen] Itching and Rash 07/11/2012            The following portions of the patient's history were reviewed and updated as appropriate: allergies, current medications, past family history, past medical history, past social history, past surgical history and problem list.  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), wet (HCC)     bilateral    Anemia N/A    Aneurysm of aortic root (HCC)     last assessed - [id number]    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HCC)     Aortic root dilatation (HCC)     last assessed - [id number]    Arthritis     Ascending aortic aneurysm (HCC)     last assessed - [id number]    Asthma     Basal cell carcinoma     last assessed - [id number]    Cancer (HCC) 2015    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HCC)     Coronary artery disease     Diabetes mellitus (HCC)     Disease of thyroid gland     Facet arthropathy, cervical     last assessed - 08Jun2017    Headache(784.0)     All my life    History of fracture of vertebral column     Hyperlipidemia     Hypertension     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HCC)     Hypoxia     last assessed - 16Mar2015    Junctional rhythm     last assessed - 04Apr2017    Lightheadedness     last assessed - 28Jun2016    Migraine     Palpitations     last assessed - 28Jun2016    Pleural effusion, bilateral     last assessed - 20Apr2015    Salivary gland cancer  (HCC)     Scoliosis ??    Dont know    Shortness of breath     last assessed - 2017    Stroke (HCC)     Thyroid trouble     Trigger finger     last assessed - 2016    Trigger middle finger of right hand     last assessed - 2016    Urinary incontinence     last assessed -  ; Resolved - 2017    Visual impairment 2015    Wet macular degeneration both eyes     Past Surgical History:   Procedure Laterality Date    ABDOMINAL AORTIC ANEURYSM REPAIR W/ ENDOLUMINAL GRAFT  2015    Ascending aorta and hemiarch replacement with 30 mm Vascutek Gelweave graft; Managed by Scott Archuleta; last assessed - 2016    APPENDECTOMY      BLADDER SURGERY      Reccurent histoy of bladder surgery    HOROWITZ PROCEDURE      CARDIAC CATHETERIZATION  2004    Procedure summary - Luminal irregularities; last assessed - 2015     SECTION      CORONARY ANEURYSM REPAIR      CYSTOSCOPY      botox injection    HYSTERECTOMY      SC NDSC NJX IMPLT MATRL URT&/BLDR NCK N/A 2017    Procedure: CYSTOSCOPY; DURASPHERE-PERIURETHRAL BULKING AGENT INJECTION ;  Surgeon: Rayo Moulton MD;  Location: [facility];  Service: Urology    SC TENDON SHEATH INCISION Right 10/18/2016    Procedure: LONG FINGER TRIGGER RELEASE ;  Surgeon: Khari Najera MD;  Location: [facility];  Service: Orthopedics    THYROIDECTOMY      Total Thyroidectomy    TONSILLECTOMY AND ADENOIDECTOMY       Social History     Socioeconomic History    Marital status:      Spouse name: Not on file    Number of children: Not on file    Years of education: Not on file    Highest education level: Not on file   Occupational History    Not on file   Tobacco Use    Smoking status: Former     Current packs/day: 0.00     Average packs/day: 0.3 packs/day for 58.0 years (14.5 ttl pk-yrs)     Types: Cigarettes     Start date: 1957     Quit date: 2014     Years since quittin.6    Smokeless tobacco: Never    Tobacco comments:      smoked 17 yrs 1/2 ppd quit and restarted then quit  10 days ago during Christmas 2014    Vaping Use    Vaping status: Never Used   Substance and Sexual Activity    Alcohol use: Not Currently     Comment: Social drinker    Drug use: No    Sexual activity: Not Currently   Other Topics Concern    Not on file   Social History Narrative    Not on file     Social Determinants of Health     Financial Resource Strain: Low Risk  (7/29/2024)    Received from [facility]    Financial Resource Strain     Do you have any trouble paying for your medications, or do you think you might in the future?: No     Does your family have trouble paying for medicine? (Household - for ages 0-17 years): Not on file   Food Insecurity: No Food Insecurity (7/29/2024)    Received from [facility]    Hunger Vital Sign     Worried About Running Out of Food in the Last Year: Never true     Ran Out of Food in the Last Year: Never true   Transportation Needs: No Transportation Needs (7/29/2024)    Received from [facility]    Transportation Needs     Do you have trouble getting a ride to medical visits or work? (Adult - for ages 18 years and over): Not on file     Does your family have a hard time getting a ride to doctors’ visits? (Household - for ages 0-17 years): Not on file     Has lack of transportation kept you from medical appointments, meetings, work, or from getting things needed for daily living? Check all that apply.: No     Do you (or your family) have trouble finding or paying for a ride (transportation)? (Household - for ages 0-17 years): Not on file   Recent Concern: Transportation Needs - Unmet Transportation Needs (7/18/2024)    PRAPARE - Transportation     Lack of Transportation (Medical): Yes     Lack of Transportation (Non-Medical): Yes   Physical Activity: Not on file   Stress: Not on file   Social Connections: Socially Integrated (7/29/2024)    Received from [facility]    Social Connections     How often do you feel lonely or isolated  from those around you?: Never   Intimate Partner Violence: Not on file   Housing Stability: Low Risk  (7/29/2024)    Received from [facility] [facility]     Do you currently live in a shelter or have no steady place to sleep at night?: No     Do you think you are at risk of becoming homeless? (Adult - for ages 18 years and over): Not on file     Does your family worry about paying for your home or becoming homeless? (Household - for ages 0-17 years): Not on file     Are you homeless or worried that you might be in the future?: No     Are you (or your family) homeless or worried that you might be in the future? (Household - for ages 0-17 years): Not on file       Objective   /80   Pulse 73   Temp 97.5 °F (36.4 °C)   Resp 18   SpO2 95%      Physical Exam     Physical Exam  Vitals and nursing note reviewed.   Constitutional:       General: She is not in acute distress.     Appearance: She is well-developed. She is not diaphoretic.   HENT:      Head: Normocephalic and atraumatic.      Right Ear: Tympanic membrane and external ear normal.      Left Ear: Tympanic membrane and external ear normal.      Nose: Nose normal.      Mouth/Throat:      Mouth: Mucous membranes are moist.      Pharynx: Posterior oropharyngeal erythema present. No oropharyngeal exudate.     Eyes:      General: No scleral icterus.        Right eye: No discharge.         Left eye: No discharge.      Conjunctiva/sclera: Conjunctivae normal.   Cardiovascular:      Rate and Rhythm: Normal rate and regular rhythm.      Heart sounds: Normal heart sounds. No murmur heard.     No friction rub. No gallop.   Pulmonary:      Effort: Pulmonary effort is normal. No respiratory distress.      Breath sounds: Examination of the right-lower field reveals rhonchi. Rhonchi present. No decreased breath sounds, wheezing or rales.   Skin:     General: Skin is warm and dry.      Coloration: Skin is not pale.      Findings: No erythema or rash.    Neurological:      Mental Status: She is alert and oriented to person, place, and time.   Psychiatric:         Behavior: Behavior normal.         Thought Content: Thought content normal.         Judgment: Judgment normal.         Nata Coronado PA-C

## 2024-08-06 LAB — BACTERIA THROAT CULT: NORMAL

## 2024-08-15 NOTE — PROGRESS NOTES
Cardiology Follow Up    Buffy Colón  6/4/1932  [id number]  [facility] [place]  [address]  [place] [id number] [phone]    1. Orthostatic hypotension  amLODIPine (NORVASC) 5 mg tablet      2. Hyperlipidemia, unspecified hyperlipidemia type        3. Nonrheumatic mitral valve regurgitation        4. Paroxysmal atrial fibrillation (HCC)            Interval History:   Ms Buffy Landon was admitted to [facility] on 7/18 - 7/19/24 with orthostatic hypotension.  She presented to the emergency room with vertigo symptoms and found to have orthostatic hypotension. TTE: Left ventricular ejection fraction 60% systolic function normal wall motion normal diastolic function severely abnormal consistent with ungraded restrictive relaxation.  Right ventricular cavity size normal.  Systolic function normal.  Left atrium mildly dilated, mild aortic valve regurgitation, mild mitral valve regurgitation, mild tricuspid valve regurgitation.  Right ventricular systolic pressure mildly elevated 38 mmHg.  Aortic root mildly dilated, ascending aorta normal in size aortic root 3.70 cm ascending aorta 3.3 cm she was treated with 1 L of normal saline instructed to wear OLI stockings with improvement in symptoms. Ga grade posterior P2 cerebral stenosis.  Neurology consulted.  Recommended OP Zio or loop if symptoms did not improve.     Ms Buffy Colón presents to our office for a follow up visit.  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  She admits to right sided facial pain and worsening eye sight.  Buffy admits to lightheadedness with standing.  She lives a lone and is afraid of having a stroke.  She admits to a poor appetite and ensuring she is hydrating.      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     Patient Active Problem List   Diagnosis    Trigger middle finger of right hand    Shortness of breath    HTN (hypertension)    Asthma-COPD overlap syndrome    Paroxysmal atrial fibrillation (HCC)    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HCC)    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HCC)    Moderate persistent asthma without complication    Other fatigue    Uncomplicated asthma    Encounter for vision screening    Nonrheumatic mitral valve regurgitation    Muscle cramps    Skin cyst    Pain of left lower extremity    [place]-neck deformity of finger    Atherosclerosis of aorta (HCC)    Hypoparathyroidism, unspecified hypoparathyroidism type (HCC)    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)    Toxic encephalopathy    Anemia    Basal cell carcinoma (BCC)    Cervical radiculopathy    Spinal stenosis of cervical region    Osteoarthritis of cervical spine    Chronic migraine without aura    Contrast media allergy    Gastroesophageal reflux disease with esophagitis    Stress incontinence in female    Migraine aura without headache    Obstructive sleep apnea syndrome    Osteopenia    Postoperative hypothyroidism    Restless legs syndrome    S/P trigger finger release    Tricuspid valve insufficiency    Xerostomia    History of polymyalgia rheumatica    Stage 3a chronic kidney disease (HCC)    Duodenum ulcer    Closed nondisplaced fracture of sternal end of left clavicle    Pain of left clavicle    Nondisplaced fracture of lateral end of left clavicle with routine healing    Esophageal pain    Rib pain    Sliding hiatal hernia    Exudative age-related macular degeneration, bilateral, with active choroidal neovascularization (HCC)    Hypertensive kidney disease with CKD (chronic kidney disease), stage 1-4 or unspecified chronic kidney disease     Dizziness    Orthostatic hypotension    Nasopharyngeal cancer (HCC)    Occipital neuralgia     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), wet (HCC)     bilateral    Anemia N/A    Aneurysm of aortic root (HCC)     last assessed - [id number]    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HCC)     Aortic root dilatation (HCC)     last assessed - 90Llc4324    Arthritis     Ascending aortic aneurysm (HCC)     last assessed - [id number]    Asthma     Basal cell carcinoma     last assessed - 30Geq9025    Cancer (HCC) 2015    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HCC)     Coronary artery disease     Diabetes mellitus (HCC)     Disease of thyroid gland     Facet arthropathy, cervical     last assessed - 30Yaq8396    Headache(784.0)     All my life    History of fracture of vertebral column     Hyperlipidemia     Hypertension     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HCC)     Hypoxia     last assessed - 64Dtg6972    Junctional rhythm     last assessed - 85Dda8645    Lightheadedness     last assessed - 28Ecg9827    Migraine     Palpitations     last assessed - 41Dkk9069    Pleural effusion, bilateral     last assessed - 73Tmp4408    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    Dont know    Shortness of breath     last assessed - 85Vsd3966    Stroke (HCC)     Thyroid trouble     Trigger finger     last assessed - 31Hkc6493    Trigger middle finger of right hand     last assessed - 34Ofa5881    Urinary incontinence     last assessed -  22Jul,2013; Resolved - 15Jan2017    Visual impairment Feb 2015    Wet macular degeneration both eyes     Social History     Socioeconomic History    Marital status:      Spouse name: Not on file    Number of children: Not on file    Years of education: Not on file    Highest education level: Not on file   Occupational History    Not on file   Tobacco Use    Smoking status: Former     Current packs/day:  0.00     Average packs/day: 0.3 packs/day for 58.0 years (14.5 ttl pk-yrs)     Types: Cigarettes     Start date: 1957     Quit date: 2014     Years since quittin.6    Smokeless tobacco: Never    Tobacco comments:     smoked 17 yrs 1/2 ppd quit and restarted then quit  10 days ago during 2014    Vaping Use    Vaping status: Never Used   Substance and Sexual Activity    Alcohol use: Not Currently     Comment: Social drinker    Drug use: No    Sexual activity: Not Currently   Other Topics Concern    Not on file   Social History Narrative    Not on file     Social Determinants of Health     Financial Resource Strain: Low Risk  (2024)    Received from [facility]    Financial Resource Strain     Do you have any trouble paying for your medications, or do you think you might in the future?: No     Does your family have trouble paying for medicine? (Household - for ages 0-17 years): Not on file   Food Insecurity: No Food Insecurity (2024)    Received from [facility]    Hunger Vital Sign     Worried About Running Out of Food in the Last Year: Never true     Ran Out of Food in the Last Year: Never true   Transportation Needs: No Transportation Needs (2024)    Received from [facility]    Transportation Needs     Do you have trouble getting a ride to medical visits or work? (Adult - for ages 18 years and over): Not on file     Does your family have a hard time getting a ride to doctors’ visits? (Household - for ages 0-17 years): Not on file     Has lack of transportation kept you from medical appointments, meetings, work, or from getting things needed for daily living? Check all that apply.: No     Do you (or your family) have trouble finding or paying for a ride (transportation)? (Household - for ages 0-17 years): Not on file   Recent Concern: Transportation Needs - Unmet Transportation Needs (2024)    PRAPARE - Transportation     Lack of Transportation (Medical): Yes     Lack of  Transportation (Non-Medical): Yes   Physical Activity: Not on file   Stress: Not on file   Social Connections: Socially Integrated (2024)    Received from [facility]    Social Doctolib     How often do you feel lonely or isolated from those around you?: Never   Intimate Partner Violence: Not on file   Housing Stability: Low Risk  (2024)    Received from [facility]    Housing Stability     Do you currently live in a shelter or have no steady place to sleep at night?: No     Do you think you are at risk of becoming homeless? (Adult - for ages 18 years and over): Not on file     Does your family worry about paying for your home or becoming homeless? (Household - for ages 0-17 years): Not on file     Are you homeless or worried that you might be in the future?: No     Are you (or your family) homeless or worried that you might be in the future? (Household - for ages 0-17 years): Not on file      Family History   Problem Relation Age of Onset    Coronary aneurysm Sister     Coronary artery disease Sister         CABG    Cancer Sister     Heart disease Family         cardiac disorder    Hypertension Family     Cancer Family     Thyroid disease Family      Past Surgical History:   Procedure Laterality Date    ABDOMINAL AORTIC ANEURYSM REPAIR W/ ENDOLUMINAL GRAFT  2015    Ascending aorta and hemiarch replacement with 30 mm Vascutek Gelweave graft; Managed by Scott Archuleta; last assessed - 2016    APPENDECTOMY      BLADDER SURGERY      Reccurent histoy of bladder surgery    HOROWITZ PROCEDURE      CARDIAC CATHETERIZATION  2004    Procedure summary - Luminal irregularities; last assessed - 2015     SECTION      CORONARY ANEURYSM REPAIR      CYSTOSCOPY      botox injection    HYSTERECTOMY      FL NDSC NJX IMPLT MATRL URT&/BLDR NCK N/A 2017    Procedure: CYSTOSCOPY; DURASPHERE-PERIURETHRAL BULKING AGENT INJECTION ;  Surgeon: Rayo Moulton MD;  Location: [facility];  Service: Urology     WI TENDON SHEATH INCISION Right 10/18/2016    Procedure: LONG FINGER TRIGGER RELEASE ;  Surgeon: Khari Najera MD;  Location: [facility];  Service: Orthopedics    THYROIDECTOMY      Total Thyroidectomy    TONSILLECTOMY AND ADENOIDECTOMY         Current Outpatient Medications:     acetaminophen (TYLENOL) 650 mg CR tablet, Take 650 mg by mouth every 8 (eight) hours as needed for mild pain, Disp: , Rfl:     albuterol (2.5 mg/3 mL) 0.083 % nebulizer solution, Take 2.5 mg by nebulization every 6 (six) hours as needed for wheezing or shortness of breath, Disp: , Rfl:     albuterol (PROVENTIL HFA,VENTOLIN HFA) 90 mcg/act inhaler, Inhale 2 puffs every 6 (six) hours as needed for wheezing, Disp: 25.5 g, Rfl: 3    amLODIPine (NORVASC) 2.5 mg tablet, Take 1 tablet (2.5 mg total) by mouth daily, Disp: 90 tablet, Rfl: 3    benazepril (LOTENSIN) 40 MG tablet, Take 1 tablet (40 mg total) by mouth daily, Disp: 90 tablet, Rfl: 3    Calcium Carb-Cholecalciferol (Calcium+D3) 600-20 MG-MCG TABS, Take 1 tablet by mouth in the morning, Disp: 90 tablet, Rfl: 3    denosumab (PROLIA) 60 mg/mL, Inject 60 mg under the skin once, Disp: , Rfl:     Diclofenac Sodium (VOLTAREN) 1 %, Apply 2 g topically 4 (four) times a day as needed (pain), Disp: , Rfl:     levothyroxine 112 mcg tablet, Take 1 tablet (112 mcg total) by mouth daily in the early morning, Disp: 90 tablet, Rfl: 0    lovastatin (MEVACOR) 20 mg tablet, Take 1 tablet (20 mg total) by mouth every morning, Disp: 90 tablet, Rfl: 3    meclizine (ANTIVERT) 12.5 MG tablet, Take 1 tablet (12.5 mg total) by mouth 3 (three) times a day, Disp: 60 tablet, Rfl: 0    metoprolol succinate (TOPROL-XL) 25 mg 24 hr tablet, Take 1 tablet (25 mg total) by mouth daily, Disp: 100 tablet, Rfl: 1    Multiple Vitamins-Minerals (One Daily Multivitamin Women) TABS, Apply 1 tablet to the mouth or throat daily. Indications: Treatment to Prevent Vitamin Deficiency, Disp: , Rfl:     pantoprazole (PROTONIX) 40  mg tablet, Take 1 tablet (40 mg total) by mouth 2 (two) times a day before meals, Disp: 200 tablet, Rfl: 1  Allergies   Allergen Reactions    Amiodarone Hives    Omnipaque [Iohexol] Hives and Shortness Of Breath    Clindamycin Other (See Comments)     When taken causes cdiff immediately    Other     Sulfa Antibiotics Hives     itching    Acetazolamide Hives and Rash     Reaction Date:Unknown    Iv Dye  [Iodinated Contrast Media] Hypertension and Palpitations     Annotation - 19Mar2015: Verified with patient     Naprosyn [Naproxen] Itching and Rash     Category: Allergy;        Labs:  Office Visit on 08/03/2024   Component Date Value     RAPID STREP A 08/03/2024 Negative     Throat Culture 08/03/2024 Negative for beta-hemolytic Streptococcus    Admission on 07/18/2024, Discharged on 07/19/2024   Component Date Value    Ventricular Rate 07/18/2024 82     Atrial Rate 07/18/2024 97     QRSD Interval 07/18/2024 78     QT Interval 07/18/2024 362     QTC Interval 07/18/2024 422     QRS Axis 07/18/2024 103     T Wave [place] 07/18/2024 69     WBC 07/18/2024 8.47     RBC 07/18/2024 4.36     Hemoglobin 07/18/2024 13.3     Hematocrit 07/18/2024 40.0     MCV 07/18/2024 92     MCH 07/18/2024 30.5     MCHC 07/18/2024 33.3     RDW 07/18/2024 14.0     MPV 07/18/2024 12.2     Platelets 07/18/2024 208     nRBC 07/18/2024 0     Segmented % 07/18/2024 59     Immature Grans % 07/18/2024 0     Lymphocytes % 07/18/2024 29     Monocytes % 07/18/2024 10     Eosinophils Relative 07/18/2024 1     Basophils Relative 07/18/2024 1     Absolute Neutrophils 07/18/2024 5.01     Absolute Immature Grans 07/18/2024 0.02     Absolute Lymphocytes 07/18/2024 2.48     Absolute Monocytes 07/18/2024 0.83     Eosinophils Absolute 07/18/2024 0.09     Basophils Absolute 07/18/2024 0.04     Sodium 07/18/2024 135     Potassium 07/18/2024 4.2     Chloride 07/18/2024 98     CO2 07/18/2024 31     ANION GAP 07/18/2024 6     BUN 07/18/2024 33 (H)     Creatinine  07/18/2024 0.88     Glucose 07/18/2024 100     Calcium 07/18/2024 9.8     AST 07/18/2024 26     ALT 07/18/2024 16     Alkaline Phosphatase 07/18/2024 49     Total Protein 07/18/2024 7.6     Albumin 07/18/2024 4.7     Total Bilirubin 07/18/2024 0.66     eGFR 07/18/2024 57     hs TnI 0hr 07/18/2024 7     Color, UA 07/18/2024 Yellow     Clarity, UA 07/18/2024 Clear     pH, UA 07/18/2024 6.0     Leukocytes, UA 07/18/2024 Negative     Nitrite, UA 07/18/2024 Negative     Protein, UA 07/18/2024 Negative     Glucose, UA 07/18/2024 Negative     Ketones, UA 07/18/2024 Negative     Urobilinogen, UA 07/18/2024 0.2     Bilirubin, UA 07/18/2024 Negative     Occult Blood, UA 07/18/2024 Negative     Specific Gravity, UA 07/18/2024 1.010     hs TnI 2hr 07/18/2024 7     Delta 2hr hsTnI 07/18/2024 0     hs TnI 4hr 07/18/2024 12     Delta 4hr hsTnI 07/18/2024 5     Ventricular Rate 07/18/2024 78     Atrial Rate 07/18/2024 78     MD Interval 07/18/2024 198     QRSD Interval 07/18/2024 78     QT Interval 07/18/2024 410     QTC Interval 07/18/2024 467     P Axis 07/18/2024 75     QRS Axis 07/18/2024 144     T Wave [place] 07/18/2024 45     Ventricular Rate 07/18/2024 70     Atrial Rate 07/18/2024 70     MD Interval 07/18/2024 180     QRSD Interval 07/18/2024 72     QT Interval 07/18/2024 396     QTC Interval 07/18/2024 427     P Axis 07/18/2024 90     QRS Axis 07/18/2024 97     T Wave [place] 07/18/2024 74     BSA 07/19/2024 1.49     A4C EF 07/19/2024 64     LVIDd 07/19/2024 4.00     LVIDS 07/19/2024 2.90     IVSd 07/19/2024 1.00     LVPWd 07/19/2024 1.00     FS 07/19/2024 28     MV E' Tissue Velocity Se* 07/19/2024 8     MV E' Tissue Velocity La* 07/19/2024 11     LA Volume Index (BP) 07/19/2024 24.8     E/A ratio 07/19/2024 2.26     E wave deceleration time 07/19/2024 210     MV Peak E Bip[place] 07/19/2024 97     MV Peak A Bipin 07/19/2024 0.43     RVID d 07/19/2024 3.4     Tricuspid annular plane * 07/19/2024 1.90     LA size 07/19/2024 3.4     LA  length (A2C) 07/19/2024 4.60     LA volume (BP) 07/19/2024 37     RAA A4C 07/19/2024 14.4     MV stenosis pressure 1/2* 07/19/2024 61     MV valve area p 1/2 meth* 07/19/2024 3.61     TR Peak [place] 07/19/2024 2.7     Triscuspid Valve Regurgi* 07/19/2024 28.0     Ao root 07/19/2024 3.70     STJ 07/19/2024 3.5     Asc Ao 07/19/2024 3.3     Sinus of Valsalva, 2D 07/19/2024 3.7     Tricuspid valve peak reg* 07/19/2024 2.67     Left ventricular stroke * 07/19/2024 39.00     Ao STJ 07/19/2024 3.50     IVS 07/19/2024 1.5     LEFT VENTRICLE SYSTOLIC * 07/19/2024 31     LV DIASTOLIC VOLUME (MOD* 07/19/2024 70     Left Atrium Area-systoli* 07/19/2024 16.4     Left Atrium Area-systoli* 07/19/2024 13.2     LVSV, 2D 07/19/2024 39     LV EF 07/19/2024 60     Est. RA pres 07/19/2024 10.0     Right Ventricular Peak S* 07/19/2024 38.00     Cholesterol 07/18/2024 193     Triglycerides 07/18/2024 87     HDL, Direct 07/18/2024 71     LDL Calculated 07/18/2024 105 (H)     Hemoglobin A1C 07/18/2024 5.7 (H)     EAG 07/18/2024 117     Sodium 07/19/2024 142     Potassium 07/19/2024 4.0     Chloride 07/19/2024 105     CO2 07/19/2024 31     ANION GAP 07/19/2024 6     BUN 07/19/2024 23     Creatinine 07/19/2024 0.72     Glucose 07/19/2024 91     Calcium 07/19/2024 8.9     eGFR 07/19/2024 72     WBC 07/19/2024 5.82     RBC 07/19/2024 4.05     Hemoglobin 07/19/2024 12.2     Hematocrit 07/19/2024 38.0     MCV 07/19/2024 94     MCH 07/19/2024 30.1     MCHC 07/19/2024 32.1     RDW 07/19/2024 14.1     MPV 07/19/2024 12.2     Platelets 07/19/2024 194     nRBC 07/19/2024 0     Segmented % 07/19/2024 49     Immature Grans % 07/19/2024 0     Lymphocytes % 07/19/2024 36     Monocytes % 07/19/2024 12     Eosinophils Relative 07/19/2024 2     Basophils Relative 07/19/2024 1     Absolute Neutrophils 07/19/2024 2.90     Absolute Immature Grans 07/19/2024 0.01     Absolute Lymphocytes 07/19/2024 2.08     Absolute Monocytes 07/19/2024 0.67     Eosinophils  Absolute 07/19/2024 0.12     Basophils Absolute 07/19/2024 0.04      Imaging: XR chest pa & lateral    Result Date: 8/3/2024  Narrative: XR CHEST PA & LATERAL INDICATION: R05.1: Acute cough. Sore throat, cough, congestion, decreased appetite for 4 days. COMPARISON: CXR and chest CT 10/13/2023. FINDINGS: Clear lungs. No pneumothorax or pleural effusion. Nodule projecting over the left base is due to the nipple. Normal heart size. Median sternotomy. Stable dilation of the aortic arch and proximal descending thoracic aorta. Mild curvature of the spine. Redemonstration of T12 compression deformity. Normal upper abdomen.     Impression: No acute cardiopulmonary disease. Workstation performed: [id number]     Echo complete w/ contrast if indicated    Result Date: 7/19/2024  Narrative:   Left Ventricle: Left ventricular cavity size is normal. Wall thickness is normal. The left ventricular ejection fraction is 60%. Systolic function is normal. Wall motion is normal. Diastolic function is severely abnormal, consistent with ungraded restrictive relaxation.   Right Ventricle: Right ventricular cavity size is normal. Systolic function is normal.   Left Atrium: The atrium is mildly dilated.   Aortic Valve: There is mild regurgitation.   Mitral Valve: There is mild annular calcification. There is mild regurgitation.   Tricuspid Valve: There is mild regurgitation. The right ventricular systolic pressure is mildly elevated. The estimated right ventricular systolic pressure is 38.00 mmHg.   Aorta: The aortic root is mildly dilated. The ascending aorta is normal in size. The aortic root is 3.70 cm. The ascending aorta is 3.3 cm.     CT spine cervical wo contrast    Result Date: 7/19/2024  Narrative: CT CERVICAL SPINE - WITHOUT CONTRAST INDICATION:   dizziness. COMPARISON: 7/6/2023 TECHNIQUE:  CT examination of the cervical spine was performed without intravenous contrast.  Contiguous axial images were obtained. Multiplanar 2D  reformatted images were created from the source data. Radiation dose length product (DLP) for this visit:  309 mGy-cm .  This examination, like all CT scans performed in the [facility], was performed utilizing techniques to minimize radiation dose exposure, including the use of iterative reconstruction and automated exposure control. IMAGE QUALITY:  Diagnostic. FINDINGS: ALIGNMENT:  Normal alignment of the cervical spine. No subluxation. VERTEBRAE:  No fracture. Stable chronic compression deformities of T2 and T3. DEGENERATIVE CHANGES:  No significant cervical degenerative changes are noted. PREVERTEBRAL AND PARASPINAL SOFT TISSUES: Unremarkable THORACIC INLET:  Normal.     Impression: No cervical spine fracture or traumatic malalignment. Workstation performed: [id number]     MRI brain wo contrast    Result Date: 7/19/2024  Narrative: MRI BRAIN WITHOUT CONTRAST INDICATION: Inpatient Order Stroke. COMPARISON:   CTA head and neck 7/18/2024. TECHNIQUE:  Multiplanar, multisequence imaging of the brain was performed. IMAGE QUALITY:  Diagnostic. FINDINGS: BRAIN PARENCHYMA:  No acute infarct, mass effect or midline shift. Mild chronic ischemic changes of the white matter. Scattered foci of susceptibility statistically reflect chronic microhemorrhage. VENTRICLES:  Normal for the patient's age. SELLA AND PITUITARY GLAND:  Normal. ORBITS:  No acute abnormality. PARANASAL SINUSES:  See below for lesion description. Mild scattered mucosal thickening with retention cysts. VASCULATURE:  Please refer to CTA head and neck on 7/18/2024 for full vascular assessment. CALVARIUM AND SKULL BASE:  No acute abnormality. EXTRACRANIAL SOFT TISSUES:  Partially evaluated expansile, destructive lesion involving the posterior aspect of the hard palate to the right of midline and extending to the floor of the right maxillary sinus, better evaluated on prior imaging.     Impression: 1. No acute infarct, mass effect or midline  shift. 2. Partially evaluated expansile, destructive lesion involving the posterior aspect of the hard palate to the right of midline and extending to the floor of the right maxillary sinus, better evaluated on prior imaging. Workstation performed: [id number]     CTA head and neck with and without contrast    Result Date: 7/18/2024  Narrative: CTA NECK AND BRAIN WITH AND WITHOUT CONTRAST INDICATION: Persistent vertigo COMPARISON:   Neck CT from 4/12/2024, prior head CT from 7/6/2023, CT angiogram of the chest from 10/13/2023 TECHNIQUE:  Routine CT imaging of the Brain without contrast.Post contrast imaging was performed after administration of iodinated contrast through the neck and brain. Post contrast axial 0.625 mm images timed to opacify the arterial system.  3D rendering was performed on an independent workstation.   MIP reconstructions performed. Coronal and sagittal reconstructions were performed of the non contrast portion of the brain. Radiation dose length product (DLP) for this visit:  1296 mGy-cm .  This examination, like all CT scans performed in the [facility], was performed utilizing techniques to minimize radiation dose exposure, including the use of iterative reconstruction and automated exposure control. IV Contrast:  85 mL of iohexol (OMNIPAQUE) IMAGE QUALITY:   Diagnostic FINDINGS: NONCONTRAST BRAIN PARENCHYMA:No intracranial mass, mass effect or midline shift. No CT signs of acute infarction.  No acute parenchymal hemorrhage. VENTRICLES AND EXTRA-AXIAL SPACES:Normal for the patient's age. VISUALIZED ORBITS: Normal. PARANASAL SINUSES: Normal. CTA NECK ARCH AND GREAT VESSELS: In the visualized thoracic aorta, there is a fusiform aneurysm of the aortic arch measuring approximately 4.7 cm in maximal transverse dimensions grossly unchanged when compared to prior CT angiogram of the chest. There is mixed atherosclerotic plaque involving the visualized aortic arch. There is separate  origin of the left vertebral artery off the aortic arch, congenital variant. VERTEBRAL ARTERIES: Patent extracranial segments. RIGHT CAROTID: The proximal right common carotid artery is tortuous, but is without significant stenosis. There is mild mixed atherosclerotic plaque at the right carotid bifurcation and carotid bulb, but there is 0% stenosis of the right cervical internal carotid artery.   No dissection. LEFT CAROTID: There is mild atherosclerotic plaque involving the proximal and distal left common carotid artery, without stenosis. There is mixed atherosclerotic plaque involving the left carotid bulb/carotid bifurcation, without stenosis of the left cervical internal carotid artery. No dissection is identified. NASCET criteria was used to determine the degree of internal carotid artery diameter stenosis. CTA BRAIN: INTERNAL CAROTID ARTERIES: There is mild mixed atherosclerotic plaque involving the cavernous right internal carotid artery without stenosis. The left internal carotid artery is without stenosis. ANTERIOR CEREBRAL ARTERY CIRCULATION:  No stenosis or occlusion. 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 MIDDLE CEREBRAL ARTERY CIRCULATION:  No stenosis or occlusion. DISTAL VERTEBRAL ARTERIES:  No stenosis or occlusion. BASILAR ARTERY:  No stenosis or occlusion. POSTERIOR CEREBRAL ARTERIES: There is a high-grade stenosis involving the right posterior cerebral artery P2 segment. The left posterior cerebral artery appears widely patent. No occlusion is identified. VENOUS STRUCTURES:  Normal. NON VASCULAR ANATOMY BONY STRUCTURES: There is multilevel cervical and upper thoracic spondylosis. SOFT TISSUES OF THE NECK: Again demonstrated is an expansile, destructive lesion involving the posterior aspect of the hard palate to the right of midline, extending to involve the floor of the right maxillary sinus, measuring at least 3.5 cm x 2.8 cm transversely seen on series  4 image 138. This appears mildly increased in size when compared to the prior examination. THORACIC INLET: There is pleural-parenchymal scarring involving the lung apices.     Impression: CT Brain:  No acute intracranial abnormality. CT Angiography: High-grade stenosis involving the right posterior cerebral artery P2 segment. No additional large vessel occlusion, high-grade stenosis, or intracranial aneurysm is identified on CT angiogram of the head. No hemodynamically significant stenosis or dissection identified on CT angiogram of the neck. Mild increase in size of the destructive mass involving the posterior aspect of the hard palate on the right, extending to involve the posterior aspect of the floor of the right maxillary sinus. Stable thoracic aortic aneurysm involving the visualized aortic arch. The above findings were communicated by Dr. Banks to Dr. Jon on 7/18/2024 at 2:05 p.m. Workstation performed: [id number]       Review of Systems:  Review of Systems   HENT:          Pain in right side of face around eye    Eyes:  Positive for visual disturbance.   Musculoskeletal:  Positive for arthralgias and myalgias.   All other systems reviewed and are negative.      Physical Exam  Vitals reviewed.   Constitutional:       Appearance: Normal appearance.   Cardiovascular:      Rate and Rhythm: Normal rate and regular rhythm.      Pulses: Normal pulses.      Heart sounds: Normal heart sounds.   Pulmonary:      Effort: Pulmonary effort is normal.      Breath sounds: Normal breath sounds.   Musculoskeletal:         General: Normal range of motion.      Cervical back: Normal range of motion and neck supple.      Right lower leg: No edema.      Left lower leg: No edema.   Skin:     General: Skin is warm and dry.      Capillary Refill: Capillary refill takes less than 2 seconds.   Neurological:      General: No focal deficit present.      Mental Status: She is alert and oriented to person, place, and time.    Psychiatric:         Mood and Affect: Mood normal.         Behavior: Behavior normal.     Discussion/Summary:  # Orthostatic hypotension RUE Sitting 160/60 and standing 130/60 denies dizziness.  Not wearing compression stockings.  Increase Amlodipine from 2.5mg daily to 5mg daily due to HTN sitting.  Continue on Benazepril 40mg daily, Metoprolol succinate 25mg daily . No Zio at this time continue on Meclizine 12.5mg TID PRN, Continue to hydrate, instructed to wear patrick LE compression stockings during the day  # Hyperlipidemia 7/18/24  TG 87, HDL 71,   continue on Mevacor 20mg daily heart healthy diet   # Mild MV regurgitation  # Paroxysmal atrial fibrillation in the setting of GIB not on AC, no re occurrence since this time

## 2024-08-19 ENCOUNTER — OFFICE VISIT (OUTPATIENT)
Dept: CARDIOLOGY CLINIC | Facility: CLINIC | Age: 89
End: 2024-08-19
Payer: COMMERCIAL

## 2024-08-19 VITALS
DIASTOLIC BLOOD PRESSURE: 60 MMHG | BODY MASS INDEX: 21.54 KG/M2 | SYSTOLIC BLOOD PRESSURE: 156 MMHG | WEIGHT: 114.1 LBS | HEIGHT: 61 IN | OXYGEN SATURATION: 96 % | HEART RATE: 61 BPM

## 2024-08-19 DIAGNOSIS — I95.1 ORTHOSTATIC HYPOTENSION: Primary | ICD-10-CM

## 2024-08-19 DIAGNOSIS — I48.0 PAROXYSMAL ATRIAL FIBRILLATION (HCC): ICD-10-CM

## 2024-08-19 DIAGNOSIS — I34.0 NONRHEUMATIC MITRAL VALVE REGURGITATION: ICD-10-CM

## 2024-08-19 DIAGNOSIS — E78.5 HYPERLIPIDEMIA, UNSPECIFIED HYPERLIPIDEMIA TYPE: ICD-10-CM

## 2024-08-19 PROCEDURE — 99215 OFFICE O/P EST HI 40 MIN: CPT | Performed by: NURSE PRACTITIONER

## 2024-08-19 RX ORDER — AMLODIPINE BESYLATE 5 MG/1
5 TABLET ORAL DAILY
Qty: 30 TABLET | Refills: 4 | Status: SHIPPED | OUTPATIENT
Start: 2024-08-19 | End: 2024-08-20 | Stop reason: SDUPTHER

## 2024-08-19 NOTE — PATIENT INSTRUCTIONS
Continue to hydrate   Compression stocking daily while awake and remove at bedtime   Increase Amlodipine to 5mg daily  Home BP monitoring and keep a log   Runner socks from [employer] 20mmHg compression     Do not take all your anti hypertensive medication together. Space throughout the day

## 2024-08-20 DIAGNOSIS — I95.1 ORTHOSTATIC HYPOTENSION: ICD-10-CM

## 2024-08-20 RX ORDER — AMLODIPINE BESYLATE 5 MG/1
5 TABLET ORAL DAILY
Qty: 90 TABLET | Refills: 1 | Status: SHIPPED | OUTPATIENT
Start: 2024-08-20

## 2024-08-20 NOTE — TELEPHONE ENCOUNTER
Reason for call:   [x] Refill   [] Prior Auth  [x] Other: script was sent in as 30 day supply to mail order - needs script to be sent in as a 90 day supply    Office:   [] PCP/Provider -   [x] Specialty/Provider - SARAHI Putnam     Medication: amLODIPine (NORVASC) 5 mg tablet     Dose/Frequency: Take 1 tablet (5 mg total) by mouth daily     Quantity: 90    Pharmacy: [facility] - [place] Bat[place] - [address] [place] [address]     Does the patient have enough for 3 days?   [] Yes   [] No - Send as HP to POD

## 2024-08-22 DIAGNOSIS — I10 ESSENTIAL HYPERTENSION: ICD-10-CM

## 2024-08-22 RX ORDER — BENAZEPRIL HYDROCHLORIDE 40 MG/1
40 TABLET ORAL DAILY
Qty: 100 TABLET | Refills: 1 | Status: SHIPPED | OUTPATIENT
Start: 2024-08-22

## 2024-08-23 DIAGNOSIS — E03.9 HYPOTHYROIDISM, UNSPECIFIED TYPE: ICD-10-CM

## 2024-08-23 RX ORDER — LEVOTHYROXINE SODIUM 112 UG/1
TABLET ORAL
Qty: 90 TABLET | Refills: 1 | Status: SHIPPED | OUTPATIENT
Start: 2024-08-23

## 2024-08-27 ENCOUNTER — ESTABLISHED COMPREHENSIVE EXAM (OUTPATIENT)
Dept: URBAN - METROPOLITAN AREA CLINIC 6 | Facility: CLINIC | Age: 89
End: 2024-08-27

## 2024-08-27 DIAGNOSIS — Z96.1: ICD-10-CM

## 2024-08-27 DIAGNOSIS — H04.123: ICD-10-CM

## 2024-08-27 DIAGNOSIS — G70.00: ICD-10-CM

## 2024-08-27 DIAGNOSIS — H16.213: ICD-10-CM

## 2024-08-27 DIAGNOSIS — H35.3230: ICD-10-CM

## 2024-08-27 PROCEDURE — 92014 COMPRE OPH EXAM EST PT 1/>: CPT

## 2024-08-27 PROCEDURE — 92134 CPTRZ OPH DX IMG PST SGM RTA: CPT

## 2024-08-27 ASSESSMENT — VISUAL ACUITY
OD_PH: 20/40
OD_CC: 20/50+2
OS_CC: 20/60
OS_PH: 20/50+2

## 2024-08-27 ASSESSMENT — TONOMETRY
OS_IOP_MMHG: 7
OD_IOP_MMHG: 7

## 2024-09-11 DIAGNOSIS — E78.5 HYPERLIPIDEMIA, UNSPECIFIED HYPERLIPIDEMIA TYPE: ICD-10-CM

## 2024-09-11 RX ORDER — LOVASTATIN 20 MG
20 TABLET ORAL EVERY MORNING
Qty: 90 TABLET | Refills: 1 | Status: SHIPPED | OUTPATIENT
Start: 2024-09-11 | End: 2025-09-06

## 2024-09-14 ENCOUNTER — APPOINTMENT (OUTPATIENT)
Dept: LAB | Facility: CLINIC | Age: 89
End: 2024-09-14
Payer: COMMERCIAL

## 2024-09-14 DIAGNOSIS — E03.9 HYPOTHYROIDISM, UNSPECIFIED TYPE: ICD-10-CM

## 2024-09-14 LAB
T4 FREE SERPL-MCNC: 1.42 NG/DL (ref 0.61–1.12)
TSH SERPL DL<=0.05 MIU/L-ACNC: 0.38 UIU/ML (ref 0.45–4.5)

## 2024-09-14 PROCEDURE — 84439 ASSAY OF FREE THYROXINE: CPT

## 2024-09-14 PROCEDURE — 84443 ASSAY THYROID STIM HORMONE: CPT

## 2024-09-14 PROCEDURE — 36415 COLL VENOUS BLD VENIPUNCTURE: CPT

## 2024-09-30 ENCOUNTER — RA CDI HCC (OUTPATIENT)
Dept: OTHER | Facility: HOSPITAL | Age: 89
End: 2024-09-30

## 2024-10-01 NOTE — PROGRESS NOTES
HCC coding opportunities          Chart Reviewed number of suggestions sent to Provider: 1  I70.0     Patients Insurance     Medicare Insurance: [employer] Advantage

## 2024-10-02 ENCOUNTER — RA CDI HCC (OUTPATIENT)
Dept: OTHER | Facility: HOSPITAL | Age: 89
End: 2024-10-02

## 2024-10-03 ENCOUNTER — OFFICE VISIT (OUTPATIENT)
Dept: GASTROENTEROLOGY | Facility: MEDICAL CENTER | Age: 89
End: 2024-10-03
Payer: COMMERCIAL

## 2024-10-03 VITALS
BODY MASS INDEX: 21.34 KG/M2 | WEIGHT: 113 LBS | OXYGEN SATURATION: 93 % | HEIGHT: 61 IN | TEMPERATURE: 98.3 F | DIASTOLIC BLOOD PRESSURE: 72 MMHG | HEART RATE: 67 BPM | SYSTOLIC BLOOD PRESSURE: 147 MMHG

## 2024-10-03 DIAGNOSIS — D64.9 ANEMIA, UNSPECIFIED TYPE: ICD-10-CM

## 2024-10-03 DIAGNOSIS — R19.7 DIARRHEA, UNSPECIFIED TYPE: Primary | ICD-10-CM

## 2024-10-03 DIAGNOSIS — R10.13 EPIGASTRIC PAIN: ICD-10-CM

## 2024-10-03 DIAGNOSIS — R13.10 DYSPHAGIA, UNSPECIFIED TYPE: ICD-10-CM

## 2024-10-03 DIAGNOSIS — R14.0 ABDOMINAL BLOATING: ICD-10-CM

## 2024-10-03 DIAGNOSIS — Z87.19 HISTORY OF DUODENAL ULCER: ICD-10-CM

## 2024-10-03 PROCEDURE — 99214 OFFICE O/P EST MOD 30 MIN: CPT | Performed by: PHYSICIAN ASSISTANT

## 2024-10-03 NOTE — PROGRESS NOTES
[facility] - Outpatient Follow-up Note  June A Weddermann 89 y.o. female MRN: [id number]  Encounter: [id number]      Assessment and Plan    1. Dysphagia  2. Epigastric pain  The patient had a hospitalization in October with dysphagia to solids and chest pain radiating to her back prompting EGD 10/13/23 which revealed a 2cm hiatal hernia otherwise normal. Empiric dilation was done to 20mm. She states she does have to take small bites and chew well but if she does this she has no trouble swallowing.   -Continue to take small bites and chew well, sit upright while eating, drink fluids while eating   -Monitor for worsening dysphagia     3. Anemia  4. PUD  The patient was hospitalized 1/2023 and found to have acute anemia for which she underwent EGD and colonoscopy 2/2023 revealing a 3cm hiatal hernia, gastritis, and a duodenal bulb ulcer. Colonoscopy was aborted due to large amount of stool in the rectum. Repeat EGD 10/13/23 revealed a healed duodenal ulcer. She has no overt bleeding and her HGB is normal.  -Monitor for anemia or over bleeding     5. Abdominal bloating  6. Diarrhea   At the patient's last visit in May she had complaints of postprandial abdominal bloating.  She states that her symptoms have worsened and she is now experiencing postprandial urgency with diarrhea and abdominal pain as well as fecal incontinence/soiling that is significantly impacting the quality of her life.  Her last colonoscopy was 2/6/2023 secondary to anemia but unfortunately this was aborted secondary to solid stool in the colon despite 1-2 drugs of GoLytely.  -Given her history of constipation there is concern for overflow diarrhea so we will obtain a KUB to rule this out  -Will also rule out other etiologies of diarrhea with a TSH, celiac serologies, H. pylori, Giardia, infectious stool studies, and inflammatory markers    Follow-up 3 to 4  months    ______________________________________________________________________    History of Present Illness  Buffy NASRIN Colón is a 89 y.o. female with a HTN, COPD, asthma, Afib not on AC, salivary gland cancer, hypothyroidism, and duodenal bulb ulcer (2/2023)   here for follow up evaluation of abdominal bloating.  At her last visit the patient states that every time she eats she was getting abdominal distention and bloating.  It was recommended that she start simethicone to see if this provided any benefit.  The patient states that her symptoms have worsened since her last visit and she is now having a change in bowel habits as well.  After she eats she will get urgency with diarrhea and abdominal pain.  She has also noticed that she is having fecal incontinence/soiling.      Review of Systems   Constitutional:  Negative for activity change, appetite change, chills, fatigue, fever and unexpected weight change.   Gastrointestinal:  Positive for abdominal pain.       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), wet (HCC)     bilateral    Anemia N/A    Aneurysm of aortic root     last assessed - 06Dec2017    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HCC)     Aortic root dilatation (HCC)     last assessed - 06Dec2017    Arthritis     Ascending aortic aneurysm (HCC)     last assessed - [id number]    Asthma     Basal cell carcinoma     last assessed - [id number]    Cancer (HCC) 2015    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HCC)     Coronary artery disease     Diabetes mellitus (HCC)     Disease of thyroid gland     Facet arthropathy, cervical     last assessed - 08Jun2017    Headache(784.0)     All my life    History of fracture of vertebral column     Hyperlipidemia     Hypertension     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HCC)     Hypoxia     last assessed - 16Mar2015    Junctional rhythm     last assessed -  2017    Lightheadedness     last assessed - 2016    Migraine     Osteoporosis     Palpitations     last assessed - 2016    Pleural effusion, bilateral     last assessed - 2015    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    Dont know    Shortness of breath     last assessed - 2017    Stroke (HCC)     Thyroid trouble     Trigger finger     last assessed - 2016    Trigger middle finger of right hand     last assessed - 2016    Urinary incontinence     last assessed -  ; Resolved - 2017    Visual impairment 2015    Wet macular degeneration both eyes       Past Social history  Past Surgical History:   Procedure Laterality Date    ABDOMINAL AORTIC ANEURYSM REPAIR W/ ENDOLUMINAL GRAFT  2015    Ascending aorta and hemiarch replacement with 30 mm Vascutek Gelweave graft; Managed by Scott Archuleta; last assessed - 2016    APPENDECTOMY      BLADDER SURGERY      Reccurent histoy of bladder surgery    HOROWITZ PROCEDURE      CARDIAC CATHETERIZATION  2004    Procedure summary - Luminal irregularities; last assessed - 2015     SECTION      CORONARY ANEURYSM REPAIR      CYSTOSCOPY      botox injection    HYSTERECTOMY      VA NDSC NJX IMPLT MATRL URT&/BLDR NCK N/A 2017    Procedure: CYSTOSCOPY; DURASPHERE-PERIURETHRAL BULKING AGENT INJECTION ;  Surgeon: Rayo Moulton MD;  Location: [facility];  Service: Urology    VA TENDON SHEATH INCISION Right 10/18/2016    Procedure: LONG FINGER TRIGGER RELEASE ;  Surgeon: Khari Najera MD;  Location: [facility];  Service: Orthopedics    THYROIDECTOMY      Total Thyroidectomy    TONSILLECTOMY AND ADENOIDECTOMY       Social History     Socioeconomic History    Marital status:      Spouse name: Not on file    Number of children: Not on file    Years of education: Not on file    Highest education level: Not on file   Occupational History    Not on file   Tobacco Use    Smoking status: Former      Current packs/day: 0.00     Average packs/day: 0.3 packs/day for 58.0 years (14.5 ttl pk-yrs)     Types: Cigarettes     Start date: 1957     Quit date: 2014     Years since quittin.7    Smokeless tobacco: Never    Tobacco comments:     smoked 17 yrs 1/2 ppd quit and restarted then quit  10 days ago during 2014    Vaping Use    Vaping status: Never Used   Substance and Sexual Activity    Alcohol use: Not Currently     Comment: Social drinker    Drug use: No    Sexual activity: Not Currently   Other Topics Concern    Not on file   Social History Narrative    Not on file     Social Determinants of Health     Financial Resource Strain: Low Risk  (2024)    Received from "[employer]    Financial Resource Strain     Do you have any trouble paying for your medications, or do you think you might in the future?: No     Does your family have trouble paying for medicine? (Household - for ages 0-17 years): Not on file   Food Insecurity: No Food Insecurity (2024)    Received from "[employer]    Hunger Vital Sign     Worried About Running Out of Food in the Last Year: Never true     Ran Out of Food in the Last Year: Never true   Transportation Needs: No Transportation Needs (2024)    Received from "[employer]    Transportation Needs     Do you have trouble getting a ride to medical visits or work? (Adult - for ages 18 years and over): Not on file     Does your family have a hard time getting a ride to doctors’ visits? (Household - for ages 0-17 years): Not on file     Has lack of transportation kept you from medical appointments, meetings, work, or from getting things needed for daily living? Check all that apply.: No     Do you (or your family) have trouble finding or paying for a ride (transportation)? (Household - for ages 0-17 years): Not on file   Recent Concern: Transportation Needs - Unmet Transportation Needs (2024)    PRAPARE - Transportation     Lack of Transportation (Medical): Yes      Lack of Transportation (Non-Medical): Yes   Physical Activity: Not on file   Stress: Not on file   Social Connections: Socially Integrated (2024)    Received from [employer]     How often do you feel lonely or isolated from those around you?: Never   Intimate Partner Violence: Not on file   Housing Stability: Low Risk  (2024)    Received from [employer]    Housing Stability     Do you currently live in a shelter or have no steady place to sleep at night?: No     Do you think you are at risk of becoming homeless? (Adult - for ages 18 years and over): Not on file     Does your family worry about paying for your home or becoming homeless? (Household - for ages 0-17 years): Not on file     Are you homeless or worried that you might be in the future?: No     Are you (or your family) homeless or worried that you might be in the future? (Household - for ages 0-17 years): Not on file     Social History     Substance and Sexual Activity   Alcohol Use Not Currently    Comment: Social drinker     Social History     Substance and Sexual Activity   Drug Use No     Social History     Tobacco Use   Smoking Status Former    Current packs/day: 0.00    Average packs/day: 0.3 packs/day for 58.0 years (14.5 ttl pk-yrs)    Types: Cigarettes    Start date: 1957    Quit date: 2014    Years since quittin.7   Smokeless Tobacco Never   Tobacco Comments    smoked 17 yrs 1/2 ppd quit and restarted then quit  10 days ago during 2014        Past Family History  Family History   Problem Relation Age of Onset    Coronary aneurysm Sister     Coronary artery disease Sister         CABG    Cancer Sister     Heart disease Family         cardiac disorder    Hypertension Family     Cancer Family     Thyroid disease Family        Current Medications  Current Outpatient Medications   Medication Sig Dispense Refill    acetaminophen (TYLENOL) 650 mg CR tablet Take 650 mg by mouth every 8 (eight)  hours as needed for mild pain      albuterol (2.5 mg/3 mL) 0.083 % nebulizer solution Take 2.5 mg by nebulization every 6 (six) hours as needed for wheezing or shortness of breath      albuterol (PROVENTIL HFA,VENTOLIN HFA) 90 mcg/act inhaler Inhale 2 puffs every 6 (six) hours as needed for wheezing 25.5 g 3    amLODIPine (NORVASC) 5 mg tablet Take 1 tablet (5 mg total) by mouth daily 90 tablet 1    benazepril (LOTENSIN) 40 MG tablet Take 1 tablet (40 mg total) by mouth daily 100 tablet 1    Calcium Carb-Cholecalciferol (Calcium+D3) 600-20 MG-MCG TABS Take 1 tablet by mouth in the morning 90 tablet 3    denosumab (PROLIA) 60 mg/mL Inject 60 mg under the skin once      Diclofenac Sodium (VOLTAREN) 1 % Apply 2 g topically 4 (four) times a day as needed (pain)      levothyroxine 112 mcg tablet Take 1 Tablet by mouth in the early morning. (at least 30 min prior to breakfast or other meds). 90 tablet 1    lovastatin (MEVACOR) 20 mg tablet Take 1 tablet (20 mg total) by mouth every morning 90 tablet 1    meclizine (ANTIVERT) 12.5 MG tablet Take 1 tablet (12.5 mg total) by mouth 3 (three) times a day (Patient taking differently: Take 12.5 mg by mouth 3 (three) times a day as needed) 60 tablet 0    metoprolol succinate (TOPROL-XL) 25 mg 24 hr tablet Take 1 tablet (25 mg total) by mouth daily 100 tablet 1    Multiple Vitamins-Minerals (One Daily Multivitamin Women) TABS Apply 1 tablet to the mouth or throat daily. Indications: Treatment to Prevent Vitamin Deficiency      pantoprazole (PROTONIX) 40 mg tablet Take 1 tablet (40 mg total) by mouth 2 (two) times a day before meals 200 tablet 1     No current facility-administered medications for this visit.       Allergies  Allergies   Allergen Reactions    Amiodarone Hives    Omnipaque [Iohexol] Hives and Shortness Of Breath    Clindamycin Other (See Comments)     When taken causes cdiff immediately    Other     Sulfa Antibiotics Hives     itching    Acetazolamide Hives and Rash  "    Reaction Date:Unknown    Iv Dye  [Iodinated Contrast Media] Hypertension and Palpitations     Annotation - 19Mar2015: Verified with patient     Naprosyn [Naproxen] Itching and Rash     Category: Allergy;          The following portions of the patient's history were reviewed and updated as appropriate: allergies, current medications, past medical history, past social history, past surgical history and problem list.      Vitals  Vitals:    10/03/24 1105   BP: 147/72   BP Location: Left arm   Patient Position: Sitting   Cuff Size: Standard   Pulse: 67   Temp: 98.3 °F (36.8 °C)   TempSrc: Tympanic   SpO2: 93%   Weight: 51.3 kg (113 lb)   Height: 5' 1\" (1.549 m)         Physical Exam  Constitutional   General appearance: Patient is seated and in no acute distress, well appearing and well nourished.   Head and Face   Head and face: Normal.    Eyes   Conjunctiva and lids: No erythema, swelling or discharge.  Anicteric.  Ears, Nose, Mouth, and Throat   Hearing: Normal.    Neck: Supple, trachea midline.  Pulmonary   Respiratory effort: No increased work of breathing or signs of respiratory distress.    Cardiovascular   Examination of extremities for edema and/or varicosities: Normal.    Musculoskeletal   Gait and station: Normal    Skin   Skin and subcutaneous tissue: Warm, dry, and intact. No visible jaundice, lesions or rashes.  Psychiatric   Judgment and insight: Normal  Recent and remote memory:  Normal  Mood and affect: Normal      Results  No visits with results within 1 Day(s) from this visit.   Latest known visit with results is:   Appointment on 09/14/2024   Component Date Value    TSH 3RD GENERATON 09/14/2024 0.376 (L)     Free T4 09/14/2024 1.42 (H)        Radiology Results  No results found.    Orders  No orders of the defined types were placed in this encounter.      Answers submitted by the patient for this visit:  Abdominal Pain Questionnaire (Submitted on 9/26/2024)  Chief Complaint: Abdominal " pain  Aggravated by: bowel movement, eating  Diagnostic workup: CT scan, upper endoscopy

## 2024-10-04 ENCOUNTER — APPOINTMENT (OUTPATIENT)
Dept: RADIOLOGY | Facility: CLINIC | Age: 89
End: 2024-10-04
Payer: COMMERCIAL

## 2024-10-04 ENCOUNTER — APPOINTMENT (OUTPATIENT)
Dept: LAB | Facility: CLINIC | Age: 89
End: 2024-10-04
Payer: COMMERCIAL

## 2024-10-04 DIAGNOSIS — R14.0 ABDOMINAL BLOATING: ICD-10-CM

## 2024-10-04 DIAGNOSIS — R19.7 DIARRHEA, UNSPECIFIED TYPE: ICD-10-CM

## 2024-10-04 LAB
CRP SERPL QL: 1.5 MG/L
IGA SERPL-MCNC: 121 MG/DL (ref 66–433)
TSH SERPL DL<=0.05 MIU/L-ACNC: 0.9 UIU/ML (ref 0.45–4.5)

## 2024-10-04 PROCEDURE — 74018 RADEX ABDOMEN 1 VIEW: CPT

## 2024-10-04 PROCEDURE — 84443 ASSAY THYROID STIM HORMONE: CPT

## 2024-10-04 PROCEDURE — 36415 COLL VENOUS BLD VENIPUNCTURE: CPT

## 2024-10-04 PROCEDURE — 86140 C-REACTIVE PROTEIN: CPT

## 2024-10-04 PROCEDURE — 82784 ASSAY IGA/IGD/IGG/IGM EACH: CPT

## 2024-10-08 ENCOUNTER — TELEPHONE (OUTPATIENT)
Dept: GASTROENTEROLOGY | Facility: MEDICAL CENTER | Age: 89
End: 2024-10-08

## 2024-10-08 NOTE — TELEPHONE ENCOUNTER
Called the patient and explained the results and recommendation from Tiffany Reece PA-C. Patient understood, was agreeable to try one capful of Miralax daily and thanked us.     ----- Message from Tiffany Reece PA-C sent at 10/8/2024  1:37 PM EDT -----  Please call and let the patient know that her blood work was all normal.  Her x-ray revealed that she had a moderate amount of stool burden which is indicating that she has constipation.  As discussed at her visit, this means she is having something called overflow diarrhea where the diarrhea is actually caused by underlying constipation.  Because of this, she does not need to get her stool studies done and we could start with treating constipation.  I would recommend she ensure she is drinking a lot of water every day and start MiraLAX 17 g once daily and titrate to have a daily formed bowel movement.  She may need to take more or less than 17 g once daily to achieve this.

## 2024-10-10 ENCOUNTER — OFFICE VISIT (OUTPATIENT)
Dept: FAMILY MEDICINE CLINIC | Facility: CLINIC | Age: 89
End: 2024-10-10
Payer: COMMERCIAL

## 2024-10-10 VITALS
SYSTOLIC BLOOD PRESSURE: 130 MMHG | WEIGHT: 114.6 LBS | DIASTOLIC BLOOD PRESSURE: 60 MMHG | HEART RATE: 62 BPM | OXYGEN SATURATION: 94 % | HEIGHT: 61 IN | TEMPERATURE: 97.5 F | BODY MASS INDEX: 21.64 KG/M2

## 2024-10-10 DIAGNOSIS — E89.0 POSTOPERATIVE HYPOTHYROIDISM: Primary | ICD-10-CM

## 2024-10-10 DIAGNOSIS — Z23 ENCOUNTER FOR IMMUNIZATION: ICD-10-CM

## 2024-10-10 DIAGNOSIS — K21.00 GASTROESOPHAGEAL REFLUX DISEASE WITH ESOPHAGITIS WITHOUT HEMORRHAGE: ICD-10-CM

## 2024-10-10 DIAGNOSIS — J45.40 MODERATE PERSISTENT ASTHMA WITHOUT COMPLICATION: ICD-10-CM

## 2024-10-10 DIAGNOSIS — I10 PRIMARY HYPERTENSION: ICD-10-CM

## 2024-10-10 PROCEDURE — 99213 OFFICE O/P EST LOW 20 MIN: CPT | Performed by: FAMILY MEDICINE

## 2024-10-10 PROCEDURE — 90662 IIV NO PRSV INCREASED AG IM: CPT | Performed by: FAMILY MEDICINE

## 2024-10-10 PROCEDURE — G0008 ADMIN INFLUENZA VIRUS VAC: HCPCS | Performed by: FAMILY MEDICINE

## 2024-10-10 NOTE — ASSESSMENT & PLAN NOTE
TSH in range.  Orders:    T4, free; Future    TSH, 3rd generation; Future    Lipid Panel with Direct LDL reflex; Future

## 2024-10-10 NOTE — ASSESSMENT & PLAN NOTE
Patient taking benazepril, metoprolol, amlodipine. BP at visit is good. She does see cardiology.  Orders:    Lipid Panel with Direct LDL reflex; Future    Comprehensive metabolic panel; Future    CBC and differential; Future

## 2024-10-10 NOTE — PROGRESS NOTES
Ambulatory Visit  Name: Buffy Colón      : 1932      MRN: [id number]  Encounter Provider: Stephanie Pruett DO  Encounter Date: 10/10/2024   Encounter department: [facility]    Assessment & Plan  Postoperative hypothyroidism  TSH in range.  Orders:    T4, free; Future    TSH, 3rd generation; Future    Lipid Panel with Direct LDL reflex; Future    Primary hypertension  Patient taking benazepril, metoprolol, amlodipine. BP at visit is good. She does see cardiology.  Orders:    Lipid Panel with Direct LDL reflex; Future    Comprehensive metabolic panel; Future    CBC and differential; Future    Encounter for immunization    Orders:    influenza vaccine, high-dose, PF 0.5 mL (Fluzone High Dose)    Moderate persistent asthma without complication         Gastroesophageal reflux disease with esophagitis without hemorrhage       Due for Medicare wellness end of March/ Beginning of April. Blood work orders in chart.     History of Present Illness     Patient is here for follow up of chronic medical conditins.  She had diarrhea which she thinks was due to C. Diff from Azithromycin which was treat bronchitis in August. The diarrhea has subsided. She did see GI and they did cancel some testing.  She is checking her BP as ordered by cardiology. She finds her BP is labile.          Review of Systems   Constitutional:  Negative for chills and fever.   HENT:  Negative for congestion and sore throat.    Respiratory:  Negative for chest tightness.    Cardiovascular:  Negative for chest pain and palpitations.   Gastrointestinal:  Negative for abdominal pain, constipation, diarrhea (resolved) and nausea.   Genitourinary:  Negative for difficulty urinating.   Skin: Negative.    Neurological:  Negative for dizziness and headaches.   Psychiatric/Behavioral: Negative.             Objective     /60 (BP Location: Left arm, Patient Position: Sitting, Cuff Size: Adult)   Pulse 62   Temp 97.5 °F (36.4 °C)  "(Temporal)   Ht 5' 1\" (1.549 m)   Wt 52 kg (114 lb 9.6 oz)   SpO2 94%   BMI 21.65 kg/m²     Physical Exam  Vitals and nursing note reviewed.   Constitutional:       General: She is not in acute distress.     Appearance: She is well-developed.   HENT:      Head: Normocephalic.   Eyes:      General: No scleral icterus.  Neck:      Thyroid: No thyromegaly.      Vascular: No carotid bruit.   Cardiovascular:      Rate and Rhythm: Normal rate and regular rhythm.      Heart sounds: Normal heart sounds. No murmur heard.  Pulmonary:      Effort: Pulmonary effort is normal.      Breath sounds: Normal breath sounds.   Musculoskeletal:      Right lower leg: Edema (pedal edema) present.      Left lower leg: No edema.   Lymphadenopathy:      Cervical: No cervical adenopathy.   Skin:     General: Skin is warm and dry.   Neurological:      Mental Status: She is alert and oriented to person, place, and time.   Psychiatric:         Mood and Affect: Mood normal.         "

## 2024-10-14 ENCOUNTER — CLINICAL SUPPORT (OUTPATIENT)
Dept: RHEUMATOLOGY | Facility: CLINIC | Age: 89
End: 2024-10-14
Payer: COMMERCIAL

## 2024-10-14 DIAGNOSIS — M81.0 AGE-RELATED OSTEOPOROSIS WITHOUT CURRENT PATHOLOGICAL FRACTURE: Primary | ICD-10-CM

## 2024-10-14 PROCEDURE — 96372 THER/PROPH/DIAG INJ SC/IM: CPT

## 2024-11-06 ENCOUNTER — OFFICE VISIT (OUTPATIENT)
Dept: CARDIOLOGY CLINIC | Facility: CLINIC | Age: 89
End: 2024-11-06
Payer: COMMERCIAL

## 2024-11-06 VITALS
SYSTOLIC BLOOD PRESSURE: 150 MMHG | DIASTOLIC BLOOD PRESSURE: 62 MMHG | HEIGHT: 61 IN | OXYGEN SATURATION: 96 % | HEART RATE: 67 BPM | WEIGHT: 114 LBS | BODY MASS INDEX: 21.52 KG/M2

## 2024-11-06 DIAGNOSIS — I10 PRIMARY HYPERTENSION: ICD-10-CM

## 2024-11-06 DIAGNOSIS — I71.22 ANEURYSM OF AORTIC ARCH WITHOUT RUPTURE (HCC): ICD-10-CM

## 2024-11-06 DIAGNOSIS — I34.0 NONRHEUMATIC MITRAL VALVE REGURGITATION: ICD-10-CM

## 2024-11-06 DIAGNOSIS — I48.0 PAROXYSMAL ATRIAL FIBRILLATION (HCC): Primary | ICD-10-CM

## 2024-11-06 PROCEDURE — G2211 COMPLEX E/M VISIT ADD ON: HCPCS | Performed by: INTERNAL MEDICINE

## 2024-11-06 PROCEDURE — 99213 OFFICE O/P EST LOW 20 MIN: CPT | Performed by: INTERNAL MEDICINE

## 2024-11-06 NOTE — PROGRESS NOTES
Follow-up - Cardiology   Buffy Colón 89 y.o. female MRN: [id number]        Impression:    Hypertension  We accept<150/90, and she meets that goal currently    Paroxysmal atrial fibrillation  No AC due to high risk for bleeding  No recurrence    History of ascending aortic aneurysm  Status post repair in 2015  4.7cm by last assessment, not repeating as she is not a candidate for redo repair    Hyperlipidemia  controlled    Edema  Very mild R>L due to amlodipine    Dyspnea on exertion  Chronic, possibly due to Grade 2 Diastolic dysfunction and age    Plan:    PCP does general labs on her  1 year follow-up advised      HPI:   Buffy Colón is a 89 y.o. 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  Her blood pressures run 130s to 150s systolic.  We have been cautious with overtreatment of her blood pressure due to her spinal compression fractures, petite status at 114 pounds, and at fall risk.  Cholesterol is well-controlled.  No A-fib recurrence, and only minimal edema on the right at times.        Review of Systems   Constitutional:  Negative for appetite change, diaphoresis, fatigue and fever.   Respiratory:  Negative for chest tightness, shortness of breath and wheezing.    Cardiovascular:  Negative for chest pain, palpitations and leg swelling.   Gastrointestinal:  Negative for abdominal pain and blood in stool.   Musculoskeletal:  Positive for arthralgias and back pain. Negative for joint swelling.   Skin:  Negative for rash.   Neurological:  Negative for dizziness, syncope and light-headedness.         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[facility])     Coronary artery disease     Diabetes mellitus (HCC)     Disease of thyroid gland     Facet arthropathy, cervical     last assessed - [id number]    Headache(784.0)     All my life    History of fracture of vertebral column     Hyperlipidemia     Hypertension     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HCC)     Hypoxia     last assessed - 64Oai4489    Junctional rhythm     last assessed - 61Xku3084    Lightheadedness     last assessed - 63Hbo0609    Migraine     Osteoporosis     Palpitations     last assessed - 33Btt5018    Pleural effusion, bilateral     last assessed - [id number]    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    Dont know    Shortness of breath     last assessed - [id number]    Stroke ([facility])     Thyroid trouble     Trigger finger     last assessed - [id number]    Trigger middle finger of right hand     last assessed - 66Lsu2979    Urinary incontinence     last assessed -  ; Resolved - 2017    Visual impairment 2015    Wet macular degeneration both eyes     Social History     Substance and Sexual Activity   Alcohol Use Not Currently    Comment: Social drinker     Social History     Substance and Sexual Activity   Drug Use No     Social History     Tobacco Use   Smoking Status Former    Current packs/day: 0.00    Average packs/day: 0.3 packs/day for 58.0 years (14.5 ttl pk-yrs)    Types: Cigarettes    Start date: 1957    Quit date: 2014    Years since quittin.8    Smokeless Tobacco Never   Tobacco Comments    smoked 17 yrs 1/2 ppd quit and restarted then quit  10 days ago during Christmas 2014        Allergies:  Allergies   Allergen Reactions    Amiodarone Hives    Omnipaque [Iohexol] Hives and Shortness Of Breath    Clindamycin Other (See Comments)     When taken causes cdiff immediately    Other     Sulfa Antibiotics Hives     itching    Acetazolamide Hives and Rash     Reaction Date:Unknown    Iv Dye  [Iodinated Contrast Media] Hypertension and Palpitations     Annotation - 19Mar2015: Verified with patient     Naprosyn [Naproxen] Itching and Rash     Category: Allergy;        Medications:     Current Outpatient Medications:     acetaminophen (TYLENOL) 650 mg CR tablet, Take 650 mg by mouth every 8 (eight) hours as needed for mild pain, Disp: , Rfl:     albuterol (2.5 mg/3 mL) 0.083 % nebulizer solution, Take 2.5 mg by nebulization every 6 (six) hours as needed for wheezing or shortness of breath, Disp: , Rfl:     albuterol (PROVENTIL HFA,VENTOLIN HFA) 90 mcg/act inhaler, Inhale 2 puffs every 6 (six) hours as needed for wheezing, Disp: 25.5 g, Rfl: 3    amLODIPine (NORVASC) 5 mg tablet, Take 1 tablet (5 mg total) by mouth daily, Disp: 90 tablet, Rfl: 1    benazepril (LOTENSIN) 40 MG tablet, Take 1 tablet (40 mg total) by mouth daily, Disp: 100 tablet, Rfl: 1    Calcium Carb-Cholecalciferol (Calcium+D3) 600-20 MG-MCG TABS, Take 1 tablet by mouth in the morning, Disp: 90 tablet, Rfl: 3    denosumab (PROLIA) 60 mg/mL, Inject 60 mg under the skin once, Disp: , Rfl:     Diclofenac Sodium (VOLTAREN) 1 %, Apply 2 g topically 4 (four) times a day as needed (pain), Disp: , Rfl:     levothyroxine 112 mcg tablet, Take 1 Tablet by mouth in the early morning. (at least 30 min prior to breakfast or other meds)., Disp: 90 tablet, Rfl: 1    lovastatin (MEVACOR) 20 mg tablet, Take 1 tablet (20 mg total) by mouth every morning, Disp: 90 tablet, Rfl: 1    meclizine (ANTIVERT) 12.5 MG  tablet, Take 1 tablet (12.5 mg total) by mouth 3 (three) times a day (Patient taking differently: Take 12.5 mg by mouth 3 (three) times a day as needed), Disp: 60 tablet, Rfl: 0    metoprolol succinate (TOPROL-XL) 25 mg 24 hr tablet, Take 1 tablet (25 mg total) by mouth daily, Disp: 100 tablet, Rfl: 1    Multiple Vitamins-Minerals (One Daily Multivitamin Women) TABS, Apply 1 tablet to the mouth or throat daily. Indications: Treatment to Prevent Vitamin Deficiency, Disp: , Rfl:     pantoprazole (PROTONIX) 40 mg tablet, Take 1 tablet (40 mg total) by mouth 2 (two) times a day before meals (Patient not taking: Reported on 11/6/2024), Disp: 200 tablet, Rfl: 1      Vitals:    11/06/24 0946   BP: 150/62   Pulse: 67   SpO2: 96%     Weight (last 2 days)       Date/Time Weight    11/06/24 0946 51.7 (114)          Physical Exam  Constitutional:       General: She is not in acute distress.     Appearance: Normal appearance. She is not diaphoretic.   HENT:      Head: Normocephalic and atraumatic.   Eyes:      General: No scleral icterus.     Conjunctiva/sclera: Conjunctivae normal.   Neck:      Vascular: No JVD.   Cardiovascular:      Rate and Rhythm: Normal rate and regular rhythm.      Heart sounds: Normal heart sounds. No murmur heard.  Pulmonary:      Effort: Pulmonary effort is normal. No respiratory distress.      Breath sounds: Normal breath sounds. No wheezing, rhonchi or rales.   Musculoskeletal:         General: No tenderness.      Right lower leg: Normal. No edema.      Left lower leg: Normal. No edema.   Skin:     General: Skin is warm and dry.   Neurological:      Mental Status: She is alert. Mental status is at baseline.           Laboratory Studies:  Lab Results   Component Value Date    HGBA1C 5.7 (H) 07/18/2024    HGBA1C 6.2 (H) 03/27/2024    HGBA1C 5.7 (H) 10/12/2020    HGBA1C 6.2 (H) 02/05/2015     (L) 06/16/2015     06/11/2015     06/07/2015    K 4.0 07/19/2024    K 4.2 07/18/2024    K 4.5  "05/24/2024    K 4.1 06/16/2015    K 4.1 06/11/2015    K 3.9 06/07/2015     07/19/2024    CL 98 07/18/2024    CL 99 05/24/2024    CL 97 (L) 06/16/2015     06/11/2015     06/07/2015    CO2 31 07/19/2024    CO2 31 07/18/2024    CO2 29 05/24/2024    CO2 30 06/16/2015    CO2 28 06/11/2015    CO2 27 06/07/2015    GLUCOSE 138 06/16/2015    GLUCOSE 180 (H) 06/11/2015    GLUCOSE 97 06/08/2015    CREATININE 0.72 07/19/2024    CREATININE 0.88 07/18/2024    CREATININE 0.73 05/24/2024    CREATININE 1.07 06/16/2015    CREATININE 0.93 06/11/2015    CREATININE 0.66 06/07/2015    BUN 23 07/19/2024    BUN 33 (H) 07/18/2024    BUN 18 05/24/2024    BUN 23 06/16/2015    BUN 18 06/11/2015    BUN 13 06/07/2015    MG 2.1 04/17/2024    MG 2.3 05/18/2021    MG 2.1 02/22/2016    MG 1.9 06/07/2015    MG 1.7 06/06/2015    MG 1.8 04/09/2015    PHOS 4.1 04/17/2024    PHOS 3.3 06/07/2015     Lab Results   Component Value Date    WBC 5.82 07/19/2024    WBC 7.65 06/16/2015    RBC 4.05 07/19/2024    RBC 4.16 06/16/2015    HGB 12.2 07/19/2024    HGB 9.1 (L) 06/16/2015    HCT 38.0 07/19/2024    HCT 29.9 (L) 06/16/2015    MCV 94 07/19/2024    MCV 72 (L) 06/16/2015    MCH 30.1 07/19/2024    MCH 21.9 (L) 06/16/2015    RDW 14.1 07/19/2024    RDW 17.2 (H) 06/16/2015     07/19/2024     06/16/2015     NT-proBNP: No results for input(s): \"NTBNP\" in the last 72 hours.   Coags:    Lipid Profile:   Lab Results   Component Value Date    CHOL 154 06/08/2015     Lab Results   Component Value Date    HDL 71 07/18/2024     Lab Results   Component Value Date    LDLCALC 105 (H) 07/18/2024     Lab Results   Component Value Date    TRIG 87 07/18/2024       Cardiac testing:   EKG reviewed personally:    No results found for this visit on 11/06/24.      Echocardiogram   12/12/2017-LVEF 55%, normal wall thickness, grade 1 DD, trace MR, trace AI  12/19-EF normal, grade 1 diastolic dysfunction, mild AI  2/23-EF normal, grade 2 diastolic " "dysfunction, moderately dilated left atrium    Steve Sandoval MD    Portions of the record may have been created with voice recognition software.  Occasional wrong word or \"sound a like\" substitutions may have occurred due to the inherent limitations of voice recognition software.  Read the chart carefully and recognize, using context, where substitutions have occurred.  "

## 2024-11-08 DIAGNOSIS — E03.9 HYPOTHYROIDISM, UNSPECIFIED TYPE: ICD-10-CM

## 2024-11-08 DIAGNOSIS — I10 ESSENTIAL HYPERTENSION: ICD-10-CM

## 2024-11-08 DIAGNOSIS — I95.1 ORTHOSTATIC HYPOTENSION: ICD-10-CM

## 2024-11-09 RX ORDER — LEVOTHYROXINE SODIUM 112 UG/1
112 TABLET ORAL DAILY
Qty: 90 TABLET | Refills: 1 | Status: SHIPPED | OUTPATIENT
Start: 2024-11-09 | End: 2025-05-08

## 2024-11-11 RX ORDER — AMLODIPINE BESYLATE 5 MG/1
5 TABLET ORAL DAILY
Qty: 90 TABLET | Refills: 1 | Status: SHIPPED | OUTPATIENT
Start: 2024-11-11

## 2024-11-11 RX ORDER — BENAZEPRIL HYDROCHLORIDE 40 MG/1
40 TABLET ORAL DAILY
Qty: 100 TABLET | Refills: 1 | Status: SHIPPED | OUTPATIENT
Start: 2024-11-11

## 2024-11-16 ENCOUNTER — NURSE TRIAGE (OUTPATIENT)
Dept: OTHER | Facility: OTHER | Age: 89
End: 2024-11-16

## 2024-11-16 NOTE — TELEPHONE ENCOUNTER
"Reason for Disposition   [1] Unable to urinate (or only a few drops) > 4 hours AND [2] bladder feels very full (e.g., palpable bladder or strong urge to urinate)    Answer Assessment - Initial Assessment Questions  1. SEVERITY: \"How bad is the pain?\"  (e.g., Scale 1-10; mild, moderate, or severe)      Severe when urinating with pressure and feeling of not emptying bladder quezada  2. FREQUENCY: \"How many times have you had painful urination today?\"       More often  3. PATTERN: \"Is pain present every time you urinate or just sometimes?\"       Yes present and burning  4. ONSET: \"When did the painful urination start?\"       This morning  5. FEVER: \"Do you have a fever?\" If Yes, ask: \"What is your temperature, how was it measured, and when did it start?\"  Denies  6. PAST UTI: \"Have you had a urine infection before?\" If Yes, ask: \"When was the last time?\" and \"What happened that time?\"       Yes she says 30 years ago. Clindamycin and bactrim were used but now patient is allergic  7. CAUSE: \"What do you think is causing the painful urination?\"  (e.g., UTI, scratch, Herpes sore)      An infection  8. OTHER SYMPTOMS: \"Do you have any other symptoms?\" (e.g., blood in urine, flank pain, genital sores, urgency, vaginal discharge)  Urgency, reports abdomen is distended now and unable to empty bladder fully.    Protocols used: Urination Pain - Female-Adult-    Patient calling with urinary retention and refusing disposition of emergency room. Patient stated she will call the ambulance or uber if she needs to. Patient declined triager calling for transport services.   "

## 2024-11-17 ENCOUNTER — OFFICE VISIT (OUTPATIENT)
Dept: URGENT CARE | Facility: CLINIC | Age: 89
End: 2024-11-17
Payer: COMMERCIAL

## 2024-11-17 ENCOUNTER — HOSPITAL ENCOUNTER (EMERGENCY)
Facility: HOSPITAL | Age: 89
Discharge: HOME | End: 2024-11-17
Attending: EMERGENCY MEDICINE
Payer: COMMERCIAL

## 2024-11-17 VITALS
BODY MASS INDEX: 21.91 KG/M2 | OXYGEN SATURATION: 96 % | TEMPERATURE: 98.2 F | SYSTOLIC BLOOD PRESSURE: 170 MMHG | RESPIRATION RATE: 16 BRPM | WEIGHT: 115.96 LBS | HEART RATE: 79 BPM | DIASTOLIC BLOOD PRESSURE: 81 MMHG

## 2024-11-17 VITALS
OXYGEN SATURATION: 96 % | TEMPERATURE: 98.4 F | RESPIRATION RATE: 17 BRPM | HEART RATE: 81 BPM | SYSTOLIC BLOOD PRESSURE: 122 MMHG | DIASTOLIC BLOOD PRESSURE: 78 MMHG

## 2024-11-17 DIAGNOSIS — N30.91 HEMORRHAGIC CYSTITIS: Primary | ICD-10-CM

## 2024-11-17 DIAGNOSIS — R31.9 HEMATURIA, UNSPECIFIED TYPE: Primary | ICD-10-CM

## 2024-11-17 LAB
ANION GAP SERPL CALCULATED.3IONS-SCNC: 5 MMOL/L (ref 4–13)
BACTERIA UR QL AUTO: ABNORMAL /HPF
BASOPHILS # BLD AUTO: 0.04 THOUSANDS/ÂΜL (ref 0–0.1)
BASOPHILS NFR BLD AUTO: 0 % (ref 0–1)
BILIRUB UR QL STRIP: NEGATIVE
BUN SERPL-MCNC: 19 MG/DL (ref 5–25)
CALCIUM SERPL-MCNC: 9.3 MG/DL (ref 8.4–10.2)
CHLORIDE SERPL-SCNC: 101 MMOL/L (ref 96–108)
CLARITY UR: ABNORMAL
CO2 SERPL-SCNC: 30 MMOL/L (ref 21–32)
COLOR UR: ABNORMAL
CREAT SERPL-MCNC: 0.64 MG/DL (ref 0.6–1.3)
EOSINOPHIL # BLD AUTO: 0.05 THOUSAND/ÂΜL (ref 0–0.61)
EOSINOPHIL NFR BLD AUTO: 1 % (ref 0–6)
ERYTHROCYTE [DISTWIDTH] IN BLOOD BY AUTOMATED COUNT: 13.2 % (ref 11.6–15.1)
GFR SERPL CREATININE-BSD FRML MDRD: 77 ML/MIN/1.73SQ M
GLUCOSE SERPL-MCNC: 111 MG/DL (ref 65–140)
GLUCOSE UR STRIP-MCNC: NEGATIVE MG/DL
HCT VFR BLD AUTO: 37.8 % (ref 34.8–46.1)
HGB BLD-MCNC: 12.3 G/DL (ref 11.5–15.4)
HGB UR QL STRIP.AUTO: ABNORMAL
IMM GRANULOCYTES # BLD AUTO: 0.03 THOUSAND/UL (ref 0–0.2)
IMM GRANULOCYTES NFR BLD AUTO: 0 % (ref 0–2)
KETONES UR STRIP-MCNC: NEGATIVE MG/DL
LEUKOCYTE ESTERASE UR QL STRIP: ABNORMAL
LYMPHOCYTES # BLD AUTO: 1.54 THOUSANDS/ÂΜL (ref 0.6–4.47)
LYMPHOCYTES NFR BLD AUTO: 17 % (ref 14–44)
MCH RBC QN AUTO: 30.2 PG (ref 26.8–34.3)
MCHC RBC AUTO-ENTMCNC: 32.5 G/DL (ref 31.4–37.4)
MCV RBC AUTO: 93 FL (ref 82–98)
MONOCYTES # BLD AUTO: 0.75 THOUSAND/ÂΜL (ref 0.17–1.22)
MONOCYTES NFR BLD AUTO: 8 % (ref 4–12)
NEUTROPHILS # BLD AUTO: 6.81 THOUSANDS/ÂΜL (ref 1.85–7.62)
NEUTS SEG NFR BLD AUTO: 74 % (ref 43–75)
NITRITE UR QL STRIP: NEGATIVE
NON-SQ EPI CELLS URNS QL MICRO: ABNORMAL /HPF
NRBC BLD AUTO-RTO: 0 /100 WBCS
PH UR STRIP.AUTO: 6.5 [PH]
PLATELET # BLD AUTO: 199 THOUSANDS/UL (ref 149–390)
PMV BLD AUTO: 11 FL (ref 8.9–12.7)
POTASSIUM SERPL-SCNC: 4.2 MMOL/L (ref 3.5–5.3)
PROT UR STRIP-MCNC: ABNORMAL MG/DL
RBC # BLD AUTO: 4.07 MILLION/UL (ref 3.81–5.12)
RBC #/AREA URNS AUTO: ABNORMAL /HPF
SL AMB  POCT GLUCOSE, UA: ABNORMAL
SL AMB LEUKOCYTE ESTERASE,UA: ABNORMAL
SL AMB POCT BILIRUBIN,UA: ABNORMAL
SL AMB POCT BLOOD,UA: ABNORMAL
SL AMB POCT CLARITY,UA: CLEAR
SL AMB POCT COLOR,UA: ABNORMAL
SL AMB POCT KETONES,UA: ABNORMAL
SL AMB POCT NITRITE,UA: ABNORMAL
SL AMB POCT PH,UA: 6
SL AMB POCT SPECIFIC GRAVITY,UA: 1.02
SL AMB POCT URINE PROTEIN: 2000
SL AMB POCT UROBILINOGEN: 0.2
SODIUM SERPL-SCNC: 136 MMOL/L (ref 135–147)
SP GR UR STRIP.AUTO: 1.01 (ref 1–1.03)
UROBILINOGEN UR STRIP-ACNC: <2 MG/DL
WBC # BLD AUTO: 9.22 THOUSAND/UL (ref 4.31–10.16)
WBC #/AREA URNS AUTO: ABNORMAL /HPF

## 2024-11-17 PROCEDURE — 81001 URINALYSIS AUTO W/SCOPE: CPT | Performed by: EMERGENCY MEDICINE

## 2024-11-17 PROCEDURE — 87186 SC STD MICRODIL/AGAR DIL: CPT | Performed by: NURSE PRACTITIONER

## 2024-11-17 PROCEDURE — 99284 EMERGENCY DEPT VISIT MOD MDM: CPT | Performed by: EMERGENCY MEDICINE

## 2024-11-17 PROCEDURE — 99284 EMERGENCY DEPT VISIT MOD MDM: CPT

## 2024-11-17 PROCEDURE — 85025 COMPLETE CBC W/AUTO DIFF WBC: CPT | Performed by: EMERGENCY MEDICINE

## 2024-11-17 PROCEDURE — 36415 COLL VENOUS BLD VENIPUNCTURE: CPT | Performed by: EMERGENCY MEDICINE

## 2024-11-17 PROCEDURE — 87186 SC STD MICRODIL/AGAR DIL: CPT | Performed by: EMERGENCY MEDICINE

## 2024-11-17 PROCEDURE — 87077 CULTURE AEROBIC IDENTIFY: CPT | Performed by: NURSE PRACTITIONER

## 2024-11-17 PROCEDURE — 87086 URINE CULTURE/COLONY COUNT: CPT | Performed by: EMERGENCY MEDICINE

## 2024-11-17 PROCEDURE — S9088 SERVICES PROVIDED IN URGENT: HCPCS | Performed by: NURSE PRACTITIONER

## 2024-11-17 PROCEDURE — 81002 URINALYSIS NONAUTO W/O SCOPE: CPT | Performed by: NURSE PRACTITIONER

## 2024-11-17 PROCEDURE — 87086 URINE CULTURE/COLONY COUNT: CPT | Performed by: NURSE PRACTITIONER

## 2024-11-17 PROCEDURE — 99213 OFFICE O/P EST LOW 20 MIN: CPT | Performed by: NURSE PRACTITIONER

## 2024-11-17 PROCEDURE — 80048 BASIC METABOLIC PNL TOTAL CA: CPT | Performed by: EMERGENCY MEDICINE

## 2024-11-17 PROCEDURE — 87077 CULTURE AEROBIC IDENTIFY: CPT | Performed by: EMERGENCY MEDICINE

## 2024-11-17 RX ORDER — CEPHALEXIN 500 MG/1
500 CAPSULE ORAL EVERY 6 HOURS SCHEDULED
Qty: 28 CAPSULE | Refills: 0 | Status: SHIPPED | OUTPATIENT
Start: 2024-11-17 | End: 2024-11-17 | Stop reason: CLARIF

## 2024-11-17 RX ORDER — CEPHALEXIN 500 MG/1
500 CAPSULE ORAL EVERY 12 HOURS SCHEDULED
Qty: 10 CAPSULE | Refills: 0 | Status: SHIPPED | OUTPATIENT
Start: 2024-11-17 | End: 2024-11-21 | Stop reason: SDUPTHER

## 2024-11-17 RX ADMIN — CEPHALEXIN 500 MG: 250 CAPSULE ORAL at 13:39

## 2024-11-17 NOTE — DISCHARGE INSTRUCTIONS
Complete course of antibiotics, Keflex/Cephalexin - taking one tablet twice a day for 5 days    Return to the ED if you develop fevers, abdominal or back pain, and/or vomiting    If bleeding continues after completion of antibiotics, follow up with Urology (information provided below)

## 2024-11-17 NOTE — PROGRESS NOTES
[facility]        NAME: Buffy Colón is a 89 y.o. female  : 1932    MRN: [id number]  DATE: 2024  TIME: 10:12 AM    Assessment and Plan   Hematuria, unspecified type [R31.9]  1. Hematuria, unspecified type  Urine culture    POCT urine dip    Urine culture    cephalexin (KEFLEX) 500 mg capsule        Large amount of blood with clots noted on urine sample.  Discussed with patient option of starting Keflex to see if we have improvement versus ER for further workup and evaluation.  Patient initially stated she wanted to start the antibiotics however then she stated as she lives alone and does not drive she prefers EMS transport to the hospital for further workup and evaluation as she is concerned that there could be something going on inside the bladder causing the large amount of bleeding.  Patient also concerned that due to the bleeding she may pass out at home.  EMS called and patient transported to [facility].    Patient Instructions     Follow up with PCP in 3-5 days.  Proceed to  ER if symptoms worsen.    Chief Complaint     Chief Complaint   Patient presents with    Difficulty Urinating     Pt is having difficulty urinating. Pt having burning. S/s ongoing since Sat morning. Pt is pushing fluids.  Pt has blood in her urine.          History of Present Illness   Buffy Colón presents to the clinic c/o    Patient presents to office with difficulty emptying her bladder, urinary burning, and voiding large amounts of blood and blood clots.  History of UTIs in the past but nothing recent.  Nothing since she has been .  She does have a history of C. difficile.  States she recently overcame a 4-week narvaez with that.  She states she does have very poor short-term memory.  She does live alone.  She is concerned due to the large amount of blood and clots that are coming out with her urine and the fact that she lives alone that there may be something going  on.        Review of Systems   Review of Systems   All other systems reviewed and are negative.        Current Medications     Long-Term Medications   Medication Sig Dispense Refill    amLODIPine (NORVASC) 5 mg tablet Take 1 tablet (5 mg total) by mouth daily 90 tablet 1    benazepril (LOTENSIN) 40 MG tablet Take 1 tablet (40 mg total) by mouth daily 100 tablet 1    Diclofenac Sodium (VOLTAREN) 1 % Apply 2 g topically 4 (four) times a day as needed (pain)      levothyroxine 112 mcg tablet Take 1 tablet (112 mcg total) by mouth daily 90 tablet 1    lovastatin (MEVACOR) 20 mg tablet Take 1 tablet (20 mg total) by mouth every morning 90 tablet 1    meclizine (ANTIVERT) 12.5 MG tablet Take 1 tablet (12.5 mg total) by mouth 3 (three) times a day (Patient taking differently: Take 12.5 mg by mouth 3 (three) times a day as needed) 60 tablet 0    metoprolol succinate (TOPROL-XL) 25 mg 24 hr tablet Take 1 tablet (25 mg total) by mouth daily 100 tablet 1    pantoprazole (PROTONIX) 40 mg tablet Take 1 tablet (40 mg total) by mouth 2 (two) times a day before meals (Patient not taking: Reported on 11/6/2024) 200 tablet 1       Current Allergies     Allergies as of 11/17/2024 - Reviewed 11/17/2024   Allergen Reaction Noted    Amiodarone Hives 03/19/2015    Omnipaque [iohexol] Hives and Shortness Of Breath 04/06/2016    Clindamycin Other (See Comments) 05/08/2017    Other  11/30/2010    Sulfa antibiotics Hives 08/26/2015    Acetazolamide Hives and Rash 02/10/2020    Iv dye  [iodinated contrast media] Hypertension and Palpitations 06/10/2014    Naprosyn [naproxen] Itching and Rash 07/11/2012            The following portions of the patient's history were reviewed and updated as appropriate: allergies, current medications, past family history, past medical history, past social history, past surgical history and problem list.    Objective   /78   Pulse 81   Temp 98.4 °F (36.9 °C) (Tympanic)   Resp 17   SpO2 96%         Physical Exam     Physical Exam  Vitals and nursing note reviewed.   Constitutional:       Appearance: Normal appearance. She is well-developed.   HENT:      Head: Normocephalic and atraumatic.      Right Ear: Hearing normal.      Left Ear: Hearing normal.      Mouth/Throat:      Lips: Pink.      Mouth: Mucous membranes are moist.      Pharynx: Oropharynx is clear.   Eyes:      General: Lids are normal.      Conjunctiva/sclera: Conjunctivae normal.      Pupils: Pupils are equal, round, and reactive to light.   Cardiovascular:      Rate and Rhythm: Normal rate and regular rhythm.      Heart sounds: Normal heart sounds, S1 normal and S2 normal.   Pulmonary:      Effort: Pulmonary effort is normal.      Breath sounds: Normal breath sounds.   Abdominal:      General: Abdomen is flat. Bowel sounds are normal.      Palpations: Abdomen is soft.      Tenderness: There is abdominal tenderness in the suprapubic area. There is no right CVA tenderness or left CVA tenderness.   Musculoskeletal:         General: Normal range of motion.      Cervical back: Full passive range of motion without pain, normal range of motion and neck supple.   Skin:     General: Skin is warm and dry.   Neurological:      General: No focal deficit present.      Mental Status: She is alert and oriented to person, place, and time.   Psychiatric:         Mood and Affect: Mood normal.         Speech: Speech normal.         Behavior: Behavior normal. Behavior is cooperative.         Thought Content: Thought content normal.         Judgment: Judgment normal.

## 2024-11-17 NOTE — PATIENT INSTRUCTIONS
"IF NO  IMPROVEMENT OR WORSENING SYMPTOMS GO TO ER IMMEDIATELY    Patient Education     Urinary tract infection - Discharge instructions   The Basics   Written by the doctors and editors at [facility]   What are discharge instructions? -- Discharge instructions are information about how to take care of yourself after getting medical care for a health problem.  What is a urinary tract infection? -- A urinary tract infection (\"UTI\") is an infection that affects either the bladder or the kidneys (figure 1). A kidney infection is more serious, and can lead to other serious problems if it is not treated properly.  You need to take antibiotics to treat a UTI. It is important to take all of your antibiotics, even if you start to feel better.  How do I care for myself at home? -- Ask the doctor or nurse what you should do when you go home. Make sure that you understand exactly what you need to do to care for yourself. Ask questions if there is anything you do not understand.  You should also:   Take all of your medicines as instructed.   Take phenazopyridine (sample brand name: AZO Urinary Pain Relief) for the first day or so, if you choose. This is an over-the-counter medicine. It will help numb your bladder and decrease the urge to urinate. This medicine causes your urine and tears to look orange.   Take acetaminophen (sample brand name: Tylenol) if needed for pain.   Drink extra fluids. This can help prevent more bladder infections. If you have sex, these things might also help:   Urinate right afterward.   If you use birth control, use a form that does not contain spermicide.  When should I call the doctor? -- Call for advice if:   You have pain in your back, shoulder, or belly.   You have a fever, shaking chills, or sweats even though you are taking antibiotics.   You notice more blood in your urine.   Your symptoms get worse or do not get better within 24 hours of starting antibiotics.   Your symptoms come back after " finishing treatment.   You have any new or worrying symptoms.  All topics are updated as new evidence becomes available and our peer review process is complete.  This topic retrieved from [employer] on: Feb 26, 2024.  Topic 035021 Version 1.0  Release: 32.2.4 - C32.56  © 2024 UpToDate, Inc. and/or its affiliates. All rights reserved.  figure 1: Anatomy of the urinary tract     Urine is made by the kidneys. It passes from the kidneys into the bladder through 2 tubes called the ureters. Then, it leaves the bladder through another tube called the urethra.  Graphic 39925 Version 8.0  Consumer Information Use and Disclaimer   Disclaimer: This generalized information is a limited summary of diagnosis, treatment, and/or medication information. It is not meant to be comprehensive and should be used as a tool to help the user understand and/or assess potential diagnostic and treatment options. It does NOT include all information about conditions, treatments, medications, side effects, or risks that may apply to a specific patient. It is not intended to be medical advice or a substitute for the medical advice, diagnosis, or treatment of a health care provider based on the health care provider's examination and assessment of a patient's specific and unique circumstances. Patients must speak with a health care provider for complete information about their health, medical questions, and treatment options, including any risks or benefits regarding use of medications. This information does not endorse any treatments or medications as safe, effective, or approved for treating a specific patient. UpToDate, Inc. and its affiliates disclaim any warranty or liability relating to this information or the use thereof.The use of this information is governed by the Terms of Use, available at https://www.woltersTradeGiguwer.com/en/know/clinical-effectiveness-terms. 2024© UpToDate, Inc. and its affiliates and/or licensors. All rights  reserved.  Copyright   © 2024 [employer] and/or its affiliates. All rights reserved.

## 2024-11-18 ENCOUNTER — VBI (OUTPATIENT)
Dept: FAMILY MEDICINE CLINIC | Facility: CLINIC | Age: 89
End: 2024-11-18

## 2024-11-18 ENCOUNTER — RESULTS FOLLOW-UP (OUTPATIENT)
Dept: EMERGENCY DEPT | Facility: HOSPITAL | Age: 89
End: 2024-11-18

## 2024-11-18 DIAGNOSIS — Z59.82 TRANSPORTATION INSECURITY: Primary | ICD-10-CM

## 2024-11-18 SDOH — ECONOMIC STABILITY - TRANSPORTATION SECURITY: TRANSPORTATION INSECURITY: Z59.82

## 2024-11-18 NOTE — TELEPHONE ENCOUNTER
11/18/24 12:37 PM    Patient contacted post ED visit, [facility] department spoke with patient/caregiver and outreach was successful.    Thank you.  Princess Torres MA  [facility]

## 2024-11-18 NOTE — TELEPHONE ENCOUNTER
11/18/24 12:26 PM    Patient contacted post ED visit, first outreach attempt made. Message was left for patient to return a call to the [facility] Department at [facility]: Phone [phone].    Thank you.  Princess Torres MA  [facility]

## 2024-11-19 ENCOUNTER — PATIENT OUTREACH (OUTPATIENT)
Dept: CASE MANAGEMENT | Facility: OTHER | Age: 89
End: 2024-11-19

## 2024-11-19 ENCOUNTER — RESULTS FOLLOW-UP (OUTPATIENT)
Dept: URGENT CARE | Facility: CLINIC | Age: 89
End: 2024-11-19

## 2024-11-19 LAB
BACTERIA UR CULT: ABNORMAL
BACTERIA UR CULT: ABNORMAL

## 2024-11-19 NOTE — ED PROVIDER NOTES
Time reflects when diagnosis was documented in both MDM as applicable and the Disposition within this note       Time User Action Codes Description Comment    11/17/2024  1:21 PM Sanjuanita Buck Add [N30.91] Hemorrhagic cystitis           ED Disposition       ED Disposition   Discharge    Condition   Stable    Date/Time   Sun Nov 17, 2024  1:21 PM    Comment   June A Weddermann discharge to home/self care.                   Assessment & Plan       Medical Decision Making  A 89-year-old female presents with dysuria and hematuria.  She denies associated abdominal and flank pain with a benign abdominal exam.  Concern for acute UTI.  Will check urine.  Will also check labs for electrolyte abnormality, ARNOLD and anemia.  Will check postvoid residual for retention.    Amount and/or Complexity of Data Reviewed  Labs: ordered. Decision-making details documented in ED Course.    Risk  Prescription drug management.        ED Course as of 11/18/24 2321   [place] Nov 17, 2024   1152 PVR 42 mL   1225 Hemoglobin: 12.3  Stable    1242 Creatinine: 0.64  WNL   1315 UA w Reflex to Microscopic w Reflex to Culture(!)  Consistent with hemorrhagic cystitis    1315 Pt updated on results.  Reports to feeling better at this time, urine is less bloody.  Will proceed with Abx treatment for hemorrhagic cystitis, if hematuria continues after completion of antibiotics recommend follow up with urology for cystoscopy.  Pt in agreement.  Return precautions discussed       Medications   cephalexin (KEFLEX) capsule 500 mg (500 mg Oral Given 11/17/24 1339)       ED Risk Strat Scores                                               History of Present Illness       Chief Complaint   Patient presents with    Blood in Urine     Two days ago began with burning with urination. Woke up today and noted blood in urine with clots. Arrives via EMS from Urgent Care. 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), wet (HCC)     bilateral    Anemia N/A    Aneurysm of aortic root     last assessed - [id number]    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HCC)     Aortic root dilatation (HCC)     last assessed - [id number]    Arthritis     Ascending aortic aneurysm (HCC)     last assessed - [id number]    Asthma     Basal cell carcinoma     last assessed - [id number]    Cancer ([facility])     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HCC)     Coronary artery disease     Diabetes mellitus (HCC)     Disease of thyroid gland     Facet arthropathy, cervical     last assessed - 2017    Headache(784.0)     All my life    History of fracture of vertebral column     Hyperlipidemia     Hypertension     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HCC)     Hypoxia     last assessed - 2015    Junctional rhythm     last assessed - [id number]    Lightheadedness     last assessed - 2016    Migraine     Osteoporosis     Palpitations     last assessed - 2016    Pleural effusion, bilateral     last assessed - 2015    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    Dont know    Shortness of breath     last assessed - 2017    Stroke (HCC)     Thyroid trouble     Trigger finger     last assessed - 2016    Trigger middle finger of right hand     last assessed - 2016    Urinary incontinence     last assessed -  ; Resolved - 2017    Visual impairment 2015    Wet macular degeneration both eyes      Past Surgical History:   Procedure Laterality Date    ABDOMINAL AORTIC ANEURYSM REPAIR W/ ENDOLUMINAL GRAFT  2015    Ascending aorta and hemiarch replacement with 30 mm Vascutek Gelweave graft; Managed by Scott Archuleta; last assessed - 2016    APPENDECTOMY      BLADDER SURGERY      Reccurent histoy of bladder surgery    HOROWITZ PROCEDURE      CARDIAC CATHETERIZATION  2004    Procedure summary - Luminal irregularities; last assessed - 2015     SECTION       CORONARY ANEURYSM REPAIR      CYSTOSCOPY      botox injection    HYSTERECTOMY      WI NDSC NJX IMPLT MATRL URT&/BLDR NCK N/A 2017    Procedure: CYSTOSCOPY; DURASPHERE-PERIURETHRAL BULKING AGENT INJECTION ;  Surgeon: Rayo Moulton MD;  Location: [facility];  Service: Urology    WI TENDON SHEATH INCISION Right 10/18/2016    Procedure: LONG FINGER TRIGGER RELEASE ;  Surgeon: Khari Najera MD;  Location: [facility];  Service: Orthopedics    THYROIDECTOMY      Total Thyroidectomy    TONSILLECTOMY AND ADENOIDECTOMY        Family History   Problem Relation Age of Onset    Coronary aneurysm Sister     Coronary artery disease Sister         CABG    Cancer Sister     Heart disease Family         cardiac disorder    Hypertension Family     Cancer Family     Thyroid disease Family       Social History     Tobacco Use    Smoking status: Former     Current packs/day: 0.00     Average packs/day: 0.3 packs/day for 58.0 years (14.5 ttl pk-yrs)     Types: Cigarettes     Start date: 1957     Quit date: 2014     Years since quittin.9    Smokeless tobacco: Never    Tobacco comments:     smoked 17 yrs  ppd quit and restarted then quit  10 days ago during 2014    Vaping Use    Vaping status: Never Used   Substance Use Topics    Alcohol use: Yes     Comment: once a year    Drug use: No      E-Cigarette/Vaping    E-Cigarette Use Never User       E-Cigarette/Vaping Substances    Nicotine No     THC No     CBD No     Flavoring No     Other No     Unknown No       I have reviewed and agree with the history as documented.     A 89-year-old female with past medical history of A-fib, asthma, CAD, diabetes, hypothyroidism, hyperlipidemia & hypertension; presents with hematuria.  Patient states she has been having dysuria over the past few days, with onset of the hematuria today.  Patient states her urine is dark red in color with the passage of clots.  She denies associated fever, chills, dizziness,  lightheadedness, chest pain, shortness of breath, abdominal pain, flank pain, nausea, vomiting, diarrhea, peripheral edema and rashes.  She denies a history of similar symptoms.  She is not on anticoagulation.      History provided by:  Patient and medical records  Blood in Urine  Associated symptoms include dysuria.       Review of Systems   Genitourinary:  Positive for dysuria and hematuria.   All other systems reviewed and are negative.      Objective       ED Triage Vitals   Temperature Pulse Blood Pressure Respirations SpO2 Patient Position - Orthostatic VS   11/17/24 1241 11/17/24 1101 11/17/24 1101 11/17/24 1101 11/17/24 1101 11/17/24 1101   98.2 °F (36.8 °C) 79 (!) 189/90 16 96 % Sitting      Temp Source Heart Rate Source BP Location FiO2 (%) Pain Score    11/17/24 1241 -- 11/17/24 1101 -- 11/17/24 1101    Oral  Right arm  No Pain      Vitals      Date and Time Temp Pulse SpO2 Resp BP Pain Score FACES Pain Rating User   11/17/24 1241 98.2 °F (36.8 °C) -- -- -- 170/81 -- -- DR   11/17/24 1101 -- 79 96 % 16 189/90 No Pain -- MM            Physical Exam  General Appearance: alert and oriented, nad, non toxic appearing  Skin:  Warm, dry, intact.  No cyanosis  HEENT: Atraumatic, normocephalic.  No eye drainage.  Normal hearing.  Moist mucous membranes.    Neck: Supple, trachea midline  Cardiac: RRR; no murmurs, rub, gallops.  No pedal edema, 2+ pulses  Pulmonary: lungs CTAB; no wheezes, rales, rhonchi  Gastrointestinal: abdomen soft, nontender, nondistended; no guarding or rebound tenderness; good bowel sounds, no mass or bruits  Extremities:  No deformities.  No calf tenderness, no clubbing  Neuro:  no focal motor or sensory deficits, CN 2-12 grossly intact  Psych:  Normal mood and affect, normal judgement and insight       Results Reviewed       Procedure Component Value Units Date/Time    Urine culture [[id number]]  (Abnormal) Collected: 11/17/24 1207    Lab Status: Preliminary result Specimen: Urine, Clean  Catch Updated: 11/18/24 1128     Urine Culture >100,000 cfu/ml Escherichia coli    Urine Microscopic [[id number]]  (Abnormal) Collected: 11/17/24 1207    Lab Status: Final result Specimen: Urine, Clean Catch Updated: 11/17/24 1251     RBC, UA Innumerable /hpf      WBC, UA Innumerable /hpf      Epithelial Cells None Seen /hpf      Bacteria, UA Occasional /hpf     UA w Reflex to Microscopic w Reflex to Culture [[id number]]  (Abnormal) Collected: 11/17/24 1207    Lab Status: Final result Specimen: Urine, Clean Catch Updated: 11/17/24 1240     Color, UA Dark Brown     Clarity, UA Turbid     Specific Gravity, UA 1.007     pH, UA 6.5     Leukocytes, UA Moderate     Nitrite, UA Negative     Protein,  (2+) mg/dl      Glucose, UA Negative mg/dl      Ketones, UA Negative mg/dl      Urobilinogen, UA <2.0 mg/dl      Bilirubin, UA Negative     Occult Blood, UA Large    Basic metabolic panel [[id number]] Collected: 11/17/24 1207    Lab Status: Final result Specimen: Blood from Arm, Right Updated: 11/17/24 1238     Sodium 136 mmol/L      Potassium 4.2 mmol/L      Chloride 101 mmol/L      CO2 30 mmol/L      ANION GAP 5 mmol/L      BUN 19 mg/dL      Creatinine 0.64 mg/dL      Glucose 111 mg/dL      Calcium 9.3 mg/dL      eGFR 77 ml/min/1.73sq m     Narrative:      National Kid[employer] Disease Foundation guidelines for Chronic Kidney Disease (CKD):     Stage 1 with normal or high GFR (GFR > 90 mL/min/1.73 square meters)    Stage 2 Mild CKD (GFR = 60-89 mL/min/1.73 square meters)    Stage 3A Moderate CKD (GFR = 45-59 mL/min/1.73 square meters)    Stage 3B Moderate CKD (GFR = 30-44 mL/min/1.73 square meters)    Stage 4 Severe CKD (GFR = 15-29 mL/min/1.73 square meters)    Stage 5 End Stage CKD (GFR <15 mL/min/1.73 square meters)  Note: GFR calculation is accurate only with a steady state creatinine    CBC and differential [[id number]] Collected: 11/17/24 1207    Lab Status: Final result Specimen: Blood from Arm, Right Updated: 11/17/24  1220     WBC 9.22 Thousand/uL      RBC 4.07 Million/uL      Hemoglobin 12.3 g/dL      Hematocrit 37.8 %      MCV 93 fL      MCH 30.2 pg      MCHC 32.5 g/dL      RDW 13.2 %      MPV 11.0 fL      Platelets 199 Thousands/uL      nRBC 0 /100 WBCs      Segmented % 74 %      Immature Grans % 0 %      Lymphocytes % 17 %      Monocytes % 8 %      Eosinophils Relative 1 %      Basophils Relative 0 %      Absolute Neutrophils 6.81 Thousands/µL      Absolute Immature Grans 0.03 Thousand/uL      Absolute Lymphocytes 1.54 Thousands/µL      Absolute Monocytes 0.75 Thousand/µL      Eosinophils Absolute 0.05 Thousand/µL      Basophils Absolute 0.04 Thousands/µL             No orders to display       Procedures    ED Medication and Procedure Management   Prior to Admission Medications   Prescriptions Last Dose Informant Patient Reported? Taking?   Calcium Carb-Cholecalciferol (Calcium+D3) 600-20 MG-MCG TABS  Self No No   Sig: Take 1 tablet by mouth in the morning   Diclofenac Sodium (VOLTAREN) 1 %  Self No No   Sig: Apply 2 g topically 4 (four) times a day as needed (pain)   Multiple Vitamins-Minerals (One Daily Multivitamin Women) TABS  Self Yes No   Sig: Apply 1 tablet to the mouth or throat daily. Indications: Treatment to Prevent Vitamin Deficiency   acetaminophen (TYLENOL) 650 mg CR tablet  Self Yes No   Sig: Take 650 mg by mouth every 8 (eight) hours as needed for mild pain   albuterol (2.5 mg/3 mL) 0.083 % nebulizer solution  Self Yes No   Sig: Take 2.5 mg by nebulization every 6 (six) hours as needed for wheezing or shortness of breath   albuterol (PROVENTIL HFA,VENTOLIN HFA) 90 mcg/act inhaler  Self No No   Sig: Inhale 2 puffs every 6 (six) hours as needed for wheezing   amLODIPine (NORVASC) 5 mg tablet   No No   Sig: Take 1 tablet (5 mg total) by mouth daily   benazepril (LOTENSIN) 40 MG tablet   No No   Sig: Take 1 tablet (40 mg total) by mouth daily   denosumab (PROLIA) 60 mg/mL  Self Yes No   Sig: Inject 60 mg under the  skin once   levothyroxine 112 mcg tablet   No No   Sig: Take 1 tablet (112 mcg total) by mouth daily   lovastatin (MEVACOR) 20 mg tablet  Self No No   Sig: Take 1 tablet (20 mg total) by mouth every morning   meclizine (ANTIVERT) 12.5 MG tablet  Self No No   Sig: Take 1 tablet (12.5 mg total) by mouth 3 (three) times a day   Patient taking differently: Take 12.5 mg by mouth 3 (three) times a day as needed   metoprolol succinate (TOPROL-XL) 25 mg 24 hr tablet  Self No No   Sig: Take 1 tablet (25 mg total) by mouth daily   pantoprazole (PROTONIX) 40 mg tablet  Self No No   Sig: Take 1 tablet (40 mg total) by mouth 2 (two) times a day before meals   Patient not taking: Reported on 11/6/2024      Facility-Administered Medications: None     Discharge Medication List as of 11/17/2024  1:35 PM        START taking these medications    Details   cephalexin (KEFLEX) 500 mg capsule Take 1 capsule (500 mg total) by mouth every 12 (twelve) hours for 5 days, Starting Sun 11/17/2024, Until Fri 11/22/2024, Normal           CONTINUE these medications which have NOT CHANGED    Details   acetaminophen (TYLENOL) 650 mg CR tablet Take 650 mg by mouth every 8 (eight) hours as needed for mild pain, Historical Med      albuterol (2.5 mg/3 mL) 0.083 % nebulizer solution Take 2.5 mg by nebulization every 6 (six) hours as needed for wheezing or shortness of breath, Historical Med      albuterol (PROVENTIL HFA,VENTOLIN HFA) 90 mcg/act inhaler Inhale 2 puffs every 6 (six) hours as needed for wheezing, Starting Tue 4/18/2023, Normal      amLODIPine (NORVASC) 5 mg tablet Take 1 tablet (5 mg total) by mouth daily, Starting Mon 11/11/2024, Normal      benazepril (LOTENSIN) 40 MG tablet Take 1 tablet (40 mg total) by mouth daily, Starting Mon 11/11/2024, Normal      Calcium Carb-Cholecalciferol (Calcium+D3) 600-20 MG-MCG TABS Take 1 tablet by mouth in the morning, Starting Thu 4/11/2024, Normal      denosumab (PROLIA) 60 mg/mL Inject 60 mg under the  skin once, Historical Med      Diclofenac Sodium (VOLTAREN) 1 % Apply 2 g topically 4 (four) times a day as needed (pain), Starting Thu 3/14/2024, No Print      levothyroxine 112 mcg tablet Take 1 tablet (112 mcg total) by mouth daily, Starting Sat 11/9/2024, Until Thu 5/8/2025, Normal      lovastatin (MEVACOR) 20 mg tablet Take 1 tablet (20 mg total) by mouth every morning, Starting Wed 9/11/2024, Until Sat 9/6/2025, Normal      meclizine (ANTIVERT) 12.5 MG tablet Take 1 tablet (12.5 mg total) by mouth 3 (three) times a day, Starting Tue 7/30/2024, Normal      metoprolol succinate (TOPROL-XL) 25 mg 24 hr tablet Take 1 tablet (25 mg total) by mouth daily, Starting Tue 7/23/2024, Normal      Multiple Vitamins-Minerals (One Daily Multivitamin Women) TABS Apply 1 tablet to the mouth or throat daily. Indications: Treatment to Prevent Vitamin Deficiency, Starting Mon 1/30/2023, Historical Med      pantoprazole (PROTONIX) 40 mg tablet Take 1 tablet (40 mg total) by mouth 2 (two) times a day before meals, Starting Tue 7/23/2024, Until Sun 1/19/2025, Normal           No discharge procedures on file.  ED SEPSIS DOCUMENTATION   Time reflects when diagnosis was documented in both MDM as applicable and the Disposition within this note       Time User Action Codes Description Comment    11/17/2024  1:21 PM Sanjuanita Buck Add [N30.91] Hemorrhagic cystitis                  Sanjuanita Buck,   11/18/24 4957

## 2024-11-19 NOTE — PROGRESS NOTES
JOSIAH GONZALEZ received a referral from patient's PCP regarding need for assistance with transportation. JOSIAH GONZALEZ spoke with pt at this time to see if she would be interested in applying for [employer]. Pt states she does have [employer] services and has utilized them in the past, however, she was not too fond of them due to how long they take to get you to appointments. Pt also finds them to be rather unreliable and inconvenient due to having to schedule the ride in advance. JOSIAH GONZALEZ also informed pt that she might have access to rides through her Medicare Advantage plan. Encouraged pt to call member services and inquire if she has this as part of her plan. Pt agreed to do so. Pt inquired if there were any services that do not require scheduling the ride in advance, but rather, come around every hour or so. JOSIAH GONZALEZ explained that unfortunately, the only option for this would be [employer]. Patient verbalized understanding and did express gratitude for the call. Pt denies having any additional concerns. JOSIAH GONZALEZ will close referral as resources have been provided.

## 2024-11-21 ENCOUNTER — OFFICE VISIT (OUTPATIENT)
Dept: FAMILY MEDICINE CLINIC | Facility: CLINIC | Age: 89
End: 2024-11-21
Payer: COMMERCIAL

## 2024-11-21 VITALS
BODY MASS INDEX: 21.22 KG/M2 | HEIGHT: 61 IN | DIASTOLIC BLOOD PRESSURE: 62 MMHG | SYSTOLIC BLOOD PRESSURE: 162 MMHG | HEART RATE: 70 BPM | OXYGEN SATURATION: 96 % | TEMPERATURE: 97.7 F | WEIGHT: 112.4 LBS

## 2024-11-21 DIAGNOSIS — I48.0 PAROXYSMAL ATRIAL FIBRILLATION (HCC): ICD-10-CM

## 2024-11-21 DIAGNOSIS — I10 PRIMARY HYPERTENSION: ICD-10-CM

## 2024-11-21 DIAGNOSIS — N30.91 HEMORRHAGIC CYSTITIS: Primary | ICD-10-CM

## 2024-11-21 DIAGNOSIS — I10 ESSENTIAL HYPERTENSION: ICD-10-CM

## 2024-11-21 PROCEDURE — 99213 OFFICE O/P EST LOW 20 MIN: CPT | Performed by: FAMILY MEDICINE

## 2024-11-21 PROCEDURE — G2211 COMPLEX E/M VISIT ADD ON: HCPCS | Performed by: FAMILY MEDICINE

## 2024-11-21 RX ORDER — METOPROLOL SUCCINATE 25 MG/1
25 TABLET, EXTENDED RELEASE ORAL DAILY
Qty: 100 TABLET | Refills: 1 | Status: SHIPPED | OUTPATIENT
Start: 2024-11-21

## 2024-11-21 RX ORDER — CEPHALEXIN 500 MG/1
500 CAPSULE ORAL EVERY 12 HOURS SCHEDULED
Qty: 10 CAPSULE | Refills: 0 | Status: SHIPPED | OUTPATIENT
Start: 2024-11-21 | End: 2024-11-26

## 2024-11-21 NOTE — PROGRESS NOTES
Name: Buffy Colón      : 1932      MRN: [id number]  Encounter Provider: Petar Venegas DO  Encounter Date: 2024   Encounter department: [facility]    Assessment & Plan  Hemorrhagic cystitis  This is a follow-up from recent emergency room visit on .  Patient presented with dysuria and bloody urine.  Culture revealed greater than 100,000 colony counts of E. Coli.  Patient was given cephalexin 500 mg twice daily.  She states symptoms are much improved.  Denies any further symptoms including hematuria.  Examination unremarkable today.  I advised patient to complete her antibiotic course.  She is to call if any bleeding should occur (will refer to urology).  Orders:    cephalexin (KEFLEX) 500 mg capsule; Take 1 capsule (500 mg total) by mouth every 12 (twelve) hours for 5 days    Primary hypertension  History of hypertension with borderline control.  She is compliant with current meds including amlodipine 5 mg daily, benazepril 40 mg daily, and metoprolol XL 25.  Blood pressure today in office is 162/62.  May be somewhat artificially elevated due to recent infection.  Will continue to follow       Paroxysmal atrial fibrillation (HCC)  Not on anticoagulation due to fall risk  Orders:    metoprolol succinate (TOPROL-XL) 25 mg 24 hr tablet; Take 1 tablet (25 mg total) by mouth daily    Essential hypertension    Orders:    metoprolol succinate (TOPROL-XL) 25 mg 24 hr tablet; Take 1 tablet (25 mg total) by mouth daily       Keep next regularly scheduled follow-up appointment with Dr Pruett in April      History of Present Illness     This is a follow-up from recent emergency room visit on .  Patient presented with dysuria and bloody urine.  Culture revealed greater than 100,000 colony counts of E. Coli.  Patient was given cephalexin 500 mg twice daily.  She states symptoms are much improved.  Denies any further symptoms including hematuria.      Review of Systems    Respiratory: Negative.     Cardiovascular: Negative.    Gastrointestinal: Negative.    Genitourinary: Negative.  Negative for dysuria and hematuria.     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[facility])     Coronary artery disease     Diabetes mellitus ([facility])     Disease of thyroid gland     Facet arthropathy, cervical     last assessed - [id number]    Headache(784.0)     All my life    History of fracture of vertebral column     Hyperlipidemia     Hypertension     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    Dont know    Shortness of breath     last assessed - 86Ger4734    Stroke ([facility])     Thyroid trouble     Trigger finger     last assessed - 09Sep2016    Trigger middle finger of right hand     last assessed - 67Eym2853    Urinary incontinence     last assessed -  22Jul,2013; Resolved - 15Jan2017    Visual impairment Feb 2015    Wet macular degeneration both eyes     Past Surgical History:   Procedure Laterality Date    ABDOMINAL AORTIC ANEURYSM REPAIR W/ ENDOLUMINAL GRAFT  06/28/2015    Ascending aorta and hemiarch replacement with 30 mm Vascutek Gelweave graft; Managed by Scott Archuleta; last  assessed - 2016    APPENDECTOMY      BLADDER SURGERY      Reccurent histoy of bladder surgery    HOROWITZ PROCEDURE      CARDIAC CATHETERIZATION  2004    Procedure summary - Luminal irregularities; last assessed - 2015     SECTION      CORONARY ANEURYSM REPAIR      CYSTOSCOPY      botox injection    HYSTERECTOMY      NH NDSC NJX IMPLT MATRL URT&/BLDR NCK N/A 2017    Procedure: CYSTOSCOPY; DURASPHERE-PERIURETHRAL BULKING AGENT INJECTION ;  Surgeon: Rayo Moulton MD;  Location: [facility];  Service: Urology    NH TENDON SHEATH INCISION Right 10/18/2016    Procedure: LONG FINGER TRIGGER RELEASE ;  Surgeon: Khari Najera MD;  Location: [facility];  Service: Orthopedics    THYROIDECTOMY      Total Thyroidectomy    TONSILLECTOMY AND ADENOIDECTOMY       Family History   Problem Relation Age of Onset    Coronary aneurysm Sister     Coronary artery disease Sister         CABG    Cancer Sister     Heart disease Family         cardiac disorder    Hypertension Family     Cancer Family     Thyroid disease Family      Social History     Tobacco Use    Smoking status: Former     Current packs/day: 0.00     Average packs/day: 0.3 packs/day for 58.0 years (14.5 ttl pk-yrs)     Types: Cigarettes     Start date: 1957     Quit date: 2014     Years since quittin.9    Smokeless tobacco: Never    Tobacco comments:     smoked 17 yrs  ppd quit and restarted then quit  10 days ago during 2014    Vaping Use    Vaping status: Never Used   Substance and Sexual Activity    Alcohol use: Yes     Comment: once a year    Drug use: No    Sexual activity: Not Currently     Current Outpatient Medications on File Prior to Visit   Medication Sig    acetaminophen (TYLENOL) 650 mg CR tablet Take 650 mg by mouth every 8 (eight) hours as needed for mild pain    albuterol (2.5 mg/3 mL) 0.083 % nebulizer solution Take 2.5 mg by nebulization every 6 (six) hours as needed for wheezing or shortness of breath     albuterol (PROVENTIL HFA,VENTOLIN HFA) 90 mcg/act inhaler Inhale 2 puffs every 6 (six) hours as needed for wheezing    amLODIPine (NORVASC) 5 mg tablet Take 1 tablet (5 mg total) by mouth daily    benazepril (LOTENSIN) 40 MG tablet Take 1 tablet (40 mg total) by mouth daily    Calcium Carb-Cholecalciferol (Calcium+D3) 600-20 MG-MCG TABS Take 1 tablet by mouth in the morning    denosumab (PROLIA) 60 mg/mL Inject 60 mg under the skin once    Diclofenac Sodium (VOLTAREN) 1 % Apply 2 g topically 4 (four) times a day as needed (pain)    levothyroxine 112 mcg tablet Take 1 tablet (112 mcg total) by mouth daily    lovastatin (MEVACOR) 20 mg tablet Take 1 tablet (20 mg total) by mouth every morning    meclizine (ANTIVERT) 12.5 MG tablet Take 1 tablet (12.5 mg total) by mouth 3 (three) times a day (Patient taking differently: Take 12.5 mg by mouth 3 (three) times a day as needed)    metoprolol succinate (TOPROL-XL) 25 mg 24 hr tablet Take 1 tablet (25 mg total) by mouth daily    Multiple Vitamins-Minerals (One Daily Multivitamin Women) TABS Apply 1 tablet to the mouth or throat daily. Indications: Treatment to Prevent Vitamin Deficiency    cephalexin (KEFLEX) 500 mg capsule Take 1 capsule (500 mg total) by mouth every 12 (twelve) hours for 5 days    pantoprazole (PROTONIX) 40 mg tablet Take 1 tablet (40 mg total) by mouth 2 (two) times a day before meals (Patient not taking: Reported on 11/6/2024)     Allergies   Allergen Reactions    Amiodarone Hives    Omnipaque [Iohexol] Hives and Shortness Of Breath    Clindamycin Other (See Comments)     When taken causes cdiff immediately    Other     Sulfa Antibiotics Hives     itching    Acetazolamide Hives and Rash     Reaction Date:Unknown    Iv Dye  [Iodinated Contrast Media] Hypertension and Palpitations     Annotation - 19Mar2015: Verified with patient     Naprosyn [Naproxen] Itching and Rash     Category: Allergy;      Immunization History   Administered Date(s) Administered  "   COVID-19 PFIZER VACCINE 0.3 ML IM 01/19/2021, 02/09/2021, 10/12/2021, 04/13/2022, 09/15/2022    COVID-19 Pfizer mRNA vacc PF link-sucrose 12 yr and older (Comirnaty) 11/08/2023    INFLUENZA 11/17/2005, 12/12/2013, 12/23/2014, 11/13/2018, 10/16/2023    Influenza Split High Dose Preservative Free IM 09/09/2016, 11/06/2017, 10/10/2024    Influenza, high dose seasonal 0.7 mL 11/13/2018, 10/14/2019, 10/05/2020, 11/16/2021, 12/23/2022, 11/07/2023    Influenza, seasonal, injectable 01/01/2012, 10/03/2014    Pneumococcal Conjugate 13-Valent 03/13/2017    Pneumococcal Polysaccharide PPV23 01/01/2008    Respiratory Syncytial Virus Vaccine (Recombinant) 11/22/2023    Tuberculin Skin Test-PPD Intradermal 05/07/2015     Objective   /62   Pulse 70   Temp 97.7 °F (36.5 °C) (Temporal)   Ht 5' 1\" (1.549 m)   Wt 51 kg (112 lb 6.4 oz)   SpO2 96%   BMI 21.24 kg/m²     Physical Exam  Cardiovascular:      Rate and Rhythm: Normal rate and regular rhythm.      Heart sounds: Normal heart sounds.      Comments: Carotids: no bruits  Ext: no edema  Pulmonary:      Effort: Pulmonary effort is normal. No respiratory distress.      Breath sounds: No wheezing or rales.   Abdominal:      Tenderness: There is no abdominal tenderness. There is no right CVA tenderness or left CVA tenderness.   Psychiatric:         Behavior: Behavior normal.         Thought Content: Thought content normal.         "

## 2024-11-21 NOTE — ASSESSMENT & PLAN NOTE
History of hypertension with borderline control.  She is compliant with current meds including amlodipine 5 mg daily, benazepril 40 mg daily, and metoprolol XL 25.  Blood pressure today in office is 162/62.  May be somewhat artificially elevated due to recent infection.  Will continue to follow

## 2024-11-21 NOTE — ASSESSMENT & PLAN NOTE
Not on anticoagulation due to fall risk  Orders:    metoprolol succinate (TOPROL-XL) 25 mg 24 hr tablet; Take 1 tablet (25 mg total) by mouth daily

## 2024-11-27 ENCOUNTER — TELEPHONE (OUTPATIENT)
Age: 89
End: 2024-11-27

## 2024-11-27 NOTE — TELEPHONE ENCOUNTER
Called patient and spoke with her regarding HFU. Patient declined to schedule. I did advise her that she can schedule an HFU up to 1 yr from her d/c date, anything after that would be a new patient appt. She verbalized understanding.    Not scheduling HFU at this time       Thank you,     Jaymie      HFU/ [facility] ALL  / Orthostatic hypotension , Occipital neuralgia     DC- HOME- 7/19/2024 June A Katarzyna can follow up with outpatient general attending neurology in 4-6 weeks.

## 2025-02-12 DIAGNOSIS — E78.5 HYPERLIPIDEMIA, UNSPECIFIED HYPERLIPIDEMIA TYPE: ICD-10-CM

## 2025-02-12 RX ORDER — LOVASTATIN 20 MG/1
20 TABLET ORAL DAILY
Qty: 90 TABLET | Refills: 1 | Status: SHIPPED | OUTPATIENT
Start: 2025-02-12

## 2025-02-12 NOTE — PROGRESS NOTES
Name: Buffy Colón      : 1932      MRN: [id number]  Encounter Provider: Sherlyn Welsh MD  Encounter Date: 2025   Encounter department: [facility]  :  Assessment & Plan  Incontinence of feces, unspecified fecal incontinence type  Reviewed her prior incomplete colonoscopy result and the indication for it as well given the patient was curious about it. Chronic intermittent fecal incontinence. Tried pelvic floor PT in the past but stopped because the therapist left. Patient is interested in going back to pelvic floor PT. Will provide referral to pelvic floor PT. Fecal incontinence could be related to weakening of her anal sphincter with age. Okay to defer anorectal manometry for now. Will monitor her progress with pelvic floor PT and if no improvement, could consider anorectal manometry  - refer to pelvic floor PT  Orders:    Ambulatory Referral to Physical Therapy; Future    Bloating  - trial of dairy free diet for a short period of time to see if this helps with bloating       RTC in 6 months    History of Present Illness   HPI  Buffy Colón is a 89 y.o. female PMH HTN, COPD, asthma, Afib not on AC, salivary gland cnacer, hypothyroidism, duodenal bulb ulcer (2023) who presents for follow up.   History obtained from: patient    Last seen by ZULLY Alexis on 10/3/2024 re: dysphagia, epigastric pain, anemia, PUD, bloating and diarrhea   EGD 2023 - mild, patchy erythematous mucosa with erosion in the body of the stomach and antrum, normal esophagus, 3 cm HH, single large, crated, benign-appearing ulcer in the duodenal bulb with clean base, path negative for H. Pylori  Colonoscopy  aborted due to large amount of solid stool in the rectum.   EGD 10/13/2023 for esophageal pain: normal esophagus, 2 cm HH, empiric dilation with TTS balloon up to 20 mm with no mucosal tear post-dilation, normal stomach/duodenum  KUB 10/4/2024: moderate amount of stool  burden. Recommended Miralax daily and titrate    Reports difficulty with passing bowel movement intermittently. Reports she tends to get diarrhea easily since colonoscopy. Reports when she gets put on antibiotics, she tends to get diarrhea. Tried Metamucil nightly but this tends to give her diarrhea so she takes it every 3 days or so.   Hx of 6 vaginal deliveries and vaginal repairs.   Fecal incontinence since colonoscopy. Patient reports trying pelvic floor PT for 3 times in the past but the physical therapist left so unable follow through.    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73    Review of Systems  Current Outpatient Medications on File Prior to Visit   Medication Sig Dispense Refill    acetaminophen (TYLENOL) 650 mg CR tablet Take 650 mg by mouth every 8 (eight) hours as needed for mild pain      albuterol (2.5 mg/3 mL) 0.083 % nebulizer solution Take 2.5 mg by nebulization every 6 (six) hours as needed for wheezing or shortness of breath      albuterol (PROVENTIL HFA,VENTOLIN HFA) 90 mcg/act inhaler Inhale 2 puffs every 6 (six) hours as needed for wheezing 25.5 g 3    amLODIPine (NORVASC) 5 mg tablet Take 1 tablet (5 mg total) by mouth daily 90 tablet 1    benazepril (LOTENSIN) 40 MG tablet Take 1 tablet (40 mg total) by mouth daily 100 tablet 1    denosumab (PROLIA) 60 mg/mL Inject 60 mg under the skin once      Diclofenac Sodium (VOLTAREN) 1 % Apply 2 g topically 4 (four) times a day as needed (pain)      levothyroxine 112 mcg tablet Take 1 tablet (112 mcg total) by mouth daily 90 tablet 1    lovastatin (MEVACOR) 20 mg tablet Take 1 Tablet by mouth in the morning. 90 tablet 1    meclizine (ANTIVERT) 12.5 MG tablet Take 1 tablet (12.5 mg total) by mouth 3 (three) times a day 60 tablet 0    metoprolol succinate (TOPROL-XL) 25 mg 24 hr tablet Take 1 tablet (25 mg total) by mouth daily 100 tablet 1    Multiple Vitamins-Minerals (One Daily Multivitamin Women) TABS Apply 1 tablet to the mouth or throat daily.  "Indications: Treatment to Prevent Vitamin Deficiency      [DISCONTINUED] Calcium Carb-Cholecalciferol (Calcium+D3) 600-20 MG-MCG TABS Take 1 tablet by mouth in the morning (Patient not taking: Reported on 2/13/2025) 90 tablet 3     No current facility-administered medications on file prior to visit.         Objective   BP (!) 173/73 (BP Location: Left arm, Patient Position: Sitting, Cuff Size: Adult)   Pulse 66   Temp 97.7 °F (36.5 °C) (Tympanic)   Ht 5' 1\" (1.549 m)   Wt 51.3 kg (113 lb 3.2 oz)   SpO2 98%   BMI 21.39 kg/m²      Physical Exam  Vitals reviewed.   Constitutional:       Appearance: Normal appearance.   HENT:      Head: Normocephalic and atraumatic.   Cardiovascular:      Rate and Rhythm: Normal rate.   Pulmonary:      Effort: Pulmonary effort is normal. No respiratory distress.   Abdominal:      General: There is no distension.      Palpations: Abdomen is soft.      Tenderness: There is no abdominal tenderness.   Musculoskeletal:         General: No swelling.   Skin:     General: Skin is warm and dry.   Neurological:      General: No focal deficit present.      Mental Status: She is alert.           "

## 2025-02-13 ENCOUNTER — OFFICE VISIT (OUTPATIENT)
Dept: GASTROENTEROLOGY | Facility: MEDICAL CENTER | Age: OVER 89
End: 2025-02-13
Payer: COMMERCIAL

## 2025-02-13 VITALS
BODY MASS INDEX: 21.37 KG/M2 | WEIGHT: 113.2 LBS | SYSTOLIC BLOOD PRESSURE: 173 MMHG | HEART RATE: 66 BPM | TEMPERATURE: 97.7 F | HEIGHT: 61 IN | DIASTOLIC BLOOD PRESSURE: 73 MMHG | OXYGEN SATURATION: 98 %

## 2025-02-13 DIAGNOSIS — R14.0 BLOATING: ICD-10-CM

## 2025-02-13 DIAGNOSIS — R15.9 INCONTINENCE OF FECES, UNSPECIFIED FECAL INCONTINENCE TYPE: Primary | ICD-10-CM

## 2025-02-13 PROCEDURE — 99213 OFFICE O/P EST LOW 20 MIN: CPT | Performed by: INTERNAL MEDICINE

## 2025-03-04 DIAGNOSIS — J45.40 MODERATE PERSISTENT ASTHMA WITHOUT COMPLICATION: Primary | ICD-10-CM

## 2025-03-04 RX ORDER — ALBUTEROL SULFATE 0.83 MG/ML
2.5 SOLUTION RESPIRATORY (INHALATION) EVERY 6 HOURS PRN
Qty: 90 ML | Refills: 0 | Status: SHIPPED | OUTPATIENT
Start: 2025-03-04

## 2025-03-04 NOTE — TELEPHONE ENCOUNTER
Spoke with patient. She stated that she has been having trouble breathing lately due to the cold weather. She said she can't even go outside because her chest gets tight.   Patient also stated that her rescue inhaler gives her a headache.

## 2025-03-04 NOTE — TELEPHONE ENCOUNTER
Reason for call:   [x] Refill   [] Prior Auth  [] Other:     Office:   [x] PCP/Provider -   [] Specialty/Provider -     Medication: albuterol (2.5 mg/3 mL) 0.083 % nebulizer solution     Dose/Frequency: Take 2.5 mg by nebulization every 6 (six) hours as needed for wheezing or shortness of breath     Pharmacy: [facility] - [place] - [address] [place] [address]     Does the patient have enough for 3 days?   [] Yes   [x] No - Send as HP to POD

## 2025-03-04 NOTE — TELEPHONE ENCOUNTER
Please call patient - the medication for her nebulizer has not been filled in a long time. Has she just started having problems with her breathing.

## 2025-03-21 ENCOUNTER — APPOINTMENT (OUTPATIENT)
Dept: LAB | Facility: CLINIC | Age: OVER 89
End: 2025-03-21
Payer: COMMERCIAL

## 2025-03-21 DIAGNOSIS — E89.0 POSTOPERATIVE HYPOTHYROIDISM: ICD-10-CM

## 2025-03-21 DIAGNOSIS — I10 PRIMARY HYPERTENSION: ICD-10-CM

## 2025-03-21 LAB
ALBUMIN SERPL BCG-MCNC: 4.5 G/DL (ref 3.5–5)
ALP SERPL-CCNC: 48 U/L (ref 34–104)
ALT SERPL W P-5'-P-CCNC: 16 U/L (ref 7–52)
ANION GAP SERPL CALCULATED.3IONS-SCNC: 10 MMOL/L (ref 4–13)
AST SERPL W P-5'-P-CCNC: 24 U/L (ref 13–39)
BASOPHILS # BLD AUTO: 0.04 THOUSANDS/ÂΜL (ref 0–0.1)
BASOPHILS NFR BLD AUTO: 1 % (ref 0–1)
BILIRUB SERPL-MCNC: 0.66 MG/DL (ref 0.2–1)
BUN SERPL-MCNC: 21 MG/DL (ref 5–25)
CALCIUM SERPL-MCNC: 9.3 MG/DL (ref 8.4–10.2)
CHLORIDE SERPL-SCNC: 99 MMOL/L (ref 96–108)
CHOLEST SERPL-MCNC: 180 MG/DL (ref ?–200)
CO2 SERPL-SCNC: 30 MMOL/L (ref 21–32)
CREAT SERPL-MCNC: 0.7 MG/DL (ref 0.6–1.3)
EOSINOPHIL # BLD AUTO: 0.14 THOUSAND/ÂΜL (ref 0–0.61)
EOSINOPHIL NFR BLD AUTO: 2 % (ref 0–6)
ERYTHROCYTE [DISTWIDTH] IN BLOOD BY AUTOMATED COUNT: 14 % (ref 11.6–15.1)
GFR SERPL CREATININE-BSD FRML MDRD: 75 ML/MIN/1.73SQ M
GLUCOSE P FAST SERPL-MCNC: 98 MG/DL (ref 65–99)
HCT VFR BLD AUTO: 39.1 % (ref 34.8–46.1)
HDLC SERPL-MCNC: 80 MG/DL
HGB BLD-MCNC: 12.4 G/DL (ref 11.5–15.4)
IMM GRANULOCYTES # BLD AUTO: 0.02 THOUSAND/UL (ref 0–0.2)
IMM GRANULOCYTES NFR BLD AUTO: 0 % (ref 0–2)
LDLC SERPL CALC-MCNC: 84 MG/DL (ref 0–100)
LYMPHOCYTES # BLD AUTO: 2.32 THOUSANDS/ÂΜL (ref 0.6–4.47)
LYMPHOCYTES NFR BLD AUTO: 33 % (ref 14–44)
MCH RBC QN AUTO: 30.8 PG (ref 26.8–34.3)
MCHC RBC AUTO-ENTMCNC: 31.7 G/DL (ref 31.4–37.4)
MCV RBC AUTO: 97 FL (ref 82–98)
MONOCYTES # BLD AUTO: 0.84 THOUSAND/ÂΜL (ref 0.17–1.22)
MONOCYTES NFR BLD AUTO: 12 % (ref 4–12)
NEUTROPHILS # BLD AUTO: 3.76 THOUSANDS/ÂΜL (ref 1.85–7.62)
NEUTS SEG NFR BLD AUTO: 52 % (ref 43–75)
NRBC BLD AUTO-RTO: 0 /100 WBCS
PLATELET # BLD AUTO: 192 THOUSANDS/UL (ref 149–390)
PMV BLD AUTO: 11.9 FL (ref 8.9–12.7)
POTASSIUM SERPL-SCNC: 4.3 MMOL/L (ref 3.5–5.3)
PROT SERPL-MCNC: 6.7 G/DL (ref 6.4–8.4)
RBC # BLD AUTO: 4.03 MILLION/UL (ref 3.81–5.12)
SODIUM SERPL-SCNC: 139 MMOL/L (ref 135–147)
T4 FREE SERPL-MCNC: 1.17 NG/DL (ref 0.61–1.12)
TRIGL SERPL-MCNC: 79 MG/DL (ref ?–150)
TSH SERPL DL<=0.05 MIU/L-ACNC: 2.21 UIU/ML (ref 0.45–4.5)
WBC # BLD AUTO: 7.12 THOUSAND/UL (ref 4.31–10.16)

## 2025-03-21 PROCEDURE — 85025 COMPLETE CBC W/AUTO DIFF WBC: CPT

## 2025-03-21 PROCEDURE — 84443 ASSAY THYROID STIM HORMONE: CPT

## 2025-03-21 PROCEDURE — 36415 COLL VENOUS BLD VENIPUNCTURE: CPT

## 2025-03-21 PROCEDURE — 84439 ASSAY OF FREE THYROXINE: CPT

## 2025-03-21 PROCEDURE — 80053 COMPREHEN METABOLIC PANEL: CPT

## 2025-03-21 PROCEDURE — 80061 LIPID PANEL: CPT

## 2025-03-26 ENCOUNTER — RESULTS FOLLOW-UP (OUTPATIENT)
Dept: FAMILY MEDICINE CLINIC | Facility: CLINIC | Age: OVER 89
End: 2025-03-26

## 2025-04-09 ENCOUNTER — RA CDI HCC (OUTPATIENT)
Dept: OTHER | Facility: HOSPITAL | Age: OVER 89
End: 2025-04-09

## 2025-04-09 NOTE — PROGRESS NOTES
HCC coding opportunities    I12.9 for [facility]     Chart Reviewed number of suggestions sent to Provider: 1     Patients Insurance     Medicare Insurance: [facility] Advantage

## 2025-04-10 ENCOUNTER — OFFICE VISIT (OUTPATIENT)
Dept: FAMILY MEDICINE CLINIC | Facility: CLINIC | Age: OVER 89
End: 2025-04-10
Payer: COMMERCIAL

## 2025-04-10 VITALS
DIASTOLIC BLOOD PRESSURE: 88 MMHG | WEIGHT: 117 LBS | BODY MASS INDEX: 21.53 KG/M2 | OXYGEN SATURATION: 97 % | HEART RATE: 64 BPM | HEIGHT: 62 IN | SYSTOLIC BLOOD PRESSURE: 136 MMHG | TEMPERATURE: 98.5 F

## 2025-04-10 DIAGNOSIS — N18.31 STAGE 3A CHRONIC KIDNEY DISEASE (HCC): ICD-10-CM

## 2025-04-10 DIAGNOSIS — C05.0 MALIGNANT NEOPLASM OF HARD PALATE (HCC): ICD-10-CM

## 2025-04-10 DIAGNOSIS — E89.0 POSTOPERATIVE HYPOTHYROIDISM: ICD-10-CM

## 2025-04-10 DIAGNOSIS — N39.3 FEMALE STRESS INCONTINENCE: ICD-10-CM

## 2025-04-10 DIAGNOSIS — I48.0 PAROXYSMAL ATRIAL FIBRILLATION (HCC): ICD-10-CM

## 2025-04-10 DIAGNOSIS — H35.3231 EXUDATIVE AGE-RELATED MACULAR DEGENERATION, BILATERAL, WITH ACTIVE CHOROIDAL NEOVASCULARIZATION (HCC): ICD-10-CM

## 2025-04-10 DIAGNOSIS — M79.604 PAIN IN BOTH LOWER EXTREMITIES: ICD-10-CM

## 2025-04-10 DIAGNOSIS — M25.551 BILATERAL HIP PAIN: ICD-10-CM

## 2025-04-10 DIAGNOSIS — M79.605 PAIN IN BOTH LOWER EXTREMITIES: ICD-10-CM

## 2025-04-10 DIAGNOSIS — Z00.00 MEDICARE ANNUAL WELLNESS VISIT, SUBSEQUENT: Primary | ICD-10-CM

## 2025-04-10 DIAGNOSIS — E03.9 HYPOTHYROIDISM, UNSPECIFIED TYPE: ICD-10-CM

## 2025-04-10 DIAGNOSIS — I10 PRIMARY HYPERTENSION: ICD-10-CM

## 2025-04-10 DIAGNOSIS — J44.89 ASTHMA-COPD OVERLAP SYNDROME (HCC): ICD-10-CM

## 2025-04-10 DIAGNOSIS — M25.552 BILATERAL HIP PAIN: ICD-10-CM

## 2025-04-10 PROCEDURE — G0439 PPPS, SUBSEQ VISIT: HCPCS | Performed by: FAMILY MEDICINE

## 2025-04-10 PROCEDURE — G2211 COMPLEX E/M VISIT ADD ON: HCPCS | Performed by: FAMILY MEDICINE

## 2025-04-10 PROCEDURE — 99214 OFFICE O/P EST MOD 30 MIN: CPT | Performed by: FAMILY MEDICINE

## 2025-04-10 RX ORDER — LEVOTHYROXINE SODIUM 100 UG/1
100 TABLET ORAL DAILY
Qty: 90 TABLET | Refills: 1 | Status: SHIPPED | OUTPATIENT
Start: 2025-04-10 | End: 2025-10-07

## 2025-04-10 RX ORDER — ASPIRIN 81 MG
TABLET, DELAYED RELEASE (ENTERIC COATED) ORAL DAILY
COMMUNITY

## 2025-04-10 NOTE — ASSESSMENT & PLAN NOTE
Lab Results   Component Value Date    EGFR 75 03/21/2025    EGFR 77 11/17/2024    EGFR 72 07/19/2024    CREATININE 0.70 03/21/2025    CREATININE 0.64 11/17/2024    CREATININE 0.72 07/19/2024

## 2025-04-10 NOTE — PATIENT INSTRUCTIONS
Medicare Preventive Visit Patient Instructions  Thank you for completing your Welcome to Medicare Visit or Medicare Annual Wellness Visit today. Your next wellness visit will be due in one year (4/11/2026).  The screening/preventive services that you may require over the next 5-10 years are detailed below. Some tests may not apply to you based off risk factors and/or age. Screening tests ordered at today's visit but not completed yet may show as past due. Also, please note that scanned in results may not display below.  Preventive Screenings:  Service Recommendations Previous Testing/Comments   Colorectal Cancer Screening  * Colonoscopy    * Fecal Occult Blood Test (FOBT)/Fecal Immunochemical Test (FIT)  * Fecal DNA/Cologuard Test  * Flexible Sigmoidoscopy Age: 45-75 years old   Colonoscopy: every 10 years (may be performed more frequently if at higher risk)  OR  FOBT/FIT: every 1 year  OR  Cologuard: every 3 years  OR  Sigmoidoscopy: every 5 years  Screening may be recommended earlier than age 45 if at higher risk for colorectal cancer. Also, an individualized decision between you and your healthcare provider will decide whether screening between the ages of 76-85 would be appropriate. Colonoscopy: 02/06/2023  FOBT/FIT: Not on file  Cologuard: Not on file  Sigmoidoscopy: Not on file    Screening Not Indicated     Breast Cancer Screening Age: 40+ years old  Frequency: every 1-2 years  Not required if history of left and right mastectomy Mammogram: 03/13/2014        Cervical Cancer Screening Between the ages of 21-29, pap smear recommended once every 3 years.   Between the ages of 30-65, can perform pap smear with HPV co-testing every 5 years.   Recommendations may differ for women with a history of total hysterectomy, cervical cancer, or abnormal pap smears in past. Pap Smear: Not on file    Screening Not Indicated   Hepatitis C Screening Once for adults born between 1945 and 1965  More frequently in patients at  high risk for Hepatitis C Hep C Antibody: Not on file        Diabetes Screening 1-2 times per year if you're at risk for diabetes or have pre-diabetes Fasting glucose: 98 mg/dL (3/21/2025)  A1C: 5.7 % (7/18/2024)  Screening Current   Cholesterol Screening Once every 5 years if you don't have a lipid disorder. May order more often based on risk factors. Lipid panel: 03/21/2025    Screening Not Indicated  History Lipid Disorder     Other Preventive Screenings Covered by Medicare:  Abdominal Aortic Aneurysm (AAA) Screening: covered once if your at risk. You're considered to be at risk if you have a family history of AAA.  Lung Cancer Screening: covers low dose CT scan once per year if you meet all of the following conditions: (1) Age 55-77; (2) No signs or symptoms of lung cancer; (3) Current smoker or have quit smoking within the last 15 years; (4) You have a tobacco smoking history of at least 20 pack years (packs per day multiplied by number of years you smoked); (5) You get a written order from a healthcare provider.  Glaucoma Screening: covered annually if you're considered high risk: (1) You have diabetes OR (2) Family history of glaucoma OR (3)  aged 50 and older OR (4)  American aged 65 and older  Osteoporosis Screening: covered every 2 years if you meet one of the following conditions: (1) You're estrogen deficient and at risk for osteoporosis based off medical history and other findings; (2) Have a vertebral abnormality; (3) On glucocorticoid therapy for more than 3 months; (4) Have primary hyperparathyroidism; (5) On osteoporosis medications and need to assess response to drug therapy.   Last bone density test (DXA Scan): 12/30/2023.  HIV Screening: covered annually if you're between the age of 15-65. Also covered annually if you are younger than 15 and older than 65 with risk factors for HIV infection. For pregnant patients, it is covered up to 3 times per  pregnancy.    Immunizations:  Immunization Recommendations   Influenza Vaccine Annual influenza vaccination during flu season is recommended for all persons aged >= 6 months who do not have contraindications   Pneumococcal Vaccine   * Pneumococcal conjugate vaccine = PCV13 (Prevnar 13), PCV15 (Vaxneuvance), PCV20 (Prevnar 20)  * Pneumococcal polysaccharide vaccine = PPSV23 (Pneumovax) Adults 19-65 yo with certain risk factors or if 65+ yo  If never received any pneumonia vaccine: recommend Prevnar 20 (PCV20)  Give PCV20 if previously received 1 dose of PCV13 or PPSV23   Hepatitis B Vaccine 3 dose series if at intermediate or high risk (ex: diabetes, end stage renal disease, liver disease)   Respiratory syncytial virus (RSV) Vaccine - COVERED BY MEDICARE PART D  * RSVPreF3 (Arexvy) CDC recommends that adults 60 years of age and older may receive a single dose of RSV vaccine using shared clinical decision-making (SCDM)   Tetanus (Td) Vaccine - COST NOT COVERED BY MEDICARE PART B Following completion of primary series, a booster dose should be given every 10 years to maintain immunity against tetanus. Td may also be given as tetanus wound prophylaxis.   Tdap Vaccine - COST NOT COVERED BY MEDICARE PART B Recommended at least once for all adults. For pregnant patients, recommended with each pregnancy.   Shingles Vaccine (Shingrix) - COST NOT COVERED BY MEDICARE PART B  2 shot series recommended in those 19 years and older who have or will have weakened immune systems or those 50 years and older     Health Maintenance Due:  There are no preventive care reminders to display for this patient.  Immunizations Due:      Topic Date Due   • COVID-19 Vaccine (7 - 2024-25 season) 09/01/2024     Advance Directives   What are advance directives?  Advance directives are legal documents that state your wishes and plans for medical care. These plans are made ahead of time in case you lose your ability to make decisions for yourself.  Advance directives can apply to any medical decision, such as the treatments you want, and if you want to donate organs.   What are the types of advance directives?  There are many types of advance directives, and each state has rules about how to use them. You may choose a combination of any of the following:  Living will:  This is a written record of the treatment you want. You can also choose which treatments you do not want, which to limit, and which to stop at a certain time. This includes surgery, medicine, IV fluid, and tube feedings.   Durable power of  for healthcare (DPAHC):  This is a written record that states who you want to make healthcare choices for you when you are unable to make them for yourself. This person, called a proxy, is usually a family member or a friend. You may choose more than 1 proxy.  Do not resuscitate (DNR) order:  A DNR order is used in case your heart stops beating or you stop breathing. It is a request not to have certain forms of treatment, such as CPR. A DNR order may be included in other types of advance directives.  Medical directive:  This covers the care that you want if you are in a coma, near death, or unable to make decisions for yourself. You can list the treatments you want for each condition. Treatment may include pain medicine, surgery, blood transfusions, dialysis, IV or tube feedings, and a ventilator (breathing machine).  Values history:  This document has questions about your views, beliefs, and how you feel and think about life. This information can help others choose the care that you would choose.  Why are advance directives important?  An advance directive helps you control your care. Although spoken wishes may be used, it is better to have your wishes written down. Spoken wishes can be misunderstood, or not followed. Treatments may be given even if you do not want them. An advance directive may make it easier for your family to make difficult  choices about your care.   Urinary Incontinence   Urinary incontinence (UI)  is when you lose control of your bladder. UI develops because your bladder cannot store or empty urine properly. The 3 most common types of UI are stress incontinence, urge incontinence, or both.  Medicines:   May be given to help strengthen your bladder control. Report any side effects of medication to your healthcare provider.  Do pelvic muscle exercises often:  Your pelvic muscles help you stop urinating. Squeeze these muscles tight for 5 seconds, then relax for 5 seconds. Gradually work up to squeezing for 10 seconds. Do 3 sets of 15 repetitions a day, or as directed. This will help strengthen your pelvic muscles and improve bladder control.  Train your bladder:  Go to the bathroom at set times, such as every 2 hours, even if you do not feel the urge to go. You can also try to hold your urine when you feel the urge to go. For example, hold your urine for 5 minutes when you feel the urge to go. As that becomes easier, hold your urine for 10 minutes.   Self-care:   Keep a UI record.  Write down how often you leak urine and how much you leak. Make a note of what you were doing when you leaked urine.  Drink liquids as directed. You may need to limit the amount of liquid you drink to help control your urine leakage. Do not drink any liquid right before you go to bed. Limit or do not have drinks that contain caffeine or alcohol.   Prevent constipation.  Eat a variety of high-fiber foods. Good examples are high-fiber cereals, beans, vegetables, and whole-grain breads. Walking is the best way to trigger your intestines to have a bowel movement.  Exercise regularly and maintain a healthy weight.  Weight loss and exercise will decrease pressure on your bladder and help you control your leakage.   Use a catheter as directed  to help empty your bladder. A catheter is a tiny, plastic tube that is put into your bladder to drain your urine.   Go to  behavior therapy as directed.  Behavior therapy may be used to help you learn to control your urge to urinate.     © Copyright [employer] 2018 Information is for End User's use only and may not be sold, redistributed or otherwise used for commercial purposes. All illustrations and images included in CareNotes® are the copyrighted property of [employer] or [employer]

## 2025-04-10 NOTE — PROGRESS NOTES
Name: Buffy Colón      : 1932      MRN: [id number]  Encounter Provider: Stephanie Pruett DO  Encounter Date: 4/10/2025   Encounter department: [facility]  :  Assessment & Plan  Medicare annual wellness visit, subsequent  Questionnaire reviewed       Primary hypertension    Orders:  •  Comprehensive metabolic panel; Future    Postoperative hypothyroidism  T4 is mildly elevated. Will decrease Levothyroxine. She does note that she islosing hair.  Orders:  •  TSH, 3rd generation with Free T4 reflex; Future    Female stress incontinence         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HCC)         Exudative age-related macular degeneration, bilateral, with active choroidal neovascularization (HCC)         Asthma-COPD overlap syndrome (HCC)         Paroxysmal atrial fibrillation (HCC)         Stage 3a chronic kidney disease (HCC)  Lab Results   Component Value Date    EGFR 75 2025    EGFR 77 2024    EGFR 72 2024    CREATININE 0.70 2025    CREATININE 0.64 2024    CREATININE 0.72 2024            Hypothyroidism, unspecified type    Orders:  •  levothyroxine 100 mcg tablet; Take 1 tablet (100 mcg total) by mouth daily    Pain in both lower extremities    Orders:  •  Ambulatory Referral to Orthopedic Surgery; Future    Bilateral hip pain    Orders:  •  Ambulatory Referral to Orthopedic Surgery; Future      Depression Screening and Follow-up Plan: Patient was screened for depression during today's encounter. They screened negative with a PHQ-2 score of 0.      Urinary Incontinence Plan of Care: counseling topics discussed: use restroom every 2 hours and limiting fluid intake 3-4 hours before bed.       Preventive health issues were discussed with patient, and age appropriate screening tests were ordered as noted in patient's After Visit Summary. Personalized health advice and appropriate referrals for health education or preventive services given if needed, as noted  in patient's After Visit Summary.    See me in 6 months.  History of Present Illness     Patient is here for follow up of chronic medical conditions along with Medicare wellness.  Complains of hip pain at night.       Patient Care Team:  Stephanie Pruett,  as PCP - General (Family Medicine)  Jet Rees, DO as PCP - PCP-[facility] ([facility])  Stephanie Pruett, DO [facility]  MD Brendon Saezn, MD Steve Sandoval, MD Sujit Arellano, MD Rober Lopez, MD Rahul Lemus, MD Jet Rees, DO Scott Archuleta, MD Faizan Payton, MD Gilberto Whitten, MD Rayo Moulton, MD Irene Smith, RD (Nutrition)  Tiffany Reece PA-C (Gastroenterology)  SARAHI Rajan as Nurse Practitioner (Gastroenterology)    Review of Systems   Constitutional:  Negative for chills and fever.   HENT:  Negative for congestion and sore throat.    Respiratory:  Negative for chest tightness.    Cardiovascular:  Negative for chest pain and palpitations.   Gastrointestinal:  Negative for abdominal pain, constipation, diarrhea and nausea.   Genitourinary:  Negative for difficulty urinating.   Musculoskeletal:  Positive for arthralgias.   Skin: Negative.    Neurological:  Negative for dizziness and headaches.   Psychiatric/Behavioral: Negative.       Medical History Reviewed by provider this encounter:       Annual Wellness Visit Questionnaire   Buffy is here for her Subsequent Wellness visit. Last Medicare Wellness visit information reviewed, patient interviewed and updates made to the record today.      Health Risk Assessment:   Patient rates overall health as good. Patient feels that their physical health rating is slightly better. Patient is satisfied with their life. Eyesight was rated as slightly worse. Hearing was rated as same. Patient feels that their emotional and mental health rating is same. Patients states they are never, rarely angry. Patient  states they are often unusually tired/fatigued. Pain experienced in the last 7 days has been some. Patient's pain rating has been 2/10. Patient states that she has experienced no weight loss or gain in last 6 months.     Depression Screening:   PHQ-2 Score: 0      Fall Risk Screening:   In the past year, patient has experienced: no history of falling in past year      Urinary Incontinence Screening:   Patient has leaked urine accidently in the last six months.     Home Safety:  Patient does not have trouble with stairs inside or outside of their home. Patient has working smoke alarms and has no working carbon monoxide detector. Home safety hazards include: none.     Nutrition:   Current diet is Regular.     Medications:   Patient is currently taking over-the-counter supplements. OTC medications include: see medication list. Patient is able to manage medications.     Activities of Daily Living (ADLs)/Instrumental Activities of Daily Living (IADLs):   Walk and transfer into and out of bed and chair?: Yes  Dress and groom yourself?: Yes    Bathe or shower yourself?: Yes    Feed yourself? Yes  Do your laundry/housekeeping?: Yes  Manage your money, pay your bills and track your expenses?: Yes  Make your own meals?: Yes    Do your own shopping?: Yes    Previous Hospitalizations:   Any hospitalizations or ED visits within the last 12 months?: Yes    How many hospitalizations have you had in the last year?: 1-2    Advance Care Planning:   Living will: Yes    Durable POA for healthcare: Yes    Advanced directive: Yes    Five wishes given: No    End of Life Decisions reviewed with patient: Yes      Cognitive Screening:   Provider or family/friend/caregiver concerned regarding cognition?: No    Preventive Screenings      Cardiovascular Screening:    General: Screening Not Indicated and History Lipid Disorder      Diabetes Screening:     General: Screening Current      Colorectal Cancer Screening:     General: Screening Not  Indicated      Cervical Cancer Screening:    General: Screening Not Indicated      Osteoporosis Screening:    General: Screening Not Indicated and History Osteoporosis      Lung Cancer Screening:     General: Screening Not Indicated    Immunizations:  - Immunizations due: Zoster (Shingrix)    Screening, Brief Intervention, and Referral to Treatment (SBIRT)     Screening  Typical number of drinks in a day: 0  Typical number of drinks in a week: 0  Interpretation: Low risk drinking behavior.    Single Item Drug Screening:  How often have you used an illegal drug (including marijuana) or a prescription medication for non-medical reasons in the past year? never    Single Item Drug Screen Score: 0  Interpretation: Negative screen for possible drug use disorder    Brief Intervention  Alcohol & drug use screenings were reviewed. No concerns regarding substance use disorder identified.     Social Drivers of Health     Financial Resource Strain: Low Risk  (7/29/2024)    Received from [facility]    Financial Resource Strain    • Do you have any trouble paying for your medications, or do you think you might in the future?: No   Food Insecurity: No Food Insecurity (4/10/2025)    Hunger Vital Sign    • Worried About Running Out of Food in the Last Year: Never true    • Ran Out of Food in the Last Year: Never true   Transportation Needs: No Transportation Needs (4/10/2025)    PRAPARE - Transportation    • Lack of Transportation (Medical): No    • Lack of Transportation (Non-Medical): No   Housing Stability: Low Risk  (4/10/2025)    Housing Stability Vital Sign    • Unable to Pay for Housing in the Last Year: No    • Number of Times Moved in the Last Year: 0    • Homeless in the Last Year: No   Utilities: Not At Risk (4/10/2025)    [facility] Utilities    • Threatened with loss of utilities: No     No results found.    Objective   /88 (BP Location: Left arm, Patient Position: Sitting, Cuff Size: Adult)   Pulse 64   Temp 98.5 °F  "(36.9 °C) (Temporal)   Ht 5' 2\" (1.575 m)   Wt 53.1 kg (117 lb)   SpO2 97%   BMI 21.40 kg/m²     Physical Exam  Vitals and nursing note reviewed.   Constitutional:       General: She is not in acute distress.  HENT:      Head: Normocephalic.      Right Ear: Tympanic membrane normal.      Left Ear: Tympanic membrane normal.      Mouth/Throat:      Pharynx: No posterior oropharyngeal erythema.   Eyes:      General: No scleral icterus.  Neck:      Thyroid: No thyromegaly.      Vascular: No carotid bruit.   Cardiovascular:      Rate and Rhythm: Normal rate and regular rhythm.      Heart sounds: Normal heart sounds. No murmur heard.     Comments: /78  Pulmonary:      Effort: Pulmonary effort is normal.      Breath sounds: Normal breath sounds.   Abdominal:      General: Bowel sounds are normal.      Palpations: Abdomen is soft.   Musculoskeletal:         General: Deformity present.      Right lower leg: No edema.      Left lower leg: No edema.   Lymphadenopathy:      Cervical: No cervical adenopathy.   Skin:     General: Skin is warm and dry.   Neurological:      Mental Status: She is alert and oriented to person, place, and time.   Psychiatric:         Mood and Affect: Mood normal.         "

## 2025-04-10 NOTE — ASSESSMENT & PLAN NOTE
T4 is mildly elevated. Will decrease Levothyroxine. She does note that she islosing hair.  Orders:  •  TSH, 3rd generation with Free T4 reflex; Future

## 2025-04-14 ENCOUNTER — OFFICE VISIT (OUTPATIENT)
Dept: RHEUMATOLOGY | Facility: CLINIC | Age: OVER 89
End: 2025-04-14
Payer: COMMERCIAL

## 2025-04-14 VITALS
DIASTOLIC BLOOD PRESSURE: 78 MMHG | HEART RATE: 62 BPM | WEIGHT: 114 LBS | BODY MASS INDEX: 20.98 KG/M2 | HEIGHT: 62 IN | OXYGEN SATURATION: 96 % | SYSTOLIC BLOOD PRESSURE: 142 MMHG

## 2025-04-14 DIAGNOSIS — M81.0 AGE-RELATED OSTEOPOROSIS WITHOUT CURRENT PATHOLOGICAL FRACTURE: Primary | ICD-10-CM

## 2025-04-14 DIAGNOSIS — M79.605 PAIN IN BOTH LOWER EXTREMITIES: ICD-10-CM

## 2025-04-14 DIAGNOSIS — M79.604 PAIN IN BOTH LOWER EXTREMITIES: ICD-10-CM

## 2025-04-14 PROCEDURE — 96372 THER/PROPH/DIAG INJ SC/IM: CPT | Performed by: STUDENT IN AN ORGANIZED HEALTH CARE EDUCATION/TRAINING PROGRAM

## 2025-04-14 PROCEDURE — 99214 OFFICE O/P EST MOD 30 MIN: CPT | Performed by: STUDENT IN AN ORGANIZED HEALTH CARE EDUCATION/TRAINING PROGRAM

## 2025-04-14 NOTE — PROGRESS NOTES
"Assessment/Plan:    Buffy Colón came into the [facility] Rheumatology Office today 04/14/25 to receive prolia injection.      Verbal consent obtained.  Consent given by: patient    patient states patient has been medically healthy with no underlining concerns/complications.      Buffy Colón presents with no symptoms today.       All insturctions were reviewed with the patient.    If the patient should have any questions/concerns, advised patient to contacted [facility].       Subjective:     History provided by: patient    Patient ID: Buffy Colón is a 89 y.o. female      Objective:    Vitals:    04/14/25 1158   BP: 142/78   Pulse: 62   SpO2: 96%   Weight: 51.7 kg (114 lb)   Height: 5' 2\" (1.575 m)       Patient tolerated the injection well without any complications.  Injection site/s left arm.  Medication was provided by rheumatology.  Specialty Pharmacy Name n/a     Patient signed consent form yes   Patient signed ABN form yes (If no patient is not a medicare patient).   Patient waited 15 minutes after injection no (This only applies to patient's receiving first time injection).       Last Visit: Visit date not found  Next visit:Visit date not found     "

## 2025-04-14 NOTE — PATIENT INSTRUCTIONS
I do think your leg pains may be related to varicose veins. I think it's reasonable to try voltaren gel on the legs, but don't use it every day.    Please get your Vitamin D level checked. If it is low, we will start you on another vitamin D supplement.

## 2025-04-14 NOTE — PROGRESS NOTES
Name: Buffy Colón      : 1932      MRN: [id number]  Encounter Provider: Jed Tuttle DO  Encounter Date: 2025   Encounter department: [facility]  :  Assessment & Plan  Age-related osteoporosis without current pathological fracture  Continue Prolia    Will update VitD screening toe ensure still adequate       Pain in both lower extremities  Possibly related to varicosities? Doesn't take oral NSAIDs due to history GI ulcer; I advised her that it is reasonable to try voltaren gel on the legs at night if pain is severe. Would recommend further follow up with PCP, if felt to be varicose vein-related could consider Derm/Vascular referral (not sure who treats this)         Patient's rheumatologic disease(s) threaten long-term function if not appropriately managed.    History of Present Illness     No falls since last time    Has been having bilateral calf pain some nights, not all nights. Ortho got x-rays, no signs of a skeletal cause    Permanent History: First saw me Dec 2023 in referral for ?PMR. On my evaluation, she did not have any symptoms of PMR. She did have history multiple thoracic compression fractures previously on alendronate for possibly up to 20 years; started Prolia.     Objective   There were no vitals taken for this visit.     General: Well appearing, in no distress.   Eyes: Sclera non-icteric. EOMI  Extremities: Warm, well perfused, no edema.   Neuro: Alert and oriented. No gross focal neurological deficits.   Skin: No rashes. Varicosities bilateral LE distal to knees  MSK exam: no spinal tenderness to palpation

## 2025-04-28 ENCOUNTER — APPOINTMENT (OUTPATIENT)
Dept: LAB | Facility: CLINIC | Age: OVER 89
End: 2025-04-28
Payer: COMMERCIAL

## 2025-04-28 DIAGNOSIS — M81.0 AGE-RELATED OSTEOPOROSIS WITHOUT CURRENT PATHOLOGICAL FRACTURE: ICD-10-CM

## 2025-04-28 LAB — 25(OH)D3 SERPL-MCNC: 52.2 NG/ML (ref 30–100)

## 2025-04-28 PROCEDURE — 82306 VITAMIN D 25 HYDROXY: CPT

## 2025-04-28 PROCEDURE — 36415 COLL VENOUS BLD VENIPUNCTURE: CPT

## 2025-05-05 ENCOUNTER — APPOINTMENT (OUTPATIENT)
Dept: RADIOLOGY | Facility: MEDICAL CENTER | Age: OVER 89
End: 2025-05-05
Attending: EMERGENCY MEDICINE
Payer: COMMERCIAL

## 2025-05-05 ENCOUNTER — OFFICE VISIT (OUTPATIENT)
Dept: OBGYN CLINIC | Facility: MEDICAL CENTER | Age: OVER 89
End: 2025-05-05
Attending: FAMILY MEDICINE
Payer: COMMERCIAL

## 2025-05-05 VITALS — BODY MASS INDEX: 20.98 KG/M2 | HEIGHT: 62 IN | WEIGHT: 114 LBS

## 2025-05-05 DIAGNOSIS — M25.552 BILATERAL HIP PAIN: ICD-10-CM

## 2025-05-05 DIAGNOSIS — M25.551 BILATERAL HIP PAIN: ICD-10-CM

## 2025-05-05 DIAGNOSIS — M47.816 LUMBAR SPONDYLOSIS: ICD-10-CM

## 2025-05-05 DIAGNOSIS — M79.605 PAIN IN BOTH LOWER EXTREMITIES: ICD-10-CM

## 2025-05-05 DIAGNOSIS — M16.0 PRIMARY OSTEOARTHRITIS OF BOTH HIPS: ICD-10-CM

## 2025-05-05 DIAGNOSIS — M54.41 ACUTE RIGHT-SIDED LOW BACK PAIN WITH RIGHT-SIDED SCIATICA: Primary | ICD-10-CM

## 2025-05-05 DIAGNOSIS — M79.604 PAIN IN BOTH LOWER EXTREMITIES: ICD-10-CM

## 2025-05-05 DIAGNOSIS — M54.50 ACUTE RIGHT-SIDED LOW BACK PAIN WITHOUT SCIATICA: ICD-10-CM

## 2025-05-05 PROCEDURE — 99214 OFFICE O/P EST MOD 30 MIN: CPT | Performed by: EMERGENCY MEDICINE

## 2025-05-05 PROCEDURE — 72100 X-RAY EXAM L-S SPINE 2/3 VWS: CPT

## 2025-05-05 NOTE — PROGRESS NOTES
Name: Buffy Colón      : 1932      MRN: [id number]  Encounter Provider: Cy Luong MD  Encounter Date: 2025   Encounter department: [facility] [place]  :  Assessment & Plan  Pain in both lower extremities  Discussed diagnostic and treatment options, Xrays L spine obtained today  Buffy wishes to proceed with home therapy (she is unable to drive)  T/c FL CSI hips vs MRI L spine and referral to Pain Management which she declines at this time  Unable to take NSAIDs    Orders:    Ambulatory Referral to Orthopedic Surgery    XR spine lumbar 2 or 3 views injury; Future    Referral to [facility]; Future    Bilateral hip pain    Orders:    Ambulatory Referral to Orthopedic Surgery    XR spine lumbar 2 or 3 views injury; Future    Referral to [facility]; Future    Acute right-sided low back pain with right-sided sciatica    Orders:    XR spine lumbar 2 or 3 views injury; Future    Referral to [facility]; Future    Lumbar spondylosis    Orders:    Referral to [facility]; Future    Primary osteoarthritis of both hips    Orders:    Referral to [facility]; Future    Takes Tylenol, Unable to take NSAIDs      Return for patient to call if any questions/concerns.      Subjective:     History of Present Illness   Buffy returns for B/L leg symptoms.  She notes right LBP with occasional radiation of pain down leg, also states left leg gives out on her but denies pain  Previously evaluated for left leg pain and chronic lower back pain, imaging was obtained at that time she was placed on a Medrol dose pack  Unable to take NSAIDs history of ulcer, discussed gabapentin  Was found to have severe osteoarthritis of the left hip    Initial note :    Buffy presents for concerns of pain of the left shin.  She does note some mild pain of the buttocks and hip area as well as the thigh but her main concern is the  shin pain which is much improved at nighttime in bed and the first few hours of the morning however as the day goes by the pain worsens denies any numbness or tingling of the legs.  She is concerned about her left leg giving out on her.  She does have a history of similar symptoms which did resolve on their own.  Previous x-rays of the lumbar spine and hip have been reviewed.  She was previously prescribed Gabapentin  She notes recently she had a bleeding ulcer and anemia  Buffy will be traveling in May to see her sister in [place]            Review of Systems    The following portions of the patient's chart were reviewed and updated as appropriate:   Allergy:    Allergies   Allergen Reactions    Amiodarone Hives    Omnipaque [Iohexol] Hives and Shortness Of Breath    Clindamycin Other (See Comments)     When taken causes cdiff immediately    Other     Sulfa Antibiotics Hives     itching    Acetazolamide Hives and Rash     Reaction Date:Unknown    Iv Dye  [Iodinated Contrast Media] Hypertension and Palpitations     Annotation - 19Mar2015: Verified with patient     Naprosyn [Naproxen] Itching and Rash     Category: Allergy;        Medications:    Current Outpatient Medications:     acetaminophen (TYLENOL) 650 mg CR tablet, Take 650 mg by mouth every 8 (eight) hours as needed for mild pain, Disp: , Rfl:     albuterol (2.5 mg/3 mL) 0.083 % nebulizer solution, Take 3 mL (2.5 mg total) by nebulization every 6 (six) hours as needed for wheezing or shortness of breath, Disp: 90 mL, Rfl: 0    albuterol (PROVENTIL HFA,VENTOLIN HFA) 90 mcg/act inhaler, Inhale 2 puffs every 6 (six) hours as needed for wheezing, Disp: 25.5 g, Rfl: 3    amLODIPine (NORVASC) 5 mg tablet, Take 1 tablet (5 mg total) by mouth daily, Disp: 90 tablet, Rfl: 1    benazepril (LOTENSIN) 40 MG tablet, Take 1 tablet (40 mg total) by mouth daily, Disp: 100 tablet, Rfl: 1    Calcium Carb-Cholecalciferol 600-5 MG-MCG TABS, Take by mouth daily, Disp: , Rfl:      denosumab (PROLIA) 60 mg/mL, Inject 60 mg under the skin once, Disp: , Rfl:     Diclofenac Sodium (VOLTAREN) 1 %, Apply 2 g topically 4 (four) times a day as needed (pain), Disp: , Rfl:     levothyroxine 100 mcg tablet, Take 1 tablet (100 mcg total) by mouth daily, Disp: 90 tablet, Rfl: 1    meclizine (ANTIVERT) 12.5 MG tablet, Take 1 tablet (12.5 mg total) by mouth 3 (three) times a day (Patient taking differently: Take 12.5 mg by mouth if needed), Disp: 60 tablet, Rfl: 0    metoprolol succinate (TOPROL-XL) 25 mg 24 hr tablet, Take 1 tablet (25 mg total) by mouth daily, Disp: 100 tablet, Rfl: 1    Multiple Vitamins-Minerals (One Daily Multivitamin Women) TABS, Apply 1 tablet to the mouth or throat daily. Indications: Treatment to Prevent Vitamin Deficiency, Disp: , Rfl:     lovastatin (MEVACOR) 20 mg tablet, Take 1 Tablet by mouth in the morning., Disp: 90 tablet, Rfl: 1    Current Facility-Administered Medications:     denosumab (PROLIA) subcutaneous injection 60 mg, 60 mg, Subcutaneous, Q6 Months, , 60 mg at 04/14/25 1234    Patient Active Problem List   Diagnosis    Trigger middle finger of right hand    Shortness of breath    HTN (hypertension)    Asthma-COPD overlap syndrome (HCC)    Paroxysmal atrial fibrillation (HCC)    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HCC)    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HCC)    Moderate persistent asthma without complication    Other fatigue    Uncomplicated asthma    Encounter for vision screening    Nonrheumatic mitral valve regurgitation    Muscle cramps    Skin cyst    Pain of left lower extremity    [place]-neck deformity of finger    Atherosclerosis of aorta (HCC)    Hypoparathyroidism, unspecified hypoparathyroidism type (HCC)    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)    Toxic encephalopathy    Anemia    Basal cell carcinoma (BCC)    Cervical radiculopathy    Spinal stenosis of cervical region    Osteoarthritis of cervical spine    Chronic migraine without aura    Contrast media  "allergy    Gastroesophageal reflux disease with esophagitis    Stress incontinence in female    Migraine aura without headache    Obstructive sleep apnea syndrome    Osteopenia    Postoperative hypothyroidism    Restless legs syndrome    S/P trigger finger release    Tricuspid valve insufficiency    Xerostomia    History of polymyalgia rheumatica    Stage 3a chronic kidney disease (HCC)    Duodenum ulcer    Closed nondisplaced fracture of sternal end of left clavicle    Pain of left clavicle    Nondisplaced fracture of lateral end of left clavicle with routine healing    Esophageal pain    Rib pain    Sliding hiatal hernia    Exudative age-related macular degeneration, bilateral, with active choroidal neovascularization (HCC)    Hypertensive kidney disease with CKD (chronic kidney disease), stage 1-4 or unspecified chronic kidney disease    Dizziness    Orthostatic hypotension    Nasopharyngeal cancer (HCC)    Occipital neuralgia       Objective:  Objective   Ht 5' 2\" (1.575 m)   Wt 51.7 kg (114 lb)   BMI 20.85 kg/m²         Left Hip Exam     Range of Motion   External rotation:  abnormal   Internal rotation: abnormal Left hip internal rotation: pain.      Back Exam     Muscle Strength   The patient has normal back strength.    Tests   Straight leg raise right: negative  Straight leg raise left: negative            Physical Exam  Musculoskeletal:      Lumbar back: Negative right straight leg raise test and negative left straight leg raise test.           Neurologic Exam    Procedures    I have personally reviewed pertinent films in PACS and my interpretation is Xrays Lumbar Spine: significant curvature and diffuse degenerative changes with possible compression fracture T12      Previous Xrays including left hip and L spine            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), " wet (HCC)     bilateral    Anemia N/A    Aneurysm of aortic root     last assessed - [id number]    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HCC)     Aortic root dilatation (HCC)     last assessed - 2017    Arthritis     Ascending aortic aneurysm (HCC)     last assessed - [id number]    Asthma     Basal cell carcinoma     last assessed - [id number]    Cancer (HCC)     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HCC)     Coronary artery disease     Diabetes mellitus (HCC)     Disease of thyroid gland     Facet arthropathy, cervical     last assessed - 2017    Headache(784.0)     All my life    History of fracture of vertebral column     Hyperlipidemia     Hypertension     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HCC)     Hypoxia     last assessed - 2015    Junctional rhythm     last assessed - 2017    Lightheadedness     last assessed - 2016    Migraine     Osteoporosis     Palpitations     last assessed - 2016    Pleural effusion, bilateral     last assessed - 2015    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    Dont know    Shortness of breath     last assessed - 2017    Stroke (HCC)     Thyroid trouble     Trigger finger     last assessed - 2016    Trigger middle finger of right hand     last assessed - 2016    Urinary incontinence     last assessed -  ; Resolved - 2017    Visual impairment 2015    Wet macular degeneration both eyes       Past Surgical History:   Procedure Laterality Date    ABDOMINAL AORTIC ANEURYSM REPAIR W/ ENDOLUMINAL GRAFT  2015    Ascending aorta and hemiarch replacement with 30 mm Vascutek Gelweave graft; Managed by Scott Archuleta; last assessed - 2016    APPENDECTOMY      BLADDER SURGERY      Reccurent histoy of bladder surgery    HOROWITZ PROCEDURE      CARDIAC CATHETERIZATION  2004    Procedure summary - Luminal irregularities; last assessed - 2015     SECTION      CORONARY ANEURYSM REPAIR      CYSTOSCOPY      botox injection    HYSTERECTOMY      IL  NDSC NJX IMPLT MATRL URT&/BLDR NCK N/A 5/19/2017    Procedure: CYSTOSCOPY; DURASPHERE-PERIURETHRAL BULKING AGENT INJECTION ;  Surgeon: Rayo Moulton MD;  Location: [facility];  Service: Urology    NY TENDON SHEATH INCISION Right 10/18/2016    Procedure: LONG FINGER TRIGGER RELEASE ;  Surgeon: Khari Najera MD;  Location: [facility];  Service: Orthopedics    THYROIDECTOMY      Total Thyroidectomy    TONSILLECTOMY AND ADENOIDECTOMY         Social History     Socioeconomic History    Marital status:      Spouse name: Not on file    Number of children: Not on file    Years of education: Not on file    Highest education level: Not on file   Occupational History    Not on file   Tobacco Use    Smoking status: Former     Current packs/day: 0.00     Average packs/day: 0.3 packs/day for 58.0 years (14.5 ttl pk-yrs)     Types: Cigarettes     Start date: 1/1/1957     Quit date: 12/25/2014     Years since quitting: 10.3    Smokeless tobacco: Never    Tobacco comments:     smoked 17 yrs 1/2 ppd quit and restarted then quit  10 days ago during Christmas 2014    Vaping Use    Vaping status: Never Used   Substance and Sexual Activity    Alcohol use: Yes     Comment: once a year    Drug use: No    Sexual activity: Not Currently   Other Topics Concern    Not on file   Social History Narrative    Not on file     Social Drivers of Health     Financial Resource Strain: Low Risk  (7/29/2024)    Received from [id number]    Financial Resource Strain     Do you have any trouble paying for your medications, or do you think you might in the future?: No     Does your family have trouble paying for medicine? (Household - for ages 0-17 years): Not on file   Food Insecurity: No Food Insecurity (4/10/2025)    Hunger Vital Sign     Worried About Running Out of Food in the Last Year: Never true     Ran Out of Food in the Last Year: Never true   Transportation Needs: No Transportation Needs (4/10/2025)    PRAPARE - Transportation     Lack of  Transportation (Medical): No     Lack of Transportation (Non-Medical): No   Physical Activity: Not on file   Stress: Not on file   Social Connections: Socially Integrated (7/29/2024)    Received from [employer]    Social [employer]     How often do you feel lonely or isolated from those around you?: Never   Intimate Partner Violence: Not on file   Housing Stability: Low Risk  (4/10/2025)    Housing Stability Vital Sign     Unable to Pay for Housing in the Last Year: No     Number of Times Moved in the Last Year: 0     Homeless in the Last Year: No       Family History   Problem Relation Age of Onset    Coronary aneurysm Sister     Coronary artery disease Sister         CABG    Cancer Sister     Heart disease Family         cardiac disorder    Hypertension Family     Cancer Family     Thyroid disease Family

## 2025-05-06 ENCOUNTER — HOME HEALTH ADMISSION (OUTPATIENT)
Dept: HOME HEALTH SERVICES | Facility: HOME HEALTHCARE | Age: OVER 89
End: 2025-05-06
Payer: COMMERCIAL

## 2025-05-07 ENCOUNTER — HOME CARE VISIT (OUTPATIENT)
Dept: HOME HEALTH SERVICES | Facility: HOME HEALTHCARE | Age: OVER 89
End: 2025-05-07
Attending: EMERGENCY MEDICINE
Payer: COMMERCIAL

## 2025-05-07 VITALS — DIASTOLIC BLOOD PRESSURE: 80 MMHG | HEART RATE: 72 BPM | SYSTOLIC BLOOD PRESSURE: 128 MMHG | OXYGEN SATURATION: 94 %

## 2025-05-07 PROCEDURE — 400013 VN SOC

## 2025-05-07 PROCEDURE — G0151 HHCP-SERV OF PT,EA 15 MIN: HCPCS

## 2025-05-08 ENCOUNTER — HOME CARE VISIT (OUTPATIENT)
Dept: HOME HEALTH SERVICES | Facility: HOME HEALTHCARE | Age: OVER 89
End: 2025-05-08
Payer: COMMERCIAL

## 2025-05-09 DIAGNOSIS — I95.1 ORTHOSTATIC HYPOTENSION: ICD-10-CM

## 2025-05-09 RX ORDER — AMLODIPINE BESYLATE 5 MG/1
5 TABLET ORAL DAILY
Qty: 90 TABLET | Refills: 1 | Status: SHIPPED | OUTPATIENT
Start: 2025-05-09

## 2025-05-13 ENCOUNTER — HOME CARE VISIT (OUTPATIENT)
Dept: HOME HEALTH SERVICES | Facility: HOME HEALTHCARE | Age: OVER 89
End: 2025-05-13
Payer: COMMERCIAL

## 2025-05-13 VITALS — HEART RATE: 62 BPM | OXYGEN SATURATION: 95 %

## 2025-05-13 PROCEDURE — G0151 HHCP-SERV OF PT,EA 15 MIN: HCPCS

## 2025-05-16 ENCOUNTER — HOME CARE VISIT (OUTPATIENT)
Dept: HOME HEALTH SERVICES | Facility: HOME HEALTHCARE | Age: OVER 89
End: 2025-05-16
Payer: COMMERCIAL

## 2025-05-16 VITALS — HEART RATE: 58 BPM | OXYGEN SATURATION: 96 %

## 2025-05-16 PROCEDURE — G0180 MD CERTIFICATION HHA PATIENT: HCPCS | Performed by: EMERGENCY MEDICINE

## 2025-05-16 PROCEDURE — G0151 HHCP-SERV OF PT,EA 15 MIN: HCPCS

## 2025-05-20 ENCOUNTER — HOME CARE VISIT (OUTPATIENT)
Dept: HOME HEALTH SERVICES | Facility: HOME HEALTHCARE | Age: OVER 89
End: 2025-05-20
Payer: COMMERCIAL

## 2025-05-20 VITALS — OXYGEN SATURATION: 96 % | HEART RATE: 57 BPM

## 2025-05-20 PROCEDURE — G0151 HHCP-SERV OF PT,EA 15 MIN: HCPCS

## 2025-05-23 ENCOUNTER — HOME CARE VISIT (OUTPATIENT)
Dept: HOME HEALTH SERVICES | Facility: HOME HEALTHCARE | Age: OVER 89
End: 2025-05-23
Payer: COMMERCIAL

## 2025-05-23 VITALS — OXYGEN SATURATION: 94 % | HEART RATE: 78 BPM

## 2025-05-23 PROCEDURE — G0151 HHCP-SERV OF PT,EA 15 MIN: HCPCS

## 2025-05-27 ENCOUNTER — HOME CARE VISIT (OUTPATIENT)
Dept: HOME HEALTH SERVICES | Facility: HOME HEALTHCARE | Age: OVER 89
End: 2025-05-27
Payer: COMMERCIAL

## 2025-05-27 VITALS — HEART RATE: 75 BPM | OXYGEN SATURATION: 96 %

## 2025-05-27 PROCEDURE — G0151 HHCP-SERV OF PT,EA 15 MIN: HCPCS

## 2025-06-12 DIAGNOSIS — I10 ESSENTIAL HYPERTENSION: ICD-10-CM

## 2025-06-12 RX ORDER — BENAZEPRIL HYDROCHLORIDE 40 MG/1
40 TABLET ORAL DAILY
Qty: 100 TABLET | Refills: 1 | Status: SHIPPED | OUTPATIENT
Start: 2025-06-12

## 2025-06-12 NOTE — TELEPHONE ENCOUNTER
Reason for call:   [x] Refill   [] Prior Auth  [] Other:     Office:   [] PCP/Provider -   [x] Specialty/Provider -     Medication: benazepril (LOTENSIN) 40 MG tablet     Dose/Frequency: Take 1 tablet (40 mg total) by mouth daily     Quantity: 100    Pharmacy: [facility] mail order pharmacy      Mail Away Pharmacy   Does the patient have enough for 10 days?   [x] Yes   [] No - Send as HP to POD

## 2025-07-21 ENCOUNTER — NURSE TRIAGE (OUTPATIENT)
Age: OVER 89
End: 2025-07-21

## 2025-07-21 NOTE — TELEPHONE ENCOUNTER
Please offer her 8:20 AM tomorrow morning at [place] to be evaluated by me, you can open the spot

## 2025-07-21 NOTE — TELEPHONE ENCOUNTER
"REASON FOR CONVERSATION: Shortness of Breath    SYMPTOMS: SOB with rest and speaks in phrases     OTHER HEALTH INFORMATION: Pt called to request office visit with DR Sandoval for SOB x's 1 week and LE swelling.  Pt dyspneic with conversation and expiratory wheezing could be heard.  Pt states her swelling looks ok in the morning and worsens as the day goes on.  Pt reported that her SOB prevents her from doing the things she wants to do.  Pt denied any chest pain, lightheadedness or dizziness.     PROTOCOL DISPOSITION: Go to ED Now    CARE ADVICE PROVIDED: Advised pt to go to ER for further evaluation but pt declined.  Pt scheduled for next available OV on 8/4 with ANY.  Pt would like to know if Dr Sandoval could order her a water pill in the interim to help with her SOB.      PRACTICE FOLLOW-UP: please advise.  Pt would like script for water pill to go to the [facility] pharmacy listed in chart.       Reason for Disposition   MODERATE difficulty breathing (e.g., speaks in phrases, SOB even at rest, pulse 100-120) of new-onset or worse than normal    Answer Assessment - Initial Assessment Questions  1. RESPIRATORY STATUS: \"Describe your breathing?\" (e.g., wheezing, shortness of breath, unable to speak, severe coughing)       SOB  2. ONSET: \"When did this breathing problem begin?\"       About a week ago   3. PATTERN \"Does the difficult breathing come and go, or has it been constant since it started?\"       Comes and goes   4. SEVERITY: \"How bad is your breathing?\" (e.g., mild, moderate, severe)       Pt SOB at rest and with conversation with RN  5. RECURRENT SYMPTOM: \"Have you had difficulty breathing before?\" If Yes, ask: \"When was the last time?\" and \"What happened that time?\"       Yes   6. CARDIAC HISTORY: \"Do you have any history of heart disease?\" (e.g., heart attack, angina, bypass surgery, angioplasty)       Diastolic HF   7. LUNG HISTORY: \"Do you have any history of lung disease?\"  (e.g., pulmonary embolus, asthma, " "emphysema)      Unsure   8. CAUSE: \"What do you think is causing the breathing problem?\"       Unsure   9. OTHER SYMPTOMS: \"Do you have any other symptoms?\" (e.g., chest pain, cough, dizziness, fever, runny nose)      Denies   10. O2 SATURATION MONITOR:  \"Do you use an oxygen saturation monitor (pulse oximeter) at home?\" If Yes, ask: \"What is your reading (oxygen level) today?\" \"What is your usual oxygen saturation reading?\" (e.g., 95%)        Unsure    Protocols used: Breathing Difficulty-Adult-OH    "

## 2025-07-22 ENCOUNTER — OFFICE VISIT (OUTPATIENT)
Dept: CARDIOLOGY CLINIC | Facility: CLINIC | Age: OVER 89
End: 2025-07-22
Payer: COMMERCIAL

## 2025-07-22 VITALS
BODY MASS INDEX: 20.22 KG/M2 | OXYGEN SATURATION: 95 % | HEART RATE: 80 BPM | SYSTOLIC BLOOD PRESSURE: 158 MMHG | DIASTOLIC BLOOD PRESSURE: 72 MMHG | WEIGHT: 109.9 LBS | HEIGHT: 62 IN

## 2025-07-22 DIAGNOSIS — I07.1 TRICUSPID VALVE INSUFFICIENCY, UNSPECIFIED ETIOLOGY: ICD-10-CM

## 2025-07-22 DIAGNOSIS — J44.89 ASTHMA-COPD OVERLAP SYNDROME (HCC): ICD-10-CM

## 2025-07-22 DIAGNOSIS — I34.0 NONRHEUMATIC MITRAL VALVE REGURGITATION: Primary | ICD-10-CM

## 2025-07-22 DIAGNOSIS — R06.02 SHORTNESS OF BREATH: ICD-10-CM

## 2025-07-22 DIAGNOSIS — I48.0 PAROXYSMAL ATRIAL FIBRILLATION (HCC): ICD-10-CM

## 2025-07-22 DIAGNOSIS — I49.3 PVC (PREMATURE VENTRICULAR CONTRACTION): ICD-10-CM

## 2025-07-22 PROCEDURE — G2211 COMPLEX E/M VISIT ADD ON: HCPCS | Performed by: INTERNAL MEDICINE

## 2025-07-22 PROCEDURE — 99215 OFFICE O/P EST HI 40 MIN: CPT | Performed by: INTERNAL MEDICINE

## 2025-07-22 RX ORDER — PREDNISONE 20 MG/1
40 TABLET ORAL DAILY
Qty: 10 TABLET | Refills: 0 | Status: SHIPPED | OUTPATIENT
Start: 2025-07-22 | End: 2025-07-27

## 2025-07-22 RX ORDER — FUROSEMIDE 20 MG/1
20 TABLET ORAL DAILY PRN
Qty: 30 TABLET | Refills: 1 | Status: SHIPPED | OUTPATIENT
Start: 2025-07-22

## 2025-07-22 NOTE — PROGRESS NOTES
Follow-up - Cardiology   Buffy Colón 89 y.o. female MRN: [id number]        Impression:    Hypertension  Currently uncontrolled in the context of an asthma flare.  Will not address at this time  Continue regular meds    Paroxysmal atrial fibrillation  No recurrence  No anticoagulation due to oropharyngeal bleeding due to her cancer mass    History of ascending aortic aneurysm  Status post repair in 2015  4.7 cm by last assessment  However at age 89, we have previously decided to forego any further imaging as she has no ability to have a reintervention at her age.    Hyperlipidemia  Controlled    Edema  Mild on the right, none on the left  She is on amlodipine  She is never had CHF    Dyspnea on exertion  Currently in an asthma flare, decreased overall breath sounds especially expiratory with faint wheezing, and very prolonged expiratory phase  She has had edema at times, now on the right-hand side although mild  Not on a routine diuretic  She does have grade 2 diastolic dysfunction but has never been diagnosed with CHF in the past    Plan:    I feel her shortness of breath may be multifactorial, but is primarily driven by acute asthma, she is actively wheezing, with diffusely decreased breath sounds and a highly prolonged expiratory phase.  After conferring with pulmonary, with whom she does follow, we will initiate a prednisone burst of 40 mg daily for 5 days.  She was advised to use her nebulizer 3-4 times a day  She was also advised to take as needed furosemide 20 mg no more than once daily for her right greater than left leg edema, but I am not convinced her primary process here is CHF at all.  She has a follow-up appointment in 2 weeks which I asked her to keep  Pulmonary will contact her for a close follow-up as well.      HPI:   Buffy Colón is a 89 y.o. 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      She comes in for an acute visit for shortness of breath.  She is short of breath at rest  She frankly denies orthopnea and feels her best when she sleeping  She has noted wheezing  She is attempted to use a nebulizer but no more than once a day  She has required steroids in the past  She has not seen pulmonary at this point since 2024 at which time she was fairly stable  She denies melena or bleeding  She denies lightheadedness, dizziness or syncope.  Her EKG reveals sinus rhythm with PVCs and bigeminy, no evidence of atrial fibrillation although she does states she feels tachycardic/palpitations        Review of Systems   Constitutional:  Negative for appetite change, diaphoresis, fatigue and fever.   Respiratory:  Positive for chest tightness, shortness of breath and wheezing.    Cardiovascular:  Negative for chest pain, palpitations and leg swelling.   Gastrointestinal:  Negative for abdominal pain and blood in stool.   Musculoskeletal:  Positive for arthralgias and back pain. Negative for joint swelling.   Skin:  Negative for rash.   Neurological:  Negative for dizziness, syncope and light-headedness.         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[facility])     Coronary artery disease     Diabetes mellitus ([facility])     Disease of thyroid gland     Facet arthropathy, cervical     last assessed - 08Jun2017    Headache(784.0)     All my life    History of fracture of vertebral column     Hyperlipidemia     Hypertension     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    Dont know    Shortness of breath     last assessed - 04Apr2017    Stroke ([facility])     Thyroid trouble     Trigger finger     last assessed - [id number]    Trigger middle finger of right hand     last assessed - 14Oct2016    Urinary incontinence     last assessed -  22Jul,2013; Resolved - 15Jan2017    Visual impairment Feb 2015    Wet macular degeneration both eyes     Social History     Substance and Sexual Activity   Alcohol Use Yes    Comment: once a year     Social History     Substance and Sexual Activity   Drug Use No     Social History     Tobacco Use   Smoking Status Former    Current packs/day: 0.00    Average packs/day: 0.3 packs/day for 58.0 years (14.5 ttl pk-yrs)    Types: Cigarettes    Start date: 1/1/1957    Quit date: 12/25/2014    Years since quitting: 10.5   Smokeless Tobacco Never   Tobacco Comments    smoked 17 yrs 1/2 ppd quit and restarted then quit  10 days ago during Christmas 2014        Allergies:  Allergies   Allergen Reactions    Amiodarone Hives    Omnipaque [Iohexol] Hives and Shortness Of Breath    Clindamycin Other (See Comments)     When taken causes cdiff immediately    Other     Sulfa Antibiotics Hives     itching    Acetazolamide Hives and Rash     Reaction Date:Unknown    Iv Dye  [Iodinated Contrast Media] Hypertension and Palpitations      Annotation - [id number]: Verified with patient     Naprosyn [Naproxen] Itching and Rash     Category: Allergy;        Medications:     Current Outpatient Medications:     acetaminophen (TYLENOL) 650 mg CR tablet, Take 650 mg by mouth every 8 (eight) hours as needed for mild pain, Disp: , Rfl:     albuterol (2.5 mg/3 mL) 0.083 % nebulizer solution, Take 3 mL (2.5 mg total) by nebulization every 6 (six) hours as needed for wheezing or shortness of breath, Disp: 90 mL, Rfl: 0    albuterol (PROVENTIL HFA,VENTOLIN HFA) 90 mcg/act inhaler, Inhale 2 puffs every 6 (six) hours as needed for wheezing, Disp: 25.5 g, Rfl: 3    amLODIPine (NORVASC) 5 mg tablet, Take 1 tablet  by mouth daily, Disp: 90 tablet, Rfl: 1    benazepril (LOTENSIN) 40 MG tablet, Take 1 tablet (40 mg total) by mouth daily, Disp: 100 tablet, Rfl: 1    Calcium Carb-Cholecalciferol 600-5 MG-MCG TABS, Take 1 Dose by mouth in the morning. one tablet daily., Disp: , Rfl:     denosumab (PROLIA) 60 mg/mL, Inject 60 mg under the skin once, Disp: , Rfl:     Diclofenac Sodium (VOLTAREN) 1 %, Apply 2 g topically 4 (four) times a day as needed (pain) (Patient taking differently: Apply 2 g topically as needed in the morning and 2 g as needed at noon and 2 g as needed in the evening and 2 g as needed before bedtime (pain). neck.), Disp: , Rfl:     furosemide (LASIX) 20 mg tablet, Take 1 tablet (20 mg total) by mouth daily as needed (swelling), Disp: 30 tablet, Rfl: 1    levothyroxine 100 mcg tablet, Take 1 tablet (100 mcg total) by mouth daily, Disp: 90 tablet, Rfl: 1    lovastatin (MEVACOR) 20 mg tablet, Take 1 Tablet by mouth in the morning., Disp: 90 tablet, Rfl: 1    meclizine (ANTIVERT) 12.5 MG tablet, Take 1 tablet (12.5 mg total) by mouth 3 (three) times a day (Patient taking differently: Take 12.5 mg by mouth if needed for dizziness), Disp: 60 tablet, Rfl: 0    metoprolol succinate (TOPROL-XL) 25 mg 24 hr tablet, Take 1 tablet (25 mg total) by mouth daily, Disp:  100 tablet, Rfl: 1    Multiple Vitamins-Minerals (One Daily Multivitamin Women) TABS, Apply 1 tablet to the mouth or throat in the morning., Disp: , Rfl:     predniSONE 20 mg tablet, Take 2 tablets (40 mg total) by mouth daily for 5 days, Disp: 10 tablet, Rfl: 0    Current Facility-Administered Medications:     denosumab (PROLIA) subcutaneous injection 60 mg, 60 mg, Subcutaneous, Q6 Months, , 60 mg at 04/14/25 1234      Vitals:    07/22/25 0803   BP: 158/72   Pulse: 80   SpO2: 95%     Weight (last 2 days)       Date/Time Weight    07/22/25 0803 49.9 (109.9)          Physical Exam  Constitutional:       General: She is not in acute distress.     Appearance: Normal appearance. She is not diaphoretic.   HENT:      Head: Normocephalic and atraumatic.     Eyes:      General: No scleral icterus.     Conjunctiva/sclera: Conjunctivae normal.      Pupils: Pupils are equal, round, and reactive to light.      Comments: Conjunctive pink   Neck:      Vascular: Hepatojugular reflux present. No JVD.     Cardiovascular:      Rate and Rhythm: Normal rate and regular rhythm.      Heart sounds: Normal heart sounds. No murmur heard.  Pulmonary:      Effort: No respiratory distress.      Breath sounds: Decreased breath sounds and wheezing present. No rhonchi or rales.     Musculoskeletal:         General: No tenderness.      Right lower leg: Edema present.      Left lower leg: Normal. No edema.     Skin:     General: Skin is warm and dry.     Neurological:      Mental Status: She is alert. Mental status is at baseline.           Laboratory Studies:  Lab Results   Component Value Date    HGBA1C 5.7 (H) 07/18/2024    HGBA1C 6.2 (H) 03/27/2024    HGBA1C 5.7 (H) 10/12/2020    HGBA1C 6.2 (H) 02/05/2015     (L) 06/16/2015     06/11/2015     06/07/2015    K 4.3 03/21/2025    K 4.2 11/17/2024    K 4.0 07/19/2024    K 4.1 06/16/2015    K 4.1 06/11/2015    K 3.9 06/07/2015    CL 99 03/21/2025     11/17/2024      "07/19/2024    CL 97 (L) 06/16/2015     06/11/2015     06/07/2015    CO2 30 03/21/2025    CO2 30 11/17/2024    CO2 31 07/19/2024    CO2 30 06/16/2015    CO2 28 06/11/2015    CO2 27 06/07/2015    GLUCOSE 138 06/16/2015    GLUCOSE 180 (H) 06/11/2015    GLUCOSE 97 06/08/2015    CREATININE 0.70 03/21/2025    CREATININE 0.64 11/17/2024    CREATININE 0.72 07/19/2024    CREATININE 1.07 06/16/2015    CREATININE 0.93 06/11/2015    CREATININE 0.66 06/07/2015    BUN 21 03/21/2025    BUN 19 11/17/2024    BUN 23 07/19/2024    BUN 23 06/16/2015    BUN 18 06/11/2015    BUN 13 06/07/2015    MG 2.1 04/17/2024    MG 2.3 05/18/2021    MG 2.1 02/22/2016    MG 1.9 06/07/2015    MG 1.7 06/06/2015    MG 1.8 04/09/2015    PHOS 4.1 04/17/2024    PHOS 3.3 06/07/2015     Lab Results   Component Value Date    WBC 7.12 03/21/2025    WBC 7.65 06/16/2015    RBC 4.03 03/21/2025    RBC 4.16 06/16/2015    HGB 12.4 03/21/2025    HGB 9.1 (L) 06/16/2015    HCT 39.1 03/21/2025    HCT 29.9 (L) 06/16/2015    MCV 97 03/21/2025    MCV 72 (L) 06/16/2015    MCH 30.8 03/21/2025    MCH 21.9 (L) 06/16/2015    RDW 14.0 03/21/2025    RDW 17.2 (H) 06/16/2015     03/21/2025     06/16/2015     NT-proBNP: No results for input(s): \"NTBNP\" in the last 72 hours.   Coags:    Lipid Profile:   Lab Results   Component Value Date    CHOL 154 06/08/2015     Lab Results   Component Value Date    HDL 80 03/21/2025     Lab Results   Component Value Date    LDLCALC 84 03/21/2025     Lab Results   Component Value Date    TRIG 79 03/21/2025       Cardiac testing:   EKG reviewed personally:    No results found for this visit on 07/22/25.    Echocardiogram   12/12/2017-LVEF 55%, normal wall thickness, grade 1 DD, trace MR, trace AI  12/19-EF normal, grade 1 diastolic dysfunction, mild AI  2/23-EF normal, grade 2 diastolic dysfunction, moderately dilated left atrium    Steve Sandoval MD    Portions of the record may have been created with voice recognition " "software.  Occasional wrong word or \"sound a like\" substitutions may have occurred due to the inherent limitations of voice recognition software.  Read the chart carefully and recognize, using context, where substitutions have occurred.    I have spent a total time of 42 minutes in caring for this patient on the day of the visit/encounter including Instructions for management, Importance of tx compliance, Impressions, Counseling / Coordination of care, and Reviewing/placing orders in the medical record (including tests, medications, and/or procedures).    "

## 2025-07-23 ENCOUNTER — TELEPHONE (OUTPATIENT)
Dept: CARDIOLOGY CLINIC | Facility: CLINIC | Age: OVER 89
End: 2025-07-23

## 2025-07-23 NOTE — TELEPHONE ENCOUNTER
Spoke to pt. Pt wanted clarification on predniSONE. Pt will take 2 tablets (40 mg total by mouth daily for 5 days. Pt verbalized understanding

## 2025-08-03 PROBLEM — E78.00 PURE HYPERCHOLESTEROLEMIA: Status: ACTIVE | Noted: 2025-08-03

## 2025-08-07 ENCOUNTER — APPOINTMENT (EMERGENCY)
Dept: RADIOLOGY | Facility: HOSPITAL | Age: OVER 89
End: 2025-08-07
Payer: COMMERCIAL

## 2025-08-07 ENCOUNTER — APPOINTMENT (OUTPATIENT)
Dept: LAB | Facility: CLINIC | Age: OVER 89
End: 2025-08-07
Attending: NURSE PRACTITIONER
Payer: COMMERCIAL

## 2025-08-07 ENCOUNTER — OFFICE VISIT (OUTPATIENT)
Dept: CARDIOLOGY CLINIC | Facility: CLINIC | Age: OVER 89
End: 2025-08-07
Payer: COMMERCIAL

## 2025-08-07 ENCOUNTER — HOSPITAL ENCOUNTER (EMERGENCY)
Facility: HOSPITAL | Age: OVER 89
Discharge: HOME | End: 2025-08-07
Attending: EMERGENCY MEDICINE
Payer: COMMERCIAL

## 2025-08-07 VITALS
BODY MASS INDEX: 20.12 KG/M2 | OXYGEN SATURATION: 96 % | SYSTOLIC BLOOD PRESSURE: 156 MMHG | DIASTOLIC BLOOD PRESSURE: 70 MMHG | WEIGHT: 110 LBS | HEART RATE: 69 BPM

## 2025-08-07 VITALS
SYSTOLIC BLOOD PRESSURE: 170 MMHG | WEIGHT: 112.43 LBS | OXYGEN SATURATION: 93 % | TEMPERATURE: 97.9 F | HEART RATE: 62 BPM | BODY MASS INDEX: 20.56 KG/M2 | RESPIRATION RATE: 18 BRPM | DIASTOLIC BLOOD PRESSURE: 77 MMHG

## 2025-08-07 DIAGNOSIS — R07.9 CHEST PAIN, UNSPECIFIED TYPE: ICD-10-CM

## 2025-08-07 DIAGNOSIS — R06.02 SHORTNESS OF BREATH: ICD-10-CM

## 2025-08-07 DIAGNOSIS — E78.00 PURE HYPERCHOLESTEROLEMIA: ICD-10-CM

## 2025-08-07 DIAGNOSIS — R07.9 CHEST PAIN, UNSPECIFIED TYPE: Primary | ICD-10-CM

## 2025-08-07 DIAGNOSIS — I48.0 PAROXYSMAL ATRIAL FIBRILLATION (HCC): ICD-10-CM

## 2025-08-07 DIAGNOSIS — N18.31 STAGE 3A CHRONIC KIDNEY DISEASE (HCC): ICD-10-CM

## 2025-08-07 DIAGNOSIS — R07.9 CHEST PAIN: Primary | ICD-10-CM

## 2025-08-07 DIAGNOSIS — I34.0 NONRHEUMATIC MITRAL VALVE REGURGITATION: ICD-10-CM

## 2025-08-07 DIAGNOSIS — I71.22 ANEURYSM OF AORTIC ARCH WITHOUT RUPTURE (HCC): ICD-10-CM

## 2025-08-07 DIAGNOSIS — I10 PRIMARY HYPERTENSION: ICD-10-CM

## 2025-08-07 DIAGNOSIS — I10 ESSENTIAL HYPERTENSION: ICD-10-CM

## 2025-08-07 LAB
2HR DELTA HS TROPONIN: 0 NG/L
ANION GAP SERPL CALCULATED.3IONS-SCNC: 5 MMOL/L (ref 4–13)
BASOPHILS # BLD AUTO: 0.05 THOUSANDS/ÂΜL (ref 0–0.1)
BASOPHILS NFR BLD AUTO: 1 % (ref 0–1)
BUN SERPL-MCNC: 19 MG/DL (ref 5–25)
CALCIUM SERPL-MCNC: 9.2 MG/DL (ref 8.4–10.2)
CARDIAC TROPONIN I PNL SERPL HS: 6 NG/L (ref 8–18)
CARDIAC TROPONIN I PNL SERPL HS: 6 NG/L (ref ?–50)
CARDIAC TROPONIN I PNL SERPL HS: 6 NG/L (ref ?–50)
CHLORIDE SERPL-SCNC: 99 MMOL/L (ref 96–108)
CO2 SERPL-SCNC: 29 MMOL/L (ref 21–32)
CREAT SERPL-MCNC: 0.66 MG/DL (ref 0.6–1.3)
EOSINOPHIL # BLD AUTO: 0.11 THOUSAND/ÂΜL (ref 0–0.61)
EOSINOPHIL NFR BLD AUTO: 1 % (ref 0–6)
ERYTHROCYTE [DISTWIDTH] IN BLOOD BY AUTOMATED COUNT: 14.3 % (ref 11.6–15.1)
GFR SERPL CREATININE-BSD FRML MDRD: 76 ML/MIN/1.73SQ M
GLUCOSE SERPL-MCNC: 126 MG/DL (ref 65–140)
HCT VFR BLD AUTO: 37.5 % (ref 34.8–46.1)
HGB BLD-MCNC: 12.3 G/DL (ref 11.5–15.4)
IMM GRANULOCYTES # BLD AUTO: 0.02 THOUSAND/UL (ref 0–0.2)
IMM GRANULOCYTES NFR BLD AUTO: 0 % (ref 0–2)
LYMPHOCYTES # BLD AUTO: 1.77 THOUSANDS/ÂΜL (ref 0.6–4.47)
LYMPHOCYTES NFR BLD AUTO: 22 % (ref 14–44)
MCH RBC QN AUTO: 30.8 PG (ref 26.8–34.3)
MCHC RBC AUTO-ENTMCNC: 32.8 G/DL (ref 31.4–37.4)
MCV RBC AUTO: 94 FL (ref 82–98)
MONOCYTES # BLD AUTO: 0.75 THOUSAND/ÂΜL (ref 0.17–1.22)
MONOCYTES NFR BLD AUTO: 10 % (ref 4–12)
NEUTROPHILS # BLD AUTO: 5.2 THOUSANDS/ÂΜL (ref 1.85–7.62)
NEUTS SEG NFR BLD AUTO: 66 % (ref 43–75)
NRBC BLD AUTO-RTO: 0 /100 WBCS
PLATELET # BLD AUTO: 193 THOUSANDS/UL (ref 149–390)
PMV BLD AUTO: 11.4 FL (ref 8.9–12.7)
POTASSIUM SERPL-SCNC: 4.1 MMOL/L (ref 3.5–5.3)
RBC # BLD AUTO: 4 MILLION/UL (ref 3.81–5.12)
SODIUM SERPL-SCNC: 133 MMOL/L (ref 135–147)
WBC # BLD AUTO: 7.9 THOUSAND/UL (ref 4.31–10.16)

## 2025-08-07 PROCEDURE — 80048 BASIC METABOLIC PNL TOTAL CA: CPT | Performed by: EMERGENCY MEDICINE

## 2025-08-07 PROCEDURE — 84484 ASSAY OF TROPONIN QUANT: CPT | Performed by: EMERGENCY MEDICINE

## 2025-08-07 PROCEDURE — 71046 X-RAY EXAM CHEST 2 VIEWS: CPT

## 2025-08-07 PROCEDURE — 85025 COMPLETE CBC W/AUTO DIFF WBC: CPT | Performed by: EMERGENCY MEDICINE

## 2025-08-07 PROCEDURE — 93005 ELECTROCARDIOGRAM TRACING: CPT

## 2025-08-07 PROCEDURE — 99285 EMERGENCY DEPT VISIT HI MDM: CPT

## 2025-08-07 PROCEDURE — 36415 COLL VENOUS BLD VENIPUNCTURE: CPT

## 2025-08-07 PROCEDURE — 84484 ASSAY OF TROPONIN QUANT: CPT

## 2025-08-07 PROCEDURE — 99214 OFFICE O/P EST MOD 30 MIN: CPT | Performed by: NURSE PRACTITIONER

## 2025-08-07 RX ORDER — AMLODIPINE BESYLATE 5 MG/1
5 TABLET ORAL EVERY 12 HOURS
Qty: 90 TABLET | Refills: 1 | Status: SHIPPED | OUTPATIENT
Start: 2025-08-07 | End: 2025-08-12 | Stop reason: SDUPTHER

## 2025-08-08 ENCOUNTER — VBI (OUTPATIENT)
Dept: FAMILY MEDICINE CLINIC | Facility: CLINIC | Age: OVER 89
End: 2025-08-08

## 2025-08-08 LAB
ATRIAL RATE: 129 BPM
ATRIAL RATE: 58 BPM
P AXIS: 105 DEGREES
P AXIS: 88 DEGREES
PR INTERVAL: 186 MS
PR INTERVAL: 188 MS
QRS AXIS: 87 DEGREES
QRS AXIS: 93 DEGREES
QRSD INTERVAL: 70 MS
QRSD INTERVAL: 78 MS
QT INTERVAL: 396 MS
QT INTERVAL: 424 MS
QTC INTERVAL: 415 MS
QTC INTERVAL: 416 MS
T WAVE AXIS: 42 DEGREES
T WAVE AXIS: 49 DEGREES
VENTRICULAR RATE: 58 BPM
VENTRICULAR RATE: 66 BPM

## 2025-08-08 PROCEDURE — 93010 ELECTROCARDIOGRAM REPORT: CPT | Performed by: STUDENT IN AN ORGANIZED HEALTH CARE EDUCATION/TRAINING PROGRAM

## 2025-08-27 PROBLEM — R07.89 OTHER CHEST PAIN: Status: ACTIVE | Noted: 2025-08-27

## (undated) DEVICE — PVC URETHRAL CATHETER: Brand: DOVER

## (undated) DEVICE — SPECIMEN CONTAINER STERILE PEEL PACK

## (undated) DEVICE — GLOVE SRG BIOGEL ORTHOPEDIC 7.5

## (undated) DEVICE — Device

## (undated) DEVICE — STERILE CYSTO PACK: Brand: CARDINAL HEALTH

## (undated) RX ORDER — CYCLOSPORINE 0.5 MG/ML
1 EMULSION OPHTHALMIC TWICE A DAY
Start: 2024-08-27